# Patient Record
Sex: FEMALE | Race: WHITE | NOT HISPANIC OR LATINO | Employment: FULL TIME | URBAN - METROPOLITAN AREA
[De-identification: names, ages, dates, MRNs, and addresses within clinical notes are randomized per-mention and may not be internally consistent; named-entity substitution may affect disease eponyms.]

---

## 2017-01-04 ENCOUNTER — HOSPITAL ENCOUNTER (OUTPATIENT)
Dept: LAB | Facility: MEDICAL CENTER | Age: 47
End: 2017-01-04
Attending: INTERNAL MEDICINE
Payer: COMMERCIAL

## 2017-01-04 DIAGNOSIS — Z79.899 ENCOUNTER FOR LONG-TERM (CURRENT) USE OF HIGH-RISK MEDICATION: ICD-10-CM

## 2017-01-04 LAB
ANION GAP SERPL CALC-SCNC: 10 MMOL/L (ref 0–11.9)
BUN SERPL-MCNC: 18 MG/DL (ref 8–22)
CALCIUM SERPL-MCNC: 9.7 MG/DL (ref 8.5–10.5)
CHLORIDE SERPL-SCNC: 102 MMOL/L (ref 96–112)
CO2 SERPL-SCNC: 24 MMOL/L (ref 20–33)
CREAT SERPL-MCNC: 0.89 MG/DL (ref 0.5–1.4)
CRP SERPL HS-MCNC: 0.33 MG/DL (ref 0–0.75)
ERYTHROCYTE [SEDIMENTATION RATE] IN BLOOD BY WESTERGREN METHOD: 8 MM/HOUR (ref 0–20)
GLUCOSE SERPL-MCNC: 115 MG/DL (ref 65–99)
POTASSIUM SERPL-SCNC: 4.1 MMOL/L (ref 3.6–5.5)
SODIUM SERPL-SCNC: 136 MMOL/L (ref 135–145)

## 2017-01-04 PROCEDURE — 85652 RBC SED RATE AUTOMATED: CPT

## 2017-01-04 PROCEDURE — 80048 BASIC METABOLIC PNL TOTAL CA: CPT

## 2017-01-04 PROCEDURE — 36415 COLL VENOUS BLD VENIPUNCTURE: CPT

## 2017-01-04 PROCEDURE — 86140 C-REACTIVE PROTEIN: CPT

## 2017-01-06 ENCOUNTER — OFFICE VISIT (OUTPATIENT)
Dept: MEDICAL GROUP | Facility: MEDICAL CENTER | Age: 47
End: 2017-01-06
Payer: COMMERCIAL

## 2017-01-06 VITALS
SYSTOLIC BLOOD PRESSURE: 128 MMHG | DIASTOLIC BLOOD PRESSURE: 84 MMHG | BODY MASS INDEX: 34.72 KG/M2 | TEMPERATURE: 98.2 F | OXYGEN SATURATION: 98 % | HEIGHT: 66 IN | HEART RATE: 80 BPM | WEIGHT: 216 LBS

## 2017-01-06 DIAGNOSIS — Z00.00 PREVENTATIVE HEALTH CARE: Chronic | ICD-10-CM

## 2017-01-06 DIAGNOSIS — F41.9 ANXIETY: ICD-10-CM

## 2017-01-06 DIAGNOSIS — M13.0 POLYARTHRITIS: ICD-10-CM

## 2017-01-06 DIAGNOSIS — Z23 NEED FOR PNEUMOCOCCAL VACCINATION: ICD-10-CM

## 2017-01-06 DIAGNOSIS — I00 RHEUMATIC FEVER: Chronic | ICD-10-CM

## 2017-01-06 DIAGNOSIS — E66.9 NON MORBID OBESITY, UNSPECIFIED OBESITY TYPE: ICD-10-CM

## 2017-01-06 DIAGNOSIS — M54.5 LOW BACK PAIN, UNSPECIFIED BACK PAIN LATERALITY, UNSPECIFIED CHRONICITY, WITH SCIATICA PRESENCE UNSPECIFIED: Chronic | ICD-10-CM

## 2017-01-06 PROCEDURE — 90732 PPSV23 VACC 2 YRS+ SUBQ/IM: CPT | Performed by: INTERNAL MEDICINE

## 2017-01-06 PROCEDURE — 99214 OFFICE O/P EST MOD 30 MIN: CPT | Mod: 25 | Performed by: INTERNAL MEDICINE

## 2017-01-06 PROCEDURE — 90471 IMMUNIZATION ADMIN: CPT | Performed by: INTERNAL MEDICINE

## 2017-01-06 RX ORDER — LORAZEPAM 1 MG/1
1 TABLET ORAL PRN
Qty: 5 TAB | Refills: 0 | Status: SHIPPED | OUTPATIENT
Start: 2017-01-06 | End: 2017-01-06 | Stop reason: SDUPTHER

## 2017-01-06 RX ORDER — LORAZEPAM 1 MG/1
1 TABLET ORAL PRN
Qty: 5 TAB | Refills: 0 | Status: SHIPPED
Start: 2017-01-06 | End: 2017-03-21

## 2017-01-06 NOTE — PROGRESS NOTES
Subjective:      Janett Mnculty is a 46 y.o. female who presents with polyarthritis         HPI    Here for follow up polyarthritis sees  and tapering down prednisone 12.5 mg and joint pains still there but going down every week 2.5 mg, now both knees are painful and the right knee mri did show avascular necrosis, did see  and has mri left knee and ankles and pelvis and that test is pending, patient is claustrophobic with mri and will require sedation  Overall pain scale 5-6 /10 compared to 8-10 scale last month  Joints bother her with prolonged standing and walking   Has had echo recently showing normal heart valves  Swelling improving feet and ankles  No GI upset with prednisone, weight increased on prednisone, no skin rashes, no nodule recurrence, no fever or chills  No nsaids   On penicillin 250 mg twice daily for 5 years by infectious disease recommendation, recent echo no valvular dysfunction, no chest pain, short of breath, palpitations, fevers or chills, no rash or diarrhea related to prednisone therapy  No new skin rashes, no new nodules  Denies anxiety or depression on prednisone      Current Outpatient Prescriptions   Medication Sig Dispense Refill   • ascorbic acid (ASCORBIC ACID) 500 MG Tab Take 500 mg by mouth every day.     • naproxen (NAPROSYN) 500 MG Tab Take 500 mg by mouth 2 times a day, with meals.     • prednisONE (DELTASONE) 2.5 MG Tab Take 15 mg in evening daily 180 Tab 0   • predniSONE (DELTASONE) 20 MG Tab Take 1 Tab by mouth 2 times a day. 60 Tab 0   • TURMERIC PO Take  by mouth.     • therapeutic multivitamin-minerals (THERAGRAN-M) Tab Take 1 Tab by mouth every day.     • vitamin D (CHOLECALCIFEROL) 1000 UNIT Tab Take 1,000 Units by mouth every day.     • fluticasone (FLONASE) 50 MCG/ACT nasal spray Spray 2 Sprays in nose every day. (Patient taking differently: Spray 2 Sprays in nose as needed.) 16 g 3   • vitamin e (VITAMIN E) 400 UNIT CAPS Take 400 Units by mouth  every day.     • loratadine (CLARITIN) 10 MG TABS Take 10 mg by mouth as needed.       No current facility-administered medications for this visit.     Patient Active Problem List    Diagnosis Date Noted   • Avascular necrosis of right femur (HCC) 12/23/2016   • Obesity 09/16/2016   • Rheumatic fever 09/16/2016   • Foot pain, right 09/30/2015   • Perimenopausal 09/25/2015   • Dyslipidemia 09/18/2015   • Allergic rhinitis due to animal hair and dander 09/12/2013   • S/P bunionectomy 06/07/2013   • Varicose vein 10/07/2011   • S/P appendectomy 06/01/2011   • Left hip revision 2002 06/01/2011   • Left ankle surgery 06/01/2011   • Right hip arthroscopy  06/01/2011   • Low back pain 06/01/2011   • Cervical pain (neck) 06/01/2011   • IBS (irritable bowel syndrome) 06/01/2011   • Preventative health care 06/01/2011           Varicose vein excision left lower extremity    Status post bunionectomy  8/9/13  podiatry; right foot martín bunionectomy, right foot first metatarsophalangeal joint bunion/degenerative arthritis     Status post appendectomy    Right hip arthroscopy  2007  ortho right hip arthroscopy  2/11/14  note bilateral greater trochanter injection under ultrasound guidance    Rheumatic fever  8/29/16 streptococcal pharyngitis positive strep test, treated with augmentin, developed erythema nodosum lower extremities and palms given NSAIDs and medrol dosepack  9/22/16 repeat medrol dose pack x 1 still with painful erythema nodosum nodules  9/24/16 erythema nodosum nodules and arthritic-type pains, we will retreat with penicillin V 500 mg 3 times a day ×10 days  10/5/16 high dose aspirin no benefit, changed to prednisone 20 mg daily, she has an appointment with me in 2 days  10/7/16 echo ordered, EKG done  10/10/16 cbc,cmp,tsh normal,ESR 40,CRP 0.3, repeat throat culture negative, blood cultures negative,  10/19/16 increase prednisone to 40 mg x 3 days then back to 20  mg  10/19/16 daughter diagnosis strep throat given antibiotics, we will treat prophylactically provided patient penicillin V 500 mg tid x 10 days  10/21/16 echo normal LV size and function, EF 65%, trace MR structurally normal mitral valve, mild TR and RVSP 30  10/24/16 on prednisone 40 mg qday unable to taper to 20 mg due to flare up of pain will try 20 mg bid then taper to 20 mg am and 10 mg qpm over the next week  10/26/16  infectious disease recommended secondary prophylaxis penicillin  mg bid x 5 years  11/7/16 still with polyarthritis, on prednisone 20 mg twice a day, repeat labs, still on penicillin, will speak with rheumatology about referral  11/8/16 ESR 15,CRP 0.3,wbc 10.9 (on prednisone),hgb 14,hct 43,cmp normal,RF,C3C4,TPO,lyme,MERA,HLA B27 negative, Factin IgG 22 (0-19),parietal cell Ab 25(0-25)  11/14/16  rheumatolog note, features consistent with rheumatic fever, also consider seronegative spondyloarthropathies, sarcoidosis, rheumatoid arthritis, will check sacroiliac films, hand and feet x-rays, follow-up one week  11/17/16 refill prednisone  11/18/16 ACE serum level normal, G6PD ad vitamin 1,25 d level normal  11/21/16 cxr negative  11/21/16 xray SI joints negative for erosions  11/29/16 x-ray joint survey hands, feet, ankles no evidence of inflammatory arthropathy  11/29/16 CT soft tissue neck without; negative  12/7/16  cardiology repeat echo in 3-4 weeks  12/10/16 MRI right knee multiple areas right knee avascular necrosis medial and lateral femoral condyle, medial and lateral tibial plateau, bone marrow edema  12/23/16  rheumatology note, inflammatory markers did normalize with steroids, rheumatoid factor, CCP, MERA,ANKA negative continue to taper steroids given osteonecrosis alternating 17.5 mg and 15 mg, and 15 mg, and 15 mg alternating with 12.5 mg, and so forth  12/27/16 echo LV ejection fraction 60%, no valvular disease    Preventative health  11/05 [facility]  colon negative  9/25/15 tdap  10/7/15 pap per gyn  negative  9/29/16 chol 190,trig 52,hdl 65,ldl 115  10/110/16 vit d 31  10/26/16 mammogram    Perimenopausal  9/25/15 on climara patch and progesterone tab per  gyn    Low back pain  10/10 MRI LS L5-S1 minimal disc bulge  1/11  at the pain management left L3-S1 facet joint injection, right L3-S1 facet joint injection  2/11  pain management right L3-S1 lateral, medial, dorsal ramus nerve block  4/11  pain management left L3-S1 medial, lateral, bursal ramus nerve block  5/11  pain management bilateral SI joint injection under fluoroscopy  7/11  pain note bilat trochanteric bursitis injection  3/12  pain note, right and left L3-L4 L5-S1 medial, dorsal, lateral branch ablation  12/12  pain note, left L3-S1 dorsal and medial branch radiofrequency ablation  1/4/13  pain note; right L3-S1 radiofrequency ablation  8/14/13  pain note  9/29/13  pain note, left L3-S1 nerve frequency ablation  10/4/13  pain note, status post right L3-S1 FJNA  12/19/13 pain note; status post right SIJI injection, continue voltaren gel, TENS, IT stretches  5/16/14  pain note; left L3-S1 medial, partial, lateral branch radiofrequency ablation under fluoroscopy  6/6/14  right L3-S1 FJNA  7/3/14  pain note; continue TENS,celebrex 200 mg qday, voltaren gel 1%  9/10/14  pain note; continue TENS, lidoderm patch,voltaren gel, celebrex 200 mg qday    Left hip revision  History of left hip open decompression and osteotomy  12/03  left hip implant removal  11/10  ortho x-ray stable decompression left femoral head neck junction  11/12  pain management note bilateral trochanteric bursitis injection  5/2/13  pain note, bilateral trochanteric bursal  "injection    Left ankle surgery 2006 ligament reconstruction    IBS    Foot pain  8/5/15 MRI right foot severe artifact secondary to first MTP are clear, limiting analysis of soft tissue, highly likely complete FHL tear  8/28/15  orthopedic note right ankle FHL tendon rupture seen on MRI, do not recommend surgical repair at this time which would involve hemiarthroplasty, implant, first MTP fusion with bone graft, followup podiatry     Dyslipidemia  9/29/14 chol 186,trig 46,hdl 76,ldl 101  9/18/15 chol 225,trig 50,hdl 77,ldl 138    Cervical pain  10/11 MRI cervical spine C4-C5 tiny disc bulge, C5-C6 bilateral and plate spurring with uncinate hypertrophy  10/7/16 MRI cervical spine C5-C6 small broad-based osteophyte complex, borderline foraminal stenosis, no significant progression of disease since 2010    avascular necrosis right femur  12/10/16 MRI right knee multiple areas right knee avascular necrosis medial and lateral femoral condyle, medial and lateral tibial plateau, bone marrow edema  12/23/16  rheumatology note, inflammatory markers did normalize with steroids, rheumatoid factor, CCP, MERA,ANKA negative continue to taper steroids given osteonecrosis alternating 17.5 mg and 15 mg, and 15 mg, and 15 mg alternating with 12.5 mg, and so forth        ROS       Objective:     /84 mmHg  Pulse 80  Temp(Src) 36.8 °C (98.2 °F)  Ht 1.676 m (5' 6\")  Wt 97.977 kg (216 lb)  BMI 34.88 kg/m2  SpO2 98%     Physical Exam   Constitutional: She appears well-developed and well-nourished. No distress.   HENT:   Head: Normocephalic and atraumatic.   Eyes: Conjunctivae are normal. Right eye exhibits no discharge. Left eye exhibits no discharge.   Neck: No JVD present. No thyromegaly present.   Cardiovascular: Normal rate and regular rhythm.    No murmur heard.  Pulmonary/Chest: Effort normal. No respiratory distress. She has no wheezes.   Abdominal: She exhibits no distension.   Neurological: She " is alert.   Skin: She is not diaphoretic.   Psychiatric: She has a normal mood and affect. Her behavior is normal.   Nursing note and vitals reviewed.    No spinal tenderness, no hip tenderness, right knee normal range of motion, no crepitus, left knee normal range of motion, left hip and right hip normal flexion, extension, internal and external rotation  Normal dorsiflexion and plantar flexion  Trace lower extremity edema  Normal affect, insight, judgment          Assessment/Plan:     Assessment  #!  Polyarthralgias initially onset after streptococcal pharyngitis, did have nodules and findings consistent with rheumatic fever, nodules have subsided, she completed a course of penicillin for the streptococcal pharyngitis, currently followed by rheumatology, inflammatory markers including sedimentation rate and C-reactive protein have returned to baseline normal most recent tests done on January 4, she still has pain in her joints but not as severe, mostly affecting knees and ankles.  Remains on prednisone taper 12.5 milligrams per day, taper per rheumatology    #2 avascular necrosis right knee by MRI, also has left knee and ankle pain, has been seen by orthopedics  and MRIs of those other joints have been ordered as well    #3 weight gain related to prednisone    #4 history streptococcal pharyngitis and rheumatic fever, on long-term insulin therapy per infectious disease      Plan  #! Ativan for pre procedure sedation for upcoming MRI ordered per orthopedics    #2 MRI left knee, bilateral ankles and pelvis per surgery     #3 continue prednisone taper per rheumatology    #4 continue prednisone 250 mg twice a day ×5 years per infectious disease    #5 monitor for rheumatic disease affecting mitral valve per cardiology, recent echo revealed normal valvular function    #6 pneumococcal 23 vaccination because she has been on chronic prednisone therapy    #7 patient counseling and education with regards to  antibiotics, prednisone, rheumatic fever, discussed.  Follow-up orthopedics and rheumatology, greater than 50% of the visit spent discussing diet, nutrition, possibly starting aquatic therapy for weight gain and exercise, continue minimize alcohol, no tobacco, prednisone taper, monitor for side effects on prednisone, continuing penicillin twice a day dosing for 5 years    #8 minimize NSAIDs on prednisone

## 2017-01-06 NOTE — MR AVS SNAPSHOT
"        Janett Ann Page   2017 10:20 AM   Office Visit   MRN: [id number]    Department:  [facility]   Dept Phone:  [phone]    Description:  Female : 1970   Provider:  Franklin Gonzalez M.D.           Allergies as of 2017     Allergen Noted Reactions    Morphine 2008   Rash    Other Food 2013   Rash    Onions-cause headaches and facial flushing      You were diagnosed with     Rheumatic fever   [646933]       Need for pneumococcal vaccination   [897087]       Low back pain, unspecified back pain laterality, unspecified chronicity, with sciatica presence unspecified   [1034774]       Polyarthritis   [324330]       Anxiety   [473387]         Vital Signs     Blood Pressure Pulse Temperature Height Weight Body Mass Index    128/84 mmHg 80 36.8 °C (98.2 °F) 1.676 m (5' 6\") 97.977 kg (216 lb) 34.88 kg/m2    Oxygen Saturation Smoking Status                98% Never Smoker           Basic Information     Date Of Birth Sex Race Ethnicity Preferred Language    1970 Female White Non- English      Your appointments     2017  8:30 AM   Follow Up Visit with Starr Jackson M.D.   [facility] (--)    [address] [place] [address], [facility] [address]  [facility] [id number] [phone]           You will be receiving a confirmation call a few days before your appointment from our automated call confirmation system.            2017  2:00 PM   MR EXTREMITY WITHOUT (30) with [facility] MRI 1   IMAGING [facility] ([place])    [address]  [facility] [address]   [phone]            2017  2:30 PM   MR EXTREMITY WITHOUT (30) with PEREZ MRI 1   IMAGING [facility] ([place])    [address]  [facility] [address]   [phone]            2017  3:00 PM   MR EXTREMITY WITHOUT (30) with PEREZ MRI 1   IMAGING [facility] ([place])    [address]  [facility] [address]   [phone]            2017  3:30 PM   MR BODY WITH with [facility] MRI 1   IMAGING [facility] ([place])    [address] [facility]  [place] [address]   [phone]            Mar 08, 2017  8:00 AM   FOLLOW UP with Bryan Armenta M.D.   [facility] (--)    [address]  [place] [id number] [phone]              Problem List              ICD-10-CM Priority Class Noted - Resolved    S/P appendectomy Z90.49   6/1/2011 - Present    Left hip revision 2002 M21.959   6/1/2011 - Present    Left ankle surgery M25.572   6/1/2011 - Present    Right hip arthroscopy  M25.551   6/1/2011 - Present    Low back pain (Chronic) M54.5   6/1/2011 - Present    Cervical pain (neck) M54.2   6/1/2011 - Present    IBS (irritable bowel syndrome) K58.9   6/1/2011 - Present    Preventative health care (Chronic) Z00.00   6/1/2011 - Present    Varicose vein I86.8   10/7/2011 - Present    S/P bunionectomy Z98.890   6/7/2013 - Present    Allergic rhinitis due to animal hair and dander (Chronic) J30.81   9/12/2013 - Present    Dyslipidemia E78.5   9/18/2015 - Present    Perimenopausal (Chronic) N95.1   9/25/2015 - Present    Foot pain, right (Chronic) M79.671   9/30/2015 - Present    Obesity E66.9   9/16/2016 - Present    Rheumatic fever (Chronic) I00   9/16/2016 - Present    Avascular necrosis of right femur (HCC) (Chronic) M87.051   12/23/2016 - Present      Health Maintenance        Date Due Completion Dates    MAMMOGRAM 10/26/2017 10/26/2016, 9/15/2015, 9/8/2014, 8/29/2013, 4/22/2004    PAP SMEAR 10/7/2018 10/7/2015    IMM DTaP/Tdap/Td Vaccine (2 - Td) 9/25/2025 9/25/2015            Current Immunizations     Influenza TIV (IM) 12/9/2013, 1/9/2013    Influenza Vaccine Quad Inj (Preserved) 12/13/2016, 10/20/2015  2:00 AM, 11/19/2014    Pneumococcal polysaccharide vaccine (PPSV-23)  Incomplete    Tdap Vaccine 9/25/2015 11:06 AM    Tuberculin Skin Test 5/5/2014  1:40 PM, 6/3/2013  1:20 PM, 6/4/2012 12:30 PM, 7/1/2011      Below and/or attached are the medications your provider expects you to take.  Review all of your home medications and newly ordered medications with your provider and/or pharmacist. Follow medication instructions as directed by your provider and/or pharmacist. Please keep your medication list with you and share with your provider. Update the information when medications are discontinued, doses are changed, or new medications (including over-the-counter products) are added; and carry medication information at all times in the event of emergency situations     Allergies:  MORPHINE - Rash     OTHER FOOD - Rash               Medications  Valid as of: January 06, 2017 - 11:09 AM    Generic Name Brand Name Tablet Size Instructions for use    Ascorbic Acid (Tab) ascorbic acid 500 MG Take 500 mg by mouth every day.        Cholecalciferol (Tab) cholecalciferol 1000 UNIT Take 1,000 Units by mouth every day.        Fluticasone Propionate (Suspension) FLONASE 50 MCG/ACT Spray 2 Sprays in nose every day.        Loratadine (Tab) CLARITIN 10 MG Take 10 mg by mouth as needed.        LORazepam (Tab) ATIVAN 1 MG Take 1 Tab by mouth as needed for Anxiety (take one hour before procedure, max one per day).        Multiple Vitamins-Minerals (Tab) THERAGRAN-M  Take 1 Tab by mouth every day.        Naproxen (Tab) NAPROSYN 500 MG Take 500 mg by mouth 2 times a day, with meals.        PredniSONE (Tab) DELTASONE 20 MG Take 1 Tab by mouth 2 times a day.        PredniSONE (Tab) DELTASONE 2.5 MG Take 15 mg in evening daily        Turmeric   Take  by mouth.        Vitamin E (Cap) VITAMIN E 400 UNIT Take 400 Units by mouth every day.        .                 Medicines prescribed today were sent to:     [id number] - [place], [place] - [address] [address]    [address] [address] [place] [address]    Phone: [phone] Fax: [phone]    Open 24 Hours?: No      Medication refill instructions:       If your prescription bottle indicates you have medication refills left, it is not necessary to call your provider’s office.  Please contact your pharmacy and they will refill your medication.    If your prescription bottle indicates you do not have any refills left, you may request refills at any time through one of the following ways: The online [employer] system (except Urgent Care), by calling your provider’s office, or by asking your pharmacy to contact your provider’s office with a refill request. Medication refills are processed only during regular business hours and may not be available until the next business day. Your provider may request additional information or to have a follow-up visit with you prior to refilling your medication.   *Please Note: Medication refills are assigned a new Rx number when refilled electronically. Your pharmacy may indicate that no refills were authorized even though a new prescription for the same medication is available at the pharmacy. Please request the medicine by name with the pharmacy before contacting your provider for a refill.        Other Notes About Your Plan         12/1/16 wbc 11.5 (81%N,13%L),ESR 10,CRP 0.02,CCP 2, MERA neg,anti-DS DNA neg,uric 4.9,, hiv negative, ANCA neg,SSA and SSB negative,Sm Ab negative,Rnp negative,C3 and C4 negative,hep B sAb,hep B sAg and hep B cAb negative, hep C Ab negative, quantiferon gold negative,anti-thyroglobulin Ab negative, t.pallidum Ab negative   12/1/16 bun 23,creat 1.2,GFR 45  12/10/16 MRI right knee multiple areas right knee avascular necrosis medial and lateral femoral condyle, medial and lateral tibial plateau, bone marrow edema  12/23/16  rheumatology note, inflammatory markers did normalize with steroids, rheumatoid factor, CCP, MERA,ANKA negative continue to taper steroids given osteonecrosis alternating 17.5 mg and 15 mg, and 15 mg, and 15 mg alternating with 12.5 mg, and so forth                   [employer] Access Code: Activation code not generated  Current [employer] Status: Active

## 2017-01-12 ENCOUNTER — OFFICE VISIT (OUTPATIENT)
Dept: RHEUMATOLOGY | Facility: PHYSICIAN GROUP | Age: 47
End: 2017-01-12
Payer: COMMERCIAL

## 2017-01-12 VITALS
OXYGEN SATURATION: 96 % | RESPIRATION RATE: 12 BRPM | TEMPERATURE: 97.5 F | HEART RATE: 88 BPM | SYSTOLIC BLOOD PRESSURE: 140 MMHG | BODY MASS INDEX: 34.56 KG/M2 | WEIGHT: 214 LBS | DIASTOLIC BLOOD PRESSURE: 90 MMHG

## 2017-01-12 DIAGNOSIS — M79.672 PAIN IN BOTH FEET: ICD-10-CM

## 2017-01-12 DIAGNOSIS — I00 RHEUMATIC FEVER: Chronic | ICD-10-CM

## 2017-01-12 DIAGNOSIS — M54.5 LOW BACK PAIN, UNSPECIFIED BACK PAIN LATERALITY, UNSPECIFIED CHRONICITY, WITH SCIATICA PRESENCE UNSPECIFIED: Chronic | ICD-10-CM

## 2017-01-12 DIAGNOSIS — Z79.899 ENCOUNTER FOR LONG-TERM (CURRENT) USE OF HIGH-RISK MEDICATION: ICD-10-CM

## 2017-01-12 DIAGNOSIS — M79.671 PAIN IN BOTH FEET: ICD-10-CM

## 2017-01-12 PROCEDURE — 99214 OFFICE O/P EST MOD 30 MIN: CPT | Performed by: INTERNAL MEDICINE

## 2017-01-12 RX ORDER — PENICILLIN V POTASSIUM 250 MG/1
250 TABLET ORAL 4 TIMES DAILY
COMMUNITY
End: 2017-09-11

## 2017-01-12 ASSESSMENT — ENCOUNTER SYMPTOMS
FEVER: 0
CHILLS: 0
BACK PAIN: 1

## 2017-01-12 NOTE — MR AVS SNAPSHOT
Janett Urban Page   2017 8:30 AM   Office Visit   MRN: [id number]    Department:  [facility]umatology [facility]   Dept Phone:  [phone]    Description:  Female : 1970   Provider:  Starr Jackson M.D.           Reason for Visit     Follow-Up follow up      Allergies as of 2017     Allergen Noted Reactions    Morphine 2008   Rash    Other Food 2013   Rash    Onions-cause headaches and facial flushing      You were diagnosed with     Pain in both feet   [401401]         Vital Signs     Blood Pressure Pulse Temperature Respirations Weight Oxygen Saturation    140/90 mmHg 88 36.4 °C (97.5 °F) 12 97.07 kg (214 lb) 96%    Smoking Status                   Never Smoker            Basic Information     Date Of Birth Sex Race Ethnicity Preferred Language    1970 Female White Non- English      Your appointments     2017  2:00 PM   MR EXTREMITY WITHOUT (30) with [facility] MRI 1   [facility] ([facility])    [address]  [place] [address]   [phone]            2017  2:30 PM   MR EXTREMITY WITHOUT (30) with PEREZ MRI 1   [facility] ([facility])    [address]  [place] [address]   [phone]            2017  3:00 PM   MR EXTREMITY WITHOUT (30) with PEREZ MRI 1   [facility]ING [facility] ([facility])    [address]  [place] [address]   [phone]            2017  3:30 PM   MR BODY WITH with PEREZ MRI 1   IMAGING [facility] ([facility])    [address]  [place] [address]   [phone]            Feb 10, 2017 11:00 AM   Established Patient with Franklin Gonzalez M.D.   [facility] ([facility])    [address]  [facility] [id number] [phone]           You will be receiving a confirmation call a few days before your appointment from our automated call confirmation system.            2017  8:00 AM   Follow Up Visit with Starr Jackson M.D.   [facility] (--)    [address]  [place]  [place] [id number] [phone]           You will be receiving a confirmation call a few days before your appointment from our automated call confirmation system.            Mar 08, 2017  8:00 AM   FOLLOW UP with Bryan Armenta M.D.   [facility] (--)    [address]  [place] [id number] [phone]              Problem List              ICD-10-CM Priority Class Noted - Resolved    S/P appendectomy Z90.49   6/1/2011 - Present    Left hip revision 2002 M21.959   6/1/2011 - Present    Left ankle surgery M25.572   6/1/2011 - Present    Right hip arthroscopy  M25.551   6/1/2011 - Present    Low back pain (Chronic) M54.5   6/1/2011 - Present    Cervical pain (neck) M54.2   6/1/2011 - Present    IBS (irritable bowel syndrome) K58.9   6/1/2011 - Present    Preventative health care (Chronic) Z00.00   6/1/2011 - Present    Varicose vein I86.8   10/7/2011 - Present    S/P bunionectomy Z98.890   6/7/2013 - Present    Allergic rhinitis due to animal hair and dander (Chronic) J30.81   9/12/2013 - Present    Dyslipidemia E78.5   9/18/2015 - Present    Perimenopausal (Chronic) N95.1   9/25/2015 - Present    Foot pain, right (Chronic) M79.671   9/30/2015 - Present    Obesity E66.9   9/16/2016 - Present    Rheumatic fever (Chronic) I00   9/16/2016 - Present    Avascular necrosis of right femur (HCC) (Chronic) M87.051   12/23/2016 - Present      Health Maintenance        Date Due Completion Dates    MAMMOGRAM 10/26/2017 10/26/2016, 9/15/2015, 9/8/2014, 8/29/2013, 4/22/2004    PAP SMEAR 10/7/2018 10/7/2015    IMM DTaP/Tdap/Td Vaccine (2 - Td) 9/25/2025 9/25/2015            Current Immunizations     Influenza TIV (IM) 12/9/2013, 1/9/2013    Influenza Vaccine Quad Inj (Preserved) 12/13/2016, 10/20/2015  2:00 AM, 11/19/2014    Pneumococcal polysaccharide vaccine (PPSV-23) 1/6/2017 11:10 AM    Tdap Vaccine 9/25/2015 11:06 AM    Tuberculin Skin Test 5/5/2014  1:40 PM, 6/3/2013  1:20 PM,  6/4/2012 12:30 PM, 7/1/2011      Below and/or attached are the medications your provider expects you to take. Review all of your home medications and newly ordered medications with your provider and/or pharmacist. Follow medication instructions as directed by your provider and/or pharmacist. Please keep your medication list with you and share with your provider. Update the information when medications are discontinued, doses are changed, or new medications (including over-the-counter products) are added; and carry medication information at all times in the event of emergency situations     Allergies:  MORPHINE - Rash     OTHER FOOD - Rash               Medications  Valid as of: January 12, 2017 -  9:53 AM    Generic Name Brand Name Tablet Size Instructions for use    Ascorbic Acid (Tab) ascorbic acid 500 MG Take 500 mg by mouth every day.        Cholecalciferol (Tab) cholecalciferol 1000 UNIT Take 1,000 Units by mouth every day.        Fluticasone Propionate (Suspension) FLONASE 50 MCG/ACT Spray 2 Sprays in nose every day.        Loratadine (Tab) CLARITIN 10 MG Take 10 mg by mouth as needed.        LORazepam (Tab) ATIVAN 1 MG Take 1 Tab by mouth as needed for Anxiety (take one hour before procedure, max one per day).        Multiple Vitamins-Minerals (Tab) THERAGRAN-M  Take 1 Tab by mouth every day.        Naproxen (Tab) NAPROSYN 500 MG Take 500 mg by mouth 2 times a day, with meals.        Penicillin V Potassium (Tab) VEETID 250 MG Take 250 mg by mouth 4 times a day.        PredniSONE (Tab) DELTASONE 20 MG Take 1 Tab by mouth 2 times a day.        PredniSONE (Tab) DELTASONE 2.5 MG Take 15 mg in evening daily        Turmeric   Take  by mouth.        Vitamin E (Cap) VITAMIN E 400 UNIT Take 400 Units by mouth every day.        .                 Medicines prescribed today were sent to:     [id number] - [place], [place] - [address] [address]    [address] [address] [address]    Phone: [phone] Fax:  [phone]    Open 24 Hours?: No      Medication refill instructions:       If your prescription bottle indicates you have medication refills left, it is not necessary to call your provider’s office. Please contact your pharmacy and they will refill your medication.    If your prescription bottle indicates you do not have any refills left, you may request refills at any time through one of the following ways: The online [employer] system (except Urgent Care), by calling your provider’s office, or by asking your pharmacy to contact your provider’s office with a refill request. Medication refills are processed only during regular business hours and may not be available until the next business day. Your provider may request additional information or to have a follow-up visit with you prior to refilling your medication.   *Please Note: Medication refills are assigned a new Rx number when refilled electronically. Your pharmacy may indicate that no refills were authorized even though a new prescription for the same medication is available at the pharmacy. Please request the medicine by name with the pharmacy before contacting your provider for a refill.        Your To Do List     Future Labs/Procedures Complete By Expires    MR-FOOT-W/O LEFT  As directed 1/12/2018      Other Notes About Your Plan         12/1/16 wbc 11.5 (81%N,13%L),ESR 10,CRP 0.02,CCP 2, MERA neg,anti-DS DNA neg,uric 4.9,, hiv negative, ANCA neg,SSA and SSB negative,Sm Ab negative,Rnp negative,C3 and C4 negative,hep B sAb,hep B sAg and hep B cAb negative, hep C Ab negative, quantiferon gold negative,anti-thyroglobulin Ab negative, t.pallidum Ab negative   12/1/16 bun 23,creat 1.2,GFR 45  12/10/16 MRI right knee multiple areas right knee avascular necrosis medial and lateral femoral condyle, medial and lateral tibial plateau, bone marrow edema  12/23/16  rheumatology note, inflammatory markers did normalize with steroids, rheumatoid factor,  CCP, MERA,ANKA negative continue to taper steroids given osteonecrosis alternating 17.5 mg and 15 mg, and 15 mg, and 15 mg alternating with 12.5 mg, and so forth                   MyChart Access Code: Activation code not generated  Current [facility] Status: Active

## 2017-01-12 NOTE — Clinical Note
[facility]   [address], [address]  [place] [address]  Phone: [phone]  Fax: [phone]              Encounter Date: 1/12/2017    Dear Dr. Gonzalez,    It was a pleasure seeing your patient, Janett Mcnulty, on 1/12/2017. Diagnoses of Pain in both feet, Low back pain, unspecified back pain laterality, unspecified chronicity, with sciatica presence unspecified, Rheumatic fever, and Encounter for long-term (current) use of high-risk medication were pertinent to this visit.     Please find attached progress note which includes the history I obtained from Ms. Mcnulty, my physical examination findings, my impression and recommendations.      Once again, it was a pleasure participating in your patient's care.  Please feel free to contact me if you have any questions or if I can be of any further assistance to your patients.      Sincerely,    Starr Jackson M.D.  Electronically Signed          PROGRESS NOTE:  Subjective:   Date of Consultation:1/12/2017  8:46 AM  Primary care physician:Franklin Gonzalez M.D.      Reason for Consultation:  Ms. Mcnulty  is a pleasant 46 y.o. year old female who presents for follow-up of arthritis    Rheumatic Fever  Following cardiology  Has been seen by ID    Arthritis  SHe reports overall stable as she has tapered her prednisone. She is noticing that she on 2 consecutive nights as she is tapered some ankle pain that wakes her from in the middle of the night. This is new.  She denies persistent joint swelling but does feel stiffness in the morning.  She was on 20 mg for a week and today she decreased down to 15 mg daily.    Avascular necrosis of the knee  Appreciated on MRI.  She has an appointment with orthopedics next week.    Past Medical History   Diagnosis Date   • Pain 6/5/13     right foot   • Arthritis      osteoarthritis     Past Surgical History   Procedure Laterality Date   • Pr inj dx/ther agnt paravert facet joint, gómez*  1/21/2011     Performed by  EDMAR ELIAS at [facility]   • Pr inj dx/ther agnt paravert facet joint, gómez*  2/25/2011     Performed by EDMAR ELIAS at [facility]   • Pr dest,paravertebral,l/s,single  3/4/2011     Performed by EDMAR ELIAS at [facility]   • Inj,sacroiliac,anes/steroid  5/13/2011     Performed by EDMAR ELIAS at [facility]   • Pr inject nerv blck,stellate ganglion  9/2/2011     Performed by EDMAR ELIAS at [facility]   • Neuro dest facet l/s w/ig sngl  3/2/2012     Performed by EDMAR ELIAS at [facility]   • Neuro dest facet l/s w/ig addl  3/2/2012     Performed by EDMAR ELIAS at [facility]   • Neuro dest facet l/s w/ig addl  3/2/2012     Performed by EDMAR ELIAS at [facility]   • Neuro dest facet l/s w/ig sngl  3/9/2012     Performed by EDMAR ELIAS at [facility]   • Neuro dest facet l/s w/ig addl  3/9/2012     Performed by EDMAR ELIAS at [facility]   • Neuro dest facet l/s w/ig addl  3/9/2012     Performed by EDMAR ELIAS at [facility]   • Neuro dest facet l/s w/ig sngl  12/28/2012     Performed by Edmar Elias D.O. at [facility]   • Neuro dest facet l/s w/ig addl  12/28/2012     Performed by Edmar Elias D.O. at [facility]   • Neuro dest facet l/s w/ig addl  12/28/2012     Performed by Edmar Elias D.O. at [facility]   • Neuro dest facet l/s w/ig sngl  1/4/2013     Performed by Edmar Elias D.O. at [facility]   • Neuro dest facet l/s w/ig addl  1/4/2013     Performed by Edmar Elias D.O. at [facility]   • Neuro dest facet l/s w/ig addl  1/4/2013     Performed by Edmar Elias D.O. at [facility]   • Other  1998     left hip revision   • Other  2009     right hip revision   •  Tonsillectomy  2001   • Other  2007     left ankle tendon repair   • Appendectomy  1991   • Other       uterine ablation   • Bunionectomy  6/7/2013     Performed by Oliver Pratt D.P.M. at [facility]   • Neuro dest facet l/s w/ig sngl  9/20/2013     Performed by Farhan Rees D.O. at [facility]   • Neuro dest facet l/s w/ig addl  9/20/2013     Performed by Farhan Rees D.O. at [facility]   • Neuro dest facet l/s w/ig addl  9/20/2013     Performed by Farhan Rees D.O. at [facility]   • Neuro dest facet l/s w/ig addl  9/20/2013     Performed by Farahn Rees D.O. at [facility]   • Neuro dest facet l/s w/ig sngl  10/4/2013     Performed by Farhan Rees D.O. at [facility]   • Neuro dest facet l/s w/ig addl  10/4/2013     Performed by Farhan Rees D.O. at [facility]   • Neuro dest facet l/s w/ig addl  10/4/2013     Performed by Farhan Rees D.O. at [facility]   • Neuro dest facet l/s w/ig addl  10/4/2013     Performed by Farhan Rees D.O. at [facility]   • Inj,sacroiliac,anes/steroid  11/8/2013     Performed by Farhan Rees D.O. at [facility]   • Inj,sacroiliac,anes/steroid  12/6/2013     Performed by Farhan Rees D.O. at [facility]   • Inj,sacroiliac,anes/steroid  1/10/2014     Performed by Farhan Rees D.O. at [facility]   • Inj,sacroiliac,anes/steroid  1/10/2014     Performed by Farhan Rees D.O. at [facility]   • Neuro dest facet l/s w/ig sngl  5/16/2014     Performed by Farhan Rees D.O. at [facility]   • Neuro dest facet l/s w/ig addl  5/16/2014     Performed by Farhan Rees D.O. at [facility]   • Neuro dest facet l/s w/ig addl  5/16/2014     Performed by Farhan Rees D.O. at [facility]      • Neuro dest facet l/s w/ig addl  5/16/2014     Performed by Farhan Rees D.O. at [facility]   • Neuro dest facet l/s w/ig sngl  6/6/2014     Performed by Farhan Rees D.O. at [facility]   • Neuro dest facet l/s w/ig addl  6/6/2014     Performed by Farhan Rees D.O. at [facility]   • Neuro dest facet l/s w/ig addl  6/6/2014     Performed by Farhan Rees D.O. at [facility]   • Neuro dest facet l/s w/ig addl  6/6/2014     Performed by Farhan Rees D.O. at [facility]   • Toe arthroplasty  7/7/2014     Performed by Oliver Pratt D.P.M. at [facility]   • Inj,sacroiliac,anes/steroid  7/11/2014     Performed by Farhan Rees D.O. at [facility]   • Inj,sacroiliac,anes/steroid  7/11/2014     Performed by Farhan Rees D.O. at [facility]   • Neuro dest facet l/s w/ig sngl  2/6/2015     Performed by Farhan Rees D.O. at [facility]   • Neuro dest facet l/s w/ig addl  2/6/2015     Performed by Farhan Rees D.O. at [facility]   • Neuro dest facet l/s w/ig addl  2/6/2015     Performed by Farhan Rees D.O. at [facility]   • Neuro dest facet l/s w/ig sngl  2/13/2015     Performed by Farhan Rees D.O. at [facility]   • Neuro dest facet l/s w/ig addl  2/13/2015     Performed by Farhan Rees D.O. at [facility]   • Neuro dest facet l/s w/ig addl  2/13/2015     Performed by Farhan Rees D.O. at [facility]   • Inj,sacroiliac,anes/steroid  4/3/2015     Performed by Farhan Rees D.O. at [facility]   • Inj,sacroiliac,anes/steroid  4/3/2015     Performed by Farhan Rees D.O. at [facility]   • Pr dstr nrolytc agnt parverteb fct sngl lmbr/sacral Left 10/9/2015     Procedure: NEURO DEST FACET L/S W/IG SNGL - L3-S1;   Surgeon: Farhan Rees D.O.;  Location: [facility];  Service: Pain Management   • Pr dstr nrolytc agnt parverteb fct addl lmbr/sacral  10/9/2015     Procedure: NEURO DEST FACET L/S W/IG ADDL;  Surgeon: Farhan Rees D.O.;  Location: [facility];  Service: Pain Management   • Pr inject rx other periph nerve  10/9/2015     Procedure: NEUROLYTIC DEST-OTHER NERVE;  Surgeon: Farhan Rees D.O.;  Location: [facility];  Service: Pain Management   • Pr dstr nrolytc agnt parverteb fct addl lmbr/sacral  10/9/2015     Procedure: NEURO DEST FACET L/S W/IG ADDL;  Surgeon: Farhan Rees D.O.;  Location: SURGERY [facility];  Service: Pain Management   • Pr dstr nrolytc agnt parverteb fct sngl lmbr/sacral Right 10/16/2015     Procedure: NEURO DEST FACET L/S W/IG SNGL - L3-S1;  Surgeon: Farhan Rees D.O.;  Location: [facility];  Service: Pain Management   • Pr dstr nrolytc agnt parverteb fct addl lmbr/sacral  10/16/2015     Procedure: NEURO DEST FACET L/S W/IG ADDL;  Surgeon: Farhan Rees D.O.;  Location: SURGERY [facility];  Service: Pain Management   • Pr inject rx other periph nerve  10/16/2015     Procedure: NEUROLYTIC DEST-OTHER NERVE;  Surgeon: Farhan Rees D.O.;  Location: [facility];  Service: Pain Management   • Pr dstr nrolytc agnt parverteb fct addl lmbr/sacral  10/16/2015     Procedure: NEURO DEST FACET L/S W/IG ADDL;  Surgeon: Farhan Rees D.O.;  Location: SUR[facility]ERY [facility];  Service: Pain Management   • Pr dstr nrolytc agnt parverteb fct sngl lmbr/sacral Left 5/6/2016     Procedure: NEURO DEST FACET L/S W/IG SNGL - L3-S1;  Surgeon: Farhan G Rees, D.O.;  Location: [facility];  Service: Pain Management   • Pr dstr nrolytc agnt parverteb fct addl lmbr/sacral Left 5/6/2016     Procedure: NEURO DEST FACET L/S W/IG ADDL;  Surgeon: Farhan Rees D.O.;  Location:  [facility]URGERY [facility];  Service: Pain Management   • Pr inject rx other periph nerve Left 5/6/2016     Procedure: NEUROLYTIC DEST-OTHER NERVE;  Surgeon: Farhan Rees D.O.;  Location: [facility];  Service: Pain Management   • Pr dstr nrolytc agnt parverteb fct addl lmbr/sacral  5/6/2016     Procedure: NEURO DEST FACET L/S W/IG ADDL;  Surgeon: Farhan Rees D.O.;  Location: [facility];  Service: Pain Management   • Pr dstr nrolytc agnt parverteb fct sngl lmbr/sacral Right 6/24/2016     Procedure: NEURO DEST FACET L/S W/IG SNGL - L3-S1;  Surgeon: Farhan Rees D.O.;  Location: [facility];  Service: Pain Management   • Pr dstr nrolytc agnt parverteb fct addl lmbr/sacral Right 6/24/2016     Procedure: NEURO DEST FACET L/S W/IG ADDL;  Surgeon: Farhan Rees D.O.;  Location: [facility];  Service: Pain Management   • Pr inject rx other periph nerve Right 6/24/2016     Procedure: NEUROLYTIC DEST-OTHER NERVE;  Surgeon: Farhan Rees D.O.;  Location: [facility];  Service: Pain Management   • Pr dstr nrolytc agnt parverteb fct addl lmbr/sacral  6/24/2016     Procedure: NEURO DEST FACET L/S W/IG ADDL;  Surgeon: Farhan Rees D.O.;  Location: [facility];  Service: Pain Management     Allergies   Allergen Reactions   • Morphine Rash   • Other Food Rash     Onions-cause headaches and facial flushing     Outpatient Encounter Prescriptions as of 1/12/2017   Medication Sig Dispense Refill   • penicillin v potassium (VEETID) 250 MG Tab Take 250 mg by mouth 4 times a day.     • ascorbic acid (ASCORBIC ACID) 500 MG Tab Take 500 mg by mouth every day.     • naproxen (NAPROSYN) 500 MG Tab Take 500 mg by mouth 2 times a day, with meals.     • prednisONE (DELTASONE) 2.5 MG Tab Take 15 mg in evening daily 180 Tab 0   • predniSONE (DELTASONE) 20 MG Tab Take 1 Tab by mouth 2 times a day. 60 Tab 0   • TURMERIC PO Take  by  mouth.     • therapeutic multivitamin-minerals (THERAGRAN-M) Tab Take 1 Tab by mouth every day.     • vitamin D (CHOLECALCIFEROL) 1000 UNIT Tab Take 1,000 Units by mouth every day.     • vitamin e (VITAMIN E) 400 UNIT CAPS Take 400 Units by mouth every day.     • lorazepam (ATIVAN) 1 MG Tab Take 1 Tab by mouth as needed for Anxiety (take one hour before procedure, max one per day). 5 Tab 0   • fluticasone (FLONASE) 50 MCG/ACT nasal spray Spray 2 Sprays in nose every day. (Patient taking differently: Spray 2 Sprays in nose as needed.) 16 g 3   • loratadine (CLARITIN) 10 MG TABS Take 10 mg by mouth as needed.       No facility-administered encounter medications on file as of 1/12/2017.       Social History     Social History   • Marital Status:      Spouse Name: N/A   • Number of Children: N/A   • Years of Education: N/A     Occupational History   • Not on file.     Social History Main Topics   • Smoking status: Never Smoker    • Smokeless tobacco: Never Used   • Alcohol Use: 5.4 oz/week     2 Glasses of wine, 7 Standard drinks or equivalent per week      Comment: 2 drinks per week   • Drug Use: No   • Sexual Activity: Not on file     Other Topics Concern   • Not on file     Social History Narrative      History   Smoking status   • Never Smoker    Smokeless tobacco   • Never Used     History   Alcohol Use   • 5.4 oz/week   • 2 Glasses of wine, 7 Standard drinks or equivalent per week     Comment: 2 drinks per week     History   Drug Use No      OB History   No data available      No LMP recorded. Patient has had an ablation.    G P A L     Family History   Problem Relation Age of Onset   • Diabetes     • Hypertension     • Cancer         Review of Systems   Constitutional: Negative for fever and chills.   HENT:        Neck and face swelling   Respiratory:        She feels she has an upper respiratory congestion   Musculoskeletal: Positive for back pain and joint pain.        Pain now also in upper back and  shoulders   Skin: Negative for rash.        Skin does feel tight  She denies Raynauds        Objective:   /90 mmHg  Pulse 88  Temp(Src) 36.4 °C (97.5 °F)  Resp 12  Wt 97.07 kg (214 lb)  SpO2 96%  Left 144/96  right 142/92  Physical Exam   Constitutional: She is oriented to person, place, and time. She appears well-developed and well-nourished. No distress.   HENT:   Head: Normocephalic and atraumatic.   Right Ear: External ear normal.   Left Ear: External ear normal.   Mild cushingoid facies   Eyes: Conjunctivae are normal. Right eye exhibits no discharge. Left eye exhibits no discharge.   Neck: No thyromegaly present.   Cardiovascular: Normal rate, regular rhythm and normal heart sounds.    Pulmonary/Chest: Effort normal. No stridor. No respiratory distress. She has no wheezes. She has no rales.   Abdominal: Soft.   Mild tenderness on DEEP palpation.   Musculoskeletal: She exhibits no edema.   There is mild tenderness on palpation of the Achilles on the L>> R and at the inertion of the plantar fascia.  Everything there can be any   No synovitis appreciated in the wrists, MCP, PIP  Trace edema of the lower extremities.  Left ankle appears swollen.   Neurological: She is alert and oriented to person, place, and time.   Skin: Skin is warm and dry. She is not diaphoretic. No erythema.   Limited exam   Psychiatric: She has a normal mood and affect. Her behavior is normal. Judgment and thought content normal.         Labs    Lab Results   Component Value Date/Time    QUANTIFERON TB GOLD Negative 12/01/2016 04:04 PM     Lab Results   Component Value Date/Time    HEPATITIS B CCORE AB, TOTAL Negative 12/01/2016 04:04 PM    HEPATITIS B SURFACE ANTIGEN Negative 12/01/2016 04:04 PM     Lab Results   Component Value Date/Time    HEPATITIS C ANTIBODY Negative 12/01/2016 04:04 PM     Lab Results   Component Value Date/Time    SODIUM 136 01/04/2017 03:54 PM    POTASSIUM 4.1 01/04/2017 03:54 PM    CHLORIDE 102 01/04/2017  03:54 PM    CO2 24 01/04/2017 03:54 PM    GLUCOSE 115* 01/04/2017 03:54 PM    BUN 18 01/04/2017 03:54 PM    CREATININE 0.89 01/04/2017 03:54 PM      Lab Results   Component Value Date/Time    WBC 11.5* 12/01/2016 04:04 PM    RBC 4.35 12/01/2016 04:04 PM    HEMOGLOBIN 14.1 12/01/2016 04:04 PM    HEMATOCRIT 42.7 12/01/2016 04:04 PM    MCV 98.2* 12/01/2016 04:04 PM    MCH 32.4 12/01/2016 04:04 PM    MCHC 33.0* 12/01/2016 04:04 PM    MPV 9.5 12/01/2016 04:04 PM    NEUTROPHILS-POLYS 81.40* 12/01/2016 04:04 PM    LYMPHOCYTES 13.40* 12/01/2016 04:04 PM    MONOCYTES 4.40 12/01/2016 04:04 PM    EOSINOPHILS 0.00 12/01/2016 04:04 PM    BASOPHILS 0.30 12/01/2016 04:04 PM      Lab Results   Component Value Date/Time    CALCIUM 9.7 01/04/2017 03:54 PM    AST(SGOT) 15 12/01/2016 04:04 PM    ALT(SGPT) 31 12/01/2016 04:04 PM    ALKALINE PHOSPHATASE 48 12/01/2016 04:04 PM    TOTAL BILIRUBIN 0.4 12/01/2016 04:04 PM    ALBUMIN 4.3 12/01/2016 04:04 PM    TOTAL PROTEIN 7.4 12/01/2016 04:04 PM     Lab Results   Component Value Date/Time    URIC ACID 4.9 12/01/2016 04:04 PM    RHEUMATOID FACTOR -NEPH- <10 11/08/2016 01:17 PM    CCP ANTIBODIES 2 12/01/2016 04:04 PM    ANTINUCLEAR ANTIBODY None Detected 12/01/2016 04:03 PM     Lab Results   Component Value Date/Time    SED RATE WESTERGREN 8 01/04/2017 03:54 PM    STAT C-REACTIVE PROTEIN 0.33 01/04/2017 03:54 PM     No results found for: MICHAEL SANTIAGO  Lab Results   Component Value Date/Time    C3 COMPLEMENT 152.0 12/01/2016 04:04 PM    COMPLEMENT C4 28.0 12/01/2016 04:04 PM     Lab Results   Component Value Date/Time    ANTI-DNA -DS None Detected 12/01/2016 04:04 PM    RNP ANTIBODIES 0 12/01/2016 04:04 PM    SMITH ANTIBODIES 0 12/01/2016 04:04 PM    ANTI-SCL-70 0 11/08/2016 01:17 PM     Lab Results   Component Value Date/Time    ANTI-DNA -DS None Detected 12/01/2016 04:04 PM    ANCA IGG <1:20 12/01/2016 04:04 PM    C3 COMPLEMENT 152.0 12/01/2016 04:04 PM    RNP ANTIBODIES 0  12/01/2016 04:04 PM    SJOGREN'S ANTI-SS-B 0 12/01/2016 04:04 PM     Lab Results   Component Value Date/Time    COLOR Colorless 12/01/2016 04:03 PM    SPECIFIC GRAVITY 1.008 12/01/2016 04:03 PM    PH 6.5 12/01/2016 04:03 PM    GLUCOSE Negative 12/01/2016 04:03 PM    KETONES Negative 12/01/2016 04:03 PM    PROTEIN Negative 12/01/2016 04:03 PM     No results found for: TOTPROTUR, TOTALVOLUME, [id number]  Lab Results   Component Value Date/Time    SSA 60 (R0)(KATHERINE) AB, IGG 0 12/01/2016 04:04 PM    SSA 52 (R0)(KATHERINE) AB, IGG 5 12/01/2016 04:04 PM     No results found for: HBA1C  No results found for: CPKTOTAL  Lab Results   Component Value Date/Time    G-6-PD 13.7 11/18/2016 03:18 PM     No results found for: [id number]  Lab Results   Component Value Date/Time    ACE 11 11/18/2016 03:18 PM     Lab Results   Component Value Date/Time    25-HYDROXY   VITAMIN D 25 31 10/10/2016 02:25 PM     No results found for: TSH, FREEDIR  Lab Results   Component Value Date/Time    TSH 1.530 11/28/2016 05:54 PM     Lab Results   Component Value Date/Time    MICROSOMAL -TPO- ABS 0.5 11/08/2016 01:17 PM    ANTI-THYROGLOBULIN <0.9 12/01/2016 04:04 PM     Lab Results   Component Value Date/Time    LYME DISEASE ABS, IGG 0.49 11/08/2016 01:17 PM     Lab Results   Component Value Date/Time    F-ACTIN AB, IGG 22* 11/08/2016 01:17 PM     No results found for: IGA, TTRANSIGA, ENDOIGA  No results found for: FLTYPE, CRYSTALSBDF  No results found for: ISTATICAL  No results found for: ISTATCREAT  No results found for: CTELOPEP  No results found for: GBMABG  No results found for: PTHINTACT    Assessment:     1. Pain in both feet  MR-FOOT-W/O LEFT   2. Low back pain, unspecified back pain laterality, unspecified chronicity, with sciatica presence unspecified     3. Rheumatic fever     4. Encounter for long-term (current) use of high-risk medication           Medical Decision Making:  Today's Assessment / Status / Plan:     Ms. Janett Urban Page presents  for arthritis    Arthralgia    RF, CCP, MERA, ANCA , HLA B27 negative.  Her inflammatory markers did normalize with steroids.  We will continue to taper her steroids given the finding of osteonecrosis.  She has been on 10 mg since Monday, January 9. We will continue a taper with alternating doses for a few days before transitioning to a stepdown at an interval of 2.5 mg of prednisone.    She had tapered her prednisone she is reporting some mild arthralgias increased tenderness in her feet. On exam today there is bilateral tenderness over the Achilles tendon in the insertion site the need to concern for possible enthesitis. We will continue to monitor however suspicious that we may need to do consider sulfasalazine for possible spondyloarthropathy. Given the tenderness in that area worse in the left and previous exam there is tenderness on the left I would like to order an MRI to evaluate for any tendinitis tenosynovitis that can be appreciated.    Osteonecrosis  Seeing orthopedics. Pending additional MRIs    Rheumatic fever  Repeat echo was normal  On antibiotics    Encounter high risk medications  On chronic steroids  Advise to continue calcium and vitamin D      1. Pain in both feet  MR-FOOT-W/O LEFT   2. Low back pain, unspecified back pain laterality, unspecified chronicity, with sciatica presence unspecified     3. Rheumatic fever     4. Encounter for long-term (current) use of high-risk medication       Return in about 6 weeks (around 2/23/2017).      1. Pain in both feet  MR-FOOT-W/O LEFT   2. Low back pain, unspecified back pain laterality, unspecified chronicity, with sciatica presence unspecified     3. Rheumatic fever     4. Encounter for long-term (current) use of high-risk medication        She agreed and verbalized her agreement and understanding with the current plan. I answered all questions and concerns she has at this time and advised her to call at any time in there interim with questions or  concerns in regards to her care.    Thank you for allowing me to participate in her care, I will continue to follow closely.

## 2017-01-12 NOTE — PROGRESS NOTES
Subjective:   Date of Consultation:1/12/2017  8:46 AM  Primary care physician:Franklin Gonzalez M.D.      Reason for Consultation:  Ms. Mcnulty  is a pleasant 46 y.o. year old female who presents for follow-up of arthritis    Rheumatic Fever  Following cardiology  Has been seen by ID    Arthritis  SHe reports overall stable as she has tapered her prednisone. She is noticing that she on 2 consecutive nights as she is tapered some ankle pain that wakes her from in the middle of the night. This is new.  She denies persistent joint swelling but does feel stiffness in the morning.  She was on 20 mg for a week and today she decreased down to 15 mg daily.    Avascular necrosis of the knee  Appreciated on MRI.  She has an appointment with orthopedics next week.    Past Medical History   Diagnosis Date   • Pain 6/5/13     right foot   • Arthritis      osteoarthritis     Past Surgical History   Procedure Laterality Date   • Pr inj dx/ther agnt paravert facet joint, gómez*  1/21/2011     Performed by EDMAR ELIAS at [facility]   • Pr inj dx/ther agnt paravert facet joint, gómez*  2/25/2011     Performed by EDMAR ELIAS at [facility]   • Pr dest,paravertebral,l/s,single  3/4/2011     Performed by EDMAR ELIAS at [facility]   • Inj,sacroiliac,anes/steroid  5/13/2011     Performed by EDMAR ELIAS at [facility]   • Pr inject nerv blck,stellate ganglion  9/2/2011     Performed by EDMAR ELIAS at [facility]   • Neuro dest facet l/s w/ig sngl  3/2/2012     Performed by EDMAR ELIAS at [facility]   • Neuro dest facet l/s w/ig addl  3/2/2012     Performed by EDMAR ELIAS at [facility]   • Neuro dest facet l/s w/ig addl  3/2/2012     Performed by EDMAR ELIAS at [facility]   • Neuro dest facet l/s w/ig sngl  3/9/2012     Performed by EDMAR ELIAS at [facility]    • Neuro dest facet l/s w/ig addl  3/9/2012     Performed by EDMAR ELIAS at [facility]   • Neuro dest facet l/s w/ig addl  3/9/2012     Performed by EDMAR ELIAS at [facility]   • Neuro dest facet l/s w/ig sngl  12/28/2012     Performed by Edmar Elias D.O. at [facility]   • Neuro dest facet l/s w/ig addl  12/28/2012     Performed by Edmar Elias D.O. at [facility]   • Neuro dest facet l/s w/ig addl  12/28/2012     Performed by Edmar Elias D.O. at [facility]   • Neuro dest facet l/s w/ig sngl  1/4/2013     Performed by Edmar Elias D.O. at [facility]   • Neuro dest facet l/s w/ig addl  1/4/2013     Performed by Edmar Elias D.O. at [facility]   • Neuro dest facet l/s w/ig addl  1/4/2013     Performed by Edmar Elias D.O. at [facility]   • Other  1998     left hip revision   • Other  2009     right hip revision   • Tonsillectomy  2001   • Other  2007     left ankle tendon repair   • Appendectomy  1991   • Other       uterine ablation   • Bunionectomy  6/7/2013     Performed by Oliver Pratt D.P.M. at [facility]   • Neuro dest facet l/s w/ig sngl  9/20/2013     Performed by Edmar Elias D.O. at [facility]   • Neuro dest facet l/s w/ig addl  9/20/2013     Performed by Edmar Elias D.O. at [facility]   • Neuro dest facet l/s w/ig addl  9/20/2013     Performed by Edmar Elias D.O. at [facility]   • Neuro dest facet l/s w/ig addl  9/20/2013     Performed by Edmar Elias D.O. at [facility]   • Neuro dest facet l/s w/ig sngl  10/4/2013     Performed by Edmar Elias D.O. at [facility]   • Neuro dest facet l/s w/ig addl  10/4/2013     Performed by Edmar Elias D.O. at [facility]   • Neuro dest facet l/s w/ig addl   10/4/2013     Performed by Farhan Rees D.O. at [facility]   • Neuro dest facet l/s w/ig addl  10/4/2013     Performed by Farhan Rees D.O. at [facility]   • Inj,sacroiliac,anes/steroid  11/8/2013     Performed by Farhan Rees D.O. at [facility]   • Inj,sacroiliac,anes/steroid  12/6/2013     Performed by Farhan Rees D.O. at [facility]   • Inj,sacroiliac,anes/steroid  1/10/2014     Performed by Farhan Rees D.O. at [facility]   • Inj,sacroiliac,anes/steroid  1/10/2014     Performed by Farhan Rees D.O. at [facility]   • Neuro dest facet l/s w/ig sngl  5/16/2014     Performed by Farhan Rees D.O. at [facility]   • Neuro dest facet l/s w/ig addl  5/16/2014     Performed by Farhan Rees D.O. at [facility]   • Neuro dest facet l/s w/ig addl  5/16/2014     Performed by Farhan Rees D.O. at [facility]   • Neuro dest facet l/s w/ig addl  5/16/2014     Performed by Farhan Rees D.O. at [facility]   • Neuro dest facet l/s w/ig sngl  6/6/2014     Performed by Farhan Rees D.O. at [facility]   • Neuro dest facet l/s w/ig addl  6/6/2014     Performed by Farhan Rees D.O. at [facility]   • Neuro dest facet l/s w/ig addl  6/6/2014     Performed by Farhan Rees D.O. at [facility]   • Neuro dest facet l/s w/ig addl  6/6/2014     Performed by Farhan Rees D.O. at [facility]   • Toe arthroplasty  7/7/2014     Performed by Oliver Pratt D.P.M. at [facility]   • Inj,sacroiliac,anes/steroid  7/11/2014     Performed by Farhan Rees D.O. at [facility]   • Inj,sacroiliac,anes/steroid  7/11/2014     Performed by Farhan Rees D.O. at [facility]   • Neuro dest facet l/s w/ig sngl  2/6/2015      Performed by Farhan Rees D.O. at [facility]   • Neuro dest facet l/s w/ig addl  2/6/2015     Performed by Farhan Rees D.O. at [facility]   • Neuro dest facet l/s w/ig addl  2/6/2015     Performed by Farhan Rees D.O. at [facility]   • Neuro dest facet l/s w/ig sngl  2/13/2015     Performed by Farhan Rees D.O. at [facility]   • Neuro dest facet l/s w/ig addl  2/13/2015     Performed by Farhan Rees D.O. at [facility]   • Neuro dest facet l/s w/ig addl  2/13/2015     Performed by Farhan Rees D.O. at [facility]   • Inj,sacroiliac,anes/steroid  4/3/2015     Performed by Farhan Rees D.O. at [facility]   • Inj,sacroiliac,anes/steroid  4/3/2015     Performed by Farhan Rees D.O. at [facility]   • Pr dstr nrolytc agnt parverteb fct sngl lmbr/sacral Left 10/9/2015     Procedure: NEURO DEST FACET L/S W/IG SNGL - L3-S1;  Surgeon: Farhan Rees D.O.;  Location: [facility];  Service: Pain Management   • Pr dstr nrolytc agnt parverteb fct addl lmbr/sacral  10/9/2015     Procedure: NEURO DEST FACET L/S W/IG ADDL;  Surgeon: Farhan Rees D.O.;  Location: [facility];  Service: Pain Management   • Pr inject rx other periph nerve  10/9/2015     Procedure: NEUROLYTIC DEST-OTHER NERVE;  Surgeon: Farhan Rees D.O.;  Location: [facility];  Service: Pain Management   • Pr dstr nrolytc agnt parverteb fct addl lmbr/sacral  10/9/2015     Procedure: NEURO DEST FACET L/S W/IG ADDL;  Surgeon: Farhan Rees D.O.;  Location: [facility];  Service: Pain Management   • Pr dstr nrolytc agnt parverteb fct sngl lmbr/sacral Right 10/16/2015     Procedure: NEURO DEST FACET L/S W/IG SNGL - L3-S1;  Surgeon: Farhan Rees D.O.;  Location: [facility];  Service: Pain Management   • Pr dstr  nrolytc agnt parverteb fct addl lmbr/sacral  10/16/2015     Procedure: NEURO DEST FACET L/S W/IG ADDL;  Surgeon: Farhan Rees D.O.;  Location: [facility];  Service: Pain Management   • Pr inject rx other periph nerve  10/16/2015     Procedure: NEUROLYTIC DEST-OTHER NERVE;  Surgeon: Farhan Rees D.O.;  Location: [facility];  Service: Pain Management   • Pr dstr nrolytc agnt parverteb fct addl lmbr/sacral  10/16/2015     Procedure: NEURO DEST FACET L/S W/IG ADDL;  Surgeon: Farhan Rees D.O.;  Location: [facility];  Service: Pain Management   • Pr dstr nrolytc agnt parverteb fct sngl lmbr/sacral Left 5/6/2016     Procedure: NEURO DEST FACET L/S W/IG SNGL - L3-S1;  Surgeon: Farhan Rees D.O.;  Location: [facility];  Service: Pain Management   • Pr dstr nrolytc agnt parverteb fct addl lmbr/sacral Left 5/6/2016     Procedure: NEURO DEST FACET L/S W/IG ADDL;  Surgeon: Farhan Rees D.O.;  Location: [facility];  Service: Pain Management   • Pr inject rx other periph nerve Left 5/6/2016     Procedure: NEUROLYTIC DEST-OTHER NERVE;  Surgeon: Farhan Rees D.O.;  Location: [facility];  Service: Pain Management   • Pr dstr nrolytc agnt parverteb fct addl lmbr/sacral  5/6/2016     Procedure: NEURO DEST FACET L/S W/IG ADDL;  Surgeon: Farhan Rees D.O.;  Location: [facility];  Service: Pain Management   • Pr dstr nrolytc agnt parverteb fct sngl lmbr/sacral Right 6/24/2016     Procedure: NEURO DEST FACET L/S W/IG SNGL - L3-S1;  Surgeon: Farhan Rees D.O.;  Location: [facility];  Service: Pain Management   • Pr dstr nrolytc agnt parverteb fct addl lmbr/sacral Right 6/24/2016     Procedure: NEURO DEST FACET L/S W/IG ADDL;  Surgeon: Farhan Rees D.O.;  Location: [facility];  Service: Pain Management   • Pr inject rx other periph nerve Right 6/24/2016      Procedure: NEUROLYTIC DEST-OTHER NERVE;  Surgeon: Farhan Rees D.O.;  Location: [facility];  Service: Pain Management   • Pr dstr nrolytc agnt parverteb fct addl lmbr/sacral  6/24/2016     Procedure: NEURO DEST FACET L/S W/IG ADDL;  Surgeon: Farhan Rees D.O.;  Location: [facility];  Service: Pain Management     Allergies   Allergen Reactions   • Morphine Rash   • Other Food Rash     Onions-cause headaches and facial flushing     Outpatient Encounter Prescriptions as of 1/12/2017   Medication Sig Dispense Refill   • penicillin v potassium (VEETID) 250 MG Tab Take 250 mg by mouth 4 times a day.     • ascorbic acid (ASCORBIC ACID) 500 MG Tab Take 500 mg by mouth every day.     • naproxen (NAPROSYN) 500 MG Tab Take 500 mg by mouth 2 times a day, with meals.     • prednisONE (DELTASONE) 2.5 MG Tab Take 15 mg in evening daily 180 Tab 0   • predniSONE (DELTASONE) 20 MG Tab Take 1 Tab by mouth 2 times a day. 60 Tab 0   • TURMERIC PO Take  by mouth.     • therapeutic multivitamin-minerals (THERAGRAN-M) Tab Take 1 Tab by mouth every day.     • vitamin D (CHOLECALCIFEROL) 1000 UNIT Tab Take 1,000 Units by mouth every day.     • vitamin e (VITAMIN E) 400 UNIT CAPS Take 400 Units by mouth every day.     • lorazepam (ATIVAN) 1 MG Tab Take 1 Tab by mouth as needed for Anxiety (take one hour before procedure, max one per day). 5 Tab 0   • fluticasone (FLONASE) 50 MCG/ACT nasal spray Spray 2 Sprays in nose every day. (Patient taking differently: Spray 2 Sprays in nose as needed.) 16 g 3   • loratadine (CLARITIN) 10 MG TABS Take 10 mg by mouth as needed.       No facility-administered encounter medications on file as of 1/12/2017.       Social History     Social History   • Marital Status:      Spouse Name: N/A   • Number of Children: N/A   • Years of Education: N/A     Occupational History   • Not on file.     Social History Main Topics   • Smoking status: Never Smoker    •  Smokeless tobacco: Never Used   • Alcohol Use: 5.4 oz/week     2 Glasses of wine, 7 Standard drinks or equivalent per week      Comment: 2 drinks per week   • Drug Use: No   • Sexual Activity: Not on file     Other Topics Concern   • Not on file     Social History Narrative      History   Smoking status   • Never Smoker    Smokeless tobacco   • Never Used     History   Alcohol Use   • 5.4 oz/week   • 2 Glasses of wine, 7 Standard drinks or equivalent per week     Comment: 2 drinks per week     History   Drug Use No      OB History   No data available      No LMP recorded. Patient has had an ablation.    G P A L     Family History   Problem Relation Age of Onset   • Diabetes     • Hypertension     • Cancer         Review of Systems   Constitutional: Negative for fever and chills.   HENT:        Neck and face swelling   Respiratory:        She feels she has an upper respiratory congestion   Musculoskeletal: Positive for back pain and joint pain.        Pain now also in upper back and shoulders   Skin: Negative for rash.        Skin does feel tight  She denies Raynauds        Objective:   /90 mmHg  Pulse 88  Temp(Src) 36.4 °C (97.5 °F)  Resp 12  Wt 97.07 kg (214 lb)  SpO2 96%  Left 144/96  right 142/92  Physical Exam   Constitutional: She is oriented to person, place, and time. She appears well-developed and well-nourished. No distress.   HENT:   Head: Normocephalic and atraumatic.   Right Ear: External ear normal.   Left Ear: External ear normal.   Mild cushingoid facies   Eyes: Conjunctivae are normal. Right eye exhibits no discharge. Left eye exhibits no discharge.   Neck: No thyromegaly present.   Cardiovascular: Normal rate, regular rhythm and normal heart sounds.    Pulmonary/Chest: Effort normal. No stridor. No respiratory distress. She has no wheezes. She has no rales.   Abdominal: Soft.   Mild tenderness on DEEP palpation.   Musculoskeletal: She exhibits no edema.   There is mild tenderness on  palpation of the Achilles on the L>> R and at the inertion of the plantar fascia.  Everything there can be any   No synovitis appreciated in the wrists, MCP, PIP  Trace edema of the lower extremities.  Left ankle appears swollen.   Neurological: She is alert and oriented to person, place, and time.   Skin: Skin is warm and dry. She is not diaphoretic. No erythema.   Limited exam   Psychiatric: She has a normal mood and affect. Her behavior is normal. Judgment and thought content normal.         Labs    Lab Results   Component Value Date/Time    QUANTIFERON TB GOLD Negative 12/01/2016 04:04 PM     Lab Results   Component Value Date/Time    HEPATITIS B CCORE AB, TOTAL Negative 12/01/2016 04:04 PM    HEPATITIS B SURFACE ANTIGEN Negative 12/01/2016 04:04 PM     Lab Results   Component Value Date/Time    HEPATITIS C ANTIBODY Negative 12/01/2016 04:04 PM     Lab Results   Component Value Date/Time    SODIUM 136 01/04/2017 03:54 PM    POTASSIUM 4.1 01/04/2017 03:54 PM    CHLORIDE 102 01/04/2017 03:54 PM    CO2 24 01/04/2017 03:54 PM    GLUCOSE 115* 01/04/2017 03:54 PM    BUN 18 01/04/2017 03:54 PM    CREATININE 0.89 01/04/2017 03:54 PM      Lab Results   Component Value Date/Time    WBC 11.5* 12/01/2016 04:04 PM    RBC 4.35 12/01/2016 04:04 PM    HEMOGLOBIN 14.1 12/01/2016 04:04 PM    HEMATOCRIT 42.7 12/01/2016 04:04 PM    MCV 98.2* 12/01/2016 04:04 PM    MCH 32.4 12/01/2016 04:04 PM    MCHC 33.0* 12/01/2016 04:04 PM    MPV 9.5 12/01/2016 04:04 PM    NEUTROPHILS-POLYS 81.40* 12/01/2016 04:04 PM    LYMPHOCYTES 13.40* 12/01/2016 04:04 PM    MONOCYTES 4.40 12/01/2016 04:04 PM    EOSINOPHILS 0.00 12/01/2016 04:04 PM    BASOPHILS 0.30 12/01/2016 04:04 PM      Lab Results   Component Value Date/Time    CALCIUM 9.7 01/04/2017 03:54 PM    AST(SGOT) 15 12/01/2016 04:04 PM    ALT(SGPT) 31 12/01/2016 04:04 PM    ALKALINE PHOSPHATASE 48 12/01/2016 04:04 PM    TOTAL BILIRUBIN 0.4 12/01/2016 04:04 PM    ALBUMIN 4.3 12/01/2016 04:04 PM     TOTAL PROTEIN 7.4 12/01/2016 04:04 PM     Lab Results   Component Value Date/Time    URIC ACID 4.9 12/01/2016 04:04 PM    RHEUMATOID FACTOR -NEPH- <10 11/08/2016 01:17 PM    CCP ANTIBODIES 2 12/01/2016 04:04 PM    ANTINUCLEAR ANTIBODY None Detected 12/01/2016 04:03 PM     Lab Results   Component Value Date/Time    SED RATE WESTERGREN 8 01/04/2017 03:54 PM    STAT C-REACTIVE PROTEIN 0.33 01/04/2017 03:54 PM     No results found for: DR PAMELAVVTINTERP  Lab Results   Component Value Date/Time    C3 COMPLEMENT 152.0 12/01/2016 04:04 PM    COMPLEMENT C4 28.0 12/01/2016 04:04 PM     Lab Results   Component Value Date/Time    ANTI-DNA -DS None Detected 12/01/2016 04:04 PM    RNP ANTIBODIES 0 12/01/2016 04:04 PM    SMITH ANTIBODIES 0 12/01/2016 04:04 PM    ANTI-SCL-70 0 11/08/2016 01:17 PM     Lab Results   Component Value Date/Time    ANTI-DNA -DS None Detected 12/01/2016 04:04 PM    ANCA IGG <1:20 12/01/2016 04:04 PM    C3 COMPLEMENT 152.0 12/01/2016 04:04 PM    RNP ANTIBODIES 0 12/01/2016 04:04 PM    SJOGREN'S ANTI-SS-B 0 12/01/2016 04:04 PM     Lab Results   Component Value Date/Time    COLOR Colorless 12/01/2016 04:03 PM    SPECIFIC GRAVITY 1.008 12/01/2016 04:03 PM    PH 6.5 12/01/2016 04:03 PM    GLUCOSE Negative 12/01/2016 04:03 PM    KETONES Negative 12/01/2016 04:03 PM    PROTEIN Negative 12/01/2016 04:03 PM     No results found for: TOTPROTUR, TOTALVOLUME, ETARGYRN94  Lab Results   Component Value Date/Time    SSA 60 (R0)(KATHERINE) AB, IGG 0 12/01/2016 04:04 PM    SSA 52 (R0)(KATHERINE) AB, IGG 5 12/01/2016 04:04 PM     No results found for: HBA1C  No results found for: CPKTOTAL  Lab Results   Component Value Date/Time    G-6-PD 13.7 11/18/2016 03:18 PM     No results found for: [id number]  Lab Results   Component Value Date/Time    ACE 11 11/18/2016 03:18 PM     Lab Results   Component Value Date/Time    25-HYDROXY   VITAMIN D 25 31 10/10/2016 02:25 PM     No results found for: TSH, FREEDIR  Lab Results    Component Value Date/Time    TSH 1.530 11/28/2016 05:54 PM     Lab Results   Component Value Date/Time    MICROSOMAL -TPO- ABS 0.5 11/08/2016 01:17 PM    ANTI-THYROGLOBULIN <0.9 12/01/2016 04:04 PM     Lab Results   Component Value Date/Time    LYME DISEASE ABS, IGG 0.49 11/08/2016 01:17 PM     Lab Results   Component Value Date/Time    F-ACTIN AB, IGG 22* 11/08/2016 01:17 PM     No results found for: IGA, TTRANSIGA, ENDOIGA  No results found for: FLTYPE, CRYSTALSBDF  No results found for: ISTATICAL  No results found for: ISTATCREAT  No results found for: CTELOPEP  No results found for: GBMABG  No results found for: PTHINTACT    Assessment:     1. Pain in both feet  MR-FOOT-W/O LEFT   2. Low back pain, unspecified back pain laterality, unspecified chronicity, with sciatica presence unspecified     3. Rheumatic fever     4. Encounter for long-term (current) use of high-risk medication           Medical Decision Making:  Today's Assessment / Status / Plan:     Ms. Janett Mcnulty presents for arthritis    Arthralgia    RF, CCP, MERA, ANCA , HLA B27 negative.  Her inflammatory markers did normalize with steroids.  We will continue to taper her steroids given the finding of osteonecrosis.  She has been on 10 mg since Monday, January 9. We will continue a taper with alternating doses for a few days before transitioning to a stepdown at an interval of 2.5 mg of prednisone.    She had tapered her prednisone she is reporting some mild arthralgias increased tenderness in her feet. On exam today there is bilateral tenderness over the Achilles tendon in the insertion site the need to concern for possible enthesitis. We will continue to monitor however suspicious that we may need to do consider sulfasalazine for possible spondyloarthropathy. Given the tenderness in that area worse in the left and previous exam there is tenderness on the left I would like to order an MRI to evaluate for any tendinitis tenosynovitis that can be  appreciated.    Osteonecrosis  Seeing orthopedics. Pending additional MRIs    Rheumatic fever  Repeat echo was normal  On antibiotics    Encounter high risk medications  On chronic steroids  Advise to continue calcium and vitamin D      1. Pain in both feet  MR-FOOT-W/O LEFT   2. Low back pain, unspecified back pain laterality, unspecified chronicity, with sciatica presence unspecified     3. Rheumatic fever     4. Encounter for long-term (current) use of high-risk medication       Return in about 6 weeks (around 2/23/2017).      1. Pain in both feet  MR-FOOT-W/O LEFT   2. Low back pain, unspecified back pain laterality, unspecified chronicity, with sciatica presence unspecified     3. Rheumatic fever     4. Encounter for long-term (current) use of high-risk medication        She agreed and verbalized her agreement and understanding with the current plan. I answered all questions and concerns she has at this time and advised her to call at any time in there interim with questions or concerns in regards to her care.    Thank you for allowing me to participate in her care, I will continue to follow closely.

## 2017-01-20 ENCOUNTER — HOSPITAL ENCOUNTER (OUTPATIENT)
Dept: RADIOLOGY | Facility: MEDICAL CENTER | Age: 47
End: 2017-01-20
Attending: ORTHOPAEDIC SURGERY
Payer: COMMERCIAL

## 2017-01-20 DIAGNOSIS — M25.571 RIGHT ANKLE PAIN, UNSPECIFIED CHRONICITY: ICD-10-CM

## 2017-01-20 DIAGNOSIS — M25.572 LEFT ANKLE PAIN, UNSPECIFIED CHRONICITY: ICD-10-CM

## 2017-01-20 DIAGNOSIS — M79.672 PAIN IN BOTH FEET: ICD-10-CM

## 2017-01-20 DIAGNOSIS — M25.559 PAIN IN JOINT, PELVIC REGION AND THIGH, UNSPECIFIED LATERALITY: ICD-10-CM

## 2017-01-20 DIAGNOSIS — M79.671 PAIN IN BOTH FEET: ICD-10-CM

## 2017-01-20 DIAGNOSIS — M25.562 LEFT KNEE PAIN, UNSPECIFIED CHRONICITY: ICD-10-CM

## 2017-01-20 PROCEDURE — 73721 MRI JNT OF LWR EXTRE W/O DYE: CPT | Mod: LT

## 2017-01-20 PROCEDURE — 73718 MRI LOWER EXTREMITY W/O DYE: CPT | Mod: LT

## 2017-01-20 PROCEDURE — 72195 MRI PELVIS W/O DYE: CPT

## 2017-01-20 PROCEDURE — 73721 MRI JNT OF LWR EXTRE W/O DYE: CPT | Mod: RT

## 2017-02-06 ENCOUNTER — HOSPITAL ENCOUNTER (OUTPATIENT)
Dept: LAB | Facility: MEDICAL CENTER | Age: 47
End: 2017-02-06
Attending: ORTHOPAEDIC SURGERY
Payer: COMMERCIAL

## 2017-02-06 LAB — B-HCG SERPL-ACNC: <0.6 MIU/ML (ref 0–5)

## 2017-02-06 PROCEDURE — 84702 CHORIONIC GONADOTROPIN TEST: CPT

## 2017-02-06 PROCEDURE — 36415 COLL VENOUS BLD VENIPUNCTURE: CPT

## 2017-02-10 ENCOUNTER — OFFICE VISIT (OUTPATIENT)
Dept: MEDICAL GROUP | Facility: MEDICAL CENTER | Age: 47
End: 2017-02-10
Payer: COMMERCIAL

## 2017-02-10 VITALS
SYSTOLIC BLOOD PRESSURE: 146 MMHG | TEMPERATURE: 97.8 F | WEIGHT: 210 LBS | HEART RATE: 92 BPM | DIASTOLIC BLOOD PRESSURE: 84 MMHG | HEIGHT: 66 IN | OXYGEN SATURATION: 97 % | BODY MASS INDEX: 33.75 KG/M2

## 2017-02-10 DIAGNOSIS — M25.551 RIGHT HIP PAIN: ICD-10-CM

## 2017-02-10 DIAGNOSIS — I00 RHEUMATIC FEVER: Chronic | ICD-10-CM

## 2017-02-10 DIAGNOSIS — M87.00 AVASCULAR NECROSIS (HCC): ICD-10-CM

## 2017-02-10 DIAGNOSIS — M54.5 LOW BACK PAIN, UNSPECIFIED BACK PAIN LATERALITY, UNSPECIFIED CHRONICITY, WITH SCIATICA PRESENCE UNSPECIFIED: Chronic | ICD-10-CM

## 2017-02-10 PROCEDURE — 99214 OFFICE O/P EST MOD 30 MIN: CPT | Performed by: INTERNAL MEDICINE

## 2017-02-10 RX ORDER — TRAMADOL HYDROCHLORIDE 50 MG/1
50 TABLET ORAL EVERY 8 HOURS PRN
Qty: 90 TAB | Refills: 3 | Status: SHIPPED
Start: 2017-02-10 | End: 2017-11-13 | Stop reason: SDUPTHER

## 2017-02-10 NOTE — PROGRESS NOTES
Subjective:      Janett Mcnulty is a 46 y.o. female who presents with joint pain           HPI   Here for follow-up, has been seen by rheumatology and orthopedics, with avascular necrosis multiple joints, initially chronic prednisone therapy in October of last year for polyarthralgias related to streptococcal pharyngitis. Has had more joint pain due to AVN in her knees and hips and calf muscles, and tapering off the prednisone 5 mg alternating with 2.5 mg daily, takes naprosyn twice daily no GI upset with medication. Joined [facility] [employer] therapy daily or five days per week over the past week and did have right hip pain the next day after working out, has also started stretching. Able to work as nurse yet, able to sleep at night without too much discomfort.  Mostly pains are in her joints, knees, hips bilateral, some low back pain, occasional shoulder pain and stiffness, some stiffness in her hands periodically, as well as ankles.  Has had muscle pains as well thigh, calf muscles and leg muscles, ambulates without use of cane or walker, no falls.  Has tried ibuprofen in addition to naproxen.  No abdominal pain, melena or hematochezia, no history of CKD  Still working full-time as a nurse, mostly desk position, has FMLA to take off as needed  No recurrent skin rashes or nodules that initially presented with streptococcal pharyngitis, no hand nodules or anterior pretibial nodules  No hematuria, no flank pain  No fevers, chills, night sweats  No skin rashes, ecchymosis, bruising  Also sees rheumatology and orthopedics who have ordered other mri of wrist and shoulders as those joints have been bothering her as well  No recurrence of subcutaneous nodules hands or legs  Remains on pcn for recent acute rheumatoid arthritis, seen by infectious disease, recommended penicillin V orally ×5 years, no fevers, chills, sore throat, cough  No tobacco, no alcohol  No anxiety or depression      Current Outpatient  Prescriptions   Medication Sig Dispense Refill   • penicillin v potassium (VEETID) 250 MG Tab Take 250 mg by mouth 4 times a day.     • lorazepam (ATIVAN) 1 MG Tab Take 1 Tab by mouth as needed for Anxiety (take one hour before procedure, max one per day). 5 Tab 0   • ascorbic acid (ASCORBIC ACID) 500 MG Tab Take 500 mg by mouth every day.     • naproxen (NAPROSYN) 500 MG Tab Take 500 mg by mouth 2 times a day, with meals.     • prednisONE (DELTASONE) 2.5 MG Tab Take 15 mg in evening daily 180 Tab 0   • TURMERIC PO Take  by mouth.     • therapeutic multivitamin-minerals (THERAGRAN-M) Tab Take 1 Tab by mouth every day.     • vitamin D (CHOLECALCIFEROL) 1000 UNIT Tab Take 1,000 Units by mouth every day.     • fluticasone (FLONASE) 50 MCG/ACT nasal spray Spray 2 Sprays in nose every day. (Patient taking differently: Spray 2 Sprays in nose as needed.) 16 g 3   • vitamin e (VITAMIN E) 400 UNIT CAPS Take 400 Units by mouth every day.     • loratadine (CLARITIN) 10 MG TABS Take 10 mg by mouth as needed.     • predniSONE (DELTASONE) 20 MG Tab Take 1 Tab by mouth 2 times a day. 60 Tab 0     No current facility-administered medications for this visit.     Patient Active Problem List    Diagnosis Date Noted   • Avascular necrosis of right femur (CMS-HCC) 12/23/2016   • Obesity 09/16/2016   • Rheumatic fever 09/16/2016   • Foot pain, right 09/30/2015   • Perimenopausal 09/25/2015   • Dyslipidemia 09/18/2015   • Allergic rhinitis due to animal hair and dander 09/12/2013   • S/P bunionectomy 06/07/2013   • Varicose vein 10/07/2011   • S/P appendectomy 06/01/2011   • Left hip revision 2002 06/01/2011   • Left ankle surgery 06/01/2011   • Right hip arthroscopy  06/01/2011   • Low back pain 06/01/2011   • Cervical pain (neck) 06/01/2011   • IBS (irritable bowel syndrome) 06/01/2011   • Preventative health care 06/01/2011         Varicose vein excision left lower extremity    Status post bunionectomy  8/9/13  podiatry;  right foot martín bunionectomy, right foot first metatarsophalangeal joint bunion/degenerative arthritis     Status post appendectomy    Right hip arthroscopy  2007  ortho right hip arthroscopy  2/11/14  note bilateral greater trochanter injection under ultrasound guidance    Rheumatic fever  8/29/16 streptococcal pharyngitis positive strep test, treated with augmentin, developed erythema nodosum lower extremities and palms given NSAIDs and medrol dosepack  9/22/16 repeat medrol dose pack x 1 still with painful erythema nodosum nodules  9/24/16 erythema nodosum nodules and arthritic-type pains, we will retreat with penicillin V 500 mg 3 times a day ×10 days  10/5/16 high dose aspirin no benefit, changed to prednisone 20 mg daily, she has an appointment with me in 2 days  10/7/16 echo ordered, EKG done  10/10/16 cbc,cmp,tsh normal,ESR 40,CRP 0.3, repeat throat culture negative, blood cultures negative,  10/19/16 increase prednisone to 40 mg x 3 days then back to 20 mg  10/19/16 daughter diagnosis strep throat given antibiotics, we will treat prophylactically provided patient penicillin V 500 mg tid x 10 days  10/21/16 echo normal LV size and function, EF 65%, trace MR structurally normal mitral valve, mild TR and RVSP 30  10/24/16 on prednisone 40 mg qday unable to taper to 20 mg due to flare up of pain will try 20 mg bid then taper to 20 mg am and 10 mg qpm over the next week  10/26/16  infectious disease recommended secondary prophylaxis penicillin  mg bid x 5 years  11/7/16 still with polyarthritis, on prednisone 20 mg twice a day, repeat labs, still on penicillin, will speak with rheumatology about referral  11/8/16 ESR 15,CRP 0.3,wbc 10.9 (on prednisone),hgb 14,hct 43,cmp normal,RF,C3C4,TPO,lyme,MERA,HLA B27 negative, Factin IgG 22 (0-19),parietal cell Ab 25(0-25)  11/14/16  rheumatolog note, features consistent with rheumatic fever, also consider seronegative  spondyloarthropathies, sarcoidosis, rheumatoid arthritis, will check sacroiliac films, hand and feet x-rays, follow-up one week  11/17/16 refill prednisone  11/18/16 ACE serum level normal, G6PD ad vitamin 1,25 d level normal  11/21/16 cxr negative  11/21/16 xray SI joints negative for erosions  11/29/16 x-ray joint survey hands, feet, ankles no evidence of inflammatory arthropathy  11/29/16 CT soft tissue neck without; negative  12/7/16  cardiology repeat echo in 3-4 weeks  12/10/16 MRI right knee multiple areas right knee avascular necrosis medial and lateral femoral condyle, medial and lateral tibial plateau, bone marrow edema  12/23/16  rheumatology note, inflammatory markers did normalize with steroids, rheumatoid factor, CCP, MERA,ANKA negative continue to taper steroids given osteonecrosis alternating 17.5 mg and 15 mg, and 15 mg, and 15 mg alternating with 12.5 mg, and so forth  12/27/16 echo LV ejection fraction 60%, no valvular disease  1/4/17 ESR 8,CRP 0.3  1/12/17  rheumatology note, inflammatory markers did normalize with prednisone therapy, continue taper with osteonecrosis bilateral tenderness achilles insertion consider enthesitis, will get MRI left achilles region to evaluate for tendinitis or tenosynovitis, follow-up 6 weeks  1/20/17 MRI left foot without; no evidence of marrow edema, arthropathy, tendinopathy or ligament injury    Preventative health  11/05 GIC colon negative  9/25/15 tdap  10/7/15 pap per gyn  negative  9/29/16 chol 190,trig 52,hdl 65,ldl 115  10/110/16 vit d 31  10/26/16 mammogram  1/6/17 pneumovax    Perimenopausal  9/25/15 on climara patch and progesterone tab per  gyn    Low back pain  10/10 MRI LS L5-S1 minimal disc bulge  1/11  at the pain management left L3-S1 facet joint injection, right L3-S1 facet joint injection  2/11  pain management right L3-S1 lateral, medial, dorsal ramus nerve block  4/11   pain management left L3-S1 medial, lateral, bursal ramus nerve block  5/11  pain management bilateral SI joint injection under fluoroscopy  7/11  pain note bilat trochanteric bursitis injection  3/12  pain note, right and left L3-L4 L5-S1 medial, dorsal, lateral branch ablation  12/12  pain note, left L3-S1 dorsal and medial branch radiofrequency ablation  1/4/13  pain note; right L3-S1 radiofrequency ablation  8/14/13  pain note  9/29/13  pain note, left L3-S1 nerve frequency ablation  10/4/13  pain note, status post right L3-S1 FJNA  12/19/13 pain note; status post right SIJI injection, continue voltaren gel, TENS, IT stretches  5/16/14  pain note; left L3-S1 medial, partial, lateral branch radiofrequency ablation under fluoroscopy  6/6/14  right L3-S1 FJNA  7/3/14  pain note; continue TENS,celebrex 200 mg qday, voltaren gel 1%  9/10/14  pain note; continue TENS, lidoderm patch,voltaren gel, celebrex 200 mg qday    Left hip revision  History of left hip open decompression and osteotomy  12/03  left hip implant removal  11/10  ortho x-ray stable decompression left femoral head neck junction  11/12  pain management note bilateral trochanteric bursitis injection  5/2/13  pain note, bilateral trochanteric bursal injection    Left ankle surgery 2006 ligament reconstruction    IBS    Foot pain  8/5/15 MRI right foot severe artifact secondary to first MTP are clear, limiting analysis of soft tissue, highly likely complete FHL tear  8/28/15  orthopedic note right ankle FHL tendon rupture seen on MRI, do not recommend surgical repair at this time which would involve hemiarthroplasty, implant, first MTP fusion with bone graft, followup podiatry     Dyslipidemia  9/29/14 chol 186,trig 46,hdl  76,ldl 101  9/18/15 chol 225,trig 50,hdl 77,ldl 138    Cervical pain  10/11 MRI cervical spine C4-C5 tiny disc bulge, C5-C6 bilateral and plate spurring with uncinate hypertrophy  10/7/16 MRI cervical spine C5-C6 small broad-based osteophyte complex, borderline foraminal stenosis, no significant progression of disease since 2010    avascular necrosis   8/29/16 streptococcal pharyngitis positive strep test, treated with augmentin, developed erythema nodosum lower extremities and palms given NSAIDs and medrol dosepack  9/22/16 repeat medrol dose pack x 1 still with painful erythema nodosum nodules  10/5/16 high dose aspirin no benefit, changed to prednisone 20 mg daily,  10/19/16 increase prednisone to 40 mg x 3 days then back to 20 mg  11/7/16 still with polyarthritis, on prednisone 20 mg twice a day, repeat labs, still on penicillin, will speak with rheumatology about referral  11/14/16  rheumatolog note, features consistent with rheumatic fever, also consider seronegative spondyloarthropathies, sarcoidosis, rheumatoid arthritis, will check sacroiliac films, hand and feet x-rays, follow-up one week  12/23/16  rheumatology note, inflammatory markers did normalize with steroids, rheumatoid factor, CCP, MERA,ANKA negative continue to taper steroids given osteonecrosis alternating 17.5 mg and 15 mg, and 15 mg, and 15 mg alternating with 12.5 mg, and so forth  12/10/16 MRI right knee multiple areas right knee avascular necrosis medial and lateral femoral condyle, medial and lateral tibial plateau, bone marrow edema  12/23/16  rheumatology note, inflammatory markers did normalize with steroids, rheumatoid factor, CCP, MERA,ANKA negative continue to taper steroids given osteonecrosis alternating 17.5 mg and 15 mg, and 15 mg, and 15 mg alternating with 12.5 mg, and so forth  1/20/17 MRI left foot no evidence of arthropathy or tendinopathy  1/20/17 MRI left ankle metallic artifact lateral  "malleolus  1/20/17 MRI right ankle small oblong focus finger edema tibial plafond below subchondral plate, would be consistent with small area of avascular necrosis  1/20/17 MRI left knee multiple areas avascular necrosis surrounding the knee to include femur, tibia, fibula  1/20/17 MRI pelvis bilateral femoral head avascular necrosis involving approximately 40% of the articular surface, metallic artifact left femoral head consistent with presence of small metallic pain  2/9/17 tapering prednisone down to 2.5 mg alternating with 5 mg      ROS       Objective:     /84 mmHg  Pulse 92  Temp(Src) 36.6 °C (97.8 °F)  Ht 1.676 m (5' 6\")  Wt 95.255 kg (210 lb)  BMI 33.91 kg/m2  SpO2 97%     Physical Exam   Constitutional: She appears well-developed and well-nourished. No distress.   HENT:   Head: Normocephalic and atraumatic.   Mouth/Throat: Oropharynx is clear and moist. No oropharyngeal exudate.   Eyes: Conjunctivae are normal. Right eye exhibits no discharge. Left eye exhibits no discharge. No scleral icterus.   Neck: Neck supple. No JVD present.   Cardiovascular: Normal rate, regular rhythm and normal heart sounds.    No murmur heard.  Pulmonary/Chest: Effort normal and breath sounds normal. No respiratory distress. She has no wheezes. She has no rales.   Abdominal: She exhibits no distension.   Skin: Skin is warm. No rash noted. She is not diaphoretic. No erythema.   Psychiatric: She has a normal mood and affect. Her behavior is normal.   Nursing note and vitals reviewed.    No subcutaneous nodules problems  No pretibial nodules     Hands and wrists no tenosynovitis, no edema, no subcutaneous nodules, normal range of motion wrists and elbows and shoulders, no crepitus  Knees bilateral mild crepitus, normal flexion and extension  No lower extremity edema  Ankles normal dorsiflexion, plantarflexion  Right hip normal flexion, extension, internal and external rotation  Left hip somewhat limited full extension " and internal and external rotation  No spinal tenderness  Skin no petechia, purpura, no nodules, no erythema nodosum or extremities  Normal affect, insight, judgment       Assessment/Plan:     Assessment  #!  Avascular necrosis multiple joints by MRI including right ankle, bilateral knees, bilateral hips, possibly shoulders and wrists, those MRIs are pending, likely related to steroid therapy for polyarthritis being followed by rheumatology, orthopedic surgery, serologic workup for underlying rheumatologic process was negative currently been tapered off prednisone by rheumatology down to 5 mg alternating with 2.5 mg    #2 polyarthritis related to avascular necrosis including knees, hips, ankles, as indicated above she also has shoulder and wrist involvement with regards to polyarthralgias, MRIs are pending for those particular joints.  Polyarthritis thought to be initially related to rheumatoid arthritis after initial streptococcal pharyngitis in August, full dose ibuprofen and naproxen were not beneficial for rheumatoid arthritis pain, subsequently prednisone therapy was initiated    #3 history of streptococcal pharyngitis and acute rheumatic fever in September, seen by infectious disease, started with penicillin V 500 mg 3 times a day and completed course of therapy recommended duration penicillin V 250 mg twice a day times 5 years of therapy with penicillin, has been seen by cardiology, echocardiogram and evaluation for rheumatic heart disease negative, initially started on prednisone higher dose in October 20 mg daily for polyarthralgias related to rheumatic fever and increasing prednisone dose to 40 mg daily through November, tapering regimen initiated by rheumatology in December.    #4 acute rheumatic fever in September, seen by infectious disease remains on prophylactic penicillin V 250 mg ×5 years, cardiology follow-up and echocardiogram negative for rheumatic valve disease    #5 remains on FMLA taking one  day of a week as needed from work for chronic pains, still working full-time      Plan  #!  Continue taper prednisone per rheumatology recommendation    #2 follow-up orthopedics, MRI wrist and shoulders pending    #3 she may need FMLA paperwork completed again, advised her to drop that off    #4 continue work on exercise aquatic therapy as tolerated    #5 additional tramadol 50 mg one by mouth 3 times a day as needed for pain, may cause drowsiness, sedation, lightheadedness, no alcohol with medication, notify us for side effects    #6 continue penicillin V 250 mg twice a day total 5 years    #7 annual echocardiogram    #8 we will check with [facility] per patient request perhaps they have an avascular necrosis specialist, I am not aware of that but we will make inquiries    #9 follow-up myself 3 months

## 2017-02-10 NOTE — MR AVS SNAPSHOT
"Arelioraailin Urban Page   2/10/2017 11:00 AM   Office Visit   MRN: [id number]    Department:  [facility]   Dept Phone:  [phone]    Description:  Female : 1970   Provider:  Franklin Gonzalez M.D.           Allergies as of 2/10/2017     Allergen Noted Reactions    Morphine 2008   Rash    Other Food 2013   Rash    Onions-cause headaches and facial flushing      You were diagnosed with     Low back pain, unspecified back pain laterality, unspecified chronicity, with sciatica presence unspecified   [[id number]]         Vital Signs     Blood Pressure Pulse Temperature Height Weight Body Mass Index    146/84 mmHg 92 36.6 °C (97.8 °F) 1.676 m (5' 6\") 95.255 kg (210 lb) 33.91 kg/m2    Oxygen Saturation Smoking Status                97% Never Smoker           Basic Information     Date Of Birth Sex Race Ethnicity Preferred Language    1970 Female White Non- English      Your appointments     2017  8:00 AM   Follow Up Visit with Starr Jackson M.D.   [facility] (--)    [address] [place] [address], [address]  [facility] [address]   [phone]           You will be receiving a confirmation call a few days before your appointment from our automated call confirmation system.            Mar 07, 2017  7:30 AM   MR SP WO 30 with [address] [facility] 1   [facility] - MRI - [address]    [address]  [place] [address]   [phone]            Mar 07, 2017  8:00 AM   MR EXTREMITY WITHOUT (30) with [address] [facility] 1   [facility] - MRI - [address])    [address]  [place] [address]   [phone]            Mar 07, 2017  8:30 AM   MR EXTREMITY WITHOUT (30) with [address] [facility] 1   [facility] - MRI - [address]    [address]  [place] [address]   [phone]            Mar 07, 2017  9:00 AM   MR-HAND/WRIST (30) with [address] 1   [facility] - MRI - [address]    [address]  [place] [address]   "   [phone]            Mar 07, 2017 10:00 AM   MR-HAND/WRIST (30) with [address] [facility] 1   [facility] - MRI - [address]    [address]  [place] [address]   [phone]            Mar 08, 2017  8:00 AM   FOLLOW UP with Bryan Armenta M.D.   [facility] (--)    [address]  [place] [address]   [phone]            Apr 21, 2017 11:00 AM   Established Patient with Franklin Gonzalez M.D.   [facility]    [address]  [place] [address]   [phone]           You will be receiving a confirmation call a few days before your appointment from our automated call confirmation system.              Problem List              ICD-10-CM Priority Class Noted - Resolved    S/P appendectomy Z90.49   6/1/2011 - Present    Left hip revision 2002 M21.959   6/1/2011 - Present    Left ankle surgery M25.572   6/1/2011 - Present    Right hip arthroscopy  M25.551   6/1/2011 - Present    Low back pain (Chronic) M54.5   6/1/2011 - Present    Cervical pain (neck) M54.2   6/1/2011 - Present    IBS (irritable bowel syndrome) K58.9   6/1/2011 - Present    Preventative health care (Chronic) Z00.00   6/1/2011 - Present    Varicose vein I86.8   10/7/2011 - Present    S/P bunionectomy Z98.890   6/7/2013 - Present    Allergic rhinitis due to animal hair and dander (Chronic) J30.81   9/12/2013 - Present    Dyslipidemia E78.5   9/18/2015 - Present    Perimenopausal (Chronic) N95.1   9/25/2015 - Present    Foot pain, right (Chronic) M79.671   9/30/2015 - Present    Obesity E66.9   9/16/2016 - Present    Rheumatic fever (Chronic) I00   9/16/2016 - Present    Avascular necrosis of right femur (CMS-HCC) (Chronic) M87.051   12/23/2016 - Present      Health Maintenance        Date Due Completion Dates    MAMMOGRAM 10/26/2017 10/26/2016, 9/15/2015, 9/8/2014, 8/29/2013, 4/22/2004    PAP SMEAR 10/7/2018 10/7/2015    IMM  DTaP/Tdap/Td Vaccine (2 - Td) 9/25/2025 9/25/2015            Current Immunizations     Influenza TIV (IM) 12/9/2013, 1/9/2013    Influenza Vaccine Quad Inj (Preserved) 12/13/2016, 10/20/2015  2:00 AM, 11/19/2014    Pneumococcal polysaccharide vaccine (PPSV-23) 1/6/2017 11:10 AM    Tdap Vaccine 9/25/2015 11:06 AM    Tuberculin Skin Test 5/5/2014  1:40 PM, 6/3/2013  1:20 PM, 6/4/2012 12:30 PM, 7/1/2011      Below and/or attached are the medications your provider expects you to take. Review all of your home medications and newly ordered medications with your provider and/or pharmacist. Follow medication instructions as directed by your provider and/or pharmacist. Please keep your medication list with you and share with your provider. Update the information when medications are discontinued, doses are changed, or new medications (including over-the-counter products) are added; and carry medication information at all times in the event of emergency situations     Allergies:  MORPHINE - Rash     OTHER FOOD - Rash               Medications  Valid as of: February 10, 2017 - 11:49 AM    Generic Name Brand Name Tablet Size Instructions for use    Ascorbic Acid (Tab) ascorbic acid 500 MG Take 500 mg by mouth every day.        Cholecalciferol (Tab) cholecalciferol 1000 UNIT Take 1,000 Units by mouth every day.        Fluticasone Propionate (Suspension) FLONASE 50 MCG/ACT Spray 2 Sprays in nose every day.        Loratadine (Tab) CLARITIN 10 MG Take 10 mg by mouth as needed.        LORazepam (Tab) ATIVAN 1 MG Take 1 Tab by mouth as needed for Anxiety (take one hour before procedure, max one per day).        Multiple Vitamins-Minerals (Tab) THERAGRAN-M  Take 1 Tab by mouth every day.        Naproxen (Tab) NAPROSYN 500 MG Take 500 mg by mouth 2 times a day, with meals.        Penicillin V Potassium (Tab) VEETID 250 MG Take 250 mg by mouth 4 times a day.        PredniSONE (Tab) DELTASONE 2.5 MG Take 15 mg in evening daily         TraMADol HCl (Tab) ULTRAM 50 MG Take 1 Tab by mouth every 8 hours as needed.        Turmeric   Take  by mouth.        Vitamin E (Cap) VITAMIN E 400 UNIT Take 400 Units by mouth every day.        .                 Medicines prescribed today were sent to:     [id number] - [place], [place] - [address] [address]    [address] [address] [place] [address]    Phone: [phone] Fax: [phone]    Open 24 Hours?: No      Medication refill instructions:       If your prescription bottle indicates you have medication refills left, it is not necessary to call your provider’s office. Please contact your pharmacy and they will refill your medication.    If your prescription bottle indicates you do not have any refills left, you may request refills at any time through one of the following ways: The online [employer] system (except Urgent Care), by calling your provider’s office, or by asking your pharmacy to contact your provider’s office with a refill request. Medication refills are processed only during regular business hours and may not be available until the next business day. Your provider may request additional information or to have a follow-up visit with you prior to refilling your medication.   *Please Note: Medication refills are assigned a new Rx number when refilled electronically. Your pharmacy may indicate that no refills were authorized even though a new prescription for the same medication is available at the pharmacy. Please request the medicine by name with the pharmacy before contacting your provider for a refill.        Other Notes About Your Plan     1/12/17  rheumatology note, inflammatory markers did normalize with prednisone therapy, continue taper with osteonecrosis bilateral tenderness achilles insertion consider enthesitis, will get MRI left achilles region to evaluate for tendinitis or tenosynovitis, follow-up 6 weeks            Varicose vein excision left lower extremity    Status post  bunionectomy  8/9/13  podiatry; right foot martín bunionectomy, right foot first metatarsophalangeal joint bunion/degenerative arthritis     Status post appendectomy    Right hip arthroscopy  2007  ortho right hip arthroscopy  2/11/14  note bilateral greater trochanter injection under ultrasound guidance    Rheumatic fever  8/29/16 streptococcal pharyngitis positive strep test, treated with augmentin, developed erythema nodosum lower extremities and palms given NSAIDs and medrol dosepack  9/22/16 repeat medrol dose pack x 1 still with painful erythema nodosum nodules  9/24/16 erythema nodosum nodules and arthritic-type pains, we will retreat with penicillin V 500 mg 3 times a day ×10 days  10/5/16 high dose aspirin no benefit, changed to prednisone 20 mg daily, she has an appointment with me in 2 days  10/7/16 echo ordered, EKG done  10/10/16 cbc,cmp,tsh normal,ESR 40,CRP 0.3, repeat throat culture negative, blood cultures negative,  10/19/16 increase prednisone to 40 mg x 3 days then back to 20 mg  10/19/16 daughter diagnosis strep throat given antibiotics, we will treat prophylactically provided patient penicillin V 500 mg tid x 10 days  10/21/16 echo normal LV size and function, EF 65%, trace MR structurally normal mitral valve, mild TR and RVSP 30  10/24/16 on prednisone 40 mg qday unable to taper to 20 mg due to flare up of pain will try 20 mg bid then taper to 20 mg am and 10 mg qpm over the next week  10/26/16  infectious disease recommended secondary prophylaxis penicillin  mg bid x 5 years  11/7/16 still with polyarthritis, on prednisone 20 mg twice a day, repeat labs, still on penicillin, will speak with rheumatology about referral  11/8/16 ESR 15,CRP 0.3,wbc 10.9 (on prednisone),hgb 14,hct 43,cmp normal,RF,C3C4,TPO,lyme,MERA,HLA B27 negative, Factin IgG 22 (0-19),parietal cell Ab 25(0-25)  11/14/16  rheumatolog note, features consistent with  rheumatic fever, also consider seronegative spondyloarthropathies, sarcoidosis, rheumatoid arthritis, will check sacroiliac films, hand and feet x-rays, follow-up one week  11/17/16 refill prednisone  11/18/16 ACE serum level normal, G6PD ad vitamin 1,25 d level normal  11/21/16 cxr negative  11/21/16 xray SI joints negative for erosions  11/29/16 x-ray joint survey hands, feet, ankles no evidence of inflammatory arthropathy  11/29/16 CT soft tissue neck without; negative  12/7/16  cardiology repeat echo in 3-4 weeks  12/10/16 MRI right knee multiple areas right knee avascular necrosis medial and lateral femoral condyle, medial and lateral tibial plateau, bone marrow edema  12/23/16  rheumatology note, inflammatory markers did normalize with steroids, rheumatoid factor, CCP, MERA,ANKA negative continue to taper steroids given osteonecrosis alternating 17.5 mg and 15 mg, and 15 mg, and 15 mg alternating with 12.5 mg, and so forth  12/27/16 echo LV ejection fraction 60%, no valvular disease  1/4/17 ESR 8,CRP 0.3  1/12/17  rheumatology note, inflammatory markers did normalize with prednisone therapy, continue taper with osteonecrosis bilateral tenderness achilles insertion consider enthesitis, will get MRI left achilles region to evaluate for tendinitis or tenosynovitis, follow-up 6 weeks  1/20/17 MRI left foot without; no evidence of marrow edema, arthropathy, tendinopathy or ligament injury    Preventative health  11/05 GIC colon negative  9/25/15 tdap  10/7/15 pap per gyn  negative  9/29/16 chol 190,trig 52,hdl 65,ldl 115  10/110/16 vit d 31  10/26/16 mammogram  1/6/17 pneumovax    Perimenopausal  9/25/15 on climara patch and progesterone tab per  gyn    Low back pain  10/10 MRI LS L5-S1 minimal disc bulge  1/11  at the pain management left L3-S1 facet joint injection, right L3-S1 facet joint injection  2/11  pain management right L3-S1 lateral,  medial, dorsal ramus nerve block  4/11  pain management left L3-S1 medial, lateral, bursal ramus nerve block  5/11  pain management bilateral SI joint injection under fluoroscopy  7/11  pain note bilat trochanteric bursitis injection  3/12  pain note, right and left L3-L4 L5-S1 medial, dorsal, lateral branch ablation  12/12  pain note, left L3-S1 dorsal and medial branch radiofrequency ablation  1/4/13  pain note; right L3-S1 radiofrequency ablation  8/14/13  pain note  9/29/13  pain note, left L3-S1 nerve frequency ablation  10/4/13  pain note, status post right L3-S1 FJNA  12/19/13 pain note; status post right SIJI injection, continue voltaren gel, TENS, IT stretches  5/16/14  pain note; left L3-S1 medial, partial, lateral branch radiofrequency ablation under fluoroscopy  6/6/14  right L3-S1 FJNA  7/3/14  pain note; continue TENS,celebrex 200 mg qday, voltaren gel 1%  9/10/14  pain note; continue TENS, lidoderm patch,voltaren gel, celebrex 200 mg qday    Left hip revision  History of left hip open decompression and osteotomy  12/03  left hip implant removal  11/10  ortho x-ray stable decompression left femoral head neck junction  11/12  pain management note bilateral trochanteric bursitis injection  5/2/13  pain note, bilateral trochanteric bursal injection    Left ankle surgery 2006 ligament reconstruction    IBS    Foot pain  8/5/15 MRI right foot severe artifact secondary to first MTP are clear, limiting analysis of soft tissue, highly likely complete FHL tear  8/28/15  orthopedic note right ankle FHL tendon rupture seen on MRI, do not recommend surgical repair at this time which would involve hemiarthroplasty, implant, first MTP fusion with bone graft, followup podiatry      Dyslipidemia  9/29/14 chol 186,trig 46,hdl 76,ldl 101  9/18/15 chol 225,trig 50,hdl 77,ldl 138    Cervical pain  10/11 MRI cervical spine C4-C5 tiny disc bulge, C5-C6 bilateral and plate spurring with uncinate hypertrophy  10/7/16 MRI cervical spine C5-C6 small broad-based osteophyte complex, borderline foraminal stenosis, no significant progression of disease since 2010    avascular necrosis right femur  12/10/16 MRI right knee multiple areas right knee avascular necrosis medial and lateral femoral condyle, medial and lateral tibial plateau, bone marrow edema  12/23/16  rheumatology note, inflammatory markers did normalize with steroids, rheumatoid factor, CCP, MERA,ANKA negative continue to taper steroids given osteonecrosis alternating 17.5 mg and 15 mg, and 15 mg, and 15 mg alternating with 12.5 mg, and so forth                     MyChart Access Code: Activation code not generated  Current [employer] Status: Active

## 2017-02-12 PROBLEM — M87.00 AVASCULAR NECROSIS (HCC): Status: ACTIVE | Noted: 2017-02-12

## 2017-02-12 PROBLEM — M87.00 AVASCULAR NECROSIS (HCC): Chronic | Status: ACTIVE | Noted: 2017-02-12

## 2017-02-16 ENCOUNTER — TELEPHONE (OUTPATIENT)
Dept: MEDICAL GROUP | Facility: MEDICAL CENTER | Age: 47
End: 2017-02-16

## 2017-02-16 DIAGNOSIS — M87.00 AVASCULAR NECROSIS (HCC): ICD-10-CM

## 2017-02-16 NOTE — TELEPHONE ENCOUNTER
Please notify patient that I have submitted a request to [facility], to have her see one of the orthopedic specialist for evaluation regarding the avascular necrosis

## 2017-02-23 ENCOUNTER — OFFICE VISIT (OUTPATIENT)
Dept: RHEUMATOLOGY | Facility: PHYSICIAN GROUP | Age: 47
End: 2017-02-23
Payer: COMMERCIAL

## 2017-02-23 VITALS
WEIGHT: 214 LBS | TEMPERATURE: 97.8 F | RESPIRATION RATE: 12 BRPM | SYSTOLIC BLOOD PRESSURE: 120 MMHG | HEART RATE: 90 BPM | DIASTOLIC BLOOD PRESSURE: 86 MMHG | BODY MASS INDEX: 34.56 KG/M2 | OXYGEN SATURATION: 100 %

## 2017-02-23 DIAGNOSIS — M79.10 MYALGIA: ICD-10-CM

## 2017-02-23 DIAGNOSIS — I00 RHEUMATIC FEVER: Chronic | ICD-10-CM

## 2017-02-23 DIAGNOSIS — M87.00 AVASCULAR NECROSIS (HCC): Chronic | ICD-10-CM

## 2017-02-23 PROCEDURE — 99213 OFFICE O/P EST LOW 20 MIN: CPT | Performed by: INTERNAL MEDICINE

## 2017-02-23 ASSESSMENT — ENCOUNTER SYMPTOMS
BACK PAIN: 1
FEVER: 0
BRUISES/BLEEDS EASILY: 1
CHILLS: 0
MYALGIAS: 1

## 2017-02-23 NOTE — Clinical Note
[facility]   [address], [address]  [place] [address]  Phone: [phone]  Fax: [phone]              Encounter Date: 2/23/2017    Dear Dr. Gonzalez,    It was a pleasure seeing your patient, Jantet Mcnulty, on 2/23/2017. Diagnoses of Avascular necrosis (CMS-[facility]), Rheumatic fever, and Myalgia were pertinent to this visit.     Please find attached progress note which includes the history I obtained from Ms. Mcnulty, my physical examination findings, my impression and recommendations.      Once again, it was a pleasure participating in your patient's care.  Please feel free to contact me if you have any questions or if I can be of any further assistance to your patients.      Sincerely,    Starr Jackson M.D.  Electronically Signed          PROGRESS NOTE:  Subjective:   Date of Consultation:2/23/2017  8:10 AM  Primary care physician:Franklin Gonzalez M.D.      Reason for Consultation:  Ms. Mcnulty  is a pleasant 46 y.o. year old female who presents for follow-up of arthritis    Rheumatic Fever  Following cardiology  Has been seen by ID    Arthritis  SHe reports overall stable as she has tapered her prednisone. She reports no worsening of symptoms as she has tapered her prednisone.  She is reporting her current use of prednisone at 2.5 mg every other day and will be tapered off prednisone by the end of this week     She continues to report myalgias and tenderness on palpation. She has some mild increase pain in the right ankle. However her episodes of pain previously noted has been stable.    Avascular necrosis of multiple joints  We first discovered avascular necrosis of the knee. She was referred to orthopedics. However additional imaging has noted avascular necrosis in the hip and the ankle. She is expected to have additional imaging in March of her shoulders and hands      Past Medical History   Diagnosis Date   • Pain 6/5/13     right foot   • Arthritis      osteoarthritis     Past  Surgical History   Procedure Laterality Date   • Pr inj dx/ther agnt paravert facet joint, gómez*  1/21/2011     Performed by EDMAR ELIAS at [facility]   • Pr inj dx/ther agnt paravert facet joint, gómez*  2/25/2011     Performed by EDMAR ELIAS at [facility]   • Pr dest,paravertebral,l/s,single  3/4/2011     Performed by EDMAR ELIAS at [facility]   • Inj,sacroiliac,anes/steroid  5/13/2011     Performed by EDMAR ELIAS at [facility]   • Pr inject nerv blck,stellate ganglion  9/2/2011     Performed by EDMAR ELIAS at [facility]   • Neuro dest facet l/s w/ig sngl  3/2/2012     Performed by EDMAR ELIAS at [facility]   • Neuro dest facet l/s w/ig addl  3/2/2012     Performed by EDMAR ELIAS at [facility]   • Neuro dest facet l/s w/ig addl  3/2/2012     Performed by EDMAR ELIAS at [facility]   • Neuro dest facet l/s w/ig sngl  3/9/2012     Performed by EDMAR ELIAS at [facility]   • Neuro dest facet l/s w/ig addl  3/9/2012     Performed by EDMAR ELIAS at [facility]   • Neuro dest facet l/s w/ig addl  3/9/2012     Performed by EDMAR ELIAS at [facility]   • Neuro dest facet l/s w/ig sngl  12/28/2012     Performed by Edmar Elias D.O. at [facility]   • Neuro dest facet l/s w/ig addl  12/28/2012     Performed by Edmar Elias D.O. at [facility]   • Neuro dest facet l/s w/ig addl  12/28/2012     Performed by Edmar Elias D.O. at [facility]   • Neuro dest facet l/s w/ig sngl  1/4/2013     Performed by Edmar Elias D.O. at [facility]   • Neuro dest facet l/s w/ig addl  1/4/2013     Performed by Edmar Elias D.O. at [facility]   • Neuro dest facet l/s w/ig addl  1/4/2013     Performed by Edmar IGLESIAS  ERNESTO Rees at [facility]S   • Other  1998     left hip revision   • Other  2009     right hip revision   • Tonsillectomy  2001   • Other  2007     left ankle tendon repair   • Appendectomy  1991   • Other       uterine ablation   • Bunionectomy  6/7/2013     Performed by Oliver Pratt D.P.M. at [facility]   • Neuro dest facet l/s w/ig sngl  9/20/2013     Performed by Farhan Rees D.O. at [facility]   • Neuro dest facet l/s w/ig addl  9/20/2013     Performed by Farhan Rees D.O. at [facility]   • Neuro dest facet l/s w/ig addl  9/20/2013     Performed by Farhan Rees D.O. at [facility]   • Neuro dest facet l/s w/ig addl  9/20/2013     Performed by Farhan Rees D.O. at [facility]   • Neuro dest facet l/s w/ig sngl  10/4/2013     Performed by Farhan Rees D.O. at [facility]   • Neuro dest facet l/s w/ig addl  10/4/2013     Performed by Farhan Rees D.O. at [facility]   • Neuro dest facet l/s w/ig addl  10/4/2013     Performed by Farhan Rees D.O. at [facility]   • Neuro dest facet l/s w/ig addl  10/4/2013     Performed by Farhan Rees D.O. at [facility]   • Inj,sacroiliac,anes/steroid  11/8/2013     Performed by Farhan Rees D.O. at [facility]   • Inj,sacroiliac,anes/steroid  12/6/2013     Performed by Farhan Rees D.O. at [facility]   • Inj,sacroiliac,anes/steroid  1/10/2014     Performed by Farhan Rees D.O. at [facility]   • Inj,sacroiliac,anes/steroid  1/10/2014     Performed by Farhan Rees D.O. at [facility]   • Neuro dest facet l/s w/ig sngl  5/16/2014     Performed by Farhan Rees D.O. at [facility]   • Neuro dest facet l/s w/ig addl  5/16/2014     Performed by Farhan Rees D.O. at [facility]   • Neuro dest facet l/s w/ig addl  5/16/2014     Performed by Farhan Rees D.O. at [facility]S   • Neuro dest facet l/s w/ig addl  5/16/2014     Performed by Farhan Rees D.O. at [facility]S   • Neuro dest facet l/s w/ig sngl  6/6/2014     Performed by Farhan Rees D.O. at [facility]S   • Neuro dest facet l/s w/ig addl  6/6/2014     Performed by Farhan Rees D.O. at [facility]S   • Neuro dest facet l/s w/ig addl  6/6/2014     Performed by Farhan Rees D.O. at [facility]S   • Neuro dest facet l/s w/ig addl  6/6/2014     Performed by Farhan Rees D.O. at [facility]S   • Toe arthroplasty  7/7/2014     Performed by Oliver Pratt D.P.M. at [facility]S   • Inj,sacroiliac,anes/steroid  7/11/2014     Performed by Farhan Rees D.O. at [facility]   • Inj,sacroiliac,anes/steroid  7/11/2014     Performed by Farhan Rees D.O. at [facility]   • Neuro dest facet l/s w/ig sngl  2/6/2015     Performed by Farhan Rees D.O. at [facility]S   • Neuro dest facet l/s w/ig addl  2/6/2015     Performed by Farhan Rees D.O. at [facility]S   • Neuro dest facet l/s w/ig addl  2/6/2015     Performed by Farhan Rees D.O. at [facility]S   • Neuro dest facet l/s w/ig sngl  2/13/2015     Performed by Farhan Rees D.O. at [facility]   • Neuro dest facet l/s w/ig addl  2/13/2015     Performed by Farhan Rees D.O. at [facility]   • Neuro dest facet l/s w/ig addl  2/13/2015     Performed by Farhan Rees D.O. at [facility]   • Inj,sacroiliac,anes/steroid  4/3/2015     Performed by Farhan Rees D.O. at [facility]   • Inj,sacroiliac,anes/steroid  4/3/2015     Performed by Farhan Rees D.O. at [facility]      • Pr dstr nrolytc agnt parverteb fct sngl lmbr/sacral Left 10/9/2015     Procedure: NEURO DEST FACET L/S W/IG SNGL - L3-S1;  Surgeon: Farhan Rees D.O.;  Location: SURGERY [facility] ORS;  Service: Pain Management   • Pr dstr nrolytc agnt parverteb fct addl lmbr/sacral  10/9/2015     Procedure: NEURO DEST FACET L/S W/IG ADDL;  Surgeon: Farhan Rees D.O.;  Location: SURGERY [facility] ORS;  Service: Pain Management   • Pr inject rx other periph nerve  10/9/2015     Procedure: NEUROLYTIC DEST-OTHER NERVE;  Surgeon: Farhan Rees D.O.;  Location: SURGERY [facility] ORS;  Service: Pain Management   • Pr dstr nrolytc agnt parverteb fct addl lmbr/sacral  10/9/2015     Procedure: NEURO DEST FACET L/S W/IG ADDL;  Surgeon: Farhan Rees D.O.;  Location: SURGERY [facility] ORS;  Service: Pain Management   • Pr dstr nrolytc agnt parverteb fct sngl lmbr/sacral Right 10/16/2015     Procedure: NEURO DEST FACET L/S W/IG SNGL - L3-S1;  Surgeon: Farhan Rees D.O.;  Location: SURGERY [facility];  Service: Pain Management   • Pr dstr nrolytc agnt parverteb fct addl lmbr/sacral  10/16/2015     Procedure: NEURO DEST FACET L/S W/IG ADDL;  Surgeon: Farhan Rees D.O.;  Location: SURGERY SURGICAL [facility] ORS;  Service: Pain Management   • Pr inject rx other periph nerve  10/16/2015     Procedure: NEUROLYTIC DEST-OTHER NERVE;  Surgeon: Farhan Rees D.O.;  Location: SURGERY [facility] ORS;  Service: Pain Management   • Pr dstr nrolytc agnt parverteb fct addl lmbr/sacral  10/16/2015     Procedure: NEURO DEST FACET L/S W/IG ADDL;  Surgeon: Farhan Rees D.O.;  Location: SURGERY SURGICAL [facility] ORS;  Service: Pain Management   • Pr dstr nrolytc agnt parverteb fct sngl lmbr/sacral Left 5/6/2016     Procedure: NEURO DEST FACET L/S W/IG SNGL - L3-S1;  Surgeon: Farhan Rees D.O.;  [facility]: [facility];  Service: Pain Management   • Pr dstr nrolytc robb carmichael [facility]  addl lmbr/sacral Left 5/6/2016     Procedure: NEURO DEST FACET L/S W/IG ADDL;  Surgeon: Farhan Rees D.O.;  Location: SURGERY SURGICAL [facility] ORS;  Service: Pain Management   • Pr inject rx other periph nerve Left 5/6/2016     Procedure: NEUROLYTIC DEST-OTHER NERVE;  Surgeon: Farhan Rees D.O.;  Location: SURGERY SURGICAL [facility]S;  Service: Pain Management   • Pr dstr nrolytc agnt parverteb fct addl lmbr/sacral  5/6/2016     Procedure: NEURO DEST FACET L/S W/IG ADDL;  Surgeon: Farhan Rees D.O.;  Location: SURGERY SURGICAL [facility] ORS;  Service: Pain Management   • Pr dstr nrolytc agnt parverteb fct sngl lmbr/sacral Right 6/24/2016     Procedure: NEURO DEST FACET L/S W/IG SNGL - L3-S1;  Surgeon: Farhan Rees D.O.;  Location: SURGERY SURGICAL [facility]S;  Service: Pain Management   • Pr dstr nrolytc agnt parverteb fct addl lmbr/sacral Right 6/24/2016     Procedure: NEURO DEST FACET L/S W/IG ADDL;  Surgeon: Farhan Rees D.O.;  Location: SURGERY SURGICAL [facility]S;  Service: Pain Management   • Pr inject rx other periph nerve Right 6/24/2016     Procedure: NEUROLYTIC DEST-OTHER NERVE;  Surgeon: Farhan Rees D.O.;  Location: SURGERY SURGICAL [facility]S;  Service: Pain Management   • Pr dstr nrolytc agnt parverteb fct addl lmbr/sacral  6/24/2016     Procedure: NEURO DEST FACET L/S W/IG ADDL;  Surgeon: Farhan Rees D.O.;  Location: SURGERY SURGICAL [facility]S;  Service: Pain Management     Allergies   Allergen Reactions   • Morphine Rash   • Other Food Rash     Onions-cause headaches and facial flushing     Outpatient Encounter Prescriptions as of 2/23/2017   Medication Sig Dispense Refill   • tramadol (ULTRAM) 50 MG Tab Take 1 Tab by mouth every 8 hours as needed. 90 Tab 3   • penicillin v potassium (VEETID) 250 MG Tab Take 250 mg by mouth 4 times a day.     • ascorbic acid (ASCORBIC ACID) 500 MG Tab Take 500 mg by mouth every day.     • naproxen (NAPROSYN) 500 MG Tab Take 500 mg by mouth 2  times a day, with meals.     • prednisONE (DELTASONE) 2.5 MG Tab Take 15 mg in evening daily 180 Tab 0   • TURMERIC PO Take  by mouth.     • therapeutic multivitamin-minerals (THERAGRAN-M) Tab Take 1 Tab by mouth every day.     • vitamin D (CHOLECALCIFEROL) 1000 UNIT Tab Take 1,000 Units by mouth every day.     • vitamin e (VITAMIN E) 400 UNIT CAPS Take 400 Units by mouth every day.     • lorazepam (ATIVAN) 1 MG Tab Take 1 Tab by mouth as needed for Anxiety (take one hour before procedure, max one per day). 5 Tab 0   • fluticasone (FLONASE) 50 MCG/ACT nasal spray Spray 2 Sprays in nose every day. (Patient taking differently: Spray 2 Sprays in nose as needed.) 16 g 3   • loratadine (CLARITIN) 10 MG TABS Take 10 mg by mouth as needed.       No facility-administered encounter medications on file as of 2/23/2017.       Social History     Social History   • Marital Status:      Spouse Name: N/A   • Number of Children: N/A   • Years of Education: N/A     Occupational History   • Not on file.     Social History Main Topics   • Smoking status: Never Smoker    • Smokeless tobacco: Never Used   • Alcohol Use: 5.4 oz/week     2 Glasses of wine, 7 Standard drinks or equivalent per week      Comment: 2 drinks per week   • Drug Use: No   • Sexual Activity: Not on file     Other Topics Concern   • Not on file     Social History Narrative      History   Smoking status   • Never Smoker    Smokeless tobacco   • Never Used     History   Alcohol Use   • 5.4 oz/week   • 2 Glasses of wine, 7 Standard drinks or equivalent per week     Comment: 2 drinks per week     History   Drug Use No      OB History   No data available      No LMP recorded. Patient has had an ablation.    G P A L     Family History   Problem Relation Age of Onset   • Diabetes     • Hypertension     • Cancer         Review of Systems   Constitutional: Negative for fever and chills.   HENT:        Neck and face still feels full   Musculoskeletal: Positive for  myalgias, back pain and joint pain.        Pain now also in upper back and shoulders  She reports a month ago she had hand soreness while she was cutting.   Skin: Negative for rash.        She denies Raynauds   Endo/Heme/Allergies: Bruises/bleeds easily.        She denies any history of blood clots or miscarriages        Objective:   /86 mmHg  Pulse 90  Temp(Src) 36.6 °C (97.8 °F)  Resp 12  Wt 97.07 kg (214 lb)  SpO2 100%  Left 144/96  right 142/92  Physical Exam   Constitutional: She is oriented to person, place, and time. She appears well-developed and well-nourished. No distress.   HENT:   Head: Normocephalic and atraumatic.   Right Ear: External ear normal.   Left Ear: External ear normal.   Mild cushingoid facies   Eyes: Conjunctivae are normal. Right eye exhibits no discharge. Left eye exhibits no discharge.   Neck: No thyromegaly present.   Pulmonary/Chest: Effort normal. No stridor. No respiratory distress.   Musculoskeletal: She exhibits no edema.   Neurological: She is alert and oriented to person, place, and time.   Skin: Skin is warm and dry. She is not diaphoretic. No erythema.   Limited exam   Psychiatric: She has a normal mood and affect. Her behavior is normal. Judgment and thought content normal.         Labs    Lab Results   Component Value Date/Time    QUANTIFERON TB GOLD Negative 12/01/2016 04:04 PM     Lab Results   Component Value Date/Time    HEPATITIS B CCORE AB, TOTAL Negative 12/01/2016 04:04 PM    HEPATITIS B SURFACE ANTIGEN Negative 12/01/2016 04:04 PM     Lab Results   Component Value Date/Time    HEPATITIS C ANTIBODY Negative 12/01/2016 04:04 PM     Lab Results   Component Value Date/Time    SODIUM 136 01/04/2017 03:54 PM    POTASSIUM 4.1 01/04/2017 03:54 PM    CHLORIDE 102 01/04/2017 03:54 PM    CO2 24 01/04/2017 03:54 PM    GLUCOSE 115* 01/04/2017 03:54 PM    BUN 18 01/04/2017 03:54 PM    CREATININE 0.89 01/04/2017 03:54 PM      Lab Results   Component Value Date/Time     WBC 11.5* 12/01/2016 04:04 PM    RBC 4.35 12/01/2016 04:04 PM    HEMOGLOBIN 14.1 12/01/2016 04:04 PM    HEMATOCRIT 42.7 12/01/2016 04:04 PM    MCV 98.2* 12/01/2016 04:04 PM    MCH 32.4 12/01/2016 04:04 PM    MCHC 33.0* 12/01/2016 04:04 PM    MPV 9.5 12/01/2016 04:04 PM    NEUTROPHILS-POLYS 81.40* 12/01/2016 04:04 PM    LYMPHOCYTES 13.40* 12/01/2016 04:04 PM    MONOCYTES 4.40 12/01/2016 04:04 PM    EOSINOPHILS 0.00 12/01/2016 04:04 PM    BASOPHILS 0.30 12/01/2016 04:04 PM      Lab Results   Component Value Date/Time    CALCIUM 9.7 01/04/2017 03:54 PM    AST(SGOT) 15 12/01/2016 04:04 PM    ALT(SGPT) 31 12/01/2016 04:04 PM    ALKALINE PHOSPHATASE 48 12/01/2016 04:04 PM    TOTAL BILIRUBIN 0.4 12/01/2016 04:04 PM    ALBUMIN 4.3 12/01/2016 04:04 PM    TOTAL PROTEIN 7.4 12/01/2016 04:04 PM     Lab Results   Component Value Date/Time    URIC ACID 4.9 12/01/2016 04:04 PM    RHEUMATOID FACTOR -NEPH- <10 11/08/2016 01:17 PM    CCP ANTIBODIES 2 12/01/2016 04:04 PM    ANTINUCLEAR ANTIBODY None Detected 12/01/2016 04:03 PM     Lab Results   Component Value Date/Time    SED RATE WESTERGREN 8 01/04/2017 03:54 PM    STAT C-REACTIVE PROTEIN 0.33 01/04/2017 03:54 PM     No results found for: MICHAEL SANTIAGO  Lab Results   Component Value Date/Time    C3 COMPLEMENT 152.0 12/01/2016 04:04 PM    COMPLEMENT C4 28.0 12/01/2016 04:04 PM     Lab Results   Component Value Date/Time    ANTI-DNA -DS None Detected 12/01/2016 04:04 PM    RNP ANTIBODIES 0 12/01/2016 04:04 PM    SMITH ANTIBODIES 0 12/01/2016 04:04 PM    ANTI-SCL-70 0 11/08/2016 01:17 PM     Lab Results   Component Value Date/Time    ANTI-DNA -DS None Detected 12/01/2016 04:04 PM    ANCA IGG <1:20 12/01/2016 04:04 PM    C3 COMPLEMENT 152.0 12/01/2016 04:04 PM    RNP ANTIBODIES 0 12/01/2016 04:04 PM    SJOGREN'S ANTI-SS-B 0 12/01/2016 04:04 PM     Lab Results   Component Value Date/Time    COLOR Colorless 12/01/2016 04:03 PM    SPECIFIC GRAVITY 1.008 12/01/2016 04:03 PM     PH 6.5 12/01/2016 04:03 PM    GLUCOSE Negative 12/01/2016 04:03 PM    KETONES Negative 12/01/2016 04:03 PM    PROTEIN Negative 12/01/2016 04:03 PM     No results found for: TOTPROTUR, TOTALVOLUME, [id number]  Lab Results   Component Value Date/Time    SSA 60 (R0)(KATHERINE) AB, IGG 0 12/01/2016 04:04 PM    SSA 52 (R0)(KATHERINE) AB, IGG 5 12/01/2016 04:04 PM     No results found for: HBA1C  No results found for: CPKTOTAL  Lab Results   Component Value Date/Time    G-6-PD 13.7 11/18/2016 03:18 PM     No results found for: [id number]  Lab Results   Component Value Date/Time    ACE 11 11/18/2016 03:18 PM     Lab Results   Component Value Date/Time    25-HYDROXY   VITAMIN D 25 31 10/10/2016 02:25 PM     No results found for: TSH, FREEDIR  Lab Results   Component Value Date/Time    TSH 1.530 11/28/2016 05:54 PM     Lab Results   Component Value Date/Time    MICROSOMAL -TPO- ABS 0.5 11/08/2016 01:17 PM    ANTI-THYROGLOBULIN <0.9 12/01/2016 04:04 PM     Lab Results   Component Value Date/Time    LYME DISEASE ABS, IGG 0.49 11/08/2016 01:17 PM     Lab Results   Component Value Date/Time    F-ACTIN AB, IGG 22* 11/08/2016 01:17 PM     No results found for: IGA, TTRANSIGA, ENDOIGA  No results found for: FLTYPE, CRYSTALSBDF  No results found for: ISTATICAL  No results found for: ISTATCREAT  No results found for: CTELOPEP  No results found for: GBMABG  No results found for: PTHINTACT    Assessment:     1. Avascular necrosis (CMS-HCC)  ANTICARDIOLIPIN AB IGG,IGM,IGA    BETA-2 GLYCOPROTEIN I AB,G,A,M    LUPUS ANTICOAGULANT    WESTERGREN SED RATE    CRP QUANTITIVE (NON-CARDIAC)    CORTISOL    ACTH    CREATINE KINASE    PROTEIN C FUNCTIONAL    PROTEIN S FUNCTIONAL    FACTOR V LEIDEN MUTATION    ANTITHROMBIN PANEL   2. Rheumatic fever  ANTICARDIOLIPIN AB IGG,IGM,IGA    BETA-2 GLYCOPROTEIN I AB,G,A,M    LUPUS ANTICOAGULANT    WESTERGREN SED RATE    CRP QUANTITIVE (NON-CARDIAC)    CORTISOL    ACTH   3. Myalgia  CREATINE KINASE         Medical  Decision Making:  Today's Assessment / Status / Plan:     Ms. Janett Mcnulty presents for arthritis    Arthralgia    RF, CCP, MERA, ANCA , HLA B27 negative.  Her inflammatory markers did normalize with steroids. We will complete her steroid course taper and recheck a sedimentation rate and CRP    Myalgia  She is reporting some myalgias. will check a CK    Osteonecrosis  Seeing orthopedics. Additional MRIs do show other joint involvement with osteonecrosis. We will check antiphospholipid antibodies protein to see protein S deficiency factor Leiden 5 mutation and antithrombin panel.    Rheumatic fever  Repeat echo was normal  On antibiotics    Encounter high risk medications  Will be tapering  Advise to continue calcium and vitamin D      1. Avascular necrosis (CMS-HCC)  ANTICARDIOLIPIN AB IGG,IGM,IGA    BETA-2 GLYCOPROTEIN I AB,G,A,M    LUPUS ANTICOAGULANT    WESTERGREN SED RATE    CRP QUANTITIVE (NON-CARDIAC)    CORTISOL    ACTH    CREATINE KINASE    PROTEIN C FUNCTIONAL    PROTEIN S FUNCTIONAL    FACTOR V LEIDEN MUTATION    ANTITHROMBIN PANEL   2. Rheumatic fever  ANTICARDIOLIPIN AB IGG,IGM,IGA    BETA-2 GLYCOPROTEIN I AB,G,A,M    LUPUS ANTICOAGULANT    WESTERGREN SED RATE    CRP QUANTITIVE (NON-CARDIAC)    CORTISOL    ACTH   3. Myalgia  CREATINE KINASE     No Follow-up on file.      1. Avascular necrosis (CMS-HCC)  ANTICARDIOLIPIN AB IGG,IGM,IGA    BETA-2 GLYCOPROTEIN I AB,G,A,M    LUPUS ANTICOAGULANT    WESTERGREN SED RATE    CRP QUANTITIVE (NON-CARDIAC)    CORTISOL    ACTH    CREATINE KINASE    PROTEIN C FUNCTIONAL    PROTEIN S FUNCTIONAL    FACTOR V LEIDEN MUTATION    ANTITHROMBIN PANEL   2. Rheumatic fever  ANTICARDIOLIPIN AB IGG,IGM,IGA    BETA-2 GLYCOPROTEIN I AB,G,A,M    LUPUS ANTICOAGULANT    WESTERGREN SED RATE    CRP QUANTITIVE (NON-CARDIAC)    CORTISOL    ACTH   3. Myalgia  CREATINE KINASE      She agreed and verbalized her agreement and understanding with the current plan. I answered all questions and  concerns she has at this time and advised her to call at any time in there interim with questions or concerns in regards to her care.    Thank you for allowing me to participate in her care, I will continue to follow closely.     Please note that this dictation was created using voice recognition software. I have made every reasonable attempt to correct obvious errors, but I expect that there are errors of grammar and possibly content that I did not discover before finalizing the note.

## 2017-02-23 NOTE — MR AVS SNAPSHOT
Jantet Urban Page   2017 8:00 AM   Office Visit   MRN: [id number]    Department:  Rheumatology [facility]   Dept Phone:  [phone]    Description:  Female : 1970   Provider:  Starr Jackson M.D.           Reason for Visit     Follow-Up follow up      Allergies as of 2017     Allergen Noted Reactions    Morphine 2008   Rash    Other Food 2013   Rash    Onions-cause headaches and facial flushing      You were diagnosed with     Avascular necrosis (CMS-HCC)   [427234]       Rheumatic fever   [610888]       Myalgia   [117921]         Vital Signs     Blood Pressure Pulse Temperature Respirations Weight Oxygen Saturation    120/86 mmHg 90 36.6 °C (97.8 °F) 12 97.07 kg (214 lb) 100%    Smoking Status                   Never Smoker            Basic Information     Date Of Birth Sex Race Ethnicity Preferred Language    1970 Female White Non- English      Your appointments     Mar 07, 2017  7:30 AM   MR SP WO 30 with [address] MRI 1   [facility] - MRI - [address]    [address]  [place] [address]   [phone]            Mar 07, 2017  8:00 AM   MR EXTREMITY WITHOUT (30) with [address] 1   [facility] - MRI - [address]    [address]  [place] [address]   [phone]            Mar 07, 2017  8:30 AM   MR EXTREMITY WITHOUT (30) with [address] 1   [facility] - MRI - [address]    [address]  [place] [address]   [phone]            Mar 07, 2017  9:00 AM   MR-HAND/WRIST (30) with [address] 1   [facility] - MRI - [address]    [address]  [place] [address]   [phone]            Mar 07, 2017 10:00 AM   MR-HAND/WRIST (30) with [address] 1   [facility] - MRI - [address]    [address]  [place] [address]   [phone]            Mar 08, 2017  8:00 AM   FOLLOW UP with Bryan Armenta M.D.   [facility] (--)    [address]  [place] [address] [phone]            Mar 14, 2017  8:00 AM   Follow Up Visit with Starr Jackson M.D.   [facility] (--)    [address]  [place] [address] [phone]           You will be receiving a confirmation call a few days before your appointment from our automated call confirmation system.            Apr 21, 2017 11:00 AM   Established Patient with Franklin Gonzalez M.D.   [facility]    [address]  [place] [id number] [phone]           You will be receiving a confirmation call a few days before your appointment from our automated call confirmation system.              Problem List              ICD-10-CM Priority Class Noted - Resolved    S/P appendectomy Z90.49   6/1/2011 - Present    Left hip revision 2002 M21.959   6/1/2011 - Present    Left ankle surgery M25.572   6/1/2011 - Present    Right hip arthroscopy  M25.551   6/1/2011 - Present    Low back pain (Chronic) M54.5   6/1/2011 - Present    Cervical pain (neck) M54.2   6/1/2011 - Present    IBS (irritable bowel syndrome) K58.9   6/1/2011 - Present    Preventative health care (Chronic) Z00.00   6/1/2011 - Present    Varicose vein I86.8   10/7/2011 - Present    S/P bunionectomy Z98.890   6/7/2013 - Present    Allergic rhinitis due to animal hair and dander (Chronic) J30.81   9/12/2013 - Present    Dyslipidemia E78.5   9/18/2015 - Present    Perimenopausal (Chronic) N95.1   9/25/2015 - Present    Foot pain, right (Chronic) M79.671   9/30/2015 - Present    Obesity E66.9   9/16/2016 - Present    Rheumatic fever (Chronic) I00   9/16/2016 - Present    Avascular necrosis (CMS-HCC) (Chronic) M87.00   2/12/2017 - Present      Health Maintenance        Date Due Completion Dates    MAMMOGRAM 10/26/2017 10/26/2016, 9/15/2015, 9/8/2014, 8/29/2013, 4/22/2004    PAP SMEAR 10/7/2018 10/7/2015    IMM DTaP/Tdap/Td Vaccine (2 - Td) 9/25/2025 9/25/2015             Current Immunizations     Influenza TIV (IM) 12/9/2013, 1/9/2013    Influenza Vaccine Quad Inj (Preserved) 12/13/2016, 10/20/2015  2:00 AM, 11/19/2014    Pneumococcal polysaccharide vaccine (PPSV-23) 1/6/2017 11:10 AM    Tdap Vaccine 9/25/2015 11:06 AM    Tuberculin Skin Test 5/5/2014  1:40 PM, 6/3/2013  1:20 PM, 6/4/2012 12:30 PM, 7/1/2011      Below and/or attached are the medications your provider expects you to take. Review all of your home medications and newly ordered medications with your provider and/or pharmacist. Follow medication instructions as directed by your provider and/or pharmacist. Please keep your medication list with you and share with your provider. Update the information when medications are discontinued, doses are changed, or new medications (including over-the-counter products) are added; and carry medication information at all times in the event of emergency situations     Allergies:  MORPHINE - Rash     OTHER FOOD - Rash               Medications  Valid as of: February 23, 2017 -  9:28 AM    Generic Name Brand Name Tablet Size Instructions for use    Ascorbic Acid (Tab) ascorbic acid 500 MG Take 500 mg by mouth every day.        Cholecalciferol (Tab) cholecalciferol 1000 UNIT Take 1,000 Units by mouth every day.        Fluticasone Propionate (Suspension) FLONASE 50 MCG/ACT Spray 2 Sprays in nose every day.        Loratadine (Tab) CLARITIN 10 MG Take 10 mg by mouth as needed.        LORazepam (Tab) ATIVAN 1 MG Take 1 Tab by mouth as needed for Anxiety (take one hour before procedure, max one per day).        Multiple Vitamins-Minerals (Tab) THERAGRAN-M  Take 1 Tab by mouth every day.        Naproxen (Tab) NAPROSYN 500 MG Take 500 mg by mouth 2 times a day, with meals.        Penicillin V Potassium (Tab) VEETID 250 MG Take 250 mg by mouth 4 times a day.        PredniSONE (Tab) DELTASONE 2.5 MG Take 15 mg in evening daily        TraMADol HCl (Tab) ULTRAM 50 MG Take 1 Tab by mouth  every 8 hours as needed.        Turmeric   Take  by mouth.        Vitamin E (Cap) VITAMIN E 400 UNIT Take 400 Units by mouth every day.        .                 Medicines prescribed today were sent to:     [id number] - [place], [place] - [address]    [address] [address] [facility] [address] [place] [place] [address]    Phone: [phone] Fax: [phone]    Open 24 Hours?: No      Medication refill instructions:       If your prescription bottle indicates you have medication refills left, it is not necessary to call your provider’s office. Please contact your pharmacy and they will refill your medication.    If your prescription bottle indicates you do not have any refills left, you may request refills at any time through one of the following ways: The online [employer] system (except Urgent Care), by calling your provider’s office, or by asking your pharmacy to contact your provider’s office with a refill request. Medication refills are processed only during regular business hours and may not be available until the next business day. Your provider may request additional information or to have a follow-up visit with you prior to refilling your medication.   *Please Note: Medication refills are assigned a new Rx number when refilled electronically. Your pharmacy may indicate that no refills were authorized even though a new prescription for the same medication is available at the pharmacy. Please request the medicine by name with the pharmacy before contacting your provider for a refill.        Your To Do List     Future Labs/Procedures Complete By Expires    CRP QUANTITIVE (NON-CARDIAC)  3/7/2017 2/23/2018    ACTH  As directed 2/23/2018    ANTICARDIOLIPIN AB IGG,IGM,IGA  As directed 2/23/2018    ANTITHROMBIN PANEL  As directed 2/23/2018    CORTISOL  As directed 2/23/2018    CREATINE KINASE  As directed 2/23/2018    FACTOR V LEIDEN MUTATION  As directed 2/23/2018    LUPUS ANTICOAGULANT  As directed 2/23/2018    PROTEIN C FUNCTIONAL   As directed 2/23/2018    PROTEIN S FUNCTIONAL  As directed 2/23/2018    WESTERGREN SED RATE  As directed 2/23/2018      Other Notes About Your Plan     1/12/17  rheumatology note, inflammatory markers did normalize with prednisone therapy, continue taper with osteonecrosis bilateral tenderness achilles insertion consider enthesitis, will get MRI left achilles region to evaluate for tendinitis or tenosynovitis, follow-up 6 weeks                   MyChart Access Code: Activation code not generated  Current MyChart Status: Active

## 2017-02-24 NOTE — PROGRESS NOTES
Subjective:   Date of Consultation:2/23/2017  8:10 AM  Primary care physician:Franklin Gonzalez M.D.      Reason for Consultation:  Ms. Mcnulty  is a pleasant 46 y.o. year old female who presents for follow-up of arthritis    Rheumatic Fever  Following cardiology  Has been seen by ID    Arthritis  SHe reports overall stable as she has tapered her prednisone. She reports no worsening of symptoms as she has tapered her prednisone.  She is reporting her current use of prednisone at 2.5 mg every other day and will be tapered off prednisone by the end of this week     She continues to report myalgias and tenderness on palpation. She has some mild increase pain in the right ankle. However her episodes of pain previously noted has been stable.    Avascular necrosis of multiple joints  We first discovered avascular necrosis of the knee. She was referred to orthopedics. However additional imaging has noted avascular necrosis in the hip and the ankle. She is expected to have additional imaging in March of her shoulders and hands      Past Medical History   Diagnosis Date   • Pain 6/5/13     right foot   • Arthritis      osteoarthritis     Past Surgical History   Procedure Laterality Date   • Pr inj dx/ther agnt paravert facet joint, gómez*  1/21/2011     Performed by EDMAR ELIAS at [facility]S   • Pr inj dx/ther agnt paravert facet joint, gómez*  2/25/2011     Performed by EDMAR ELIAS at [facility]   • Pr dest,paravertebral,l/s,single  3/4/2011     Performed by EDMAR ELIAS at [facility]   • Inj,sacroiliac,anes/steroid  5/13/2011     Performed by EDMAR ELIAS at [facility]   • Pr inject nerv blck,stellate ganglion  9/2/2011     Performed by EDMAR ELIAS at [facility]   • Neuro dest facet l/s w/ig sngl  3/2/2012     Performed by EDMAR ELIAS at [facility] ORS   • Neuro dest facet l/s w/ig addl  3/2/2012      Performed by EDMAR ELIAS at [facility] ORS   • Neuro dest facet l/s w/ig addl  3/2/2012     Performed by EDMAR ELIAS at [facility] ORS   • Neuro dest facet l/s w/ig sngl  3/9/2012     Performed by EDMAR ELIAS at [facility] ORS   • Neuro dest facet l/s w/ig addl  3/9/2012     Performed by EDMAR ELIAS at [facility] OR[facility]   • Neuro dest facet l/s w/ig addl  3/9/2012     Performed by EDMAR ELIAS at [facility] ORS   • Neuro dest facet l/s w/ig sngl  12/28/2012     Performed by Edmar Elias D.O. at [facility] ORS   • Neuro dest facet l/s w/ig addl  12/28/2012     Performed by Edmar Elias D.O. at [facility] ORS   • Neuro dest facet l/s w/ig addl  12/28/2012     Performed by Edmar Elias D.O. at [facility] ORS   • Neuro dest facet l/s w/ig sngl  1/4/2013     Performed by Edmar Elias D.O. at [facility] ORS   • Neuro dest facet l/s w/ig addl  1/4/2013     Performed by Edmar Elias D.O. at [facility]   • Neuro dest facet l/s w/ig addl  1/4/2013     Performed by Edmar Elias D.O. at [facility]   • Other  1998     left hip revision   • Other  2009     right hip revision   • Tonsillectomy  2001   • Other  2007     left ankle tendon repair   • Appendectomy  1991   • Other       uterine ablation   • Bunionectomy  6/7/2013     Performed by Oliver Pratt D.P.M. at [facility]   • Neuro dest facet l/s w/ig sngl  9/20/2013     Performed by Edmar Elias D.O. at [facility] ORS   • Neuro dest facet l/s w/ig addl  9/20/2013     Performed by Edmar Elias D.O. at [facility] ORS   • Neuro dest facet l/s w/ig addl  9/20/2013     Performed by Edmar Elias D.O. [facility]   • Neuro dest facet l/s w/ig addl  9/20/2013     Performed by Edmar Elias D.O. at SUR[facility]ERY  [facility] ORS   • Neuro dest facet l/s w/ig sngl  10/4/2013     Performed by Farhan Rees D.O. at [facility] ORS   • Neuro dest facet l/s w/ig addl  10/4/2013     Performed by Farhan Rees D.O. at [facility]S   • Neuro dest facet l/s w/ig addl  10/4/2013     Performed by Farhan Rees D.O. at [facility]S   • Neuro dest facet l/s w/ig addl  10/4/2013     Performed by Farhan Rees D.O. at [facility]S   • Inj,sacroiliac,anes/steroid  11/8/2013     Performed by Farhan Rees D.O. at [facility]S   • Inj,sacroiliac,anes/steroid  12/6/2013     Performed by Farhan Rees D.O. at [facility]   • Inj,sacroiliac,anes/steroid  1/10/2014     Performed by Farhan Rees D.O. at [facility]   • Inj,sacroiliac,anes/steroid  1/10/2014     Performed by Farhan Rees D.O. at [facility]   • Neuro dest facet l/s w/ig sngl  5/16/2014     Performed by Farhan Rees D.O. at [facility]S   • Neuro dest facet l/s w/ig addl  5/16/2014     Performed by Farhan Rees D.O. at [facility]S   • Neuro dest facet l/s w/ig addl  5/16/2014     Performed by Farhan Rees D.O. at [facility]S   • Neuro dest facet l/s w/ig addl  5/16/2014     Performed by Farhan Rees D.O. at [facility]S   • Neuro dest facet l/s w/ig sngl  6/6/2014     Performed by Farhan Rees D.O. at [facility]   • Neuro dest facet l/s w/ig addl  6/6/2014     Performed by Farhan Rees D.O. at [facility]S   • Neuro dest facet l/s w/ig addl  6/6/2014     Performed by Farhan Rees D.O. at SUR[facility]ERY [facility]S   • Neuro dest facet l/s w/ig addl  6/6/2014     Performed by Farhan Rees D.O. at SUR[facility]   • Toe arthroplasty  7/7/2014     Performed by Oliver Pratt D.P.M. at [facility]   • Inj,sacroiliac,anes/steroid  7/11/2014     Performed by Farhan Rees D.O. at [facility]   • Inj,sacroiliac,anes/steroid  7/11/2014     Performed by Farhan Rees D.O. at [facility]   • Neuro dest facet l/s w/ig sngl  2/6/2015     Performed by Farhan Rees D.O. at [facility]   • Neuro dest facet l/s w/ig addl  2/6/2015     Performed by Farhan Rees D.O. at [facility]   • Neuro dest facet l/s w/ig addl  2/6/2015     Performed by Farhan Rees D.O. at [facility]   • Neuro dest facet l/s w/ig sngl  2/13/2015     Performed by Farhan Rees D.O. at [facility]   • Neuro dest facet l/s w/ig addl  2/13/2015     Performed by Farhan Rees D.O. at [facility]   • Neuro dest facet l/s w/ig addl  2/13/2015     Performed by Farhan Rees D.O. at [facility]   • Inj,sacroiliac,anes/steroid  4/3/2015     Performed by Farhan Rees D.O. at [facility]   • Inj,sacroiliac,anes/steroid  4/3/2015     Performed by Farhan Rees D.O. at [facility]   • Pr dstr nrolytc agnt parverteb fct sngl lmbr/sacral Left 10/9/2015     Procedure: NEURO DEST FACET L/S W/IG SNGL - L3-S1;  Surgeon: Farhan Rees D.O.;  Location: [facility];  Service: Pain Management   • Pr dstr nrolytc agnt parverteb fct addl lmbr/sacral  10/9/2015     Procedure: NEURO DEST FACET L/S W/IG ADDL;  Surgeon: Farhan Rees D.O.;  Location: [facility];  Service: Pain Management   • Pr inject rx other periph nerve  10/9/2015     Procedure: NEUROLYTIC DEST-OTHER NERVE;  Surgeon: Farhan Rees D.O.;  Location: [facility];  Service: Pain Management   • Pr dstr nrolytc agnt parverteb fct addl lmbr/sacral  10/9/2015     Procedure: NEURO DEST FACET L/S W/IG ADDL;  Surgeon: Farhan Rees D.O.;  Location:  [facility];  Service: Pain Management   • Pr dstr nrolytc agnt parverteb fct sngl lmbr/sacral Right 10/16/2015     Procedure: NEURO DEST FACET L/S W/IG SNGL - L3-S1;  Surgeon: Farhan Rees D.O.;  Location: [facility];  Service: Pain Management   • Pr dstr nrolytc agnt parverteb fct addl lmbr/sacral  10/16/2015     Procedure: NEURO DEST FACET L/S W/IG ADDL;  Surgeon: Farhan Rees D.O.;  Location: [facility];  Service: Pain Management   • Pr inject rx other periph nerve  10/16/2015     Procedure: NEUROLYTIC DEST-OTHER NERVE;  Surgeon: Farhan Rees D.O.;  Location: [facility];  Service: Pain Management   • Pr dstr nrolytc agnt parverteb fct addl lmbr/sacral  10/16/2015     Procedure: NEURO DEST FACET L/S W/IG ADDL;  Surgeon: Farhan Rees D.O.;  Location: [facility];  Service: Pain Management   • Pr dstr nrolytc agnt parverteb fct sngl lmbr/sacral Left 5/6/2016     Procedure: NEURO DEST FACET L/S W/IG SNGL - L3-S1;  Surgeon: Farhan Rees D.O.;  Location: [facility];  Service: Pain Management   • Pr dstr nrolytc agnt parverteb fct addl lmbr/sacral Left 5/6/2016     Procedure: NEURO DEST FACET L/S W/IG ADDL;  Surgeon: Farhan Rees D.O.;  Location: [facility];  Service: Pain Management   • Pr inject rx other periph nerve Left 5/6/2016     Procedure: NEUROLYTIC DEST-OTHER NERVE;  Surgeon: Farhan Rees D.O.;  Location: [facility];  Service: Pain Management   • Pr dstr nrolytc agnt parverteb fct addl lmbr/sacral  5/6/2016     Procedure: NEURO DEST FACET L/S W/IG ADDL;  Surgeon: Farhan Rees D.O.;  Location: [facility];  Service: Pain Management   • Pr dstr nrolytc agnt parverteb fct sngl lmbr/sacral Right 6/24/2016     Procedure: NEURO DEST FACET L/S W/IG SNGL - L3-S1;  Surgeon: Farhan Rees D.O.;  [facility]: [facility];   Service: Pain Management   • Pr dstr nrolytc agnt parverteb fct addl lmbr/sacral Right 6/24/2016     Procedure: NEURO DEST FACET L/S W/IG ADDL;  Surgeon: Farhan Rees D.O.;  Location: [facility]ERY [facility];  Service: Pain Management   • Pr inject rx other periph nerve Right 6/24/2016     Procedure: NEUROLYTIC DEST-OTHER NERVE;  Surgeon: Farhan Rees D.O.;  Location: [facility];  Service: Pain Management   • Pr dstr nrolytc agnt parverteb fct addl lmbr/sacral  6/24/2016     Procedure: NEURO DEST FACET L/S W/IG ADDL;  Surgeon: Farhan Rees D.O.;  Location: [facility];  Service: Pain Management     Allergies   Allergen Reactions   • Morphine Rash   • Other Food Rash     Onions-cause headaches and facial flushing     Outpatient Encounter Prescriptions as of 2/23/2017   Medication Sig Dispense Refill   • tramadol (ULTRAM) 50 MG Tab Take 1 Tab by mouth every 8 hours as needed. 90 Tab 3   • penicillin v potassium (VEETID) 250 MG Tab Take 250 mg by mouth 4 times a day.     • ascorbic acid (ASCORBIC ACID) 500 MG Tab Take 500 mg by mouth every day.     • naproxen (NAPROSYN) 500 MG Tab Take 500 mg by mouth 2 times a day, with meals.     • prednisONE (DELTASONE) 2.5 MG Tab Take 15 mg in evening daily 180 Tab 0   • TURMERIC PO Take  by mouth.     • therapeutic multivitamin-minerals (THERAGRAN-M) Tab Take 1 Tab by mouth every day.     • vitamin D (CHOLECALCIFEROL) 1000 UNIT Tab Take 1,000 Units by mouth every day.     • vitamin e (VITAMIN E) 400 UNIT CAPS Take 400 Units by mouth every day.     • lorazepam (ATIVAN) 1 MG Tab Take 1 Tab by mouth as needed for Anxiety (take one hour before procedure, max one per day). 5 Tab 0   • fluticasone (FLONASE) 50 MCG/ACT nasal spray Spray 2 Sprays in nose every day. (Patient taking differently: Spray 2 Sprays in nose as needed.) 16 g 3   • loratadine (CLARITIN) 10 MG TABS Take 10 mg by mouth as needed.       No facility-administered  encounter medications on file as of 2/23/2017.       Social History     Social History   • Marital Status:      Spouse Name: N/A   • Number of Children: N/A   • Years of Education: N/A     Occupational History   • Not on file.     Social History Main Topics   • Smoking status: Never Smoker    • Smokeless tobacco: Never Used   • Alcohol Use: 5.4 oz/week     2 Glasses of wine, 7 Standard drinks or equivalent per week      Comment: 2 drinks per week   • Drug Use: No   • Sexual Activity: Not on file     Other Topics Concern   • Not on file     Social History Narrative      History   Smoking status   • Never Smoker    Smokeless tobacco   • Never Used     History   Alcohol Use   • 5.4 oz/week   • 2 Glasses of wine, 7 Standard drinks or equivalent per week     Comment: 2 drinks per week     History   Drug Use No      OB History   No data available      No LMP recorded. Patient has had an ablation.    G P A L     Family History   Problem Relation Age of Onset   • Diabetes     • Hypertension     • Cancer         Review of Systems   Constitutional: Negative for fever and chills.   HENT:        Neck and face still feels full   Musculoskeletal: Positive for myalgias, back pain and joint pain.        Pain now also in upper back and shoulders  She reports a month ago she had hand soreness while she was cutting.   Skin: Negative for rash.        She denies Raynauds   Endo/Heme/Allergies: Bruises/bleeds easily.        She denies any history of blood clots or miscarriages        Objective:   /86 mmHg  Pulse 90  Temp(Src) 36.6 °C (97.8 °F)  Resp 12  Wt 97.07 kg (214 lb)  SpO2 100%  Left 144/96  right 142/92  Physical Exam   Constitutional: She is oriented to person, place, and time. She appears well-developed and well-nourished. No distress.   HENT:   Head: Normocephalic and atraumatic.   Right Ear: External ear normal.   Left Ear: External ear normal.   Mild cushingoid facies   Eyes: Conjunctivae are normal. Right  eye exhibits no discharge. Left eye exhibits no discharge.   Neck: No thyromegaly present.   Pulmonary/Chest: Effort normal. No stridor. No respiratory distress.   Musculoskeletal: She exhibits no edema.   Neurological: She is alert and oriented to person, place, and time.   Skin: Skin is warm and dry. She is not diaphoretic. No erythema.   Limited exam   Psychiatric: She has a normal mood and affect. Her behavior is normal. Judgment and thought content normal.         Labs    Lab Results   Component Value Date/Time    QUANTIFERON TB GOLD Negative 12/01/2016 04:04 PM     Lab Results   Component Value Date/Time    HEPATITIS B CCORE AB, TOTAL Negative 12/01/2016 04:04 PM    HEPATITIS B SURFACE ANTIGEN Negative 12/01/2016 04:04 PM     Lab Results   Component Value Date/Time    HEPATITIS C ANTIBODY Negative 12/01/2016 04:04 PM     Lab Results   Component Value Date/Time    SODIUM 136 01/04/2017 03:54 PM    POTASSIUM 4.1 01/04/2017 03:54 PM    CHLORIDE 102 01/04/2017 03:54 PM    CO2 24 01/04/2017 03:54 PM    GLUCOSE 115* 01/04/2017 03:54 PM    BUN 18 01/04/2017 03:54 PM    CREATININE 0.89 01/04/2017 03:54 PM      Lab Results   Component Value Date/Time    WBC 11.5* 12/01/2016 04:04 PM    RBC 4.35 12/01/2016 04:04 PM    HEMOGLOBIN 14.1 12/01/2016 04:04 PM    HEMATOCRIT 42.7 12/01/2016 04:04 PM    MCV 98.2* 12/01/2016 04:04 PM    MCH 32.4 12/01/2016 04:04 PM    MCHC 33.0* 12/01/2016 04:04 PM    MPV 9.5 12/01/2016 04:04 PM    NEUTROPHILS-POLYS 81.40* 12/01/2016 04:04 PM    LYMPHOCYTES 13.40* 12/01/2016 04:04 PM    MONOCYTES 4.40 12/01/2016 04:04 PM    EOSINOPHILS 0.00 12/01/2016 04:04 PM    BASOPHILS 0.30 12/01/2016 04:04 PM      Lab Results   Component Value Date/Time    CALCIUM 9.7 01/04/2017 03:54 PM    AST(SGOT) 15 12/01/2016 04:04 PM    ALT(SGPT) 31 12/01/2016 04:04 PM    ALKALINE PHOSPHATASE 48 12/01/2016 04:04 PM    TOTAL BILIRUBIN 0.4 12/01/2016 04:04 PM    ALBUMIN 4.3 12/01/2016 04:04 PM    TOTAL PROTEIN 7.4  12/01/2016 04:04 PM     Lab Results   Component Value Date/Time    URIC ACID 4.9 12/01/2016 04:04 PM    RHEUMATOID FACTOR -NEPH- <10 11/08/2016 01:17 PM    CCP ANTIBODIES 2 12/01/2016 04:04 PM    ANTINUCLEAR ANTIBODY None Detected 12/01/2016 04:03 PM     Lab Results   Component Value Date/Time    SED RATE WESTERGREN 8 01/04/2017 03:54 PM    STAT C-REACTIVE PROTEIN 0.33 01/04/2017 03:54 PM     No results found for: STACI SANTIAGOVTINTERP  Lab Results   Component Value Date/Time    C3 COMPLEMENT 152.0 12/01/2016 04:04 PM    COMPLEMENT C4 28.0 12/01/2016 04:04 PM     Lab Results   Component Value Date/Time    ANTI-DNA -DS None Detected 12/01/2016 04:04 PM    RNP ANTIBODIES 0 12/01/2016 04:04 PM    SMITH ANTIBODIES 0 12/01/2016 04:04 PM    ANTI-SCL-70 0 11/08/2016 01:17 PM     Lab Results   Component Value Date/Time    ANTI-DNA -DS None Detected 12/01/2016 04:04 PM    ANCA IGG <1:20 12/01/2016 04:04 PM    C3 COMPLEMENT 152.0 12/01/2016 04:04 PM    RNP ANTIBODIES 0 12/01/2016 04:04 PM    SJOGREN'S ANTI-SS-B 0 12/01/2016 04:04 PM     Lab Results   Component Value Date/Time    COLOR Colorless 12/01/2016 04:03 PM    SPECIFIC GRAVITY 1.008 12/01/2016 04:03 PM    PH 6.5 12/01/2016 04:03 PM    GLUCOSE Negative 12/01/2016 04:03 PM    KETONES Negative 12/01/2016 04:03 PM    PROTEIN Negative 12/01/2016 04:03 PM     No results found for: TOTPROTUR, TOTALVOLUME, [id number]  Lab Results   Component Value Date/Time    SSA 60 (R0)(KATHERINE) AB, IGG 0 12/01/2016 04:04 PM    SSA 52 (R0)(KATHERINE) AB, IGG 5 12/01/2016 04:04 PM     No results found for: HBA1C  No results found for: CPKTOTAL  Lab Results   Component Value Date/Time    G-6-PD 13.7 11/18/2016 03:18 PM     No results found for: [id number]  Lab Results   Component Value Date/Time    ACE 11 11/18/2016 03:18 PM     Lab Results   Component Value Date/Time    25-HYDROXY   VITAMIN D 25 31 10/10/2016 02:25 PM     No results found for: TSH, FREEDIR  Lab Results   Component Value Date/Time     TSH 1.530 11/28/2016 05:54 PM     Lab Results   Component Value Date/Time    MICROSOMAL -TPO- ABS 0.5 11/08/2016 01:17 PM    ANTI-THYROGLOBULIN <0.9 12/01/2016 04:04 PM     Lab Results   Component Value Date/Time    LYME DISEASE ABS, IGG 0.49 11/08/2016 01:17 PM     Lab Results   Component Value Date/Time    F-ACTIN AB, IGG 22* 11/08/2016 01:17 PM     No results found for: IGA, TTRANSIGA, ENDOIGA  No results found for: FLTYPE, CRYSTALSBDF  No results found for: ISTATICAL  No results found for: ISTATCREAT  No results found for: CTELOPEP  No results found for: GBMABG  No results found for: PTHINTACT    Assessment:     1. Avascular necrosis (CMS-HCC)  ANTICARDIOLIPIN AB IGG,IGM,IGA    BETA-2 GLYCOPROTEIN I AB,G,A,M    LUPUS ANTICOAGULANT    WESTERGREN SED RATE    CRP QUANTITIVE (NON-CARDIAC)    CORTISOL    ACTH    CREATINE KINASE    PROTEIN C FUNCTIONAL    PROTEIN S FUNCTIONAL    FACTOR V LEIDEN MUTATION    ANTITHROMBIN PANEL   2. Rheumatic fever  ANTICARDIOLIPIN AB IGG,IGM,IGA    BETA-2 GLYCOPROTEIN I AB,G,A,M    LUPUS ANTICOAGULANT    WESTERGREN SED RATE    CRP QUANTITIVE (NON-CARDIAC)    CORTISOL    ACTH   3. Myalgia  CREATINE KINASE         Medical Decision Making:  Today's Assessment / Status / Plan:     Ms. Janett Mcnulty presents for arthritis    Arthralgia    RF, CCP, MERA, ANCA , HLA B27 negative.  Her inflammatory markers did normalize with steroids. We will complete her steroid course taper and recheck a sedimentation rate and CRP    Myalgia  She is reporting some myalgias. will check a CK    Osteonecrosis  Seeing orthopedics. Additional MRIs do show other joint involvement with osteonecrosis. We will check antiphospholipid antibodies protein to see protein S deficiency factor Leiden 5 mutation and antithrombin panel.    Rheumatic fever  Repeat echo was normal  On antibiotics    Encounter high risk medications  Will be tapering  Advise to continue calcium and vitamin D      1. Avascular necrosis (CMS-HCC)   ANTICARDIOLIPIN AB IGG,IGM,IGA    BETA-2 GLYCOPROTEIN I AB,G,A,M    LUPUS ANTICOAGULANT    WESTERGREN SED RATE    CRP QUANTITIVE (NON-CARDIAC)    CORTISOL    ACTH    CREATINE KINASE    PROTEIN C FUNCTIONAL    PROTEIN S FUNCTIONAL    FACTOR V LEIDEN MUTATION    ANTITHROMBIN PANEL   2. Rheumatic fever  ANTICARDIOLIPIN AB IGG,IGM,IGA    BETA-2 GLYCOPROTEIN I AB,G,A,M    LUPUS ANTICOAGULANT    WESTERGREN SED RATE    CRP QUANTITIVE (NON-CARDIAC)    CORTISOL    ACTH   3. Myalgia  CREATINE KINASE     Return in about 3 weeks (around 3/16/2017).      1. Avascular necrosis (CMS-HCC)  ANTICARDIOLIPIN AB IGG,IGM,IGA    BETA-2 GLYCOPROTEIN I AB,G,A,M    LUPUS ANTICOAGULANT    WESTERGREN SED RATE    CRP QUANTITIVE (NON-CARDIAC)    CORTISOL    ACTH    CREATINE KINASE    PROTEIN C FUNCTIONAL    PROTEIN S FUNCTIONAL    FACTOR V LEIDEN MUTATION    ANTITHROMBIN PANEL   2. Rheumatic fever  ANTICARDIOLIPIN AB IGG,IGM,IGA    BETA-2 GLYCOPROTEIN I AB,G,A,M    LUPUS ANTICOAGULANT    WESTERGREN SED RATE    CRP QUANTITIVE (NON-CARDIAC)    CORTISOL    ACTH   3. Myalgia  CREATINE KINASE      She agreed and verbalized her agreement and understanding with the current plan. I answered all questions and concerns she has at this time and advised her to call at any time in there interim with questions or concerns in regards to her care.    Thank you for allowing me to participate in her care, I will continue to follow closely.     Please note that this dictation was created using voice recognition software. I have made every reasonable attempt to correct obvious errors, but I expect that there are errors of grammar and possibly content that I did not discover before finalizing the note.

## 2017-02-28 ENCOUNTER — TELEPHONE (OUTPATIENT)
Dept: MEDICAL GROUP | Facility: MEDICAL CENTER | Age: 47
End: 2017-02-28

## 2017-02-28 NOTE — TELEPHONE ENCOUNTER
1. Caller Name: Camila                      Call Back Number: [phone]    2. Message: Pt left message checking on status of FMLA paperwork. I spoke with pt and notified her Dr. Gonzalez was away on vacation. Pt stated she would check with HR to see if they can extend the due date the paperwork is needed to be turned in by.     3. Patient approves office to leave a detailed voicemail/MyChart message: N\A

## 2017-03-01 ENCOUNTER — TELEPHONE (OUTPATIENT)
Dept: MEDICAL GROUP | Facility: MEDICAL CENTER | Age: 47
End: 2017-03-01

## 2017-03-06 ENCOUNTER — HOSPITAL ENCOUNTER (OUTPATIENT)
Dept: LAB | Facility: MEDICAL CENTER | Age: 47
End: 2017-03-06
Attending: ORTHOPAEDIC SURGERY
Payer: COMMERCIAL

## 2017-03-06 ENCOUNTER — HOSPITAL ENCOUNTER (OUTPATIENT)
Dept: LAB | Facility: MEDICAL CENTER | Age: 47
End: 2017-03-06
Attending: INTERNAL MEDICINE
Payer: COMMERCIAL

## 2017-03-06 DIAGNOSIS — M79.10 MYALGIA: ICD-10-CM

## 2017-03-06 DIAGNOSIS — I00 RHEUMATIC FEVER: Chronic | ICD-10-CM

## 2017-03-06 DIAGNOSIS — M87.00 AVASCULAR NECROSIS (HCC): Chronic | ICD-10-CM

## 2017-03-06 LAB
ANION GAP SERPL CALC-SCNC: 8 MMOL/L (ref 0–11.9)
B-HCG SERPL-ACNC: <0.6 MIU/ML (ref 0–5)
BUN SERPL-MCNC: 20 MG/DL (ref 8–22)
CALCIUM SERPL-MCNC: 9.6 MG/DL (ref 8.5–10.5)
CHLORIDE SERPL-SCNC: 103 MMOL/L (ref 96–112)
CO2 SERPL-SCNC: 25 MMOL/L (ref 20–33)
CREAT SERPL-MCNC: 0.8 MG/DL (ref 0.5–1.4)
GLUCOSE SERPL-MCNC: 79 MG/DL (ref 65–99)
POTASSIUM SERPL-SCNC: 3.9 MMOL/L (ref 3.6–5.5)
SODIUM SERPL-SCNC: 136 MMOL/L (ref 135–145)

## 2017-03-06 PROCEDURE — 36415 COLL VENOUS BLD VENIPUNCTURE: CPT

## 2017-03-06 PROCEDURE — 85610 PROTHROMBIN TIME: CPT

## 2017-03-06 PROCEDURE — 85300 ANTITHROMBIN III ACTIVITY: CPT

## 2017-03-06 PROCEDURE — 85730 THROMBOPLASTIN TIME PARTIAL: CPT

## 2017-03-06 PROCEDURE — 80048 BASIC METABOLIC PNL TOTAL CA: CPT

## 2017-03-06 PROCEDURE — 81241 F5 GENE: CPT

## 2017-03-06 PROCEDURE — 85306 CLOT INHIBIT PROT S FREE: CPT

## 2017-03-06 PROCEDURE — 86147 CARDIOLIPIN ANTIBODY EA IG: CPT | Mod: 91

## 2017-03-06 PROCEDURE — 85303 CLOT INHIBIT PROT C ACTIVITY: CPT

## 2017-03-06 PROCEDURE — 84702 CHORIONIC GONADOTROPIN TEST: CPT

## 2017-03-06 PROCEDURE — 86146 BETA-2 GLYCOPROTEIN ANTIBODY: CPT | Mod: 91

## 2017-03-06 PROCEDURE — 85613 RUSSELL VIPER VENOM DILUTED: CPT

## 2017-03-06 PROCEDURE — 82550 ASSAY OF CK (CPK): CPT

## 2017-03-06 PROCEDURE — 85301 ANTITHROMBIN III ANTIGEN: CPT

## 2017-03-07 ENCOUNTER — HOSPITAL ENCOUNTER (OUTPATIENT)
Dept: RADIOLOGY | Facility: MEDICAL CENTER | Age: 47
End: 2017-03-07
Attending: ORTHOPAEDIC SURGERY
Payer: COMMERCIAL

## 2017-03-07 VITALS
HEART RATE: 90 BPM | HEIGHT: 67 IN | BODY MASS INDEX: 32.96 KG/M2 | TEMPERATURE: 97.4 F | SYSTOLIC BLOOD PRESSURE: 119 MMHG | OXYGEN SATURATION: 98 % | DIASTOLIC BLOOD PRESSURE: 58 MMHG | RESPIRATION RATE: 16 BRPM | WEIGHT: 210 LBS

## 2017-03-07 DIAGNOSIS — M25.531 RIGHT WRIST PAIN: ICD-10-CM

## 2017-03-07 DIAGNOSIS — M25.512 LEFT SHOULDER PAIN, UNSPECIFIED CHRONICITY: ICD-10-CM

## 2017-03-07 DIAGNOSIS — M25.532 LEFT WRIST PAIN: ICD-10-CM

## 2017-03-07 DIAGNOSIS — M54.5 LOW BACK PAIN, UNSPECIFIED BACK PAIN LATERALITY, UNSPECIFIED CHRONICITY, WITH SCIATICA PRESENCE UNSPECIFIED: ICD-10-CM

## 2017-03-07 DIAGNOSIS — M25.511 RIGHT SHOULDER PAIN, UNSPECIFIED CHRONICITY: ICD-10-CM

## 2017-03-07 LAB
APTT PPP: 27.1 SEC (ref 24.7–36)
CK SERPL-CCNC: 92 U/L (ref 0–154)
INR PPP: 0.85 (ref 0.87–1.13)
LA PPP-IMP: ABNORMAL
PROTHROMBIN TIME: 11.9 SEC (ref 12–14.6)
SCREEN DRVVT: 37.4 SEC (ref 28–48)

## 2017-03-07 PROCEDURE — 73221 MRI JOINT UPR EXTREM W/O DYE: CPT | Mod: RT

## 2017-03-07 PROCEDURE — 700101 HCHG RX REV CODE 250

## 2017-03-07 PROCEDURE — 72148 MRI LUMBAR SPINE W/O DYE: CPT

## 2017-03-07 PROCEDURE — 700111 HCHG RX REV CODE 636 W/ 250 OVERRIDE (IP)

## 2017-03-07 PROCEDURE — 73221 MRI JOINT UPR EXTREM W/O DYE: CPT | Mod: LT

## 2017-03-07 ASSESSMENT — PAIN SCALES - GENERAL
PAINLEVEL_OUTOF10: 3
PAINLEVEL_OUTOF10: 0
PAINLEVEL_OUTOF10: 1
PAINLEVEL_OUTOF10: 0

## 2017-03-07 NOTE — DISCHARGE INSTRUCTIONS
MRI ADULT DISCHARGE INSTRUCTIONS    You have been medicated today for your scan. Please follow the instructions below to ensure your safe recovery. If you have any questions or problems, feel free to call us at [phone] or [phone].     1.   Have someone stay with you to assist you as needed.    2.   Do not drive or operate any mechanical devices.    3.   Do not perform any activity that requires concentration. Make no major decisions over the next 24 hours.     4.   Be careful changing positions from laying down to sitting or standing, as you may become dizzy.     5.   Do not drink alcohol for 48 hours.    6.   There are no restrictions for eating your normal meals. Drink fluids.    7.   You may continue your usual medications for pain, tranquilizers, muscle relaxants or sedatives when awake.     8.   Tomorrow, you may continue your normal daily activities.     9.   Pressure dressing on 10 - 15 minutes. If swelling or bleeding occurs when removed, continue placing direct pressure on injection site for another 5 minutes, or until bleeding stops.     I have been informed of and understand the above discharge instructions.

## 2017-03-07 NOTE — IP AVS SNAPSHOT
" <p align=\"LEFT\"><IMG SRC=\"//[email or web address]$/[email or web address]\" alt=\"Image\" WIDTH=\"50%\" HEIGHT=\"200\" BORDER=\"\"></p>      `           Janett Urban Page   MRN: [id number]    Department:  [facility] MRI [address]   Date of Visit:              `  Discharge Instructions       MRI ADULT DISCHARGE INSTRUCTIONS    You have been medicated today for your scan. Please follow the instructions below to ensure your safe recovery. If you have any questions or problems, feel free to call us at [phone] or [phone].     1.   Have someone stay with you to assist you as needed.    2.   Do not drive or operate any mechanical devices.    3.   Do not perform any activity that requires concentration. Make no major decisions over the next 24 hours.     4.   Be careful changing positions from laying down to sitting or standing, as you may become dizzy.     5.   Do not drink alcohol for 48 hours.    6.   There are no restrictions for eating your normal meals. Drink fluids.    7.   You may continue your usual medications for pain, tranquilizers, muscle relaxants or sedatives when awake.     8.   Tomorrow, you may continue your normal daily activities.     9.   Pressure dressing on 10 - 15 minutes. If swelling or bleeding occurs when removed, continue placing direct pressure on injection site for another 5 minutes, or until bleeding stops.     I have been informed of and understand the above discharge instructions.      `       Diet / Nutrition:    Follow any diet instructions given to you by your doctor or the dietician, including how much salt (sodium) you are allowed each day.    If you are overweight, talk to your doctor about a weight reduction plan.    Activity:    Remain physically active following your doctor's instructions about exercise and activity.    Rest often.     Any time you become even a little tired or short of breath, SIT DOWN and rest.    Worsening Symptoms:    Report any of the following signs and symptoms to the " doctor's office immediately:    *Pain of jaw, arm, or neck  *Chest pain not relieved by medication                               *Dizziness or loss of consciousness  *Difficulty breathing even when at rest   *More tired than usual                                       *Bleeding drainage or swelling of surgical site  *Swelling of feet, ankles, legs or stomach                 *Fever (>100ºF)  *Pink or blood tinged sputum  *Weight gain (3lbs/day or 5lbs /week)           *Shock from internal defibrillator (if applicable)  *Palpitations or irregular heartbeats                *Cool and/or numb extremities    Stroke Awareness    Common Risk Factors for Stroke include:    Age  Atrial Fibrillation  Carotid Artery Stenosis  Diabetes Mellitus  Excessive alcohol consumption  High blood pressure  Overweight   Physical inactivity  Smoking    Warning signs and symptoms of a stroke include:    *Sudden numbness or weakness of the face, arm or leg (especially on one side of the body).  *Sudden confusion, trouble speaking or understanding.  *Sudden trouble seeing in one or both eyes.  *Sudden trouble walking, dizziness, loss of balance or coordination.Sudden severe headache with no known cause.    It is very important to get treatment quickly when a stroke occurs. If you experience any of the above warning signs, call 911 immediately.                    `     Quit Smoking / Tobacco Use:    I understand the use of any tobacco products increases my chance of suffering from future heart disease or stroke and could cause other illnesses which may shorten my life. Quitting the use of tobacco products is the single most important thing I can do to improve my health. For further information on smoking / tobacco cessation call a Toll Free Quit Line at 1-384.388.3831 (*[employer] [place]) or 1-832.387.3635 (American [employer]) or you can access the web based program at www.lungusa.org.    [place] Tobacco Users Help Line:  (393)  879-3707       Toll Free: 3-581-590-3025  Quit Tobacco Program [facility] (702)526-4114    Crisis Hotline:    [place] Crisis Hotline:  6-016-MOIVRWO or 1-711.702.5237    [place] Crisis Hotline:    1-138.865.1511 or 033-427-2817    Discharge Survey:   Thank you for choosing [facility]. We hope we did everything we could to make your hospital stay a pleasant one. You may be receiving a phone survey and we would appreciate your time and participation in answering the questions. Your input is very valuable to us in our efforts to improve our service to our patients and their families.        My signature on this form indicates that:    1. I have reviewed and understand the above information.  2. My questions regarding this information have been answered to my satisfaction.  3. I have formulated a plan with my discharge nurse to obtain my prescribed medications for home.                   `           Patient or Caregiver Signature:  ____________________________________________________________    Date:  ____________________________________________________________       `

## 2017-03-08 ENCOUNTER — OFFICE VISIT (OUTPATIENT)
Dept: CARDIOLOGY | Facility: MEDICAL CENTER | Age: 47
End: 2017-03-08
Payer: COMMERCIAL

## 2017-03-08 VITALS
HEART RATE: 88 BPM | HEIGHT: 66 IN | SYSTOLIC BLOOD PRESSURE: 120 MMHG | WEIGHT: 213 LBS | DIASTOLIC BLOOD PRESSURE: 80 MMHG | BODY MASS INDEX: 34.23 KG/M2

## 2017-03-08 DIAGNOSIS — I00 RHEUMATIC FEVER: Chronic | ICD-10-CM

## 2017-03-08 LAB
AT III ACT/NOR PPP CHRO: 96 % (ref 76–128)
AT III AG ACT/NOR PPP IA: 83 % (ref 82–136)
CARDIOLIPIN IGA SER IA-ACNC: 1 APL (ref 0–11)
CARDIOLIPIN IGG SER IA-ACNC: 2 GPL (ref 0–14)
CARDIOLIPIN IGM SER IA-ACNC: 6 MPL (ref 0–12)
PROT C ACT/NOR PPP: 167 % (ref 83–168)
PROT S ACT/NOR PPP: 103 % (ref 57–131)

## 2017-03-08 PROCEDURE — 99213 OFFICE O/P EST LOW 20 MIN: CPT | Performed by: INTERNAL MEDICINE

## 2017-03-08 ASSESSMENT — ENCOUNTER SYMPTOMS
FEVER: 0
BRUISES/BLEEDS EASILY: 0
NEUROLOGICAL NEGATIVE: 1
PALPITATIONS: 0

## 2017-03-08 NOTE — PROGRESS NOTES
Subjective:   Janett Mcnulty is a 46 y.o. female who presents today for follow-up of rheumatic fever. She is still not feeling hyper sent back to normal. Repeat echo was reviewed with the patient and she was actually no valvular abnormalities and normal LV function.  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    Past Medical History   Diagnosis Date   • Pain 6/5/13     right foot   • Arthritis      osteoarthritis     Past Surgical History   Procedure Laterality Date   • Pr inj dx/ther agnt paravert facet joint, gómez*  1/21/2011     Performed by EDMAR REES at [facility]   • Pr inj dx/ther agnt paravert facet joint, gómez*  2/25/2011     Performed by EDMAR REES at [facility]   • Pr dest,paravertebral,l/s,single  3/4/2011     Performed by EDMAR REES at [facility]   • Inj,sacroiliac,anes/steroid  5/13/2011     Performed by EDMAR REES at [facility]   • Pr inject nerv blck,stellate ganglion  9/2/2011     Performed by EDMAR REES at [facility]   • Neuro dest facet l/s w/ig sngl  3/2/2012     Performed by EDMAR REES at [facility]   • Neuro dest facet l/s w/ig addl  3/2/2012     Performed by EDMAR REES at [facility]   • Neuro dest facet l/s w/ig addl  3/2/2012     Performed by EMDAR REES at [facility]   • Neuro dest facet l/s w/ig sngl  3/9/2012     Performed by EDMAR REES at [facility]   • Neuro dest facet l/s w/ig addl  3/9/2012     Performed by EDMAR REES at [facility]   • Neuro dest facet l/s w/ig addl  3/9/2012     Performed by EDMAR REES at [facility]   • Neuro dest facet l/s w/ig sngl  12/28/2012     Performed by Edmar Rees D.O. at [facility]   • Neuro dest facet l/s w/ig addl  12/28/2012      Performed by Farhan Rees D.O. at [facility]   • Neuro dest facet l/s w/ig addl  12/28/2012     Performed by Farhan Rees D.O. at [facility]   • Neuro dest facet l/s w/ig sngl  1/4/2013     Performed by Farhan Rees D.O. at [facility]   • Neuro dest facet l/s w/ig addl  1/4/2013     Performed by Farhan Rees D.O. at [facility]   • Neuro dest facet l/s w/ig addl  1/4/2013     Performed by Farhan Rees D.O. at [facility]   • Other  1998     left hip revision   • Other  2009     right hip revision   • Tonsillectomy  2001   • Other  2007     left ankle tendon repair   • Appendectomy  1991   • Other       uterine ablation   • Bunionectomy  6/7/2013     Performed by Oliver Pratt D.P.M. at [facility]   • Neuro dest facet l/s w/ig sngl  9/20/2013     Performed by Farhan Rees D.O. at [facility]   • Neuro dest facet l/s w/ig addl  9/20/2013     Performed by Farhan Rees D.O. at [facility]   • Neuro dest facet l/s w/ig addl  9/20/2013     Performed by Farhan Rees D.O. at [facility]   • Neuro dest facet l/s w/ig addl  9/20/2013     Performed by Farhan Rees D.O. at [facility]   • Neuro dest facet l/s w/ig sngl  10/4/2013     Performed by Farhan Rees D.O. at [facility]   • Neuro dest facet l/s w/ig addl  10/4/2013     Performed by Farhan Rees D.O. at [facility]   • Neuro dest facet l/s w/ig addl  10/4/2013     Performed by Farhan Rees D.O. at [facility]   • Neuro dest facet l/s w/ig addl  10/4/2013     Performed by Farhan Rees D.O. at [facility]   • Inj,sacroiliac,anes/steroid  11/8/2013     Performed by Farhan Rees D.O. at [facility]   • Inj,sacroiliac,anes/steroid  12/6/2013     Performed by Farhan Rees,  ERNESTO at [facility]   • Inj,sacroiliac,anes/steroid  1/10/2014     Performed by Farhan Rees D.O. at [facility]   • Inj,sacroiliac,anes/steroid  1/10/2014     Performed by Farhan Rees D.O. at [facility]   • Neuro dest facet l/s w/ig sngl  5/16/2014     Performed by Farhan Rees D.O. at [facility]   • Neuro dest facet l/s w/ig addl  5/16/2014     Performed by Farhan Rees D.O. at [facility]   • Neuro dest facet l/s w/ig addl  5/16/2014     Performed by Farhan Rees D.O. at [facility]   • Neuro dest facet l/s w/ig addl  5/16/2014     Performed by Farhan Rees D.O. at [facility]   • Neuro dest facet l/s w/ig sngl  6/6/2014     Performed by Farhan Rees D.O. at [facility]   • Neuro dest facet l/s w/ig addl  6/6/2014     Performed by Farhan Rees D.O. at [facility]   • Neuro dest facet l/s w/ig addl  6/6/2014     Performed by Farhan Rees D.O. at [facility]   • Neuro dest facet l/s w/ig addl  6/6/2014     Performed by Farhan Rees D.O. at [facility]   • Toe arthroplasty  7/7/2014     Performed by Oliver Pratt D.P.M. at [facility]   • Inj,sacroiliac,anes/steroid  7/11/2014     Performed by Farhan Rees D.O. at [facility]   • Inj,sacroiliac,anes/steroid  7/11/2014     Performed by Farhan Rees D.O. at [facility]   • Neuro dest facet l/s w/ig sngl  2/6/2015     Performed by Farhan Rees D.O. at [facility]   • Neuro dest facet l/s w/ig addl  2/6/2015     Performed by Farhan Rees D.O. at [facility]   • Neuro dest facet l/s w/ig addl  2/6/2015     Performed by Farhan Rees D.O. at [facility]   • Neuro dest facet l/s w/ig sngl  2/13/2015     Performed by Farhan Rees D.O. at  [facility]   • Neuro dest facet l/s w/ig addl  2/13/2015     Performed by Farhan Rees D.O. at [facility]   • Neuro dest facet l/s w/ig addl  2/13/2015     Performed by Farhan Rees D.O. at [facility]   • Inj,sacroiliac,anes/steroid  4/3/2015     Performed by Farhan Rees D.O. at [facility]   • Inj,sacroiliac,anes/steroid  4/3/2015     Performed by Farhan Rees D.O. at [facility]   • Pr dstr nrolytc agnt parverteb fct sngl lmbr/sacral Left 10/9/2015     Procedure: NEURO DEST FACET L/S W/IG SNGL - L3-S1;  Surgeon: Farhan Rees D.O.;  Location: [facility];  Service: Pain Management   • Pr dstr nrolytc agnt parverteb fct addl lmbr/sacral  10/9/2015     Procedure: NEURO DEST FACET L/S W/IG ADDL;  Surgeon: Farhan Rees D.O.;  Location: [facility];  Service: Pain Management   • Pr inject rx other periph nerve  10/9/2015     Procedure: NEUROLYTIC DEST-OTHER NERVE;  Surgeon: Farhan Rees D.O.;  Location: [facility];  Service: Pain Management   • Pr dstr nrolytc agnt parverteb fct addl lmbr/sacral  10/9/2015     Procedure: NEURO DEST FACET L/S W/IG ADDL;  Surgeon: Farhan Rees D.O.;  Location: [facility];  Service: Pain Management   • Pr dstr nrolytc agnt parverteb fct sngl lmbr/sacral Right 10/16/2015     Procedure: NEURO DEST FACET L/S W/IG SNGL - L3-S1;  Surgeon: Farhan Rees D.O.;  Location: [facility];  Service: Pain Management   • Pr dstr nrolytc agnt parverteb fct addl lmbr/sacral  10/16/2015     Procedure: NEURO DEST FACET L/S W/IG ADDL;  Surgeon: Farhan Rees D.O.;  Location: [facility];  Service: Pain Management   • Pr inject rx other periph nerve  10/16/2015     Procedure: NEUROLYTIC DEST-OTHER NERVE;  Surgeon: Farhan Rees D.O.;  Location: [facility];  Service: Pain  Management   • Pr dstr nrolytc agnt parverteb fct addl lmbr/sacral  10/16/2015     Procedure: NEURO DEST FACET L/S W/IG ADDL;  Surgeon: Farhan Rees D.O.;  Location: [facility];  Service: Pain Management   • Pr dstr nrolytc agnt parverteb fct sngl lmbr/sacral Left 5/6/2016     Procedure: NEURO DEST FACET L/S W/IG SNGL - L3-S1;  Surgeon: Farhan Rees D.O.;  Location: [facility];  Service: Pain Management   • Pr dstr nrolytc agnt parverteb fct addl lmbr/sacral Left 5/6/2016     Procedure: NEURO DEST FACET L/S W/IG ADDL;  Surgeon: Farhan Rees D.O.;  Location: [facility];  Service: Pain Management   • Pr inject rx other periph nerve Left 5/6/2016     Procedure: NEUROLYTIC DEST-OTHER NERVE;  Surgeon: Farhan Rees D.O.;  Location: [facility];  Service: Pain Management   • Pr dstr nrolytc agnt parverteb fct addl lmbr/sacral  5/6/2016     Procedure: NEURO DEST FACET L/S W/IG ADDL;  Surgeon: Farhan Rees D.O.;  Location: [facility];  Service: Pain Management   • Pr dstr nrolytc agnt parverteb fct sngl lmbr/sacral Right 6/24/2016     Procedure: NEURO DEST FACET L/S W/IG SNGL - L3-S1;  Surgeon: Farhan Rees D.O.;  Location: [facility];  Service: Pain Management   • Pr dstr nrolytc agnt parverteb fct addl lmbr/sacral Right 6/24/2016     Procedure: NEURO DEST FACET L/S W/IG ADDL;  Surgeon: Farhan Rees D.O.;  Location: [facility];  Service: Pain Management   • Pr inject rx other periph nerve Right 6/24/2016     Procedure: NEUROLYTIC DEST-OTHER NERVE;  Surgeon: Farhan Rees D.O.;  Location: [facility];  Service: Pain Management   • Pr dstr nrolytc agnt parverteb fct addl lmbr/sacral  6/24/2016     Procedure: NEURO DEST FACET L/S W/IG ADDL;  Surgeon: Farhan Rees D.O.;  Location: [facility];  Service: Pain Management     Family History  "  Problem Relation Age of Onset   • Diabetes     • Hypertension     • Cancer       History   Smoking status   • Never Smoker    Smokeless tobacco   • Never Used     Allergies   Allergen Reactions   • Morphine Rash   • Other Food Rash     Onions-cause headaches and facial flushing     Outpatient Encounter Prescriptions as of 3/8/2017   Medication Sig Dispense Refill   • tramadol (ULTRAM) 50 MG Tab Take 1 Tab by mouth every 8 hours as needed. 90 Tab 3   • penicillin v potassium (VEETID) 250 MG Tab Take 250 mg by mouth 4 times a day.     • ascorbic acid (ASCORBIC ACID) 500 MG Tab Take 500 mg by mouth every day.     • naproxen (NAPROSYN) 500 MG Tab Take 500 mg by mouth 2 times a day, with meals.     • TURMERIC PO Take  by mouth.     • therapeutic multivitamin-minerals (THERAGRAN-M) Tab Take 1 Tab by mouth every day.     • vitamin D (CHOLECALCIFEROL) 1000 UNIT Tab Take 1,000 Units by mouth every day.     • fluticasone (FLONASE) 50 MCG/ACT nasal spray Spray 2 Sprays in nose every day. (Patient taking differently: Spray 2 Sprays in nose as needed.) 16 g 3   • vitamin e (VITAMIN E) 400 UNIT CAPS Take 400 Units by mouth every day.     • loratadine (CLARITIN) 10 MG TABS Take 10 mg by mouth as needed.     • lorazepam (ATIVAN) 1 MG Tab Take 1 Tab by mouth as needed for Anxiety (take one hour before procedure, max one per day). 5 Tab 0   • prednisONE (DELTASONE) 2.5 MG Tab Take 15 mg in evening daily 180 Tab 0     No facility-administered encounter medications on file as of 3/8/2017.     Review of Systems   Constitutional: Positive for malaise/fatigue. Negative for fever.   Cardiovascular: Negative for chest pain, palpitations and leg swelling.   Neurological: Negative.    Endo/Heme/Allergies: Does not bruise/bleed easily.        Objective:   /80 mmHg  Pulse 88  Ht 1.676 m (5' 6\")  Wt 96.616 kg (213 lb)  BMI 34.40 kg/m2    Physical Exam   Constitutional: She is oriented to person, place, and time. She appears " well-developed and well-nourished. No distress.   HENT:   Head: Atraumatic.   Eyes: Conjunctivae and EOM are normal. Pupils are equal, round, and reactive to light.   Neck: Neck supple. No JVD present.   Cardiovascular: Normal rate and regular rhythm.    No murmur heard.  Pulmonary/Chest: Effort normal and breath sounds normal. No respiratory distress. She has no wheezes. She has no rales.   Abdominal: Soft. There is no tenderness.   Musculoskeletal: She exhibits no edema.   Neurological: She is alert and oriented to person, place, and time.   Skin: Skin is warm and dry. She is not diaphoretic.   Psychiatric: She has a normal mood and affect.       Assessment:     1. Rheumatic fever         Medical Decision Making:  Today's Assessment / Status / Plan:     The patient has no evidence of any valvular involvement on repeat and repeat echo. Again the images were reviewed with the patient and reassurance given. Return on an as-needed basis.

## 2017-03-08 NOTE — MR AVS SNAPSHOT
"        Janett Rajni Page   3/8/2017 8:00 AM   Office Visit   MRN: [id number]    Department:  [facility]   Dept Phone:  [phone]    Description:  Female : 1970   Provider:  Bryan Armenta M.D.           Reason for Visit     Follow-Up echo in Epic      Allergies as of 3/8/2017     Allergen Noted Reactions    Morphine 2008   Rash    Other Food 2013   Rash    Onions-cause headaches and facial flushing      Vital Signs     Blood Pressure Pulse Height Weight Body Mass Index Smoking Status    120/80 mmHg 88 1.676 m (5' 6\") 96.616 kg (213 lb) 34.40 kg/m2 Never Smoker       Basic Information     Date Of Birth Sex Race Ethnicity Preferred Language    1970 Female White Non- English      Your appointments     Mar 14, 2017  8:00 AM   Follow Up Visit with Starr Jackson M.D.   [facility] (--)    [address], [address]  [facility] [address] [phone]           You will be receiving a confirmation call a few days before your appointment from our automated call confirmation system.            2017 11:00 AM   Established Patient with Franklin Gonzalez M.D.   [facility] ([facility])    [address]  [facility] [address] [phone]           You will be receiving a confirmation call a few days before your appointment from our automated call confirmation system.              Problem List              ICD-10-CM Priority Class Noted - Resolved    S/P appendectomy Z90.49   2011 - Present    Left hip revision 2002 M21.959   2011 - Present    Left ankle surgery M25.572   2011 - Present    Right hip arthroscopy  M25.551   2011 - Present    Low back pain (Chronic) M54.5   2011 - Present    Cervical pain (neck) M54.2   2011 - Present    IBS (irritable bowel syndrome) K58.9   2011 - Present    Preventative health care (Chronic) Z00.00   2011 - Present    Varicose vein I86.8   10/7/2011 - Present    S/P " bunionectomy Z98.890   6/7/2013 - Present    Allergic rhinitis due to animal hair and dander (Chronic) J30.81   9/12/2013 - Present    Dyslipidemia E78.5   9/18/2015 - Present    Perimenopausal (Chronic) N95.1   9/25/2015 - Present    Foot pain, right (Chronic) M79.671   9/30/2015 - Present    Obesity E66.9   9/16/2016 - Present    Rheumatic fever (Chronic) I00   9/16/2016 - Present    Avascular necrosis (CMS-HCC) (Chronic) M87.00   2/12/2017 - Present      Health Maintenance        Date Due Completion Dates    MAMMOGRAM 10/26/2017 10/26/2016, 9/15/2015, 9/8/2014, 8/29/2013, 4/22/2004    PAP SMEAR 10/7/2018 10/7/2015    IMM DTaP/Tdap/Td Vaccine (2 - Td) 9/25/2025 9/25/2015            Current Immunizations     Influenza TIV (IM) 12/9/2013, 1/9/2013    Influenza Vaccine Quad Inj (Preserved) 12/13/2016, 10/20/2015  2:00 AM, 11/19/2014    Pneumococcal polysaccharide vaccine (PPSV-23) 1/6/2017 11:10 AM    Tdap Vaccine 9/25/2015 11:06 AM    Tuberculin Skin Test 5/5/2014  1:40 PM, 6/3/2013  1:20 PM, 6/4/2012 12:30 PM, 7/1/2011      Below and/or attached are the medications your provider expects you to take. Review all of your home medications and newly ordered medications with your provider and/or pharmacist. Follow medication instructions as directed by your provider and/or pharmacist. Please keep your medication list with you and share with your provider. Update the information when medications are discontinued, doses are changed, or new medications (including over-the-counter products) are added; and carry medication information at all times in the event of emergency situations     Allergies:  MORPHINE - Rash     OTHER FOOD - Rash               Medications  Valid as of: March 08, 2017 -  8:35 AM    Generic Name Brand Name Tablet Size Instructions for use    Ascorbic Acid (Tab) ascorbic acid 500 MG Take 500 mg by mouth every day.        Cholecalciferol (Tab) cholecalciferol 1000 UNIT Take 1,000 Units by mouth every day.          Fluticasone Propionate (Suspension) FLONASE 50 MCG/ACT Spray 2 Sprays in nose every day.        Loratadine (Tab) CLARITIN 10 MG Take 10 mg by mouth as needed.        LORazepam (Tab) ATIVAN 1 MG Take 1 Tab by mouth as needed for Anxiety (take one hour before procedure, max one per day).        Multiple Vitamins-Minerals (Tab) THERAGRAN-M  Take 1 Tab by mouth every day.        Naproxen (Tab) NAPROSYN 500 MG Take 500 mg by mouth 2 times a day, with meals.        Penicillin V Potassium (Tab) VEETID 250 MG Take 250 mg by mouth 4 times a day.        PredniSONE (Tab) DELTASONE 2.5 MG Take 15 mg in evening daily        TraMADol HCl (Tab) ULTRAM 50 MG Take 1 Tab by mouth every 8 hours as needed.        Turmeric   Take  by mouth.        Vitamin E (Cap) VITAMIN E 400 UNIT Take 400 Units by mouth every day.        .                 Medicines prescribed today were sent to:     [facility] - [place], [place] - [address] [address] [address]    Phone: [phone] Fax: [phone]    Open 24 Hours?: No      Medication refill instructions:       If your prescription bottle indicates you have medication refills left, it is not necessary to call your provider’s office. Please contact your pharmacy and they will refill your medication.    If your prescription bottle indicates you do not have any refills left, you may request refills at any time through one of the following ways: The online [employer] system (except Urgent Care), by calling your provider’s office, or by asking your pharmacy to contact your provider’s office with a refill request. Medication refills are processed only during regular business hours and may not be available until the next business day. Your provider may request additional information or to have a follow-up visit with you prior to refilling your medication.   *Please Note: Medication refills are assigned a new Rx number when refilled electronically. Your pharmacy may  indicate that no refills were authorized even though a new prescription for the same medication is available at the pharmacy. Please request the medicine by name with the pharmacy before contacting your provider for a refill.        Other Notes About Your Plan       2/23/17  rheumatology note complete steroid taper then repeat ESR, CRP and CPK, also check antiphospholipid antibody,antithrombin, factor v leiden, protein s                     MyChart Access Code: Activation code not generated  Current MyChart Status: Active

## 2017-03-09 ENCOUNTER — PATIENT MESSAGE (OUTPATIENT)
Dept: RHEUMATOLOGY | Facility: PHYSICIAN GROUP | Age: 47
End: 2017-03-09

## 2017-03-09 DIAGNOSIS — R60.9 SWELLING: ICD-10-CM

## 2017-03-09 LAB
B2 GLYCOPROT1 IGA SER-ACNC: 2 SAU (ref 0–20)
B2 GLYCOPROT1 IGG SERPL IA-ACNC: 0 SGU (ref 0–20)
B2 GLYCOPROT1 IGM SERPL IA-ACNC: 4 SMU (ref 0–20)

## 2017-03-10 ENCOUNTER — HOSPITAL ENCOUNTER (OUTPATIENT)
Dept: LAB | Facility: MEDICAL CENTER | Age: 47
End: 2017-03-10
Attending: INTERNAL MEDICINE
Payer: COMMERCIAL

## 2017-03-10 ENCOUNTER — TELEPHONE (OUTPATIENT)
Dept: RHEUMATOLOGY | Facility: PHYSICIAN GROUP | Age: 47
End: 2017-03-10

## 2017-03-10 ENCOUNTER — HOSPITAL ENCOUNTER (OUTPATIENT)
Dept: RADIOLOGY | Facility: MEDICAL CENTER | Age: 47
End: 2017-03-10
Attending: ORTHOPAEDIC SURGERY
Payer: COMMERCIAL

## 2017-03-10 DIAGNOSIS — M87.00 AVASCULAR NECROSIS (HCC): Chronic | ICD-10-CM

## 2017-03-10 DIAGNOSIS — I00 RHEUMATIC FEVER: Chronic | ICD-10-CM

## 2017-03-10 DIAGNOSIS — R60.0 BILATERAL EDEMA OF LOWER EXTREMITY: ICD-10-CM

## 2017-03-10 DIAGNOSIS — R60.9 SWELLING: ICD-10-CM

## 2017-03-10 LAB
APPEARANCE UR: CLEAR
BILIRUB UR QL STRIP.AUTO: NEGATIVE
COLOR UR AUTO: NORMAL
CORTIS SERPL-MCNC: 3.3 UG/DL (ref 0–23)
CRP SERPL HS-MCNC: 0.17 MG/DL (ref 0–0.75)
ERYTHROCYTE [SEDIMENTATION RATE] IN BLOOD BY WESTERGREN METHOD: 19 MM/HOUR (ref 0–20)
F5 P.R506Q BLD/T QL: NEGATIVE
GLUCOSE UR STRIP.AUTO-MCNC: NEGATIVE MG/DL
KETONES UR STRIP.AUTO-MCNC: NEGATIVE MG/DL
LEUKOCYTE ESTERASE UR QL STRIP.AUTO: NEGATIVE
MICRO URNS: NORMAL
NITRITE UR QL STRIP.AUTO: NEGATIVE
PH UR: 6 [PH]
PROT UR QL STRIP: NEGATIVE MG/DL
RBC UR QL AUTO: NEGATIVE
SP GR UR STRIP.AUTO: 1.01

## 2017-03-10 PROCEDURE — 85652 RBC SED RATE AUTOMATED: CPT

## 2017-03-10 PROCEDURE — 82784 ASSAY IGA/IGD/IGG/IGM EACH: CPT | Mod: 91

## 2017-03-10 PROCEDURE — 82533 TOTAL CORTISOL: CPT

## 2017-03-10 PROCEDURE — 93922 UPR/L XTREMITY ART 2 LEVELS: CPT | Mod: 26 | Performed by: SURGERY

## 2017-03-10 PROCEDURE — 93922 UPR/L XTREMITY ART 2 LEVELS: CPT

## 2017-03-10 PROCEDURE — 82595 ASSAY OF CRYOGLOBULIN: CPT

## 2017-03-10 PROCEDURE — 36415 COLL VENOUS BLD VENIPUNCTURE: CPT

## 2017-03-10 PROCEDURE — 93970 EXTREMITY STUDY: CPT | Mod: 26 | Performed by: SURGERY

## 2017-03-10 PROCEDURE — 82785 ASSAY OF IGE: CPT

## 2017-03-10 PROCEDURE — 86140 C-REACTIVE PROTEIN: CPT

## 2017-03-10 PROCEDURE — 93970 EXTREMITY STUDY: CPT

## 2017-03-10 PROCEDURE — 82784 ASSAY IGA/IGD/IGG/IGM EACH: CPT

## 2017-03-10 PROCEDURE — 82024 ASSAY OF ACTH: CPT

## 2017-03-10 PROCEDURE — 81003 URINALYSIS AUTO W/O SCOPE: CPT

## 2017-03-10 NOTE — TELEPHONE ENCOUNTER
Also called patient.  Discussed with the patient alternatives to management.  Ordered inflammatory markers and UA.  If UA shows RBC or protein we will pursue further studies.        She has been off prednisone since 2/24.  Starting this week she started to have increasing pain in the lower extremities with each day developing worsening symptoms.  She denies pulmonary or cardiac symptoms.  She only has swelling no rashes.  Given that she has had multiple areas of avascular necrosis with no clear explanation and work-up thus far for hypercoagulable state is negative will still check arterial dopplers and venous dopplers.

## 2017-03-10 NOTE — TELEPHONE ENCOUNTER
Pt called to see if you can change the Ultrasound order to STAT, so they can do it today rather than next week. Pt concerned she may have a blood clot.

## 2017-03-12 LAB
ACTH PLAS-MCNC: 16 PG/ML (ref 6–58)
IGA SERPL-MCNC: 140 MG/DL (ref 68–408)
IGG SERPL-MCNC: 753 MG/DL (ref 768–1632)
IGM SERPL-MCNC: 93 MG/DL (ref 35–263)

## 2017-03-14 ENCOUNTER — PATIENT MESSAGE (OUTPATIENT)
Dept: FAMILY PLANNING/WOMEN'S HEALTH CLINIC | Facility: OTHER | Age: 47
End: 2017-03-14

## 2017-03-14 ENCOUNTER — OFFICE VISIT (OUTPATIENT)
Dept: RHEUMATOLOGY | Facility: PHYSICIAN GROUP | Age: 47
End: 2017-03-14
Payer: COMMERCIAL

## 2017-03-14 VITALS
RESPIRATION RATE: 12 BRPM | OXYGEN SATURATION: 97 % | HEART RATE: 90 BPM | BODY MASS INDEX: 34.23 KG/M2 | SYSTOLIC BLOOD PRESSURE: 124 MMHG | DIASTOLIC BLOOD PRESSURE: 76 MMHG | TEMPERATURE: 97.6 F | WEIGHT: 212 LBS

## 2017-03-14 DIAGNOSIS — M87.00 AVASCULAR NECROSIS (HCC): Chronic | ICD-10-CM

## 2017-03-14 PROCEDURE — 99214 OFFICE O/P EST MOD 30 MIN: CPT | Performed by: INTERNAL MEDICINE

## 2017-03-14 ASSESSMENT — ENCOUNTER SYMPTOMS
FEVER: 0
BACK PAIN: 1
CHILLS: 0

## 2017-03-14 NOTE — PROGRESS NOTES
Subjective:   Date of Consultation:3/14/2017  8:19 AM  Primary care physician:Franklin Gonzalez M.D.      Reason for Consultation:  Ms. Mcnulty  is a pleasant 46 y.o. year old female who presents for follow-up of arthritis and multifocal avascular necrosis    Rheumatic Fever  Following cardiology  Has been seen by ID    Arthritis  She has been stable up until 1 week ago when she first developed lower extremity swelling followed by arthralgia    Lower extremity swelling  Progressing. Started a week ago.  Does not resolve with rest but improves.  DVT studies are negative    Avascular necrosis of multiple joints  We first discovered avascular necrosis of the knee. She was referred to orthopedics. However additional imaging has noted avascular necrosis in the hip and the ankle. Additional imaging shows involvement of the wrists.      Past Medical History   Diagnosis Date   • Pain 6/5/13     right foot   • Arthritis      osteoarthritis     Past Surgical History   Procedure Laterality Date   • Pr inj dx/ther agnt paravert facet joint, gómez*  1/21/2011     Performed by EDMAR ELIAS at [facility]   • Pr inj dx/ther agnt paravert facet joint, gómez*  2/25/2011     Performed by EDMAR ELIAS at [facility]   • Pr dest,paravertebral,l/s,single  3/4/2011     Performed by EDMAR ELIAS at [facility]   • Inj,sacroiliac,anes/steroid  5/13/2011     Performed by EDMAR ELIAS at [facility]   • Pr inject nerv blck,stellate ganglion  9/2/2011     Performed by EDMAR ELIAS at [facility]   • Neuro dest facet l/s w/ig sngl  3/2/2012     Performed by EDMAR ELIAS at [facility]   • Neuro dest facet l/s w/ig addl  3/2/2012     Performed by EDMAR ELIAS at [facility]   • Neuro dest facet l/s w/ig addl  3/2/2012     Performed by EDMAR ELIAS at [facility]   • Neuro dest facet l/s w/ig sngl   3/9/2012     Performed by EDMAR ELIAS at [facility]   • Neuro dest facet l/s w/ig addl  3/9/2012     Performed by EDMAR ELIAS at [facility]   • Neuro dest facet l/s w/ig addl  3/9/2012     Performed by EDMAR ELIAS at [facility]   • Neuro dest facet l/s w/ig sngl  12/28/2012     Performed by Edmar Elias D.O. at [facility]   • Neuro dest facet l/s w/ig addl  12/28/2012     Performed by Edmar Elias D.O. at [facility]   • Neuro dest facet l/s w/ig addl  12/28/2012     Performed by Edmar Elias D.O. at [facility]   • Neuro dest facet l/s w/ig sngl  1/4/2013     Performed by Edmar Elias D.O. at [facility]   • Neuro dest facet l/s w/ig addl  1/4/2013     Performed by Edmar Elias D.O. at [facility]   • Neuro dest facet l/s w/ig addl  1/4/2013     Performed by Edmar Elias D.O. at [facility]   • Other  1998     left hip revision   • Other  2009     right hip revision   • Tonsillectomy  2001   • Other  2007     left ankle tendon repair   • Appendectomy  1991   • Other       uterine ablation   • Bunionectomy  6/7/2013     Performed by Oliver Pratt D.P.M. at [facility]   • Neuro dest facet l/s w/ig sngl  9/20/2013     Performed by Edmar Elias D.O. at [facility]   • Neuro dest facet l/s w/ig addl  9/20/2013     Performed by Edmar Elias D.O. at [facility]   • Neuro dest facet l/s w/ig addl  9/20/2013     Performed by Edmar Elias D.O. at [facility]   • Neuro dest facet l/s w/ig addl  9/20/2013     Performed by Edmar Elias D.O. at [facility]   • Neuro dest facet l/s w/ig sngl  10/4/2013     Performed by Edmar Elias D.O. at [facility]   • Neuro dest facet l/s w/ig addl  10/4/2013     Performed by Edmar IGLESIAS  ERNESTO Rees at [facility]S   • Neuro dest facet l/s w/ig addl  10/4/2013     Performed by Farhan Rees D.O. at [facility]   • Neuro dest facet l/s w/ig addl  10/4/2013     Performed by Farhan Rees D.O. at [facility]   • Inj,sacroiliac,anes/steroid  11/8/2013     Performed by Farhan Rees D.O. at [facility]   • Inj,sacroiliac,anes/steroid  12/6/2013     Performed by Farhan Rees D.O. at [facility]   • Inj,sacroiliac,anes/steroid  1/10/2014     Performed by Farhan Rees D.O. at [facility]   • Inj,sacroiliac,anes/steroid  1/10/2014     Performed by Farhan Rees D.O. at [facility]   • Neuro dest facet l/s w/ig sngl  5/16/2014     Performed by Farhan Rees D.O. at [facility]   • Neuro dest facet l/s w/ig addl  5/16/2014     Performed by Farhan Rees D.O. at [facility]   • Neuro dest facet l/s w/ig addl  5/16/2014     Performed by Farhan Rees D.O. at [facility]   • Neuro dest facet l/s w/ig addl  5/16/2014     Performed by Farhan Rees D.O. at [facility]   • Neuro dest facet l/s w/ig sngl  6/6/2014     Performed by Farhan Rees D.O. at [facility]   • Neuro dest facet l/s w/ig addl  6/6/2014     Performed by Farhan Rees D.O. at [facility]   • Neuro dest facet l/s w/ig addl  6/6/2014     Performed by Farhan Rees D.O. at [facility]   • Neuro dest facet l/s w/ig addl  6/6/2014     Performed by Farhan Rees D.O. at [facility]   • Toe arthroplasty  7/7/2014     Performed by Oliver Pratt D.P.M. at [facility]   • Inj,sacroiliac,anes/steroid  7/11/2014     Performed by Farhan Rees D.O. at [facility]   • Inj,sacroiliac,anes/steroid  7/11/2014     Performed by Farhan Rees,  ERNESTO at [facility]   • Neuro dest facet l/s w/ig sngl  2/6/2015     Performed by Farhan Rees D.O. at [facility]   • Neuro dest facet l/s w/ig addl  2/6/2015     Performed by Farhan Rees D.O. at [facility]   • Neuro dest facet l/s w/ig addl  2/6/2015     Performed by Farhan Rees D.O. at [facility]   • Neuro dest facet l/s w/ig sngl  2/13/2015     Performed by Farhan Rees D.O. at [facility]   • Neuro dest facet l/s w/ig addl  2/13/2015     Performed by Farhan Rees D.O. at [facility]   • Neuro dest facet l/s w/ig addl  2/13/2015     Performed by Farhan Rees D.O. at [facility]   • Inj,sacroiliac,anes/steroid  4/3/2015     Performed by Farhan Rees D.O. at [facility]   • Inj,sacroiliac,anes/steroid  4/3/2015     Performed by Farhan Rees D.O. at [facility]   • Pr dstr nrolytc agnt parverteb fct sngl lmbr/sacral Left 10/9/2015     Procedure: NEURO DEST FACET L/S W/IG SNGL - L3-S1;  Surgeon: Farhan Rees D.O.;  Location: [facility];  Service: Pain Management   • Pr dstr nrolytc agnt parverteb fct addl lmbr/sacral  10/9/2015     Procedure: NEURO DEST FACET L/S W/IG ADDL;  Surgeon: Farhan Rees D.O.;  Location: [facility];  Service: Pain Management   • Pr inject rx other periph nerve  10/9/2015     Procedure: NEUROLYTIC DEST-OTHER NERVE;  Surgeon: Farhan Rees D.O.;  Location: [facility];  Service: Pain Management   • Pr dstr nrolytc agnt parverteb fct addl lmbr/sacral  10/9/2015     Procedure: NEURO DEST FACET L/S W/IG ADDL;  Surgeon: Farhan Rees D.O.;  Location: [facility];  Service: Pain Management   • Pr dstr nrolytc agnt parverteb fct sngl lmbr/sacral Right 10/16/2015     Procedure: NEURO DEST FACET L/S W/IG SNGL - L3-S1;  Surgeon: Farhan Rees,  ERNESTO;  Location: SUR[facility];  Service: Pain Management   • Pr dstr nrolytc agnt parverteb fct addl lmbr/sacral  10/16/2015     Procedure: NEURO DEST FACET L/S W/IG ADDL;  Surgeon: Farhan Rees D.O.;  Location: [facility]ERY [facility];  Service: Pain Management   • Pr inject rx other periph nerve  10/16/2015     Procedure: NEUROLYTIC DEST-OTHER NERVE;  Surgeon: Farhan Rees D.O.;  Location: [facility]ERY [facility];  Service: Pain Management   • Pr dstr nrolytc agnt parverteb fct addl lmbr/sacral  10/16/2015     Procedure: NEURO DEST FACET L/S W/IG ADDL;  Surgeon: Farhan Rees D.O.;  Location: [facility];  Service: Pain Management   • Pr dstr nrolytc agnt parverteb fct sngl lmbr/sacral Left 5/6/2016     Procedure: NEURO DEST FACET L/S W/IG SNGL - L3-S1;  Surgeon: Farhan Rees D.O.;  Location: [facility];  Service: Pain Management   • Pr dstr nrolytc agnt parverteb fct addl lmbr/sacral Left 5/6/2016     Procedure: NEURO DEST FACET L/S W/IG ADDL;  Surgeon: Farhan Rees D.O.;  Location: [facility];  Service: Pain Management   • Pr inject rx other periph nerve Left 5/6/2016     Procedure: NEUROLYTIC DEST-OTHER NERVE;  Surgeon: Farhan Rees D.O.;  Location: [facility];  Service: Pain Management   • Pr dstr nrolytc agnt parverteb fct addl lmbr/sacral  5/6/2016     Procedure: NEURO DEST FACET L/S W/IG ADDL;  Surgeon: Farhan Rees D.O.;  Location: [facility];  Service: Pain Management   • Pr dstr nrolytc agnt parverteb fct sngl lmbr/sacral Right 6/24/2016     Procedure: NEURO DEST FACET L/S W/IG SNGL - L3-S1;  Surgeon: Farhan Rees D.O.;  Location: [facility];  Service: Pain Management   • Pr dstr nrolytc agnt parverteb fct addl lmbr/sacral Right 6/24/2016     Procedure: NEURO DEST FACET L/S W/IG ADDL;  Surgeon: Farhan Rees D.O.;  [facility]: [facility];  Service: Pain Management   • Pr inject rx other periph nerve Right 6/24/2016     Procedure: NEUROLYTIC DEST-OTHER NERVE;  Surgeon: Farhan Rees D.O.;  Location: [facility];  Service: Pain Management   • Pr dstr nrolytc agnt parverteb fct addl lmbr/sacral  6/24/2016     Procedure: NEURO DEST FACET L/S W/IG ADDL;  Surgeon: Farhan Rees D.O.;  Location: [facility];  Service: Pain Management     Allergies   Allergen Reactions   • Morphine Rash   • Other Food Rash     Onions-cause headaches and facial flushing     Outpatient Encounter Prescriptions as of 3/14/2017   Medication Sig Dispense Refill   • penicillin v potassium (VEETID) 250 MG Tab Take 250 mg by mouth 4 times a day.     • ascorbic acid (ASCORBIC ACID) 500 MG Tab Take 500 mg by mouth every day.     • naproxen (NAPROSYN) 500 MG Tab Take 500 mg by mouth 2 times a day, with meals.     • TURMERIC PO Take  by mouth.     • therapeutic multivitamin-minerals (THERAGRAN-M) Tab Take 1 Tab by mouth every day.     • vitamin D (CHOLECALCIFEROL) 1000 UNIT Tab Take 1,000 Units by mouth every day.     • vitamin e (VITAMIN E) 400 UNIT CAPS Take 400 Units by mouth every day.     • tramadol (ULTRAM) 50 MG Tab Take 1 Tab by mouth every 8 hours as needed. 90 Tab 3   • lorazepam (ATIVAN) 1 MG Tab Take 1 Tab by mouth as needed for Anxiety (take one hour before procedure, max one per day). 5 Tab 0   • prednisONE (DELTASONE) 2.5 MG Tab Take 15 mg in evening daily 180 Tab 0   • fluticasone (FLONASE) 50 MCG/ACT nasal spray Spray 2 Sprays in nose every day. (Patient taking differently: Spray 2 Sprays in nose as needed.) 16 g 3   • loratadine (CLARITIN) 10 MG TABS Take 10 mg by mouth as needed.       No facility-administered encounter medications on file as of 3/14/2017.       Social History     Social History   • Marital Status:      Spouse Name: N/A   • Number of Children: N/A   • Years of Education: N/A     Occupational History   •  Not on file.     Social History Main Topics   • Smoking status: Never Smoker    • Smokeless tobacco: Never Used   • Alcohol Use: 5.4 oz/week     2 Glasses of wine, 7 Standard drinks or equivalent per week      Comment: 2 drinks per week   • Drug Use: No   • Sexual Activity: Not on file     Other Topics Concern   • Not on file     Social History Narrative      History   Smoking status   • Never Smoker    Smokeless tobacco   • Never Used     History   Alcohol Use   • 5.4 oz/week   • 2 Glasses of wine, 7 Standard drinks or equivalent per week     Comment: 2 drinks per week     History   Drug Use No      OB History   No data available      No LMP recorded. Patient has had an ablation.    G P A L     Family History   Problem Relation Age of Onset   • Diabetes     • Hypertension     • Cancer         Review of Systems   Constitutional: Negative for fever and chills.   HENT:        Neck and face still feels full   Cardiovascular: Positive for leg swelling.   Musculoskeletal: Positive for back pain and joint pain.        Joint pains described as tingling   Skin: Negative for rash.        She denies Raynauds   Endo/Heme/Allergies:        She denies any history of blood clots or miscarriages        Objective:   /76 mmHg  Pulse 90  Temp(Src) 36.4 °C (97.6 °F)  Resp 12  Wt 96.163 kg (212 lb)  SpO2 97%  Breastfeeding? No  Left 144/96  right 142/92  Physical Exam   Constitutional: She is oriented to person, place, and time. She appears well-developed and well-nourished. No distress.   HENT:   Head: Normocephalic and atraumatic.   Right Ear: External ear normal.   Left Ear: External ear normal.   Eyes: Conjunctivae are normal. Right eye exhibits no discharge. Left eye exhibits no discharge.   Neck: No thyromegaly present.   Pulmonary/Chest: Effort normal. No stridor. No respiratory distress.   Musculoskeletal: She exhibits edema.   Bilateral edema.  No synovitis of the MCP, PIP, Wrists.  Mild puffiness to the hands.    Neurological: She is alert and oriented to person, place, and time.   Skin: Skin is warm and dry. She is not diaphoretic. No erythema.   Limited exam.  No palpable purpura, no erythema nodosum, no petechiae   Psychiatric: She has a normal mood and affect. Her behavior is normal. Judgment and thought content normal.         Labs    Lab Results   Component Value Date/Time    QUANTIFERON TB GOLD Negative 12/01/2016 04:04 PM     Lab Results   Component Value Date/Time    HEPATITIS B CCORE AB, TOTAL Negative 12/01/2016 04:04 PM    HEPATITIS B SURFACE ANTIGEN Negative 12/01/2016 04:04 PM     Lab Results   Component Value Date/Time    HEPATITIS C ANTIBODY Negative 12/01/2016 04:04 PM     Lab Results   Component Value Date/Time    SODIUM 136 03/06/2017 02:12 PM    POTASSIUM 3.9 03/06/2017 02:12 PM    CHLORIDE 103 03/06/2017 02:12 PM    CO2 25 03/06/2017 02:12 PM    GLUCOSE 79 03/06/2017 02:12 PM    BUN 20 03/06/2017 02:12 PM    CREATININE 0.80 03/06/2017 02:12 PM      Lab Results   Component Value Date/Time    WBC 11.5* 12/01/2016 04:04 PM    RBC 4.35 12/01/2016 04:04 PM    HEMOGLOBIN 14.1 12/01/2016 04:04 PM    HEMATOCRIT 42.7 12/01/2016 04:04 PM    MCV 98.2* 12/01/2016 04:04 PM    MCH 32.4 12/01/2016 04:04 PM    MCHC 33.0* 12/01/2016 04:04 PM    MPV 9.5 12/01/2016 04:04 PM    NEUTROPHILS-POLYS 81.40* 12/01/2016 04:04 PM    LYMPHOCYTES 13.40* 12/01/2016 04:04 PM    MONOCYTES 4.40 12/01/2016 04:04 PM    EOSINOPHILS 0.00 12/01/2016 04:04 PM    BASOPHILS 0.30 12/01/2016 04:04 PM      Lab Results   Component Value Date/Time    CALCIUM 9.6 03/06/2017 02:12 PM    AST(SGOT) 15 12/01/2016 04:04 PM    ALT(SGPT) 31 12/01/2016 04:04 PM    ALKALINE PHOSPHATASE 48 12/01/2016 04:04 PM    TOTAL BILIRUBIN 0.4 12/01/2016 04:04 PM    ALBUMIN 4.3 12/01/2016 04:04 PM    TOTAL PROTEIN 7.4 12/01/2016 04:04 PM     Lab Results   Component Value Date/Time    URIC ACID 4.9 12/01/2016 04:04 PM    RHEUMATOID FACTOR -NEPH- <10 11/08/2016 01:17 PM     CCP ANTIBODIES 2 12/01/2016 04:04 PM    ANTINUCLEAR ANTIBODY None Detected 12/01/2016 04:03 PM     Lab Results   Component Value Date/Time    SED RATE WESTERGREN 19 03/10/2017 12:08 PM    STAT C-REACTIVE PROTEIN 0.17 03/10/2017 12:08 PM     Lab Results   Component Value Date/Time    DILUTE DANNY VIPER VENOM MARY KAY 37.4 03/06/2017 02:12 PM    LUPUS ANTICOAGULANT Not Present 03/06/2017 02:12 PM     Lab Results   Component Value Date/Time    C3 COMPLEMENT 152.0 12/01/2016 04:04 PM    COMPLEMENT C4 28.0 12/01/2016 04:04 PM    ANTI-CARIOLIPIN AB IGG 2 03/06/2017 02:12 PM    ANTI-CARDIOLIPIN AB IGM 6 03/06/2017 02:12 PM    ANTI-CARDIOLIPIN AB IGA 1 03/06/2017 02:12 PM     Lab Results   Component Value Date/Time    ANTI-DNA -DS None Detected 12/01/2016 04:04 PM    RNP ANTIBODIES 0 12/01/2016 04:04 PM    SMITH ANTIBODIES 0 12/01/2016 04:04 PM    ANTI-SCL-70 0 11/08/2016 01:17 PM     Lab Results   Component Value Date/Time    ANTI-DNA -DS None Detected 12/01/2016 04:04 PM    ANCA IGG <1:20 12/01/2016 04:04 PM    C3 COMPLEMENT 152.0 12/01/2016 04:04 PM    RNP ANTIBODIES 0 12/01/2016 04:04 PM    SJOGREN'S ANTI-SS-B 0 12/01/2016 04:04 PM     Lab Results   Component Value Date/Time    COLOR Lt. Yellow 03/10/2017 12:08 PM    SPECIFIC GRAVITY 1.014 03/10/2017 12:08 PM    PH 6.0 03/10/2017 12:08 PM    GLUCOSE Negative 03/10/2017 12:08 PM    KETONES Negative 03/10/2017 12:08 PM    PROTEIN Negative 03/10/2017 12:08 PM     No results found for: TOTPROTUR, TOTALVOLUME, [id number]  Lab Results   Component Value Date/Time    SSA 60 (R0)(KATHERINE) AB, IGG 0 12/01/2016 04:04 PM    SSA 52 (R0)(KATHERINE) AB, IGG 5 12/01/2016 04:04 PM     No results found for: HBA1C  Lab Results   Component Value Date/Time    CPK TOTAL 92 03/06/2017 02:12 PM     Lab Results   Component Value Date/Time    G-6-PD 13.7 11/18/2016 03:18 PM     No results found for: [id number]  Lab Results   Component Value Date/Time    ACE 11 11/18/2016 03:18 PM     Lab Results   Component  Value Date/Time    25-HYDROXY   VITAMIN D 25 31 10/10/2016 02:25 PM     No results found for: TSH, FREEDIR  Lab Results   Component Value Date/Time    TSH 1.530 11/28/2016 05:54 PM     Lab Results   Component Value Date/Time    MICROSOMAL -TPO- ABS 0.5 11/08/2016 01:17 PM    ANTI-THYROGLOBULIN <0.9 12/01/2016 04:04 PM     Lab Results   Component Value Date/Time    LYME DISEASE ABS, IGG 0.49 11/08/2016 01:17 PM     Lab Results   Component Value Date/Time    F-ACTIN AB, IGG 22* 11/08/2016 01:17 PM     Lab Results   Component Value Date/Time    IMMUNOGLOBULIN A 140 03/10/2017 12:08 PM     No results found for: FLTYPE, CRYSTALSBDF  No results found for: ISTATICAL  No results found for: ISTATCREAT  No results found for: CTELOPEP  No results found for: GBMABG  No results found for: PTHINTACT         Assessment:     1. Avascular necrosis (CMS-HCC)  REFERRAL TO IMMUNOLOGY    REFERRAL TO HEMATOLOGY ONCOLOGY Referral to?: Other    CANCELED: REFERRAL TO HEMATOLOGY ONCOLOGY Referral to?: Other         Medical Decision Making:  Today's Assessment / Status / Plan:     Ms. Janett Mcnulty first presented to me in November 2016 following a diagnosis of streptococcal pharyngitis and then erythema nodosum noted by PCP and diagnosis of rheumatic fever. At the time she presented with polyarthralgias.  She was started on prednisone 20 mg and increased to 40 mg. Subsequently we obtain an MRI that showed avascular necrosis of the knee. Further evaluation did identify additional joints also with osteonecrosis. We have since gradually tapered her prednisone. Unclear if the short course of prednisone could be the etiology of her multifocal osteonecrosis. Hypercoagulable workup thus far is negative. Anti-TPO antibody and antithyroglobulin antibody was negative. Her urinalysis did not show any protein or urine.    Rheumatologic workup includes a chest x-ray that was negative for hilar adenopathy, MERA that was negative RF and anti-CCP antibody  was negative. HLA-B27 antibody that was negative, ANCA serologies that was negative. Hepatitis panel and syphilis and QuantiFERON were also negative    Arthralgia    RF, CCP, MERA, ANCA , HLA B27 negative.  Her inflammatory markers did normalize with steroids. Following completion of steroids about 2 weeks later she had onset of lower extremity swelling.  Upon the onset of her joint pains we did repeat sedimentation rate and CRP on March 10 and this was normal.  On exam I do not appreciate any synovitis.    Myalgia  CK is normal    Osteonecrosis - multifocal  Seeing orthopedics. She has multiple involvement of her wrists right ankle and knees (bilateral), hips. Etiology is unclear however it seems to be involving more joints.  Antiphospholipid antibody panels negative, protein C and protein S is normal, anti-thrombin III is normal, Factor Leiden V is normal.  Cryoglobulins are pending  Will refer to hematology for evaluation of other etiologies.    Rheumatic fever  Followed by cardiology and ID  On antibiotics    Slightly decrease IgG levels  We'll refer to immunology/allergy.    Encounter high risk medications  Off prednisone  At this point even though she is experiencing lower extremity swelling and arthralgia will hold off on prednisone.  Advise to continue calcium and vitamin D    Lower extremity swelling  Venous doppler is negative  UA did not show protein  PAOLA were negative.      1. Avascular necrosis (CMS-HCC)  REFERRAL TO IMMUNOLOGY    REFERRAL TO HEMATOLOGY ONCOLOGY Referral to?: Other    CANCELED: REFERRAL TO HEMATOLOGY ONCOLOGY Referral to?: Other     Return in about 4 weeks (around 4/11/2017).      1. Avascular necrosis (CMS-HCC)  REFERRAL TO IMMUNOLOGY    REFERRAL TO HEMATOLOGY ONCOLOGY Referral to?: Other    CANCELED: REFERRAL TO HEMATOLOGY ONCOLOGY Referral to?: Other      I have spent greater than 50% of this 30 minute visit in counseling/coordination of care with the patient regarding follow-up and  contact with hematology office.  I did leave a message for the staff regarding this referral.      She agreed and verbalized her agreement and understanding with the current plan. I answered all questions and concerns she has at this time and advised her to call at any time in there interim with questions or concerns in regards to her care.    Thank you for allowing me to participate in her care, I will continue to follow closely.     Please note that this dictation was created using voice recognition software. I have made every reasonable attempt to correct obvious errors, but I expect that there are errors of grammar and possibly content that I did not discover before finalizing the note.

## 2017-03-14 NOTE — MR AVS SNAPSHOT
Janett Rajni Page   3/14/2017 8:00 AM   Office Visit   MRN: [id number]    Department:  Rheumatology [facility]   Dept Phone:  [phone]    Description:  Female : 1970   Provider:  Starr Jackson M.D.           Reason for Visit     Follow-Up follow up      Allergies as of 3/14/2017     Allergen Noted Reactions    Morphine 2008   Rash    Other Food 2013   Rash    Onions-cause headaches and facial flushing      You were diagnosed with     Avascular necrosis (CMS-[facility])   [[id number]]         Vital Signs     Blood Pressure Pulse Temperature Respirations Weight Oxygen Saturation    124/76 mmHg 90 36.4 °C (97.6 °F) 12 96.163 kg (212 lb) 97%    Breastfeeding? Smoking Status                No Never Smoker           Basic Information     Date Of Birth Sex Race Ethnicity Preferred Language    1970 Female White Non- English      Your appointments     2017  8:00 AM   Follow Up Visit with Starr Jackson M.D.   [facility] (--)    [address], [address]  [place] [address] [phone]           You will be receiving a confirmation call a few days before your appointment from our automated call confirmation system.            2017 11:00 AM   Established Patient with Franklin Gonzalez M.D.   [facility] ([facility])    [address]  [place] [address] [phone]           You will be receiving a confirmation call a few days before your appointment from our automated call confirmation system.              Problem List              ICD-10-CM Priority Class Noted - Resolved    S/P appendectomy Z90.49   2011 - Present    Left hip revision  M21.959   2011 - Present    Left ankle surgery M25.572   2011 - Present    Right hip arthroscopy  M25.551   2011 - Present    Low back pain (Chronic) M54.5   2011 - Present    Cervical pain (neck) M54.2   2011 - Present    IBS (irritable bowel syndrome) K58.9   2011 -  Present    Preventative health care (Chronic) Z00.00   6/1/2011 - Present    Varicose vein I86.8   10/7/2011 - Present    S/P bunionectomy Z98.890   6/7/2013 - Present    Allergic rhinitis due to animal hair and dander (Chronic) J30.81   9/12/2013 - Present    Dyslipidemia E78.5   9/18/2015 - Present    Perimenopausal (Chronic) N95.1   9/25/2015 - Present    Foot pain, right (Chronic) M79.671   9/30/2015 - Present    Obesity E66.9   9/16/2016 - Present    Rheumatic fever (Chronic) I00   9/16/2016 - Present    Avascular necrosis (CMS-HCC) (Chronic) M87.00   2/12/2017 - Present      Health Maintenance        Date Due Completion Dates    MAMMOGRAM 10/26/2017 10/26/2016, 9/15/2015, 9/8/2014, 8/29/2013, 4/22/2004    PAP SMEAR 10/7/2018 10/7/2015    IMM DTaP/Tdap/Td Vaccine (2 - Td) 9/25/2025 9/25/2015            Current Immunizations     Influenza TIV (IM) 12/9/2013, 1/9/2013    Influenza Vaccine Quad Inj (Preserved) 12/13/2016, 10/20/2015  2:00 AM, 11/19/2014    Pneumococcal polysaccharide vaccine (PPSV-23) 1/6/2017 11:10 AM    Tdap Vaccine 9/25/2015 11:06 AM    Tuberculin Skin Test 5/5/2014  1:40 PM, 6/3/2013  1:20 PM, 6/4/2012 12:30 PM, 7/1/2011      Below and/or attached are the medications your provider expects you to take. Review all of your home medications and newly ordered medications with your provider and/or pharmacist. Follow medication instructions as directed by your provider and/or pharmacist. Please keep your medication list with you and share with your provider. Update the information when medications are discontinued, doses are changed, or new medications (including over-the-counter products) are added; and carry medication information at all times in the event of emergency situations     Allergies:  MORPHINE - Rash     OTHER FOOD - Rash               Medications  Valid as of: March 14, 2017 -  9:29 AM    Generic Name Brand Name Tablet Size Instructions for use    Ascorbic Acid (Tab) ascorbic acid 500  MG Take 500 mg by mouth every day.        Cholecalciferol (Tab) cholecalciferol 1000 UNIT Take 1,000 Units by mouth every day.        Fluticasone Propionate (Suspension) FLONASE 50 MCG/ACT Spray 2 Sprays in nose every day.        Loratadine (Tab) CLARITIN 10 MG Take 10 mg by mouth as needed.        LORazepam (Tab) ATIVAN 1 MG Take 1 Tab by mouth as needed for Anxiety (take one hour before procedure, max one per day).        Multiple Vitamins-Minerals (Tab) THERAGRAN-M  Take 1 Tab by mouth every day.        Naproxen (Tab) NAPROSYN 500 MG Take 500 mg by mouth 2 times a day, with meals.        Penicillin V Potassium (Tab) VEETID 250 MG Take 250 mg by mouth 4 times a day.        PredniSONE (Tab) DELTASONE 2.5 MG Take 15 mg in evening daily        TraMADol HCl (Tab) ULTRAM 50 MG Take 1 Tab by mouth every 8 hours as needed.        Turmeric   Take  by mouth.        Vitamin E (Cap) VITAMIN E 400 UNIT Take 400 Units by mouth every day.        .                 Medicines prescribed today were sent to:     [id number] - [place], [place] - [address]    [address] [place] [address]    Phone: [phone] Fax: [phone]    Open 24 Hours?: No      Medication refill instructions:       If your prescription bottle indicates you have medication refills left, it is not necessary to call your provider’s office. Please contact your pharmacy and they will refill your medication.    If your prescription bottle indicates you do not have any refills left, you may request refills at any time through one of the following ways: The online [employer] system (except Urgent Care), by calling your provider’s office, or by asking your pharmacy to contact your provider’s office with a refill request. Medication refills are processed only during regular business hours and may not be available until the next business day. Your provider may request additional information or to have a follow-up visit with you prior to refilling your  medication.   *Please Note: Medication refills are assigned a new Rx number when refilled electronically. Your pharmacy may indicate that no refills were authorized even though a new prescription for the same medication is available at the pharmacy. Please request the medicine by name with the pharmacy before contacting your provider for a refill.        Referral     A referral request has been sent to our patient care coordination department. Please allow 3-5 business days for us to process this request and contact you either by phone or mail. If you do not hear from us by the 5th business day, please call us at [phone].        Other Notes About Your Plan       2/23/17  rheumatology note complete steroid taper then repeat ESR, CRP and CPK, also check antiphospholipid antibody,antithrombin, factor v leiden, protein s                     MyChart Access Code: Activation code not generated  Current MyChart Status: Active

## 2017-03-14 NOTE — Clinical Note
[facility]   [address], [address]  [place] [address]  Phone: [phone]  Fax: [phone]              Encounter Date: 3/14/2017    Dear Dr. Kurtz ref. provider found,    It was a pleasure seeing your patient, Janett Mcnulty, on 3/14/2017. The encounter diagnosis was Avascular necrosis (CMS-[facility]).     Please find attached progress note which includes the history I obtained from Ms. Mcnulty, my physical examination findings, my impression and recommendations.      Once again, it was a pleasure participating in your patient's care.  Please feel free to contact me if you have any questions or if I can be of any further assistance to your patients.      Sincerely,    Starr Jackson M.D.  Electronically Signed          PROGRESS NOTE:  Subjective:   Date of Consultation:3/14/2017  8:19 AM  Primary care physician:Franklin Gonzalez M.D.      Reason for Consultation:  Ms. Mcnulty  is a pleasant 46 y.o. year old female who presents for follow-up of arthritis and multifocal avascular necrosis    Rheumatic Fever  Following cardiology  Has been seen by ID    Arthritis  She has been stable up until 1 week ago when she first developed lower extremity swelling followed by arthralgia    Lower extremity swelling  Progressing. Started a week ago.  Does not resolve with rest but improves.  DVT studies are negative    Avascular necrosis of multiple joints  We first discovered avascular necrosis of the knee. She was referred to orthopedics. However additional imaging has noted avascular necrosis in the hip and the ankle. Additional imaging shows involvement of the wrists.      Past Medical History   Diagnosis Date   • Pain 6/5/13     right foot   • Arthritis      osteoarthritis     Past Surgical History   Procedure Laterality Date   • Pr inj dx/ther agnt paravert facet joint, gómez*  1/21/2011     Performed by EDMAR ELIAS [facility]   • Pr inj dx/ther agnt paravert facet joint, gómez*  2/25/2011      Performed by EDMAR ELIAS at [facility] ORS   • Pr dest,paravertebral,l/s,single  3/4/2011     Performed by EDMAR ELIAS at [facility] OR[facility]   • Inj,sacroiliac,anes/steroid  5/13/2011     Performed by EDMAR ELIAS at [facility]   • Pr inject nerv blck,stellate ganglion  9/2/2011     Performed by EDMAR ELIAS at [facility] OR[facility]   • Neuro dest facet l/s w/ig sngl  3/2/2012     Performed by EDMAR ELIAS at [facility]   • Neuro dest facet l/s w/ig addl  3/2/2012     Performed by EDMAR ELIAS at [facility]   • Neuro dest facet l/s w/ig addl  3/2/2012     Performed by EDMAR ELIAS at [facility]   • Neuro dest facet l/s w/ig sngl  3/9/2012     Performed by EDMAR ELIAS at [facility]   • Neuro dest facet l/s w/ig addl  3/9/2012     Performed by EDMAR ELIAS at [facility]   • Neuro dest facet l/s w/ig addl  3/9/2012     Performed by EDMAR ELIAS at [facility]   • Neuro dest facet l/s w/ig sngl  12/28/2012     Performed by Edmar Elias D.O. at [facility] ORS   • Neuro dest facet l/s w/ig addl  12/28/2012     Performed by Edmar Elias D.O. at [facility] ORS   • Neuro dest facet l/s w/ig addl  12/28/2012     Performed by Edmar Elias D.O. at [facility] ORS   • Neuro dest facet l/s w/ig sngl  1/4/2013     Performed by Edmar Elias D.O. at [facility] ORS   • Neuro dest facet l/s w/ig addl  1/4/2013     Performed by Edmar Elias D.O. at [facility]   • Neuro dest facet l/s w/ig addl  1/4/2013     Performed by Edmar Elias D.O. at [facility]   • Other  1998     left hip revision   • Other  2009     right hip revision   • Tonsillectomy  2001   • Other  2007     left ankle tendon repair   • Appendectomy  1991   • Other        uterine ablation   • Bunionectomy  6/7/2013     Performed by Oliver Pratt D.P.M. at [facility]   • Neuro dest facet l/s w/ig sngl  9/20/2013     Performed by Farhan Rees D.O. at [facility] ORS   • Neuro dest facet l/s w/ig addl  9/20/2013     Performed by Farhan Rees D.O. at [facility] ORS   • Neuro dest facet l/s w/ig addl  9/20/2013     Performed by Farhan Rees D.O. at [facility] ORS   • Neuro dest facet l/s w/ig addl  9/20/2013     Performed by Farhan Rees D.O. at [facility] ORS   • Neuro dest facet l/s w/ig sngl  10/4/2013     Performed by Farhan Rees D.O. at [facility] ORS   • Neuro dest facet l/s w/ig addl  10/4/2013     Performed by Farhan Rees D.O. at [facility] ORS   • Neuro dest facet l/s w/ig addl  10/4/2013     Performed by Farhan Rees D.O. at [facility] ORS   • Neuro dest facet l/s w/ig addl  10/4/2013     Performed by Farhan Rees D.O. at [facility] ORS   • Inj,sacroiliac,anes/steroid  11/8/2013     Performed by Farhan Rees D.O. at [facility] ORS   • Inj,sacroiliac,anes/steroid  12/6/2013     Performed by Farhan Rees D.O. at [facility] OR[facility]   • Inj,sacroiliac,anes/steroid  1/10/2014     Performed by Farhan Rees D.O. at [facility]   • Inj,sacroiliac,anes/steroid  1/10/2014     Performed by Farhan Rees D.O. at [facility] ORS   • Neuro dest facet l/s w/ig sngl  5/16/2014     Performed by Farhan Rees D.O. at [facility] ORS   • Neuro dest facet l/s w/ig addl  5/16/2014     Performed by Farhan Rees D.O. at SUR[facility] ORS   • Neuro dest facet l/s w/ig addl  5/16/2014     Performed by Farhan Rees D.O. at [facility]S   • Neuro dest facet l/s w/ig addl  5/16/2014     Performed by Farhan Rees D.O. at SURGERY  [facility] ORS   • Neuro dest facet l/s w/ig sngl  6/6/2014     Performed by Farhan Rees D.O. at [facility] ORS   • Neuro dest facet l/s w/ig addl  6/6/2014     Performed by Farhan Rees D.O. at [facility] ORS   • Neuro dest facet l/s w/ig addl  6/6/2014     Performed by Farhan Rees D.O. at [facility] ORS   • Neuro dest facet l/s w/ig addl  6/6/2014     Performed by Farhan Rees D.O. at [facility] ORS   • Toe arthroplasty  7/7/2014     Performed by Oliver Pratt D.P.M. at [facility]   • Inj,sacroiliac,anes/steroid  7/11/2014     Performed by Farhan Rees D.O. at [facility] ORS   • Inj,sacroiliac,anes/steroid  7/11/2014     Performed by Farhan Rees D.O. at [facility] ORS   • Neuro dest facet l/s w/ig sngl  2/6/2015     Performed by Farhan Rees D.O. at [facility]S   • Neuro dest facet l/s w/ig addl  2/6/2015     Performed by Farhan Rees D.O. at [facility] ORS   • Neuro dest facet l/s w/ig addl  2/6/2015     Performed by Farhan Rees D.O. at [facility] ORS   • Neuro dest facet l/s w/ig sngl  2/13/2015     Performed by Farhan Rees D.O. at [facility] OR[facility]   • Neuro dest facet l/s w/ig addl  2/13/2015     Performed by Farhan Rees D.O. at [facility]   • Neuro dest facet l/s w/ig addl  2/13/2015     Performed by Farhan Rees D.O. at [facility]   • Inj,sacroiliac,anes/steroid  4/3/2015     Performed by Farhan Rees D.O. at [facility]   • Inj,sacroiliac,anes/steroid  4/3/2015     Performed by Farhan Rees D.O. at [facility]   • Pr dstr nrolytc agnt parverteb fct sngl lmbr/sacral Left 10/9/2015     Procedure: NEURO DEST FACET L/S W/IG SNGL - L3-S1;  Surgeon: Farhan Rees D.O.;  Location: [facility];  Service: Pain Management     • Pr dstr nrolytc agnt parverteb fct addl lmbr/sacral  10/9/2015     Procedure: NEURO DEST FACET L/S W/IG ADDL;  Surgeon: Farhan Rees D.O.;  Location: [facility];  Service: Pain Management   • Pr inject rx other periph nerve  10/9/2015     Procedure: NEUROLYTIC DEST-OTHER NERVE;  Surgeon: Farhan Rees D.O.;  Location: [facility];  Service: Pain Management   • Pr dstr nrolytc agnt parverteb fct addl lmbr/sacral  10/9/2015     Procedure: NEURO DEST FACET L/S W/IG ADDL;  Surgeon: Farhan Rees D.O.;  Location: [facility];  Service: Pain Management   • Pr dstr nrolytc agnt parverteb fct sngl lmbr/sacral Right 10/16/2015     Procedure: NEURO DEST FACET L/S W/IG SNGL - L3-S1;  Surgeon: Farhan Rees D.O.;  Location: [facility];  Service: Pain Management   • Pr dstr nrolytc agnt parverteb fct addl lmbr/sacral  10/16/2015     Procedure: NEURO DEST FACET L/S W/IG ADDL;  Surgeon: Farhan Rees D.O.;  Location: [facility];  Service: Pain Management   • Pr inject rx other periph nerve  10/16/2015     Procedure: NEUROLYTIC DEST-OTHER NERVE;  Surgeon: Farhan Rees D.O.;  Location: [facility];  Service: Pain Management   • Pr dstr nrolytc agnt parverteb fct addl lmbr/sacral  10/16/2015     Procedure: NEURO DEST FACET L/S W/IG ADDL;  Surgeon: Farhan Rees D.O.;  Location: [facility];  Service: Pain Management   • Pr dstr nrolytc agnt parverteb fct sngl lmbr/sacral Left 5/6/2016     Procedure: NEURO DEST FACET L/S W/IG SNGL - L3-S1;  Surgeon: Farhan Rees D.O.;  Location: [facility];  Service: Pain Management   • Pr dstr nrolytc agnt parverteb fct addl lmbr/sacral Left 5/6/2016     Procedure: NEURO DEST FACET L/S W/IG ADDL;  Surgeon: Farhan Rees D.O.;  [facility]: [facility];  Service: Pain Management   • Pr inject rx other periph nerve Left  5/6/2016     Procedure: NEUROLYTIC DEST-OTHER NERVE;  Surgeon: Farhan Rees D.O.;  [facility]: [facility];  Service: Pain Management   • Pr dstr nrolytc agnt parverteb fct addl lmbr/sacral  5/6/2016     Procedure: NEURO DEST FACET L/S W/IG ADDL;  Surgeon: Farhan Rees D.O.;  [facility]: [facility];  Service: Pain Management   • Pr dstr nrolytc agnt parverteb fct sngl lmbr/sacral Right 6/24/2016     Procedure: NEURO DEST FACET L/S W/IG SNGL - L3-S1;  Surgeon: Farhan Rees D.O.;  [facility]: [facility];  Service: Pain Management   • Pr dstr nrolytc agnt parverteb fct addl lmbr/sacral Right 6/24/2016     Procedure: NEURO DEST FACET L/S W/IG ADDL;  Surgeon: Farhan Rees D.O.;  [facility]: [facility];  Service: Pain Management   • Pr inject rx other periph nerve Right 6/24/2016     Procedure: NEUROLYTIC DEST-OTHER NERVE;  Surgeon: Farhan Rees D.O.;  [facility]: [facility];  Service: Pain Management   • Pr dstr nrolytc agnt parverteb fct addl lmbr/sacral  6/24/2016     Procedure: NEURO DEST FACET L/S W/IG ADDL;  Surgeon: Farhan Rees D.O.;  [facility];  Service: Pain Management     Allergies   Allergen Reactions   • Morphine Rash   • Other Food Rash     Onions-cause headaches and facial flushing     Outpatient Encounter Prescriptions as of 3/14/2017   Medication Sig Dispense Refill   • penicillin v potassium (VEETID) 250 MG Tab Take 250 mg by mouth 4 times a day.     • ascorbic acid (ASCORBIC ACID) 500 MG Tab Take 500 mg by mouth every day.     • naproxen (NAPROSYN) 500 MG Tab Take 500 mg by mouth 2 times a day, with meals.     • TURMERIC PO Take  by mouth.     • therapeutic multivitamin-minerals (THERAGRAN-M) Tab Take 1 Tab by mouth every day.     • vitamin D (CHOLECALCIFEROL) 1000 UNIT Tab Take 1,000 Units by mouth every day.     • vitamin e (VITAMIN E) 400 UNIT CAPS Take 400 Units by mouth  every day.     • tramadol (ULTRAM) 50 MG Tab Take 1 Tab by mouth every 8 hours as needed. 90 Tab 3   • lorazepam (ATIVAN) 1 MG Tab Take 1 Tab by mouth as needed for Anxiety (take one hour before procedure, max one per day). 5 Tab 0   • prednisONE (DELTASONE) 2.5 MG Tab Take 15 mg in evening daily 180 Tab 0   • fluticasone (FLONASE) 50 MCG/ACT nasal spray Spray 2 Sprays in nose every day. (Patient taking differently: Spray 2 Sprays in nose as needed.) 16 g 3   • loratadine (CLARITIN) 10 MG TABS Take 10 mg by mouth as needed.       No facility-administered encounter medications on file as of 3/14/2017.       Social History     Social History   • Marital Status:      Spouse Name: N/A   • Number of Children: N/A   • Years of Education: N/A     Occupational History   • Not on file.     Social History Main Topics   • Smoking status: Never Smoker    • Smokeless tobacco: Never Used   • Alcohol Use: 5.4 oz/week     2 Glasses of wine, 7 Standard drinks or equivalent per week      Comment: 2 drinks per week   • Drug Use: No   • Sexual Activity: Not on file     Other Topics Concern   • Not on file     Social History Narrative      History   Smoking status   • Never Smoker    Smokeless tobacco   • Never Used     History   Alcohol Use   • 5.4 oz/week   • 2 Glasses of wine, 7 Standard drinks or equivalent per week     Comment: 2 drinks per week     History   Drug Use No      OB History   No data available      No LMP recorded. Patient has had an ablation.    G P A L     Family History   Problem Relation Age of Onset   • Diabetes     • Hypertension     • Cancer         Review of Systems   Constitutional: Negative for fever and chills.   HENT:        Neck and face still feels full   Cardiovascular: Positive for leg swelling.   Musculoskeletal: Positive for back pain and joint pain.        Joint pains described as tingling   Skin: Negative for rash.        She denies Raynauds   Endo/Heme/Allergies:        She denies any  history of blood clots or miscarriages        Objective:   /76 mmHg  Pulse 90  Temp(Src) 36.4 °C (97.6 °F)  Resp 12  Wt 96.163 kg (212 lb)  SpO2 97%  Breastfeeding? No  Left 144/96  right 142/92  Physical Exam   Constitutional: She is oriented to person, place, and time. She appears well-developed and well-nourished. No distress.   HENT:   Head: Normocephalic and atraumatic.   Right Ear: External ear normal.   Left Ear: External ear normal.   Eyes: Conjunctivae are normal. Right eye exhibits no discharge. Left eye exhibits no discharge.   Neck: No thyromegaly present.   Pulmonary/Chest: Effort normal. No stridor. No respiratory distress.   Musculoskeletal: She exhibits edema.   Bilateral edema.  No synovitis of the MCP, PIP, Wrists.  Mild puffiness to the hands.   Neurological: She is alert and oriented to person, place, and time.   Skin: Skin is warm and dry. She is not diaphoretic. No erythema.   Limited exam.  No palpable purpura, no erythema nodosum, no petechiae   Psychiatric: She has a normal mood and affect. Her behavior is normal. Judgment and thought content normal.         Labs    Lab Results   Component Value Date/Time    QUANTIFERON TB GOLD Negative 12/01/2016 04:04 PM     Lab Results   Component Value Date/Time    HEPATITIS B CCORE AB, TOTAL Negative 12/01/2016 04:04 PM    HEPATITIS B SURFACE ANTIGEN Negative 12/01/2016 04:04 PM     Lab Results   Component Value Date/Time    HEPATITIS C ANTIBODY Negative 12/01/2016 04:04 PM     Lab Results   Component Value Date/Time    SODIUM 136 03/06/2017 02:12 PM    POTASSIUM 3.9 03/06/2017 02:12 PM    CHLORIDE 103 03/06/2017 02:12 PM    CO2 25 03/06/2017 02:12 PM    GLUCOSE 79 03/06/2017 02:12 PM    BUN 20 03/06/2017 02:12 PM    CREATININE 0.80 03/06/2017 02:12 PM      Lab Results   Component Value Date/Time    WBC 11.5* 12/01/2016 04:04 PM    RBC 4.35 12/01/2016 04:04 PM    HEMOGLOBIN 14.1 12/01/2016 04:04 PM    HEMATOCRIT 42.7 12/01/2016 04:04 PM     MCV 98.2* 12/01/2016 04:04 PM    MCH 32.4 12/01/2016 04:04 PM    MCHC 33.0* 12/01/2016 04:04 PM    MPV 9.5 12/01/2016 04:04 PM    NEUTROPHILS-POLYS 81.40* 12/01/2016 04:04 PM    LYMPHOCYTES 13.40* 12/01/2016 04:04 PM    MONOCYTES 4.40 12/01/2016 04:04 PM    EOSINOPHILS 0.00 12/01/2016 04:04 PM    BASOPHILS 0.30 12/01/2016 04:04 PM      Lab Results   Component Value Date/Time    CALCIUM 9.6 03/06/2017 02:12 PM    AST(SGOT) 15 12/01/2016 04:04 PM    ALT(SGPT) 31 12/01/2016 04:04 PM    ALKALINE PHOSPHATASE 48 12/01/2016 04:04 PM    TOTAL BILIRUBIN 0.4 12/01/2016 04:04 PM    ALBUMIN 4.3 12/01/2016 04:04 PM    TOTAL PROTEIN 7.4 12/01/2016 04:04 PM     Lab Results   Component Value Date/Time    URIC ACID 4.9 12/01/2016 04:04 PM    RHEUMATOID FACTOR -NEPH- <10 11/08/2016 01:17 PM    CCP ANTIBODIES 2 12/01/2016 04:04 PM    ANTINUCLEAR ANTIBODY None Detected 12/01/2016 04:03 PM     Lab Results   Component Value Date/Time    SED RATE WESTERGREN 19 03/10/2017 12:08 PM    STAT C-REACTIVE PROTEIN 0.17 03/10/2017 12:08 PM     Lab Results   Component Value Date/Time    DILUTE DANNY VIPER VENOM MARY KAY 37.4 03/06/2017 02:12 PM    LUPUS ANTICOAGULANT Not Present 03/06/2017 02:12 PM     Lab Results   Component Value Date/Time    C3 COMPLEMENT 152.0 12/01/2016 04:04 PM    COMPLEMENT C4 28.0 12/01/2016 04:04 PM    ANTI-CARIOLIPIN AB IGG 2 03/06/2017 02:12 PM    ANTI-CARDIOLIPIN AB IGM 6 03/06/2017 02:12 PM    ANTI-CARDIOLIPIN AB IGA 1 03/06/2017 02:12 PM     Lab Results   Component Value Date/Time    ANTI-DNA -DS None Detected 12/01/2016 04:04 PM    RNP ANTIBODIES 0 12/01/2016 04:04 PM    SMITH ANTIBODIES 0 12/01/2016 04:04 PM    ANTI-SCL-70 0 11/08/2016 01:17 PM     Lab Results   Component Value Date/Time    ANTI-DNA -DS None Detected 12/01/2016 04:04 PM    ANCA IGG <1:20 12/01/2016 04:04 PM    C3 COMPLEMENT 152.0 12/01/2016 04:04 PM    RNP ANTIBODIES 0 12/01/2016 04:04 PM    SJOGREN'S ANTI-SS-B 0 12/01/2016 04:04 PM     Lab Results      Component Value Date/Time    COLOR Lt. Yellow 03/10/2017 12:08 PM    SPECIFIC GRAVITY 1.014 03/10/2017 12:08 PM    PH 6.0 03/10/2017 12:08 PM    GLUCOSE Negative 03/10/2017 12:08 PM    KETONES Negative 03/10/2017 12:08 PM    PROTEIN Negative 03/10/2017 12:08 PM     No results found for: TOTPROTUR, TOTALVOLUME, [id number]  Lab Results   Component Value Date/Time    SSA 60 (R0)(KATHERINE) AB, IGG 0 12/01/2016 04:04 PM    SSA 52 (R0)(KATHERINE) AB, IGG 5 12/01/2016 04:04 PM     No results found for: HBA1C  Lab Results   Component Value Date/Time    CPK TOTAL 92 03/06/2017 02:12 PM     Lab Results   Component Value Date/Time    G-6-PD 13.7 11/18/2016 03:18 PM     No results found for: [id number]  Lab Results   Component Value Date/Time    ACE 11 11/18/2016 03:18 PM     Lab Results   Component Value Date/Time    25-HYDROXY   VITAMIN D 25 31 10/10/2016 02:25 PM     No results found for: TSH, FREEDIR  Lab Results   Component Value Date/Time    TSH 1.530 11/28/2016 05:54 PM     Lab Results   Component Value Date/Time    MICROSOMAL -TPO- ABS 0.5 11/08/2016 01:17 PM    ANTI-THYROGLOBULIN <0.9 12/01/2016 04:04 PM     Lab Results   Component Value Date/Time    LYME DISEASE ABS, IGG 0.49 11/08/2016 01:17 PM     Lab Results   Component Value Date/Time    F-ACTIN AB, IGG 22* 11/08/2016 01:17 PM     Lab Results   Component Value Date/Time    IMMUNOGLOBULIN A 140 03/10/2017 12:08 PM     No results found for: FLTYPE, CRYSTALSBDF  No results found for: ISTATICAL  No results found for: ISTATCREAT  No results found for: CTELOPEP  No results found for: GBMABG  No results found for: PTHINTACT         Assessment:     1. Avascular necrosis (CMS-HCC)  REFERRAL TO IMMUNOLOGY    REFERRAL TO HEMATOLOGY ONCOLOGY Referral to?: Other    CANCELED: REFERRAL TO HEMATOLOGY ONCOLOGY Referral to?: Other         Medical Decision Making:  Today's Assessment / Status / Plan:     Ms. Janett Mcnulty first presented to me in November 2016 following a diagnosis of  streptococcal pharyngitis and then erythema nodosum noted by PCP and diagnosis of rheumatic fever. At the time she presented with polyarthralgias.  She was started on prednisone 20 mg and increased to 40 mg. Subsequently we obtain an MRI that showed avascular necrosis of the knee. Further evaluation did identify additional joints also with osteonecrosis. We have since gradually tapered her prednisone. Unclear if the short course of prednisone could be the etiology of her multifocal osteonecrosis. Hypercoagulable workup thus far is negative. Anti-TPO antibody and antithyroglobulin antibody was negative. Her urinalysis did not show any protein or urine.    Rheumatologic workup includes a chest x-ray that was negative for hilar adenopathy, MERA that was negative RF and anti-CCP antibody was negative. HLA-B27 antibody that was negative, ANCA serologies that was negative. Hepatitis panel and syphilis and QuantiFERON were also negative    Arthralgia    RF, CCP, MERA, ANCA , HLA B27 negative.  Her inflammatory markers did normalize with steroids. Following completion of steroids about 2 weeks later she had onset of lower extremity swelling.  Upon the onset of her joint pains we did repeat sedimentation rate and CRP on March 10 and this was normal.  On exam I do not appreciate any synovitis.    Myalgia  CK is normal    Osteonecrosis - multifocal  Seeing orthopedics. She has multiple involvement of her wrists right ankle and knees (bilateral), hips. Etiology is unclear however it seems to be involving more joints.  Antiphospholipid antibody panels negative, protein C and protein S is normal, anti-thrombin III is normal, Factor Leiden V is normal.  Cryoglobulins are pending  Will refer to hematology for evaluation of other etiologies.    Rheumatic fever  Followed by cardiology and ID  On antibiotics    Slightly decrease IgG levels  We'll refer to immunology/allergy.    Encounter high risk medications  Off prednisone  At this  point even though she is experiencing lower extremity swelling and arthralgia will hold off on prednisone.  Advise to continue calcium and vitamin D    Lower extremity swelling  Venous doppler is negative  UA did not show protein  PAOLA were negative.      1. Avascular necrosis (CMS-HCC)  REFERRAL TO IMMUNOLOGY    REFERRAL TO HEMATOLOGY ONCOLOGY Referral to?: Other    CANCELED: REFERRAL TO HEMATOLOGY ONCOLOGY Referral to?: Other     Return in about 4 weeks (around 4/11/2017).      1. Avascular necrosis (CMS-HCC)  REFERRAL TO IMMUNOLOGY    REFERRAL TO HEMATOLOGY ONCOLOGY Referral to?: Other    CANCELED: REFERRAL TO HEMATOLOGY ONCOLOGY Referral to?: Other      I have spent greater than 50% of this 30 minute visit in counseling/coordination of care with the patient regarding follow-up and contact with hematology office.  I did leave a message for the staff regarding this referral.      She agreed and verbalized her agreement and understanding with the current plan. I answered all questions and concerns she has at this time and advised her to call at any time in there interim with questions or concerns in regards to her care.    Thank you for allowing me to participate in her care, I will continue to follow closely.     Please note that this dictation was created using voice recognition software. I have made every reasonable attempt to correct obvious errors, but I expect that there are errors of grammar and possibly content that I did not discover before finalizing the note.

## 2017-03-15 LAB — CRYOGLOB SER QL 3D COLD INC: NORMAL

## 2017-03-16 NOTE — PROGRESS NOTES
Discussed case with Hematology.  They are happy to see the patient however they did review the records.  They are unsure if there is more to add but they are happy to take a closer look    I discussed with the patient our discussion.  Patient still would like to see hematology.    We will move forward with the referral.

## 2017-03-17 LAB — IGE SERPL-ACNC: 6 KU/L

## 2017-03-20 ENCOUNTER — TELEPHONE (OUTPATIENT)
Dept: MEDICAL GROUP | Facility: MEDICAL CENTER | Age: 47
End: 2017-03-20

## 2017-03-21 ENCOUNTER — TELEPHONE (OUTPATIENT)
Dept: MEDICAL GROUP | Facility: MEDICAL CENTER | Age: 47
End: 2017-03-21

## 2017-03-21 ENCOUNTER — APPOINTMENT (OUTPATIENT)
Dept: ADMISSIONS | Facility: MEDICAL CENTER | Age: 47
End: 2017-03-21
Attending: PHYSICAL MEDICINE & REHABILITATION
Payer: COMMERCIAL

## 2017-03-21 NOTE — OR NURSING
Pre admit appointment completed via telephone. Note pt has been on PCN since 9/2016 while her MD still trying to R/O rheumatic fever. Pt states the protocol for treatment involves PCN for total of 5 years. States no heart murmur.    Dr Tyson notified of procedure and possible rheumatic fever via email.

## 2017-03-21 NOTE — TELEPHONE ENCOUNTER
Please call [facility], I placed a referral for the patient on March 17 to see infectious disease, and the patient specifically asked for Dr. Edison Anderson from [facility], phone number [phone].  Instead the referral was placed for [facility] infectious disease.    Could you please contact [facility] and have them change that referral to Dr. Anderson at [facility]?

## 2017-03-21 NOTE — TELEPHONE ENCOUNTER
----- Message from Malena Sainz sent at 3/21/2017  9:39 AM PDT -----  Regarding: RE: Infectious Disease Referral  Done. They do require a doctor to doctor phone call before seeing the patient. Could you have Dr. Gonzalez give them a call at his convenience, please?    Thank you,  Malena     ----- Message -----     From: Zulema Arndt, Med Ass't     Sent: 3/21/2017   8:54 AM       To: Malena Sainz, Faye Cevallos, Med Ass't  Subject: Infectious Disease Referral                      Good morning,    Dr. Gonzalez sent a referral on 3/17 requesting it to go to Dr. Anderson who is at [facility], phone number is [phone]. Can we please correct the referral so it goes to the correct office?     Thank you,    Zulema, Medical Assistant

## 2017-03-21 NOTE — TELEPHONE ENCOUNTER
Dr. Anderson's office requires a doctor to doctor call regarding referral.     Please contact: [phone]

## 2017-03-21 NOTE — OR NURSING
"Per Dr Tyson (after reviewing available medical information): \"If the RF is an active issue then I would guess Dr Rees would want to wait.  In any case, Dr Rees should speak to Dr Gonzalez and get a clear picture of this prior to probably doing the procedure.\"     Notified Parvin at Dr Rees's office and faxed note to office. She will inform Dr Rees tomorrow when he is back in office.   "

## 2017-03-23 NOTE — TELEPHONE ENCOUNTER
Called infectious disease office and left message for them to call us back about referral for second opinion rheumatic fever

## 2017-03-23 NOTE — TELEPHONE ENCOUNTER
I did call and speak to Dr. Ferguson from infectious disease, he is a partner of Dr. Anderson, he does agree that the patient can be seen in consultation by Dr. Anderson from infectious disease.    Please have medical records fax all old records for the past year to that office    Please call [facility] and notify them that I did receive authorization from Dr. Anderson's office to have the patient be seen as a new patient consultation by [facility]

## 2017-03-24 ENCOUNTER — HOSPITAL ENCOUNTER (OUTPATIENT)
Facility: MEDICAL CENTER | Age: 47
End: 2017-03-24
Attending: PHYSICAL MEDICINE & REHABILITATION | Admitting: PHYSICAL MEDICINE & REHABILITATION
Payer: COMMERCIAL

## 2017-03-24 VITALS
TEMPERATURE: 97.9 F | DIASTOLIC BLOOD PRESSURE: 63 MMHG | OXYGEN SATURATION: 97 % | WEIGHT: 210 LBS | RESPIRATION RATE: 16 BRPM | SYSTOLIC BLOOD PRESSURE: 118 MMHG | HEIGHT: 70 IN | HEART RATE: 76 BPM | BODY MASS INDEX: 30.06 KG/M2

## 2017-03-24 PROBLEM — M47.816 LUMBAR SPONDYLOSIS: Status: ACTIVE | Noted: 2017-03-24

## 2017-03-24 PROCEDURE — 64636 DESTROY L/S FACET JNT ADDL: CPT | Performed by: PHYSICAL MEDICINE & REHABILITATION

## 2017-03-24 PROCEDURE — 700111 HCHG RX REV CODE 636 W/ 250 OVERRIDE (IP)

## 2017-03-24 PROCEDURE — 99152 MOD SED SAME PHYS/QHP 5/>YRS: CPT | Performed by: PHYSICAL MEDICINE & REHABILITATION

## 2017-03-24 PROCEDURE — 99153 MOD SED SAME PHYS/QHP EA: CPT | Performed by: PHYSICAL MEDICINE & REHABILITATION

## 2017-03-24 PROCEDURE — 64640 INJECTION TREATMENT OF NERVE: CPT | Performed by: PHYSICAL MEDICINE & REHABILITATION

## 2017-03-24 PROCEDURE — 700101 HCHG RX REV CODE 250

## 2017-03-24 PROCEDURE — 502240 HCHG MISC OR SUPPLY RC 0272: Performed by: PHYSICAL MEDICINE & REHABILITATION

## 2017-03-24 PROCEDURE — 64635 DESTROY LUMB/SAC FACET JNT: CPT | Performed by: PHYSICAL MEDICINE & REHABILITATION

## 2017-03-24 PROCEDURE — 160048 HCHG OR STATISTICAL LEVEL 1-5: Performed by: PHYSICAL MEDICINE & REHABILITATION

## 2017-03-24 PROCEDURE — 160002 HCHG RECOVERY MINUTES (STAT): Performed by: PHYSICAL MEDICINE & REHABILITATION

## 2017-03-24 PROCEDURE — 700111 HCHG RX REV CODE 636 W/ 250 OVERRIDE (IP): Performed by: PHYSICAL MEDICINE & REHABILITATION

## 2017-03-24 RX ORDER — SODIUM CHLORIDE, SODIUM LACTATE, POTASSIUM CHLORIDE, CALCIUM CHLORIDE 600; 310; 30; 20 MG/100ML; MG/100ML; MG/100ML; MG/100ML
500 INJECTION, SOLUTION INTRAVENOUS CONTINUOUS
Status: DISCONTINUED | OUTPATIENT
Start: 2017-03-24 | End: 2017-03-24 | Stop reason: HOSPADM

## 2017-03-24 RX ORDER — BUPIVACAINE HYDROCHLORIDE 5 MG/ML
INJECTION, SOLUTION EPIDURAL; INTRACAUDAL
Status: DISCONTINUED | OUTPATIENT
Start: 2017-03-24 | End: 2017-03-24 | Stop reason: HOSPADM

## 2017-03-24 RX ORDER — MIDAZOLAM HYDROCHLORIDE 1 MG/ML
INJECTION INTRAMUSCULAR; INTRAVENOUS
Status: DISCONTINUED | OUTPATIENT
Start: 2017-03-24 | End: 2017-03-24 | Stop reason: HOSPADM

## 2017-03-24 RX ORDER — DEXTROSE MONOHYDRATE 25 G/50ML
INJECTION, SOLUTION INTRAVENOUS
Status: DISCONTINUED | OUTPATIENT
Start: 2017-03-24 | End: 2017-03-24 | Stop reason: HOSPADM

## 2017-03-24 RX ORDER — LIDOCAINE HYDROCHLORIDE 10 MG/ML
INJECTION, SOLUTION EPIDURAL; INFILTRATION; INTRACAUDAL; PERINEURAL
Status: DISCONTINUED | OUTPATIENT
Start: 2017-03-24 | End: 2017-03-24 | Stop reason: HOSPADM

## 2017-03-24 RX ADMIN — SODIUM CHLORIDE, POTASSIUM CHLORIDE, SODIUM LACTATE AND CALCIUM CHLORIDE 500 ML: 600; 310; 30; 20 INJECTION, SOLUTION INTRAVENOUS at 08:00

## 2017-03-24 ASSESSMENT — PAIN SCALES - GENERAL
PAINLEVEL_OUTOF10: 1
PAINLEVEL_OUTOF10: 2
PAINLEVEL_OUTOF10: 4

## 2017-03-24 NOTE — DISCHARGE INSTRUCTIONS
HOME CARE INSTRUCTIONS      1. Resume usual diet and medications unless otherwise instructed by your physician.  2. You may experience tenderness in your low back/neck after the procedure for the next 24 hours.  The pain you have may worsen in the next day or so.  It can take up to one week to get relief from the injection.  For post-procedure pain, over-the-counter non-aspirin analgesic is recommended.  3. Apply ice packs to the injection site intermittently for the next 24 hours (apply for 15 minutes, remove for 30 minutes, reapply ice, etc.)  Do not apply heat to this area for the next 24 hours.  4. Resume non-strenuous activity as prior to injection unless otherwise instructed by your physician.  5. If given local anesthesia with your procedure, you may experience warmth, tingling and/or numbness in your extremities following the injection.  6. It is important that you take extra time and precautions to prevent possible stumbling or falling, especially if you are taking medications that may cause drowsiness.  7. Do not take a tub bath for a few days after your procedure.  Showers are okay.  8. You should not drive for 24 hours after the procedure.  A ride home after the procedure is required.  If you received sedation, you must have a responsible adult to stay with you for 24 hours.  9. No alcohol for 24 hours if you receive sedation.  10. Do not make any important decisions or sign any legal documents for 24 hours after receiving sedation.  11. If you experience any loss of bowel or bladder control, contact your physician.  12. Your physician will discuss follow-up arrangements with you.  If you  have any questions or problems please call your physician.      Dr. Mccord       [phone]  Dr. Mccormack      [phone]  Dr. Winston   [phone]  Dr. Pérez [phone]     Dr. White  [phone]  Dr. Robles      [phone]  Dr. Boyd   [phone]  Dr. Mcnamara  [phone]  Dr. Cardona           [phone]  Dr. Rees [phone]    Asha  [phone] Dr. Johnson   [phone]  Dr. Bolden        [phone]  Dr. Holder      [phone]  Dr. Zollinger [phone]  Other:___________    I hereby acknowledge that I understand and accept these instructions.

## 2017-03-24 NOTE — IP AVS SNAPSHOT
3/24/2017          Janett Urban Page  [address]  [place] [address]    Dear Janett:    [facility] wants to ensure your discharge home is safe and you or your loved ones have had all your questions answered regarding your care after you leave the hospital.    You may receive a telephone call within two days of your discharge.  This call is to make certain you understand your discharge instructions as well as ensure we provided you with the best care possible during your stay with us.     The call will only last approximately 3-5 minutes and will be done by a nurse.    Once again, we want to ensure your discharge home is safe and that you have a clear understanding of any next steps in your care.  If you have any questions or concerns, please do not hesitate to contact us, we are here for you.  Thank you for choosing [facility] for your healthcare needs.    Sincerely,    Erlin Soriano    [facility] [place]

## 2017-03-24 NOTE — IP AVS SNAPSHOT
" Home Care Instructions                                                                                                                Name:Janett Mcnulty  Medical Record Number:[id number]  CSN: [id number]    YOB: 1970   Age: 46 y.o.  Sex: female  HT:1.778 m (5' 10\") WT: 95.255 kg (210 lb)          Admit Date: 3/24/2017     Discharge Date:   Today's Date: 3/24/2017  Attending Doctor:  Farhan Rees D.O.                  Allergies:  Morphine and Other food                Discharge Instructions          HOME CARE INSTRUCTIONS      1. Resume usual diet and medications unless otherwise instructed by your physician.  2. You may experience tenderness in your low back/neck after the procedure for the next 24 hours.  The pain you have may worsen in the next day or so.  It can take up to one week to get relief from the injection.  For post-procedure pain, over-the-counter non-aspirin analgesic is recommended.  3. Apply ice packs to the injection site intermittently for the next 24 hours (apply for 15 minutes, remove for 30 minutes, reapply ice, etc.)  Do not apply heat to this area for the next 24 hours.  4. Resume non-strenuous activity as prior to injection unless otherwise instructed by your physician.  5. If given local anesthesia with your procedure, you may experience warmth, tingling and/or numbness in your extremities following the injection.  6. It is important that you take extra time and precautions to prevent possible stumbling or falling, especially if you are taking medications that may cause drowsiness.  7. Do not take a tub bath for a few days after your procedure.  Showers are okay.  8. You should not drive for 24 hours after the procedure.  A ride home after the procedure is required.  If you received sedation, you must have a responsible adult to stay with you for 24 hours.  9. No alcohol for 24 hours if you receive sedation.  10. Do not make any important decisions or sign any legal " documents for 24 hours after receiving sedation.  11. If you experience any loss of bowel or bladder control, contact your physician.  12. Your physician will discuss follow-up arrangements with you.  If you  have any questions or problems please call your physician.      Dr. Mccord       [phone]  Dr. Mccormack      [phone]  Dr. Winston   [phone]  Dr. Pérez [phone]     Dr. White  [phone]  Dr. Robles      [phone]  Dr. Boyd   [phone]  Dr. Mcnamara  [phone]  Dr. Cardona           [phone]  Dr. Rees [phone]  Dr. Barry  [phone] Dr. Johnson   [phone]  Dr. Bolden        [phone]  Dr. Holder      [phone]  Dr. Zollinger [phone]  Other:___________    I hereby acknowledge that I understand and accept these instructions.                            Medication List      ASK your doctor about these medications        Instructions    ascorbic acid 500 MG Tabs   Commonly known as:  ascorbic acid    Take 500 mg by mouth as needed.   Dose:  500 mg       fluticasone 50 MCG/ACT nasal spray   Commonly known as:  FLONASE    Spray 2 Sprays in nose every day.   Dose:  2 Spray       GLUCOSAMINE COMPLEX PO    Take  by mouth every day.       loratadine 10 MG Tabs   Commonly known as:  CLARITIN    Take 10 mg by mouth as needed.   Dose:  10 mg       naproxen 500 MG Tabs   Commonly known as:  NAPROSYN    Take 500 mg by mouth 2 times a day, with meals.   Dose:  500 mg       penicillin v potassium 250 MG Tabs   Commonly known as:  VEETID    Take 250 mg by mouth 4 times a day.   Dose:  250 mg       therapeutic multivitamin-minerals Tabs    Take 1 Tab by mouth every day.   Dose:  1 Tab       tramadol 50 MG Tabs   Commonly known as:  ULTRAM    Take 1 Tab by mouth every 8 hours as needed.   Dose:  50 mg       TURMERIC PO    Take  by mouth.       vitamin D 1000 UNIT Tabs   Commonly known as:  cholecalciferol    Take 1,000 Units by mouth every day.   Dose:  1000 Units       vitamin e 400 UNIT Caps   Commonly known as:  VITAMIN E    Take 400  Units by mouth every day.   Dose:  400 Units               Medication Information     Above and/or attached are the medications your physician expects you to take upon discharge. Review all of your home medications and newly ordered medications with your doctor and/or pharmacist. Follow medication instructions as directed by your doctor and/or pharmacist. Please keep your medication list with you and share with your physician. Update the information when medications are discontinued, doses are changed, or new medications (including over-the-counter products) are added; and carry medication information at all times in the event of emergency situations.        Resources     Quit Smoking / Tobacco Use:    I understand the use of any tobacco products increases my chance of suffering from future heart disease or stroke and could cause other illnesses which may shorten my life. Quitting the use of tobacco products is the single most important thing I can do to improve my health. For further information on smoking / tobacco cessation call a Toll Free Quit Line at 1-983.658.5580 (*[employer] [place]) or 1-529.247.4466 (American [employer] Association) or you can access the web based program at www.[employer].org.    [place] Tobacco Users Help Line:  (622) 681-1208       Toll Free: 1-171.510.5045  Quit Tobacco Program [facility] (624)780-4304    Crisis Hotline:    [place] Crisis Hotline:  0-327-BKUTIKY or 1-903.166.8135    [place] Crisis Hotline:    1-277.439.3895 or 788-616-4958    Discharge Survey:   Thank you for choosing [facility]. We hope we did everything we could to make your hospital stay a pleasant one. You may be receiving a survey and we would appreciate your time and participation in answering the questions. Your input is very valuable to us in our efforts to improve our service to our patients and their families.            Signatures     My signature on this form indicates that:    1. I  acknowledge receipt and understanding of these Home Care Instruction.  2. My questions regarding this information have been answered to my satisfaction.  3. I have formulated a plan with my discharge nurse to obtain my prescribed medications for home.    __________________________________      __________________________________                   Patient Signature                                 Guardian/Responsible Adult Signature      __________________________________                 __________       ________                       Nurse Signature                                               Date                 Time

## 2017-03-25 NOTE — TELEPHONE ENCOUNTER
ANTHONY for [facility] to call me.   Medical records request faxed, contact info included in that fax for Dr Anderson's office.

## 2017-04-01 ENCOUNTER — HOSPITAL ENCOUNTER (OUTPATIENT)
Dept: LAB | Facility: MEDICAL CENTER | Age: 47
End: 2017-04-01
Attending: ALLERGY & IMMUNOLOGY
Payer: COMMERCIAL

## 2017-04-01 LAB
APPEARANCE UR: CLEAR
BASOPHILS # BLD AUTO: 0.06 K/UL (ref 0–0.12)
BASOPHILS NFR BLD AUTO: 1.1 % (ref 0–1.8)
BILIRUB UR QL STRIP.AUTO: NEGATIVE
COLOR UR AUTO: NORMAL
CRP SERPL HS-MCNC: 2.4 MG/L (ref 0–7.5)
EOSINOPHIL # BLD: 0.34 K/UL (ref 0–0.51)
EOSINOPHIL NFR BLD AUTO: 6.1 % (ref 0–6.9)
ERYTHROCYTE [DISTWIDTH] IN BLOOD BY AUTOMATED COUNT: 42.5 FL (ref 35.9–50)
ERYTHROCYTE [SEDIMENTATION RATE] IN BLOOD BY WESTERGREN METHOD: 29 MM/HOUR (ref 0–20)
GLUCOSE UR STRIP.AUTO-MCNC: NEGATIVE MG/DL
HCT VFR BLD AUTO: 38.9 % (ref 37–47)
HGB BLD-MCNC: 12.9 G/DL (ref 12–16)
IMM GRANULOCYTES # BLD AUTO: 0.01 K/UL (ref 0–0.11)
IMM GRANULOCYTES NFR BLD AUTO: 0.2 % (ref 0–0.9)
KETONES UR STRIP.AUTO-MCNC: NEGATIVE MG/DL
LDH SERPL L TO P-CCNC: 198 U/L (ref 107–266)
LEUKOCYTE ESTERASE UR QL STRIP.AUTO: NEGATIVE
LYMPHOCYTES # BLD: 1.64 K/UL (ref 1–4.8)
LYMPHOCYTES NFR BLD AUTO: 29.4 % (ref 22–41)
MCH RBC QN AUTO: 31.9 PG (ref 27–33)
MCHC RBC AUTO-ENTMCNC: 33.2 G/DL (ref 33.6–35)
MCV RBC AUTO: 96 FL (ref 81.4–97.8)
MICRO URNS: NORMAL
MONOCYTES # BLD: 0.52 K/UL (ref 0–0.85)
MONOCYTES NFR BLD AUTO: 9.3 % (ref 0–13.4)
NEUTROPHILS # BLD: 3 K/UL (ref 2–7.15)
NEUTROPHILS NFR BLD AUTO: 53.9 % (ref 44–72)
NITRITE UR QL STRIP.AUTO: NEGATIVE
NRBC # BLD AUTO: 0 K/UL
NRBC BLD-RTO: 0 /100 WBC
PH UR: 6 [PH]
PLATELET # BLD AUTO: 299 K/UL (ref 164–446)
PMV BLD AUTO: 10.4 FL (ref 9–12.9)
PROT UR QL STRIP: NEGATIVE MG/DL
RBC # BLD AUTO: 4.05 M/UL (ref 4.2–5.4)
RBC UR QL AUTO: NEGATIVE
SP GR UR STRIP.AUTO: 1.01
WBC # BLD AUTO: 5.6 K/UL (ref 4.8–10.8)

## 2017-04-01 PROCEDURE — 86215 DEOXYRIBONUCLEASE ANTIBODY: CPT

## 2017-04-01 PROCEDURE — 81003 URINALYSIS AUTO W/O SCOPE: CPT

## 2017-04-01 PROCEDURE — 82784 ASSAY IGA/IGD/IGG/IGM EACH: CPT | Mod: 91

## 2017-04-01 PROCEDURE — 85025 COMPLETE CBC W/AUTO DIFF WBC: CPT

## 2017-04-01 PROCEDURE — 82787 IGG 1 2 3 OR 4 EACH: CPT | Mod: 91

## 2017-04-01 PROCEDURE — 82232 ASSAY OF BETA-2 PROTEIN: CPT

## 2017-04-01 PROCEDURE — 86060 ANTISTREPTOLYSIN O TITER: CPT

## 2017-04-01 PROCEDURE — 84160 ASSAY OF PROTEIN ANY SOURCE: CPT

## 2017-04-01 PROCEDURE — 84156 ASSAY OF PROTEIN URINE: CPT

## 2017-04-01 PROCEDURE — 88184 FLOWCYTOMETRY/ TC 1 MARKER: CPT

## 2017-04-01 PROCEDURE — 85652 RBC SED RATE AUTOMATED: CPT

## 2017-04-01 PROCEDURE — 36415 COLL VENOUS BLD VENIPUNCTURE: CPT

## 2017-04-01 PROCEDURE — 83883 ASSAY NEPHELOMETRY NOT SPEC: CPT | Mod: 91

## 2017-04-01 PROCEDURE — 84165 PROTEIN E-PHORESIS SERUM: CPT

## 2017-04-01 PROCEDURE — 86317 IMMUNOASSAY INFECTIOUS AGENT: CPT | Mod: 91

## 2017-04-01 PROCEDURE — 88185 FLOWCYTOMETRY/TC ADD-ON: CPT | Mod: 91

## 2017-04-01 PROCEDURE — 82784 ASSAY IGA/IGD/IGG/IGM EACH: CPT

## 2017-04-01 PROCEDURE — 86141 C-REACTIVE PROTEIN HS: CPT

## 2017-04-01 PROCEDURE — 83615 LACTATE (LD) (LDH) ENZYME: CPT

## 2017-04-03 LAB
ALBUMIN 24H UR QL ELPH: DETECTED
ALBUMIN SERPL-MCNC: 4.16 G/DL (ref 3.75–5.01)
ALPHA1 GLOB 24H UR QL ELPH: ABNORMAL
ALPHA1 GLOB SERPL ELPH-MCNC: 0.32 G/DL (ref 0.19–0.46)
ALPHA2 GLOB 24H UR QL ELPH: ABNORMAL
ALPHA2 GLOB SERPL ELPH-MCNC: 0.7 G/DL (ref 0.48–1.05)
ANNOTATION COMMENT IMP: NORMAL
ASO AB SERPL-ACNC: 85 IU/ML (ref 0–330)
B-GLOBULIN SERPL ELPH-MCNC: 0.71 G/DL (ref 0.48–1.1)
B-GLOBULIN UR QL ELPH: ABNORMAL
B2 MICROGLOB SERPL-MCNC: 1.9 MG/L (ref 1.1–2.4)
CD19 CELLS NFR SPEC: 9 % (ref 6–23)
CD3 CELLS # BLD: 1379 CELLS/UL (ref 570–2400)
CD3 CELLS NFR SPEC: 76 % (ref 62–87)
CD3+CD4+ CELLS # BLD: 965 CELLS/UL (ref 430–1800)
CD3+CD4+ CELLS NFR BLD: 53 % (ref 32–64)
CD3+CD4+ CELLS/CD3+CD8+ CLL BLD: 2.41 RATIO (ref 0.8–3.9)
CD3+CD8+ CELLS # BLD: 400 CELLS/UL (ref 210–1200)
CD3+CD8+ CELLS NFR SPEC: 22 % (ref 15–46)
CD3-CD16+CD56+ CELLS # SPEC: 200 CELLS/UL (ref 78–470)
CD3-CD16+CD56+ CELLS NFR SPEC: 11 % (ref 4–26)
CELLS.CD3-CD19+ [#/VOLUME] IN BLOOD: 171 CELLS/UL (ref 91–610)
COLLECT DURATION TIME SPEC: ABNORMAL H
DEPRECATED S PNEUM 1 IGG SER-MCNC: 7.71 UG/ML
DEPRECATED S PNEUM12 IGG SER-MCNC: 6.84 UG/ML
DEPRECATED S PNEUM14 IGG SER-MCNC: 24.3 UG/ML
DEPRECATED S PNEUM19 IGG SER-MCNC: 5.57 UG/ML
DEPRECATED S PNEUM23 IGG SER-MCNC: 4.74 UG/ML
DEPRECATED S PNEUM3 IGG SER-MCNC: 1.91 UG/ML
DEPRECATED S PNEUM4 IGG SER-MCNC: 1.46 UG/ML
DEPRECATED S PNEUM5 IGG SER-MCNC: 6.97 UG/ML
DEPRECATED S PNEUM8 IGG SER-MCNC: 2.45 UG/ML
DEPRECATED S PNEUM9 IGG SER-MCNC: 5.89 UG/ML
EER PROT ELECT SER Q1092: NORMAL
GAMMA GLOB SERPL ELPH-MCNC: 0.72 G/DL (ref 0.62–1.51)
GAMMA GLOB UR QL ELPH: DETECTED
IGA SERPL-MCNC: 131 MG/DL (ref 68–408)
IGG1 SER-MCNC: 309 MG/DL (ref 240–1118)
IGG2 SER-MCNC: 232 MG/DL (ref 124–549)
IGG3 SER-MCNC: 28 MG/DL (ref 21–134)
IGG4 SER-MCNC: 29 MG/DL (ref 1–123)
IGM SERPL-MCNC: 74 MG/DL (ref 35–263)
INTERPRETATION SERPL IFE-IMP: NORMAL
INTERPRETATION UR IFE-IMP: ABNORMAL
KAPPA LC FREE UR-MCNC: 0.18 MG/DL (ref 0.14–2.42)
KAPPA LC FREE/LAMBDA FREE UR: 18 RATIO (ref 2.04–10.37)
LAMBDA LC FREE UR-MCNC: 0.01 MG/DL (ref 0.02–0.67)
PROT 24H UR-MRATE: ABNORMAL MG/D (ref 10–140)
PROT SERPL-MCNC: 6.6 G/DL (ref 6–8.3)
S PNEUM DA 18C IGG SER-MCNC: 44.43 UG/ML
S PNEUM DA 6B IGG SER-MCNC: >37.83 UG/ML
S PNEUM DA 7F IGG SER-MCNC: 23.13 UG/ML
S PNEUM DA 9V IGG SER-MCNC: 6.4 UG/ML
S PNEUM SEROTYPE IGG SER-IMP: NORMAL
TOTAL VOLUME 1105: ABNORMAL ML

## 2017-04-03 NOTE — OP REPORT
Surgeon: Farhan Rees DO    Assistants:  None    Preoperative diagnosis:  Lumbosacral spondylosis    Post operative diagnosis:  Lumbosacral spondylosis    Type of Sedation:  Local anesthesia and conscious sedation    Sedation time:  19 minutes    Site of Service: [facility]    Procedure:  1. Left L3 Medial Branch Nerve Radiofrequency Ablation Under Fluoroscopy  2. Left L4 Medial Branch Nerve Radiofrequency Ablation Under Fluoroscopy  3. Left L5 Dorsal Ramus Nerve Radiofrequency Ablation Under Fluoroscopy  4. Left S1 Lateral Branch Nerve Radiofrequency Ablation Under Fluoroscopy    After discussing risks, benefits, and alternatives to the procedure, the patient expressed understanding and wished to proceed.  The patient was brought into the fluoroscopy suite and placed in the prone position.  Procedural pause was conducted to verify: correct patient identity, procedure to be performed and as applicable, correct side and site, correct patient position, and availability of implants, special equipment or special instruments.      The skin was sterilely prepped and draped in the usual fashion using betadine times three in the region of the injections.      Left L3 Medial Branch Nerve Radiofrequency Ablation:  Using fluoroscopy, the junction of the L4 transverse process and superior articulating process was identified and marked.  The skin and subcutaneous tissues were anesthetized with 1% lidocaine using a 25 gauge 1.5 inch needle.  Using fluoroscopic guidance, a 100 mm SMK RF cannulus with a 10 mm active tip was then inserted down to the superior junction of the left L4 transverse process and the superior articulating process.  Sensory testing was performed at 50Hz up to 1 volt.  Motor testing was performed at 2Hz up to 3.5 volts.  With final needle positioning, there was no evidence of radicular stimulation.  Prior to lesioning, 1 cc of 1% lidocaine was injected.  Lesion was performed at  80 degrees Celcius for 120 seconds.  After lesioning a solution of 0.5 cc of 0.5% Bupivicaine and 0.5 cc of D50 was injected performing a chemical neurolysis.  Following the procedure the needle was withdrawn slightly and flushed with 1% Lidocaine as it was withdrawn.      Left L3 RF probe spot film:  A single A-P spot film was taken of the probe    placement which documented that the probe position was at the superior junction   of the base of the left L4 transverse process.    Left L4 Medial Branch Nerve Radiofrequency Ablation: Using fluoroscopy, the junction of the L5 transverse process and superior articulating process was identified and marked.  The skin and subcutaneous tissues were anesthetized with 1% lidocaine using a 25 gauge 1.5 inch needle.  Using fluoroscopic guidance, a 100 mm SMK RF cannulus with a 10 mm active tip was then inserted down to the superior junction of the left L5 transverse process and the superior articulating process.  Sensory testing was performed at 50Hz up to 1 volt.  Motor testing was performed at 2Hz up to 3.5 volts.  With final needle positioning, there was no evidence of radicular stimulation.  Prior to lesioning, 1 cc of 1% lidocaine was injected.  Lesion was performed at 80 degrees Celcius for 120 seconds.  After lesioning a solution of 0.5 cc of 0.5% Bupivicaine and 0.5 cc of D50 was injected performing a chemical neurolysis.   Following the procedure the needle was withdrawn slightly and flushed with 1% Lidocaine as it was withdrawn.      Left L4 RF probe spot film:  A single A-P spot film was taken of the probe    placement which documented that the probe position was at the superior junction   of the base of the left L5 transverse process.    Left L5 Dorsal Ramus Nerve Radiofrequency Ablation:  Using fluoroscopy, the junction of the left S1 superior articulating process and the sacral ala was identified and marked.  The skin and subcutaneous tissues were anesthetized with  1% lidocaine using a 25 gauge 1.5 inch needle.  Using fluoroscopic guidance, a 100 mm SMK RF cannulus with a 10 mm active tip was then inserted down to the superior junction of the left S1 superior articulating process and the sacral ala to ablate the left L5 dorsal ramus.  Sensory testing was performed at 50Hz up to 1 volt.  Motor testing was performed at 2Hz up to 3.5 volts.  With final needle positioning, there was no evidence of radicular stimulation.  Prior to lesioning, 1 cc of 1% lidocaine was injected.  Lesion was performed at 80 degrees Celcius for 120 seconds.  After lesioning a solution of 0.5 cc of 0.5% Bupivicaine and 0.5 cc of D50 was injected performing a chemical neurolysis.   A spot film was taken for documentation purposes.  Following the procedure the needle was withdrawn slightly and flushed with 1% Lidocaine as it was withdrawn.      Left L5 RF probe spot film:  A single A-P spot film was taken of the probe    placement which documented that the probe position was at the superior junction   of the left S1 superior articular process and the left sacral ala.    Left S1 Lateral Branch Nerve Radiofrequency Ablation: Using fluoroscopy, the left S1 foramen was identified and marked.  The skin and subcutaneous tissues were anesthetized with 1% lidocaine using a 25 gauge 1.5 inch needle.  Using fluoroscopic guidance, a 100 mm SMK RF cannulus with a 10 mm active tip was then inserted down to the superior and lateral corner of the left S1 foramen to ablate the left S1 ascending facet joint nerve.   Sensory testing was performed at 50Hz up to 1 volt.  Motor testing was performed at 2Hz up to 3.5 volts.  With final needle positioning, there was no evidence of radicular stimulation.  Prior to lesioning, 1 cc of 1% lidocaine was injected.  Lesion was performed at 80 degrees Celcius for 120 seconds.  After lesioning a solution of 0.5 cc of 0.5% Bupivicaine and 0.5 cc of D50 was injected performing a chemical  neurolysis.  A spot film was taken for documentation purposes.  Following the procedure the needle was withdrawn slightly and flushed with 1% Lidocaine as it was withdrawn.      Left S1 RF probe spot film:  A single A-P spot film was taken of the probe    placement which documented that the probe position was at the superior outer   quadrant of the left S1 foramen.      The patient tolerated the procedure well and there were no apparent complications.  After an appropriate amount of observation, the patient was dismissed from the clinic in good condition under their own power.    Complications:  None    Disposition:   1.  The patient was discharged to the recovery area in good condition.  2.  Discharge instructions were provided and explained.  3.  Patient was instructed to call with any questions or problems.  4.  Apply ice to the procedure site and keep clean and dry for 24 hours.  5.  Medications were reviewed for efficacy and appropriateness.  6.  Continue current medications.  7.  Return in 1 to 2 weeks for follow up.

## 2017-04-04 ENCOUNTER — HOSPITAL ENCOUNTER (OUTPATIENT)
Facility: MEDICAL CENTER | Age: 47
End: 2017-04-07
Attending: PHYSICAL MEDICINE & REHABILITATION | Admitting: PHYSICAL MEDICINE & REHABILITATION
Payer: COMMERCIAL

## 2017-04-04 ENCOUNTER — APPOINTMENT (OUTPATIENT)
Dept: ADMISSIONS | Facility: MEDICAL CENTER | Age: 47
End: 2017-04-04
Attending: PHYSICAL MEDICINE & REHABILITATION
Payer: COMMERCIAL

## 2017-04-04 LAB
C DIPHTHERIAE IGG SER-ACNC: 0.6 IU/ML
HAEM INFLU B IGG SER-MCNC: 1.4 UG/ML

## 2017-04-04 NOTE — IP AVS SNAPSHOT
" Home Care Instructions                                                                                                                Name:Janett Mcnulty  Medical Record Number:[id number]  CSN: [id number]    YOB: 1970   Age: 46 y.o.  Sex: female  HT:1.702 m (5' 7\") WT: 95.255 kg (210 lb)          Admit Date: 4/4/2017     Discharge Date:   Today's Date: 4/7/2017  Attending Doctor:  Farhan Rees D.O.                  Allergies:  Morphine and Other food                Discharge Instructions          HOME CARE INSTRUCTIONS      1. Resume usual diet and medications unless otherwise instructed by your physician.  2. You may experience tenderness in your low back/neck after the procedure for the next 24 hours.  The pain you have may worsen in the next day or so.  It can take up to one week to get relief from the injection.  For post-procedure pain, over-the-counter non-aspirin analgesic is recommended.  3. Apply ice packs to the injection site intermittently for the next 24 hours (apply for 15 minutes, remove for 30 minutes, reapply ice, etc.)  Do not apply heat to this area for the next 24 hours.  4. Resume non-strenuous activity as prior to injection unless otherwise instructed by your physician.  5. If given local anesthesia with your procedure, you may experience warmth, tingling and/or numbness in your extremities following the injection.  6. It is important that you take extra time and precautions to prevent possible stumbling or falling, especially if you are taking medications that may cause drowsiness.  7. Do not take a tub bath for a few days after your procedure.  Showers are okay.  8. You should not drive for 24 hours after the procedure.  A ride home after the procedure is required.  If you received sedation, you must have a responsible adult to stay with you for 24 hours.  9. No alcohol for 24 hours if you receive sedation.  10. Do not make any important decisions or sign any legal documents " for 24 hours after receiving sedation.  11. If you experience any loss of bowel or bladder control, contact your physician.  12. Your physician will discuss follow-up arrangements with you.  If you  have any questions or problems please call your physician.      Dr. Rees [phone]      I hereby acknowledge that I understand and accept these instructions.                            Medication List      ASK your doctor about these medications        Instructions    ascorbic acid 500 MG Tabs   Commonly known as:  ascorbic acid    Take 500 mg by mouth as needed.   Dose:  500 mg       fluticasone 50 MCG/ACT nasal spray   Commonly known as:  FLONASE    Spray 2 Sprays in nose every day.   Dose:  2 Spray       GLUCOSAMINE COMPLEX PO    Take  by mouth every day.       loratadine 10 MG Tabs   Commonly known as:  CLARITIN    Take 10 mg by mouth as needed.   Dose:  10 mg       naproxen 500 MG Tabs   Commonly known as:  NAPROSYN    Take 500 mg by mouth 2 times a day, with meals.   Dose:  500 mg       penicillin v potassium 250 MG Tabs   Commonly known as:  VEETID    Take 250 mg by mouth 4 times a day.   Dose:  250 mg       therapeutic multivitamin-minerals Tabs    Take 1 Tab by mouth every day.   Dose:  1 Tab       tramadol 50 MG Tabs   Commonly known as:  ULTRAM    Take 1 Tab by mouth every 8 hours as needed.   Dose:  50 mg       TURMERIC PO    Take  by mouth.       vitamin D 1000 UNIT Tabs   Commonly known as:  cholecalciferol    Take 1,000 Units by mouth every day.   Dose:  1000 Units       vitamin e 400 UNIT Caps   Commonly known as:  VITAMIN E    Take 400 Units by mouth every day.   Dose:  400 Units               Medication Information     Above and/or attached are the medications your physician expects you to take upon discharge. Review all of your home medications and newly ordered medications with your doctor and/or pharmacist. Follow medication instructions as directed by your doctor and/or pharmacist. Please keep  your medication list with you and share with your physician. Update the information when medications are discontinued, doses are changed, or new medications (including over-the-counter products) are added; and carry medication information at all times in the event of emergency situations.        Resources     Quit Smoking / Tobacco Use:    I understand the use of any tobacco products increases my chance of suffering from future heart disease or stroke and could cause other illnesses which may shorten my life. Quitting the use of tobacco products is the single most important thing I can do to improve my health. For further information on smoking / tobacco cessation call a Toll Free Quit Line at 1-116.371.6868 (*[employer] [place]) or 1-339.439.3004 (American [employer] Association) or you can access the web based program at www.lungusa.org.    [place] Tobacco Users Help Line:  (189) 780-1056       Toll Free: 1-494.910.3721  Quit Tobacco Program [facility] (257)306-6913    Crisis Hotline:    [place] Crisis Hotline:  5-471-QXLKBJT or 1-832.729.7359    [place] Crisis Hotline:    1-972.872.1450 or 260-655-0788    Discharge Survey:   Thank you for choosing [facility]. We hope we did everything we could to make your hospital stay a pleasant one. You may be receiving a survey and we would appreciate your time and participation in answering the questions. Your input is very valuable to us in our efforts to improve our service to our patients and their families.            Signatures     My signature on this form indicates that:    1. I acknowledge receipt and understanding of these Home Care Instruction.  2. My questions regarding this information have been answered to my satisfaction.  3. I have formulated a plan with my discharge nurse to obtain my prescribed medications for home.    __________________________________      __________________________________                   Patient Signature                                  Guardian/Responsible Adult Signature      __________________________________                 __________       ________                       Nurse Signature                                               Date                 Time

## 2017-04-05 LAB — STREP DNASE B TITR SER: <86 U/ML (ref 0–260)

## 2017-04-06 ENCOUNTER — HOSPITAL ENCOUNTER (OUTPATIENT)
Facility: MEDICAL CENTER | Age: 47
End: 2017-04-06
Attending: PHYSICAL MEDICINE & REHABILITATION | Admitting: PHYSICAL MEDICINE & REHABILITATION
Payer: COMMERCIAL

## 2017-04-07 ENCOUNTER — TELEPHONE (OUTPATIENT)
Dept: MEDICAL GROUP | Facility: MEDICAL CENTER | Age: 47
End: 2017-04-07

## 2017-04-07 VITALS
OXYGEN SATURATION: 95 % | HEART RATE: 79 BPM | DIASTOLIC BLOOD PRESSURE: 73 MMHG | SYSTOLIC BLOOD PRESSURE: 115 MMHG | RESPIRATION RATE: 14 BRPM | HEIGHT: 67 IN | TEMPERATURE: 97.2 F | WEIGHT: 210 LBS | BODY MASS INDEX: 32.96 KG/M2

## 2017-04-07 DIAGNOSIS — I00 RHEUMATIC FEVER: ICD-10-CM

## 2017-04-07 DIAGNOSIS — M87.00 AVASCULAR NECROSIS (HCC): ICD-10-CM

## 2017-04-07 DIAGNOSIS — M25.50 POLYARTHRALGIA: ICD-10-CM

## 2017-04-07 PROCEDURE — 64635 DESTROY LUMB/SAC FACET JNT: CPT | Performed by: PHYSICAL MEDICINE & REHABILITATION

## 2017-04-07 PROCEDURE — A6402 STERILE GAUZE <= 16 SQ IN: HCPCS | Performed by: PHYSICAL MEDICINE & REHABILITATION

## 2017-04-07 PROCEDURE — 99153 MOD SED SAME PHYS/QHP EA: CPT | Performed by: PHYSICAL MEDICINE & REHABILITATION

## 2017-04-07 PROCEDURE — 160048 HCHG OR STATISTICAL LEVEL 1-5: Performed by: PHYSICAL MEDICINE & REHABILITATION

## 2017-04-07 PROCEDURE — 64636 DESTROY L/S FACET JNT ADDL: CPT | Performed by: PHYSICAL MEDICINE & REHABILITATION

## 2017-04-07 PROCEDURE — 64640 INJECTION TREATMENT OF NERVE: CPT | Performed by: PHYSICAL MEDICINE & REHABILITATION

## 2017-04-07 PROCEDURE — 160002 HCHG RECOVERY MINUTES (STAT): Performed by: PHYSICAL MEDICINE & REHABILITATION

## 2017-04-07 PROCEDURE — 700111 HCHG RX REV CODE 636 W/ 250 OVERRIDE (IP): Performed by: PHYSICAL MEDICINE & REHABILITATION

## 2017-04-07 PROCEDURE — 99152 MOD SED SAME PHYS/QHP 5/>YRS: CPT | Performed by: PHYSICAL MEDICINE & REHABILITATION

## 2017-04-07 RX ORDER — SODIUM CHLORIDE, SODIUM LACTATE, POTASSIUM CHLORIDE, CALCIUM CHLORIDE 600; 310; 30; 20 MG/100ML; MG/100ML; MG/100ML; MG/100ML
500 INJECTION, SOLUTION INTRAVENOUS CONTINUOUS
Status: DISCONTINUED | OUTPATIENT
Start: 2017-04-07 | End: 2017-04-07 | Stop reason: HOSPADM

## 2017-04-07 RX ORDER — MIDAZOLAM HYDROCHLORIDE 1 MG/ML
INJECTION INTRAMUSCULAR; INTRAVENOUS
Status: DISCONTINUED | OUTPATIENT
Start: 2017-04-07 | End: 2017-04-07 | Stop reason: HOSPADM

## 2017-04-07 RX ORDER — LIDOCAINE HYDROCHLORIDE 10 MG/ML
INJECTION, SOLUTION EPIDURAL; INFILTRATION; INTRACAUDAL; PERINEURAL
Status: DISCONTINUED | OUTPATIENT
Start: 2017-04-07 | End: 2017-04-07 | Stop reason: HOSPADM

## 2017-04-07 RX ORDER — BUPIVACAINE HYDROCHLORIDE 5 MG/ML
INJECTION, SOLUTION EPIDURAL; INTRACAUDAL
Status: DISCONTINUED | OUTPATIENT
Start: 2017-04-07 | End: 2017-04-07 | Stop reason: HOSPADM

## 2017-04-07 ASSESSMENT — PAIN SCALES - GENERAL
PAINLEVEL_OUTOF10: 4
PAINLEVEL_OUTOF10: 1
PAINLEVEL_OUTOF10: 1

## 2017-04-07 NOTE — DISCHARGE INSTRUCTIONS
HOME CARE INSTRUCTIONS      1. Resume usual diet and medications unless otherwise instructed by your physician.  2. You may experience tenderness in your low back/neck after the procedure for the next 24 hours.  The pain you have may worsen in the next day or so.  It can take up to one week to get relief from the injection.  For post-procedure pain, over-the-counter non-aspirin analgesic is recommended.  3. Apply ice packs to the injection site intermittently for the next 24 hours (apply for 15 minutes, remove for 30 minutes, reapply ice, etc.)  Do not apply heat to this area for the next 24 hours.  4. Resume non-strenuous activity as prior to injection unless otherwise instructed by your physician.  5. If given local anesthesia with your procedure, you may experience warmth, tingling and/or numbness in your extremities following the injection.  6. It is important that you take extra time and precautions to prevent possible stumbling or falling, especially if you are taking medications that may cause drowsiness.  7. Do not take a tub bath for a few days after your procedure.  Showers are okay.  8. You should not drive for 24 hours after the procedure.  A ride home after the procedure is required.  If you received sedation, you must have a responsible adult to stay with you for 24 hours.  9. No alcohol for 24 hours if you receive sedation.  10. Do not make any important decisions or sign any legal documents for 24 hours after receiving sedation.  11. If you experience any loss of bowel or bladder control, contact your physician.  12. Your physician will discuss follow-up arrangements with you.  If you  have any questions or problems please call your physician.      Dr. Rees [phone]      I hereby acknowledge that I understand and accept these instructions.

## 2017-04-07 NOTE — OR NURSING
Pt arrived from pain procedure room with RN present. Pt denies numbness/tingling in LE bilaterally. No reports of pain or nausea. Unlabored breathing. VSS. Dressing clean, dry, intact. No drainage from injection sites.

## 2017-04-07 NOTE — OP REPORT
Surgeon: Farhan Rees DO    Assistants:  None    Preoperative diagnosis:  Lumbosacral spondylosis    Post operative diagnosis:  Lumboscacral spondylosis    Type of Sedation:  Local anesthesia and conscious sedation    Sedation time:    Site of Service: [facility]     Procedure:  1. Right L3 Medial Branch Nerve Radiofrequency Ablation Under Fluoroscopy  2. Right L4 Medial Branch Nerve Radiofrequency Ablation Under Fluoroscopy  3. Right L5 Dorsal Ramus Nerve Radiofrequency Ablation Under Fluoroscopy  4. Right S1 Lateral Branch Nerve Radiofrequency Ablation Under Fluoroscopy    After discussing risks, benefits, and alternatives to the procedure, the patient expressed understanding and wished to proceed.  The patient was brought into the fluoroscopy suite and placed in the prone position.  Procedural pause was conducted to verify: correct patient identity, procedure to be performed and as applicable, correct side and site, correct patient position, and availability of implants, special equipment or special instruments.      The skin was sterilely prepped and draped in the usual fashion using betadine times three in the region of the injections.      Right L3 Medial Branch Nerve Radiofrequency Ablation:  Using fluoroscopy, the junction of the L4 transverse process and superior articulating process was identified and marked.  The skin and subcutaneous tissues were anesthetized with 1% lidocaine using a 25 gauge 1.5 inch needle.  Using fluoroscopic guidance, a 100 mm SMK RF cannulus with a 10 mm active tip was then inserted down to the superior junction of the right L4 transverse process and the superior articulating process.  Sensory testing was performed at 50Hz up to 1 volt.  Motor testing was performed at 2Hz up to 3.5 volts.  With final needle positioning, there was no evidence of radicular stimulation.  Prior to lesioning, 1 cc of 1% lidocaine was injected.  Lesion was performed at 80 degrees  Celcius for 120 seconds.  After lesioning a solution of 0.5 cc of 0.5% Bupivicaine and 0.5 cc of D50 was injected performing a chemical neurolysis.  Then a solution of 0.5 cc of 0.5% Bupivicaine and 0.5 cc of Celestone (6mg/ml) was injected.  A spot film was taken for documentation purposes.  Following the procedure the needle was withdrawn slightly and flushed with 1% Lidocaine as it was withdrawn.       Right L3 RF probe spot film:  A single A-P spot film was taken of the probe    placement which documented that the probe position was at the superior junction   of the base of the right L4 transverse process.    Right L4 Medial Branch Nerve Radiofrequency Ablation: Using fluoroscopy, the junction of the L5 transverse process and superior articulating process was identified and marked.  The skin and subcutaneous tissues were anesthetized with 1% lidocaine using a 25 gauge 1.5 inch needle.  Using fluoroscopic guidance, a 100 mm SMK RF cannulus with a 10 mm active tip was then inserted down to the superior junction of the right L5 transverse process and the superior articulating process.  Sensory testing was performed at 50Hz up to 1 volt.  Motor testing was performed at 2Hz up to 3.5 volts.  With final needle positioning, there was no evidence of radicular stimulation.  Prior to lesioning, 1 cc of 1% lidocaine was injected.  Lesion was performed at 80 degrees Celcius for 120 seconds.  After lesioning a solution of 0.5 cc of 0.5% Bupivicaine and 0.5 cc of D50 was injected performing a chemical neurolysis.  Then a solution of 0.5 cc of 0.5% Bupivicaine and 0.5 cc of Celestone (6mg/ml) was injected.  A spot film was taken for documentation purposes.  Following the procedure the needle was withdrawn slightly and flushed with 1% Lidocaine as it was withdrawn.    Right L4 RF probe spot film:  A single A-P spot film was taken of the probe    placement which documented that the probe position was at the superior  junction   of the base of the right L5 transverse process.    Right L5 Dorsal Ramus Nerve Radiofrequency Ablation:  Using fluoroscopy, the junction of the right S1 superior articulating process and the sacral ala was identified and marked.  The skin and subcutaneous tissues were anesthetized with 1% lidocaine using a 25 gauge 1.5 inch needle.  Using fluoroscopic guidance, a 100 mm SMK RF cannulus with a 10 mm active tip was then inserted down to the superior junction of the right S1 superior articulating process and the sacral ala to ablate the right L5 dorsal ramus.  Sensory testing was performed at 50Hz up to 1 volt.  Motor testing was performed at 2Hz up to 3.5 volts.  With final needle positioning, there was no evidence of radicular stimulation.  Prior to lesioning, 1 cc of 1% lidocaine was injected.  Lesion was performed at 80 degrees Celcius for 120 seconds.  After lesioning a solution of 0.5 cc of 0.5% Bupivicaine and 0.5 cc of D50 was injected performing a chemical neurolysis.  Then a solution of 0.5 cc of 0.5% Bupivicaine and 0.5 cc of Celestone (6mg/ml) was injected.  A spot film was taken for documentation purposes.  Following the procedure the needle was withdrawn slightly and flushed with 1% Lidocaine as it was withdrawn.      Right L5 RF probe spot film:  A single A-P spot film was taken of the probe    placement which documented that the probe position was at the superior junction   of the right S1 superior articular process and the right sacral ala.    Right S1 Lateral Branch Nerve Radiofrequency Ablation: Using fluoroscopy, the right S1 foramen was identified and marked.  The skin and subcutaneous tissues were anesthetized with 1% lidocaine using a 25 gauge 1.5 inch needle.  Using fluoroscopic guidance, a 100 mm SMK RF cannulus with a 10 mm active tip was then inserted down to the superior and lateral corner of the right S1 foramen to ablate the right S1 ascending facet joint nerve.   Sensory  testing was performed at 50Hz up to 1 volt.  Motor testing was performed at 2Hz up to 3.5 volts.  With final needle positioning, there was no evidence of radicular stimulation.  Prior to lesioning, 1 cc of 1% lidocaine was injected.  Lesion was performed at 80 degrees Celcius for 120 seconds.  After lesioning a solution of 0.5 cc of 0.5% Bupivicaine and 0.5 cc of D50 was injected performing a chemical neurolysis.  Then a solution of 0.5 cc of 0.5% Bupivicaine and 0.5 cc of Celestone (6mg/ml) was injected.  A spot film was taken for documentation purposes.  Following the procedure the needle was withdrawn slightly and flushed with 1% Lidocaine as it was withdrawn.      Right S1 RF probe spot film:  A single A-P spot film was taken of the probe    placement which documented that the probe position was at the superior outer   quadrant of the right S1 foramen.    The patient tolerated the procedure well and there were no apparent complications.  After an appropriate amount of observation, the patient was dismissed from the clinic in good condition under their own power.    Complications:  None    Disposition:   1.  The patient was discharged to the recovery area in good condition.  2.  Discharge instructions were provided and explained.  3.  Patient was instructed to call with any questions or problems.  4.  Apply ice to the procedure site and keep clean and dry for 24 hours.  5.  Medications were reviewed for efficacy and appropriateness.  6.  Continue current medications.  7.  Return in 1 to 2 weeks for follow up.

## 2017-04-11 ENCOUNTER — APPOINTMENT (OUTPATIENT)
Dept: RHEUMATOLOGY | Facility: PHYSICIAN GROUP | Age: 47
End: 2017-04-11
Payer: COMMERCIAL

## 2017-04-19 PROBLEM — M47.816 LUMBAR SPONDYLOSIS: Status: RESOLVED | Noted: 2017-03-24 | Resolved: 2017-04-19

## 2017-04-21 ENCOUNTER — OFFICE VISIT (OUTPATIENT)
Dept: MEDICAL GROUP | Facility: MEDICAL CENTER | Age: 47
End: 2017-04-21
Payer: COMMERCIAL

## 2017-04-21 VITALS
HEART RATE: 91 BPM | HEIGHT: 66 IN | BODY MASS INDEX: 33.59 KG/M2 | OXYGEN SATURATION: 95 % | WEIGHT: 209 LBS | SYSTOLIC BLOOD PRESSURE: 112 MMHG | TEMPERATURE: 97.1 F | DIASTOLIC BLOOD PRESSURE: 70 MMHG

## 2017-04-21 DIAGNOSIS — Z86.79 HISTORY OF RHEUMATIC FEVER: Chronic | ICD-10-CM

## 2017-04-21 DIAGNOSIS — M87.00 AVASCULAR NECROSIS (HCC): Chronic | ICD-10-CM

## 2017-04-21 DIAGNOSIS — M54.5 LOW BACK PAIN, UNSPECIFIED BACK PAIN LATERALITY, UNSPECIFIED CHRONICITY, WITH SCIATICA PRESENCE UNSPECIFIED: Chronic | ICD-10-CM

## 2017-04-21 PROCEDURE — 99214 OFFICE O/P EST MOD 30 MIN: CPT | Performed by: INTERNAL MEDICINE

## 2017-04-21 NOTE — MR AVS SNAPSHOT
"        Janett Rajni Page   2017 11:00 AM   Office Visit   MRN: [id number]    Department:  [facility]   Dept Phone:  [phone]    Description:  Female : 1970   Provider:  Franklin Gonzalez M.D.           Allergies as of 2017     Allergen Noted Reactions    Morphine 2008   Rash    Other Food 2013   Rash    Onions-cause headaches and facial flushing      Vital Signs     Blood Pressure Pulse Temperature Height Weight Body Mass Index    112/70 mmHg 91 36.2 °C (97.1 °F) 1.676 m (5' 6\") 94.802 kg (209 lb) 33.75 kg/m2    Oxygen Saturation Smoking Status                95% Never Smoker           Basic Information     Date Of Birth Sex Race Ethnicity Preferred Language    1970 Female White Non- English      Your appointments     2017  5:30 PM   Follow Up Visit with Starr Jackson M.D.   [facility] (--)    [address], [address]  [place] [address] [phone]           You will be receiving a confirmation call a few days before your appointment from our automated call confirmation system.            2017 11:00 AM   Established Patient with Franklin Gonzalez M.D.   [facility] ([facility])    [address]  [place] [address] [phone]           You will be receiving a confirmation call a few days before your appointment from our automated call confirmation system.              Problem List              ICD-10-CM Priority Class Noted - Resolved    S/P appendectomy Z90.49   2011 - Present    Left hip revision  M21.959   2011 - Present    Left ankle surgery M25.572   2011 - Present    Right hip arthroscopy  M25.551   2011 - Present    Low back pain (Chronic) M54.5   2011 - Present    Cervical pain (neck) M54.2   2011 - Present    IBS (irritable bowel syndrome) K58.9   2011 - Present    Preventative health care (Chronic) Z00.00   2011 - Present    Varicose vein I86.8 "   10/7/2011 - Present    S/P bunionectomy Z98.890   6/7/2013 - Present    Allergic rhinitis due to animal hair and dander (Chronic) J30.81   9/12/2013 - Present    Dyslipidemia E78.5   9/18/2015 - Present    Perimenopausal (Chronic) N95.1   9/25/2015 - Present    Foot pain, right (Chronic) M79.671   9/30/2015 - Present    Obesity E66.9   9/16/2016 - Present    History of rheumatic fever (Chronic) Z86.79   9/16/2016 - Present    Avascular necrosis (CMS-HCC) (Chronic) M87.00   2/12/2017 - Present      Health Maintenance        Date Due Completion Dates    MAMMOGRAM 10/26/2017 10/26/2016, 9/15/2015, 9/8/2014, 8/29/2013, 4/22/2004    PAP SMEAR 10/7/2018 10/7/2015    IMM DTaP/Tdap/Td Vaccine (2 - Td) 9/25/2025 9/25/2015            Current Immunizations     Influenza TIV (IM) 12/9/2013, 1/9/2013    Influenza Vaccine Quad Inj (Preserved) 12/13/2016, 10/20/2015  2:00 AM, 11/19/2014    Pneumococcal polysaccharide vaccine (PPSV-23) 1/6/2017 11:10 AM    Tdap Vaccine 9/25/2015 11:06 AM    Tuberculin Skin Test 5/5/2014  1:40 PM, 6/3/2013  1:20 PM, 6/4/2012 12:30 PM, 7/1/2011      Below and/or attached are the medications your provider expects you to take. Review all of your home medications and newly ordered medications with your provider and/or pharmacist. Follow medication instructions as directed by your provider and/or pharmacist. Please keep your medication list with you and share with your provider. Update the information when medications are discontinued, doses are changed, or new medications (including over-the-counter products) are added; and carry medication information at all times in the event of emergency situations     Allergies:  MORPHINE - Rash     OTHER FOOD - Rash               Medications  Valid as of: April 21, 2017 - 11:52 AM    Generic Name Brand Name Tablet Size Instructions for use    Ascorbic Acid (Tab) ascorbic acid 500 MG Take 500 mg by mouth as needed.        [place]-Glucosamine-Vit D   Take  by  mouth every day.        Cholecalciferol (Tab) cholecalciferol 1000 UNIT Take 1,000 Units by mouth every day.        Fluticasone Propionate (Suspension) FLONASE 50 MCG/ACT Spray 2 Sprays in nose every day.        Loratadine (Tab) CLARITIN 10 MG Take 10 mg by mouth as needed.        Multiple Vitamins-Minerals (Tab) THERAGRAN-M  Take 1 Tab by mouth every day.        Naproxen (Tab) NAPROSYN 500 MG Take 500 mg by mouth 2 times a day, with meals.        Penicillin V Potassium (Tab) VEETID 250 MG Take 250 mg by mouth 4 times a day.        TraMADol HCl (Tab) ULTRAM 50 MG Take 1 Tab by mouth every 8 hours as needed.        Turmeric   Take  by mouth.        Vitamin E (Cap) VITAMIN E 400 UNIT Take 400 Units by mouth every day.        .                 Medicines prescribed today were sent to:     [id number] - [place], [place] - [address] [address]    [id number] [place] [address]    Phone: [phone] Fax: [phone]    Open 24 Hours?: No      Medication refill instructions:       If your prescription bottle indicates you have medication refills left, it is not necessary to call your provider’s office. Please contact your pharmacy and they will refill your medication.    If your prescription bottle indicates you do not have any refills left, you may request refills at any time through one of the following ways: The online [employer] system (except Urgent Care), by calling your provider’s office, or by asking your pharmacy to contact your provider’s office with a refill request. Medication refills are processed only during regular business hours and may not be available until the next business day. Your provider may request additional information or to have a follow-up visit with you prior to refilling your medication.   *Please Note: Medication refills are assigned a new Rx number when refilled electronically. Your pharmacy may indicate that no refills were authorized even though a new prescription for the same  medication is available at the pharmacy. Please request the medicine by name with the pharmacy before contacting your provider for a refill.        Other Notes About Your Plan       3/6/17 anticardiolipin antibodies, lupus anticoagulant, protein CNS, factor V 5 Leyden, anti-thrombin panel, beta 2 glycoprotein negative  3/10/17 ESR 19,CRP 0.17, cortisol 3.3, ACTH 16, IgG 753 ([phone]), IgA 140, IgM 93  3/14/16  rheumatology note currently off prednisone, osteonecrosis involving multiple joints, knees, hips, right ankle, etiology unclear, antiphospholipid antibody, protein C and S normal, antithrombin III factor V leyden normal, refer to hematology for evaluation other etiologies, slightly decreased IgG refer to allergy immunology  4/17/17  allergy note slight IgG reduction at 753 with normal IgA, normal IgM, no history to suggest primary immunodeficiency, 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

## 2017-04-21 NOTE — PROGRESS NOTES
Subjective:      Janett Mcnulty is a 46 y.o. female who presents with joint pain          HPI  Did see [facility] specialist  and is being referred to a [place] bone endocrinologist we do not have the records from [facility] that, patient also has been seen by orthopedics locally  yesterday, we do not have those records.  Avascular necrosis bilateral hips, knees, left ankle, wrists contributing to significant joint pain.  Previously the pain has been worse.  Recently she has had more hip pain bilateral worse with prolonged sitting and then will need to get up to walk because of the pain, able to walk 15 minutes to 30 minutes at a time before her hips will bother her or her knees will bother her, also will have discomfort at night.  Tramadol is beneficial at night one tablet without daytime drowsiness or sedation.  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   Left hip is worse than right side no significant swelling. Knees will also bother her at times as well, not as intense pain as the hips, worse with sitting for a long time, no falls. Insomnia will make joint pain worse next day, wrists will bother her at ×2.  Does notice some left ankle swelling at the end of the day.  Has a previous ultrasound negative for deep venous thrombosis.  Still does aqua classes friday through sunday 30 to 60 minutes , strength in a healthy diet.  Limit sweets and candies.  No tobacco  Needs FMLA form completed for work, typically works 4 days a week, but needs 1-2 days off from work due to chronic pain, also needs to leave work on occasion one to 2 hours prior end of the day for various appointments  Denies depression, good social support with           Current Outpatient Prescriptions   Medication Sig Dispense Refill   • Boswellia-Glucosamine-Vit D (GLUCOSAMINE COMPLEX PO) Take  by mouth every day.     • tramadol (ULTRAM) 50 MG Tab Take 1 Tab by mouth every 8 hours as  needed. 90 Tab 3   • penicillin v potassium (VEETID) 250 MG Tab Take 250 mg by mouth 4 times a day.     • ascorbic acid (ASCORBIC ACID) 500 MG Tab Take 500 mg by mouth as needed.     • naproxen (NAPROSYN) 500 MG Tab Take 500 mg by mouth 2 times a day, with meals.     • TURMERIC PO Take  by mouth.     • therapeutic multivitamin-minerals (THERAGRAN-M) Tab Take 1 Tab by mouth every day.     • vitamin D (CHOLECALCIFEROL) 1000 UNIT Tab Take 1,000 Units by mouth every day.     • fluticasone (FLONASE) 50 MCG/ACT nasal spray Spray 2 Sprays in nose every day. (Patient taking differently: Spray 2 Sprays in nose as needed.) 16 g 3   • vitamin e (VITAMIN E) 400 UNIT CAPS Take 400 Units by mouth every day.     • loratadine (CLARITIN) 10 MG TABS Take 10 mg by mouth as needed.       No current facility-administered medications for this visit.     Patient Active Problem List    Diagnosis Date Noted   • Avascular necrosis (CMS-HCC) 02/12/2017   • Obesity 09/16/2016   • History of rheumatic fever 09/16/2016   • Foot pain, right 09/30/2015   • Perimenopausal 09/25/2015   • Dyslipidemia 09/18/2015   • Allergic rhinitis due to animal hair and dander 09/12/2013   • S/P bunionectomy 06/07/2013   • Varicose vein 10/07/2011   • S/P appendectomy 06/01/2011   • Left hip revision 2002 06/01/2011   • Left ankle surgery 06/01/2011   • Right hip arthroscopy  06/01/2011   • Low back pain 06/01/2011   • Cervical pain (neck) 06/01/2011   • IBS (irritable bowel syndrome) 06/01/2011   • Preventative health care 06/01/2011         Varicose vein excision left lower extremity    Status post bunionectomy  8/9/13  podiatry; right foot martín bunionectomy, right foot first metatarsophalangeal joint bunion/degenerative arthritis     Status post appendectomy    Right hip arthroscopy  2007  ortho right hip arthroscopy  2/11/14  note bilateral greater trochanter injection under ultrasound guidance  1/20/17 MRI pelvis bilateral  femoral head avascular necrosis involving approximately 40% of the articular surface, metallic artifact left femoral head consistent with presence of small metallic pain    Preventative health  11/05 [facility] colon negative  9/25/15 tdap  10/7/15 pap per gyn  negative  9/29/16 chol 190,trig 52,hdl 65,ldl 115  10/110/16 vit d 31  10/26/16 mammogram  1/6/17 pneumovax    Perimenopausal  9/25/15 on climara patch and progesterone tab per  gyn    Low back pain  10/10 MRI LS L5-S1 minimal disc bulge  1/11  at the pain management left L3-S1 facet joint injection, right L3-S1 facet joint injection  2/11  pain management right L3-S1 lateral, medial, dorsal ramus nerve block  4/11  pain management left L3-S1 medial, lateral, bursal ramus nerve block  5/11  pain management bilateral SI joint injection under fluoroscopy  7/11  pain note bilat trochanteric bursitis injection  3/12  pain note, right and left L3-L4 L5-S1 medial, dorsal, lateral branch ablation  12/12  pain note, left L3-S1 dorsal and medial branch radiofrequency ablation  1/4/13  pain note; right L3-S1 radiofrequency ablation  8/14/13  pain note  9/29/13  pain note, left L3-S1 nerve frequency ablation  10/4/13  pain note, status post right L3-S1 FJNA  12/19/13 pain note; status post right SIJI injection, continue voltaren gel, TENS, IT stretches  5/16/14  pain note; left L3-S1 medial, partial, lateral branch radiofrequency ablation under fluoroscopy  6/6/14  right L3-S1 FJNA  7/3/14  pain note; continue TENS,celebrex 200 mg qday, voltaren gel 1%  9/10/14  pain note; continue TENS, lidoderm patch,voltaren gel, celebrex 200 mg qday    Left hip revision  History of left hip open decompression and osteotomy  12/03  left hip implant removal  11/10   ortho x-ray stable decompression left femoral head neck junction  11/12  pain management note bilateral trochanteric bursitis injection  5/2/13  pain note, bilateral trochanteric bursal injection    Left ankle surgery 2006 ligament reconstruction    IBS    History rheumatic fever  8/29/16 streptococcal pharyngitis positive strep test, treated with augmentin, developed erythema nodosum lower extremities and palms given NSAIDs and medrol dosepack  9/22/16 repeat medrol dose pack x 1 still with painful erythema nodosum nodules  9/24/16 erythema nodosum nodules and arthritic-type pains, we will retreat with penicillin V 500 mg 3 times a day ×10 days  10/5/16 high dose aspirin no benefit, changed to prednisone 20 mg daily, she has an appointment with me in 2 days  10/7/16 echo ordered, EKG done  10/10/16 cbc,cmp,tsh normal,ESR 40,CRP 0.3, repeat throat culture negative, blood cultures negative,  10/19/16 increase prednisone to 40 mg x 3 days then back to 20 mg  10/19/16 daughter diagnosis strep throat given antibiotics, we will treat prophylactically provided patient penicillin V 500 mg tid x 10 days  10/21/16 echo normal LV size and function, EF 65%, trace MR structurally normal mitral valve, mild TR and RVSP 30  10/24/16 on prednisone 40 mg qday unable to taper to 20 mg due to flare up of pain will try 20 mg bid then taper to 20 mg am and 10 mg qpm over the next week  10/26/16  infectious disease recommended secondary prophylaxis penicillin  mg bid x 5 years  11/7/16 still with polyarthritis, on prednisone 20 mg twice a day, repeat labs, still on penicillin, will speak with rheumatology about referral  11/8/16 ESR 15,CRP 0.3,wbc 10.9 (on prednisone),hgb 14,hct 43,cmp normal,RF,C3C4,TPO,lyme,MERA,HLA B27 negative, Factin IgG 22 (0-19),parietal cell Ab 25(0-25)  11/14/16  rheumatolog note, features consistent with rheumatic fever, also consider seronegative  spondyloarthropathies, sarcoidosis, rheumatoid arthritis, will check sacroiliac films, hand and feet x-rays, follow-up one week  11/17/16 refill prednisone  11/18/16 ACE serum level normal, G6PD ad vitamin 1,25 d level normal  11/21/16 cxr negative  11/21/16 xray SI joints negative for erosions  11/29/16 x-ray joint survey hands, feet, ankles no evidence of inflammatory arthropathy  11/29/16 CT soft tissue neck without; negative  12/7/16  cardiology repeat echo in 3-4 weeks  12/10/16 MRI right knee multiple areas right knee avascular necrosis medial and lateral femoral condyle, medial and lateral tibial plateau, bone marrow edema  12/23/16  rheumatology note, inflammatory markers did normalize with steroids, rheumatoid factor, CCP, MERA,ANKA negative continue to taper steroids given osteonecrosis alternating 17.5 mg and 15 mg, and 15 mg, and 15 mg alternating with 12.5 mg, and so forth  12/27/16 echo LV ejection fraction 60%, no valvular disease  1/4/17 ESR 8,CRP 0.3  1/12/17  rheumatology note, inflammatory markers did normalize with prednisone therapy, continue taper with osteonecrosis bilateral tenderness achilles insertion consider enthesitis, will get MRI left achilles region to evaluate for tendinitis or tenosynovitis, follow-up 6 weeks  1/20/17 MRI left foot without; no evidence of marrow edema, arthropathy, tendinopathy or ligament injury  3/6/17 anticardiolipin antibodies, lupus anticoagulant, protein CNS, factor V 5 Leyden, anti-thrombin panel, beta 2 glycoprotein negative  3/10/17 ESR 19,CRP 0.17, cortisol 3.3, ACTH 16, IgG 753 ([id number]), IgA 140, IgM 93  3/14/16  rheumatology note currently off prednisone, osteonecrosis involving multiple joints, knees, hips, right ankle, etiology unclear, antiphospholipid antibody, protein C and S normal, antithrombin III factor V leyden normal, refer to hematology for evaluation other etiologies, slightly decreased IgG refer to allergy  immunology  3/14/17 remains on penicillin  3/30/17  allergy immunology evaluation slight IgG reduction 753 with normal IgG, IgM, no history to suggest primary immunodeficiency, recent diagnosis of group A streptococcal pharyngitis complicated by erythema nodosum, 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 therapy, we will perform humerol workup to include repeat quantitative immunoglobulins with subclass, specific antibodies for haemophilus influenza, pneumococcal, tetanus/diphtheria, limited flow cytometry with repeat CBC, SPEP/UPEP, ESR, CRP, UA, follow-up ASO, DNase B titer  4/17/17  allergy note slight IgG reduction at 753 with normal IgA, normal IgM, no history to suggest primary immunodeficiency, suspect that 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 carryover effect from chronic prednisone therapy, cbc unremarkable, inflammatory markers ESR slightly elevated 28, CRP 2.4, urinalysis negative for protein or blood, no further humeral immunodeficiency or lab assessment at this point with normal immunologic titers, intact specific and subclass antibodies, the IgG decrease does not represent any underlying primary immunodeficiency or active antibody dysfunction at this time    Foot pain  8/5/15 MRI right foot severe artifact secondary to first MTP are clear, limiting analysis of soft tissue, highly likely complete FHL tear  8/28/15  orthopedic note right ankle FHL tendon rupture seen on MRI, do not recommend surgical repair at this time which would involve hemiarthroplasty, implant, first MTP fusion with bone graft, followup podiatry     Dyslipidemia  9/29/14 chol 186,trig 46,hdl 76,ldl 101  9/18/15 chol 225,trig 50,hdl 77,ldl 138    Cervical pain  10/11 MRI cervical spine C4-C5 tiny disc bulge, C5-C6 bilateral and plate spurring with uncinate hypertrophy  10/7/16 MRI cervical spine C5-C6 small broad-based osteophyte complex, borderline foraminal stenosis, no significant progression  of disease since 2010    avascular necrosis    8/29/16 streptococcal pharyngitis positive strep test, treated with augmentin, developed erythema nodosum lower extremities and palms given NSAIDs and medrol dosepack  9/22/16 repeat medrol dose pack x 1 still with painful erythema nodosum nodules  10/5/16 high dose aspirin no benefit, changed to prednisone 20 mg daily,  10/19/16 increase prednisone to 40 mg x 3 days then back to 20 mg  11/7/16 still with polyarthritis, on prednisone 20 mg twice a day, repeat labs, still on penicillin, will speak with rheumatology about referral  11/14/16  rheumatolog note, features consistent with rheumatic fever, also consider seronegative spondyloarthropathies, sarcoidosis, rheumatoid arthritis, will check sacroiliac films, hand and feet x-rays, follow-up one week  12/23/16  rheumatology note, inflammatory markers did normalize with steroids, rheumatoid factor, CCP, MERA,ANKA negative continue to taper steroids given osteonecrosis alternating 17.5 mg and 15 mg, and 15 mg, and 15 mg alternating with 12.5 mg, and so forth  12/10/16 MRI right knee multiple areas right knee avascular necrosis medial and lateral femoral condyle, medial and lateral tibial plateau, bone marrow edema  12/23/16  rheumatology note, inflammatory markers did normalize with steroids, rheumatoid factor, CCP, MERA,ANKA negative continue to taper steroids given osteonecrosis alternating 17.5 mg and 15 mg, and 15 mg, and 15 mg alternating with 12.5 mg, and so forth  1/20/17 MRI left foot no evidence of arthropathy or tendinopathy  1/20/17 MRI left ankle metallic artifact lateral malleolus  1/20/17 MRI right ankle small oblong focus finger edema tibial plafond below subchondral plate, would be consistent with small area of avascular necrosis  1/20/17 MRI left knee multiple areas avascular necrosis surrounding the knee to include femur, tibia, fibula  1/20/17 MRI pelvis bilateral femoral head  avascular necrosis involving approximately 40% of the articular surface, metallic artifact left femoral head consistent with presence of small metallic pain  2/9/17 tapering prednisone down to 2.5 mg alternating with 5 mg  2/23/17  rheumatology note complete steroid taper then repeat ESR, CRP and CPK, also check antiphospholipid antibody,antithrombin, factor v leiden, protein s  3/6/17 anticardiolipin antibodies, lupus anticoagulant, protein CNS, factor V 5 Leyden, anti-thrombin panel, beta 2 glycoprotein negative  3/10/17 ESR 19,CRP 0.17, cortisol 3.3, ACTH 16, IgG 753 ([id number]), IgA 140, IgM 93  3/14/16  rheumatology note currently off prednisone, osteonecrosis involving multiple joints, knees, hips, right ankle, etiology unclear, antiphospholipid antibody, protein C and S normal, antithrombin III factor V leyden normal, refer to hematology for evaluation other etiologies, slightly decreased IgG refer to allergy immunology  3/30/17  allergy immunology evaluation slight IgG reduction 753 with normal IgG, IgM, no history to suggest primary immunodeficiency, recent diagnosis of group A streptococcal pharyngitis complicated by erythema nodosum, 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 therapy, we will perform humerol workup to include repeat quantitative immunoglobulins with subclass, specific antibodies for haemophilus influenza, pneumococcal, tetanus/diphtheria, limited flow cytometry with repeat CBC, SPEP/UPEP, ESR, CRP, UA, follow-up ASO, DNase B titer      ROS       Objective:         Physical Exam   Constitutional: She appears well-developed and well-nourished. No distress.   HENT:   Head: Normocephalic and atraumatic.   Mouth/Throat: Oropharynx is clear and moist.   Eyes: Conjunctivae are normal. Right eye exhibits no discharge. Left eye exhibits no discharge.   Neck: No JVD present. No thyromegaly present.   Cardiovascular: Normal rate, regular rhythm and normal heart sounds.     Pulmonary/Chest: Effort normal and breath sounds normal. No respiratory distress.   Abdominal: She exhibits no distension.   Neurological: She is alert.   Skin: Skin is warm. She is not diaphoretic.   Psychiatric: She has a normal mood and affect. Her behavior is normal.   Nursing note and vitals reviewed.    No edema or effusion of bilateral knees  Trace left lower extremity edema, normal dorsalis pedis pulses bilateral, no open sores, lesions, ulcerations feet lower extremities  No petechia or purpura  Normal affect, insight, judgment          Assessment/Plan:     Assessment  #1 history of rheumatic fever August 2016 completed treatment of penicillin for documented streptococcal pharyngitis, developed subsequent erythema nodosum, polyarthritis, received Medrol Dosepak ×2 in September.  Polyarthritis unresponsive to high dose ibuprofen, started on chronic prednisone in October, saw infectious disease  recommended continuing prophylaxis penicillin  mg twice a day ×5 years which she remains on bid and is compliant, transthoracic echocardiogram no evidence of valvular disease, saw rheumatology  in November, and continues to see orthopedics .  Taking tramadol in addition to naproxen, tramadol just at night without daytime drowsiness or side effects    #2 avascular necrosis right ankle by MRI in January, right knee by MRI in December, left knee by MRI in January, bilateral hips MRI in January has seen orthopedics, rheumatology, and [facility] as well, is off prednisone.  Currently has bilateral hip pain left greater than right, bilateral knee pain left greater than right, left ankle pain, bilateral wrist pain, she still continues to work although she is limited by her chronic arthritic pain    #3 history of right hip arthroscopy    #4 history of low back pain, saw by Dr. Rees, has had multiple injections with corticosteroids, nerve blocks, status post radiofrequency  ablation, back pain has been stable compared to her other joint pains    #5 chronic prophylactic penicillin status post episode of rheumatic fever in August 2016, tolerating pcn without side effects          Plan  #1 FMLA form completed     #2 old records [facility] and  [facility]    #3 may increase tramadol to twice a day dosing monitor for daytime drowsiness, continue Naprosyn 500 mg twice a day    #4 follow-up [facility], [facility] referral has been made to bone endocrinologist at [facility]    #5 follow-up [facility]    #6 Continue penicillin 5 years total    #7 total time spent with patient regarding completing FMLA form, revealing old records, counseling, education, greater than 50% of office visit    #8 follow-up 2 months, hopefully by that time she will have seen the bone endocrinologist at [facility] with regards to further treatment options for avascular necrosis

## 2017-04-21 NOTE — Clinical Note
[facility]  Franklin Gonzalez M.D.  [address]  [place] [address]  Fax: [phone]   Authorization for Release/Disclosure of   Protected Health Information   Name: SUMAYA WATSON : 1970 SSN: [id number]   Address: [address] [place] [address]  [place] [address] Phone:    [phone] (home) [phone] (work)   I authorize the entity listed below to release/disclose the PHI below to:   [facility]/Franklin Gonzalez M.D. and Franklin Gonzalez M.D.   Provider or Entity Name:                                                     Dr Ferrer   Address   City, [facility], [facility]   Phone:      Fax:     Reason for request: continuity of care   Information to be released:   Most recent [facility] records   [  ] LAST COLONOSCOPY,  including any PATH REPORT and follow-up  [  ] LAST FIT/COLOGUARD RESULT [  ] LAST DEXA  [  ] LAST MAMMOGRAM  [  ] LAST PAP  [  ] LAST LABS [  ] RETINA EXAM REPORT  [  ] IMMUNIZATION RECORDS  [ xx ] Release all info      Most recent [facility] records.      [  ] Check here and initial the line next to each item to release ALL health information INCLUDING  _____ Care and treatment for drug and / or alcohol abuse  _____ HIV testing, infection status, or AIDS  _____ Genetic Testing    DATES OF SERVICE OR TIME PERIOD TO BE DISCLOSED: _____________  I understand and acknowledge that:  * This Authorization may be revoked at any time by you in writing, except if your health information has already been used or disclosed.  * Your health information that will be used or disclosed as a result of you signing this authorization could be re-disclosed by the recipient. If this occurs, your re-disclosed health information may no longer be protected by State or Federal laws.  * You may refuse to sign this Authorization. Your refusal will not affect your ability to obtain treatment.  * This Authorization becomes effective upon signing and will  on (date) __________.      If no date is indicated, this  Authorization will  one (1) year from the signature date.    Name: Janett Mcnulty    Signature:   Date:     2017       PLEASE FAX REQUESTED RECORDS BACK TO: [phone]

## 2017-04-25 ENCOUNTER — OFFICE VISIT (OUTPATIENT)
Dept: RHEUMATOLOGY | Facility: PHYSICIAN GROUP | Age: 47
End: 2017-04-25
Payer: COMMERCIAL

## 2017-04-25 VITALS
OXYGEN SATURATION: 98 % | BODY MASS INDEX: 34.07 KG/M2 | WEIGHT: 211 LBS | RESPIRATION RATE: 12 BRPM | HEART RATE: 82 BPM | TEMPERATURE: 97.8 F | SYSTOLIC BLOOD PRESSURE: 110 MMHG | DIASTOLIC BLOOD PRESSURE: 70 MMHG

## 2017-04-25 DIAGNOSIS — R70.0 ESR RAISED: ICD-10-CM

## 2017-04-25 DIAGNOSIS — M87.00 AVASCULAR NECROSIS (HCC): Chronic | ICD-10-CM

## 2017-04-25 PROCEDURE — 99213 OFFICE O/P EST LOW 20 MIN: CPT | Performed by: INTERNAL MEDICINE

## 2017-04-25 ASSESSMENT — ENCOUNTER SYMPTOMS
CHILLS: 0
BACK PAIN: 1
FEVER: 0

## 2017-04-25 NOTE — Clinical Note
[facility]   [address], [address]  [place] [address]  Phone: [phone]  Fax: [phone]              Encounter Date: 4/25/2017    Dear Dr. Gonzalez  It was a pleasure seeing your patient, Janett Mcnulty, on 4/25/2017. Diagnoses of ESR raised and Avascular necrosis (CMS-HCC) were pertinent to this visit.     Please find attached progress note which includes the history I obtained from Ms. Mcnulty, my physical examination findings, my impression and recommendations.      Once again, it was a pleasure participating in your patient's care.  Please feel free to contact me if you have any questions or if I can be of any further assistance to your patients.      Sincerely,    Starr Jackson M.D.  Electronically Signed          PROGRESS NOTE:  Subjective:   Date of Consultation:4/25/2017  5:35 PM  Primary care physician:Franklin Gonzalez M.D.      Reason for Consultation:  Ms. Mcnulty  is a pleasant 46 y.o. year old female who presents for follow-up of arthritis and multifocal avascular necrosis    Rheumatic Fever  Following cardiology  Has been seen by ID    Arthritis  She is experiencing increasing arthralgia in her hands and feet    Lower extremity swelling  Progressing. Started a week ago.  Does not resolve with rest but improves.  DVT studies are negative    Avascular necrosis of multiple joints  We first discovered avascular necrosis of the knee. She was referred to orthopedics. However additional imaging has noted avascular necrosis in the hip and the ankle. Additional imaging shows involvement of the wrists.  She did see a specialist at [place] and has been referred to bone endocrinology      Past Medical History   Diagnosis Date   • Anesthesia      vomitted with tonsils   • Arthritis      osteoarthritis   • Avascular necrosis (CMS-HCC) 11/2016     in many joints   • Rheumatic fever 3/21/17     Working on ruling out possible diagnoses-on penicillin(started 9/2016)   • Pain 6/5/13    right foot   • Pain 3/21/17     to joints related to avascular necrosis.     Past Surgical History   Procedure Laterality Date   • Pr inj dx/ther agnt paravert facet joint, gómez*  1/21/2011     Performed by EDMAR ELIAS at [facility]S   • Pr inj dx/ther agnt paravert facet joint, gómez*  2/25/2011     Performed by EDMAR ELIAS at [facility]   • Pr dest,paravertebral,l/s,single  3/4/2011     Performed by EDMAR ELIAS at [facility]   • Inj,sacroiliac,anes/steroid  5/13/2011     Performed by EDMAR ELIAS at [facility]   • Pr inject nerv blck,stellate ganglion  9/2/2011     Performed by EDMAR ELIAS at [facility]   • Neuro dest facet l/s w/ig sngl  3/2/2012     Performed by EDMAR ELIAS at [facility]   • Neuro dest facet l/s w/ig addl  3/2/2012     Performed by EDMAR ELIAS at [facility]   • Neuro dest facet l/s w/ig addl  3/2/2012     Performed by EDMAR ELIAS at [facility]   • Neuro dest facet l/s w/ig sngl  3/9/2012     Performed by EDMAR ELIAS at [facility]   • Neuro dest facet l/s w/ig addl  3/9/2012     Performed by EDMAR ELIAS at [facility]   • Neuro dest facet l/s w/ig addl  3/9/2012     Performed by EDMAR ELIAS at [facility]   • Neuro dest facet l/s w/ig sngl  12/28/2012     Performed by Edmar Elias D.O. at [facility]   • Neuro dest facet l/s w/ig addl  12/28/2012     Performed by Edmar Elias D.O. at [facility]   • Neuro dest facet l/s w/ig addl  12/28/2012     Performed by Edmar Elias D.O. at [facility]   • Neuro dest facet l/s w/ig sngl  1/4/2013     Performed by Edmar [facility] D.O. [facility]   • Neuro dest facet l/s w/ig addl  1/4/2013     Performed by Edmar Elias D.O. at [facility]   • Neuro dest facet l/s w/ig addl  1/4/2013     Performed by Farhan Rees D.O. at [facility] ORS   • Hip arthroscopy Right 2009      revision   • Tonsillectomy  2001   • Other Left 2007     left ankle tendon repair   • Appendectomy  1991   • Other  2013     uterine ablation   • Bunionectomy  6/7/2013     Performed by Oliver Pratt D.P.M. at SURGERY [facility]S   • Neuro dest facet l/s w/ig sngl  9/20/2013     Performed by Farhan Rees D.O. at [facility] ORS   • Neuro dest facet l/s w/ig addl  9/20/2013     Performed by Farhan Rees D.O. at [facility]S   • Neuro dest facet l/s w/ig addl  9/20/2013     Performed by Farhan Rees D.O. at [facility] ORS   • Neuro dest facet l/s w/ig addl  9/20/2013     Performed by Farhan Rees D.O. at [facility] ORS   • Neuro dest facet l/s w/ig sngl  10/4/2013     Performed by Farhan Rees D.O. at [facility]S   • Neuro dest facet l/s w/ig addl  10/4/2013     Performed by Farhan Rees D.O. at [facility] ORS   • Neuro dest facet l/s w/ig addl  10/4/2013     Performed by Farhan Rees D.O. at [facility] ORS   • Neuro dest facet l/s w/ig addl  10/4/2013     Performed by Farhan Rees D.O. at [facility] ORS   • Inj,sacroiliac,anes/steroid  11/8/2013     Performed by Farhan Rees D.O. at [facility]   • Inj,sacroiliac,anes/steroid  12/6/2013     Performed by Farhan Rees D.O. at [facility]   • Inj,sacroiliac,anes/steroid  1/10/2014     Performed by Farhan Rees D.O. at [facility]S   • Inj,sacroiliac,anes/steroid  1/10/2014     Performed by Farhan Rees D.O. at [facility]   • Neuro dest facet l/s w/ig sngl  5/16/2014     Performed by Farhan Rees D.O. at [facility]S   • Neuro dest facet l/s w/ig addl  5/16/2014      Performed by Farhan Rees D.O. at [facility] ORS   • Neuro dest facet l/s w/ig addl  5/16/2014     Performed by Farhan Rees D.O. at [facility] ORS   • Neuro dest facet l/s w/ig addl  5/16/2014     Performed by Farhan Rees D.O. at [facility] ORS   • Neuro dest facet l/s w/ig sngl  6/6/2014     Performed by Farhan Rees D.O. at [facility] ORS   • Neuro dest facet l/s w/ig addl  6/6/2014     Performed by Farhan Rees D.O. at [facility] ORS   • Neuro dest facet l/s w/ig addl  6/6/2014     Performed by Farhan Rees D.O. at [facility] ORS   • Neuro dest facet l/s w/ig addl  6/6/2014     Performed by Farhan Rees D.O. at [facility] ORS   • Toe arthroplasty  7/7/2014     Performed by Oliver Pratt D.P.M. at [facility] ORS   • Inj,sacroiliac,anes/steroid  7/11/2014     Performed by Farhan Rees D.O. at [facility]S   • Inj,sacroiliac,anes/steroid  7/11/2014     Performed by Farhan Rees D.O. at [facility]S   • Neuro dest facet l/s w/ig sngl  2/6/2015     Performed by Farhan Rees D.O. at [facility]S   • Neuro dest facet l/s w/ig addl  2/6/2015     Performed by Farhan Rees D.O. at [facility]S   • Neuro dest facet l/s w/ig addl  2/6/2015     Performed by Farhan Rees D.O. at [facility]S   • Neuro dest facet l/s w/ig sngl  2/13/2015     Performed by Farhan Rees D.O. at [facility]S   • Neuro dest facet l/s w/ig addl  2/13/2015     Performed by Farhan Rees D.O. at [facility]   • Neuro dest facet l/s w/ig addl  2/13/2015     Performed by Farhan Rees D.O. at [facility]   • Inj,sacroiliac,anes/steroid  4/3/2015     Performed by Farhan Rees D.O. at [facility]   • Inj,sacroiliac,anes/steroid  4/3/2015     Performed  by Farhan Rees D.O. at [facility]S   • Pr dstr nrolytc agnt parverteb fct sngl lmbr/sacral Left 10/9/2015     Procedure: NEURO DEST FACET L/S W/IG SNGL - L3-S1;  Surgeon: Farhan Rese D.O.;  Location: [facility] ORS;  Service: Pain Management   • Pr dstr nrolytc agnt parverteb fct addl lmbr/sacral  10/9/2015     Procedure: NEURO DEST FACET L/S W/IG ADDL;  Surgeon: Farhan Rees D.O.;  Location: SURGERY [facility] ORS;  Service: Pain Management   • Pr inject rx other periph nerve  10/9/2015     Procedure: NEUROLYTIC DEST-OTHER NERVE;  Surgeon: Farhan Rees D.O.;  Location: [facility];  Service: Pain Management   • Pr dstr nrolytc agnt parverteb fct addl lmbr/sacral  10/9/2015     Procedure: NEURO DEST FACET L/S W/IG ADDL;  Surgeon: Farhan Rees D.O.;  Location: SURGERY [facility] ORS;  Service: Pain Management   • Pr dstr nrolytc agnt parverteb fct sngl lmbr/sacral Right 10/16/2015     Procedure: NEURO DEST FACET L/S W/IG SNGL - L3-S1;  Surgeon: Farhan Rees D.O.;  Location: [facility];  Service: Pain Management   • Pr dstr nrolytc agnt parverteb fct addl lmbr/sacral  10/16/2015     Procedure: NEURO DEST FACET L/S W/IG ADDL;  Surgeon: Farhan Rees D.O.;  Location: SURGERY [facility];  Service: Pain Management   • Pr inject rx other periph nerve  10/16/2015     Procedure: NEUROLYTIC DEST-OTHER NERVE;  Surgeon: Farhan Rees D.O.;  Location: SURGERY [facility];  Service: Pain Management   • Pr dstr nrolytc agnt parverteb fct addl lmbr/sacral  10/16/2015     Procedure: NEURO DEST FACET L/S W/IG ADDL;  Surgeon: Farhan Rees D.O.;  Location: SURGERY [facility];  Service: Pain Management   • Pr dstr nrolytc agnt parverteb fct sngl lmbr/sacral Left 5/6/2016     Procedure: NEURO DEST FACET L/S W/IG SNGL - L3-S1;  Surgeon: Farhan Rees D.O.;  [facility];   Service: Pain Management   • Pr dstr nrolytc agnt parverteb fct addl lmbr/sacral Left 5/6/2016     Procedure: NEURO DEST FACET L/S W/IG ADDL;  Surgeon: Farhan Rees D.O.;  Location: SURGERY [facility];  Service: Pain Management   • Pr inject rx other periph nerve Left 5/6/2016     Procedure: NEUROLYTIC DEST-OTHER NERVE;  Surgeon: Farhan Rees D.O.;  Location: SURGERY [facility];  Service: Pain Management   • Pr dstr nrolytc agnt parverteb fct addl lmbr/sacral  5/6/2016     Procedure: NEURO DEST FACET L/S W/IG ADDL;  Surgeon: Farhan Rees D.O.;  Location: SURGERY [facility];  Service: Pain Management   • Pr dstr nrolytc agnt parverteb fct sngl lmbr/sacral Right 6/24/2016     Procedure: NEURO DEST FACET L/S W/IG SNGL - L3-S1;  Surgeon: Farhan Rees D.O.;  Location: SURGERY [facility];  Service: Pain Management   • Pr dstr nrolytc agnt parverteb fct addl lmbr/sacral Right 6/24/2016     Procedure: NEURO DEST FACET L/S W/IG ADDL;  Surgeon: Farhan Rees D.O.;  Location: SURGERY [facility];  Service: Pain Management   • Pr inject rx other periph nerve Right 6/24/2016     Procedure: NEUROLYTIC DEST-OTHER NERVE;  Surgeon: Farhan Rees D.O.;  Location: SURGERY [facility];  Service: Pain Management   • Pr dstr nrolytc agnt parverteb fct addl lmbr/sacral  6/24/2016     Procedure: NEURO DEST FACET L/S W/IG ADDL;  Surgeon: Farhan Rees D.O.;  Location: SURGERY [facility];  Service: Pain Management   • Other orthopedic surgery Left 2001     hip revision(not total)   • Pr dstr nrolytc agnt parverteb fct sngl lmbr/sacral Left 3/24/2017     Procedure: NEURO DEST FACET L/S W/IG SNGL - L3-S1;  Surgeon: Farhan Rees D.O.;  [facility];  Service: Pain Management   • Pr dstr nrolytc agnt parverteb fct addl lmbr/sacral Left 3/24/2017     Procedure: NEURO DEST FACET L/S W/IG ADDL;  Surgeon: Farhan Rees D.O.;   [place] [facility];  Service: Pain Management   • Pr inject rx other periph nerve Left 3/24/2017     Procedure: NEUROLYTIC DEST-OTHER NERVE;  Surgeon: Farhan Rees D.O.;  [place] [facility];  Service: Pain Management   • Pr dstr nrolytc agnt parverteb fct addl lmbr/sacral  3/24/2017     Procedure: NEURO DEST FACET L/S W/IG ADDL;  Surgeon: Farhan Rees D.O.;  Location: [facility];  Service: Pain Management   • Pr dstr nrolytc agnt parverteb fct sngl lmbr/sacral Right 4/7/2017     Procedure: NEURO DEST FACET L/S W/IG SNGL - L3-S1;  Surgeon: Farhan Rees D.O.;  Location: [facility];  Service: Pain Management   • Pr dstr nrolytc agnt parverteb fct addl lmbr/sacral Right 4/7/2017     Procedure: NEURO DEST FACET L/S W/IG ADDL;  Surgeon: Farhan Rees D.O.;  Location: [facility];  Service: Pain Management   • Pr inject rx other periph nerve Right 4/7/2017     Procedure: NEUROLYTIC DEST-OTHER NERVE;  Surgeon: Farhan Rees D.O.;  Location: [facility];  Service: Pain Management   • Pr dstr nrolytc agnt parverteb fct addl lmbr/sacral  4/7/2017     Procedure: NEURO DEST FACET L/S W/IG ADDL;  Surgeon: Farhan Rees D.O.;  Location: [facility];  Service: Pain Management     Allergies   Allergen Reactions   • Morphine Rash   • Other Food Rash     Onions-cause headaches and facial flushing     Outpatient Encounter Prescriptions as of 4/25/2017   Medication Sig Dispense Refill   • [place]ia-Glucosamine-Vit D (GLUCOSAMINE COMPLEX PO) Take  by mouth every day.     • tramadol (ULTRAM) 50 MG Tab Take 1 Tab by mouth every 8 hours as needed. 90 Tab 3   • penicillin v potassium (VEETID) 250 MG Tab Take 250 mg by mouth 4 times a day.     • ascorbic acid (ASCORBIC ACID) 500 MG Tab Take 500 mg by mouth as needed.     • naproxen (NAPROSYN) 500 MG Tab Take 500 mg by mouth 2 times a day, with meals.     •  TURMERIC PO Take  by mouth.     • therapeutic multivitamin-minerals (THERAGRAN-M) Tab Take 1 Tab by mouth every day.     • vitamin D (CHOLECALCIFEROL) 1000 UNIT Tab Take 1,000 Units by mouth every day.     • vitamin e (VITAMIN E) 400 UNIT CAPS Take 400 Units by mouth every day.     • fluticasone (FLONASE) 50 MCG/ACT nasal spray Spray 2 Sprays in nose every day. (Patient taking differently: Spray 2 Sprays in nose as needed.) 16 g 3   • loratadine (CLARITIN) 10 MG TABS Take 10 mg by mouth as needed.       No facility-administered encounter medications on file as of 4/25/2017.       Social History     Social History   • Marital Status:      Spouse Name: N/A   • Number of Children: N/A   • Years of Education: N/A     Occupational History   • Not on file.     Social History Main Topics   • Smoking status: Never Smoker    • Smokeless tobacco: Never Used   • Alcohol Use: 5.4 oz/week     2 Glasses of wine, 7 Standard drinks or equivalent per week      Comment: 2 drinks per week   • Drug Use: No   • Sexual Activity: Not on file     Other Topics Concern   • Not on file     Social History Narrative      History   Smoking status   • Never Smoker    Smokeless tobacco   • Never Used     History   Alcohol Use   • 5.4 oz/week   • 2 Glasses of wine, 7 Standard drinks or equivalent per week     Comment: 2 drinks per week     History   Drug Use No      OB History   No data available      No LMP recorded. Patient has had an ablation.    G P A L     Family History   Problem Relation Age of Onset   • Diabetes     • Hypertension     • Cancer         Review of Systems   Constitutional: Negative for fever and chills.   HENT:        Neck and face still feels full   Cardiovascular: Positive for leg swelling.   Musculoskeletal: Positive for back pain and joint pain.        Joint pains described as tingling   Skin: Negative for rash.        She denies Raynauds   Endo/Heme/Allergies:        She denies any history of blood clots or  miscarriages        Objective:   /70 mmHg  Pulse 82  Temp(Src) 36.6 °C (97.8 °F)  Resp 12  Wt 95.709 kg (211 lb)  SpO2 98%  Breastfeeding? No  Left 144/96  right 142/92  Physical Exam   Constitutional: She is oriented to person, place, and time. She appears well-developed and well-nourished. No distress.   HENT:   Head: Normocephalic and atraumatic.   Right Ear: External ear normal.   Left Ear: External ear normal.   Eyes: Conjunctivae are normal. Right eye exhibits no discharge. Left eye exhibits no discharge.   Neck: No thyromegaly present.   Pulmonary/Chest: Effort normal. No stridor. No respiratory distress.   Musculoskeletal: She exhibits edema.   Bilateral edema of lower extremities   No synovitis of the MCP, PIP, Wrists.  Mild puffiness to the hands.   Neurological: She is alert and oriented to person, place, and time.   Skin: Skin is warm and dry. She is not diaphoretic. No erythema.   Limited exam.  No palpable purpura, no erythema nodosum, no petechiae   Psychiatric: She has a normal mood and affect. Her behavior is normal. Judgment and thought content normal.         Labs    Lab Results   Component Value Date/Time    QUANTIFERON TB GOLD Negative 12/01/2016 04:04 PM     Lab Results   Component Value Date/Time    HEPATITIS B CCORE AB, TOTAL Negative 12/01/2016 04:04 PM    HEPATITIS B SURFACE ANTIGEN Negative 12/01/2016 04:04 PM     Lab Results   Component Value Date/Time    HEPATITIS C ANTIBODY Negative 12/01/2016 04:04 PM     Lab Results   Component Value Date/Time    SODIUM 136 03/06/2017 02:12 PM    POTASSIUM 3.9 03/06/2017 02:12 PM    CHLORIDE 103 03/06/2017 02:12 PM    CO2 25 03/06/2017 02:12 PM    GLUCOSE 79 03/06/2017 02:12 PM    BUN 20 03/06/2017 02:12 PM    CREATININE 0.80 03/06/2017 02:12 PM      Lab Results   Component Value Date/Time    WBC 5.6 04/01/2017 12:03 PM    RBC 4.05* 04/01/2017 12:03 PM    HEMOGLOBIN 12.9 04/01/2017 12:03 PM    HEMATOCRIT 38.9 04/01/2017 12:03 PM    MCV  96.0 04/01/2017 12:03 PM    MCH 31.9 04/01/2017 12:03 PM    MCHC 33.2* 04/01/2017 12:03 PM    MPV 10.4 04/01/2017 12:03 PM    NEUTROPHILS-POLYS 53.90 04/01/2017 12:03 PM    LYMPHOCYTES 29.40 04/01/2017 12:03 PM    MONOCYTES 9.30 04/01/2017 12:03 PM    EOSINOPHILS 6.10 04/01/2017 12:03 PM    BASOPHILS 1.10 04/01/2017 12:03 PM      Lab Results   Component Value Date/Time    CALCIUM 9.6 03/06/2017 02:12 PM    AST(SGOT) 15 12/01/2016 04:04 PM    ALT(SGPT) 31 12/01/2016 04:04 PM    ALKALINE PHOSPHATASE 48 12/01/2016 04:04 PM    TOTAL BILIRUBIN 0.4 12/01/2016 04:04 PM    ALBUMIN 4.16 04/01/2017 12:03 PM    TOTAL PROTEIN 6.60 04/01/2017 12:03 PM     Lab Results   Component Value Date/Time    URIC ACID 4.9 12/01/2016 04:04 PM    RHEUMATOID FACTOR -NEPH- <10 11/08/2016 01:17 PM    CCP ANTIBODIES 2 12/01/2016 04:04 PM    ANTINUCLEAR ANTIBODY None Detected 12/01/2016 04:03 PM     Lab Results   Component Value Date/Time    SED RATE WESTERGREN 29* 04/01/2017 12:03 PM    STAT C-REACTIVE PROTEIN 0.17 03/10/2017 12:08 PM     Lab Results   Component Value Date/Time    DILUTE DANNY VIPER VENOM MARY KAY 37.4 03/06/2017 02:12 PM    LUPUS ANTICOAGULANT Not Present 03/06/2017 02:12 PM     Lab Results   Component Value Date/Time    C3 COMPLEMENT 152.0 12/01/2016 04:04 PM    COMPLEMENT C4 28.0 12/01/2016 04:04 PM    ANTI-CARIOLIPIN AB IGG 2 03/06/2017 02:12 PM    ANTI-CARDIOLIPIN AB IGM 6 03/06/2017 02:12 PM    ANTI-CARDIOLIPIN AB IGA 1 03/06/2017 02:12 PM    CRYOGLOBULINS NEG 72Hour 03/10/2017 12:08 PM     Lab Results   Component Value Date/Time    ANTI-DNA -DS None Detected 12/01/2016 04:04 PM    RNP ANTIBODIES 0 12/01/2016 04:04 PM    SMITH ANTIBODIES 0 12/01/2016 04:04 PM    ANTI-SCL-70 0 11/08/2016 01:17 PM     Lab Results   Component Value Date/Time    ANTI-DNA -DS None Detected 12/01/2016 04:04 PM    ANCA IGG <1:20 12/01/2016 04:04 PM    C3 COMPLEMENT 152.0 12/01/2016 04:04 PM    RNP ANTIBODIES 0 12/01/2016 04:04 PM    SJOGREN'S  ANTI-SS-B 0 12/01/2016 04:04 PM     Lab Results   Component Value Date/Time    COLOR Lt. Yellow 04/01/2017 12:03 PM    SPECIFIC GRAVITY 1.006 04/01/2017 12:03 PM    PH 6.0 04/01/2017 12:03 PM    GLUCOSE Negative 04/01/2017 12:03 PM    KETONES Negative 04/01/2017 12:03 PM    PROTEIN Negative 04/01/2017 12:03 PM     Lab Results   Component Value Date/Time    TOTAL PROTEIN, URINE See Note 04/01/2017 12:03 PM    TOTAL VOLUME Random 04/01/2017 12:03 PM     Lab Results   Component Value Date/Time    SSA 60 (R0)(KATHERINE) AB, IGG 0 12/01/2016 04:04 PM    SSA 52 (R0)(KATHERINE) AB, IGG 5 12/01/2016 04:04 PM     No results found for: HBA1C  Lab Results   Component Value Date/Time    CPK TOTAL 92 03/06/2017 02:12 PM     Lab Results   Component Value Date/Time    G-6-PD 13.7 11/18/2016 03:18 PM     No results found for: [id number]  Lab Results   Component Value Date/Time    ACE 11 11/18/2016 03:18 PM     Lab Results   Component Value Date/Time    25-HYDROXY   VITAMIN D 25 31 10/10/2016 02:25 PM     No results found for: TSH, FREEDIR  Lab Results   Component Value Date/Time    TSH 1.530 11/28/2016 05:54 PM     Lab Results   Component Value Date/Time    MICROSOMAL -TPO- ABS 0.5 11/08/2016 01:17 PM    ANTI-THYROGLOBULIN <0.9 12/01/2016 04:04 PM     Lab Results   Component Value Date/Time    LYME DISEASE ABS, IGG 0.49 11/08/2016 01:17 PM     Lab Results   Component Value Date/Time    F-ACTIN AB, IGG 22* 11/08/2016 01:17 PM     Lab Results   Component Value Date/Time    IMMUNOGLOBULIN A 131 04/01/2017 12:03 PM     No results found for: FLTYPE, CRYSTALSBDF  No results found for: ISTATICAL  No results found for: ISTATCREAT  No results found for: CTELOPEP  No results found for: GBMABG  No results found for: PTHINTACT         Assessment:     1. ESR raised  WESTERGREN SED RATE    CRP QUANTITIVE (NON-CARDIAC)   2. Avascular necrosis (CMS-HCC)           Medical Decision Making:  Today's Assessment / Status / Plan:     Ms. Janett Urban Page first  presented to me in November 2016 following a diagnosis of streptococcal pharyngitis and then erythema nodosum noted by PCP and diagnosis of rheumatic fever. At the time she presented with polyarthralgias.  She was started on prednisone 20 mg and increased to 40 mg. Subsequently we obtain an MRI that showed avascular necrosis of the knee. Further evaluation did identify additional joints also with osteonecrosis. We have since gradually tapered her prednisone. Unclear if the short course of prednisone could be the etiology of her multifocal osteonecrosis. Hypercoagulable workup thus far is negative. Anti-TPO antibody and antithyroglobulin antibody was negative. Her urinalysis did not show any protein or urine.    Rheumatologic workup includes a chest x-ray that was negative for hilar adenopathy, MERA that was negative RF and anti-CCP antibody was negative. HLA-B27 antibody that was negative, ANCA serologies that was negative. Hepatitis panel and syphilis and QuantiFERON were also negative    Arthralgia    RF, CCP, MERA, ANCA , HLA B27 negative.  Her inflammatory markers did normalize with steroids. Following completion of steroids about 2 weeks later she had onset of lower extremity swelling.  Upon the onset of her joint pains we did repeat sedimentation rate and CRP on March 10 and this was normal.  On exam I do not appreciate any synovitis.    Raised ESR  We will repeat along with CRP  If elevated we will also consider evaluation for vasculitis and order imaging.    Myalgia  CK is normal    Osteonecrosis - multifocal  She did see orthopedics at [place] and refer to bone endocrinology. Her appointment is on May 10. The etiology still is unclear. The patient herself is noticing increasing pain in her hips. Suggestive of worsening  disease  She has multiple involvement of her wrists right ankle and knees (bilateral), hips. Etiology is unclear however it seems to be involving more joints.  Antiphospholipid antibody  panels negative, protein C and protein S is normal, anti-thrombin III is normal, Factor Leiden V is normal.  Cryoglobulins are negative    Rheumatic fever  Followed by cardiology and ID  On antibiotics    Slightly decrease IgG levels  Her work-up is negative.    Encounter high risk medications  Off prednisone  At this point even though she is experiencing lower extremity swelling and arthralgia will hold off on prednisone.  Advise to continue calcium and vitamin D    Lower extremity swelling  Venous doppler is negative  UA did not show protein  PAOLA were negative.  Her symptoms seems to be progressing.  We will continue to monitor      1. ESR raised  WESTERGREN SED RATE    CRP QUANTITIVE (NON-CARDIAC)   2. Avascular necrosis (CMS-HCC)       Return in about 4 weeks (around 5/24/2017), or @10:30.      1. ESR raised  WESTERGREN SED RATE    CRP QUANTITIVE (NON-CARDIAC)   2. Avascular necrosis (CMS-HCC)        I have spent greater than 50% of this 30 minute visit in counseling/coordination of care with the patient regarding follow-up and discussion of her increasing symptoms and any additional work-up    She agreed and verbalized her agreement and understanding with the current plan. I answered all questions and concerns she has at this time and advised her to call at any time in there interim with questions or concerns in regards to her care.    Thank you for allowing me to participate in her care, I will continue to follow closely.     Please note that this dictation was created using voice recognition software. I have made every reasonable attempt to correct obvious errors, but I expect that there are errors of grammar and possibly content that I did not discover before finalizing the note.

## 2017-04-25 NOTE — MR AVS SNAPSHOT
Janett Rajni Page   2017 5:30 PM   Office Visit   MRN: [id number]    Department:  Rheumatology Med [facility]   Dept Phone:  [phone]    Description:  Female : 1970   Provider:  Starr Jackson M.D.           Reason for Visit     Follow-Up follow up      Allergies as of 2017     Allergen Noted Reactions    Morphine 2008   Rash    Other Food 2013   Rash    Onions-cause headaches and facial flushing      You were diagnosed with     ESR raised   [998043]       Avascular necrosis (CMS-HCC)   [992440]         Vital Signs     Blood Pressure Pulse Temperature Respirations Weight Oxygen Saturation    110/70 mmHg 82 36.6 °C (97.8 °F) 12 95.709 kg (211 lb) 98%    Breastfeeding? Smoking Status                No Never Smoker           Basic Information     Date Of Birth Sex Race Ethnicity Preferred Language    1970 Female White Non- English      Your appointments     2017 10:30 AM   PT New Evaluation 60 Minutes with Laura Marx P.MERRY Hernández Physical Therapy [facility] ([address])    [address] [place] [id number]   [phone]           Please bring Photo ID, Insurance Cards, list of all Medication and copies of any legal documents (such as Living Will, Power of ) If speaking a language besides English please bring an adult . Please arrive 30 minutes prior for check in and registration.            May 02, 2017 11:45 AM   PT Follow Up 30 Minutes with BEAR Szymanski Physical Therapy [facility] ([address])    [address] [place] [address]   [phone]            May 05, 2017  1:15 PM   PT Follow Up 30 Minutes with Laura Marx P.TSrikanth Hernández Physical Therapy [facility] ([address] [place] [address])    [address] [place] [address]   [phone]            May 09, 2017 11:45 AM   PT Follow Up 30 Minutes with MARY Szymanski.TSrikanth Hernández Physical Therapy [facility] ([address] [place] [address])    [facility]  [address] [address]   [phone]            May 12, 2017 10:45 AM   [facility] ([address])    [address] [place] [address]   [phone]            May 16, 2017 11:45 AM   PT Follow Up 30 Minutes with BEAR Szymanski Physical Therapy [facility] ([address])    [address] [place] [address]   [phone]            May 19, 2017 10:45 AM   PT Follow Up 30 Minutes with BEAR Szymanski Physical Therapy [facility] ([address])    [address] [place] [address]   [phone]            May 22, 2017 11:45 AM   PT Follow Up 30 Minutes [facility] ([address])    [address] [place] [address]   [phone]            May 24, 2017 11:15 AM   PT Follow Up 30 Minutes [facility] ([address])    [address] [place] [address]   [phone]            May 25, 2017 10:30 AM   Urgent/Same Day with Starr Jackson M.D.   [facility] (--)    [address] [facility] [address]  [facility] [id number] [phone]           You will be receiving a confirmation call a few days before your appointment from our automated call confirmation system.            Jun 23, 2017 11:00 AM   Established Patient with Franklin Gonzalez M.D.   [facility] ([facility])    [address]  [facility] [id number] [phone]           You will be receiving a confirmation call a few days before your appointment from our automated call confirmation system.              Problem List              ICD-10-CM Priority Class Noted - Resolved    S/P appendectomy Z90.49   6/1/2011 - Present    Left hip revision 2002 M21.959   6/1/2011 - Present    Left ankle surgery M25.572   6/1/2011 -  Present    Right hip arthroscopy  M25.551   6/1/2011 - Present    Low back pain (Chronic) M54.5   6/1/2011 - Present    Cervical pain (neck) M54.2   6/1/2011 - Present    IBS (irritable bowel syndrome) K58.9   6/1/2011 - Present    Preventative health care (Chronic) Z00.00   6/1/2011 - Present    Varicose vein I86.8   10/7/2011 - Present    S/P bunionectomy Z98.890   6/7/2013 - Present    Allergic rhinitis due to animal hair and dander (Chronic) J30.81   9/12/2013 - Present    Dyslipidemia E78.5   9/18/2015 - Present    Perimenopausal (Chronic) N95.1   9/25/2015 - Present    Foot pain, right (Chronic) M79.671   9/30/2015 - Present    Obesity E66.9   9/16/2016 - Present    History of rheumatic fever (Chronic) Z86.79   9/16/2016 - Present    Avascular necrosis (CMS-HCC) (Chronic) M87.00   2/12/2017 - Present      Health Maintenance        Date Due Completion Dates    MAMMOGRAM 10/26/2017 10/26/2016, 9/15/2015, 9/8/2014, 8/29/2013, 4/22/2004    PAP SMEAR 10/7/2018 10/7/2015    IMM DTaP/Tdap/Td Vaccine (2 - Td) 9/25/2025 9/25/2015            Current Immunizations     Influenza TIV (IM) 12/9/2013, 1/9/2013    Influenza Vaccine Quad Inj (Preserved) 12/13/2016, 10/20/2015  2:00 AM, 11/19/2014    Pneumococcal polysaccharide vaccine (PPSV-23) 1/6/2017 11:10 AM    Tdap Vaccine 9/25/2015 11:06 AM    Tuberculin Skin Test 5/5/2014  1:40 PM, 6/3/2013  1:20 PM, 6/4/2012 12:30 PM, 7/1/2011      Below and/or attached are the medications your provider expects you to take. Review all of your home medications and newly ordered medications with your provider and/or pharmacist. Follow medication instructions as directed by your provider and/or pharmacist. Please keep your medication list with you and share with your provider. Update the information when medications are discontinued, doses are changed, or new medications (including over-the-counter products) are added; and carry medication information at all times in the event of  emergency situations     Allergies:  MORPHINE - Rash     OTHER FOOD - Rash               Medications  Valid as of: April 25, 2017 -  6:51 PM    Generic Name Brand Name Tablet Size Instructions for use    Ascorbic Acid (Tab) ascorbic acid 500 MG Take 500 mg by mouth as needed.        Boswellia-Glucosamine-Vit D   Take  by mouth every day.        Cholecalciferol (Tab) cholecalciferol 1000 UNIT Take 1,000 Units by mouth every day.        Fluticasone Propionate (Suspension) FLONASE 50 MCG/ACT Spray 2 Sprays in nose every day.        Loratadine (Tab) CLARITIN 10 MG Take 10 mg by mouth as needed.        Multiple Vitamins-Minerals (Tab) THERAGRAN-M  Take 1 Tab by mouth every day.        Naproxen (Tab) NAPROSYN 500 MG Take 500 mg by mouth 2 times a day, with meals.        Penicillin V Potassium (Tab) VEETID 250 MG Take 250 mg by mouth 4 times a day.        TraMADol HCl (Tab) ULTRAM 50 MG Take 1 Tab by mouth every 8 hours as needed.        Turmeric   Take  by mouth.        Vitamin E (Cap) VITAMIN E 400 UNIT Take 400 Units by mouth every day.        .                 Medicines prescribed today were sent to:     [id number] - [place], [place] - [address] [address] [address]    Phone: [phone] Fax: [phone]    Open 24 Hours?: No      Medication refill instructions:       If your prescription bottle indicates you have medication refills left, it is not necessary to call your provider’s office. Please contact your pharmacy and they will refill your medication.    If your prescription bottle indicates you do not have any refills left, you may request refills at any time through one of the following ways: The online [employer] system (except Urgent Care), by calling your provider’s office, or by asking your pharmacy to contact your provider’s office with a refill request. Medication refills are processed only during regular business hours and may not be available until the next business day.  Your provider may request additional information or to have a follow-up visit with you prior to refilling your medication.   *Please Note: Medication refills are assigned a new Rx number when refilled electronically. Your pharmacy may indicate that no refills were authorized even though a new prescription for the same medication is available at the pharmacy. Please request the medicine by name with the pharmacy before contacting your provider for a refill.        Your To Do List     Future Labs/Procedures Complete By Expires    CRP QUANTITIVE (NON-CARDIAC)  5/7/2017 4/25/2018    COREYREN SED RATE  As directed 4/25/2018      Other Notes About Your Plan     4/21/17 upcoming appointment with bone endocrinologist at [facility] Access Code: Activation code not generated  Current [employer] Status: Active

## 2017-04-28 ENCOUNTER — HOSPITAL ENCOUNTER (OUTPATIENT)
Dept: PHYSICAL THERAPY | Facility: REHABILITATION | Age: 47
End: 2017-04-28
Attending: PHYSICAL MEDICINE & REHABILITATION
Payer: COMMERCIAL

## 2017-04-28 PROCEDURE — 97110 THERAPEUTIC EXERCISES: CPT

## 2017-04-28 PROCEDURE — 97162 PT EVAL MOD COMPLEX 30 MIN: CPT

## 2017-05-02 ENCOUNTER — HOSPITAL ENCOUNTER (OUTPATIENT)
Dept: PHYSICAL THERAPY | Facility: REHABILITATION | Age: 47
End: 2017-05-02
Attending: PHYSICAL MEDICINE & REHABILITATION
Payer: COMMERCIAL

## 2017-05-02 PROCEDURE — 97110 THERAPEUTIC EXERCISES: CPT

## 2017-05-04 ENCOUNTER — HOSPITAL ENCOUNTER (OUTPATIENT)
Dept: LAB | Facility: MEDICAL CENTER | Age: 47
End: 2017-05-04
Attending: INTERNAL MEDICINE
Payer: COMMERCIAL

## 2017-05-04 DIAGNOSIS — R70.0 ESR RAISED: ICD-10-CM

## 2017-05-04 LAB
CRP SERPL HS-MCNC: 0.32 MG/DL (ref 0–0.75)
ERYTHROCYTE [SEDIMENTATION RATE] IN BLOOD BY WESTERGREN METHOD: 19 MM/HOUR (ref 0–20)

## 2017-05-04 PROCEDURE — 86140 C-REACTIVE PROTEIN: CPT

## 2017-05-04 PROCEDURE — 85652 RBC SED RATE AUTOMATED: CPT

## 2017-05-04 PROCEDURE — 36415 COLL VENOUS BLD VENIPUNCTURE: CPT

## 2017-05-05 ENCOUNTER — HOSPITAL ENCOUNTER (OUTPATIENT)
Dept: PHYSICAL THERAPY | Facility: REHABILITATION | Age: 47
End: 2017-05-05
Attending: PHYSICAL MEDICINE & REHABILITATION
Payer: COMMERCIAL

## 2017-05-05 PROCEDURE — 97110 THERAPEUTIC EXERCISES: CPT

## 2017-05-09 ENCOUNTER — HOSPITAL ENCOUNTER (OUTPATIENT)
Dept: PHYSICAL THERAPY | Facility: REHABILITATION | Age: 47
End: 2017-05-09
Attending: PHYSICAL MEDICINE & REHABILITATION
Payer: COMMERCIAL

## 2017-05-09 PROCEDURE — 97110 THERAPEUTIC EXERCISES: CPT

## 2017-05-12 ENCOUNTER — HOSPITAL ENCOUNTER (OUTPATIENT)
Dept: PHYSICAL THERAPY | Facility: REHABILITATION | Age: 47
End: 2017-05-12
Attending: PHYSICAL MEDICINE & REHABILITATION
Payer: COMMERCIAL

## 2017-05-12 PROCEDURE — 97110 THERAPEUTIC EXERCISES: CPT

## 2017-05-16 ENCOUNTER — HOSPITAL ENCOUNTER (OUTPATIENT)
Dept: PHYSICAL THERAPY | Facility: REHABILITATION | Age: 47
End: 2017-05-16
Attending: PHYSICAL MEDICINE & REHABILITATION
Payer: COMMERCIAL

## 2017-05-16 PROCEDURE — 97110 THERAPEUTIC EXERCISES: CPT

## 2017-05-19 ENCOUNTER — APPOINTMENT (OUTPATIENT)
Dept: PHYSICAL THERAPY | Facility: REHABILITATION | Age: 47
End: 2017-05-19
Attending: PHYSICAL MEDICINE & REHABILITATION
Payer: COMMERCIAL

## 2017-05-22 ENCOUNTER — APPOINTMENT (OUTPATIENT)
Dept: PHYSICAL THERAPY | Facility: REHABILITATION | Age: 47
End: 2017-05-22
Attending: PHYSICAL MEDICINE & REHABILITATION
Payer: COMMERCIAL

## 2017-05-24 ENCOUNTER — APPOINTMENT (OUTPATIENT)
Dept: PHYSICAL THERAPY | Facility: REHABILITATION | Age: 47
End: 2017-05-24
Attending: PHYSICAL MEDICINE & REHABILITATION
Payer: COMMERCIAL

## 2017-05-25 ENCOUNTER — OFFICE VISIT (OUTPATIENT)
Dept: RHEUMATOLOGY | Facility: PHYSICIAN GROUP | Age: 47
End: 2017-05-25
Payer: COMMERCIAL

## 2017-05-25 VITALS
BODY MASS INDEX: 33.1 KG/M2 | DIASTOLIC BLOOD PRESSURE: 68 MMHG | RESPIRATION RATE: 12 BRPM | HEART RATE: 80 BPM | SYSTOLIC BLOOD PRESSURE: 112 MMHG | TEMPERATURE: 99 F | WEIGHT: 205 LBS | OXYGEN SATURATION: 98 %

## 2017-05-25 DIAGNOSIS — M79.672 PAIN IN BOTH FEET: ICD-10-CM

## 2017-05-25 DIAGNOSIS — M87.00 AVASCULAR NECROSIS (HCC): Chronic | ICD-10-CM

## 2017-05-25 DIAGNOSIS — M79.671 PAIN IN BOTH FEET: ICD-10-CM

## 2017-05-25 DIAGNOSIS — M79.89 SWELLING OF LOWER EXTREMITY: ICD-10-CM

## 2017-05-25 DIAGNOSIS — M25.50 ARTHRALGIA, UNSPECIFIED JOINT: ICD-10-CM

## 2017-05-25 PROCEDURE — 99214 OFFICE O/P EST MOD 30 MIN: CPT | Performed by: INTERNAL MEDICINE

## 2017-05-25 RX ORDER — ALENDRONATE SODIUM 70 MG/1
70 TABLET ORAL
COMMUNITY
End: 2017-12-14

## 2017-05-25 ASSESSMENT — ENCOUNTER SYMPTOMS
BACK PAIN: 1
CHILLS: 0
SHORTNESS OF BREATH: 0
MYALGIAS: 1
FEVER: 0

## 2017-05-25 NOTE — MR AVS SNAPSHOT
Janett Ann Page   2017 10:30 AM   Office Visit   MRN: [id number]    Department:  Rheumatology Med [facility]   Dept Phone:  [phone]    Description:  Female : 1970   Provider:  Starr Jackson M.D.           Reason for Visit     Follow-Up follow up      Allergies as of 2017     Allergen Noted Reactions    Morphine 2008   Rash    Other Food 2013   Rash    Onions-cause headaches and facial flushing      Vital Signs     Blood Pressure Pulse Temperature Respirations Weight Oxygen Saturation    112/68 mmHg 80 37.2 °C (99 °F) 12 92.987 kg (205 lb) 98%    Breastfeeding? Smoking Status                No Never Smoker           Basic Information     Date Of Birth Sex Race Ethnicity Preferred Language    1970 Female White Non- English      Your appointments     May 31, 2017 11:45 AM   PT Follow Up 30 Minutes with Laura Marx P.T.   [facility] Physical Therapy [facility] ([address] [facility])    [address] [place] [address].  [address]  [facility] [id number] [phone]            2017 11:00 AM   Established Patient with Franklin Gonzalez M.D.   [facility] ([facility])    [address]  [facility] [id number] [phone]           You will be receiving a confirmation call a few days before your appointment from our automated call confirmation system.            2017  8:00 AM   Follow Up Visit with Starr Jackson M.D.   [facility] (--)    [address]  [facility] [id number] [phone]           You will be receiving a confirmation call a few days before your appointment from our automated call confirmation system.              Problem List              ICD-10-CM Priority Class Noted - Resolved    S/P appendectomy Z90.49   2011 - Present    Left hip revision  M21.959   2011 - Present    Left ankle surgery M25.572   2011 - Present    Right hip arthroscopy  M25.551   2011 - Present    Low back pain (Chronic)  M54.5   6/1/2011 - Present    Cervical pain (neck) M54.2   6/1/2011 - Present    IBS (irritable bowel syndrome) K58.9   6/1/2011 - Present    Preventative health care (Chronic) Z00.00   6/1/2011 - Present    Varicose vein I86.8   10/7/2011 - Present    S/P bunionectomy Z98.890   6/7/2013 - Present    Allergic rhinitis due to animal hair and dander (Chronic) J30.81   9/12/2013 - Present    Dyslipidemia E78.5   9/18/2015 - Present    Perimenopausal (Chronic) N95.1   9/25/2015 - Present    Foot pain, right (Chronic) M79.671   9/30/2015 - Present    Obesity E66.9   9/16/2016 - Present    History of rheumatic fever (Chronic) Z86.79   9/16/2016 - Present    Avascular necrosis (CMS-HCC) (Chronic) M87.00   2/12/2017 - Present      Health Maintenance        Date Due Completion Dates    MAMMOGRAM 10/26/2017 10/26/2016, 9/15/2015, 9/8/2014, 8/29/2013, 4/22/2004    PAP SMEAR 10/7/2018 10/7/2015    IMM DTaP/Tdap/Td Vaccine (2 - Td) 9/25/2025 9/25/2015            Current Immunizations     Influenza TIV (IM) 12/9/2013, 1/9/2013    Influenza Vaccine Quad Inj (Preserved) 12/13/2016, 10/20/2015  2:00 AM, 11/19/2014    Pneumococcal polysaccharide vaccine (PPSV-23) 1/6/2017 11:10 AM    Tdap Vaccine 9/25/2015 11:06 AM    Tuberculin Skin Test 5/5/2014  1:40 PM, 6/3/2013  1:20 PM, 6/4/2012 12:30 PM, 7/1/2011      Below and/or attached are the medications your provider expects you to take. Review all of your home medications and newly ordered medications with your provider and/or pharmacist. Follow medication instructions as directed by your provider and/or pharmacist. Please keep your medication list with you and share with your provider. Update the information when medications are discontinued, doses are changed, or new medications (including over-the-counter products) are added; and carry medication information at all times in the event of emergency situations     Allergies:  MORPHINE - Rash     OTHER FOOD - Rash                  Medications  Valid as of: May 25, 2017 - 11:38 AM    Generic Name Brand Name Tablet Size Instructions for use    Alendronate Sodium (Tab) FOSAMAX 70 MG Take 70 mg by mouth every 7 days.        Ascorbic Acid (Tab) ascorbic acid 500 MG Take 500 mg by mouth as needed.        Boswellia-Glucosamine-Vit D   Take  by mouth every day.        Calcium Citrate-Vitamin D   Take  by mouth.        Cholecalciferol (Tab) cholecalciferol 1000 UNIT Take 1,000 Units by mouth every day.        Fluticasone Propionate (Suspension) FLONASE 50 MCG/ACT Spray 2 Sprays in nose every day.        Loratadine (Tab) CLARITIN 10 MG Take 10 mg by mouth as needed.        Multiple Vitamins-Minerals (Tab) THERAGRAN-M  Take 1 Tab by mouth every day.        Naproxen (Tab) NAPROSYN 500 MG Take 500 mg by mouth 2 times a day, with meals.        Penicillin V Potassium (Tab) VEETID 250 MG Take 250 mg by mouth 4 times a day.        TraMADol HCl (Tab) ULTRAM 50 MG Take 1 Tab by mouth every 8 hours as needed.        Turmeric   Take  by mouth.        Vitamin E (Cap) VITAMIN E 400 UNIT Take 400 Units by mouth every day.        .                 Medicines prescribed today were sent to:     [facility] - [place], [place] - [address] [address] [address]    Phone: [phone] Fax: [phone]    Open 24 Hours?: No      Medication refill instructions:       If your prescription bottle indicates you have medication refills left, it is not necessary to call your provider’s office. Please contact your pharmacy and they will refill your medication.    If your prescription bottle indicates you do not have any refills left, you may request refills at any time through one of the following ways: The online [employer] system (except Urgent Care), by calling your provider’s office, or by asking your pharmacy to contact your provider’s office with a refill request. Medication refills are processed only during regular business hours and may not be  available until the next business day. Your provider may request additional information or to have a follow-up visit with you prior to refilling your medication.   *Please Note: Medication refills are assigned a new Rx number when refilled electronically. Your pharmacy may indicate that no refills were authorized even though a new prescription for the same medication is available at the pharmacy. Please request the medicine by name with the pharmacy before contacting your provider for a refill.        Other Notes About Your Plan     5/10/17 [email or web address] lacy endocrinology note recommend fosamax weekly with anecdotal evidence that bisphosphonate therapy may provide symptomatic benefit and osteonecrosis, if develops side effects could consider intravenous reclast, referral [facility], follow-up 4 months                     MyChart Access Code: Activation code not generated  Current [employer] Status: Active

## 2017-05-25 NOTE — Clinical Note
[facility]   [address], [address]  [place] [address]  Phone: [phone]  Fax: [phone]              Encounter Date: 5/25/2017    Dear Dr. Gonzalez,    It was a pleasure seeing your patient, Janett Mcnulty, on 5/25/2017. Diagnoses of Avascular necrosis (CMS-HCC), Pain in both feet, Arthralgia, unspecified joint, and Swelling of lower extremity were pertinent to this visit.     Please find attached progress note which includes the history I obtained from Ms. Mcnulty, my physical examination findings, my impression and recommendations.      Once again, it was a pleasure participating in your patient's care.  Please feel free to contact me if you have any questions or if I can be of any further assistance to your patients.      Sincerely,    Starr Jackson M.D.  Electronically Signed          PROGRESS NOTE:  Subjective:   Date of Consultation:5/25/2017 10:37 AM  Primary care physician:Franklin Gonzalez M.D.      Reason for Consultation:  Ms. Mcnulty  is a pleasant 46 y.o. year old female who presents for follow-up of arthritis and multifocal avascular necrosis    Rheumatic Fever  Following cardiology  Has been seen by ID    Arthritis  She is experiencing increasing arthralgia in her feet    Lower extremity swelling  Previous DVT studies were negative.  Persistent lower extremity swelling.  With elevation swelling improves.    Avascular necrosis of multiple joints  She did see bone endocrinology and was started on alendronate.  She will follow-up in 4 weeks   She is tolerating her medications.    Increase tripping  Noted a few weeks ago she noted tripping a lot but has not happened of recently  No weakness no numbness no tingling    Past Medical History   Diagnosis Date   • Anesthesia      vomitted with tonsils   • Arthritis      osteoarthritis   • Avascular necrosis (CMS-HCC) 11/2016     in many joints   • Rheumatic fever 3/21/17     Working on ruling out possible diagnoses-on  penicillin(started 9/2016)   • Pain 6/5/13     right foot   • Pain 3/21/17     to joints related to avascular necrosis.     Past Surgical History   Procedure Laterality Date   • Pr inj dx/ther agnt paravert facet joint, gómez*  1/21/2011     Performed by EDMAR ELIAS at [facility]   • Pr inj dx/ther agnt paravert facet joint, gómez*  2/25/2011     Performed by EDMAR ELIAS at [facility]   • Pr dest,paravertebral,l/s,single  3/4/2011     Performed by EDMAR ELIAS at [facility]   • Inj,sacroiliac,anes/steroid  5/13/2011     Performed by EDMAR ELIAS at [facility]   • Pr inject nerv blck,stellate ganglion  9/2/2011     Performed by EDMAR ELIAS at [facility]   • Neuro dest facet l/s w/ig sngl  3/2/2012     Performed by EDMAR ELIAS at [facility]   • Neuro dest facet l/s w/ig addl  3/2/2012     Performed by EDMAR ELIAS at [facility]   • Neuro dest facet l/s w/ig addl  3/2/2012     Performed by EDMAR ELIAS at [facility]   • Neuro dest facet l/s w/ig sngl  3/9/2012     Performed by EDMAR ELIAS at [facility]   • Neuro dest facet l/s w/ig addl  3/9/2012     Performed by EDMAR ELIAS at [facility]   • Neuro dest facet l/s w/ig addl  3/9/2012     Performed by EDMAR ELIAS at [facility]   • Neuro dest facet l/s w/ig sngl  12/28/2012     Performed by Edmar Elias D.O. at [facility]   • Neuro dest facet l/s w/ig addl  12/28/2012     Performed by Edmar Elias D.O. at [facility]   • Neuro dest facet l/s w/ig addl  12/28/2012     Performed by Edmar Elias D.O. at [facility]   • Neuro dest facet l/s w/ig sngl  1/4/2013     Performed by Edmar [facility]as D.O. at [facility]   • Neuro dest facet l/s w/ig addl  1/4/2013     Performed  by Farhan Rees D.O. at [facility]   • Neuro dest facet l/s w/ig addl  1/4/2013     Performed by Farhan Rees D.O. at [facility]   • Hip arthroscopy Right 2009      revision   • Tonsillectomy  2001   • Other Left 2007     left ankle tendon repair   • Appendectomy  1991   • Other  2013     uterine ablation   • Bunionectomy  6/7/2013     Performed by Oliver Pratt D.P.M. at [facility]   • Neuro dest facet l/s w/ig sngl  9/20/2013     Performed by Farhan Rees D.O. at [facility]   • Neuro dest facet l/s w/ig addl  9/20/2013     Performed by Farhan Rees D.O. at [facility]   • Neuro dest facet l/s w/ig addl  9/20/2013     Performed by Farhan Rees D.O. at [facility]   • Neuro dest facet l/s w/ig addl  9/20/2013     Performed by Farhan Rees D.O. at [facility]   • Neuro dest facet l/s w/ig sngl  10/4/2013     Performed by Farhan Rees D.O. at [facility]   • Neuro dest facet l/s w/ig addl  10/4/2013     Performed by Farhan Rees D.O. at [facility]   • Neuro dest facet l/s w/ig addl  10/4/2013     Performed by Farhan Rees D.O. at [facility]   • Neuro dest facet l/s w/ig addl  10/4/2013     Performed by Farhan Rees D.O. at [facility]   • Inj,sacroiliac,anes/steroid  11/8/2013     Performed by Farhan Rees D.O. at [facility]   • Inj,sacroiliac,anes/steroid  12/6/2013     Performed by Farhan Rees D.O. at [facility]   • Inj,sacroiliac,anes/steroid  1/10/2014     Performed by Farhan Rees D.O. at [facility]   • Inj,sacroiliac,anes/steroid  1/10/2014     Performed by Farhan Rees D.O. at [facility]   • Neuro dest facet l/s w/ig sngl  5/16/2014     Performed by Farhan Rees D.O. at [facility]   •  Neuro dest facet l/s w/ig addl  5/16/2014     Performed by Farhan Rees D.O. at [facility]   • Neuro dest facet l/s w/ig addl  5/16/2014     Performed by Farhan Rees D.O. at [facility]   • Neuro dest facet l/s w/ig addl  5/16/2014     Performed by Farhan Rees D.O. at [facility]   • Neuro dest facet l/s w/ig sngl  6/6/2014     Performed by Farhan Rees D.O. at [facility]   • Neuro dest facet l/s w/ig addl  6/6/2014     Performed by Farhan Rees D.O. at [facility]   • Neuro dest facet l/s w/ig addl  6/6/2014     Performed by Farhan Rees D.O. at [facility]   • Neuro dest facet l/s w/ig addl  6/6/2014     Performed by Farhan Rees D.O. at [facility]   • Toe arthroplasty  7/7/2014     Performed by Oliver Pratt D.P.M. at [facility]   • Inj,sacroiliac,anes/steroid  7/11/2014     Performed by Farhan Rees D.O. at [facility]   • Inj,sacroiliac,anes/steroid  7/11/2014     Performed by Farhan Rees D.O. at [facility]   • Neuro dest facet l/s w/ig sngl  2/6/2015     Performed by Farhan Rees D.O. at [facility]   • Neuro dest facet l/s w/ig addl  2/6/2015     Performed by Farhan Rees D.O. at [facility]   • Neuro dest facet l/s w/ig addl  2/6/2015     Performed by Farhan Rees D.O. at [facility]   • Neuro dest facet l/s w/ig sngl  2/13/2015     Performed by Farhan Rees D.O. at [facility]   • Neuro dest facet l/s w/ig addl  2/13/2015     Performed by Farhan Rees D.O. at [facility]   • Neuro dest facet l/s w/ig addl  2/13/2015     Performed by Farhan Rees D.O. at [facility]   • Inj,sacroiliac,anes/steroid  4/3/2015     Performed by Farhan Rees D.O. at [facility]   •  Inj,sacroiliac,anes/steroid  4/3/2015     Performed by Farhan Rees D.O. at [facility]   • Pr dstr nrolytc agnt parverteb fct sngl lmbr/sacral Left 10/9/2015     Procedure: NEURO DEST FACET L/S W/IG SNGL - L3-S1;  Surgeon: Farhan Rees D.O.;  Location: [facility];  Service: Pain Management   • Pr dstr nrolytc agnt parverteb fct addl lmbr/sacral  10/9/2015     Procedure: NEURO DEST FACET L/S W/IG ADDL;  Surgeon: Farhan Rees D.O.;  Location: [facility];  Service: Pain Management   • Pr inject rx other periph nerve  10/9/2015     Procedure: NEUROLYTIC DEST-OTHER NERVE;  Surgeon: Farhan Rees D.O.;  Location: [facility];  Service: Pain Management   • Pr dstr nrolytc agnt parverteb fct addl lmbr/sacral  10/9/2015     Procedure: NEURO DEST FACET L/S W/IG ADDL;  Surgeon: Farhan Rees D.O.;  Location: [facility];  Service: Pain Management   • Pr dstr nrolytc agnt parverteb fct sngl lmbr/sacral Right 10/16/2015     Procedure: NEURO DEST FACET L/S W/IG SNGL - L3-S1;  Surgeon: Farhan Rees D.O.;  Location: [facility];  Service: Pain Management   • Pr dstr nrolytc agnt parverteb fct addl lmbr/sacral  10/16/2015     Procedure: NEURO DEST FACET L/S W/IG ADDL;  Surgeon: Farhan Rees D.O.;  Location: [facility];  Service: Pain Management   • Pr inject rx other periph nerve  10/16/2015     Procedure: NEUROLYTIC DEST-OTHER NERVE;  Surgeon: Farhan Rees D.O.;  Location: [facility];  Service: Pain Management   • Pr dstr nrolytc agnt parverteb fct addl lmbr/sacral  10/16/2015     Procedure: NEURO DEST FACET L/S W/IG ADDL;  Surgeon: Farhan Rees D.O.;  Location: [facility];  Service: Pain Management   • Pr dstr nrolytc agnt parverteb fct sngl lmbr/sacral Left 5/6/2016     Procedure: NEURO DEST FACET L/S W/IG SNGL - L3-S1;  Surgeon: Farhan Rees,  ERNESTO;  Location: [facility];  Service: Pain Management   • Pr dstr nrolytc agnt parverteb fct addl lmbr/sacral Left 5/6/2016     Procedure: NEURO DEST FACET L/S W/IG ADDL;  Surgeon: Farhan Rees D.O.;  Location: [facility];  Service: Pain Management   • Pr inject rx other periph nerve Left 5/6/2016     Procedure: NEUROLYTIC DEST-OTHER NERVE;  Surgeon: Farhan Rees D.O.;  Location: [facility];  Service: Pain Management   • Pr dstr nrolytc agnt parverteb fct addl lmbr/sacral  5/6/2016     Procedure: NEURO DEST FACET L/S W/IG ADDL;  Surgeon: Farhan Rees D.O.;  Location: [facility];  Service: Pain Management   • Pr dstr nrolytc agnt parverteb fct sngl lmbr/sacral Right 6/24/2016     Procedure: NEURO DEST FACET L/S W/IG SNGL - L3-S1;  Surgeon: Farhan Rees D.O.;  Location: [facility];  Service: Pain Management   • Pr dstr nrolytc agnt parverteb fct addl lmbr/sacral Right 6/24/2016     Procedure: NEURO DEST FACET L/S W/IG ADDL;  Surgeon: Farhan Rees D.O.;  Location: [facility];  Service: Pain Management   • Pr inject rx other periph nerve Right 6/24/2016     Procedure: NEUROLYTIC DEST-OTHER NERVE;  Surgeon: Farhan Rees D.O.;  Location: [facility];  Service: Pain Management   • Pr dstr nrolytc agnt parverteb fct addl lmbr/sacral  6/24/2016     Procedure: NEURO DEST FACET L/S W/IG ADDL;  Surgeon: Farhan Rees D.O.;  Location: [facility];  Service: Pain Management   • Other orthopedic surgery Left 2001     hip revision(not total)   • Pr dstr nrolytc agnt parverteb fct sngl lmbr/sacral Left 3/24/2017     Procedure: NEURO DEST FACET L/S W/IG SNGL - L3-S1;  Surgeon: Farhan Rees D.O.;  [facility]: [facility];  Service: Pain Management   • Pr dstr nrolytc agnt parverteb fct addl lmbr/sacral Left 3/24/2017     Procedure: NEURO DEST FACET L/S W/IG  ADDL;  Surgeon: Farhan Rees D.O.;  Location: SUR[facility]ERY [facility];  Service: Pain Management   • Pr inject rx other periph nerve Left 3/24/2017     Procedure: NEUROLYTIC DEST-OTHER NERVE;  Surgeon: Farhan Rees D.O.;  Location: [facility];  Service: Pain Management   • Pr dstr nrolytc agnt parverteb fct addl lmbr/sacral  3/24/2017     Procedure: NEURO DEST FACET L/S W/IG ADDL;  Surgeon: Farhan Rees D.O.;  Location: SURGERY [facility];  Service: Pain Management   • Pr dstr nrolytc agnt parverteb fct sngl lmbr/sacral Right 4/7/2017     Procedure: NEURO DEST FACET L/S W/IG SNGL - L3-S1;  Surgeon: Farhan Rees D.O.;  Location: [facility];  Service: Pain Management   • Pr dstr nrolytc agnt parverteb fct addl lmbr/sacral Right 4/7/2017     Procedure: NEURO DEST FACET L/S W/IG ADDL;  Surgeon: Farhan Rees D.O.;  Location: [facility];  Service: Pain Management   • Pr inject rx other periph nerve Right 4/7/2017     Procedure: NEUROLYTIC DEST-OTHER NERVE;  Surgeon: Farhan Rees D.O.;  Location: [facility];  Service: Pain Management   • Pr dstr nrolytc agnt parverteb fct addl lmbr/sacral  4/7/2017     Procedure: NEURO DEST FACET L/S W/IG ADDL;  Surgeon: Farhan Rees D.O.;  Location: SURGERY [facility];  Service: Pain Management     Allergies   Allergen Reactions   • Morphine Rash   • Other Food Rash     Onions-cause headaches and facial flushing     Outpatient Encounter Prescriptions as of 5/25/2017   Medication Sig Dispense Refill   • Calcium Citrate-Vitamin D (CALCIUM + D PO) Take  by mouth.     • Boswellia-Glucosamine-Vit D (GLUCOSAMINE COMPLEX PO) Take  by mouth every day.     • tramadol (ULTRAM) 50 MG Tab Take 1 Tab by mouth every 8 hours as needed. 90 Tab 3   • penicillin v potassium (VEETID) 250 MG Tab Take 250 mg by mouth 4 times a day.     • naproxen (NAPROSYN) 500 MG Tab Take 500 mg by mouth 2  times a day, with meals.     • TURMERIC PO Take  by mouth.     • therapeutic multivitamin-minerals (THERAGRAN-M) Tab Take 1 Tab by mouth every day.     • vitamin D (CHOLECALCIFEROL) 1000 UNIT Tab Take 1,000 Units by mouth every day.     • fluticasone (FLONASE) 50 MCG/ACT nasal spray Spray 2 Sprays in nose every day. (Patient taking differently: Spray 2 Sprays in nose as needed.) 16 g 3   • vitamin e (VITAMIN E) 400 UNIT CAPS Take 400 Units by mouth every day.     • alendronate (FOSAMAX) 70 MG Tab Take 70 mg by mouth every 7 days.     • ascorbic acid (ASCORBIC ACID) 500 MG Tab Take 500 mg by mouth as needed.     • loratadine (CLARITIN) 10 MG TABS Take 10 mg by mouth as needed.       No facility-administered encounter medications on file as of 5/25/2017.       Social History     Social History   • Marital Status:      Spouse Name: N/A   • Number of Children: N/A   • Years of Education: N/A     Occupational History   • Not on file.     Social History Main Topics   • Smoking status: Never Smoker    • Smokeless tobacco: Never Used   • Alcohol Use: 5.4 oz/week     2 Glasses of wine, 7 Standard drinks or equivalent per week      Comment: 2 drinks per week   • Drug Use: No   • Sexual Activity: Not on file     Other Topics Concern   • Not on file     Social History Narrative      History   Smoking status   • Never Smoker    Smokeless tobacco   • Never Used     History   Alcohol Use   • 5.4 oz/week   • 2 Glasses of wine, 7 Standard drinks or equivalent per week     Comment: 2 drinks per week     History   Drug Use No      OB History   No data available      No LMP recorded. Patient has had an ablation.    G P A L     Family History   Problem Relation Age of Onset   • Diabetes     • Hypertension     • Cancer         Review of Systems   Constitutional: Negative for fever and chills.   Respiratory: Negative for shortness of breath.    Cardiovascular: Positive for leg swelling. Negative for chest pain.   Musculoskeletal:  Positive for myalgias, back pain and joint pain.        Mild myalgias since starting alendronate   Skin: Negative for rash.        Objective:   /68 mmHg  Pulse 80  Temp(Src) 37.2 °C (99 °F)  Resp 12  Wt 92.987 kg (205 lb)  SpO2 98%  Breastfeeding? No    Physical Exam   Constitutional: She is oriented to person, place, and time. She appears well-developed and well-nourished. No distress.   HENT:   Head: Normocephalic and atraumatic.   Right Ear: External ear normal.   Left Ear: External ear normal.   Eyes: Conjunctivae are normal. Right eye exhibits no discharge. Left eye exhibits no discharge.   Neck: No thyromegaly present.   Pulmonary/Chest: Effort normal. No stridor. No respiratory distress.   Musculoskeletal: She exhibits edema.   Bilateral edema of lower extremities   No synovitis of the MCP, PIP, Wrists.     Neurological: She is alert and oriented to person, place, and time.   Skin: Skin is warm and dry. No rash noted. She is not diaphoretic. No erythema.   Limited exam.  No palpable purpura, no erythema nodosum, no petechiae  Mild duskiness appreciated in toes that improves with elevation.  Capillary refill is ~ 2 sec   Psychiatric: She has a normal mood and affect. Her behavior is normal. Judgment and thought content normal.         Labs    Lab Results   Component Value Date/Time    QUANTIFERON TB GOLD Negative 12/01/2016 04:04 PM     Lab Results   Component Value Date/Time    HEPATITIS B CCORE AB, TOTAL Negative 12/01/2016 04:04 PM    HEPATITIS B SURFACE ANTIGEN Negative 12/01/2016 04:04 PM     Lab Results   Component Value Date/Time    HEPATITIS C ANTIBODY Negative 12/01/2016 04:04 PM     Lab Results   Component Value Date/Time    SODIUM 136 03/06/2017 02:12 PM    POTASSIUM 3.9 03/06/2017 02:12 PM    CHLORIDE 103 03/06/2017 02:12 PM    CO2 25 03/06/2017 02:12 PM    GLUCOSE 79 03/06/2017 02:12 PM    BUN 20 03/06/2017 02:12 PM    CREATININE 0.80 03/06/2017 02:12 PM      Lab Results   Component  Value Date/Time    WBC 5.6 04/01/2017 12:03 PM    RBC 4.05* 04/01/2017 12:03 PM    HEMOGLOBIN 12.9 04/01/2017 12:03 PM    HEMATOCRIT 38.9 04/01/2017 12:03 PM    MCV 96.0 04/01/2017 12:03 PM    MCH 31.9 04/01/2017 12:03 PM    MCHC 33.2* 04/01/2017 12:03 PM    MPV 10.4 04/01/2017 12:03 PM    NEUTROPHILS-POLYS 53.90 04/01/2017 12:03 PM    LYMPHOCYTES 29.40 04/01/2017 12:03 PM    MONOCYTES 9.30 04/01/2017 12:03 PM    EOSINOPHILS 6.10 04/01/2017 12:03 PM    BASOPHILS 1.10 04/01/2017 12:03 PM      Lab Results   Component Value Date/Time    CALCIUM 9.6 03/06/2017 02:12 PM    AST(SGOT) 15 12/01/2016 04:04 PM    ALT(SGPT) 31 12/01/2016 04:04 PM    ALKALINE PHOSPHATASE 48 12/01/2016 04:04 PM    TOTAL BILIRUBIN 0.4 12/01/2016 04:04 PM    ALBUMIN 4.16 04/01/2017 12:03 PM    TOTAL PROTEIN 6.60 04/01/2017 12:03 PM     Lab Results   Component Value Date/Time    URIC ACID 4.9 12/01/2016 04:04 PM    RHEUMATOID FACTOR -NEPH- <10 11/08/2016 01:17 PM    CCP ANTIBODIES 2 12/01/2016 04:04 PM    ANTINUCLEAR ANTIBODY None Detected 12/01/2016 04:03 PM     Lab Results   Component Value Date/Time    SED RATE WESTERGREN 19 05/04/2017 02:19 PM    STAT C-REACTIVE PROTEIN 0.32 05/04/2017 02:19 PM     Lab Results   Component Value Date/Time    DILUTE DANNY VIPER VENOM MARY KAY 37.4 03/06/2017 02:12 PM    LUPUS ANTICOAGULANT Not Present 03/06/2017 02:12 PM     Lab Results   Component Value Date/Time    C3 COMPLEMENT 152.0 12/01/2016 04:04 PM    COMPLEMENT C4 28.0 12/01/2016 04:04 PM    ANTI-CARIOLIPIN AB IGG 2 03/06/2017 02:12 PM    ANTI-CARDIOLIPIN AB IGM 6 03/06/2017 02:12 PM    ANTI-CARDIOLIPIN AB IGA 1 03/06/2017 02:12 PM    CRYOGLOBULINS NEG 72Hour 03/10/2017 12:08 PM     Lab Results   Component Value Date/Time    ANTI-DNA -DS None Detected 12/01/2016 04:04 PM    RNP ANTIBODIES 0 12/01/2016 04:04 PM    SMITH ANTIBODIES 0 12/01/2016 04:04 PM    ANTI-SCL-70 0 11/08/2016 01:17 PM     Lab Results   Component Value Date/Time    ANTI-DNA -DS None  Detected 12/01/2016 04:04 PM    ANCA IGG <1:20 12/01/2016 04:04 PM    C3 COMPLEMENT 152.0 12/01/2016 04:04 PM    RNP ANTIBODIES 0 12/01/2016 04:04 PM    SJOGREN'S ANTI-SS-B 0 12/01/2016 04:04 PM     Lab Results   Component Value Date/Time    COLOR Lt. Yellow 04/01/2017 12:03 PM    SPECIFIC GRAVITY 1.006 04/01/2017 12:03 PM    PH 6.0 04/01/2017 12:03 PM    GLUCOSE Negative 04/01/2017 12:03 PM    KETONES Negative 04/01/2017 12:03 PM    PROTEIN Negative 04/01/2017 12:03 PM     Lab Results   Component Value Date/Time    TOTAL PROTEIN, URINE See Note 04/01/2017 12:03 PM    TOTAL VOLUME Random 04/01/2017 12:03 PM     Lab Results   Component Value Date/Time    SSA 60 (R0)(KATHERINE) AB, IGG 0 12/01/2016 04:04 PM    SSA 52 (R0)(KATHERINE) AB, IGG 5 12/01/2016 04:04 PM     No results found for: HBA1C  Lab Results   Component Value Date/Time    CPK TOTAL 92 03/06/2017 02:12 PM     Lab Results   Component Value Date/Time    G-6-PD 13.7 11/18/2016 03:18 PM     No results found for: [id number]  Lab Results   Component Value Date/Time    ACE 11 11/18/2016 03:18 PM     Lab Results   Component Value Date/Time    25-HYDROXY   VITAMIN D 25 31 10/10/2016 02:25 PM     No results found for: TSH, FREEDIR  Lab Results   Component Value Date/Time    TSH 1.530 11/28/2016 05:54 PM     Lab Results   Component Value Date/Time    MICROSOMAL -TPO- ABS 0.5 11/08/2016 01:17 PM    ANTI-THYROGLOBULIN <0.9 12/01/2016 04:04 PM     Lab Results   Component Value Date/Time    LYME DISEASE ABS, IGG 0.49 11/08/2016 01:17 PM     Lab Results   Component Value Date/Time    F-ACTIN AB, IGG 22* 11/08/2016 01:17 PM     Lab Results   Component Value Date/Time    IMMUNOGLOBULIN A 131 04/01/2017 12:03 PM     No results found for: FLTYPE, CRYSTALSBDF  No results found for: ISTATICAL  No results found for: ISTATCREAT  No results found for: CTELOPEP  No results found for: GBMABG  No results found for: PTHINTACT         Assessment:     1. Avascular necrosis (CMS-HCC)     2. Pain  in both feet     3. Arthralgia, unspecified joint     4. Swelling of lower extremity           Medical Decision Making:  Today's Assessment / Status / Plan:     Ms. Janett Mcnulty first presented to me in November 2016 following a diagnosis of streptococcal pharyngitis and then erythema nodosum noted by PCP and diagnosis of rheumatic fever. At the time she presented with polyarthralgias.  She was started on prednisone 20 mg and increased to 40 mg. Subsequently we obtain an MRI that showed avascular necrosis of the knee. Further evaluation did identify additional joints also with osteonecrosis. We have since gradually tapered off prednisone. Unclear if the short course of prednisone could be the etiology of her multifocal osteonecrosis. Hypercoagulable workup thus far is negative. Anti-TPO antibody and antithyroglobulin antibody was negative. Her urinalysis did not show any protein or urine.    Rheumatologic workup includes a chest x-ray that was negative for hilar adenopathy, MERA that was negative RF and anti-CCP antibody was negative. HLA-B27 antibody that was negative, ANCA serologies that was negative. Hepatitis panel and syphilis and QuantiFERON were also negative    Arthralgia    RF, CCP, MERA, ANCA , HLA B27 negative.  Her inflammatory markers did normalize with steroids. Following completion of steroids about 2 weeks later she had onset of lower extremity swelling.  Off steroids repeat ESR and CRP were within normal limits    Myalgia  CK is normal    Osteonecrosis - multifocal  Antiphospholipid antibody panels negative, protein C and protein S is normal, anti-thrombin III is normal, Factor Leiden V is normal. Cryoglobulins are negative  She did see bone endocrinology and was started on alendronate    Rheumatic fever  Followed by cardiology and ID  On antibiotics    Encounter high risk medications  Off prednisone  Advise to continue calcium and vitamin D    Lower extremity swelling  Venous  insufficiency??  PAOLA were negative.  Her symptoms seems to be persistent but not worsening.  We will continue to monitor but if this worsens consider additional imaging      1. Avascular necrosis (CMS-HCC)     2. Pain in both feet     3. Arthralgia, unspecified joint     4. Swelling of lower extremity       Return in about 8 weeks (around 7/20/2017).      1. Avascular necrosis (CMS-HCC)     2. Pain in both feet     3. Arthralgia, unspecified joint     4. Swelling of lower extremity        I have spent greater than 50% of this 30 minute visit in counseling/coordination of care with the patient regarding follow-up and discussion of next steps    She agreed and verbalized her agreement and understanding with the current plan. I answered all questions and concerns she has at this time and advised her to call at any time in there interim with questions or concerns in regards to her care.    Thank you for allowing me to participate in her care, I will continue to follow closely.     Please note that this dictation was created using voice recognition software. I have made every reasonable attempt to correct obvious errors, but I expect that there are errors of grammar and possibly content that I did not discover before finalizing the note.

## 2017-05-25 NOTE — PROGRESS NOTES
Subjective:   Date of Consultation:5/25/2017 10:37 AM  Primary care physician:Franklin Gonzalez M.D.      Reason for Consultation:  Ms. Mcnulty  is a pleasant 46 y.o. year old female who presents for follow-up of arthritis and multifocal avascular necrosis    Rheumatic Fever  Following cardiology  Has been seen by ID    Arthritis  She is experiencing increasing arthralgia in her feet    Lower extremity swelling  Previous DVT studies were negative.  Persistent lower extremity swelling.  With elevation swelling improves.    Avascular necrosis of multiple joints  She did see bone endocrinology and was started on alendronate.  She will follow-up in 4 weeks   She is tolerating her medications.    Increase tripping  Noted a few weeks ago she noted tripping a lot but has not happened of recently  No weakness no numbness no tingling    Past Medical History   Diagnosis Date   • Anesthesia      vomitted with tonsils   • Arthritis      osteoarthritis   • Avascular necrosis (CMS-HCC) 11/2016     in many joints   • Rheumatic fever 3/21/17     Working on ruling out possible diagnoses-on penicillin(started 9/2016)   • Pain 6/5/13     right foot   • Pain 3/21/17     to joints related to avascular necrosis.     Past Surgical History   Procedure Laterality Date   • Pr inj dx/ther agnt paravert facet joint, gómez*  1/21/2011     Performed by EDMAR ELIAS at [facility]S   • Pr inj dx/ther agnt paravert facet joint, gómez*  2/25/2011     Performed by EDMAR ELIAS at [facility]S   • Pr dest,paravertebral,l/s,single  3/4/2011     Performed by EDMAR ELIAS at [facility]S   • Inj,sacroiliac,anes/steroid  5/13/2011     Performed by EDMAR ELIAS at [facility] ORS   • Pr inject nerv blck,stellate ganglion  9/2/2011     Performed by EDMAR ELIAS at [facility] ORS   • Neuro dest facet l/s w/ig sngl  3/2/2012     Performed by EDMAR ELIAS at [facility] ORS   • Neuro dest facet l/s w/ig addl  3/2/2012     Performed by EDMAR ELIAS at [facility] ORS   • Neuro dest facet l/s w/ig addl  3/2/2012     Performed by EDMAR ELIAS at [facility] ORS   • Neuro dest facet l/s w/ig sngl  3/9/2012     Performed by EDMAR ELIAS at [facility] ORS   • Neuro dest facet l/s w/ig addl  3/9/2012     Performed by EDMAR ELIAS at [facility] ORS   • Neuro dest facet l/s w/ig addl  3/9/2012     Performed by EDMAR ELIAS at [facility] ORS   • Neuro dest facet l/s w/ig sngl  12/28/2012     Performed by Edmar Elias D.O. at [facility] ORS   • Neuro dest facet l/s w/ig addl  12/28/2012     Performed by Edmar Elias D.O. at [facility] ORS   • Neuro dest facet l/s w/ig addl  12/28/2012     Performed by Edmar Elias D.O. at [facility] ORS   • Neuro dest facet l/s w/ig sngl  1/4/2013     Performed by Edmar Elias D.O. at [facility] ORS   • Neuro dest facet l/s w/ig addl  1/4/2013     Performed by Edmar Elias D.O. at [facility] ORS   • Neuro dest facet l/s w/ig addl  1/4/2013     Performed by Edmar Elias D.O. at [facility] ORS   • Hip arthroscopy Right 2009      revision   • Tonsillectomy  2001   • Other Left 2007     left ankle tendon repair   • Appendectomy  1991   • Other  2013     uterine ablation   • Bunionectomy  6/7/2013     Performed by VESTA GarciaP.KASSANDRA at [facility] ORS   • Neuro dest facet l/s w/ig sngl  9/20/2013     Performed by Edmar Elias D.O. at [facility] ORS   • Neuro dest facet l/s w/ig addl  9/20/2013     Performed by Edmar Elias D.O. at [facility] ORS   • Neuro dest facet l/s w/ig addl  9/20/2013     Performed by Edmar Elias D.O. at [facility]   • Neuro dest facet l/s w/ig addl  9/20/2013     Performed by Edmar  KELSIE Rees D.O. at [facility] ORS   • Neuro dest facet l/s w/ig sngl  10/4/2013     Performed by Farhan Rees D.O. at [facility] ORS   • Neuro dest facet l/s w/ig addl  10/4/2013     Performed by Farhan Rees D.O. at [facility] ORS   • Neuro dest facet l/s w/ig addl  10/4/2013     Performed by Farhan Rees D.O. at [facility] ORS   • Neuro dest facet l/s w/ig addl  10/4/2013     Performed by Farhan Rees D.O. at [facility] ORS   • Inj,sacroiliac,anes/steroid  11/8/2013     Performed by Farhan Rees D.O. at [facility] ORS   • Inj,sacroiliac,anes/steroid  12/6/2013     Performed by Farhan Rees D.O. at [facility] ORS   • Inj,sacroiliac,anes/steroid  1/10/2014     Performed by Farhan Rees D.O. at [facility] ORS   • Inj,sacroiliac,anes/steroid  1/10/2014     Performed by Farhan Rees D.O. at [facility] ORS   • Neuro dest facet l/s w/ig sngl  5/16/2014     Performed by Farhan Rees D.O. at [facility] ORS   • Neuro dest facet l/s w/ig addl  5/16/2014     Performed by Farhan Rees D.O. at [facility] ORS   • Neuro dest facet l/s w/ig addl  5/16/2014     Performed by Farhan Rees D.O. at [facility] ORS   • Neuro dest facet l/s w/ig addl  5/16/2014     Performed by Farhan Rees D.O. at [facility] ORS   • Neuro dest facet l/s w/ig sngl  6/6/2014     Performed by Farhan Rees D.O. at [facility] ORS   • Neuro dest facet l/s w/ig addl  6/6/2014     Performed by Farhan Rees D.O. at [facility] ORS   • Neuro dest facet l/s w/ig addl  6/6/2014     Performed by Farhan Rees D.O. at [facility] ORS   • Neuro dest facet l/s w/ig addl  6/6/2014     Performed by Farhan Rees D.O. at [facility] ORS   • Toe arthroplasty  7/7/2014     Performed by Oliver KERR  ANTONY Pratt at [facility] ORS   • Inj,sacroiliac,anes/steroid  7/11/2014     Performed by Farhan Rees D.O. at [facility] ORS   • Inj,sacroiliac,anes/steroid  7/11/2014     Performed by Farhan Rees D.O. at [facility] ORS   • Neuro dest facet l/s w/ig sngl  2/6/2015     Performed by Farhan Rees D.O. at [facility] ORS   • Neuro dest facet l/s w/ig addl  2/6/2015     Performed by Farhan Rees D.O. at [facility] ORS   • Neuro dest facet l/s w/ig addl  2/6/2015     Performed by Farhan Rees D.O. at [facility] ORS   • Neuro dest facet l/s w/ig sngl  2/13/2015     Performed by Farhan Rees D.O. at [facility] ORS   • Neuro dest facet l/s w/ig addl  2/13/2015     Performed by Farhan Rees D.O. at [facility] ORS   • Neuro dest facet l/s w/ig addl  2/13/2015     Performed by Farhan Rees D.O. at [facility] ORS   • Inj,sacroiliac,anes/steroid  4/3/2015     Performed by Farhan Rees D.O. at [facility] ORS   • Inj,sacroiliac,anes/steroid  4/3/2015     Performed by Farhan Rees D.O. at [facility] ORS   • Pr dstr nrolytc agnt parverteb fct sngl lmbr/sacral Left 10/9/2015     Procedure: NEURO DEST FACET L/S W/IG SNGL - L3-S1;  Surgeon: Farhan Rees D.O.;  Location: [facility];  Service: Pain Management   • Pr dstr nrolytc agnt parverteb fct addl lmbr/sacral  10/9/2015     Procedure: NEURO DEST FACET L/S W/IG ADDL;  Surgeon: Farhan Rees D.O.;  Location: [facility] ORS;  Service: Pain Management   • Pr inject rx other periph nerve  10/9/2015     Procedure: NEUROLYTIC DEST-OTHER NERVE;  Surgeon: Farhan Rees D.O.;  Location: [facility];  Service: Pain Management   • Pr dstr nrolytc agnt parverteb fct addl lmbr/sacral  10/9/2015     Procedure: NEURO DEST FACET L/S W/IG ADDL;  Surgeon: Farhan IGLESIAS  ERNESTO Rees;  Location: SURGERY [facility] ORS;  Service: Pain Management   • Pr dstr nrolytc agnt parverteb fct sngl lmbr/sacral Right 10/16/2015     Procedure: NEURO DEST FACET L/S W/IG SNGL - L3-S1;  Surgeon: Farhan Rees D.O.;  Location: [facility];  Service: Pain Management   • Pr dstr nrolytc agnt parverteb fct addl lmbr/sacral  10/16/2015     Procedure: NEURO DEST FACET L/S W/IG ADDL;  Surgeon: Farhan Rees D.O.;  Location: [facility];  Service: Pain Management   • Pr inject rx other periph nerve  10/16/2015     Procedure: NEUROLYTIC DEST-OTHER NERVE;  Surgeon: Farhan Rees D.O.;  Location: [facility]ERY [facility];  Service: Pain Management   • Pr dstr nrolytc agnt parverteb fct addl lmbr/sacral  10/16/2015     Procedure: NEURO DEST FACET L/S W/IG ADDL;  Surgeon: Farhan Rees D.O.;  Location: SURGERY [facility];  Service: Pain Management   • Pr dstr nrolytc agnt parverteb fct sngl lmbr/sacral Left 5/6/2016     Procedure: NEURO DEST FACET L/S W/IG SNGL - L3-S1;  Surgeon: Farhan Rees D.O.;  Location: [facility]ERY [facility]S;  Service: Pain Management   • Pr dstr nrolytc agnt parverteb fct addl lmbr/sacral Left 5/6/2016     Procedure: NEURO DEST FACET L/S W/IG ADDL;  Surgeon: Farhan Rees D.O.;  Location: SUR[facility]ERY [facility];  Service: Pain Management   • Pr inject rx other periph nerve Left 5/6/2016     Procedure: NEUROLYTIC DEST-OTHER NERVE;  Surgeon: Farhan Rees D.O.;  Location: SUR[facility]ERY [facility];  Service: Pain Management   • Pr dstr nrolytc agnt parverteb fct addl lmbr/sacral  5/6/2016     Procedure: NEURO DEST FACET L/S W/IG ADDL;  Surgeon: Farhan Rees D.O.;  Location: [facility];  Service: Pain Management   • Pr dstr nrolytc agnt parverteb fct sngl lmbr/sacral Right 6/24/2016     Procedure: NEURO DEST FACET L/S W/IG SNGL - L3-S1;  Surgeon: Farhan Rees D.O.;  Location:  SURGERY [facility] ORS;  Service: Pain Management   • Pr dstr nrolytc agnt parverteb fct addl lmbr/sacral Right 6/24/2016     Procedure: NEURO DEST FACET L/S W/IG ADDL;  Surgeon: Farhan Rees D.O.;  Location: [facility];  Service: Pain Management   • Pr inject rx other periph nerve Right 6/24/2016     Procedure: NEUROLYTIC DEST-OTHER NERVE;  Surgeon: Farhan Rees D.O.;  Location: [facility];  Service: Pain Management   • Pr dstr nrolytc agnt parverteb fct addl lmbr/sacral  6/24/2016     Procedure: NEURO DEST FACET L/S W/IG ADDL;  Surgeon: Farhan Rees D.O.;  Location: [facility];  Service: Pain Management   • Other orthopedic surgery Left 2001     hip revision(not total)   • Pr dstr nrolytc agnt parverteb fct sngl lmbr/sacral Left 3/24/2017     Procedure: NEURO DEST FACET L/S W/IG SNGL - L3-S1;  Surgeon: Farhan Rees D.O.;  Location: [facility];  Service: Pain Management   • Pr dstr nrolytc agnt parverteb fct addl lmbr/sacral Left 3/24/2017     Procedure: NEURO DEST FACET L/S W/IG ADDL;  Surgeon: Farhan Rees D.O.;  Location: [facility];  Service: Pain Management   • Pr inject rx other periph nerve Left 3/24/2017     Procedure: NEUROLYTIC DEST-OTHER NERVE;  Surgeon: Farhan Rees D.O.;  Location: [facility];  Service: Pain Management   • Pr dstr nrolytc agnt parverteb fct addl lmbr/sacral  3/24/2017     Procedure: NEURO DEST FACET L/S W/IG ADDL;  Surgeon: Farhan Rees D.O.;  Location: [facility];  Service: Pain Management   • Pr dstr nrolytc agnt parverteb fct sngl lmbr/sacral Right 4/7/2017     Procedure: NEURO DEST FACET L/S W/IG SNGL - L3-S1;  Surgeon: Farhan G Rees, D.O.;  Location: [facility];  Service: Pain Management   • Pr dstr nrolytc agnt parverteb fct addl lmbr/sacral Right 4/7/2017     Procedure: NEURO DEST FACET L/S W/IG ADDL;  Surgeon:  Farhan Rees D.O.;  Location: [facility];  Service: Pain Management   • Pr inject rx other periph nerve Right 4/7/2017     Procedure: NEUROLYTIC DEST-OTHER NERVE;  Surgeon: Farhan Rees D.O.;  Location: [facility];  Service: Pain Management   • Pr dstr nrolytc agnt parverteb fct addl lmbr/sacral  4/7/2017     Procedure: NEURO DEST FACET L/S W/IG ADDL;  Surgeon: Farhan Rees D.O.;  Location: [facility];  Service: Pain Management     Allergies   Allergen Reactions   • Morphine Rash   • Other Food Rash     Onions-cause headaches and facial flushing     Outpatient Encounter Prescriptions as of 5/25/2017   Medication Sig Dispense Refill   • Calcium Citrate-Vitamin D (CALCIUM + D PO) Take  by mouth.     • Boswellia-Glucosamine-Vit D (GLUCOSAMINE COMPLEX PO) Take  by mouth every day.     • tramadol (ULTRAM) 50 MG Tab Take 1 Tab by mouth every 8 hours as needed. 90 Tab 3   • penicillin v potassium (VEETID) 250 MG Tab Take 250 mg by mouth 4 times a day.     • naproxen (NAPROSYN) 500 MG Tab Take 500 mg by mouth 2 times a day, with meals.     • TURMERIC PO Take  by mouth.     • therapeutic multivitamin-minerals (THERAGRAN-M) Tab Take 1 Tab by mouth every day.     • vitamin D (CHOLECALCIFEROL) 1000 UNIT Tab Take 1,000 Units by mouth every day.     • fluticasone (FLONASE) 50 MCG/ACT nasal spray Spray 2 Sprays in nose every day. (Patient taking differently: Spray 2 Sprays in nose as needed.) 16 g 3   • vitamin e (VITAMIN E) 400 UNIT CAPS Take 400 Units by mouth every day.     • alendronate (FOSAMAX) 70 MG Tab Take 70 mg by mouth every 7 days.     • ascorbic acid (ASCORBIC ACID) 500 MG Tab Take 500 mg by mouth as needed.     • loratadine (CLARITIN) 10 MG TABS Take 10 mg by mouth as needed.       No facility-administered encounter medications on file as of 5/25/2017.       Social History     Social History   • Marital Status:      Spouse Name: N/A   • Number of  Children: N/A   • Years of Education: N/A     Occupational History   • Not on file.     Social History Main Topics   • Smoking status: Never Smoker    • Smokeless tobacco: Never Used   • Alcohol Use: 5.4 oz/week     2 Glasses of wine, 7 Standard drinks or equivalent per week      Comment: 2 drinks per week   • Drug Use: No   • Sexual Activity: Not on file     Other Topics Concern   • Not on file     Social History Narrative      History   Smoking status   • Never Smoker    Smokeless tobacco   • Never Used     History   Alcohol Use   • 5.4 oz/week   • 2 Glasses of wine, 7 Standard drinks or equivalent per week     Comment: 2 drinks per week     History   Drug Use No      OB History   No data available      No LMP recorded. Patient has had an ablation.    G P A L     Family History   Problem Relation Age of Onset   • Diabetes     • Hypertension     • Cancer         Review of Systems   Constitutional: Negative for fever and chills.   Respiratory: Negative for shortness of breath.    Cardiovascular: Positive for leg swelling. Negative for chest pain.   Musculoskeletal: Positive for myalgias, back pain and joint pain.        Mild myalgias since starting alendronate   Skin: Negative for rash.        Objective:   /68 mmHg  Pulse 80  Temp(Src) 37.2 °C (99 °F)  Resp 12  Wt 92.987 kg (205 lb)  SpO2 98%  Breastfeeding? No    Physical Exam   Constitutional: She is oriented to person, place, and time. She appears well-developed and well-nourished. No distress.   HENT:   Head: Normocephalic and atraumatic.   Right Ear: External ear normal.   Left Ear: External ear normal.   Eyes: Conjunctivae are normal. Right eye exhibits no discharge. Left eye exhibits no discharge.   Neck: No thyromegaly present.   Pulmonary/Chest: Effort normal. No stridor. No respiratory distress.   Musculoskeletal: She exhibits edema.   Bilateral edema of lower extremities   No synovitis of the MCP, PIP, Wrists.     Neurological: She is alert  and oriented to person, place, and time.   Skin: Skin is warm and dry. No rash noted. She is not diaphoretic. No erythema.   Limited exam.  No palpable purpura, no erythema nodosum, no petechiae  Mild duskiness appreciated in toes that improves with elevation.  Capillary refill is ~ 2 sec   Psychiatric: She has a normal mood and affect. Her behavior is normal. Judgment and thought content normal.         Labs    Lab Results   Component Value Date/Time    QUANTIFERON TB GOLD Negative 12/01/2016 04:04 PM     Lab Results   Component Value Date/Time    HEPATITIS B CCORE AB, TOTAL Negative 12/01/2016 04:04 PM    HEPATITIS B SURFACE ANTIGEN Negative 12/01/2016 04:04 PM     Lab Results   Component Value Date/Time    HEPATITIS C ANTIBODY Negative 12/01/2016 04:04 PM     Lab Results   Component Value Date/Time    SODIUM 136 03/06/2017 02:12 PM    POTASSIUM 3.9 03/06/2017 02:12 PM    CHLORIDE 103 03/06/2017 02:12 PM    CO2 25 03/06/2017 02:12 PM    GLUCOSE 79 03/06/2017 02:12 PM    BUN 20 03/06/2017 02:12 PM    CREATININE 0.80 03/06/2017 02:12 PM      Lab Results   Component Value Date/Time    WBC 5.6 04/01/2017 12:03 PM    RBC 4.05* 04/01/2017 12:03 PM    HEMOGLOBIN 12.9 04/01/2017 12:03 PM    HEMATOCRIT 38.9 04/01/2017 12:03 PM    MCV 96.0 04/01/2017 12:03 PM    MCH 31.9 04/01/2017 12:03 PM    MCHC 33.2* 04/01/2017 12:03 PM    MPV 10.4 04/01/2017 12:03 PM    NEUTROPHILS-POLYS 53.90 04/01/2017 12:03 PM    LYMPHOCYTES 29.40 04/01/2017 12:03 PM    MONOCYTES 9.30 04/01/2017 12:03 PM    EOSINOPHILS 6.10 04/01/2017 12:03 PM    BASOPHILS 1.10 04/01/2017 12:03 PM      Lab Results   Component Value Date/Time    CALCIUM 9.6 03/06/2017 02:12 PM    AST(SGOT) 15 12/01/2016 04:04 PM    ALT(SGPT) 31 12/01/2016 04:04 PM    ALKALINE PHOSPHATASE 48 12/01/2016 04:04 PM    TOTAL BILIRUBIN 0.4 12/01/2016 04:04 PM    ALBUMIN 4.16 04/01/2017 12:03 PM    TOTAL PROTEIN 6.60 04/01/2017 12:03 PM     Lab Results   Component Value Date/Time    URIC  ACID 4.9 12/01/2016 04:04 PM    RHEUMATOID FACTOR -NEPH- <10 11/08/2016 01:17 PM    CCP ANTIBODIES 2 12/01/2016 04:04 PM    ANTINUCLEAR ANTIBODY None Detected 12/01/2016 04:03 PM     Lab Results   Component Value Date/Time    SED RATE WESTERGREN 19 05/04/2017 02:19 PM    STAT C-REACTIVE PROTEIN 0.32 05/04/2017 02:19 PM     Lab Results   Component Value Date/Time    DILUTE DANNY VIPER VENOM MARY KAY 37.4 03/06/2017 02:12 PM    LUPUS ANTICOAGULANT Not Present 03/06/2017 02:12 PM     Lab Results   Component Value Date/Time    C3 COMPLEMENT 152.0 12/01/2016 04:04 PM    COMPLEMENT C4 28.0 12/01/2016 04:04 PM    ANTI-CARIOLIPIN AB IGG 2 03/06/2017 02:12 PM    ANTI-CARDIOLIPIN AB IGM 6 03/06/2017 02:12 PM    ANTI-CARDIOLIPIN AB IGA 1 03/06/2017 02:12 PM    CRYOGLOBULINS NEG 72Hour 03/10/2017 12:08 PM     Lab Results   Component Value Date/Time    ANTI-DNA -DS None Detected 12/01/2016 04:04 PM    RNP ANTIBODIES 0 12/01/2016 04:04 PM    SMITH ANTIBODIES 0 12/01/2016 04:04 PM    ANTI-SCL-70 0 11/08/2016 01:17 PM     Lab Results   Component Value Date/Time    ANTI-DNA -DS None Detected 12/01/2016 04:04 PM    ANCA IGG <1:20 12/01/2016 04:04 PM    C3 COMPLEMENT 152.0 12/01/2016 04:04 PM    RNP ANTIBODIES 0 12/01/2016 04:04 PM    SJOGREN'S ANTI-SS-B 0 12/01/2016 04:04 PM     Lab Results   Component Value Date/Time    COLOR Lt. Yellow 04/01/2017 12:03 PM    SPECIFIC GRAVITY 1.006 04/01/2017 12:03 PM    PH 6.0 04/01/2017 12:03 PM    GLUCOSE Negative 04/01/2017 12:03 PM    KETONES Negative 04/01/2017 12:03 PM    PROTEIN Negative 04/01/2017 12:03 PM     Lab Results   Component Value Date/Time    TOTAL PROTEIN, URINE See Note 04/01/2017 12:03 PM    TOTAL VOLUME Random 04/01/2017 12:03 PM     Lab Results   Component Value Date/Time    SSA 60 (R0)(KATHERINE) AB, IGG 0 12/01/2016 04:04 PM    SSA 52 (R0)(KATHERINE) AB, IGG 5 12/01/2016 04:04 PM     No results found for: HBA1C  Lab Results   Component Value Date/Time    CPK TOTAL 92 03/06/2017 02:12 PM      Lab Results   Component Value Date/Time    G-6-PD 13.7 11/18/2016 03:18 PM     No results found for: [id number]  Lab Results   Component Value Date/Time    ACE 11 11/18/2016 03:18 PM     Lab Results   Component Value Date/Time    25-HYDROXY   VITAMIN D 25 31 10/10/2016 02:25 PM     No results found for: TSH, FREEDIR  Lab Results   Component Value Date/Time    TSH 1.530 11/28/2016 05:54 PM     Lab Results   Component Value Date/Time    MICROSOMAL -TPO- ABS 0.5 11/08/2016 01:17 PM    ANTI-THYROGLOBULIN <0.9 12/01/2016 04:04 PM     Lab Results   Component Value Date/Time    LYME DISEASE ABS, IGG 0.49 11/08/2016 01:17 PM     Lab Results   Component Value Date/Time    F-ACTIN AB, IGG 22* 11/08/2016 01:17 PM     Lab Results   Component Value Date/Time    IMMUNOGLOBULIN A 131 04/01/2017 12:03 PM     No results found for: FLTYPE, CRYSTALSBDF  No results found for: ISTATICAL  No results found for: ISTATCREAT  No results found for: CTELOPEP  No results found for: GBMABG  No results found for: PTHINTACT         Assessment:     1. Avascular necrosis (CMS-HCC)     2. Pain in both feet     3. Arthralgia, unspecified joint     4. Swelling of lower extremity           Medical Decision Making:  Today's Assessment / Status / Plan:     Ms. Janett Mcnulty first presented to me in November 2016 following a diagnosis of streptococcal pharyngitis and then erythema nodosum noted by PCP and diagnosis of rheumatic fever. At the time she presented with polyarthralgias.  She was started on prednisone 20 mg and increased to 40 mg. Subsequently we obtain an MRI that showed avascular necrosis of the knee. Further evaluation did identify additional joints also with osteonecrosis. We have since gradually tapered off prednisone. Unclear if the short course of prednisone could be the etiology of her multifocal osteonecrosis. Hypercoagulable workup thus far is negative. Anti-TPO antibody and antithyroglobulin antibody was negative. Her  urinalysis did not show any protein or urine.    Rheumatologic workup includes a chest x-ray that was negative for hilar adenopathy, MERA that was negative RF and anti-CCP antibody was negative. HLA-B27 antibody that was negative, ANCA serologies that was negative. Hepatitis panel and syphilis and QuantiFERON were also negative    Arthralgia    RF, CCP, MERA, ANCA , HLA B27 negative.  Her inflammatory markers did normalize with steroids. Following completion of steroids about 2 weeks later she had onset of lower extremity swelling.  Off steroids repeat ESR and CRP were within normal limits    Myalgia  CK is normal    Osteonecrosis - multifocal  Antiphospholipid antibody panels negative, protein C and protein S is normal, anti-thrombin III is normal, Factor Leiden V is normal. Cryoglobulins are negative  She did see bone endocrinology and was started on alendronate    Rheumatic fever  Followed by cardiology and ID  On antibiotics    Encounter high risk medications  Off prednisone  Advise to continue calcium and vitamin D    Lower extremity swelling  Venous insufficiency??  PAOLA were negative.  Her symptoms seems to be persistent but not worsening.  We will continue to monitor but if this worsens consider additional imaging      1. Avascular necrosis (CMS-HCC)     2. Pain in both feet     3. Arthralgia, unspecified joint     4. Swelling of lower extremity       Return in about 8 weeks (around 7/20/2017).      1. Avascular necrosis (CMS-HCC)     2. Pain in both feet     3. Arthralgia, unspecified joint     4. Swelling of lower extremity        I have spent greater than 50% of this 30 minute visit in counseling/coordination of care with the patient regarding follow-up and discussion of next steps    She agreed and verbalized her agreement and understanding with the current plan. I answered all questions and concerns she has at this time and advised her to call at any time in there interim with questions or concerns in  regards to her care.    Thank you for allowing me to participate in her care, I will continue to follow closely.     Please note that this dictation was created using voice recognition software. I have made every reasonable attempt to correct obvious errors, but I expect that there are errors of grammar and possibly content that I did not discover before finalizing the note.

## 2017-05-31 ENCOUNTER — HOSPITAL ENCOUNTER (OUTPATIENT)
Dept: PHYSICAL THERAPY | Facility: REHABILITATION | Age: 47
End: 2017-05-31
Attending: PHYSICAL MEDICINE & REHABILITATION
Payer: COMMERCIAL

## 2017-05-31 PROCEDURE — 97110 THERAPEUTIC EXERCISES: CPT

## 2017-05-31 PROCEDURE — 97140 MANUAL THERAPY 1/> REGIONS: CPT

## 2017-06-23 ENCOUNTER — OFFICE VISIT (OUTPATIENT)
Dept: MEDICAL GROUP | Facility: MEDICAL CENTER | Age: 47
End: 2017-06-23
Payer: COMMERCIAL

## 2017-06-23 VITALS
BODY MASS INDEX: 32.47 KG/M2 | DIASTOLIC BLOOD PRESSURE: 70 MMHG | SYSTOLIC BLOOD PRESSURE: 118 MMHG | HEIGHT: 66 IN | WEIGHT: 202 LBS | TEMPERATURE: 97.9 F | OXYGEN SATURATION: 97 % | HEART RATE: 94 BPM

## 2017-06-23 DIAGNOSIS — M87.00 AVASCULAR NECROSIS (HCC): Chronic | ICD-10-CM

## 2017-06-23 DIAGNOSIS — G89.29 CHRONIC PAIN OF BOTH KNEES: ICD-10-CM

## 2017-06-23 DIAGNOSIS — G58.9 MONONEUROPATHY: ICD-10-CM

## 2017-06-23 DIAGNOSIS — M25.552 PAIN OF BOTH HIP JOINTS: ICD-10-CM

## 2017-06-23 DIAGNOSIS — M25.551 PAIN OF BOTH HIP JOINTS: ICD-10-CM

## 2017-06-23 DIAGNOSIS — M25.562 CHRONIC PAIN OF BOTH KNEES: ICD-10-CM

## 2017-06-23 DIAGNOSIS — M25.561 CHRONIC PAIN OF BOTH KNEES: ICD-10-CM

## 2017-06-23 PROCEDURE — 99214 OFFICE O/P EST MOD 30 MIN: CPT | Performed by: INTERNAL MEDICINE

## 2017-06-23 RX ORDER — GABAPENTIN 300 MG/1
CAPSULE ORAL
Qty: 60 CAP | Refills: 11 | Status: SHIPPED | OUTPATIENT
Start: 2017-06-23 | End: 2018-06-27 | Stop reason: SDUPTHER

## 2017-06-23 ASSESSMENT — PATIENT HEALTH QUESTIONNAIRE - PHQ9: CLINICAL INTERPRETATION OF PHQ2 SCORE: 0

## 2017-06-23 NOTE — MR AVS SNAPSHOT
Janett Ann Page   2017 11:00 AM   Office Visit   MRN: [id number]    Department:  [facility]   Dept Phone:  [phone]    Description:  Female : 1970   Provider:  Franklin Gonzalez M.D.           Allergies as of 2017     Allergen Noted Reactions    Morphine 2008   Rash    Other Food 2013   Rash    Onions-cause headaches and facial flushing      You were diagnosed with     Avascular necrosis (CMS-HCC)   [671163]         Vital Signs     Blood Pressure Pulse Temperature Weight Oxygen Saturation Smoking Status    118/70 mmHg 94 36.6 °C (97.9 °F) 91.627 kg (202 lb) 97% Never Smoker       Basic Information     Date Of Birth Sex Race Ethnicity Preferred Language    1970 Female White Non- English      Your appointments     2017  8:00 AM   Follow Up Visit with Starr Jackson M.D.   [facility] (--)    [address] [place] [address], [address]  [facility] [id number] [phone]           You will be receiving a confirmation call a few days before your appointment from our automated call confirmation system.              Problem List              ICD-10-CM Priority Class Noted - Resolved    S/P appendectomy Z90.49   2011 - Present    Left hip revision  M21.959   2011 - Present    Left ankle surgery M25.572   2011 - Present    Right hip arthroscopy  M25.551   2011 - Present    Low back pain (Chronic) M54.5   2011 - Present    Cervical pain (neck) M54.2   2011 - Present    IBS (irritable bowel syndrome) K58.9   2011 - Present    Preventative health care (Chronic) Z00.00   2011 - Present    Varicose vein I86.8   10/7/2011 - Present    S/P bunionectomy Z98.890   2013 - Present    Allergic rhinitis due to animal hair and dander (Chronic) J30.81   2013 - Present    Dyslipidemia E78.5   2015 - Present    Perimenopausal (Chronic) N95.1   2015 - Present    Foot pain, right (Chronic) M79.671   2015 - Present    Obesity E66.9   9/16/2016 - Present    History of rheumatic fever (Chronic) Z86.79   9/16/2016 - Present    Avascular necrosis (CMS-HCC) (Chronic) M87.00   2/12/2017 - Present      Health Maintenance        Date Due Completion Dates    MAMMOGRAM 10/26/2017 10/26/2016, 9/15/2015, 9/8/2014, 8/29/2013, 4/22/2004    PAP SMEAR 10/7/2018 10/7/2015    IMM DTaP/Tdap/Td Vaccine (2 - Td) 9/25/2025 9/25/2015            Current Immunizations     Influenza TIV (IM) 12/9/2013, 1/9/2013    Influenza Vaccine Quad Inj (Preserved) 12/13/2016, 10/20/2015  2:00 AM, 11/19/2014    Pneumococcal polysaccharide vaccine (PPSV-23) 1/6/2017 11:10 AM    Tdap Vaccine 9/25/2015 11:06 AM    Tuberculin Skin Test 5/5/2014  1:40 PM, 6/3/2013  1:20 PM, 6/4/2012 12:30 PM, 7/1/2011      Below and/or attached are the medications your provider expects you to take. Review all of your home medications and newly ordered medications with your provider and/or pharmacist. Follow medication instructions as directed by your provider and/or pharmacist. Please keep your medication list with you and share with your provider. Update the information when medications are discontinued, doses are changed, or new medications (including over-the-counter products) are added; and carry medication information at all times in the event of emergency situations     Allergies:  MORPHINE - Rash     OTHER FOOD - Rash               Medications  Valid as of: June 23, 2017 - 11:40 AM    Generic Name Brand Name Tablet Size Instructions for use    Alendronate Sodium (Tab) FOSAMAX 70 MG Take 70 mg by mouth every 7 days.        Ascorbic Acid (Tab) ascorbic acid 500 MG Take 500 mg by mouth as needed.        Boswellia-Glucosamine-Vit D   Take  by mouth every day.        Calcium Citrate-Vitamin D   Take  by mouth.        Cholecalciferol (Tab) cholecalciferol 1000 UNIT Take 1,000 Units by mouth every day.        Fluticasone Propionate (Suspension) FLONASE 50 MCG/ACT Spray 2 Sprays in nose  every day.        Loratadine (Tab) CLARITIN 10 MG Take 10 mg by mouth as needed.        Multiple Vitamins-Minerals (Tab) THERAGRAN-M  Take 1 Tab by mouth every day.        Naproxen (Tab) NAPROSYN 500 MG Take 500 mg by mouth 2 times a day, with meals.        Penicillin V Potassium (Tab) VEETID 250 MG Take 250 mg by mouth 4 times a day.        TraMADol HCl (Tab) ULTRAM 50 MG Take 1 Tab by mouth every 8 hours as needed.        Turmeric   Take  by mouth.        Vitamin E (Cap) VITAMIN E 400 UNIT Take 400 Units by mouth every day.        .                 Medicines prescribed today were sent to:     [id number] - [place], [place] - [address] [address] [facility]    [address] [facility] [place] [address]    Phone: [phone] Fax: [phone]    Open 24 Hours?: No      Medication refill instructions:       If your prescription bottle indicates you have medication refills left, it is not necessary to call your provider’s office. Please contact your pharmacy and they will refill your medication.    If your prescription bottle indicates you do not have any refills left, you may request refills at any time through one of the following ways: The online [employer] system (except Urgent Care), by calling your provider’s office, or by asking your pharmacy to contact your provider’s office with a refill request. Medication refills are processed only during regular business hours and may not be available until the next business day. Your provider may request additional information or to have a follow-up visit with you prior to refilling your medication.   *Please Note: Medication refills are assigned a new Rx number when refilled electronically. Your pharmacy may indicate that no refills were authorized even though a new prescription for the same medication is available at the pharmacy. Please request the medicine by name with the pharmacy before contacting your provider for a refill.        Other Notes About Your Plan     5/10/17   [facility] endocrinology note recommend fosamax weekly with anecdotal evidence that bisphosphonate therapy may provide symptomatic benefit and osteonecrosis, if develops side effects could consider intravenous reclast, referral [facility], follow-up 4 months                         MyChart Access Code: Activation code not generated  Current [facility] Status: Active

## 2017-06-23 NOTE — PROGRESS NOTES
Subjective:      Janett Mcnulty is a 47 y.o. female who presents with follow up knee pain        HPI  Arrived 11:11  History of rheumatic fever in August of last year, treated with penicillin, developed polyarthralgias subsequently, started on prednisone in September after no improvement with ibuprofen 800 mg 3 times a day, was on prednisone for the next 6 weeks up to 40 mg daily unable to taper, started on penicillin 250 mg twice a day by infectious disease ×5 years, so cardiology, echocardiogram no vegetation, seen by rheumatology in November findings consistent with rheumatic fever, patient's symptoms progressed, and December MRI showed right knee avascular necrosis, January MRI revealed avascular necrosis, in addition to right ankle is moderately avascular necrosis, MRI pelvis avascular necrosis 40% articular surface, seen by allergy immunology locally, and seen recently by [place] [facility].  Also has seen orthopedics locally and at [place].  Patient started on alendronate nearly 2 months ago  Did see [place] [facility] this week and is on fosamax and does have some more muscle pain since starting the medication has had more calf pain and thigh and hip pain, more like muscle pains and stiffness since starting the medication, that part has not gotten worse, bottom of feet more burning sensation especially at night and when walks on the feet. No worsening edema.  We have no records yet from [place] [facility]  the joint pains have been stable not worse  Goes to pool 3-5 times per week  No tobacco, no etoh  No fever or chills  No gerd, no abdominal pain, no nausea or emesis, no dysphagia or odynophagia          Current Outpatient Prescriptions   Medication Sig Dispense Refill   • alendronate (FOSAMAX) 70 MG Tab Take 70 mg by mouth every 7 days.     • Calcium Citrate-Vitamin D (CALCIUM + D PO) Take  by mouth.     • Boswellia-Glucosamine-Vit D (GLUCOSAMINE COMPLEX PO) Take  by mouth  every day.     • tramadol (ULTRAM) 50 MG Tab Take 1 Tab by mouth every 8 hours as needed. 90 Tab 3   • penicillin v potassium (VEETID) 250 MG Tab Take 250 mg by mouth 4 times a day.     • ascorbic acid (ASCORBIC ACID) 500 MG Tab Take 500 mg by mouth as needed.     • naproxen (NAPROSYN) 500 MG Tab Take 500 mg by mouth 2 times a day, with meals.     • TURMERIC PO Take  by mouth.     • therapeutic multivitamin-minerals (THERAGRAN-M) Tab Take 1 Tab by mouth every day.     • vitamin D (CHOLECALCIFEROL) 1000 UNIT Tab Take 1,000 Units by mouth every day.     • fluticasone (FLONASE) 50 MCG/ACT nasal spray Spray 2 Sprays in nose every day. (Patient taking differently: Spray 2 Sprays in nose as needed.) 16 g 3   • vitamin e (VITAMIN E) 400 UNIT CAPS Take 400 Units by mouth every day.     • loratadine (CLARITIN) 10 MG TABS Take 10 mg by mouth as needed.       No current facility-administered medications for this visit.     Patient Active Problem List    Diagnosis Date Noted   • Avascular necrosis (CMS-HCC) 02/12/2017   • Obesity 09/16/2016   • History of rheumatic fever 09/16/2016   • Foot pain, right 09/30/2015   • Perimenopausal 09/25/2015   • Dyslipidemia 09/18/2015   • Allergic rhinitis due to animal hair and dander 09/12/2013   • S/P bunionectomy 06/07/2013   • Varicose vein 10/07/2011   • S/P appendectomy 06/01/2011   • Left hip revision 2002 06/01/2011   • Left ankle surgery 06/01/2011   • Right hip arthroscopy  06/01/2011   • Low back pain 06/01/2011   • Cervical pain (neck) 06/01/2011   • IBS (irritable bowel syndrome) 06/01/2011   • Preventative health care 06/01/2011       Varicose vein excision left lower extremity    Status post bunionectomy  8/9/13  podiatry; right foot martín bunionectomy, right foot first metatarsophalangeal joint bunion/degenerative arthritis     Status post appendectomy    Right hip arthroscopy  2007  ortho right hip arthroscopy  2/11/14  note bilateral  greater trochanter injection under ultrasound guidance  1/20/17 MRI pelvis bilateral femoral head avascular necrosis involving approximately 40% of the articular surface, metallic artifact left femoral head consistent with presence of small metallic pain    Preventative health  11/05 [facility] colon negative  9/25/15 tdap  10/7/15 pap per gyn  negative  9/29/16 chol 190,trig 52,hdl 65,ldl 115  10/110/16 vit d 31  10/26/16 mammogram  1/6/17 pneumovax    Perimenopausal  9/25/15 on climara patch and progesterone tab per  gyn    Low back pain  10/10 MRI LS L5-S1 minimal disc bulge  1/11  at the pain management left L3-S1 facet joint injection, right L3-S1 facet joint injection  2/11  pain management right L3-S1 lateral, medial, dorsal ramus nerve block  4/11  pain management left L3-S1 medial, lateral, bursal ramus nerve block  5/11  pain management bilateral SI joint injection under fluoroscopy  7/11  pain note bilat trochanteric bursitis injection  3/12  pain note, right and left L3-L4 L5-S1 medial, dorsal, lateral branch ablation  12/12  pain note, left L3-S1 dorsal and medial branch radiofrequency ablation  1/4/13  pain note; right L3-S1 radiofrequency ablation  8/14/13  pain note  9/29/13  pain note, left L3-S1 nerve frequency ablation  10/4/13  pain note, status post right L3-S1 FJNA  12/19/13 pain note; status post right SIJI injection, continue voltaren gel, TENS, IT stretches  5/16/14  pain note; left L3-S1 medial, partial, lateral branch radiofrequency ablation under fluoroscopy  6/6/14  right L3-S1 FJNA  7/3/14  pain note; continue TENS,celebrex 200 mg qday, voltaren gel 1%  9/10/14  pain note; continue TENS, lidoderm patch,voltaren gel, celebrex 200 mg qday    Left hip revision  History of left hip open  decompression and osteotomy  12/03  left hip implant removal  11/10  ortho x-ray stable decompression left femoral head neck junction  11/12  pain management note bilateral trochanteric bursitis injection  5/2/13  pain note, bilateral trochanteric bursal injection    Left ankle surgery 2006 ligament reconstruction    IBS    History rheumatic fever  8/29/16 streptococcal pharyngitis positive strep test, treated with augmentin, developed erythema nodosum lower extremities and palms given NSAIDs and medrol dosepack  9/22/16 repeat medrol dose pack x 1 still with painful erythema nodosum nodules  9/24/16 erythema nodosum nodules and arthritic-type pains, we will retreat with penicillin V 500 mg 3 times a day ×10 days  10/5/16 high dose aspirin no benefit, changed to prednisone 20 mg daily, she has an appointment with me in 2 days  10/7/16 echo ordered, EKG done  10/10/16 cbc,cmp,tsh normal,ESR 40,CRP 0.3, repeat throat culture negative, blood cultures negative,  10/19/16 increase prednisone to 40 mg x 3 days then back to 20 mg  10/19/16 daughter diagnosis strep throat given antibiotics, we will treat prophylactically provided patient penicillin V 500 mg tid x 10 days  10/21/16 echo normal LV size and function, EF 65%, trace MR structurally normal mitral valve, mild TR and RVSP 30  10/24/16 on prednisone 40 mg qday unable to taper to 20 mg due to flare up of pain will try 20 mg bid then taper to 20 mg am and 10 mg qpm over the next week  10/26/16  infectious disease recommended secondary prophylaxis penicillin  mg bid x 5 years  11/7/16 still with polyarthritis, on prednisone 20 mg twice a day, repeat labs, still on penicillin, will speak with rheumatology about referral  11/8/16 ESR 15,CRP 0.3,wbc 10.9 (on prednisone),hgb 14,hct 43,cmp normal,RF,C3C4,TPO,lyme,MERA,HLA B27 negative, Factin IgG 22 (0-19),parietal cell Ab 25(0-25)  11/14/16   rheumatolog note, features consistent with rheumatic fever, also consider seronegative spondyloarthropathies, sarcoidosis, rheumatoid arthritis, will check sacroiliac films, hand and feet x-rays, follow-up one week  11/17/16 refill prednisone  11/18/16 ACE serum level normal, G6PD ad vitamin 1,25 d level normal  11/21/16 cxr negative  11/21/16 xray SI joints negative for erosions  11/29/16 x-ray joint survey hands, feet, ankles no evidence of inflammatory arthropathy  11/29/16 CT soft tissue neck without; negative  12/7/16  cardiology repeat echo in 3-4 weeks  12/10/16 MRI right knee multiple areas right knee avascular necrosis medial and lateral femoral condyle, medial and lateral tibial plateau, bone marrow edema  12/23/16  rheumatology note, inflammatory markers did normalize with steroids, rheumatoid factor, CCP, MERA,ANKA negative continue to taper steroids given osteonecrosis alternating 17.5 mg and 15 mg, and 15 mg, and 15 mg alternating with 12.5 mg, and so forth  12/27/16 echo LV ejection fraction 60%, no valvular disease  1/4/17 ESR 8,CRP 0.3  1/12/17  rheumatology note, inflammatory markers did normalize with prednisone therapy, continue taper with osteonecrosis bilateral tenderness achilles insertion consider enthesitis, will get MRI left achilles region to evaluate for tendinitis or tenosynovitis, follow-up 6 weeks  1/20/17 MRI left foot without; no evidence of marrow edema, arthropathy, tendinopathy or ligament injury  3/6/17 anticardiolipin antibodies, lupus anticoagulant, protein CNS, factor V 5 Leyden, anti-thrombin panel, beta 2 glycoprotein negative  3/10/17 ESR 19,CRP 0.17, cortisol 3.3, ACTH 16, IgG 753 ([id number]), IgA 140, IgM 93  3/14/16  rheumatology note currently off prednisone, osteonecrosis involving multiple joints, knees, hips, right ankle, etiology unclear, antiphospholipid antibody, protein C and S normal, antithrombin III factor V leyden normal, refer to  hematology for evaluation other etiologies, slightly decreased IgG refer to allergy immunology  3/14/17 remains on penicillin  3/30/17  allergy immunology evaluation slight IgG reduction 753 with normal IgG, IgM, no history to suggest primary immunodeficiency, recent diagnosis of group A streptococcal pharyngitis complicated by erythema nodosum, 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 therapy, we will perform humerol workup to include repeat quantitative immunoglobulins with subclass, specific antibodies for haemophilus influenza, pneumococcal, tetanus/diphtheria, limited flow cytometry with repeat CBC, SPEP/UPEP, ESR, CRP, UA, follow-up ASO, DNase B titer  4/17/17  allergy note slight IgG reduction at 753 with normal IgA, normal IgM, no history to suggest primary immunodeficiency, suspect that 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 carryover effect from chronic prednisone therapy, cbc unremarkable, inflammatory markers ESR slightly elevated 28, CRP 2.4, urinalysis negative for protein or blood, no further humeral immunodeficiency or lab assessment at this point with normal immunologic titers, intact specific and subclass antibodies, the IgG decrease does not represent any underlying primary immunodeficiency or active antibody dysfunction at this time  4/25/17  rheumatology note await [place] [facility] evaluation  5/10/17 dr.wu lacy endocrinology note recommend fosamax weekly with anecdotal evidence that bisphosphonate therapy may provide symptomatic benefit and osteonecrosis, if develops side effects could consider intravenous reclast, referral [place] [facility], follow-up 4 months  5/28/17  rheumatology note avascular necrosis, arthralgias lower extremities, continue off prednisone, on alendronate per [place] [facility]    Foot pain  8/5/15 MRI right foot severe artifact secondary to first MTP are clear, limiting analysis of soft tissue, highly likely  complete FHL tear  8/28/15  orthopedic note right ankle FHL tendon rupture seen on MRI, do not recommend surgical repair at this time which would involve hemiarthroplasty, implant, first MTP fusion with bone graft, followup podiatry     Dyslipidemia  9/29/14 chol 186,trig 46,hdl 76,ldl 101  9/18/15 chol 225,trig 50,hdl 77,ldl 138    Cervical pain  10/11 MRI cervical spine C4-C5 tiny disc bulge, C5-C6 bilateral and plate spurring with uncinate hypertrophy  10/7/16 MRI cervical spine C5-C6 small broad-based osteophyte complex, borderline foraminal stenosis, no significant progression of disease since 2010    avascular necrosis    8/29/16 streptococcal pharyngitis positive strep test, treated with augmentin, developed erythema nodosum lower extremities and palms given NSAIDs and medrol dosepack  9/22/16 repeat medrol dose pack x 1 still with painful erythema nodosum nodules  10/5/16 high dose aspirin no benefit, changed to prednisone 20 mg daily,  10/19/16 increase prednisone to 40 mg x 3 days then back to 20 mg  11/7/16 still with polyarthritis, on prednisone 20 mg twice a day, repeat labs, still on penicillin, will speak with rheumatology about referral  11/14/16  rheumatolog note, features consistent with rheumatic fever, also consider seronegative spondyloarthropathies, sarcoidosis, rheumatoid arthritis, will check sacroiliac films, hand and feet x-rays, follow-up one week  12/23/16  rheumatology note, inflammatory markers did normalize with steroids, rheumatoid factor, CCP, MERA,ANKA negative continue to taper steroids given osteonecrosis alternating 17.5 mg and 15 mg, and 15 mg, and 15 mg alternating with 12.5 mg, and so forth  12/10/16 MRI right knee multiple areas right knee avascular necrosis medial and lateral femoral condyle, medial and lateral tibial plateau, bone marrow edema  12/23/16  rheumatology note, inflammatory markers did normalize with steroids, rheumatoid  factor, CCP, MERA,ANKA negative continue to taper steroids given osteonecrosis alternating 17.5 mg and 15 mg, and 15 mg, and 15 mg alternating with 12.5 mg, and so forth  1/20/17 MRI left foot no evidence of arthropathy or tendinopathy  1/20/17 MRI left ankle metallic artifact lateral malleolus  1/20/17 MRI right ankle small oblong focus finger edema tibial plafond below subchondral plate, would be consistent with small area of avascular necrosis  1/20/17 MRI left knee multiple areas avascular necrosis surrounding the knee to include femur, tibia, fibula  1/20/17 MRI pelvis bilateral femoral head avascular necrosis involving approximately 40% of the articular surface, metallic artifact left femoral head consistent with presence of small metallic pain  2/9/17 tapering prednisone down to 2.5 mg alternating with 5 mg  2/23/17  rheumatology note complete steroid taper then repeat ESR, CRP and CPK, also check antiphospholipid antibody,antithrombin, factor v leiden, protein s  3/6/17 anticardiolipin antibodies, lupus anticoagulant, protein CNS, factor V 5 Leyden, anti-thrombin panel, beta 2 glycoprotein negative  3/10/17 ESR 19,CRP 0.17, cortisol 3.3, ACTH 16, IgG 753 ([id number]), IgA 140, IgM 93  3/14/16  rheumatology note currently off prednisone, osteonecrosis involving multiple joints, knees, hips, right ankle, etiology unclear, antiphospholipid antibody, protein C and S normal, antithrombin III factor V leyden normal, refer to hematology for evaluation other etiologies, slightly decreased IgG refer to allergy immunology  3/30/17  allergy immunology evaluation slight IgG reduction 753 with normal IgG, IgM, no history to suggest primary immunodeficiency, recent diagnosis of group A streptococcal pharyngitis complicated by erythema nodosum, 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 therapy, we will perform humerol workup to include repeat quantitative immunoglobulins with subclass, specific  antibodies for haemophilus influenza, pneumococcal, tetanus/diphtheria, limited flow cytometry with repeat CBC, SPEP/UPEP, ESR, CRP, UA, follow-up ASO, DNase B titer  5/10/17 dr.wu lacy endocrinology note recommend fosamax weekly with anecdotal evidence that bisphosphonate therapy may provide symptomatic benefit and osteonecrosis, if develops side effects could consider intravenous reclast, referral [facility], follow-up 4 months  5/28/17  rheumatology note avascular necrosis, arthralgias lower extremities, continue off prednisone, on alendronate per [facility]    Allergic rhinitis  Tried otc claritin  9/12/13 flonase trial  9/25/15 flonase and zyrtec or claritin          ROS       Objective:          Physical Exam   Constitutional: She appears well-developed and well-nourished. No distress.   HENT:   Head: Normocephalic and atraumatic.   Neck: No JVD present. No thyromegaly present.   Cardiovascular: Normal rate and regular rhythm.    No murmur heard.  Pulmonary/Chest: Effort normal and breath sounds normal. No respiratory distress. She has no wheezes.   Abdominal: She exhibits no distension.   Neurological: She is alert.   Skin: Skin is warm. She is not diaphoretic.   Psychiatric: She has a normal mood and affect. Her behavior is normal.   Nursing note and vitals reviewed.    Limited right and left hip flexion-extension, right and left knee crepitus, difficulty with full range of motion, difficulty with full extension, tender to palpation knees bilateral, mild effusion bilateral, tender IT band area right and left  Lower extremity edema +1 bilateral  Feet no open sores, lesions, ulcerations          Assessment/Plan:      Assessment   #1 history of streptococcal pharyngitis with probable rheumatic fever by criteria also has subcutaneous nodules and erythema nodosum september of last year, completed initial penicillin therapy for streptococcal pharyngitis, subsequently seen by  infectious disease and cardiology, currently on prophylaxis penicillin 250 mg twice a day ×5 years, developed polyarthralgias was on prednisone starting in September of last year 20 to 40 mg but had difficulty tapering off medication, seen by rheumatology, allergy immunology locally, and subsequently rheumatology at [place]    #2 avascular necrosis bilateral hips, knees, question related to steroid therapy although was not on a significant amount of prednisone for a prolonged period of time, seen by orthopedics locally and at [place], last MRI January, particularly affecting knees with 40% involvement, recommended to consider hip surgery, and was seen by our local orthopedic specialist  and by the [place] orthopedist specialist, seen by bone endocrinologist and for started on alendronate 2 months ago    #3 chronic penicillin prophylaxis 250 mg twice a day ×5 years per infectious disease for rheumatic fever    #4 Fosamax therapy ×2 months for avascular necrosis, no side effects as far as gastrointestinal symptoms but some muscle pain since starting medication    #5 neuropathic pain on feet bilaterally, no history of diabetes          Plan  #1 repeat MRI pelvis particular attention to hips, also repeat MRI knees evaluate status of avascular necrosis as she may be a candidate for surgery, MRI would be useful to monitor progression of disease and to check for response to alendronate, if the MRI especially of the hips shows worsening of avascular necrosis, patient would consider surgery as an option, we may be able to also detect improvement of the avascular necrosis with alendronate     #2 continue penicillin, follow-up infectious disease second opinion for duration of therapy, considering intramuscular penicillin monthly    #3 continue Fosamax, precautions with regards to staying upright, taking only with water, wait 30 minutes, before ingesting other medications, meals, vitamins, supplements    #4 start  gabapentin initially 300 mg every evening, then titrating to twice a day after 3 days may cause lightheadedness, dizziness, nausea    #5 follow with myself 3 months

## 2017-07-01 ENCOUNTER — HOSPITAL ENCOUNTER (OUTPATIENT)
Dept: LAB | Facility: MEDICAL CENTER | Age: 47
End: 2017-07-01
Attending: INTERNAL MEDICINE
Payer: COMMERCIAL

## 2017-07-01 LAB
ALBUMIN SERPL BCP-MCNC: 4 G/DL (ref 3.2–4.9)
ALBUMIN/GLOB SERPL: 1.5 G/DL
ALP SERPL-CCNC: 44 U/L (ref 30–99)
ALT SERPL-CCNC: 25 U/L (ref 2–50)
ANION GAP SERPL CALC-SCNC: 6 MMOL/L (ref 0–11.9)
AST SERPL-CCNC: 23 U/L (ref 12–45)
BASOPHILS # BLD AUTO: 1.1 % (ref 0–1.8)
BASOPHILS # BLD: 0.07 K/UL (ref 0–0.12)
BILIRUB SERPL-MCNC: 0.3 MG/DL (ref 0.1–1.5)
BUN SERPL-MCNC: 15 MG/DL (ref 8–22)
CALCIUM SERPL-MCNC: 8.9 MG/DL (ref 8.5–10.5)
CHLORIDE SERPL-SCNC: 106 MMOL/L (ref 96–112)
CO2 SERPL-SCNC: 25 MMOL/L (ref 20–33)
CREAT SERPL-MCNC: 0.69 MG/DL (ref 0.5–1.4)
CRP SERPL HS-MCNC: 0.21 MG/DL (ref 0–0.75)
EOSINOPHIL # BLD AUTO: 0.32 K/UL (ref 0–0.51)
EOSINOPHIL NFR BLD: 5.1 % (ref 0–6.9)
ERYTHROCYTE [DISTWIDTH] IN BLOOD BY AUTOMATED COUNT: 45 FL (ref 35.9–50)
ERYTHROCYTE [SEDIMENTATION RATE] IN BLOOD BY WESTERGREN METHOD: 18 MM/HOUR (ref 0–20)
GFR SERPL CREATININE-BSD FRML MDRD: >60 ML/MIN/1.73 M 2
GLOBULIN SER CALC-MCNC: 2.7 G/DL (ref 1.9–3.5)
GLUCOSE SERPL-MCNC: 87 MG/DL (ref 65–99)
HCT VFR BLD AUTO: 39.4 % (ref 37–47)
HGB BLD-MCNC: 12.8 G/DL (ref 12–16)
IMM GRANULOCYTES # BLD AUTO: 0.01 K/UL (ref 0–0.11)
IMM GRANULOCYTES NFR BLD AUTO: 0.2 % (ref 0–0.9)
LYMPHOCYTES # BLD AUTO: 1.97 K/UL (ref 1–4.8)
LYMPHOCYTES NFR BLD: 31.5 % (ref 22–41)
MCH RBC QN AUTO: 30.3 PG (ref 27–33)
MCHC RBC AUTO-ENTMCNC: 32.5 G/DL (ref 33.6–35)
MCV RBC AUTO: 93.1 FL (ref 81.4–97.8)
MONOCYTES # BLD AUTO: 0.57 K/UL (ref 0–0.85)
MONOCYTES NFR BLD AUTO: 9.1 % (ref 0–13.4)
NEUTROPHILS # BLD AUTO: 3.32 K/UL (ref 2–7.15)
NEUTROPHILS NFR BLD: 53 % (ref 44–72)
NRBC # BLD AUTO: 0 K/UL
NRBC BLD AUTO-RTO: 0 /100 WBC
PLATELET # BLD AUTO: 333 K/UL (ref 164–446)
PMV BLD AUTO: 10.3 FL (ref 9–12.9)
POTASSIUM SERPL-SCNC: 4.3 MMOL/L (ref 3.6–5.5)
PROT SERPL-MCNC: 6.7 G/DL (ref 6–8.2)
RBC # BLD AUTO: 4.23 M/UL (ref 4.2–5.4)
SODIUM SERPL-SCNC: 137 MMOL/L (ref 135–145)
WBC # BLD AUTO: 6.3 K/UL (ref 4.8–10.8)

## 2017-07-01 PROCEDURE — 85652 RBC SED RATE AUTOMATED: CPT

## 2017-07-01 PROCEDURE — 36415 COLL VENOUS BLD VENIPUNCTURE: CPT

## 2017-07-01 PROCEDURE — 80053 COMPREHEN METABOLIC PANEL: CPT

## 2017-07-01 PROCEDURE — 86140 C-REACTIVE PROTEIN: CPT

## 2017-07-01 PROCEDURE — 85025 COMPLETE CBC W/AUTO DIFF WBC: CPT

## 2017-07-07 ENCOUNTER — HOSPITAL ENCOUNTER (OUTPATIENT)
Dept: RADIOLOGY | Facility: MEDICAL CENTER | Age: 47
End: 2017-07-07
Attending: INTERNAL MEDICINE
Payer: COMMERCIAL

## 2017-07-07 ENCOUNTER — TELEPHONE (OUTPATIENT)
Dept: MEDICAL GROUP | Facility: MEDICAL CENTER | Age: 47
End: 2017-07-07

## 2017-07-07 DIAGNOSIS — M87.00 AVASCULAR NECROSIS (HCC): Chronic | ICD-10-CM

## 2017-07-07 DIAGNOSIS — M25.552 PAIN OF BOTH HIP JOINTS: ICD-10-CM

## 2017-07-07 DIAGNOSIS — M25.551 PAIN OF BOTH HIP JOINTS: ICD-10-CM

## 2017-07-07 PROCEDURE — 73721 MRI JNT OF LWR EXTRE W/O DYE: CPT | Mod: RT

## 2017-07-07 PROCEDURE — 72195 MRI PELVIS W/O DYE: CPT

## 2017-07-07 PROCEDURE — 73721 MRI JNT OF LWR EXTRE W/O DYE: CPT | Mod: LT

## 2017-07-08 NOTE — TELEPHONE ENCOUNTER
Notified with results of MRI, improvement in areas of avascular necrosis bilateral knees, stable hips, continue bisphosphonate therapy, follow-up [place] [facility] bone specialist in September

## 2017-07-08 NOTE — TELEPHONE ENCOUNTER
Notified with results of MRI, improvement in areas of avascular necrosis bilateral knees, stable hips, continue bisphosphonate therapy, follow-up [place] [facility] bone specialist in September

## 2017-07-17 ENCOUNTER — PATIENT MESSAGE (OUTPATIENT)
Dept: RHEUMATOLOGY | Facility: PHYSICIAN GROUP | Age: 47
End: 2017-07-17

## 2017-07-17 DIAGNOSIS — M87.00 AVASCULAR NECROSIS (HCC): ICD-10-CM

## 2017-07-19 ENCOUNTER — APPOINTMENT (OUTPATIENT)
Dept: RHEUMATOLOGY | Facility: PHYSICIAN GROUP | Age: 47
End: 2017-07-19
Payer: COMMERCIAL

## 2017-07-25 ENCOUNTER — TELEPHONE (OUTPATIENT)
Dept: MEDICAL GROUP | Facility: MEDICAL CENTER | Age: 47
End: 2017-07-25

## 2017-07-25 DIAGNOSIS — M25.541 ARTHRALGIA OF RIGHT HAND: ICD-10-CM

## 2017-07-25 DIAGNOSIS — M87.00 AVASCULAR NECROSIS OF BONE (HCC): ICD-10-CM

## 2017-07-25 DIAGNOSIS — M25.559 ARTHRALGIA OF HIP, UNSPECIFIED LATERALITY: ICD-10-CM

## 2017-07-25 DIAGNOSIS — M25.569 KNEE PAIN, UNSPECIFIED CHRONICITY, UNSPECIFIED LATERALITY: ICD-10-CM

## 2017-07-26 NOTE — TELEPHONE ENCOUNTER
Please call akash tello's office (orthopedics) at [phone] extension [phone]    Letter know that we received the request for the orthopedic referral, I will place the referral in the computer system, our referral specialist will work on the authorization

## 2017-08-14 ENCOUNTER — HOSPITAL ENCOUNTER (OUTPATIENT)
Dept: LAB | Facility: MEDICAL CENTER | Age: 47
End: 2017-08-14
Attending: INTERNAL MEDICINE
Payer: COMMERCIAL

## 2017-08-14 DIAGNOSIS — M87.00 AVASCULAR NECROSIS (HCC): ICD-10-CM

## 2017-08-14 PROCEDURE — 86060 ANTISTREPTOLYSIN O TITER: CPT

## 2017-08-14 PROCEDURE — 85306 CLOT INHIBIT PROT S FREE: CPT

## 2017-08-14 PROCEDURE — 85303 CLOT INHIBIT PROT C ACTIVITY: CPT

## 2017-08-14 PROCEDURE — 86146 BETA-2 GLYCOPROTEIN ANTIBODY: CPT | Mod: 91

## 2017-08-14 PROCEDURE — 36415 COLL VENOUS BLD VENIPUNCTURE: CPT

## 2017-08-14 PROCEDURE — 85300 ANTITHROMBIN III ACTIVITY: CPT

## 2017-08-14 PROCEDURE — 83090 ASSAY OF HOMOCYSTEINE: CPT

## 2017-08-14 PROCEDURE — 86215 DEOXYRIBONUCLEASE ANTIBODY: CPT

## 2017-08-14 PROCEDURE — 86147 CARDIOLIPIN ANTIBODY EA IG: CPT | Mod: 91

## 2017-08-14 PROCEDURE — 85610 PROTHROMBIN TIME: CPT

## 2017-08-14 PROCEDURE — 85613 RUSSELL VIPER VENOM DILUTED: CPT

## 2017-08-14 PROCEDURE — 85730 THROMBOPLASTIN TIME PARTIAL: CPT

## 2017-08-14 PROCEDURE — 81241 F5 GENE: CPT

## 2017-08-15 LAB
APTT PPP: 24.7 SEC (ref 24.7–36)
HCYS SERPL-SCNC: 10.97 UMOL/L
INR PPP: 0.85 (ref 0.87–1.13)
LA PPP-IMP: ABNORMAL
PROTHROMBIN TIME: 11.9 SEC (ref 12–14.6)
SCREEN DRVVT: 33.8 SEC (ref 28–48)

## 2017-08-16 LAB
ASO AB SERPL-ACNC: 92 IU/ML (ref 0–330)
AT III ACT/NOR PPP CHRO: 88 % (ref 76–128)
B2 GLYCOPROT1 IGA SER-ACNC: 2 SAU (ref 0–20)
B2 GLYCOPROT1 IGG SERPL IA-ACNC: 0 SGU (ref 0–20)
B2 GLYCOPROT1 IGM SERPL IA-ACNC: 5 SMU (ref 0–20)
CARDIOLIPIN IGA SER IA-ACNC: 2 APL (ref 0–11)
CARDIOLIPIN IGG SER IA-ACNC: 0 GPL (ref 0–14)
CARDIOLIPIN IGM SER IA-ACNC: 2 MPL (ref 0–12)
PROT C ACT/NOR PPP: 138 % (ref 83–168)
PROT S ACT/NOR PPP: 98 % (ref 57–131)
STREP DNASE B TITR SER: 104 U/ML (ref 0–260)

## 2017-08-18 ENCOUNTER — HOSPITAL ENCOUNTER (OUTPATIENT)
Dept: RADIOLOGY | Facility: MEDICAL CENTER | Age: 47
End: 2017-08-18
Attending: ORTHOPAEDIC SURGERY
Payer: COMMERCIAL

## 2017-08-18 DIAGNOSIS — M89.50: ICD-10-CM

## 2017-08-18 PROCEDURE — 73718 MRI LOWER EXTREMITY W/O DYE: CPT | Mod: RT

## 2017-08-18 PROCEDURE — 73718 MRI LOWER EXTREMITY W/O DYE: CPT | Mod: LT

## 2017-08-30 ASSESSMENT — ENCOUNTER SYMPTOMS
CHILLS: 0
SHORTNESS OF BREATH: 0
BACK PAIN: 1
MYALGIAS: 1
FEVER: 0

## 2017-08-31 ENCOUNTER — OFFICE VISIT (OUTPATIENT)
Dept: RHEUMATOLOGY | Facility: PHYSICIAN GROUP | Age: 47
End: 2017-08-31
Payer: COMMERCIAL

## 2017-08-31 VITALS
DIASTOLIC BLOOD PRESSURE: 64 MMHG | TEMPERATURE: 98.5 F | HEART RATE: 98 BPM | RESPIRATION RATE: 16 BRPM | SYSTOLIC BLOOD PRESSURE: 118 MMHG | BODY MASS INDEX: 32.93 KG/M2 | WEIGHT: 204 LBS | OXYGEN SATURATION: 94 %

## 2017-08-31 DIAGNOSIS — M87.9 OSTEONECROSIS (HCC): ICD-10-CM

## 2017-08-31 PROCEDURE — 99213 OFFICE O/P EST LOW 20 MIN: CPT | Performed by: INTERNAL MEDICINE

## 2017-08-31 NOTE — LETTER
[facility]   [address], [address]  [place] [address]  Phone: [phone]  Fax: [phone]              Encounter Date: 8/31/2017    Dear Dr. Gonzalez,    It was a pleasure seeing your patient, Janett Mcnulty, on 8/31/2017. The encounter diagnosis was Osteonecrosis (CMS-[facility]).     Please find attached progress note which includes the history I obtained from Ms. Mcnulty, my physical examination findings, my impression and recommendations.      Once again, it was a pleasure participating in your patient's care.  Please feel free to contact me if you have any questions or if I can be of any further assistance to your patients.      Sincerely,    Starr Jackson M.D.  Electronically Signed          PROGRESS NOTE:  Subjective:   Date of Consultation:8/31/2017  8:13 AM  Primary care physician:Franklin Gonzalez M.D.      Reason for Consultation:  Ms. Mcnulty  is a pleasant 46 y.o. year old female who presents for follow-up of arthritis and multifocal avascular necrosis    Rheumatic Fever  Following cardiology and repeat echo is normal  Has been seen by ID and antibiotics have been stopped  We did repeat the anti DNASE B enzyme and this was negative      Arthritis  She is experiencing increasing arthralgia in her feet.  MRI of the feet showed OA and some bone marrow edema.  SHe denies prolonged morning stiffness.  She continues to have hip pain bilateral.    Lower extremity swelling  Previous DVT studies were negative.  Persistent lower extremity swelling.  With elevation swelling improves.    Avascular necrosis of multiple joints  At last visit she did see endocrinology and was started on alendronate.  She did see bone endocrinology and was started on alendronate.   She was to follow-up for 4 weeks.    In addition she did see [facility] and recommendations were for a hypercoagulable work-up.  Her work-up thus far has been negative.  We have repeated her serologies.  She also had MRI of  the pelvis repeated and the AVN in the hips are stable with no subchondral collapse.  Also   MRI of the knee showed improvement in the multiple infarcts of the femur, tibia and fibula.  She did see ortho and given the persistent hip pain will proceed with core decompression.    Results for SUMAYA WATSON (MRN [id number]) as of 8/30/2017 20:52   Ref. Range 8/14/2017 16:08   PT Latest Ref Range: 12.0 - 14.6 sec 11.9 (L)   INR Latest Ref Range: 0.87 - 1.13  0.85 (L)   APTT Latest Ref Range: 24.7 - 36.0 sec 24.7   Dilute Vladimir Viper Venom Kevin Latest Ref Range: 28.0 - 48.0 sec 33.8   Lupus Anticoagulant Unknown Not Present   Protein C Functional Latest Ref Range: 83 - 168 % 138   Protein S-Functional Latest Ref Range: 57 - 131 % 98   Antithrombin III, Functional Latest Ref Range: 76 - 128 % 88   Homocysteine Latest Ref Range: <11.00 umol/L 10.97   Anti-Cariolipin Ab Igg Latest Ref Range: 0 - 14 GPL 0   Anti-Cardiolipin Ab Iga Latest Ref Range: 0 - 11 APL 2   Anti-Cardiolipin Ab Igm Latest Ref Range: 0 - 12 MPL 2   Beta-2 Glycoprotein I Ab Igg Latest Ref Range: 0 - 20 SGU 0   Beta-2 Glycoprotein I Iga Latest Ref Range: 0 - 20 LAURA 2   Beta-2 Glycoprotein I Igm Latest Ref Range: 0 - 20 SMU 5   Antistreptolysin O Titer Latest Ref Range: 0 - 330 IU/mL 92   Anti Dnase-B Latest Ref Range: 0 - 260 U/mL 104     She is tolerating her medications.    Past Medical History:   Diagnosis Date   • Rheumatic fever 3/21/17    Working on ruling out possible diagnoses-on penicillin(started 9/2016)   • Pain 3/21/17    to joints related to avascular necrosis.   • Avascular necrosis (CMS-HCC) 11/2016    in many joints   • Pain 6/5/13    right foot   • Anesthesia     vomitted with tonsils   • Arthritis     osteoarthritis     Past Surgical History:   Procedure Laterality Date   • NC DSTR NROLYTC AGNT PARVERTEB FCT SNGL LMBR/SACRAL Right 4/7/2017    Procedure: NEURO DEST FACET L/S W/IG SNGL - L3-S1;  Surgeon: Farhan Rees D.O.;  Location:  SURGERY [facility];  Service: Pain Management   • IA DSTR NROLYTC AGNT PARVERTEB FCT ADDL LMBR/SACRAL Right 4/7/2017    Procedure: NEURO DEST FACET L/S W/IG ADDL;  Surgeon: Farhan Rees D.O.;  Location: SURGERY [facility];  Service: Pain Management   • PB INJECT RX OTHER PERIPH NERVE Right 4/7/2017    Procedure: NEUROLYTIC DEST-OTHER NERVE;  Surgeon: Farhan Rees D.O.;  Location: SURGERY [facility];  Service: Pain Management   • IA DSTR NROLYTC AGNT PARVERTEB FCT ADDL LMBR/SACRAL  4/7/2017    Procedure: NEURO DEST FACET L/S W/IG ADDL;  Surgeon: Farhan Rees D.O.;  Location: [facility];  Service: Pain Management   • IA DSTR NROLYTC AGNT PARVERTEB FCT SNGL LMBR/SACRAL Left 3/24/2017    Procedure: NEURO DEST FACET L/S W/IG SNGL - L3-S1;  Surgeon: Farhan Rees D.O.;  Location: [facility];  Service: Pain Management   • IA DSTR NROLYTC AGNT PARVERTEB FCT ADDL LMBR/SACRAL Left 3/24/2017    Procedure: NEURO DEST FACET L/S W/IG ADDL;  Surgeon: Farhan Rees D.O.;  Location: [facility];  Service: Pain Management   • PB INJECT RX OTHER PERIPH NERVE Left 3/24/2017    Procedure: NEUROLYTIC DEST-OTHER NERVE;  Surgeon: Farhan Rees D.O.;  Location: SURGERY [facility];  Service: Pain Management   • IA DSTR NROLYTC AGNT PARVERTEB FCT ADDL LMBR/SACRAL  3/24/2017    Procedure: NEURO DEST FACET L/S W/IG ADDL;  Surgeon: Farhan Rees D.O.;  Location: SURGERY [facility];  Service: Pain Management   • IA DSTR NROLYTC AGNT PARVERTEB FCT SNGL LMBR/SACRAL Right 6/24/2016    Procedure: NEURO DEST FACET L/S W/IG SNGL - L3-S1;  Surgeon: Farhan Rees D.O.;  Location: SURGERY [facility];  Service: Pain Management   • IA DSTR NROLYTC AGNT PARVERTEB FCT ADDL LMBR/SACRAL Right 6/24/2016    Procedure: NEURO DEST FACET L/S W/IG ADDL;  Surgeon: Farhan Rees D.O.;  Location: [facility];  Service: Pain  Management   • PB INJECT RX OTHER PERIPH NERVE Right 6/24/2016    Procedure: NEUROLYTIC DEST-OTHER NERVE;  Surgeon: Farhan Rees D.O.;  Location: SURGERY [facility];  Service: Pain Management   • MD DSTR NROLYTC AGNT PARVERTEB FCT ADDL LMBR/SACRAL  6/24/2016    Procedure: NEURO DEST FACET L/S W/IG ADDL;  Surgeon: Farhan Rees D.O.;  Location: SURGERY [facility];  Service: Pain Management   • MD DSTR NROLYTC AGNT PARVERTEB FCT SNGL LMBR/SACRAL Left 5/6/2016    Procedure: NEURO DEST FACET L/S W/IG SNGL - L3-S1;  Surgeon: Farhan Rees D.O.;  Location: SURGERY [facility];  Service: Pain Management   • MD DSTR NROLYTC AGNT PARVERTEB FCT ADDL LMBR/SACRAL Left 5/6/2016    Procedure: NEURO DEST FACET L/S W/IG ADDL;  Surgeon: Farhan Rees D.O.;  Location: SURGERY [facility];  Service: Pain Management   • PB INJECT RX OTHER PERIPH NERVE Left 5/6/2016    Procedure: NEUROLYTIC DEST-OTHER NERVE;  Surgeon: Farhan Rees D.O.;  Location: SURGERY [facility];  Service: Pain Management   • MD DSTR NROLYTC AGNT PARVERTEB FCT ADDL LMBR/SACRAL  5/6/2016    Procedure: NEURO DEST FACET L/S W/IG ADDL;  Surgeon: Farhan Rees D.O.;  Location: SURGERY [facility];  Service: Pain Management   • MD DSTR NROLYTC AGNT PARVERTEB FCT SNGL LMBR/SACRAL Right 10/16/2015    Procedure: NEURO DEST FACET L/S W/IG SNGL - L3-S1;  Surgeon: Farhan Rees D.O.;  Location: SURGERY [facility];  Service: Pain Management   • MD DSTR NROLYTC AGNT PARVERTEB FCT ADDL LMBR/SACRAL  10/16/2015    Procedure: NEURO DEST FACET L/S W/IG ADDL;  Surgeon: Farhan Rees D.O.;  Location: SURGERY [facility];  Service: Pain Management   • PB INJECT RX OTHER PERIPH NERVE  10/16/2015    Procedure: NEUROLYTIC DEST-OTHER NERVE;  Surgeon: Farhan Rees D.O.;  [facility]: [facility];  Service: Pain Management   • MD DSTR NROLYTC AGNT PARVERTEB FCT ADDL LMBR/SACRAL   10/16/2015    Procedure: NEURO DEST FACET L/S W/IG ADDL;  Surgeon: Farhan Rees D.O.;  Location: [facility];  Service: Pain Management   • AK DSTR NROLYTC AGNT PARVERTEB FCT SNGL LMBR/SACRAL Left 10/9/2015    Procedure: NEURO DEST FACET L/S W/IG SNGL - L3-S1;  Surgeon: Farhan Rees D.O.;  Location: SURGERY [facility];  Service: Pain Management   • AK DSTR NROLYTC AGNT PARVERTEB FCT ADDL LMBR/SACRAL  10/9/2015    Procedure: NEURO DEST FACET L/S W/IG ADDL;  Surgeon: Farhan Rees D.O.;  Location: SURGERY [facility];  Service: Pain Management   • PB INJECT RX OTHER PERIPH NERVE  10/9/2015    Procedure: NEUROLYTIC DEST-OTHER NERVE;  Surgeon: Farhan Rees D.O.;  Location: [facility];  Service: Pain Management   • AK DSTR NROLYTC AGNT PARVERTEB FCT ADDL LMBR/SACRAL  10/9/2015    Procedure: NEURO DEST FACET L/S W/IG ADDL;  Surgeon: Farhan Rees D.O.;  Location: [facility];  Service: Pain Management   • INJ,SACROILIAC,ANES/STEROID  4/3/2015    Performed by Farhan Rees D.O. at [facility]   • INJ,SACROILIAC,ANES/STEROID  4/3/2015    Performed by Farhan Rees D.O. at [facility]   • NEURO DEST FACET L/S W/IG SNGL  2/13/2015    Performed by Farhan Rees D.O. at [facility]   • NEURO DEST FACET L/S W/IG ADDL  2/13/2015    Performed by Farhan Rees D.O. at [facility]   • NEURO DEST FACET L/S W/IG ADDL  2/13/2015    Performed by Farhan Rees D.O. at [facility]   • NEURO DEST FACET L/S W/IG SNGL  2/6/2015    Performed by Farhan Rees D.O. at [facility]   • NEURO DEST FACET L/S W/IG ADDL  2/6/2015    Performed by Farhan Rees D.O. at [facility]   • NEURO DEST FACET L/S W/IG ADDL  2/6/2015    Performed by Farhan Rees D.O. at [facility]   • INJ,SACROILIAC,ANES/STEROID  7/11/2014     Performed by Farhan Rees D.O. at [facility]   • INJ,SACROILIAC,ANES/STEROID  7/11/2014    Performed by Farhan Rees D.O. at [facility]   • TOE ARTHROPLASTY  7/7/2014    Performed by Oliver Pratt D.P.M. at [facility]   • NEURO DEST FACET L/S W/IG SNGL  6/6/2014    Performed by Farhan Rees D.O. at [facility]   • NEURO DEST FACET L/S W/IG ADDL  6/6/2014    Performed by Farhan Rees D.O. at [facility]   • NEURO DEST FACET L/S W/IG ADDL  6/6/2014    Performed by Farhan Rees D.O. at [facility]   • NEURO DEST FACET L/S W/IG ADDL  6/6/2014    Performed by Farhan Rees D.O. at [facility]   • NEURO DEST FACET L/S W/IG SNGL  5/16/2014    Performed by Farhan Rees D.O. at [facility]   • NEURO DEST FACET L/S W/IG ADDL  5/16/2014    Performed by Farhan Rees D.O. at [facility]   • NEURO DEST FACET L/S W/IG ADDL  5/16/2014    Performed by Farhan Rees D.O. at [facility]   • NEURO DEST FACET L/S W/IG ADDL  5/16/2014    Performed by Farhan Rees D.O. at [facility]   • INJ,SACROILIAC,ANES/STEROID  1/10/2014    Performed by Farhan Rees D.O. at [facility]   • INJ,SACROILIAC,ANES/STEROID  1/10/2014    Performed by Farhan Rees D.O. at [facility]   • INJ,SACROILIAC,ANES/STEROID  12/6/2013    Performed by Farhan Rees D.O. at [facility]   • INJ,SACROILIAC,ANES/STEROID  11/8/2013    Performed by Farhan Rees D.O. at [facility]   • NEURO DEST FACET L/S W/IG SNGL  10/4/2013    Performed by Farhan Rees D.O. at SUR[facility]ERY [facility]   • NEURO DEST FACET L/S W/IG ADDL  10/4/2013    Performed by Farhan Rees D.O. at SUR[facility]ERY [facility]   • NEURO DEST FACET L/S W/IG ADDL  10/4/2013    Performed by Farhan IGLESIAS  ERNESTO Elias at [facility]   • NEURO DEST FACET L/S W/IG ADDL  10/4/2013    Performed by Edmar Elias D.O. at [facility]   • NEURO DEST FACET L/S W/IG SNGL  9/20/2013    Performed by Edmar Elias D.O. at [facility]   • NEURO DEST FACET L/S W/IG ADDL  9/20/2013    Performed by Edmar Elias D.O. at [facility]   • NEURO DEST FACET L/S W/IG ADDL  9/20/2013    Performed by Edmar Elias D.O. at [facility]   • NEURO DEST FACET L/S W/IG ADDL  9/20/2013    Performed by Edmar Elias D.O. at [facility]   • BUNIONECTOMY  6/7/2013    Performed by Oliver Pratt D.P.M. at [facility]   • NEURO DEST FACET L/S W/IG SNGL  1/4/2013    Performed by Edmar Elias D.O. at [facility]   • NEURO DEST FACET L/S W/IG ADDL  1/4/2013    Performed by Edmar Elias D.O. at [facility]   • NEURO DEST FACET L/S W/IG ADDL  1/4/2013    Performed by Edmar Elias D.O. at [facility]   • OTHER  2013    uterine ablation   • NEURO DEST FACET L/S W/IG SNGL  12/28/2012    Performed by Edmar Elias D.O. at [facility]   • NEURO DEST FACET L/S W/IG ADDL  12/28/2012    Performed by Edmar Elias D.O. at [facility]   • NEURO DEST FACET L/S W/IG ADDL  12/28/2012    Performed by Edmar Elias D.O. at [facility]   • NEURO DEST FACET L/S W/IG SNGL  3/9/2012    Performed by EDMAR ELIAS at [facility]   • NEURO DEST FACET L/S W/IG ADDL  3/9/2012    Performed by EDMAR ELIAS at [facility]   • NEURO DEST FACET L/S W/IG ADDL  3/9/2012    Performed by EDMAR ELIAS at [facility]   • NEURO DEST FACET L/S W/IG SNGL  3/2/2012    Performed by EDMAR ELIAS at [facility]   • NEURO DEST FACET L/S W/IG ADDL  3/2/2012    Performed by  EDMAR ELIAS at [facility]   • NEURO DEST FACET L/S W/IG ADDL  3/2/2012    Performed by EDMAR ELIAS at [facility]   • PB INJECT NERV BLCK,STELLATE GANGLION  9/2/2011    Performed by EDMAR ELIAS at [facility]   • INJ,SACROILIAC,ANES/STEROID  5/13/2011    Performed by EDMAR ELIAS at [facility]   • PB DEST,PARAVERTEBRAL,L/S,SINGLE  3/4/2011    Performed by EDMAR ELIAS at [facility]   • PB INJ DX/THER AGNT PARAVERT FACET JOINT, ROSALVA*  2/25/2011    Performed by EDMAR ELIAS at [facility]   • PB INJ DX/THER AGNT PARAVERT FACET JOINT, ROSALVA*  1/21/2011    Performed by EDMAR ELIAS at [facility]   • HIP ARTHROSCOPY Right 2009     revision   • OTHER Left 2007    left ankle tendon repair   • TONSILLECTOMY  2001   • OTHER ORTHOPEDIC SURGERY Left 2001    hip revision(not total)   • APPENDECTOMY  1991     Allergies   Allergen Reactions   • Morphine Rash   • Other Food Rash     Onions-cause headaches and facial flushing     Outpatient Encounter Prescriptions as of 8/31/2017   Medication Sig Dispense Refill   • gabapentin (NEURONTIN) 300 MG Cap One po qpm x 3 days then increase to bid 60 Cap 11   • alendronate (FOSAMAX) 70 MG Tab Take 70 mg by mouth every 7 days.     • Calcium Citrate-Vitamin D (CALCIUM + D PO) Take  by mouth.     • [place]wellia-Glucosamine-Vit D (GLUCOSAMINE COMPLEX PO) Take  by mouth every day.     • tramadol (ULTRAM) 50 MG Tab Take 1 Tab by mouth every 8 hours as needed. 90 Tab 3   • naproxen (NAPROSYN) 500 MG Tab Take 500 mg by mouth 2 times a day, with meals.     • TURMERIC PO Take  by mouth.     • therapeutic multivitamin-minerals (THERAGRAN-M) Tab Take 1 Tab by mouth every day.     • vitamin D (CHOLECALCIFEROL) 1000 UNIT Tab Take 1,000 Units by mouth every day.     • fluticasone (FLONASE) 50 MCG/ACT nasal spray Spray 2 Sprays in nose every day. (Patient taking  differently: Spray 2 Sprays in nose as needed.) 16 g 3   • vitamin e (VITAMIN E) 400 UNIT CAPS Take 400 Units by mouth every day.     • penicillin v potassium (VEETID) 250 MG Tab Take 250 mg by mouth 4 times a day.     • ascorbic acid (ASCORBIC ACID) 500 MG Tab Take 500 mg by mouth as needed.     • loratadine (CLARITIN) 10 MG TABS Take 10 mg by mouth as needed.       No facility-administered encounter medications on file as of 8/31/2017.        Social History     Social History   • Marital status:      Spouse name: N/A   • Number of children: N/A   • Years of education: N/A     Occupational History   • Not on file.     Social History Main Topics   • Smoking status: Never Smoker   • Smokeless tobacco: Never Used   • Alcohol use 5.4 oz/week     2 Glasses of wine, 7 Standard drinks or equivalent per week      Comment: 2 drinks per week   • Drug use: No   • Sexual activity: Not on file     Other Topics Concern   • Not on file     Social History Narrative   • No narrative on file      History   Smoking Status   • Never Smoker   Smokeless Tobacco   • Never Used     History   Alcohol Use   • 5.4 oz/week   • 2 Glasses of wine, 7 Standard drinks or equivalent per week     Comment: 2 drinks per week     History   Drug Use No      OB History   No data available      No LMP recorded. Patient has had an ablation.    G P A L     Family History   Problem Relation Age of Onset   • Diabetes     • Hypertension     • Cancer         Review of Systems   Constitutional: Negative for chills and fever.   Respiratory: Negative for shortness of breath.    Cardiovascular: Positive for leg swelling. Negative for chest pain.   Musculoskeletal: Positive for back pain, joint pain and myalgias.        Mild myalgias since starting alendronate   Skin: Negative for rash.        She denies rash but does note a mottling on the forearms and thighs that is faint        Objective:   /64   Pulse 98   Temp 36.9 °C (98.5 °F)   Resp 16   Wt  92.5 kg (204 lb)   SpO2 94%   Breastfeeding? No   BMI 32.93 kg/m²      Physical Exam   Constitutional: She is oriented to person, place, and time. She appears well-developed and well-nourished. No distress.   HENT:   Head: Normocephalic and atraumatic.   Right Ear: External ear normal.   Left Ear: External ear normal.   Eyes: Conjunctivae are normal. Right eye exhibits no discharge. Left eye exhibits no discharge.   Pulmonary/Chest: Effort normal. No stridor. No respiratory distress.   Musculoskeletal:        Neurological: She is alert and oriented to person, place, and time.   Skin: Skin is warm and dry. No rash noted. She is not diaphoretic. No erythema.   Limited exam. On the forearms mild faint mottling of the skin   Psychiatric: She has a normal mood and affect. Her behavior is normal. Judgment and thought content normal.         Labs    Lab Results   Component Value Date/Time    QNTTBGOLD Negative 12/01/2016 04:04 PM     Lab Results   Component Value Date/Time    HEPBCORTOT Negative 12/01/2016 04:04 PM    HEPBSAG Negative 12/01/2016 04:04 PM     Lab Results   Component Value Date/Time    HEPCAB Negative 12/01/2016 04:04 PM     Lab Results   Component Value Date/Time    SODIUM 137 07/01/2017 11:56 AM    POTASSIUM 4.3 07/01/2017 11:56 AM    CHLORIDE 106 07/01/2017 11:56 AM    CO2 25 07/01/2017 11:56 AM    GLUCOSE 87 07/01/2017 11:56 AM    BUN 15 07/01/2017 11:56 AM    CREATININE 0.69 07/01/2017 11:56 AM      Lab Results   Component Value Date/Time    WBC 6.3 07/01/2017 11:56 AM    RBC 4.23 07/01/2017 11:56 AM    HEMOGLOBIN 12.8 07/01/2017 11:56 AM    HEMATOCRIT 39.4 07/01/2017 11:56 AM    MCV 93.1 07/01/2017 11:56 AM    MCH 30.3 07/01/2017 11:56 AM    MCHC 32.5 (L) 07/01/2017 11:56 AM    MPV 10.3 07/01/2017 11:56 AM    NEUTSPOLYS 53.00 07/01/2017 11:56 AM    LYMPHOCYTES 31.50 07/01/2017 11:56 AM    MONOCYTES 9.10 07/01/2017 11:56 AM    EOSINOPHILS 5.10 07/01/2017 11:56 AM    BASOPHILS 1.10 07/01/2017 11:56 AM       Lab Results   Component Value Date/Time    CALCIUM 8.9 07/01/2017 11:56 AM    ASTSGOT 23 07/01/2017 11:56 AM    ALTSGPT 25 07/01/2017 11:56 AM    ALKPHOSPHAT 44 07/01/2017 11:56 AM    TBILIRUBIN 0.3 07/01/2017 11:56 AM    ALBUMIN 4.0 07/01/2017 11:56 AM    ALBUMIN 4.16 04/01/2017 12:03 PM    TOTPROTEIN 6.7 07/01/2017 11:56 AM    TOTPROTEIN 6.60 04/01/2017 12:03 PM     Lab Results   Component Value Date/Time    URICACID 4.9 12/01/2016 04:04 PM    RHEUMFACTN <10 11/08/2016 01:17 PM    CCPANTIBODY 2 12/01/2016 04:04 PM    ANTINUCAB None Detected 12/01/2016 04:03 PM     Lab Results   Component Value Date/Time    SEDRATEWES 18 07/01/2017 11:56 AM    CREACTPROT 0.21 07/01/2017 11:56 AM     Lab Results   Component Value Date/Time    RUSSELVIPER 33.8 08/14/2017 04:08 PM    DRVVTINTERP Not Present 08/14/2017 04:08 PM     Lab Results   Component Value Date/Time    Z7XMQDQXSKS 152.0 12/01/2016 04:04 PM    Z8LWKFKXTHA 28.0 12/01/2016 04:04 PM    IGGCARDIOLI 0 08/14/2017 04:08 PM    IGMCARDIOLI 2 08/14/2017 04:08 PM    IGACARDIOLI 2 08/14/2017 04:08 PM    CRYOGLOBULIN NEG 72Hour 03/10/2017 12:08 PM     Lab Results   Component Value Date/Time    ANTIDNADS None Detected 12/01/2016 04:04 PM    RNPAB 0 12/01/2016 04:04 PM    SMITHAB 0 12/01/2016 04:04 PM    ADXYYSD14 0 11/08/2016 01:17 PM     Lab Results   Component Value Date/Time    ANTIDNADS None Detected 12/01/2016 04:04 PM    ANCAIGG <1:20 12/01/2016 04:04 PM    U2JGDEDXKVL 152.0 12/01/2016 04:04 PM    RNPAB 0 12/01/2016 04:04 PM    ANTISSBSJ 0 12/01/2016 04:04 PM     Lab Results   Component Value Date/Time    COLORURINE Lt. Yellow 04/01/2017 12:03 PM    SPECGRAVITY 1.006 04/01/2017 12:03 PM    PHURINE 6.0 04/01/2017 12:03 PM    GLUCOSEUR Negative 04/01/2017 12:03 PM    KETONES Negative 04/01/2017 12:03 PM    PROTEINURIN Negative 04/01/2017 12:03 PM     Lab Results   Component Value Date/Time    TOTPROTUR See Note 04/01/2017 12:03 PM    TOTALVOLUME Random 04/01/2017 12:03  PM     Lab Results   Component Value Date/Time    SSA60 0 12/01/2016 04:04 PM    SSA52 5 12/01/2016 04:04 PM     No results found for: HBA1C  Lab Results   Component Value Date/Time    CPKTOTAL 92 03/06/2017 02:12 PM     Lab Results   Component Value Date/Time    G6PD 13.7 11/18/2016 03:18 PM     No results found for: MRZA09LQEN  Lab Results   Component Value Date/Time    ACESERUM 11 11/18/2016 03:18 PM     Lab Results   Component Value Date/Time    25HYDROXY 31 10/10/2016 02:25 PM     No results found for: TSH, FREEDIR  Lab Results   Component Value Date/Time    TSHULTRASEN 1.530 11/28/2016 05:54 PM     Lab Results   Component Value Date/Time    MICROSOMALA 0.5 11/08/2016 01:17 PM    ANTITHYROGL <0.9 12/01/2016 04:04 PM     Lab Results   Component Value Date/Time    IGGLYMEABS 0.49 11/08/2016 01:17 PM     Lab Results   Component Value Date/Time    FACTIN 22 (H) 11/08/2016 01:17 PM     Lab Results   Component Value Date/Time     04/01/2017 12:03 PM     No results found for: FLTYPE, CRYSTALSBDF  No results found for: ISTATICAL  No results found for: ISTATCREAT  No results found for: CTELOPEP  No results found for: GBMABG  No results found for: PTHINTACT         Assessment:     1. Osteonecrosis (CMS-HCC)  FACTOR V LEIDEN MUTATION    MISCELLANEOUS LAB TEST ([facility]/Other)         Medical Decision Making:  Today's Assessment / Status / Plan:     Ms. Janett Mcnulty first presented to me in November 2016 following a diagnosis of streptococcal pharyngitis and then erythema nodosum noted by PCP and diagnosis of rheumatic fever. At the time she presented with polyarthralgias.  She was started on prednisone 20 mg and increased to 40 mg. Subsequently we obtain an MRI that showed avascular necrosis of the knee. Further evaluation did identify additional joints also with osteonecrosis. We have since gradually tapered off prednisone. Unclear if the short course of prednisone could be the etiology of her multifocal  osteonecrosis. Hypercoagulable workup thus far is negative. Anti-TPO antibody and antithyroglobulin antibody was negative. Her urinalysis did not show any protein or urine.    Rheumatologic workup includes a chest x-ray that was negative for hilar adenopathy, MERA that was negative RF and anti-CCP antibody was negative. HLA-B27 antibody that was negative, ANCA serologies that was negative. Hepatitis panel and syphilis and QuantiFERON were also negative    Arthralgia    RF, CCP, MERA, ANCA , HLA B27 negative.  Her inflammatory markers did normalize with steroids. Following completion of steroids about 2 weeks later she had onset of lower extremity swelling That has not resolved.  She has new onset feet pain that is worse in the right. Her MRI did show some bone marrow edema for which we will continue to monitor as this is somewhat nonspecific.    Myalgia  CK is normal    Osteonecrosis - multifocal  Antiphospholipid antibody panels negative, protein C and protein S is normal, anti-thrombin III is normal, Cryoglobulins are negative  She is still on alendronate.    We will order for the anti phosphatidyl antibody IgG/IgA/IgM and repeat her factor Leiden V  We will follow-up and did discuss with [facility] regarding next steps given her findings were primarily negative.  For her hip she is planned for surgery.    Rheumatic fever  She did see infectious disease and she is off antibiotics    Encounter high risk medications  Off prednisone  Advise to continue calcium and vitamin D    Lower extremity swelling  Venous insufficiency??  PAOLA were negative.  Her symptoms seems to be persistent but not worsening.  We will continue to monitor but if this worsens consider additional imaging      1. Osteonecrosis (CMS-HCC)  FACTOR V LEIDEN MUTATION    MISCELLANEOUS LAB TEST ([facility]/Other)     We will reschedule our next appointment after discussion with [facility].      She agreed and verbalized her agreement and  understanding with the current plan. I answered all questions and concerns she has at this time and advised her to call at any time in there interim with questions or concerns in regards to her care.    Thank you for allowing me to participate in her care, I will continue to follow closely.     Please note that this dictation was created using voice recognition software. I have made every reasonable attempt to correct obvious errors, but I expect that there are errors of grammar and possibly content that I did not discover before finalizing the note.

## 2017-08-31 NOTE — PROGRESS NOTES
Subjective:   Date of Consultation:8/31/2017  8:13 AM  Primary care physician:Franklin Gonzalez M.D.      Reason for Consultation:  Ms. Mcnulty  is a pleasant 46 y.o. year old female who presents for follow-up of arthritis and multifocal avascular necrosis    Rheumatic Fever  Following cardiology and repeat echo is normal  Has been seen by ID and antibiotics have been stopped  We did repeat the anti DNASE B enzyme and this was negative      Arthritis  She is experiencing increasing arthralgia in her feet.  MRI of the feet showed OA and some bone marrow edema.  SHe denies prolonged morning stiffness.  She continues to have hip pain bilateral.    Lower extremity swelling  Previous DVT studies were negative.  Persistent lower extremity swelling.  With elevation swelling improves.    Avascular necrosis of multiple joints  At last visit she did see endocrinology and was started on alendronate.  She did see bone endocrinology and was started on alendronate.   She was to follow-up for 4 weeks.    In addition she did see [place] [facility] and recommendations were for a hypercoagulable work-up.  Her work-up thus far has been negative.  We have repeated her serologies.  She also had MRI of the pelvis repeated and the AVN in the hips are stable with no subchondral collapse.  Also   MRI of the knee showed improvement in the multiple infarcts of the femur, tibia and fibula.  She did see ortho and given the persistent hip pain will proceed with core decompression.    Results for SUMAYA MCNULTY (MRN [id number]) as of 8/30/2017 20:52   Ref. Range 8/14/2017 16:08   PT Latest Ref Range: 12.0 - 14.6 sec 11.9 (L)   INR Latest Ref Range: 0.87 - 1.13  0.85 (L)   APTT Latest Ref Range: 24.7 - 36.0 sec 24.7   Dilute Vladimir Viper Venom Kevin Latest Ref Range: 28.0 - 48.0 sec 33.8   Lupus Anticoagulant Unknown Not Present   Protein C Functional Latest Ref Range: 83 - 168 % 138   Protein S-Functional Latest Ref Range: 57 - 131 % 98    Antithrombin III, Functional Latest Ref Range: 76 - 128 % 88   Homocysteine Latest Ref Range: <11.00 umol/L 10.97   Anti-Cariolipin Ab Igg Latest Ref Range: 0 - 14 GPL 0   Anti-Cardiolipin Ab Iga Latest Ref Range: 0 - 11 APL 2   Anti-Cardiolipin Ab Igm Latest Ref Range: 0 - 12 MPL 2   Beta-2 Glycoprotein I Ab Igg Latest Ref Range: 0 - 20 SGU 0   Beta-2 Glycoprotein I Iga Latest Ref Range: 0 - 20 LAURA 2   Beta-2 Glycoprotein I Igm Latest Ref Range: 0 - 20 SMU 5   Antistreptolysin O Titer Latest Ref Range: 0 - 330 IU/mL 92   Anti Dnase-B Latest Ref Range: 0 - 260 U/mL 104     She is tolerating her medications.    Past Medical History:   Diagnosis Date   • Rheumatic fever 3/21/17    Working on ruling out possible diagnoses-on penicillin(started 9/2016)   • Pain 3/21/17    to joints related to avascular necrosis.   • Avascular necrosis (CMS-HCC) 11/2016    in many joints   • Pain 6/5/13    right foot   • Anesthesia     vomitted with tonsils   • Arthritis     osteoarthritis     Past Surgical History:   Procedure Laterality Date   • GA DSTR NROLYTC AGNT PARVERTEB FCT SNGL LMBR/SACRAL Right 4/7/2017    Procedure: NEURO DEST FACET L/S W/IG SNGL - L3-S1;  Surgeon: Farhan Rees D.O.;  Location: [facility] ORS;  Service: Pain Management   • GA DSTR NROLYTC AGNT PARVERTEB FCT ADDL LMBR/SACRAL Right 4/7/2017    Procedure: NEURO DEST FACET L/S W/IG ADDL;  Surgeon: Farhan Rees D.O.;  Location: [facility] ORS;  Service: Pain Management   • PB INJECT RX OTHER PERIPH NERVE Right 4/7/2017    Procedure: NEUROLYTIC DEST-OTHER NERVE;  Surgeon: Farhan Rees D.O.;  Location: [facility] ORS;  Service: Pain Management   • GA DSTR NROLYTC AGNT PARVERTEB FCT ADDL LMBR/SACRAL  4/7/2017    Procedure: NEURO DEST FACET L/S W/IG ADDL;  Surgeon: Farhan Rees D.O.;  Location: [facility] ORS;  Service: Pain Management   • GA DSTR NROLYTC AGNT PARVERTEB FCT SNGL LMBR/SACRAL Left  3/24/2017    Procedure: NEURO DEST FACET L/S W/IG SNGL - L3-S1;  Surgeon: Farhan Rees D.O.;  Location: [facility] ORS;  Service: Pain Management   • WY DSTR NROLYTC AGNT PARVERTEB FCT ADDL LMBR/SACRAL Left 3/24/2017    Procedure: NEURO DEST FACET L/S W/IG ADDL;  Surgeon: Farhan Rees D.O.;  Location: [facility] ORS;  Service: Pain Management   • PB INJECT RX OTHER PERIPH NERVE Left 3/24/2017    Procedure: NEUROLYTIC DEST-OTHER NERVE;  Surgeon: Farhan Rees D.O.;  Location: [facility]S;  Service: Pain Management   • WY DSTR NROLYTC AGNT PARVERTEB FCT ADDL LMBR/SACRAL  3/24/2017    Procedure: NEURO DEST FACET L/S W/IG ADDL;  Surgeon: Farhan Rees D.O.;  Location: [facility] ORS;  Service: Pain Management   • WY DSTR NROLYTC AGNT PARVERTEB FCT SNGL LMBR/SACRAL Right 6/24/2016    Procedure: NEURO DEST FACET L/S W/IG SNGL - L3-S1;  Surgeon: Farhan Rees D.O.;  Location: SURGERY [facility] ORS;  Service: Pain Management   • WY DSTR NROLYTC AGNT PARVERTEB FCT ADDL LMBR/SACRAL Right 6/24/2016    Procedure: NEURO DEST FACET L/S W/IG ADDL;  Surgeon: Farhan Rees D.O.;  Location: [facility] ORS;  Service: Pain Management   • PB INJECT RX OTHER PERIPH NERVE Right 6/24/2016    Procedure: NEUROLYTIC DEST-OTHER NERVE;  Surgeon: Farhan Rees D.O.;  Location: [facility] ORS;  Service: Pain Management   • WY DSTR NROLYTC AGNT PARVERTEB FCT ADDL LMBR/SACRAL  6/24/2016    Procedure: NEURO DEST FACET L/S W/IG ADDL;  Surgeon: Farhan Rees D.O.;  Location: [facility] ORS;  Service: Pain Management   • WY DSTR NROLYTC AGNT PARVERTEB FCT SNGL LMBR/SACRAL Left 5/6/2016    Procedure: NEURO DEST FACET L/S W/IG SNGL - L3-S1;  Surgeon: Farhan Rees D.O.;  Location: [facility];  Service: Pain Management   • WY DSTR NROLYTC AGNT PARVERTEB FCT ADDL LMBR/SACRAL Left 5/6/2016    Procedure: NEURO DEST  FACET L/S W/IG ADDL;  Surgeon: Farhan Rees D.O.;  Location: SUR[facility]ERY SURG[facility] ORS;  Service: Pain Management   • PB INJECT RX OTHER PERIPH NERVE Left 5/6/2016    Procedure: NEUROLYTIC DEST-OTHER NERVE;  Surgeon: Farhan Rees D.O.;  Location: [facility] ORS;  Service: Pain Management   • SC DSTR NROLYTC AGNT PARVERTEB FCT ADDL LMBR/SACRAL  5/6/2016    Procedure: NEURO DEST FACET L/S W/IG ADDL;  Surgeon: Farhan Rees D.O.;  Location: [facility] ORS;  Service: Pain Management   • SC DSTR NROLYTC AGNT PARVERTEB FCT SNGL LMBR/SACRAL Right 10/16/2015    Procedure: NEURO DEST FACET L/S W/IG SNGL - L3-S1;  Surgeon: Farhan Rees D.O.;  Location: [facility] ORS;  Service: Pain Management   • SC DSTR NROLYTC AGNT PARVERTEB FCT ADDL LMBR/SACRAL  10/16/2015    Procedure: NEURO DEST FACET L/S W/IG ADDL;  Surgeon: Farhan Rees D.O.;  Location: [facility] ORS;  Service: Pain Management   • PB INJECT RX OTHER PERIPH NERVE  10/16/2015    Procedure: NEUROLYTIC DEST-OTHER NERVE;  Surgeon: Farhan Rees D.O.;  Location: [facility] ORS;  Service: Pain Management   • SC DSTR NROLYTC AGNT PARVERTEB FCT ADDL LMBR/SACRAL  10/16/2015    Procedure: NEURO DEST FACET L/S W/IG ADDL;  Surgeon: Farhan Rees D.O.;  Location: [facility] ORS;  Service: Pain Management   • SC DSTR NROLYTC AGNT PARVERTEB FCT SNGL LMBR/SACRAL Left 10/9/2015    Procedure: NEURO DEST FACET L/S W/IG SNGL - L3-S1;  Surgeon: Farhan Rees D.O.;  Location: [facility]ERY [facility] ORS;  Service: Pain Management   • SC DSTR NROLYTC AGNT PARVERTEB FCT ADDL LMBR/SACRAL  10/9/2015    Procedure: NEURO DEST FACET L/S W/IG ADDL;  Surgeon: Farhan Rees D.O.;  Location: [facility];  Service: Pain Management   • PB INJECT RX OTHER PERIPH NERVE  10/9/2015    Procedure: NEUROLYTIC DEST-OTHER NERVE;  Surgeon: Farhan Rees D.O.;  Location: SURGERY  [facility] ORS;  Service: Pain Management   • IN DSTR NROLYTC AGNT PARVERTEB FCT ADDL LMBR/SACRAL  10/9/2015    Procedure: NEURO DEST FACET L/S W/IG ADDL;  Surgeon: Farhan Rees D.O.;  Location: [facility];  Service: Pain Management   • INJ,SACROILIAC,ANES/STEROID  4/3/2015    Performed by Farhan Rees D.O. at [facility]   • INJ,SACROILIAC,ANES/STEROID  4/3/2015    Performed by Farhan Rees D.O. at [facility]   • NEURO DEST FACET L/S W/IG SNGL  2/13/2015    Performed by Farhan Rees D.O. at [facility]   • NEURO DEST FACET L/S W/IG ADDL  2/13/2015    Performed by Farhan Rees D.O. at [facility]   • NEURO DEST FACET L/S W/IG ADDL  2/13/2015    Performed by Farhan Rees D.O. at [facility]   • NEURO DEST FACET L/S W/IG SNGL  2/6/2015    Performed by Farhan Rees D.O. at [facility]   • NEURO DEST FACET L/S W/IG ADDL  2/6/2015    Performed by Farhan Rees D.O. at [facility]   • NEURO DEST FACET L/S W/IG ADDL  2/6/2015    Performed by Farhan Rees D.O. at [facility] ORS   • INJ,SACROILIAC,ANES/STEROID  7/11/2014    Performed by Farhna Rees D.O. at [facility]   • INJ,SACROILIAC,ANES/STEROID  7/11/2014    Performed by Farhan Rees D.O. at [facility] ORS   • TOE ARTHROPLASTY  7/7/2014    Performed by Oliver Pratt D.P.M. at [facility]   • NEURO DEST FACET L/S W/IG SNGL  6/6/2014    Performed by Farhan Rees D.O. at [facility]   • NEURO DEST FACET L/S W/IG ADDL  6/6/2014    Performed by Farhan Rees D.O. at [facility] ORS   • NEURO DEST FACET L/S W/IG ADDL  6/6/2014    Performed by Farhan Rees D.O. at [facility]   • NEURO DEST FACET L/S W/IG ADDL  6/6/2014    Performed by Farhan Rees D.O. at [facility] ORS    • NEURO DEST FACET L/S W/IG SNGL  5/16/2014    Performed by Farhan Rees D.O. at [facility] ORS   • NEURO DEST FACET L/S W/IG ADDL  5/16/2014    Performed by Farhan Rees D.O. at [facility] ORS   • NEURO DEST FACET L/S W/IG ADDL  5/16/2014    Performed by Farhan Rees D.O. at [facility]   • NEURO DEST FACET L/S W/IG ADDL  5/16/2014    Performed by Farhan Rees D.O. at [facility] ORS   • INJ,SACROILIAC,ANES/STEROID  1/10/2014    Performed by Farhan Rees D.O. at [facility] ORS   • INJ,SACROILIAC,ANES/STEROID  1/10/2014    Performed by Farhan Rees D.O. at [facility] OR[facility]   • INJ,SACROILIAC,ANES/STEROID  12/6/2013    Performed by Farhan Rees D.O. at [facility]   • INJ,SACROILIAC,ANES/STEROID  11/8/2013    Performed by Farhan Rees D.O. at [facility]   • NEURO DEST FACET L/S W/IG SNGL  10/4/2013    Performed by Farhan Rees D.O. at [facility]   • NEURO DEST FACET L/S W/IG ADDL  10/4/2013    Performed by Farhan Rees D.O. at [facility]   • NEURO DEST FACET L/S W/IG ADDL  10/4/2013    Performed by Farhan Rees D.O. at [facility]   • NEURO DEST FACET L/S W/IG ADDL  10/4/2013    Performed by Farhan Rees D.O. at [facility]   • NEURO DEST FACET L/S W/IG SNGL  9/20/2013    Performed by Farhan Rees D.O. at [facility]   • NEURO DEST FACET L/S W/IG ADDL  9/20/2013    Performed by Farhan Rees D.O. at [facility]S   • NEURO DEST FACET L/S W/IG ADDL  9/20/2013    Performed by Farhan Rees D.O. at SUR[facility]S   • NEURO DEST FACET L/S W/IG ADDL  9/20/2013    Performed by Fahran Rees D.O. at SUR[facility]ERY [facility]S   • BUNIONECTOMY  6/7/2013    Performed by Oliver Pratt D.P.M. at [facility] ORS   • NEURO DEST FACET  L/S W/IG SNGL  1/4/2013    Performed by Edmar Elias D.O. at [place] [facility]   • NEURO DEST FACET L/S W/IG ADDL  1/4/2013    Performed by Edmar Elias D.O. at [place] [facility]   • NEURO DEST FACET L/S W/IG ADDL  1/4/2013    Performed by Edmar Elias D.O. at [place] [facility]   • OTHER  2013    uterine ablation   • NEURO DEST FACET L/S W/IG SNGL  12/28/2012    Performed by Edmar Elias D.O. at [place] [facility]   • NEURO DEST FACET L/S W/IG ADDL  12/28/2012    Performed by Edmar Elias D.O. at [place] [facility]   • NEURO DEST FACET L/S W/IG ADDL  12/28/2012    Performed by Edmar Elias D.O. at [place] [facility]   • NEURO DEST FACET L/S W/IG SNGL  3/9/2012    Performed by EDMAR ELIAS at [place] [facility]   • NEURO DEST FACET L/S W/IG ADDL  3/9/2012    Performed by EDMAR ELIAS at [place] [facility]   • NEURO DEST FACET L/S W/IG ADDL  3/9/2012    Performed by EDMAR ELIAS at [place] [facility]   • NEURO DEST FACET L/S W/IG SNGL  3/2/2012    Performed by EDMAR ELIAS at [place] [facility]   • NEURO DEST FACET L/S W/IG ADDL  3/2/2012    Performed by EDMAR ELIAS at [place] [facility]   • NEURO DEST FACET L/S W/IG ADDL  3/2/2012    Performed by EDMAR ELIAS at [place] [facility]   • PB INJECT NERV BLCK,STELLATE GANGLION  9/2/2011    Performed by EDMAR ELIAS at [place] [facility]   • INJ,SACROILIAC,ANES/STEROID  5/13/2011    Performed by EDMAR ELIAS at [place] [facility]   • PB DEST,PARAVERTEBRAL,L/S,SINGLE  3/4/2011    Performed by EDMAR ELIAS at [place] [facility]   • PB INJ DX/THER AGNT PARAVERT FACET JOINT, ROSALVA*  2/25/2011    Performed by EDMAR ELIAS at [facility]   • PB INJ DX/THER AGNT PARAVERT FACET JOINT, ROSALVA*  1/21/2011    Performed by EDMAR ELIAS at [facility]   •  HIP ARTHROSCOPY Right 2009     revision   • OTHER Left 2007    left ankle tendon repair   • TONSILLECTOMY  2001   • OTHER ORTHOPEDIC SURGERY Left 2001    hip revision(not total)   • APPENDECTOMY  1991     Allergies   Allergen Reactions   • Morphine Rash   • Other Food Rash     Onions-cause headaches and facial flushing     Outpatient Encounter Prescriptions as of 8/31/2017   Medication Sig Dispense Refill   • gabapentin (NEURONTIN) 300 MG Cap One po qpm x 3 days then increase to bid 60 Cap 11   • alendronate (FOSAMAX) 70 MG Tab Take 70 mg by mouth every 7 days.     • Calcium Citrate-Vitamin D (CALCIUM + D PO) Take  by mouth.     • Boswellia-Glucosamine-Vit D (GLUCOSAMINE COMPLEX PO) Take  by mouth every day.     • tramadol (ULTRAM) 50 MG Tab Take 1 Tab by mouth every 8 hours as needed. 90 Tab 3   • naproxen (NAPROSYN) 500 MG Tab Take 500 mg by mouth 2 times a day, with meals.     • TURMERIC PO Take  by mouth.     • therapeutic multivitamin-minerals (THERAGRAN-M) Tab Take 1 Tab by mouth every day.     • vitamin D (CHOLECALCIFEROL) 1000 UNIT Tab Take 1,000 Units by mouth every day.     • fluticasone (FLONASE) 50 MCG/ACT nasal spray Spray 2 Sprays in nose every day. (Patient taking differently: Spray 2 Sprays in nose as needed.) 16 g 3   • vitamin e (VITAMIN E) 400 UNIT CAPS Take 400 Units by mouth every day.     • penicillin v potassium (VEETID) 250 MG Tab Take 250 mg by mouth 4 times a day.     • ascorbic acid (ASCORBIC ACID) 500 MG Tab Take 500 mg by mouth as needed.     • loratadine (CLARITIN) 10 MG TABS Take 10 mg by mouth as needed.       No facility-administered encounter medications on file as of 8/31/2017.        Social History     Social History   • Marital status:      Spouse name: N/A   • Number of children: N/A   • Years of education: N/A     Occupational History   • Not on file.     Social History Main Topics   • Smoking status: Never Smoker   • Smokeless tobacco: Never Used   • Alcohol use 5.4  oz/week     2 Glasses of wine, 7 Standard drinks or equivalent per week      Comment: 2 drinks per week   • Drug use: No   • Sexual activity: Not on file     Other Topics Concern   • Not on file     Social History Narrative   • No narrative on file      History   Smoking Status   • Never Smoker   Smokeless Tobacco   • Never Used     History   Alcohol Use   • 5.4 oz/week   • 2 Glasses of wine, 7 Standard drinks or equivalent per week     Comment: 2 drinks per week     History   Drug Use No      OB History   No data available      No LMP recorded. Patient has had an ablation.    G P A L     Family History   Problem Relation Age of Onset   • Diabetes     • Hypertension     • Cancer         Review of Systems   Constitutional: Negative for chills and fever.   Respiratory: Negative for shortness of breath.    Cardiovascular: Positive for leg swelling. Negative for chest pain.   Musculoskeletal: Positive for back pain, joint pain and myalgias.        Mild myalgias since starting alendronate   Skin: Negative for rash.        She denies rash but does note a mottling on the forearms and thighs that is faint        Objective:   /64   Pulse 98   Temp 36.9 °C (98.5 °F)   Resp 16   Wt 92.5 kg (204 lb)   SpO2 94%   Breastfeeding? No   BMI 32.93 kg/m²     Physical Exam   Constitutional: She is oriented to person, place, and time. She appears well-developed and well-nourished. No distress.   HENT:   Head: Normocephalic and atraumatic.   Right Ear: External ear normal.   Left Ear: External ear normal.   Eyes: Conjunctivae are normal. Right eye exhibits no discharge. Left eye exhibits no discharge.   Pulmonary/Chest: Effort normal. No stridor. No respiratory distress.   Musculoskeletal:        Neurological: She is alert and oriented to person, place, and time.   Skin: Skin is warm and dry. No rash noted. She is not diaphoretic. No erythema.   Limited exam. On the forearms mild faint mottling of the skin   Psychiatric: She  has a normal mood and affect. Her behavior is normal. Judgment and thought content normal.         Labs    Lab Results   Component Value Date/Time    QNTTBGOLD Negative 12/01/2016 04:04 PM     Lab Results   Component Value Date/Time    HEPBCORTOT Negative 12/01/2016 04:04 PM    HEPBSAG Negative 12/01/2016 04:04 PM     Lab Results   Component Value Date/Time    HEPCAB Negative 12/01/2016 04:04 PM     Lab Results   Component Value Date/Time    SODIUM 137 07/01/2017 11:56 AM    POTASSIUM 4.3 07/01/2017 11:56 AM    CHLORIDE 106 07/01/2017 11:56 AM    CO2 25 07/01/2017 11:56 AM    GLUCOSE 87 07/01/2017 11:56 AM    BUN 15 07/01/2017 11:56 AM    CREATININE 0.69 07/01/2017 11:56 AM      Lab Results   Component Value Date/Time    WBC 6.3 07/01/2017 11:56 AM    RBC 4.23 07/01/2017 11:56 AM    HEMOGLOBIN 12.8 07/01/2017 11:56 AM    HEMATOCRIT 39.4 07/01/2017 11:56 AM    MCV 93.1 07/01/2017 11:56 AM    MCH 30.3 07/01/2017 11:56 AM    MCHC 32.5 (L) 07/01/2017 11:56 AM    MPV 10.3 07/01/2017 11:56 AM    NEUTSPOLYS 53.00 07/01/2017 11:56 AM    LYMPHOCYTES 31.50 07/01/2017 11:56 AM    MONOCYTES 9.10 07/01/2017 11:56 AM    EOSINOPHILS 5.10 07/01/2017 11:56 AM    BASOPHILS 1.10 07/01/2017 11:56 AM      Lab Results   Component Value Date/Time    CALCIUM 8.9 07/01/2017 11:56 AM    ASTSGOT 23 07/01/2017 11:56 AM    ALTSGPT 25 07/01/2017 11:56 AM    ALKPHOSPHAT 44 07/01/2017 11:56 AM    TBILIRUBIN 0.3 07/01/2017 11:56 AM    ALBUMIN 4.0 07/01/2017 11:56 AM    ALBUMIN 4.16 04/01/2017 12:03 PM    TOTPROTEIN 6.7 07/01/2017 11:56 AM    TOTPROTEIN 6.60 04/01/2017 12:03 PM     Lab Results   Component Value Date/Time    URICACID 4.9 12/01/2016 04:04 PM    RHEUMFACTN <10 11/08/2016 01:17 PM    CCPANTIBODY 2 12/01/2016 04:04 PM    ANTINUCAB None Detected 12/01/2016 04:03 PM     Lab Results   Component Value Date/Time    SEDRATEWES 18 07/01/2017 11:56 AM    CREACTPROT 0.21 07/01/2017 11:56 AM     Lab Results   Component Value Date/Time     SANTIAGOVIPER 33.8 08/14/2017 04:08 PM    DRVVTINTERP Not Present 08/14/2017 04:08 PM     Lab Results   Component Value Date/Time    I6ACSFSQETX 152.0 12/01/2016 04:04 PM    V3MYHBLKZKG 28.0 12/01/2016 04:04 PM    IGGCARDIOLI 0 08/14/2017 04:08 PM    IGMCARDIOLI 2 08/14/2017 04:08 PM    IGACARDIOLI 2 08/14/2017 04:08 PM    CRYOGLOBULIN NEG 72Hour 03/10/2017 12:08 PM     Lab Results   Component Value Date/Time    ANTIDNADS None Detected 12/01/2016 04:04 PM    RNPAB 0 12/01/2016 04:04 PM    SMITHAB 0 12/01/2016 04:04 PM    NHHGOYK11 0 11/08/2016 01:17 PM     Lab Results   Component Value Date/Time    ANTIDNADS None Detected 12/01/2016 04:04 PM    ANCAIGG <1:20 12/01/2016 04:04 PM    P1XGXDIHPFF 152.0 12/01/2016 04:04 PM    RNPAB 0 12/01/2016 04:04 PM    ANTISSBSJ 0 12/01/2016 04:04 PM     Lab Results   Component Value Date/Time    COLORURINE Lt. Yellow 04/01/2017 12:03 PM    SPECGRAVITY 1.006 04/01/2017 12:03 PM    PHURINE 6.0 04/01/2017 12:03 PM    GLUCOSEUR Negative 04/01/2017 12:03 PM    KETONES Negative 04/01/2017 12:03 PM    PROTEINURIN Negative 04/01/2017 12:03 PM     Lab Results   Component Value Date/Time    TOTPROTUR See Note 04/01/2017 12:03 PM    TOTALVOLUME Random 04/01/2017 12:03 PM     Lab Results   Component Value Date/Time    SSA60 0 12/01/2016 04:04 PM    SSA52 5 12/01/2016 04:04 PM     No results found for: HBA1C  Lab Results   Component Value Date/Time    CPKTOTAL 92 03/06/2017 02:12 PM     Lab Results   Component Value Date/Time    G6PD 13.7 11/18/2016 03:18 PM     No results found for: AEET37WELE  Lab Results   Component Value Date/Time    ACESERUM 11 11/18/2016 03:18 PM     Lab Results   Component Value Date/Time    25HYDROXY 31 10/10/2016 02:25 PM     No results found for: TSH, FREEDIR  Lab Results   Component Value Date/Time    TSHULTRASEN 1.530 11/28/2016 05:54 PM     Lab Results   Component Value Date/Time    MICROSOMALA 0.5 11/08/2016 01:17 PM    ANTITHYROGL <0.9 12/01/2016 04:04 PM     Lab  Results   Component Value Date/Time    IGGLYMEABS 0.49 11/08/2016 01:17 PM     Lab Results   Component Value Date/Time    FACTIN 22 (H) 11/08/2016 01:17 PM     Lab Results   Component Value Date/Time     04/01/2017 12:03 PM     No results found for: FLTYPE, CRYSTALSBDF  No results found for: ISTATICAL  No results found for: ISTATCREAT  No results found for: CTELOPEP  No results found for: GBMABG  No results found for: PTHINTACT         Assessment:     1. Osteonecrosis (CMS-HCC)  FACTOR V LEIDEN MUTATION    MISCELLANEOUS LAB TEST ([facility]/Other)         Medical Decision Making:  Today's Assessment / Status / Plan:     Ms. Janett Mcnulty first presented to me in November 2016 following a diagnosis of streptococcal pharyngitis and then erythema nodosum noted by PCP and diagnosis of rheumatic fever. At the time she presented with polyarthralgias.  She was started on prednisone 20 mg and increased to 40 mg. Subsequently we obtain an MRI that showed avascular necrosis of the knee. Further evaluation did identify additional joints also with osteonecrosis. We have since gradually tapered off prednisone. Unclear if the short course of prednisone could be the etiology of her multifocal osteonecrosis. Hypercoagulable workup thus far is negative. Anti-TPO antibody and antithyroglobulin antibody was negative. Her urinalysis did not show any protein or urine.    Rheumatologic workup includes a chest x-ray that was negative for hilar adenopathy, MERA that was negative RF and anti-CCP antibody was negative. HLA-B27 antibody that was negative, ANCA serologies that was negative. Hepatitis panel and syphilis and QuantiFERON were also negative    Arthralgia    RF, CCP, MERA, ANCA , HLA B27 negative.  Her inflammatory markers did normalize with steroids. Following completion of steroids about 2 weeks later she had onset of lower extremity swelling That has not resolved.  She has new onset feet pain that is worse in the right. Her  MRI did show some bone marrow edema for which we will continue to monitor as this is somewhat nonspecific.    Myalgia  CK is normal    Osteonecrosis - multifocal  Antiphospholipid antibody panels negative, protein C and protein S is normal, anti-thrombin III is normal, Cryoglobulins are negative  She is still on alendronate.    We will order for the anti phosphatidyl antibody IgG/IgA/IgM and repeat her factor Leiden V  We will follow-up and did discuss with [facility] regarding next steps given her findings were primarily negative.  For her hip she is planned for surgery.    Rheumatic fever  She did see infectious disease and she is off antibiotics    Encounter high risk medications  Off prednisone  Advise to continue calcium and vitamin D    Lower extremity swelling  Venous insufficiency??  PAOLA were negative.  Her symptoms seems to be persistent but not worsening.  We will continue to monitor but if this worsens consider additional imaging      1. Osteonecrosis (CMS-HCC)  FACTOR V LEIDEN MUTATION    MISCELLANEOUS LAB TEST ([facility]/Other)     We will reschedule our next appointment after discussion with [facility].      She agreed and verbalized her agreement and understanding with the current plan. I answered all questions and concerns she has at this time and advised her to call at any time in there interim with questions or concerns in regards to her care.    Thank you for allowing me to participate in her care, I will continue to follow closely.     Please note that this dictation was created using voice recognition software. I have made every reasonable attempt to correct obvious errors, but I expect that there are errors of grammar and possibly content that I did not discover before finalizing the note.

## 2017-09-01 ENCOUNTER — HOSPITAL ENCOUNTER (OUTPATIENT)
Dept: LAB | Facility: MEDICAL CENTER | Age: 47
End: 2017-09-01
Attending: INTERNAL MEDICINE
Payer: COMMERCIAL

## 2017-09-01 DIAGNOSIS — M87.9 OSTEONECROSIS (HCC): ICD-10-CM

## 2017-09-01 PROCEDURE — 36415 COLL VENOUS BLD VENIPUNCTURE: CPT

## 2017-09-01 PROCEDURE — 81241 F5 GENE: CPT

## 2017-09-01 PROCEDURE — 86148 ANTI-PHOSPHOLIPID ANTIBODY: CPT | Mod: 91

## 2017-09-03 LAB
PS IGA SER IA-ACNC: 1 U/ML (ref 0–19)
PS IGG SER IA-ACNC: 2 U/ML (ref 0–10)
PS IGM SER IA-ACNC: 15 U/ML (ref 0–24)

## 2017-09-10 LAB — F5 P.R506Q BLD/T QL: NEGATIVE

## 2017-09-11 ENCOUNTER — TELEPHONE (OUTPATIENT)
Dept: RHEUMATOLOGY | Facility: PHYSICIAN GROUP | Age: 47
End: 2017-09-11

## 2017-09-11 ENCOUNTER — APPOINTMENT (OUTPATIENT)
Dept: ADMISSIONS | Facility: MEDICAL CENTER | Age: 47
End: 2017-09-11
Attending: ORTHOPAEDIC SURGERY
Payer: COMMERCIAL

## 2017-09-11 NOTE — TELEPHONE ENCOUNTER
Called Dr. Lam at [facility].  Recommended labs were negative.    She posed a question - role for anticoagulation if there was a secondary role??  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    Will discuss with heme if there are additional labs they may suggest doing.  If additional labs are negative would ask if empirically any role for anticoagulation.  Will fax labs to [phone]

## 2017-09-12 ENCOUNTER — APPOINTMENT (OUTPATIENT)
Dept: ADMISSIONS | Facility: MEDICAL CENTER | Age: 47
End: 2017-09-12
Payer: COMMERCIAL

## 2017-09-21 ENCOUNTER — TELEPHONE (OUTPATIENT)
Dept: RHEUMATOLOGY | Facility: PHYSICIAN GROUP | Age: 47
End: 2017-09-21

## 2017-09-21 NOTE — TELEPHONE ENCOUNTER
Called patient.  REviewed results.  Noted that I spoke Dr. Lam and noted results were normal.  We will fax results to Dr. Lam. [phone]

## 2017-09-22 ENCOUNTER — HOSPITAL ENCOUNTER (OUTPATIENT)
Dept: LAB | Facility: MEDICAL CENTER | Age: 47
End: 2017-09-22
Payer: COMMERCIAL

## 2017-09-22 LAB
BDY FAT % MEASURED: 34.1 %
BP DIAS: 65 MMHG
BP SYS: 113 MMHG
CHOLEST SERPL-MCNC: 189 MG/DL (ref 100–199)
DIABETES HTDIA: NO
EVENT NAME HTEVT: NORMAL
FASTING HTFAS: YES
GLUCOSE SERPL-MCNC: 94 MG/DL (ref 65–99)
HDLC SERPL-MCNC: 56 MG/DL
HYPERTENSION HTHYP: NO
LDLC SERPL CALC-MCNC: 122 MG/DL
SCREENING LOC CITY HTCIT: NORMAL
SCREENING LOC STATE HTSTA: NORMAL
SCREENING LOCATION HTLOC: NORMAL
SMOKING HTSMO: NO
SUBSCRIBER ID HTSID: NORMAL
TRIGL SERPL-MCNC: 53 MG/DL (ref 0–149)

## 2017-09-22 PROCEDURE — 36415 COLL VENOUS BLD VENIPUNCTURE: CPT

## 2017-09-22 PROCEDURE — 80061 LIPID PANEL: CPT

## 2017-09-22 PROCEDURE — 82947 ASSAY GLUCOSE BLOOD QUANT: CPT

## 2017-09-22 PROCEDURE — S5190 WELLNESS ASSESSMENT BY NONPH: HCPCS

## 2017-09-29 ENCOUNTER — APPOINTMENT (OUTPATIENT)
Dept: RADIOLOGY | Facility: MEDICAL CENTER | Age: 47
End: 2017-09-29
Attending: ORTHOPAEDIC SURGERY
Payer: COMMERCIAL

## 2017-09-29 ENCOUNTER — HOSPITAL ENCOUNTER (OUTPATIENT)
Facility: MEDICAL CENTER | Age: 47
End: 2017-09-29
Attending: ORTHOPAEDIC SURGERY | Admitting: ORTHOPAEDIC SURGERY
Payer: COMMERCIAL

## 2017-09-29 VITALS
BODY MASS INDEX: 30.91 KG/M2 | OXYGEN SATURATION: 99 % | HEIGHT: 68 IN | WEIGHT: 203.93 LBS | TEMPERATURE: 96.8 F | DIASTOLIC BLOOD PRESSURE: 76 MMHG | RESPIRATION RATE: 16 BRPM | SYSTOLIC BLOOD PRESSURE: 141 MMHG | HEART RATE: 95 BPM

## 2017-09-29 PROBLEM — G56.01 CARPAL TUNNEL SYNDROME ON RIGHT: Status: ACTIVE | Noted: 2017-09-29

## 2017-09-29 LAB
B-HCG FREE SERPL-ACNC: <5 MIU/ML
IHCGL IHCGL: NEGATIVE MIU/ML

## 2017-09-29 PROCEDURE — 501445 HCHG STAPLER, SKIN DISP: Performed by: ORTHOPAEDIC SURGERY

## 2017-09-29 PROCEDURE — 160036 HCHG PACU - EA ADDL 30 MINS PHASE I: Performed by: ORTHOPAEDIC SURGERY

## 2017-09-29 PROCEDURE — 73502 X-RAY EXAM HIP UNI 2-3 VIEWS: CPT | Mod: RT

## 2017-09-29 PROCEDURE — 160025 RECOVERY II MINUTES (STATS): Performed by: ORTHOPAEDIC SURGERY

## 2017-09-29 PROCEDURE — 160035 HCHG PACU - 1ST 60 MINS PHASE I: Performed by: ORTHOPAEDIC SURGERY

## 2017-09-29 PROCEDURE — 501838 HCHG SUTURE GENERAL: Performed by: ORTHOPAEDIC SURGERY

## 2017-09-29 PROCEDURE — 160048 HCHG OR STATISTICAL LEVEL 1-5: Performed by: ORTHOPAEDIC SURGERY

## 2017-09-29 PROCEDURE — A9270 NON-COVERED ITEM OR SERVICE: HCPCS

## 2017-09-29 PROCEDURE — 160046 HCHG PACU - 1ST 60 MINS PHASE II: Performed by: ORTHOPAEDIC SURGERY

## 2017-09-29 PROCEDURE — 700101 HCHG RX REV CODE 250

## 2017-09-29 PROCEDURE — 502576 HCHG PACK, HAND: Performed by: ORTHOPAEDIC SURGERY

## 2017-09-29 PROCEDURE — 160002 HCHG RECOVERY MINUTES (STAT): Performed by: ORTHOPAEDIC SURGERY

## 2017-09-29 PROCEDURE — 700111 HCHG RX REV CODE 636 W/ 250 OVERRIDE (IP)

## 2017-09-29 PROCEDURE — 84702 CHORIONIC GONADOTROPIN TEST: CPT

## 2017-09-29 PROCEDURE — 160047 HCHG PACU  - EA ADDL 30 MINS PHASE II: Performed by: ORTHOPAEDIC SURGERY

## 2017-09-29 PROCEDURE — 160029 HCHG SURGERY MINUTES - 1ST 30 MINS LEVEL 4: Performed by: ORTHOPAEDIC SURGERY

## 2017-09-29 PROCEDURE — 160009 HCHG ANES TIME/MIN: Performed by: ORTHOPAEDIC SURGERY

## 2017-09-29 PROCEDURE — 160041 HCHG SURGERY MINUTES - EA ADDL 1 MIN LEVEL 4: Performed by: ORTHOPAEDIC SURGERY

## 2017-09-29 PROCEDURE — 700102 HCHG RX REV CODE 250 W/ 637 OVERRIDE(OP)

## 2017-09-29 PROCEDURE — 700105 HCHG RX REV CODE 258: Performed by: ORTHOPAEDIC SURGERY

## 2017-09-29 PROCEDURE — A6402 STERILE GAUZE <= 16 SQ IN: HCPCS | Performed by: ORTHOPAEDIC SURGERY

## 2017-09-29 PROCEDURE — 501480 HCHG STOCKINETTE: Performed by: ORTHOPAEDIC SURGERY

## 2017-09-29 RX ORDER — OXYCODONE HCL 5 MG/5 ML
SOLUTION, ORAL ORAL
Status: COMPLETED
Start: 2017-09-29 | End: 2017-09-29

## 2017-09-29 RX ORDER — SODIUM CHLORIDE, SODIUM LACTATE, POTASSIUM CHLORIDE, CALCIUM CHLORIDE 600; 310; 30; 20 MG/100ML; MG/100ML; MG/100ML; MG/100ML
INJECTION, SOLUTION INTRAVENOUS
Status: DISCONTINUED | OUTPATIENT
Start: 2017-09-29 | End: 2017-09-29 | Stop reason: HOSPADM

## 2017-09-29 RX ORDER — ROPIVACAINE HYDROCHLORIDE 5 MG/ML
INJECTION, SOLUTION EPIDURAL; INFILTRATION; PERINEURAL
Status: DISCONTINUED | OUTPATIENT
Start: 2017-09-29 | End: 2017-09-29 | Stop reason: HOSPADM

## 2017-09-29 RX ORDER — HALOPERIDOL 5 MG/ML
1 INJECTION INTRAMUSCULAR EVERY 6 HOURS PRN
Status: DISCONTINUED | OUTPATIENT
Start: 2017-09-29 | End: 2017-09-29 | Stop reason: HOSPADM

## 2017-09-29 RX ORDER — DIPHENHYDRAMINE HYDROCHLORIDE 50 MG/ML
25 INJECTION INTRAMUSCULAR; INTRAVENOUS EVERY 6 HOURS PRN
Status: DISCONTINUED | OUTPATIENT
Start: 2017-09-29 | End: 2017-09-29 | Stop reason: HOSPADM

## 2017-09-29 RX ORDER — LIDOCAINE HYDROCHLORIDE 10 MG/ML
INJECTION, SOLUTION INFILTRATION; PERINEURAL
Status: COMPLETED
Start: 2017-09-29 | End: 2017-09-29

## 2017-09-29 RX ORDER — MEPERIDINE HYDROCHLORIDE 25 MG/ML
INJECTION INTRAMUSCULAR; INTRAVENOUS; SUBCUTANEOUS
Status: COMPLETED
Start: 2017-09-29 | End: 2017-09-29

## 2017-09-29 RX ORDER — ONDANSETRON 2 MG/ML
4 INJECTION INTRAMUSCULAR; INTRAVENOUS EVERY 4 HOURS PRN
Status: DISCONTINUED | OUTPATIENT
Start: 2017-09-29 | End: 2017-09-29 | Stop reason: HOSPADM

## 2017-09-29 RX ORDER — SCOLOPAMINE TRANSDERMAL SYSTEM 1 MG/1
1 PATCH, EXTENDED RELEASE TRANSDERMAL
Status: DISCONTINUED | OUTPATIENT
Start: 2017-09-29 | End: 2017-09-29 | Stop reason: HOSPADM

## 2017-09-29 RX ORDER — DEXAMETHASONE SODIUM PHOSPHATE 4 MG/ML
4 INJECTION, SOLUTION INTRA-ARTICULAR; INTRALESIONAL; INTRAMUSCULAR; INTRAVENOUS; SOFT TISSUE
Status: DISCONTINUED | OUTPATIENT
Start: 2017-09-29 | End: 2017-09-29 | Stop reason: HOSPADM

## 2017-09-29 RX ADMIN — FENTANYL CITRATE 50 MCG: 50 INJECTION, SOLUTION INTRAMUSCULAR; INTRAVENOUS at 08:45

## 2017-09-29 RX ADMIN — LIDOCAINE HYDROCHLORIDE 0.2 ML: 10 INJECTION, SOLUTION INFILTRATION; PERINEURAL at 06:30

## 2017-09-29 RX ADMIN — SODIUM CHLORIDE, POTASSIUM CHLORIDE, SODIUM LACTATE AND CALCIUM CHLORIDE: 600; 310; 30; 20 INJECTION, SOLUTION INTRAVENOUS at 06:45

## 2017-09-29 RX ADMIN — MEPERIDINE HYDROCHLORIDE 12.5 MG: 25 INJECTION INTRAMUSCULAR; INTRAVENOUS; SUBCUTANEOUS at 08:30

## 2017-09-29 RX ADMIN — OXYCODONE HYDROCHLORIDE 10 MG: 5 SOLUTION ORAL at 08:30

## 2017-09-29 RX ADMIN — MEPERIDINE HYDROCHLORIDE 12.5 MG: 25 INJECTION INTRAMUSCULAR; INTRAVENOUS; SUBCUTANEOUS at 08:48

## 2017-09-29 ASSESSMENT — PAIN SCALES - GENERAL
PAINLEVEL_OUTOF10: 3
PAINLEVEL_OUTOF10: ASSUMED PAIN PRESENT

## 2017-09-29 NOTE — OR NURSING
Patient to preop, allergies and NPO status verified, home medications reconciled, belongings secured, verbalizes understanding of pain scale, surgical site verified, IV access established, SCDs/ DINORAH hose in place.

## 2017-09-29 NOTE — OP REPORT
DATE OF SERVICE:  2017    PREOPERATIVE DIAGNOSES:  1.  Avascular necrosis, right hip.  2.  Carpal tunnel syndrome, right hand.    POSTOPERATIVE DIAGNOSES:    SURGEON:  Luan Ferrer MD.    ANESTHESIOLOGIST:  Gina Tyson MD.    PROCEDURES PERFORMED:  1.  Right hip fluoroscopically guided core decompression.  2.  Right hand carpal tunnel release.    WEIGHTBEARIN% weightbearing to right hip.    PROCEDURE IN DETAIL:  The patient was taken to the operating room where she   underwent general anesthetic in supine position.  All bony prominences were   padded.  Right lower extremity was isolated, prepped using Hibiclens, alcohol,   ChloraPrep and draped using sterile technique.  The surgery was undertaken,   starting with the hip after an appropriate timeout for both the hip and the   right arm.  The right hip was then addressed with fluoroscopic guidance.    Localization was undertaken and then a small stab incision laterally   undertaken.  This was then followed with percutaneous drilling of the lateral   femoral cortex in the appropriate position with a 3.5 drill and then   subsequent multipass drilling of the femoral head on AP and lateral imaging to   obtain a decompression of the dysvascular area of the femoral head.  This was   done and confirmed on multiple plain imaging and the wound then addressed   with irrigation.  Anesthetization with ropivacaine was completed.  The   instruments were removed, the irrigation repeated.  The closure undertaken   with nylon.    At this point, the right hand was addressed.  Right upper extremity isolated,   prepped using Hibiclens, alcohol, ChloraPrep and draped using sterile   technique.  The right carpal tunnel release was then undertaken with a base of   the palm incision parallel to the skin creases.  Skin and subcutaneous tissue   were incised.  Blunt dissection was undertaken.  The palmar fascia was split.    The transverse carpal ligament identified  at its distal extent and then   transected by placing a freer in the tunnel and transecting down on to the   freer.  The proximal extent of the tunnel was bisected after volar and dorsal   dissection of the distal forearm fascia and splitting this with a blunt tip   Metzenbaum scissor.  The tunnel was assessed and no evidence of loose bodies   or mass effect was noted.  The tourniquet let down, hemostasis obtained and   closure undertaken with a running nylon and anesthetization with ropivacaine.    Patient placed into a volar splint, awoken from her anesthetic and taken to   the recovery room, tolerated the procedure very well with no signs of   complications.       ____________________________________     MD GHULAM VERNON / DAY    DD:  09/29/2017 09:19:59  DT:  09/29/2017 09:51:45    D#:  [id number]  Job#:  [id number]

## 2017-09-29 NOTE — OR NURSING
0812 Pt to PACU from OR via gurney, respirations spontaneous and non-labored via 5L NC. Rt hand and hip surgical sites clean, dry and intact, pt not arousable, VSS.   0825 Pt shivering and reports pain in rt hip plan to medicate, VSS.   0830 IV demerol and oral analgesia administered.    0832 Report to LESLY De Luna.

## 2017-09-29 NOTE — OR NURSING
0832: vd report from LESLY Rollins and assumed care. Pt C/O pain, pain medication given per MAR.  0845: Still shivering, medication given per MAR, and Bear Hugger turned on. Pain still not tolerable, pain medication also given per MAR.  0900: More comfortable, pain has decreased, no nausea.  0915: Sleepy, but arouses to voice, pain is still tolerable, no nausea.  0930: Meets criteria to transfer to Stage 2, report called and pt readied for transfer.

## 2017-09-29 NOTE — DISCHARGE INSTRUCTIONS
ACTIVITY: Rest and take it easy for the first 24 hours.  A responsible adult is recommended to remain with you during that time.  It is normal to feel sleepy.  We encourage you to not do anything that requires balance, judgment or coordination.    MILD FLU-LIKE SYMPTOMS ARE NORMAL. YOU MAY EXPERIENCE GENERALIZED MUSCLE ACHES, THROAT IRRITATION, HEADACHE AND/OR SOME NAUSEA.    FOR 24 HOURS DO NOT:  Drive, operate machinery or run household appliances.  Drink beer or alcoholic beverages.   Make important decisions or sign legal documents.    SPECIAL INSTRUCTIONS:   Keep dressings clean, dry, and intact.  May shower on Post-op day #2 (Sunday) with wound covered.  Toe Touch weight bearing, use a heel to toe gait.  Wear white elastic stockings daily until OKed by MD to stop.  Ambulate with assistance post-op day Zero (Friday), and then 3 times daily each subsequent day, unless instructed not to by MD.       DIET: To avoid nausea, slowly advance diet as tolerated, avoiding spicy or greasy foods for the first day.  Add more substantial food to your diet according to your physician's instructions. INCREASE FLUIDS AND FIBER TO AVOID CONSTIPATION.      FOLLOW-UP APPOINTMENT:  A follow-up appointment should be arranged with your doctor in; call to schedule.    You should CALL YOUR PHYSICIAN if you develop:  Fever greater than 101 degrees F.  Pain not relieved by medication, or persistent nausea or vomiting.  Excessive bleeding (blood soaking through dressing) or unexpected drainage from the wound.  Extreme redness or swelling around the incision site, drainage of pus or foul smelling drainage.  Inability to urinate or empty your bladder within 8 hours.  Problems with breathing or chest pain.    You should call 911 if you develop problems with breathing or chest pain.  If you are unable to contact your doctor or surgical center, you should go to the nearest emergency room or urgent care center.  Physician's telephone #:  [phone]    If any questions arise, call your doctor.  If your doctor is not available, please feel free to call the Surgical Center at [phone].  The Center is open Monday through Friday from 7AM to 7PM.  You can also call the HEALTH HOTLINE open 24 hours/day, 7 days/week and speak to a nurse at [phone], or toll free at [phone].    A registered nurse may call you a few days after your surgery to see how you are doing after your procedure.    MEDICATIONS: Resume taking daily medication.  Take prescribed pain medication with food.  If no medication is prescribed, you may take non-aspirin pain medication if needed.  PAIN MEDICATION CAN BE VERY CONSTIPATING.  Take a stool softener or laxative such as senokot, pericolace, or milk of magnesia if needed.    Prescription given prior to surgery.  Last pain medication given at 08:30 AM 10mg Oxycodone.    If your physician has prescribed pain medication that includes Acetaminophen (Tylenol), do not take additional Acetaminophen (Tylenol) while taking the prescribed medication.    Depression / Suicide Risk    As you are discharged from this [facility] facility, it is important to learn how to keep safe from harming yourself.    Recognize the warning signs:  · Abrupt changes in personality, positive or negative- including increase in energy   · Giving away possessions  · Change in eating patterns- significant weight changes-  positive or negative  · Change in sleeping patterns- unable to sleep or sleeping all the time   · Unwillingness or inability to communicate  · Depression  · Unusual sadness, discouragement and loneliness  · Talk of wanting to die  · Neglect of personal appearance   · Rebelliousness- reckless behavior  · Withdrawal from people/activities they love  · Confusion- inability to concentrate     If you or a loved one observes any of these behaviors or has concerns about self-harm, here's what you can do:  · Talk about it- your feelings and  reasons for harming yourself  · Remove any means that you might use to hurt yourself (examples: pills, rope, extension cords, firearm)  · Get professional help from the community (Mental Health, Substance Abuse, psychological counseling)  · Do not be alone:Call your Safe Contact- someone whom you trust who will be there for you.  · Call your local CRISIS HOTLINE 597-5022 or 384-855-8139  · Call your local Children's Mobile Crisis Response Team [place] (892) 226-2532 or www.[facility]  · Call the toll free National Suicide Prevention Hotlines   · National Suicide Prevention Lifeline 736-084-XKTJ (4746)  · National Hope Line Network 800-SUICIDE (201-3286)

## 2017-10-03 ENCOUNTER — TELEPHONE (OUTPATIENT)
Dept: RHEUMATOLOGY | Facility: PHYSICIAN GROUP | Age: 47
End: 2017-10-03

## 2017-10-03 DIAGNOSIS — M87.00 AVASCULAR NECROSIS (HCC): ICD-10-CM

## 2017-10-12 ENCOUNTER — TELEPHONE (OUTPATIENT)
Dept: RHEUMATOLOGY | Facility: PHYSICIAN GROUP | Age: 47
End: 2017-10-12

## 2017-10-12 ENCOUNTER — HOSPITAL ENCOUNTER (OUTPATIENT)
Dept: LAB | Facility: MEDICAL CENTER | Age: 47
End: 2017-10-12
Attending: INTERNAL MEDICINE
Payer: COMMERCIAL

## 2017-10-12 DIAGNOSIS — M87.00 AVASCULAR NECROSIS (HCC): ICD-10-CM

## 2017-10-12 LAB — HCYS SERPL-SCNC: 11.68 UMOL/L

## 2017-10-12 PROCEDURE — 83090 ASSAY OF HOMOCYSTEINE: CPT

## 2017-10-12 PROCEDURE — 81291 MTHFR GENE: CPT

## 2017-10-12 PROCEDURE — 36415 COLL VENOUS BLD VENIPUNCTURE: CPT

## 2017-10-12 NOTE — TELEPHONE ENCOUNTER
[facility] called and stated that they need you to order the Homocystine as a blood draw. They do not do it as a urine. It was drawn for but they need you to order it by today. Thank you!

## 2017-10-18 LAB
MTHFR C.1298A>C GENO BLD/T: NEGATIVE
MTHFR C.677C>T GENO BLD/T: NEGATIVE
MTHFR GENE MUT ANL BLD/T: NORMAL

## 2017-11-08 ENCOUNTER — IMMUNIZATION (OUTPATIENT)
Dept: OCCUPATIONAL MEDICINE | Facility: CLINIC | Age: 47
End: 2017-11-08

## 2017-11-08 DIAGNOSIS — Z23 NEED FOR VACCINATION: ICD-10-CM

## 2017-11-08 PROCEDURE — 90686 IIV4 VACC NO PRSV 0.5 ML IM: CPT | Performed by: PREVENTIVE MEDICINE

## 2017-11-13 DIAGNOSIS — M54.5 LOW BACK PAIN, UNSPECIFIED BACK PAIN LATERALITY, UNSPECIFIED CHRONICITY, WITH SCIATICA PRESENCE UNSPECIFIED: Chronic | ICD-10-CM

## 2017-11-13 RX ORDER — TRAMADOL HYDROCHLORIDE 50 MG/1
50 TABLET ORAL EVERY 8 HOURS PRN
Qty: 90 TAB | Refills: 4 | Status: SHIPPED
Start: 2017-11-13 | End: 2018-05-08 | Stop reason: SDUPTHER

## 2017-12-06 ENCOUNTER — HOSPITAL ENCOUNTER (OUTPATIENT)
Dept: RADIOLOGY | Facility: MEDICAL CENTER | Age: 47
End: 2017-12-06
Attending: OBSTETRICS & GYNECOLOGY
Payer: COMMERCIAL

## 2017-12-06 DIAGNOSIS — Z12.31 ENCOUNTER FOR SCREENING MAMMOGRAM FOR MALIGNANT NEOPLASM OF BREAST: ICD-10-CM

## 2017-12-06 PROCEDURE — G0202 SCR MAMMO BI INCL CAD: HCPCS

## 2017-12-14 ENCOUNTER — APPOINTMENT (OUTPATIENT)
Dept: ADMISSIONS | Facility: MEDICAL CENTER | Age: 47
End: 2017-12-14
Attending: ORTHOPAEDIC SURGERY
Payer: COMMERCIAL

## 2017-12-20 ENCOUNTER — HOSPITAL ENCOUNTER (OUTPATIENT)
Dept: RADIOLOGY | Facility: REHABILITATION | Age: 47
End: 2017-12-20
Attending: PHYSICAL MEDICINE & REHABILITATION
Payer: COMMERCIAL

## 2017-12-20 ENCOUNTER — HOSPITAL ENCOUNTER (OUTPATIENT)
Dept: PAIN MANAGEMENT | Facility: REHABILITATION | Age: 47
End: 2017-12-20
Attending: PHYSICAL MEDICINE & REHABILITATION
Payer: COMMERCIAL

## 2017-12-20 VITALS
SYSTOLIC BLOOD PRESSURE: 111 MMHG | HEIGHT: 67 IN | RESPIRATION RATE: 16 BRPM | TEMPERATURE: 97.5 F | HEART RATE: 78 BPM | BODY MASS INDEX: 31.52 KG/M2 | WEIGHT: 200.84 LBS | DIASTOLIC BLOOD PRESSURE: 60 MMHG | OXYGEN SATURATION: 98 %

## 2017-12-20 PROCEDURE — 99152 MOD SED SAME PHYS/QHP 5/>YRS: CPT

## 2017-12-20 PROCEDURE — 64636 DESTROY L/S FACET JNT ADDL: CPT

## 2017-12-20 PROCEDURE — 64635 DESTROY LUMB/SAC FACET JNT: CPT

## 2017-12-20 PROCEDURE — 64640 INJECTION TREATMENT OF NERVE: CPT

## 2017-12-20 PROCEDURE — 700101 HCHG RX REV CODE 250

## 2017-12-20 PROCEDURE — 700111 HCHG RX REV CODE 636 W/ 250 OVERRIDE (IP)

## 2017-12-20 RX ORDER — BETAMETHASONE SODIUM PHOSPHATE AND BETAMETHASONE ACETATE 3; 3 MG/ML; MG/ML
INJECTION, SUSPENSION INTRA-ARTICULAR; INTRALESIONAL; INTRAMUSCULAR; SOFT TISSUE
Status: COMPLETED
Start: 2017-12-20 | End: 2017-12-20

## 2017-12-20 RX ORDER — BUPIVACAINE HYDROCHLORIDE 5 MG/ML
INJECTION, SOLUTION EPIDURAL; INTRACAUDAL
Status: COMPLETED
Start: 2017-12-20 | End: 2017-12-20

## 2017-12-20 RX ORDER — MIDAZOLAM HYDROCHLORIDE 1 MG/ML
INJECTION INTRAMUSCULAR; INTRAVENOUS
Status: COMPLETED
Start: 2017-12-20 | End: 2017-12-20

## 2017-12-20 RX ORDER — LIDOCAINE HYDROCHLORIDE 10 MG/ML
INJECTION, SOLUTION EPIDURAL; INFILTRATION; INTRACAUDAL; PERINEURAL
Status: COMPLETED
Start: 2017-12-20 | End: 2017-12-20

## 2017-12-20 RX ORDER — DEXTROSE MONOHYDRATE 25 G/50ML
INJECTION, SOLUTION INTRAVENOUS
Status: COMPLETED
Start: 2017-12-20 | End: 2017-12-20

## 2017-12-20 RX ADMIN — MIDAZOLAM HYDROCHLORIDE 1 MG: 1 INJECTION, SOLUTION INTRAMUSCULAR; INTRAVENOUS at 11:19

## 2017-12-20 RX ADMIN — MIDAZOLAM HYDROCHLORIDE 1 MG: 1 INJECTION, SOLUTION INTRAMUSCULAR; INTRAVENOUS at 11:23

## 2017-12-20 RX ADMIN — FENTANYL CITRATE 50 MCG: 50 INJECTION, SOLUTION INTRAMUSCULAR; INTRAVENOUS at 11:20

## 2017-12-20 RX ADMIN — LIDOCAINE HYDROCHLORIDE 5 ML: 10 INJECTION, SOLUTION EPIDURAL; INFILTRATION; INTRACAUDAL; PERINEURAL at 11:18

## 2017-12-20 RX ADMIN — FENTANYL CITRATE 50 MCG: 50 INJECTION, SOLUTION INTRAMUSCULAR; INTRAVENOUS at 11:17

## 2017-12-20 RX ADMIN — BUPIVACAINE HYDROCHLORIDE 2 ML: 5 INJECTION, SOLUTION EPIDURAL; INTRACAUDAL; PERINEURAL at 11:25

## 2017-12-20 RX ADMIN — MIDAZOLAM HYDROCHLORIDE 1 MG: 1 INJECTION, SOLUTION INTRAMUSCULAR; INTRAVENOUS at 11:17

## 2017-12-20 RX ADMIN — DEXTROSE MONOHYDRATE 1 ML: 25 INJECTION, SOLUTION INTRAVENOUS at 11:25

## 2017-12-20 ASSESSMENT — PAIN SCALES - GENERAL
PAINLEVEL_OUTOF10: 6
PAINLEVEL_OUTOF10: 0

## 2017-12-20 NOTE — PROGRESS NOTES
Handoff report done.    Pt is A/O, VS checked and stable. Denies tingling and numbness. Denies dizziness or lightheadedness. Dressing is CDI. Tolerated PO well, cold pack applied.        Brandon   to drive patient home.

## 2017-12-20 NOTE — PROGRESS NOTES
Hand off report received from Westley GRACE at 1110.Pt positioned prone by CNA,RN, and Xray tech. Arms hanging off the head of the table, hands resting on stool under table . Pillow placed under feet and lower legs by CNA. Grounding pad instruction given and placed on rt calf by CNA. Time out given by MD. Stated pt's allergy to steroid to clarify not using celestone. Hand off report given to Westley GRACE at 2824

## 2017-12-20 NOTE — PROGRESS NOTES
Patient is alert and oriented.  VS checked. Denies taking blood thinners  or anti-inflammatory med. Discharge instructions given. NPO status maintained. IV established   Brandon- will drive patient home.     Handoff report given to LESLY Felipe.

## 2017-12-20 NOTE — OP REPORT
Surgeon: Farhan Rees DO    Assistants:  None    Preoperative diagnosis:  Lumbosacral spondylosis    Post operative diagnosis:  Lumbosacral spondylosis    Type of Sedation:  Local anesthesia and conscious sedation    Sedation time: 17 minutes    Site of Service:  [facility]     Procedure:  1. Left L3 Medial Branch Nerve Radiofrequency Ablation Under Fluoroscopy  2. Left L4 Medial Branch Nerve Radiofrequency Ablation Under Fluoroscopy  3. Left L5 Dorsal Ramus Nerve Radiofrequency Ablation Under Fluoroscopy  4. Left S1 Lateral Branch Nerve Radiofrequency Ablation Under Fluoroscopy    After discussing risks, benefits, and alternatives to the procedure, the patient expressed understanding and wished to proceed.  The patient was brought into the fluoroscopy suite and placed in the prone position.  Procedural pause was conducted to verify: correct patient identity, procedure to be performed and as applicable, correct side and site, correct patient position, and availability of implants, special equipment or special instruments.      The skin was sterilely prepped and draped in the usual fashion using betadine times three in the region of the injections.      Left L3 Medial Branch Nerve Radiofrequency Ablation:  Using fluoroscopy, the junction of the L4 transverse process and superior articulating process was identified and marked.  The skin and subcutaneous tissues were anesthetized with 1% lidocaine using a 25 gauge 1.5 inch needle.  Using fluoroscopic guidance, a 100 mm SMK RF cannulus with a 10 mm active tip was then inserted down to the superior junction of the left L4 transverse process and the superior articulating process.  Sensory testing was performed at 50Hz up to 1 volt.  Motor testing was performed at 2Hz up to 3.5 volts.  With final needle positioning, there was no evidence of radicular stimulation.  Prior to lesioning, 1 cc of 1% lidocaine was injected.  Lesion was performed at 80 degrees Celcius  for 120 seconds.  After lesioning a solution of 0.5 cc of 0.5% Bupivicaine and 0.5 cc of D50 was injected performing a chemical neurolysis.  Following the procedure the needle was withdrawn slightly and flushed with 1% Lidocaine as it was withdrawn.      Left L3 RF probe spot film:  A single A-P spot film was taken of the probe placement which documented that the probe position was at the superior junction   of the base of the left L4 transverse process.    Left L4 Medial Branch Nerve Radiofrequency Ablation: Using fluoroscopy, the junction of the L5 transverse process and superior articulating process was identified and marked.  The skin and subcutaneous tissues were anesthetized with 1% lidocaine using a 25 gauge 1.5 inch needle.  Using fluoroscopic guidance, a 100 mm SMK RF cannulus with a 10 mm active tip was then inserted down to the superior junction of the left L5 transverse process and the superior articulating process.  Sensory testing was performed at 50Hz up to 1 volt.  Motor testing was performed at 2Hz up to 3.5 volts.  With final needle positioning, there was no evidence of radicular stimulation.  Prior to lesioning, 1 cc of 1% lidocaine was injected.  Lesion was performed at 80 degrees Celcius for 120 seconds.  After lesioning a solution of 0.5 cc of 0.5% Bupivicaine and 0.5 cc of D50 was injected performing a chemical neurolysis.   A spot film was taken for documentation purposes.  Following the procedure the needle was withdrawn slightly and flushed with 1% Lidocaine as it was withdrawn.      Left L4 RF probe spot film:  A single A-P spot film was taken of the probe    placement which documented that the probe position was at the superior junction   of the base of the left L5 transverse process.    Left L5 Dorsal Ramus Nerve Radiofrequency Ablation:  Using fluoroscopy, the junction of the left S1 superior articulating process and the sacral ala was identified and marked.  The skin and subcutaneous  tissues were anesthetized with 1% lidocaine using a 25 gauge 1.5 inch needle.  Using fluoroscopic guidance, a 100 mm SMK RF cannulus with a 10 mm active tip was then inserted down to the superior junction of the left S1 superior articulating process and the sacral ala to ablate the left L5 dorsal ramus.  Sensory testing was performed at 50Hz up to 1 volt.  Motor testing was performed at 2Hz up to 3.5 volts.  With final needle positioning, there was no evidence of radicular stimulation.  Prior to lesioning, 1 cc of 1% lidocaine was injected.  Lesion was performed at 80 degrees Celcius for 120 seconds.  After lesioning a solution of 0.5 cc of 0.5% Bupivicaine and 0.5 cc of D50 was injected performing a chemical neurolysis.  A spot film was taken for documentation purposes.  Following the procedure the needle was withdrawn slightly and flushed with 1% Lidocaine as it was withdrawn.      Left L5 RF probe spot film:  A single A-P spot film was taken of the probe    placement which documented that the probe position was at the superior junction   of the left S1 superior articular process and the left sacral ala.    Left S1 Lateral Branch Nerve Radiofrequency Ablation: Using fluoroscopy, the left S1 foramen was identified and marked.  The skin and subcutaneous tissues were anesthetized with 1% lidocaine using a 25 gauge 1.5 inch needle.  Using fluoroscopic guidance, a 100 mm SMK RF cannulus with a 10 mm active tip was then inserted down to the superior and lateral corner of the left S1 foramen to ablate the left S1 ascending facet joint nerve.   Sensory testing was performed at 50Hz up to 1 volt.  Motor testing was performed at 2Hz up to 3.5 volts.  With final needle positioning, there was no evidence of radicular stimulation.  Prior to lesioning, 1 cc of 1% lidocaine was injected.  Lesion was performed at 80 degrees Celcius for 120 seconds.  After lesioning a solution of 0.5 cc of 0.5% Bupivicaine and 0.5 cc of D50 was  injected performing a chemical neurolysis. A spot film was taken for documentation purposes.  Following the procedure the needle was withdrawn slightly and flushed with 1% Lidocaine as it was withdrawn.      Left S1 RF probe spot film:  A single A-P spot film was taken of the probe    placement which documented that the probe position was at the superior outer   quadrant of the left S1 foramen.      The patient tolerated the procedure well and there were no apparent complications.  After an appropriate amount of observation, the patient was dismissed from the clinic in good condition under their own power.    Complications:  None    Disposition:   1.  The patient was discharged to the recovery area in good condition.  2.  Discharge instructions were provided and explained.  3.  Patient was instructed to call with any questions or problems.  4.  Apply ice to the procedure site and keep clean and dry for 24 hours.  5.  Medications were reviewed for efficacy and appropriateness.  6.  Continue current medications.  7.  Return in 1 to 2 weeks for follow up.

## 2017-12-22 ENCOUNTER — HOSPITAL ENCOUNTER (OUTPATIENT)
Dept: RADIOLOGY | Facility: REHABILITATION | Age: 47
End: 2017-12-22
Attending: PHYSICAL MEDICINE & REHABILITATION
Payer: COMMERCIAL

## 2017-12-22 ENCOUNTER — HOSPITAL ENCOUNTER (OUTPATIENT)
Dept: PAIN MANAGEMENT | Facility: REHABILITATION | Age: 47
End: 2017-12-22
Attending: PHYSICAL MEDICINE & REHABILITATION
Payer: COMMERCIAL

## 2017-12-22 VITALS
DIASTOLIC BLOOD PRESSURE: 71 MMHG | HEIGHT: 67 IN | RESPIRATION RATE: 16 BRPM | WEIGHT: 202.38 LBS | HEART RATE: 78 BPM | OXYGEN SATURATION: 98 % | SYSTOLIC BLOOD PRESSURE: 114 MMHG | TEMPERATURE: 97.6 F | BODY MASS INDEX: 31.76 KG/M2

## 2017-12-22 PROCEDURE — 64635 DESTROY LUMB/SAC FACET JNT: CPT

## 2017-12-22 PROCEDURE — 700101 HCHG RX REV CODE 250

## 2017-12-22 PROCEDURE — 99152 MOD SED SAME PHYS/QHP 5/>YRS: CPT

## 2017-12-22 PROCEDURE — 700111 HCHG RX REV CODE 636 W/ 250 OVERRIDE (IP)

## 2017-12-22 PROCEDURE — 64636 DESTROY L/S FACET JNT ADDL: CPT

## 2017-12-22 PROCEDURE — 64640 INJECTION TREATMENT OF NERVE: CPT

## 2017-12-22 RX ORDER — LIDOCAINE HYDROCHLORIDE 10 MG/ML
INJECTION, SOLUTION EPIDURAL; INFILTRATION; INTRACAUDAL; PERINEURAL
Status: COMPLETED
Start: 2017-12-22 | End: 2017-12-22

## 2017-12-22 RX ORDER — MIDAZOLAM HYDROCHLORIDE 1 MG/ML
INJECTION INTRAMUSCULAR; INTRAVENOUS
Status: COMPLETED
Start: 2017-12-22 | End: 2017-12-22

## 2017-12-22 RX ORDER — DEXTROSE MONOHYDRATE 25 G/50ML
INJECTION, SOLUTION INTRAVENOUS
Status: COMPLETED
Start: 2017-12-22 | End: 2017-12-22

## 2017-12-22 RX ORDER — BUPIVACAINE HYDROCHLORIDE 5 MG/ML
INJECTION, SOLUTION EPIDURAL; INTRACAUDAL
Status: COMPLETED
Start: 2017-12-22 | End: 2017-12-22

## 2017-12-22 RX ADMIN — MIDAZOLAM HYDROCHLORIDE 1 MG: 1 INJECTION, SOLUTION INTRAMUSCULAR; INTRAVENOUS at 08:50

## 2017-12-22 RX ADMIN — FENTANYL CITRATE 50 MCG: 50 INJECTION, SOLUTION INTRAMUSCULAR; INTRAVENOUS at 08:50

## 2017-12-22 RX ADMIN — MIDAZOLAM HYDROCHLORIDE 1 MG: 1 INJECTION, SOLUTION INTRAMUSCULAR; INTRAVENOUS at 08:54

## 2017-12-22 RX ADMIN — FENTANYL CITRATE 50 MCG: 50 INJECTION, SOLUTION INTRAMUSCULAR; INTRAVENOUS at 08:54

## 2017-12-22 RX ADMIN — BUPIVACAINE HYDROCHLORIDE 5 ML: 5 INJECTION, SOLUTION EPIDURAL; INTRACAUDAL; PERINEURAL at 08:15

## 2017-12-22 RX ADMIN — MIDAZOLAM HYDROCHLORIDE 1 MG: 1 INJECTION, SOLUTION INTRAMUSCULAR; INTRAVENOUS at 08:57

## 2017-12-22 RX ADMIN — DEXTROSE MONOHYDRATE 1 ML: 25 INJECTION, SOLUTION INTRAVENOUS at 08:15

## 2017-12-22 RX ADMIN — LIDOCAINE HYDROCHLORIDE 5 ML: 10 INJECTION, SOLUTION EPIDURAL; INFILTRATION; INTRACAUDAL; PERINEURAL at 08:15

## 2017-12-22 ASSESSMENT — PAIN SCALES - GENERAL
PAINLEVEL_OUTOF10: 1
PAINLEVEL_OUTOF10: 4
PAINLEVEL_OUTOF10: 1

## 2017-12-22 NOTE — OP REPORT
Surgeon: Farhan Rees DO    Assistants:  None    Preoperative diagnosis:  Lumbosacral spondylosis    Post operative diagnosis:  Lumboscacral spondylosis    Type of Sedation:  Local anesthesia and conscious sedation    Sedation time: 18 minutes    Site of Service:  [facility]     Procedure:  1. Right L3 Medial Branch Nerve Radiofrequency Ablation Under Fluoroscopy  2. Right L4 Medial Branch Nerve Radiofrequency Ablation Under Fluoroscopy  3. Right L5 Dorsal Ramus Nerve Radiofrequency Ablation Under Fluoroscopy  4. Right S1 Lateral Branch Nerve Radiofrequency Ablation Under Fluoroscopy    After discussing risks, benefits, and alternatives to the procedure, the patient expressed understanding and wished to proceed.  The patient was brought into the fluoroscopy suite and placed in the prone position.  Procedural pause was conducted to verify: correct patient identity, procedure to be performed and as applicable, correct side and site, correct patient position, and availability of implants, special equipment or special instruments.      The skin was sterilely prepped and draped in the usual fashion using betadine times three in the region of the injections.      Right L3 Medial Branch Nerve Radiofrequency Ablation:  Using fluoroscopy, the junction of the L4 transverse process and superior articulating process was identified and marked.  The skin and subcutaneous tissues were anesthetized with 1% lidocaine using a 25 gauge 1.5 inch needle.  Using fluoroscopic guidance, a 100 mm SMK RF cannulus with a 10 mm active tip was then inserted down to the superior junction of the right L4 transverse process and the superior articulating process.  Sensory testing was performed at 50Hz up to 1 volt.  Motor testing was performed at 2Hz up to 3.5 volts.  With final needle positioning, there was no evidence of radicular stimulation.  Prior to lesioning, 1 cc of 1% lidocaine was injected.  Lesion was performed at 80 degrees  Celcius for 120 seconds.  After lesioning a solution of 0.5 cc of 0.5% Bupivicaine and 0.5 cc of D50 was injected performing a chemical neurolysis.  TA spot film was taken for documentation purposes.  Following the procedure the needle was withdrawn slightly and flushed with 1% Lidocaine as it was withdrawn.       Right L3 RF probe spot film:  A single A-P spot film was taken of the probe placement which documented that the probe position was at the superior junction   of the base of the right L4 transverse process.    Right L4 Medial Branch Nerve Radiofrequency Ablation: Using fluoroscopy, the junction of the L5 transverse process and superior articulating process was identified and marked.  The skin and subcutaneous tissues were anesthetized with 1% lidocaine using a 25 gauge 1.5 inch needle.  Using fluoroscopic guidance, a 100 mm SMK RF cannulus with a 10 mm active tip was then inserted down to the superior junction of the right L5 transverse process and the superior articulating process.  Sensory testing was performed at 50Hz up to 1 volt.  Motor testing was performed at 2Hz up to 3.5 volts.  With final needle positioning, there was no evidence of radicular stimulation.  Prior to lesioning, 1 cc of 1% lidocaine was injected.  Lesion was performed at 80 degrees Celcius for 120 seconds.  After lesioning a solution of 0.5 cc of 0.5% Bupivicaine and 0.5 cc of D50 was injected performing a chemical neurolysis. A spot film was taken for documentation purposes.  Following the procedure the needle was withdrawn slightly and flushed with 1% Lidocaine as it was withdrawn.      Right L4 RF probe spot film:  A single A-P spot film was taken of the probe    placement which documented that the probe position was at the superior junction   of the base of the right L5 transverse process.    Right L5 Dorsal Ramus Nerve Radiofrequency Ablation:  Using fluoroscopy, the junction of the right S1 superior articulating process and the  sacral ala was identified and marked.  The skin and subcutaneous tissues were anesthetized with 1% lidocaine using a 25 gauge 1.5 inch needle.  Using fluoroscopic guidance, a 100 mm SMK RF cannulus with a 10 mm active tip was then inserted down to the superior junction of the right S1 superior articulating process and the sacral ala to ablate the right L5 dorsal ramus.  Sensory testing was performed at 50Hz up to 1 volt.  Motor testing was performed at 2Hz up to 3.5 volts.  With final needle positioning, there was no evidence of radicular stimulation.  Prior to lesioning, 1 cc of 1% lidocaine was injected.  Lesion was performed at 80 degrees Celcius for 120 seconds.  After lesioning a solution of 0.5 cc of 0.5% Bupivicaine and 0.5 cc of D50 was injected performing a chemical neurolysis.   Following the procedure the needle was withdrawn slightly and flushed with 1% Lidocaine as it was withdrawn.      Right L5 RF probe spot film:  A single A-P spot film was taken of the probe    placement which documented that the probe position was at the superior junction   of the right S1 superior articular process and the right sacral ala.    Right S1 Lateral Branch Nerve Radiofrequency Ablation: Using fluoroscopy, the right S1 foramen was identified and marked.  The skin and subcutaneous tissues were anesthetized with 1% lidocaine using a 25 gauge 1.5 inch needle.  Using fluoroscopic guidance, a 100 mm SMK RF cannulus with a 10 mm active tip was then inserted down to the superior and lateral corner of the right S1 foramen to ablate the right S1 ascending facet joint nerve.   Sensory testing was performed at 50Hz up to 1 volt.  Motor testing was performed at 2Hz up to 3.5 volts.  With final needle positioning, there was no evidence of radicular stimulation.  Prior to lesioning, 1 cc of 1% lidocaine was injected.  Lesion was performed at 80 degrees Celcius for 120 seconds.  After lesioning a solution of 0.5 cc of 0.5% Bupivicaine  and 0.5 cc of D50 was injected performing a chemical neurolysis.   A spot film was taken for documentation purposes.  Following the procedure the needle was withdrawn slightly and flushed with 1% Lidocaine as it was withdrawn.      Right S1 RF probe spot film:  A single A-P spot film was taken of the probe    placement which documented that the probe position was at the superior outer   quadrant of the right S1 foramen.    The patient tolerated the procedure well and there were no apparent complications.  After an appropriate amount of observation, the patient was dismissed from the clinic in good condition under their own power.    Complications:  None    Disposition:   1.  The patient was discharged to the recovery area in good condition.  2.  Discharge instructions were provided and explained.  3.  Patient was instructed to call with any questions or problems.  4.  Apply ice to the procedure site and keep clean and dry for 24 hours.  5.  Medications were reviewed for efficacy and appropriateness.  6.  Continue current medications.  7.  Return in 1 to 2 weeks for follow up.

## 2017-12-22 NOTE — PROGRESS NOTES
Pt given verbal and written d/c instructions and verbalizes understanding. denies nsaid use in last 3 days, denies blood thinners use in last 5 days, denies current infection or abx use. Med rec complete. Pt has post procedural ride home with  Brandon.

## 2017-12-29 ENCOUNTER — APPOINTMENT (OUTPATIENT)
Dept: RADIOLOGY | Facility: MEDICAL CENTER | Age: 47
End: 2017-12-29
Attending: ORTHOPAEDIC SURGERY
Payer: COMMERCIAL

## 2017-12-29 ENCOUNTER — HOSPITAL ENCOUNTER (OUTPATIENT)
Facility: MEDICAL CENTER | Age: 47
End: 2017-12-29
Attending: ORTHOPAEDIC SURGERY | Admitting: ORTHOPAEDIC SURGERY
Payer: COMMERCIAL

## 2017-12-29 VITALS
HEIGHT: 67 IN | WEIGHT: 203.71 LBS | TEMPERATURE: 97.2 F | DIASTOLIC BLOOD PRESSURE: 76 MMHG | RESPIRATION RATE: 12 BRPM | OXYGEN SATURATION: 100 % | SYSTOLIC BLOOD PRESSURE: 122 MMHG | BODY MASS INDEX: 31.97 KG/M2

## 2017-12-29 LAB — HCG SERPL QL: NEGATIVE

## 2017-12-29 PROCEDURE — 160048 HCHG OR STATISTICAL LEVEL 1-5: Performed by: ORTHOPAEDIC SURGERY

## 2017-12-29 PROCEDURE — 73502 X-RAY EXAM HIP UNI 2-3 VIEWS: CPT | Mod: LT

## 2017-12-29 PROCEDURE — 160002 HCHG RECOVERY MINUTES (STAT): Performed by: ORTHOPAEDIC SURGERY

## 2017-12-29 PROCEDURE — 36415 COLL VENOUS BLD VENIPUNCTURE: CPT

## 2017-12-29 PROCEDURE — A6402 STERILE GAUZE <= 16 SQ IN: HCPCS | Performed by: ORTHOPAEDIC SURGERY

## 2017-12-29 PROCEDURE — 84703 CHORIONIC GONADOTROPIN ASSAY: CPT

## 2017-12-29 PROCEDURE — 160041 HCHG SURGERY MINUTES - EA ADDL 1 MIN LEVEL 4: Performed by: ORTHOPAEDIC SURGERY

## 2017-12-29 PROCEDURE — 700101 HCHG RX REV CODE 250

## 2017-12-29 PROCEDURE — 160029 HCHG SURGERY MINUTES - 1ST 30 MINS LEVEL 4: Performed by: ORTHOPAEDIC SURGERY

## 2017-12-29 PROCEDURE — 160025 RECOVERY II MINUTES (STATS): Performed by: ORTHOPAEDIC SURGERY

## 2017-12-29 PROCEDURE — 160036 HCHG PACU - EA ADDL 30 MINS PHASE I: Performed by: ORTHOPAEDIC SURGERY

## 2017-12-29 PROCEDURE — 700105 HCHG RX REV CODE 258: Performed by: ORTHOPAEDIC SURGERY

## 2017-12-29 PROCEDURE — 160046 HCHG PACU - 1ST 60 MINS PHASE II: Performed by: ORTHOPAEDIC SURGERY

## 2017-12-29 PROCEDURE — 160035 HCHG PACU - 1ST 60 MINS PHASE I: Performed by: ORTHOPAEDIC SURGERY

## 2017-12-29 PROCEDURE — 700111 HCHG RX REV CODE 636 W/ 250 OVERRIDE (IP)

## 2017-12-29 PROCEDURE — A9270 NON-COVERED ITEM OR SERVICE: HCPCS

## 2017-12-29 PROCEDURE — 700102 HCHG RX REV CODE 250 W/ 637 OVERRIDE(OP)

## 2017-12-29 PROCEDURE — A6222 GAUZE <=16 IN NO W/SAL W/O B: HCPCS | Performed by: ORTHOPAEDIC SURGERY

## 2017-12-29 PROCEDURE — 160009 HCHG ANES TIME/MIN: Performed by: ORTHOPAEDIC SURGERY

## 2017-12-29 RX ORDER — ONDANSETRON 2 MG/ML
INJECTION INTRAMUSCULAR; INTRAVENOUS
Status: COMPLETED
Start: 2017-12-29 | End: 2017-12-29

## 2017-12-29 RX ORDER — OXYCODONE HCL 5 MG/5 ML
SOLUTION, ORAL ORAL
Status: COMPLETED
Start: 2017-12-29 | End: 2017-12-29

## 2017-12-29 RX ORDER — LIDOCAINE HYDROCHLORIDE 10 MG/ML
INJECTION, SOLUTION INFILTRATION; PERINEURAL
Status: DISCONTINUED
Start: 2017-12-29 | End: 2017-12-29 | Stop reason: HOSPADM

## 2017-12-29 RX ORDER — DEXAMETHASONE SODIUM PHOSPHATE 4 MG/ML
4 INJECTION, SOLUTION INTRA-ARTICULAR; INTRALESIONAL; INTRAMUSCULAR; INTRAVENOUS; SOFT TISSUE
Status: DISCONTINUED | OUTPATIENT
Start: 2017-12-29 | End: 2017-12-29 | Stop reason: HOSPADM

## 2017-12-29 RX ORDER — MEPERIDINE HYDROCHLORIDE 25 MG/ML
INJECTION INTRAMUSCULAR; INTRAVENOUS; SUBCUTANEOUS
Status: COMPLETED
Start: 2017-12-29 | End: 2017-12-29

## 2017-12-29 RX ORDER — DIPHENHYDRAMINE HYDROCHLORIDE 50 MG/ML
25 INJECTION INTRAMUSCULAR; INTRAVENOUS EVERY 6 HOURS PRN
Status: DISCONTINUED | OUTPATIENT
Start: 2017-12-29 | End: 2017-12-29 | Stop reason: HOSPADM

## 2017-12-29 RX ORDER — SODIUM CHLORIDE, SODIUM LACTATE, POTASSIUM CHLORIDE, CALCIUM CHLORIDE 600; 310; 30; 20 MG/100ML; MG/100ML; MG/100ML; MG/100ML
INJECTION, SOLUTION INTRAVENOUS
Status: DISCONTINUED | OUTPATIENT
Start: 2017-12-29 | End: 2017-12-29 | Stop reason: HOSPADM

## 2017-12-29 RX ORDER — ROPIVACAINE HYDROCHLORIDE 5 MG/ML
INJECTION, SOLUTION EPIDURAL; INFILTRATION; PERINEURAL
Status: DISCONTINUED | OUTPATIENT
Start: 2017-12-29 | End: 2017-12-29 | Stop reason: HOSPADM

## 2017-12-29 RX ORDER — ONDANSETRON 2 MG/ML
4 INJECTION INTRAMUSCULAR; INTRAVENOUS EVERY 4 HOURS PRN
Status: DISCONTINUED | OUTPATIENT
Start: 2017-12-29 | End: 2017-12-29 | Stop reason: HOSPADM

## 2017-12-29 RX ORDER — HALOPERIDOL 5 MG/ML
1 INJECTION INTRAMUSCULAR EVERY 6 HOURS PRN
Status: DISCONTINUED | OUTPATIENT
Start: 2017-12-29 | End: 2017-12-29 | Stop reason: HOSPADM

## 2017-12-29 RX ORDER — SCOLOPAMINE TRANSDERMAL SYSTEM 1 MG/1
1 PATCH, EXTENDED RELEASE TRANSDERMAL
Status: DISCONTINUED | OUTPATIENT
Start: 2017-12-29 | End: 2017-12-29 | Stop reason: HOSPADM

## 2017-12-29 RX ADMIN — FENTANYL CITRATE 50 MCG: 50 INJECTION, SOLUTION INTRAMUSCULAR; INTRAVENOUS at 10:19

## 2017-12-29 RX ADMIN — MEPERIDINE HYDROCHLORIDE 25 MG: 25 INJECTION INTRAMUSCULAR; INTRAVENOUS; SUBCUTANEOUS at 10:11

## 2017-12-29 RX ADMIN — SODIUM CHLORIDE, POTASSIUM CHLORIDE, SODIUM LACTATE AND CALCIUM CHLORIDE: 600; 310; 30; 20 INJECTION, SOLUTION INTRAVENOUS at 08:10

## 2017-12-29 RX ADMIN — OXYCODONE HYDROCHLORIDE 10 MG: 5 SOLUTION ORAL at 10:28

## 2017-12-29 RX ADMIN — SODIUM CHLORIDE, POTASSIUM CHLORIDE, SODIUM LACTATE AND CALCIUM CHLORIDE: 600; 310; 30; 20 INJECTION, SOLUTION INTRAVENOUS at 10:33

## 2017-12-29 RX ADMIN — ONDANSETRON 4 MG: 2 INJECTION INTRAMUSCULAR; INTRAVENOUS at 11:06

## 2017-12-29 ASSESSMENT — PAIN SCALES - GENERAL
PAINLEVEL_OUTOF10: 2
PAINLEVEL_OUTOF10: 2
PAINLEVEL_OUTOF10: 5
PAINLEVEL_OUTOF10: 2
PAINLEVEL_OUTOF10: 7
PAINLEVEL_OUTOF10: 2
PAINLEVEL_OUTOF10: 4
PAINLEVEL_OUTOF10: ASSUMED PAIN PRESENT

## 2017-12-29 NOTE — DISCHARGE INSTRUCTIONS
ACTIVITY: Rest and take it easy for the first 24 hours.  A responsible adult is recommended to remain with you during that time.  It is normal to feel sleepy.  We encourage you to not do anything that requires balance, judgment or coordination.    MILD FLU-LIKE SYMPTOMS ARE NORMAL. YOU MAY EXPERIENCE GENERALIZED MUSCLE ACHES, THROAT IRRITATION, HEADACHE AND/OR SOME NAUSEA.    FOR 24 HOURS DO NOT:  Drive, operate machinery or run household appliances.  Drink beer or alcoholic beverages.   Make important decisions or sign legal documents.    SPECIAL INSTRUCTIONS:   Ambulate with assistance post-op day Zero, and then 3 times daily each subsequent day, unless instructed not to by MD.  Ice and elevate operative site.  50% weight bearing on operative leg.  Wear white elastic stockings daily until OKed for discontinuation by MD.  May shower with wound COVERED on post-op day #2 (Sunday).    DIET: To avoid nausea, slowly advance diet as tolerated, avoiding spicy or greasy foods for the first day.  Add more substantial food to your diet according to your physician's instructions. INCREASE FLUIDS AND FIBER TO AVOID CONSTIPATION.      FOLLOW-UP APPOINTMENT:  A follow-up appointment should be arranged with your doctor in; call to schedule.    You should CALL YOUR PHYSICIAN if you develop:  Fever greater than 101 degrees F.  Pain not relieved by medication, or persistent nausea or vomiting.  Excessive bleeding (blood soaking through dressing) or unexpected drainage from the wound.  Extreme redness or swelling around the incision site, drainage of pus or foul smelling drainage.  Inability to urinate or empty your bladder within 8 hours.  Problems with breathing or chest pain.    You should call 911 if you develop problems with breathing or chest pain.  If you are unable to contact your doctor or surgical center, you should go to the nearest emergency room or urgent care center.  Physician's telephone #: [phone]    If any  questions arise, call your doctor.  If your doctor is not available, please feel free to call the Surgical Center at [phone].  The Center is open Monday through Friday from 7AM to 7PM.  You can also call the HEALTH HOTLINE open 24 hours/day, 7 days/week and speak to a nurse at [phone], or toll free at [phone].    A registered nurse may call you a few days after your surgery to see how you are doing after your procedure.    MEDICATIONS: Resume taking daily medication.  Take prescribed pain medication with food.  If no medication is prescribed, you may take non-aspirin pain medication if needed.  PAIN MEDICATION CAN BE VERY CONSTIPATING.  Take a stool softener or laxative such as senokot, pericolace, or milk of magnesia if needed.    Prescription given prior to surgery.  Last pain medication given at 10:28 AM..    If your physician has prescribed pain medication that includes Acetaminophen (Tylenol), do not take additional Acetaminophen (Tylenol) while taking the prescribed medication.    Depression / Suicide Risk    As you are discharged from this [facility] facility, it is important to learn how to keep safe from harming yourself.    Recognize the warning signs:  · Abrupt changes in personality, positive or negative- including increase in energy   · Giving away possessions  · Change in eating patterns- significant weight changes-  positive or negative  · Change in sleeping patterns- unable to sleep or sleeping all the time   · Unwillingness or inability to communicate  · Depression  · Unusual sadness, discouragement and loneliness  · Talk of wanting to die  · Neglect of personal appearance   · Rebelliousness- reckless behavior  · Withdrawal from people/activities they love  · Confusion- inability to concentrate     If you or a loved one observes any of these behaviors or has concerns about self-harm, here's what you can do:  · Talk about it- your feelings and reasons for harming  yourself  · Remove any means that you might use to hurt yourself (examples: pills, rope, extension cords, firearm)  · Get professional help from the community (Mental Health, Substance Abuse, psychological counseling)  · Do not be alone:Call your Safe Contact- someone whom you trust who will be there for you.  · Call your local CRISIS HOTLINE 749-4011 or 632-575-1147  · Call your local Children's Mobile Crisis Response Team [place] (399) 331-4930 or www.[employer]  · Call the toll free National Suicide Prevention Hotlines   · National Suicide Prevention Lifeline 596-931-DELC (3591)  · National Hope Line Network 800-SUICIDE (489-6439)

## 2017-12-29 NOTE — OR NURSING
1113  Pt to stage two via terry. Pt denies pain and nausea at this time. Pt getting dressed with help of CNA.   Pt up to chair with help of CNA. VSS.   1142 Explained discharge instructions to pt and pts  . Both express understanding   1152 Pt meets criteria to be discharged after uneventful stay in stage two

## 2017-12-29 NOTE — OR NURSING
Pt to pre-op. Name, birthday, allergies, NPO status, last void, medication rec, surgical site and procedure verified with patient.  Pt belongings collected and secured in locker.  Pt verbalizes understanding of pain scale, expected plan of care and course of stay.  IV established. Sequentials placed with jessica hose.Family at bedside.

## 2017-12-29 NOTE — OR NURSING
1005: To PACU from OR via gurney, respirations spontaneous and non-labored. Icepack applied over c/d/i L hip surgical dressings.  1010: Pt shivering, anti-shiver medication given per MAR. No nausea, pain is tolerable.  1020: Pain increased, pain medication given per MAR, no nausea.  1030: Pain is tolerable, no nausea. No change in surgical site assessment.  1045: Pain is still tolerable, no nausea. Sleepy, but arouses to voice, tolerating room air.  1100: Meets criteria to transfer to Stage 2, report called to LESLY Mccord and pt readied for transfer.  1106: Pt having a little nausea, with moving around getting ready for discharge, anti-nausea medication given per MAR.

## 2017-12-31 NOTE — OP REPORT
DATE OF SERVICE:  12/29/2017    PREOPERATIVE DIAGNOSES:  1.  Left hip pain.  2.  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    POSTOPERATIVE DIAGNOSES:  1.  Left hip pain.  2.  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    PROCEDURE:  Left proximal femoral head core decompression.    SURGEON:  Luan Ferrer MD    ASSISTANT:  Meliton Catherine CST/ENRIQUE    ANESTHESIA:  General.    ANESTHESIOLOGIST:  Marcus Hill MD    FINDINGS:  1.  Stable core decompression.  2.  A 50% weightbearing status with crutches.  3.  Stable decompression in multiple regions of the femoral head under   fluoroscopic guidance.    PROCEDURE IN DETAIL:  Patient was taken to the operating room and underwent   general anesthetic in supine position.  All bony prominences padded.  The [place]   table was utilized.  Fluoroscopic localization was undertaken.  This was   followed by direct visualization on AP and lateral imaging studies.  The hip   was then addressed with a sterile Hibiclens, alcohol, ChloraPrep and a sterile   draping technique.  Fluoroscopic localization was again utilized.  The   appropriate time-out was undertaken.  An incision was made on the lateral   aspect of the thigh.  Skin and subcutaneous tissue were incised.  Hemostasis   obtained.  Blunt dissection undertaken to the lateral aspect of the proximal   femur.  Following this, a drill hole was placed in the lateral aspect of the   femur at a 3.5 diameter.  Through this, the 2.7 diameter long drill bit was   able to be utilized.  Fluoroscopic localization was undertaken to obtain   multiple passes of the drill through the sclerotic and necrotic areas of the   femoral head on AP and lateral imaging in multiple planes across the femoral   head to obtain a stable decompression.  Multiple passes were undertaken and   multiple images saved.  The hip was then addressed with thorough irrigation.    Sutures were utilized for skin closure with anesthetization with  ropivacaine.    Patient placed into a sterile bandage, awoken from her anesthetic and taken   to the recovery room.  She tolerated the procedure very well with no signs of   complications.       ____________________________________     MD GHULAM VERNON / NTS    DD:  12/30/2017 16:26:39  DT:  12/30/2017 16:51:04    D#:  [id number]  Job#:  [id number]

## 2018-05-05 PROBLEM — G56.01 CARPAL TUNNEL SYNDROME ON RIGHT: Status: RESOLVED | Noted: 2017-09-29 | Resolved: 2018-05-05

## 2018-05-08 ENCOUNTER — OFFICE VISIT (OUTPATIENT)
Dept: MEDICAL GROUP | Facility: MEDICAL CENTER | Age: 48
End: 2018-05-08
Payer: COMMERCIAL

## 2018-05-08 ENCOUNTER — TELEPHONE (OUTPATIENT)
Dept: MEDICAL GROUP | Facility: MEDICAL CENTER | Age: 48
End: 2018-05-08

## 2018-05-08 ENCOUNTER — HOSPITAL ENCOUNTER (OUTPATIENT)
Dept: RADIOLOGY | Facility: MEDICAL CENTER | Age: 48
End: 2018-05-08
Attending: INTERNAL MEDICINE
Payer: COMMERCIAL

## 2018-05-08 VITALS
TEMPERATURE: 99 F | OXYGEN SATURATION: 97 % | DIASTOLIC BLOOD PRESSURE: 74 MMHG | HEIGHT: 67 IN | HEART RATE: 84 BPM | SYSTOLIC BLOOD PRESSURE: 120 MMHG | BODY MASS INDEX: 30.92 KG/M2 | WEIGHT: 197 LBS

## 2018-05-08 DIAGNOSIS — G89.29 OTHER CHRONIC PAIN: ICD-10-CM

## 2018-05-08 DIAGNOSIS — M87.00 AVASCULAR NECROSIS (HCC): Chronic | ICD-10-CM

## 2018-05-08 DIAGNOSIS — Z00.00 PREVENTATIVE HEALTH CARE: Primary | Chronic | ICD-10-CM

## 2018-05-08 DIAGNOSIS — E66.9 CLASS 1 OBESITY WITHOUT SERIOUS COMORBIDITY WITH BODY MASS INDEX (BMI) OF 31.0 TO 31.9 IN ADULT, UNSPECIFIED OBESITY TYPE: ICD-10-CM

## 2018-05-08 DIAGNOSIS — M54.2 NECK PAIN: ICD-10-CM

## 2018-05-08 DIAGNOSIS — M54.5 LOW BACK PAIN, UNSPECIFIED BACK PAIN LATERALITY, UNSPECIFIED CHRONICITY, WITH SCIATICA PRESENCE UNSPECIFIED: Chronic | ICD-10-CM

## 2018-05-08 DIAGNOSIS — Z01.810 PREOPERATIVE CARDIOVASCULAR EXAMINATION: ICD-10-CM

## 2018-05-08 DIAGNOSIS — M25.551 RIGHT HIP PAIN: ICD-10-CM

## 2018-05-08 DIAGNOSIS — I65.22 CALCIFICATION OF LEFT CAROTID ARTERY: ICD-10-CM

## 2018-05-08 PROCEDURE — 99396 PREV VISIT EST AGE 40-64: CPT | Performed by: INTERNAL MEDICINE

## 2018-05-08 PROCEDURE — 72040 X-RAY EXAM NECK SPINE 2-3 VW: CPT

## 2018-05-08 RX ORDER — TRAMADOL HYDROCHLORIDE 50 MG/1
50 TABLET ORAL EVERY 8 HOURS PRN
Qty: 90 TAB | Refills: 0 | Status: SHIPPED | OUTPATIENT
Start: 2018-06-07 | End: 2018-05-08 | Stop reason: SDUPTHER

## 2018-05-08 RX ORDER — TRAMADOL HYDROCHLORIDE 50 MG/1
50 TABLET ORAL EVERY 8 HOURS PRN
Qty: 90 TAB | Refills: 0 | Status: SHIPPED | OUTPATIENT
Start: 2018-07-07 | End: 2018-08-06

## 2018-05-08 RX ORDER — TRAMADOL HYDROCHLORIDE 50 MG/1
50 TABLET ORAL EVERY 8 HOURS PRN
Qty: 90 TAB | Refills: 0 | Status: SHIPPED | OUTPATIENT
Start: 2018-05-08 | End: 2018-05-08 | Stop reason: SDUPTHER

## 2018-05-08 NOTE — PROGRESS NOTES
Subjective:      Janett Mcnulty is a 48 y.o. female who presents with annual          HPI   Annual  Sees gyn    Sees pain management  has had ablation back  Sees dr.wu white endocrine  Sees massage therapy   Sees chiropractor   Eye exam annually   Medications, allergies, medical history, surgical history, social history, family history reviewed and updated  Off alendronate   Able to do stationary bike 30 minutes at a time and does once per week and pool therapy one hour three days per week   Takes ultram one per day in the evening for chronic knee pain and low back pain with no side effects   Has had neck pain   SH works nurse, eats clean, coffee and tea, no soda, water daily, rare etoh, no tobacco  FH no changes  Has used tens unit    9/29/17   orthopedic surgical note right hip fluoroscopically guided core decompression and right hand carpal tunnel release  12/20/17  pain note left L3-S1 radiofrequency ablation under fluoroscopy  12/22/17  pain note right L3-S1 radiofrequency ablation under fluoroscopy  12/29/17  orthopedic surgical note  left proximal femoral head core decompression      Current Outpatient Prescriptions   Medication Sig Dispense Refill   • Multiple Vitamins-Minerals (MULTIVITAMIN ADULT PO) Take  by mouth every day.     • tramadol (ULTRAM) 50 MG Tab Take 1 Tab by mouth every 8 hours as needed for Moderate Pain. 90 Tab 4   • gabapentin (NEURONTIN) 300 MG Cap One po qpm x 3 days then increase to bid (Patient taking differently: 600 mg every evening.) 60 Cap 11   • Calcium Citrate-Vitamin D (CALCIUM + D PO) Take  by mouth every day.     • [place]-Glucosamine-Vit D (GLUCOSAMINE COMPLEX PO) Take  by mouth every day.     • ascorbic acid (ASCORBIC ACID) 500 MG Tab Take 500 mg by mouth every day.     • naproxen (NAPROSYN) 500 MG Tab Take 500 mg by mouth every day.     • vitamin D (CHOLECALCIFEROL) 1000 UNIT Tab Take 1,000 Units by  mouth every day.     • fluticasone (FLONASE) 50 MCG/ACT nasal spray Spray 2 Sprays in nose every day. (Patient taking differently: Spray 2 Sprays in nose as needed.) 16 g 3     No current facility-administered medications for this visit.        Varicose vein excision left lower extremity  9/11 varicose veins left excision   3/11/17 ultrasound venous bilateral lower extremities negative     Status post bunionectomy  8/9/13  podiatry; right foot martín bunionectomy, right foot first metatarsophalangeal joint bunion/degenerative arthritis      Status post appendectomy     Right hip arthroscopy  2007  ortho right hip arthroscopy  2/11/14  note bilateral greater trochanter injection under ultrasound guidance  1/20/17 MRI pelvis bilateral femoral head avascular necrosis involving approximately 40% of the articular surface, metallic artifact left femoral head consistent with presence of small metallic pain     Preventative health  11/05 [facility] colon negative  9/25/15 tdap  10/7/15 pap per gyn  negative  9/29/16 chol 190,trig 52,hdl 65,ldl 115  10/110/16 vit d 31  1/6/17 pneumovax  12/6/17 mammogram per  gynecology heterogeneously dense breast tissue     Perimenopausal  9/25/15 on climara patch and progesterone tab per  gyn     Low back pain  10/10 MRI LS L5-S1 minimal disc bulge  1/11  at the pain management left L3-S1 facet joint injection, right L3-S1 facet joint injection  2/11  pain management right L3-S1 lateral, medial, dorsal ramus nerve block  4/11  pain management left L3-S1 medial, lateral, bursal ramus nerve block  5/11  pain management bilateral SI joint injection under fluoroscopy  7/11  pain note bilat trochanteric bursitis injection  3/12  pain note, right and left L3-L4 L5-S1 medial, dorsal, lateral branch ablation  12/12  pain note, left L3-S1 dorsal  and medial branch radiofrequency ablation  1/4/13  pain note; right L3-S1 radiofrequency ablation  8/14/13  pain note  9/29/13  pain note, left L3-S1 nerve frequency ablation  10/4/13  pain note, status post right L3-S1 FJNA  12/19/13 pain note; status post right SIJI injection, continue voltaren gel, TENS, IT stretches  5/16/14  pain note; left L3-S1 medial, partial, lateral branch radiofrequency ablation under fluoroscopy  6/6/14  right L3-S1 FJNA  7/3/14  pain note; continue TENS,celebrex 200 mg qday, voltaren gel 1%  9/10/14  pain note; continue TENS, lidoderm patch,voltaren gel, celebrex 200 mg qday     Left hip revision  History of left hip open decompression and osteotomy  12/03  left hip implant removal  11/10  ortho x-ray stable decompression left femoral head neck junction  11/12  pain management note bilateral trochanteric bursitis injection  5/2/13  pain note, bilateral trochanteric bursal injection     Left ankle surgery 2006 ligament reconstruction     IBS     History rheumatic fever  8/29/16 streptococcal pharyngitis positive strep test, treated with augmentin, developed erythema nodosum lower extremities and palms given NSAIDs and medrol dosepack  9/22/16 repeat medrol dose pack x 1 still with painful erythema nodosum nodules  9/24/16 erythema nodosum nodules and arthritic-type pains, we will retreat with penicillin V 500 mg 3 times a day ×10 days  10/5/16 high dose aspirin no benefit, changed to prednisone 20 mg daily, she has an appointment with me in 2 days  10/7/16 echo ordered, EKG done  10/10/16 cbc,cmp,tsh normal,ESR 40,CRP 0.3, repeat throat culture negative, blood cultures negative,  10/19/16 increase prednisone to 40 mg x 3 days then back to 20 mg  10/19/16 daughter diagnosis strep throat given antibiotics, we will treat  prophylactically provided patient penicillin V 500 mg tid x 10 days  10/21/16 echo normal LV size and function, EF 65%, trace MR structurally normal mitral valve, mild TR and RVSP 30  10/24/16 on prednisone 40 mg qday unable to taper to 20 mg due to flare up of pain will try 20 mg bid then taper to 20 mg am and 10 mg qpm over the next week  10/26/16  infectious disease recommended secondary prophylaxis penicillin  mg bid x 5 years  11/7/16 still with polyarthritis, on prednisone 20 mg twice a day, repeat labs, still on penicillin, will speak with rheumatology about referral  11/8/16 ESR 15,CRP 0.3,wbc 10.9 (on prednisone),hgb 14,hct 43,cmp normal,RF,C3C4,TPO,lyme,MERA,HLA B27 negative, Factin IgG 22 (0-19),parietal cell Ab 25(0-25)  11/14/16  rheumatolog note, features consistent with rheumatic fever, also consider seronegative spondyloarthropathies, sarcoidosis, rheumatoid arthritis, will check sacroiliac films, hand and feet x-rays, follow-up one week  11/17/16 refill prednisone  11/18/16 ACE serum level normal, G6PD ad vitamin 1,25 d level normal  11/21/16 cxr negative  11/21/16 xray SI joints negative for erosions  11/29/16 x-ray joint survey hands, feet, ankles no evidence of inflammatory arthropathy  11/29/16 CT soft tissue neck without; negative  12/7/16  cardiology repeat echo in 3-4 weeks  12/10/16 MRI right knee multiple areas right knee avascular necrosis medial and lateral femoral condyle, medial and lateral tibial plateau, bone marrow edema  12/23/16  rheumatology note, inflammatory markers did normalize with steroids, rheumatoid factor, CCP, MERA,ANKA negative continue to taper steroids given osteonecrosis alternating 17.5 mg and 15 mg, and 15 mg, and 15 mg alternating with 12.5 mg, and so forth  12/27/16 echo LV ejection fraction 60%, no valvular disease  1/4/17 ESR 8,CRP 0.3  1/12/17  rheumatology note, inflammatory markers did normalize with prednisone  therapy, continue taper with osteonecrosis bilateral tenderness achilles insertion consider enthesitis, will get MRI left achilles region to evaluate for tendinitis or tenosynovitis, follow-up 6 weeks  1/20/17 MRI left foot without; no evidence of marrow edema, arthropathy, tendinopathy or ligament injury  3/6/17 anticardiolipin antibodies, lupus anticoagulant, protein CNS, factor V 5 Leyden, anti-thrombin panel, beta 2 glycoprotein negative  3/10/17 ESR 19,CRP 0.17, cortisol 3.3, ACTH 16, IgG 753 ([id number]), IgA 140, IgM 93  3/14/16  rheumatology note currently off prednisone, osteonecrosis involving multiple joints, knees, hips, right ankle, etiology unclear, antiphospholipid antibody, protein C and S normal, antithrombin III factor V leyden normal, refer to hematology for evaluation other etiologies, slightly decreased IgG refer to allergy immunology  3/14/17 remains on penicillin  3/30/17  allergy immunology evaluation slight IgG reduction 753 with normal IgG, IgM, no history to suggest primary immunodeficiency, recent diagnosis of group A streptococcal pharyngitis complicated by erythema nodosum, 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 therapy, we will perform humerol workup to include repeat quantitative immunoglobulins with subclass, specific antibodies for haemophilus influenza, pneumococcal, tetanus/diphtheria, limited flow cytometry with repeat CBC, SPEP/UPEP, ESR, CRP, UA, follow-up ASO, DNase B titer  4/17/17  allergy note slight IgG reduction at 753 with normal IgA, normal IgM, no history to suggest primary immunodeficiency, suspect that 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 carryover effect from chronic prednisone therapy, cbc unremarkable, inflammatory markers ESR slightly elevated 28, CRP 2.4, urinalysis negative for protein or blood, no further humeral immunodeficiency or lab assessment at this point with normal immunologic titers, intact specific and subclass antibodies,  the IgG decrease does not represent any underlying primary immunodeficiency or active antibody dysfunction at this time  4/25/17  rheumatology note await [facility] evaluation  5/10/17 dr.wu [facility] endocrinology note recommend fosamax weekly with anecdotal evidence that bisphosphonate therapy may provide symptomatic benefit and osteonecrosis, if develops side effects could consider intravenous reclast, referral [facility], follow-up 4 months  5/28/17  rheumatology note avascular necrosis, arthralgias lower extremities, continue off prednisone, on alendronate per [facility]  7/3/17  infectious disease consultation history of streptococcal pharyngitis with probable rheumatic fever, recommend discontinue penicillin prophylaxis follow-up as needed  6/27/17  [facility] evaluation, recommended evaluation of hypercoagulable state, to consider antiphospholipid antibodies (lupus anticoagulant, anticardiolipin antibodies, beta-2 glycoprotein, phosphatidyl serine antibodies, factor V Leiden, antithrombin III, prothrombin mutation, protein C&S quantitative and qualitative, homocysteine, recommend after review of records do not strongly feel that she has true rheumatic fever, clear to discontinue penicillin, recheck ASO and anti-DNase B titers after 4 weeks  8/16/17 homocystine, protein C and protein S, lupus anticoagulant, beta 2 glycoprotein, antithrombin III, anticardiolipin antibody, anti-DNAse antibody, antistreptolysin all negative  8/31/17  rheumatology note, continues on alendronate per [facility] recommendation [facility], did follow up with orthopedics, will proceed with core decompression  2/23/18  [facility] note, multifocal osteonecrosis knees, hips, other joints, underlying etiology unclear, trial few months of alendronate without dramatic improvement, alendronate discontinued because of  undergoing core procedures with her hips, recommend continuing off alendronate monitoring for improvement, reassess in 6 months     Foot pain  8/5/15 MRI right foot severe artifact secondary to first MTP are clear, limiting analysis of soft tissue, highly likely complete FHL tear  8/28/15  orthopedic note right ankle FHL tendon rupture seen on MRI, do not recommend surgical repair at this time which would involve hemiarthroplasty, implant, first MTP fusion with bone graft, followup podiatry   8/18/17 MRI left foot normal, MRI right foot osteoathritis right first metatarsal head and sesamoids moderate in degree, ulnar edema with an sesamoids may be due to susceptibility artifact related to first metatarsal phalangeal arthroplasty     Dyslipidemia  9/29/14 chol 186,trig 46,hdl 76,ldl 101  9/18/15 chol 225,trig 50,hdl 77,ldl 138  9/20/17 chol 189,trig 53,hdl 56,ldl 122     Cervical pain  10/11 MRI cervical spine C4-C5 tiny disc bulge, C5-C6 bilateral and plate spurring with uncinate hypertrophy  10/7/16 MRI cervical spine C5-C6 small broad-based osteophyte complex, borderline foraminal stenosis, no significant progression of disease since 2010     avascular necrosis    8/29/16 streptococcal pharyngitis positive strep test, treated with augmentin, developed erythema nodosum lower extremities and palms given NSAIDs and medrol dosepack  9/22/16 repeat medrol dose pack x 1 still with painful erythema nodosum nodules  10/5/16 high dose aspirin no benefit, changed to prednisone 20 mg daily,  10/19/16 increase prednisone to 40 mg x 3 days then back to 20 mg  11/7/16 still with polyarthritis, on prednisone 20 mg twice a day, repeat labs, still on penicillin, will speak with rheumatology about referral  11/14/16  rheumatolog note, features consistent with rheumatic fever, also consider seronegative spondyloarthropathies, sarcoidosis, rheumatoid arthritis, will check sacroiliac films, hand and feet  x-rays, follow-up one week  12/23/16  rheumatology note, inflammatory markers did normalize with steroids, rheumatoid factor, CCP, MERA,ANKA negative continue to taper steroids given osteonecrosis alternating 17.5 mg and 15 mg, and 15 mg, and 15 mg alternating with 12.5 mg, and so forth  12/10/16 MRI right knee multiple areas right knee avascular necrosis medial and lateral femoral condyle, medial and lateral tibial plateau, bone marrow edema  12/23/16  rheumatology note, inflammatory markers did normalize with steroids, rheumatoid factor, CCP, MERA,ANKA negative continue to taper steroids given osteonecrosis alternating 17.5 mg and 15 mg, and 15 mg, and 15 mg alternating with 12.5 mg, and so forth  1/20/17 MRI left foot no evidence of arthropathy or tendinopathy  1/20/17 MRI left ankle metallic artifact lateral malleolus  1/20/17 MRI right ankle small oblong focus finger edema tibial plafond below subchondral plate, would be consistent with small area of avascular necrosis  1/20/17 MRI left knee multiple areas avascular necrosis surrounding the knee to include femur, tibia, fibula  1/20/17 MRI pelvis bilateral femoral head avascular necrosis involving approximately 40% of the articular surface, metallic artifact left femoral head consistent with presence of small metallic pain  2/9/17 tapering prednisone down to 2.5 mg alternating with 5 mg  2/23/17  rheumatology note complete steroid taper then repeat ESR, CRP and CPK, also check antiphospholipid antibody,antithrombin, factor v leiden, protein s  3/6/17 anticardiolipin antibodies, lupus anticoagulant, protein CNS, factor V 5 Leyden, anti-thrombin panel, beta 2 glycoprotein negative  3/10/17 ESR 19,CRP 0.17, cortisol 3.3, ACTH 16, IgG 753 ([id number]), IgA 140, IgM 93  3/14/16  rheumatology note currently off prednisone, osteonecrosis involving multiple joints, knees, hips, right ankle, etiology unclear, antiphospholipid antibody, protein C  and S normal, antithrombin III factor V leyden normal, refer to hematology for evaluation other etiologies, slightly decreased IgG refer to allergy immunology  3/30/17  allergy immunology evaluation slight IgG reduction 753 with normal IgG, IgM, no history to suggest primary immunodeficiency, recent diagnosis of group A streptococcal pharyngitis complicated by erythema nodosum, 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 therapy, we will perform humerol workup to include repeat quantitative immunoglobulins with subclass, specific antibodies for haemophilus influenza, pneumococcal, tetanus/diphtheria, limited flow cytometry with repeat CBC, SPEP/UPEP, ESR, CRP, UA, follow-up ASO, DNase B titer  5/10/17 dr.wu lacy endocrinology note recommend fosamax weekly with anecdotal evidence that bisphosphonate therapy may provide symptomatic benefit and osteonecrosis, if develops side effects could consider intravenous reclast, referral [facility], follow-up 4 months  5/28/17  rheumatology note avascular necrosis, arthralgias lower extremities, continue off prednisone, on alendronate per [facility]  7/7/17 MRI left knee without; again seen multiple bone infarcts femur, tibia, fibula, patella appeared less prominent on current examination  7/7/17 MRI right knee without; interval improvement in size of multiple bony infarcts involving femur, tibia, fibula, some infarcts have resolved  7/7/17 MRI pelvis without; stable bilateral femoral head avascular necrosis, these have not progressed and there is no evidence of subchondral collapse or arthropathy  6/27/17  [facility] evaluation, recommended evaluation of hypercoagulable state, to consider antiphospholipid antibodies (lupus anticoagulant, anticardiolipin antibodies, beta-2 glycoprotein, phosphatidyl serine antibodies, factor V Leiden, antithrombin III, prothrombin mutation, protein C&S quantitative and  qualitative, homocysteine, recommend after review of records do not strongly feel that she has true rheumatic fever, clear to discontinue penicillin, recheck ASO and anti-DNase B titers after 4 weeks  8/16/17 homocystine, protein C and protein S, lupus anticoagulant, beta 2 glycoprotein, antithrombin III, anticardiolipin antibody, anti-DNAse antibody, antistreptolysin all negative  8/18/17 MRI left foot normal, MRI right foot osteoathritis right first metatarsal head and sesamoids moderate in degree, ulnar edema with an sesamoids may be due to susceptibility artifact related to first metatarsal phalangeal arthroplasty  8/31/17  rheumatology note, continues on alendronate per [facility] end[facility] recommendation [facility], did follow up with orthopedics, will proceed with core decompression  9/27/17  [facility] with focal osteonecrosis involving knees, hips, other joints, etiology unclear, dramatic improvement with a few months of alendronate, about to undergo surgical intervention hip, will discontinue alendronate pending surgery, reassess 4-5 months  2/23/18  [facility] note, multifocal osteonecrosis knees, hips, other joints, underlying etiology unclear, trial few months of alendronate without dramatic improvement, alendronate discontinued because of undergoing core procedures with her hips, recommend continuing off alendronate monitoring for improvement, reassess in 6 months     Allergic rhinitis  Tried otc claritin  9/12/13 flonase trial  9/25/15 flonase and zyrtec or claritin            Patient Active Problem List   Diagnosis   • S/P appendectomy   • Left hip revision 2002   • Left ankle surgery   • Right hip arthroscopy    • Low back pain   • Cervical pain (neck)   • IBS (irritable bowel syndrome)   • Preventative health care   • Varicose vein of leg   • S/P bunionectomy   • Allergic rhinitis due to animal hair and dander   • Dyslipidemia   • Perimenopausal   •  Foot pain, right   • Obesity   • History of rheumatic fever   • Avascular necrosis (HCC)       Opiate Risk Score 1 done in office today  Date of Last therapy agreement/ update 5/8/18  Date of last UDS 5/8/18   Discrepancies: 0    ROS       Objective:          Physical Exam   Constitutional: She appears well-developed and well-nourished.   HENT:   Head: Normocephalic and atraumatic.   Right Ear: External ear normal.   Left Ear: External ear normal.   Nose: Nose normal.   Mouth/Throat: Oropharynx is clear and moist.   Eyes: Conjunctivae are normal. Right eye exhibits no discharge. Left eye exhibits no discharge. No scleral icterus.   Neck: Neck supple. No JVD present. No thyromegaly present.   Cardiovascular: Normal rate, regular rhythm and normal heart sounds.    Pulmonary/Chest: Effort normal and breath sounds normal. No respiratory distress. She has no wheezes.   Abdominal: Soft. She exhibits no distension and no mass. There is no tenderness.   Musculoskeletal: She exhibits no edema.   Neurological: She is alert.   Skin: Skin is warm. No rash noted. No erythema.   Psychiatric: She has a normal mood and affect. Her behavior is normal.   Nursing note and vitals reviewed.         limited cervical range of motion, no spinal tenderness to palpation, normal  strength bilateral     Assessment/Plan:     Assessment  #1 annual    #2  avascular necrosis stable followed by orthopedics, rheumatology, [facility] uses tramadol for pain as needed 1-2 per day without side effects  2/23/18  [facility] note, multifocal osteonecrosis knees, hips, other joints, underlying etiology unclear, trial few months of alendronate without dramatic improvement, alendronate discontinued because of undergoing core procedures with her hips, recommend continuing off alendronate monitoring for improvement, reassess in 6 months recent 9/29/17   orthopedic surgical note right hip fluoroscopically guided  core decompression and  subsequent 12/29/17  orthopedic surgical note  left proximal femoral head core decompression     #3 History rheumatic fever stable no recurrence 7/3/17  infectious disease consultation history of streptococcal pharyngitis with probable rheumatic fever, recommend discontinue penicillin prophylaxis follow-up as needed 2/23/18  [facility] note, multifocal osteonecrosis knees, hips, other joints, underlying etiology unclear, trial few months of alendronate without dramatic improvement, alendronate discontinued because of undergoing core procedures with her hips, recommend continuing off alendronate monitoring for improvement, reassess in 6 months     #4 Foot pain chronic has seen podiatry previously stable no falls 8/18/17 MRI left foot normal, MRI right foot osteoathritis right first metatarsal head and sesamoids moderate in degree, ulnar edema with an sesamoids may be due to susceptibility artifact related to first metatarsal phalangeal arthroplasty     #5 Dyslipidemia 9/20/17 chol 189,trig 53,hdl 56,ldl 122, diet-controlled     #6 Right hip arthroscopy 2007  ortho right hip arthroscopy  1/20/17 MRI pelvis bilateral femoral head avascular necrosis involving approximately 40% of the articular surface, metallic artifact left femoral head consistent with presence of small metallic pain and subsequent follow-up procedure 9/29/17   orthopedic surgical note right hip fluoroscopically guided core decompression and  subsequent     #7 Perimenopausal followed by gynecology      #8 Low back pain followed by pain management 9/10/14  pain note; continue TENS, lidoderm patch,voltaren gel, celebrex 200 mg qday status post recent radiofrequency lumbar spine injections right and left, on tramadol as needed for pain 1-2 per day without side effects     #9 Left hip revision History of left hip open decompression and osteotomy recent  12/29/17  orthopedic surgical note  left proximal femoral head core decompression    #10 Preventative health  11/05 [facility] colon negative  9/25/15 tdap  10/7/15 pap per gyn  negative  9/29/16 chol 190,trig 52,hdl 65,ldl 115  10/110/16 vit d 31  1/6/17 pneumovax  12/6/17 mammogram per  gynecology heterogeneously dense breast tissue    #11 neck pain has had mri two years ago and seen by pain management     #12 chronic tramadol uses no drowsiness, sedation, memory loss, confusion, depression, no alcohol with medication, stable and controlled, has tried physical therapy, TENS, acupuncture, chiropractor, massage therapy, heat, NSAIDs, gabapentin, Celebrex, Voltaren, Lidoderm patch    #13 impaired glucose metabolism    #14 history of low vitamin D      Plan   #! Has used tens, massage therapy, chiropractor for neck and back    #2 follow up pain management Dr. Rees      #3 follow up     #4 follow up gyn    #5 ultram 50 mg tid prn pain  monitor for long-term adverse effects as discussed and advised an opiate contract    #6  reviewed    #7 narcotic contract signed and reviwed Opioid side effects include: Drowsiness, sedation, confusion, memory loss, depression, impaired balance and coordination, increased risk of falls, respiratory suppression, nausea, vomiting, constipation, itching and rash, difficulty urinating.  Long-term use may contribute to tolerance, dependency, habituation requiring more medication to achieve the same benefits, and long-term use may decrease efficacy of the medication.  If medications are discontinued abruptly, symptoms of withdrawals may include nausea, abdominal pain, irregular heart rhythms, diaphoresis, all narcotics need to be tapered under the supervision of a provider.  Overdose and death may occur if patient does not take the opioid prescriptions as prescribed, other medications may potentiate side effects of respiratory suppression, overdose,  death, including benzodiazepines, depression or anxiety medications, alcohol, illicit drugs.  Patient will notify myself or prescribing physician of narcotics, of any changes in medication regimen, any additional medications, any change in dosing or frequency of opiate therapy, and patient will not receive opiate prescriptions from anyone other than myself having signed a narcotic contract previously with myself.  Patient has been warned not to operate heavy machinery, and to not handle firearms, operate motor vehicle under the influence of opiate medications, these may affect his ability or her ability to function and result in life-threatening accidents, and as such may have legal repercussions.  Discussed management plan with narcotics risk, symptomatic care, reviewed activity level, exercise program, activity modification.  Reviewed imaging studies performed, further diagnostic and imaging considerations, further evaluation by specialists including pain management, neurosurgeon orthopedics if indicated  Discussed management options and reviewed symptomatic care, can consider acupuncture, massage therapy, TENS unit, home exercise program, physical therapy, reviewed pain medication dosing, risks and alternatives, medication adjustments, consider taper, reviewed , imaging options further discussed and therapeutic options  Given the above-mentioned considerations, at this time benefits of chronic opiate and pain medication therapy outweighs the risks    #8 opiod risk score 1    #9 EKG done today sinus, done because of necessity for conscious sedation, MRI cervical spine    #10 MRI cervical spine will require conscious sedation     #11 labs ordered    #12 x-rays cervical spine    #13 follow-up 3 months

## 2018-05-11 ENCOUNTER — HOSPITAL ENCOUNTER (OUTPATIENT)
Dept: RADIOLOGY | Facility: MEDICAL CENTER | Age: 48
End: 2018-05-11
Attending: INTERNAL MEDICINE
Payer: COMMERCIAL

## 2018-05-11 DIAGNOSIS — I65.22 CALCIFICATION OF LEFT CAROTID ARTERY: ICD-10-CM

## 2018-05-11 PROCEDURE — 93880 EXTRACRANIAL BILAT STUDY: CPT

## 2018-05-30 DIAGNOSIS — Z01.818 PREOP TESTING: ICD-10-CM

## 2018-06-02 ENCOUNTER — HOSPITAL ENCOUNTER (OUTPATIENT)
Dept: LAB | Facility: MEDICAL CENTER | Age: 48
End: 2018-06-02
Attending: INTERNAL MEDICINE
Payer: COMMERCIAL

## 2018-06-02 DIAGNOSIS — Z01.818 PREOP TESTING: ICD-10-CM

## 2018-06-02 DIAGNOSIS — Z00.00 PREVENTATIVE HEALTH CARE: Chronic | ICD-10-CM

## 2018-06-02 LAB
25(OH)D3 SERPL-MCNC: 37 NG/ML (ref 30–100)
BASOPHILS # BLD AUTO: 1.2 % (ref 0–1.8)
BASOPHILS # BLD: 0.08 K/UL (ref 0–0.12)
EOSINOPHIL # BLD AUTO: 0.61 K/UL (ref 0–0.51)
EOSINOPHIL NFR BLD: 8.8 % (ref 0–6.9)
ERYTHROCYTE [DISTWIDTH] IN BLOOD BY AUTOMATED COUNT: 51.6 FL (ref 35.9–50)
EST. AVERAGE GLUCOSE BLD GHB EST-MCNC: 108 MG/DL
HBA1C MFR BLD: 5.4 % (ref 0–5.6)
HCG SERPL QL: NEGATIVE
HCT VFR BLD AUTO: 39.9 % (ref 37–47)
HGB BLD-MCNC: 13.4 G/DL (ref 12–16)
IMM GRANULOCYTES # BLD AUTO: 0.01 K/UL (ref 0–0.11)
IMM GRANULOCYTES NFR BLD AUTO: 0.1 % (ref 0–0.9)
LYMPHOCYTES # BLD AUTO: 2.4 K/UL (ref 1–4.8)
LYMPHOCYTES NFR BLD: 34.7 % (ref 22–41)
MCH RBC QN AUTO: 32.5 PG (ref 27–33)
MCHC RBC AUTO-ENTMCNC: 33.6 G/DL (ref 33.6–35)
MCV RBC AUTO: 96.8 FL (ref 81.4–97.8)
MONOCYTES # BLD AUTO: 0.56 K/UL (ref 0–0.85)
MONOCYTES NFR BLD AUTO: 8.1 % (ref 0–13.4)
NEUTROPHILS # BLD AUTO: 3.26 K/UL (ref 2–7.15)
NEUTROPHILS NFR BLD: 47.1 % (ref 44–72)
NRBC # BLD AUTO: 0 K/UL
NRBC BLD-RTO: 0 /100 WBC
PLATELET # BLD AUTO: 309 K/UL (ref 164–446)
PMV BLD AUTO: 10 FL (ref 9–12.9)
RBC # BLD AUTO: 4.12 M/UL (ref 4.2–5.4)
TSH SERPL DL<=0.005 MIU/L-ACNC: 1.95 UIU/ML (ref 0.38–5.33)
WBC # BLD AUTO: 6.9 K/UL (ref 4.8–10.8)

## 2018-06-02 PROCEDURE — 85025 COMPLETE CBC W/AUTO DIFF WBC: CPT

## 2018-06-02 PROCEDURE — 84703 CHORIONIC GONADOTROPIN ASSAY: CPT

## 2018-06-02 PROCEDURE — 36415 COLL VENOUS BLD VENIPUNCTURE: CPT

## 2018-06-02 PROCEDURE — 84443 ASSAY THYROID STIM HORMONE: CPT

## 2018-06-02 PROCEDURE — 82306 VITAMIN D 25 HYDROXY: CPT

## 2018-06-02 PROCEDURE — 83036 HEMOGLOBIN GLYCOSYLATED A1C: CPT

## 2018-06-04 ENCOUNTER — HOSPITAL ENCOUNTER (OUTPATIENT)
Dept: RADIOLOGY | Facility: MEDICAL CENTER | Age: 48
End: 2018-06-04
Attending: INTERNAL MEDICINE
Payer: COMMERCIAL

## 2018-06-04 ENCOUNTER — TELEPHONE (OUTPATIENT)
Dept: MEDICAL GROUP | Facility: MEDICAL CENTER | Age: 48
End: 2018-06-04

## 2018-06-04 VITALS
RESPIRATION RATE: 16 BRPM | OXYGEN SATURATION: 98 % | DIASTOLIC BLOOD PRESSURE: 65 MMHG | HEART RATE: 76 BPM | HEIGHT: 67 IN | BODY MASS INDEX: 30.13 KG/M2 | WEIGHT: 192 LBS | SYSTOLIC BLOOD PRESSURE: 117 MMHG | TEMPERATURE: 97.6 F

## 2018-06-04 DIAGNOSIS — M54.2 NECK PAIN: ICD-10-CM

## 2018-06-04 PROCEDURE — 72141 MRI NECK SPINE W/O DYE: CPT

## 2018-06-04 PROCEDURE — 99152 MOD SED SAME PHYS/QHP 5/>YRS: CPT

## 2018-06-04 PROCEDURE — 700111 HCHG RX REV CODE 636 W/ 250 OVERRIDE (IP)

## 2018-06-04 RX ORDER — MIDAZOLAM HYDROCHLORIDE 1 MG/ML
INJECTION INTRAMUSCULAR; INTRAVENOUS
Status: DISPENSED
Start: 2018-06-04 | End: 2018-06-04

## 2018-06-04 ASSESSMENT — PAIN SCALES - GENERAL
PAINLEVEL_OUTOF10: 3

## 2018-06-04 NOTE — DISCHARGE INSTRUCTIONS
MRI ADULT DISCHARGE INSTRUCTIONS    You have been medicated today for your scan. Please follow the instructions below to ensure your safe recovery. If you have any questions or problems, feel free to call us at [phone] or [phone].     1.   Have someone stay with you to assist you as needed.    2.   Do not drive or operate any mechanical devices.    3.   Do not perform any activity that requires concentration. Make no major decisions over the next 24 hours.     4.   Be careful changing positions from laying down to sitting or standing, as you may become dizzy.     5.   Do not drink alcohol for 48 hours.    6.   There are no restrictions for eating your normal meals. Drink fluids.    7.   You may continue your usual medications for pain, tranquilizers, muscle relaxants or sedatives when awake.     8.   Tomorrow, you may continue your normal daily activities.     9.   Pressure dressing on 10 - 15 minutes. If swelling or bleeding occurs when removed, continue placing direct pressure on injection site for another 5 minutes, or until bleeding stops.   Midazolam (VERSED)  What is this medicine?  You were given MIDAZOLAM (RENAY ndiaye) for your procedure today. This medication is a benzodiazepine. It is used to cause relaxation or sleep before surgery and to block the memory of the procedure.  This medicine may be used for other purposes; ask your health care provider or pharmacist if you have questions.  What side effects may I notice from receiving this medicine?  Side effects that you should report to your doctor or health care professional as soon as possible:  • allergic reactions like skin rash, itching or hives, swelling of the face, lips, or tongue  • breathing problems  • confusion  • dizziness or lightheadedness  • fast, irregular heartbeat  • halluninations during recovery  • numbness or tingling in the hands or feet  • pain, redness, or swelling at site where injected  • seizures  Side effects that usually  do not require medical attention (report to your doctor or health care professional if they continue or are bothersome):  • coughing  • headache  • hiccups  • involuntary eye and muscle movements  • loss of memory of events just before, during, and after use  • nausea, vomiting  • speech problems  • tiredness  • trouble sleeping or nightmares  This list may not describe all possible side effects. Call your doctor for medical advice about side effects. You may report side effects to FDA at 4-671-WRJ-6459.    Fentanyl  What is this medicine?  You were given FENTANYL (FEN ta nil) for your procedure today, it is a pain reliever. It is used to treat breakthrough pain that your long acting pain medicine does not control. Do not use this medicine for a pain that will go away in a few days like pain from surgery, doctor or dentist visits.   This medicine may be used for other purposes; ask your health care provider or pharmacist if you have questions.  What side effects may I notice from receiving this medicine?  Side effects that you should report to your doctor or health care professional as soon as possible:  • allergic reactions like skin rash, itching or hives, swelling of the face, lips, or tongue  • breathing problems  • changes in vision  • confusion  • dry mouth  • feeling faint, lightheaded  • hallucination  • irregular heartbeat  • mouth pain, sores  • problems with balance, talking, walking  • trouble passing urine or change in the amount of urine  • unusual bleeding or bruising  • unusually weak or tired  Side effects that usually do not require medical attention (report to your doctor or health care professional if they continue or are bothersome):  • dizzy  • headache  • loss of appetite  • nausea, vomiting  • sweating  • tingling in mouth  This list may not describe all possible side effects. Call your doctor for medical advice about side effects. You may report side effects to FDA at 6-428-FDA-4366.    I  have been informed of and understand the above discharge instructions.

## 2018-06-21 NOTE — PROGRESS NOTES
Surgeon: Farhan Rees DO    Assistants:  None    Preoperative diagnosis:  Cervical spondylosis    Post operative diagnosis:  Cervical spondylosis    Type of Sedation:  Local anesthesia and conscious sedation    Sedation time: 16 minutes    Site of Service: [facility]     Procedure:  1. Right Third Occipital Nerve Radiofrequency Ablation Under Fluoroscopy  2. Right C3 Medial Branch Nerve Radiofrequency Ablation Under Fluoroscopy  3. Right C4 Medial Branch Nerve Radiofrequency Ablation Under Fluoroscopy    After discussing risks, benefits, and alternatives to the procedure, the patient expressed understanding and wished to proceed.  The patient was brought into the fluoroscopy suite and placed in the prone position.  Procedural pause was conducted to verify: correct patient identity, procedure to be performed and as applicable, correct side and site, correct patient position, and availability of implants, special equipment or special instruments.      The skin was sterilely prepped and draped in the usual fashion using betadine times three in the region of the injections.      Right Third Occipital Nerve Radiofrequency Ablation:  Using fluoroscopy, the right posterolateral margin of the C3 vertebra was identified and marked.  The skin and subcutaneous tissues were anesthetized with 1% lidocaine using a 25 gauge 1.5 inch needle.  Using fluoroscopic guidance, a 100 mm SMK RF cannulus with a 10 mm active tip was then inserted down to the right posterolateral margin of the C3 vertebra.  Sensory testing was performed at 50Hz up to 1 volt.  Motor testing was performed at 2Hz up to 3.5 volts.  With final needle positioning, there was no evidence of radicular stimulation.  Prior to lesioning, 1 cc of 1% lidocaine was injected.  Lesion was performed at 80 degrees Celcius for 90 seconds once.  After lesioning a solution of 0.5 cc of 0.5% Bupivicaine and 0.5 cc of Celestone (6mg/ml) was injected.  A spot film was taken  for documentation purposes.  Following the procedure the needle was withdrawn slightly and flushed with 1% Lidocaine as it was withdrawn.      Right TON RF probe spot film:  A single A-P spot film was taken of the probe   placement which documented that the probe position was at the right posterolateral margin of the C3 vertebra.      Right C3 Medial Branch Nerve Radiofrequency Ablation: Using fluoroscopy, the waist of the right side of the C3 lateral mass was identified and marked.  The skin and subcutaneous tissues were anesthetized with 1% lidocaine using a 25 gauge 1.5 inch needle.  Using fluoroscopic guidance, a 100 mm SMK RF cannulus with a 10 mm active tip was then inserted down to the waist of the right side of the C3 lateral mass.  Sensory testing was performed at 50Hz up to 1 volt.  Motor testing was performed at 2Hz up to 3.5 volts.  With final needle positioning, there was no evidence of radicular stimulation.  Prior to lesioning, 1 cc of 1% lidocaine was injected.  Lesion was performed at 80 degrees Celcius for 90 seconds once.  After lesioning a solution of 0.5 cc of 0.5% Bupivicaine and 0.5 cc of Celestone (6mg/ml) was injected.  A spot film was taken for documentation purposes.  Following the procedure the needle was withdrawn slightly and flushed with 1% Lidocaine as it was withdrawn.      Right C3 RF probe spot film:  A single A-P spot film was taken of the probe   placement which documented that the probe position was at the waist of the right side of the C3 lateral mass.    Right C4 Medial Branch Nerve Radiofrequency Ablation:  Using fluoroscopy, the waist of the right side of the C4 lateral mass was identified and marked.  The skin and subcutaneous tissues were anesthetized with 1% lidocaine using a 25 gauge 1.5 inch needle.  Using fluoroscopic guidance, a 100 mm SMK RF cannulus with a 10 mm active tip was then inserted down to the waist of the right side of the C4 lateral mass.  Sensory testing  was performed at 50Hz up to 1 volt.  Motor testing was performed at 2Hz up to 3.5 volts.  With final needle positioning, there was no evidence of radicular stimulation.  Prior to lesioning, 1 cc of 1% lidocaine was injected.  Lesion was performed at 80 degrees Celcius for 90 seconds once.  After lesioning a solution of 0.5 cc of 0.5% Bupivicaine and 0.5 cc of Celestone (6mg/ml) was injected.  A spot film was taken for documentation purposes.  Following the procedure the needle was withdrawn slightly and flushed with 1% Lidocaine as it was withdrawn.      Right C4 RF probe spot film:  A single A-P spot film was taken of the probe   placement which documented that the probe position was at the waist of the right side of the C4 lateral mass.      The patient tolerated the procedure well and there were no apparent complications.  After an appropriate amount of observation, the patient was dismissed from the clinic in good condition under their own power.      Complications:  None    Disposition:   1.  The patient was discharged to the recovery area in good condition.  2.  Discharge instructions were provided and explained.  3.  Patient was instructed to call with any questions or problems.  4.  Apply ice to the procedure site and keep clean and dry for 24 hours.  5.  Medications were reviewed for efficacy and appropriateness.  6.  Continue current medications.  7.  Return in 1 to 2 weeks for follow up.

## 2018-06-22 ENCOUNTER — HOSPITAL ENCOUNTER (OUTPATIENT)
Dept: RADIOLOGY | Facility: REHABILITATION | Age: 48
End: 2018-06-22
Attending: PHYSICAL MEDICINE & REHABILITATION
Payer: COMMERCIAL

## 2018-06-22 ENCOUNTER — HOSPITAL ENCOUNTER (OUTPATIENT)
Dept: PAIN MANAGEMENT | Facility: REHABILITATION | Age: 48
End: 2018-06-22
Attending: PHYSICAL MEDICINE & REHABILITATION
Payer: COMMERCIAL

## 2018-06-22 VITALS
DIASTOLIC BLOOD PRESSURE: 69 MMHG | BODY MASS INDEX: 30.24 KG/M2 | SYSTOLIC BLOOD PRESSURE: 106 MMHG | RESPIRATION RATE: 14 BRPM | HEIGHT: 67 IN | OXYGEN SATURATION: 98 % | HEART RATE: 66 BPM | WEIGHT: 192.68 LBS

## 2018-06-22 PROCEDURE — 700111 HCHG RX REV CODE 636 W/ 250 OVERRIDE (IP)

## 2018-06-22 PROCEDURE — 99152 MOD SED SAME PHYS/QHP 5/>YRS: CPT

## 2018-06-22 PROCEDURE — 64633 DESTROY CERV/THOR FACET JNT: CPT

## 2018-06-22 PROCEDURE — 700101 HCHG RX REV CODE 250

## 2018-06-22 PROCEDURE — 64634 DESTROY C/TH FACET JNT ADDL: CPT

## 2018-06-22 RX ORDER — BUPIVACAINE HYDROCHLORIDE 5 MG/ML
INJECTION, SOLUTION EPIDURAL; INTRACAUDAL
Status: COMPLETED
Start: 2018-06-22 | End: 2018-06-22

## 2018-06-22 RX ORDER — MIDAZOLAM HYDROCHLORIDE 1 MG/ML
INJECTION INTRAMUSCULAR; INTRAVENOUS
Status: COMPLETED
Start: 2018-06-22 | End: 2018-06-22

## 2018-06-22 RX ORDER — BETAMETHASONE SODIUM PHOSPHATE AND BETAMETHASONE ACETATE 3; 3 MG/ML; MG/ML
INJECTION, SUSPENSION INTRA-ARTICULAR; INTRALESIONAL; INTRAMUSCULAR; SOFT TISSUE
Status: COMPLETED
Start: 2018-06-22 | End: 2018-06-22

## 2018-06-22 RX ORDER — LIDOCAINE HYDROCHLORIDE 10 MG/ML
INJECTION, SOLUTION EPIDURAL; INFILTRATION; INTRACAUDAL; PERINEURAL
Status: COMPLETED
Start: 2018-06-22 | End: 2018-06-22

## 2018-06-22 RX ADMIN — FENTANYL CITRATE 50 MCG: 50 INJECTION, SOLUTION INTRAMUSCULAR; INTRAVENOUS at 10:19

## 2018-06-22 RX ADMIN — MIDAZOLAM HYDROCHLORIDE 1 MG: 1 INJECTION, SOLUTION INTRAMUSCULAR; INTRAVENOUS at 10:23

## 2018-06-22 RX ADMIN — MIDAZOLAM HYDROCHLORIDE 2 MG: 1 INJECTION, SOLUTION INTRAMUSCULAR; INTRAVENOUS at 10:19

## 2018-06-22 RX ADMIN — LIDOCAINE HYDROCHLORIDE 5 ML: 10 INJECTION, SOLUTION EPIDURAL; INFILTRATION; INTRACAUDAL; PERINEURAL at 10:15

## 2018-06-22 RX ADMIN — BUPIVACAINE HYDROCHLORIDE 5 ML: 5 INJECTION, SOLUTION EPIDURAL; INTRACAUDAL; PERINEURAL at 10:15

## 2018-06-22 ASSESSMENT — PAIN SCALES - GENERAL
PAINLEVEL_OUTOF10: 1
PAINLEVEL_OUTOF10: 4

## 2018-06-22 NOTE — PROGRESS NOTES
Pt given verbal and written d/c instructions and verbalizes understanding. Took ibuprofen yesterday , denies blood thinners use in last 5 days, denies current infection or abx use. Med rec complete. Pt has post procedural ride home with  Brandon

## 2018-07-12 NOTE — PROGRESS NOTES
Surgeon: Farhan Rees DO    Assistants:  None    Preoperative diagnosis:  Lumbosacral spondylosis    Post operative diagnosis:  Lumbosacral spondylosis    Type of Sedation:  Local anesthesia and conscious sedation    Sedation time: 18 minutes    Site of Service:  [facility]     Procedure:  1. Left L3 Medial Branch Nerve Radiofrequency Ablation Under Fluoroscopy  2. Left L4 Medial Branch Nerve Radiofrequency Ablation Under Fluoroscopy  3. Left L5 Dorsal Ramus Nerve Radiofrequency Ablation Under Fluoroscopy  4. Left S1 Lateral Branch Nerve Radiofrequency Ablation Under Fluoroscopy    After discussing risks, benefits, and alternatives to the procedure, the patient expressed understanding and wished to proceed.  The patient was brought into the fluoroscopy suite and placed in the prone position.  Procedural pause was conducted to verify: correct patient identity, procedure to be performed and as applicable, correct side and site, correct patient position, and availability of implants, special equipment or special instruments.      The skin was sterilely prepped and draped in the usual fashion using betadine times three in the region of the injections.      Left L3 Medial Branch Nerve Radiofrequency Ablation:  Using fluoroscopy, the junction of the L4 transverse process and superior articulating process was identified and marked.  The skin and subcutaneous tissues were anesthetized with 1% lidocaine using a 25 gauge 1.5 inch needle.  Using fluoroscopic guidance, a 100 mm SMK RF cannulus with a 10 mm active tip was then inserted down to the superior junction of the left L4 transverse process and the superior articulating process.  Sensory testing was performed at 50Hz up to 1 volt.  Motor testing was performed at 2Hz up to 3.5 volts.  With final needle positioning, there was no evidence of radicular stimulation.  Prior to lesioning, 1 cc of 1% lidocaine was injected.  Lesion was performed at 80 degrees Celcius  for 120 seconds.  After lesioning a solution of 0.5 cc of 0.5% Bupivicaine and 0.5 cc of D50 was injected performing a chemical neurolysis.  Following the procedure the needle was withdrawn slightly and flushed with 1% Lidocaine as it was withdrawn.      Left L3 RF probe spot film:  A single A-P spot film was taken of the probe placement which documented that the probe position was at the superior junction   of the base of the left L4 transverse process.    Left L4 Medial Branch Nerve Radiofrequency Ablation: Using fluoroscopy, the junction of the L5 transverse process and superior articulating process was identified and marked.  The skin and subcutaneous tissues were anesthetized with 1% lidocaine using a 25 gauge 1.5 inch needle.  Using fluoroscopic guidance, a 100 mm SMK RF cannulus with a 10 mm active tip was then inserted down to the superior junction of the left L5 transverse process and the superior articulating process.  Sensory testing was performed at 50Hz up to 1 volt.  Motor testing was performed at 2Hz up to 3.5 volts.  With final needle positioning, there was no evidence of radicular stimulation.  Prior to lesioning, 1 cc of 1% lidocaine was injected.  Lesion was performed at 80 degrees Celcius for 120 seconds.  After lesioning a solution of 0.5 cc of 0.5% Bupivicaine and 0.5 cc of D50 was injected performing a chemical neurolysis.   A spot film was taken for documentation purposes.  Following the procedure the needle was withdrawn slightly and flushed with 1% Lidocaine as it was withdrawn.      Left L4 RF probe spot film:  A single A-P spot film was taken of the probe    placement which documented that the probe position was at the superior junction   of the base of the left L5 transverse process.    Left L5 Dorsal Ramus Nerve Radiofrequency Ablation:  Using fluoroscopy, the junction of the left S1 superior articulating process and the sacral ala was identified and marked.  The skin and subcutaneous  tissues were anesthetized with 1% lidocaine using a 25 gauge 1.5 inch needle.  Using fluoroscopic guidance, a 100 mm SMK RF cannulus with a 10 mm active tip was then inserted down to the superior junction of the left S1 superior articulating process and the sacral ala to ablate the left L5 dorsal ramus.  Sensory testing was performed at 50Hz up to 1 volt.  Motor testing was performed at 2Hz up to 3.5 volts.  With final needle positioning, there was no evidence of radicular stimulation.  Prior to lesioning, 1 cc of 1% lidocaine was injected.  Lesion was performed at 80 degrees Celcius for 120 seconds.  After lesioning a solution of 0.5 cc of 0.5% Bupivicaine and 0.5 cc of D50 was injected performing a chemical neurolysis.  A spot film was taken for documentation purposes.  Following the procedure the needle was withdrawn slightly and flushed with 1% Lidocaine as it was withdrawn.      Left L5 RF probe spot film:  A single A-P spot film was taken of the probe    placement which documented that the probe position was at the superior junction   of the left S1 superior articular process and the left sacral ala.    Left S1 Lateral Branch Nerve Radiofrequency Ablation: Using fluoroscopy, the left S1 foramen was identified and marked.  The skin and subcutaneous tissues were anesthetized with 1% lidocaine using a 25 gauge 1.5 inch needle.  Using fluoroscopic guidance, a 100 mm SMK RF cannulus with a 10 mm active tip was then inserted down to the superior and lateral corner of the left S1 foramen to ablate the left S1 ascending facet joint nerve.   Sensory testing was performed at 50Hz up to 1 volt.  Motor testing was performed at 2Hz up to 3.5 volts.  With final needle positioning, there was no evidence of radicular stimulation.  Prior to lesioning, 1 cc of 1% lidocaine was injected.  Lesion was performed at 80 degrees Celcius for 120 seconds.  After lesioning a solution of 0.5 cc of 0.5% Bupivicaine and 0.5 cc of D50 was  injected performing a chemical neurolysis. A spot film was taken for documentation purposes.  Following the procedure the needle was withdrawn slightly and flushed with 1% Lidocaine as it was withdrawn.      Left S1 RF probe spot film:  A single A-P spot film was taken of the probe    placement which documented that the probe position was at the superior outer   quadrant of the left S1 foramen.      The patient tolerated the procedure well and there were no apparent complications.  After an appropriate amount of observation, the patient was dismissed from the clinic in good condition under their own power.    Complications:  None    Disposition:   1.  The patient was discharged to the recovery area in good condition.  2.  Discharge instructions were provided and explained.  3.  Patient was instructed to call with any questions or problems.  4.  Apply ice to the procedure site and keep clean and dry for 24 hours.  5.  Medications were reviewed for efficacy and appropriateness.  6.  Continue current medications.  7.  Return in 1 to 2 weeks for follow up.

## 2018-07-12 NOTE — PROGRESS NOTES
PAT note: PAT call made 07/12/2018 at 1322. Spoke with Janett. Health history, allergies, medications and pre-procedure instructions reviewed with patient.Pt verbalized understanding of instructions.

## 2018-07-13 ENCOUNTER — HOSPITAL ENCOUNTER (OUTPATIENT)
Dept: PAIN MANAGEMENT | Facility: REHABILITATION | Age: 48
End: 2018-07-13
Attending: PHYSICAL MEDICINE & REHABILITATION
Payer: COMMERCIAL

## 2018-07-13 ENCOUNTER — HOSPITAL ENCOUNTER (OUTPATIENT)
Dept: RADIOLOGY | Facility: REHABILITATION | Age: 48
End: 2018-07-13
Attending: PHYSICAL MEDICINE & REHABILITATION
Payer: COMMERCIAL

## 2018-07-13 VITALS
HEIGHT: 67 IN | DIASTOLIC BLOOD PRESSURE: 71 MMHG | SYSTOLIC BLOOD PRESSURE: 145 MMHG | WEIGHT: 201.94 LBS | HEART RATE: 78 BPM | RESPIRATION RATE: 16 BRPM | OXYGEN SATURATION: 99 % | BODY MASS INDEX: 31.7 KG/M2 | TEMPERATURE: 98 F

## 2018-07-13 PROCEDURE — 64635 DESTROY LUMB/SAC FACET JNT: CPT

## 2018-07-13 PROCEDURE — 700111 HCHG RX REV CODE 636 W/ 250 OVERRIDE (IP)

## 2018-07-13 PROCEDURE — 64640 INJECTION TREATMENT OF NERVE: CPT

## 2018-07-13 PROCEDURE — 99152 MOD SED SAME PHYS/QHP 5/>YRS: CPT

## 2018-07-13 PROCEDURE — 64636 DESTROY L/S FACET JNT ADDL: CPT

## 2018-07-13 PROCEDURE — 700101 HCHG RX REV CODE 250

## 2018-07-13 RX ORDER — DEXTROSE MONOHYDRATE 25 G/50ML
INJECTION, SOLUTION INTRAVENOUS
Status: COMPLETED
Start: 2018-07-13 | End: 2018-07-13

## 2018-07-13 RX ORDER — LIDOCAINE HYDROCHLORIDE 10 MG/ML
INJECTION, SOLUTION EPIDURAL; INFILTRATION; INTRACAUDAL; PERINEURAL
Status: COMPLETED
Start: 2018-07-13 | End: 2018-07-13

## 2018-07-13 RX ORDER — BETAMETHASONE SODIUM PHOSPHATE AND BETAMETHASONE ACETATE 3; 3 MG/ML; MG/ML
INJECTION, SUSPENSION INTRA-ARTICULAR; INTRALESIONAL; INTRAMUSCULAR; SOFT TISSUE
Status: COMPLETED
Start: 2018-07-13 | End: 2018-07-13

## 2018-07-13 RX ORDER — MIDAZOLAM HYDROCHLORIDE 1 MG/ML
INJECTION INTRAMUSCULAR; INTRAVENOUS
Status: COMPLETED
Start: 2018-07-13 | End: 2018-07-13

## 2018-07-13 RX ORDER — BUPIVACAINE HYDROCHLORIDE 5 MG/ML
INJECTION, SOLUTION EPIDURAL; INTRACAUDAL
Status: COMPLETED
Start: 2018-07-13 | End: 2018-07-13

## 2018-07-13 RX ADMIN — MIDAZOLAM HYDROCHLORIDE 1 MG: 1 INJECTION, SOLUTION INTRAMUSCULAR; INTRAVENOUS at 08:38

## 2018-07-13 RX ADMIN — BUPIVACAINE HYDROCHLORIDE 6 ML: 5 INJECTION, SOLUTION EPIDURAL; INTRACAUDAL; PERINEURAL at 08:00

## 2018-07-13 RX ADMIN — MIDAZOLAM HYDROCHLORIDE 2 MG: 1 INJECTION, SOLUTION INTRAMUSCULAR; INTRAVENOUS at 08:34

## 2018-07-13 RX ADMIN — LIDOCAINE HYDROCHLORIDE 5 ML: 10 INJECTION, SOLUTION EPIDURAL; INFILTRATION; INTRACAUDAL; PERINEURAL at 08:00

## 2018-07-13 RX ADMIN — DEXTROSE MONOHYDRATE 1 ML: 25 INJECTION, SOLUTION INTRAVENOUS at 08:00

## 2018-07-13 RX ADMIN — FENTANYL CITRATE 50 MCG: 50 INJECTION, SOLUTION INTRAMUSCULAR; INTRAVENOUS at 08:34

## 2018-07-13 ASSESSMENT — PAIN SCALES - GENERAL
PAINLEVEL_OUTOF10: 1
PAINLEVEL_OUTOF10: 3
PAINLEVEL_OUTOF10: 1
PAINLEVEL_OUTOF10: 1

## 2018-07-13 NOTE — PROGRESS NOTES
Reviewed Plan of Care with patient. Confirmed that she has stopped all blood thinning medications for last 2 days.  Confirmed that she has a . Reviewed home care instructions with patient prior to procedure. All questions and concerns addressed.   Dr Rees aware.

## 2018-08-10 ENCOUNTER — HOSPITAL ENCOUNTER (OUTPATIENT)
Dept: PAIN MANAGEMENT | Facility: REHABILITATION | Age: 48
End: 2018-08-10
Attending: PHYSICAL MEDICINE & REHABILITATION
Payer: COMMERCIAL

## 2018-08-10 ENCOUNTER — HOSPITAL ENCOUNTER (OUTPATIENT)
Dept: RADIOLOGY | Facility: REHABILITATION | Age: 48
End: 2018-08-10
Attending: PHYSICAL MEDICINE & REHABILITATION
Payer: COMMERCIAL

## 2018-08-10 VITALS
HEART RATE: 82 BPM | OXYGEN SATURATION: 97 % | TEMPERATURE: 97.3 F | RESPIRATION RATE: 14 BRPM | HEIGHT: 67 IN | DIASTOLIC BLOOD PRESSURE: 69 MMHG | SYSTOLIC BLOOD PRESSURE: 106 MMHG | WEIGHT: 196.87 LBS | BODY MASS INDEX: 30.9 KG/M2

## 2018-08-10 PROCEDURE — 64634 DESTROY C/TH FACET JNT ADDL: CPT

## 2018-08-10 PROCEDURE — 99152 MOD SED SAME PHYS/QHP 5/>YRS: CPT

## 2018-08-10 PROCEDURE — 700111 HCHG RX REV CODE 636 W/ 250 OVERRIDE (IP)

## 2018-08-10 PROCEDURE — 700101 HCHG RX REV CODE 250

## 2018-08-10 PROCEDURE — 64633 DESTROY CERV/THOR FACET JNT: CPT

## 2018-08-10 RX ORDER — BUPIVACAINE HYDROCHLORIDE 5 MG/ML
INJECTION, SOLUTION EPIDURAL; INTRACAUDAL
Status: COMPLETED
Start: 2018-08-10 | End: 2018-08-10

## 2018-08-10 RX ORDER — BETAMETHASONE SODIUM PHOSPHATE AND BETAMETHASONE ACETATE 3; 3 MG/ML; MG/ML
INJECTION, SUSPENSION INTRA-ARTICULAR; INTRALESIONAL; INTRAMUSCULAR; SOFT TISSUE
Status: COMPLETED
Start: 2018-08-10 | End: 2018-08-10

## 2018-08-10 RX ORDER — LIDOCAINE HYDROCHLORIDE 10 MG/ML
INJECTION, SOLUTION EPIDURAL; INFILTRATION; INTRACAUDAL; PERINEURAL
Status: COMPLETED
Start: 2018-08-10 | End: 2018-08-10

## 2018-08-10 RX ORDER — MIDAZOLAM HYDROCHLORIDE 1 MG/ML
INJECTION INTRAMUSCULAR; INTRAVENOUS
Status: COMPLETED
Start: 2018-08-10 | End: 2018-08-10

## 2018-08-10 RX ORDER — TRAMADOL HYDROCHLORIDE 50 MG/1
50 TABLET ORAL EVERY 8 HOURS PRN
COMMUNITY
End: 2018-12-11 | Stop reason: SDUPTHER

## 2018-08-10 RX ADMIN — LIDOCAINE HYDROCHLORIDE 5 ML: 10 INJECTION, SOLUTION EPIDURAL; INFILTRATION; INTRACAUDAL; PERINEURAL at 08:22

## 2018-08-10 RX ADMIN — MIDAZOLAM HYDROCHLORIDE 1 MG: 1 INJECTION, SOLUTION INTRAMUSCULAR; INTRAVENOUS at 08:22

## 2018-08-10 RX ADMIN — FENTANYL CITRATE 50 MCG: 50 INJECTION, SOLUTION INTRAMUSCULAR; INTRAVENOUS at 08:17

## 2018-08-10 RX ADMIN — FENTANYL CITRATE 50 MCG: 50 INJECTION, SOLUTION INTRAMUSCULAR; INTRAVENOUS at 08:24

## 2018-08-10 RX ADMIN — BUPIVACAINE HYDROCHLORIDE 3 ML: 5 INJECTION, SOLUTION EPIDURAL; INTRACAUDAL; PERINEURAL at 08:26

## 2018-08-10 RX ADMIN — MIDAZOLAM HYDROCHLORIDE 2 MG: 1 INJECTION, SOLUTION INTRAMUSCULAR; INTRAVENOUS at 08:17

## 2018-08-10 ASSESSMENT — PAIN SCALES - GENERAL
PAINLEVEL_OUTOF10: 4
PAINLEVEL_OUTOF10: 0

## 2018-08-10 NOTE — PROGRESS NOTES
Pt position prone by CST and RN.  Arms tucked to pt's sides.  Pillow placed under feet and lower legs by CST. Grounding pad placed on rt. Calf by CST.

## 2018-08-10 NOTE — PROGRESS NOTES
Surgeon: Farhan Rees DO    Assistants:  None    Preoperative diagnosis:  Cervical spondylosis    Post operative diagnosis:  Cervical spondylosis    Type of Sedation:  Local anesthesia and conscious sedation    Sedation time:16 minutes    Site of Service: [facility]     Procedure:  1. Left Third Occipital Nerve Radiofrequency Ablation Under Fluoroscopy  2. Left C3 Medial Branch Nerve Radiofrequency Ablation Under Fluoroscopy  3. Left C4 Medial Branch Nerve Radiofrequency Ablation Under Fluoroscopy    After discussing risks, benefits, and alternatives to the procedure, the patient expressed understanding and wished to proceed.  The patient was brought into the fluoroscopy suite and placed in the prone position.  Procedural pause was conducted to verify: correct patient identity, procedure to be performed and as applicable, correct side and site, correct patient position, and availability of implants, special equipment or special instruments.      The skin was sterilely prepped and draped in the usual fashion using betadine times three in the region of the injections.      Left Third Occipital Nerve Radiofrequency Ablation:  Using fluoroscopy, the left posterolateral margin of the C3 vertebra was identified and marked.  The skin and subcutaneous tissues were anesthetized with 1% lidocaine using a 25 gauge 1.5 inch needle.  Using fluoroscopic guidance, a 100 mm SMK RF cannulus with a 10 mm active tip was then inserted down to the left posterolateral margin of the C3 vertebra.  Sensory testing was performed at 50Hz up to 1 volt.  Motor testing was performed at 2Hz up to 3.5 volts.  With final needle positioning, there was no evidence of radicular stimulation.  Prior to lesioning, 1 cc of 1% lidocaine was injected.  Lesion was performed at 80 degrees Celcius for 90 seconds once.  After lesioning a solution of 0.5 cc of 0.5% Bupivicaine was injected.  A spot film was taken for documentation purposes.  Following  the procedure the needle was withdrawn slightly and flushed with 1% Lidocaine as it was withdrawn.      Left TON RF probe spot film:  A single A-P spot film was taken of the probe   placement which documented that the probe position was at the left posterolateral margin of the C3 vertebra.      Left C3 Medial Branch Nerve Radiofrequency Ablation: Using fluoroscopy, the waist of the left side of the C3 lateral mass was identified and marked.  The skin and subcutaneous tissues were anesthetized with 1% lidocaine using a 25 gauge 1.5 inch needle.  Using fluoroscopic guidance, a 100 mm SMK RF cannulus with a 10 mm active tip was then inserted down to the waist of the left side of the C3 lateral mass.  Sensory testing was performed at 50Hz up to 1 volt.  Motor testing was performed at 2Hz up to 3.5 volts.  With final needle positioning, there was no evidence of radicular stimulation.  Prior to lesioning, 1 cc of 1% lidocaine was injected.  Lesion was performed at 80 degrees Celcius for 90 seconds once.  After lesioning a solution of 0.5 cc of 0.5% Bupivicaine was injected.  A spot film was taken for documentation purposes.  Following the procedure the needle was withdrawn slightly and flushed with 1% Lidocaine as it was withdrawn.      Left C3 RF probe spot film:  A single A-P spot film was taken of the probe   placement which documented that the probe position was at the waist of the left side of the C3 lateral mass.    Left C4 Medial Branch Nerve Radiofrequency Ablation:  Using fluoroscopy, the waist of the left side of the C4 lateral mass was identified and marked.  The skin and subcutaneous tissues were anesthetized with 1% lidocaine using a 25 gauge 1.5 inch needle.  Using fluoroscopic guidance, a 100 mm SMK RF cannulus with a 10 mm active tip was then inserted down to the waist of the left side of the C4 lateral mass.  Sensory testing was performed at 50Hz up to 1 volt.  Motor testing was performed at 2Hz up to 3.5  volts.  With final needle positioning, there was no evidence of radicular stimulation.  Prior to lesioning, 1 cc of 1% lidocaine was injected.  Lesion was performed at 80 degrees Celcius for 90 seconds once.  After lesioning a solution of 0.5 cc of 0.5% Bupivicaine was injected.  A spot film was taken for documentation purposes.  Following the procedure the needle was withdrawn slightly and flushed with 1% Lidocaine as it was withdrawn.      Left C4 RF probe spot film:  A single A-P spot film was taken of the probe   placement which documented that the probe position was at the waist of the left side of the C4 lateral mass.      The patient tolerated the procedure well and there were no apparent complications.  After an appropriate amount of observation, the patient was dismissed from the clinic in good condition under their own power.    Complications:  None    Disposition:   1.  The patient was discharged to the recovery area in good condition.  2.  Discharge instructions were provided and explained.  3.  Patient was instructed to call with any questions or problems.  4.  Apply ice to the procedure site and keep clean and dry for 24 hours.  5.  Medications were reviewed for efficacy and appropriateness.  6.  Continue current medications.  7.  Return in 1 to 2 weeks for follow up.

## 2018-09-23 ENCOUNTER — HOSPITAL ENCOUNTER (EMERGENCY)
Facility: MEDICAL CENTER | Age: 48
End: 2018-09-23
Attending: EMERGENCY MEDICINE
Payer: COMMERCIAL

## 2018-09-23 VITALS
OXYGEN SATURATION: 98 % | HEART RATE: 89 BPM | WEIGHT: 205.91 LBS | SYSTOLIC BLOOD PRESSURE: 142 MMHG | RESPIRATION RATE: 18 BRPM | DIASTOLIC BLOOD PRESSURE: 87 MMHG | TEMPERATURE: 97.7 F | BODY MASS INDEX: 32.32 KG/M2 | HEIGHT: 67 IN

## 2018-09-23 DIAGNOSIS — S81.811A LACERATION OF RIGHT LEG EXCLUDING THIGH, INITIAL ENCOUNTER: ICD-10-CM

## 2018-09-23 PROCEDURE — 90471 IMMUNIZATION ADMIN: CPT

## 2018-09-23 PROCEDURE — 99283 EMERGENCY DEPT VISIT LOW MDM: CPT

## 2018-09-23 PROCEDURE — 304999 HCHG REPAIR-SIMPLE/INTERMED LEVEL 1

## 2018-09-23 PROCEDURE — 303747 HCHG EXTRA SUTURE

## 2018-09-23 PROCEDURE — 304217 HCHG IRRIGATION SYSTEM

## 2018-09-23 PROCEDURE — 90715 TDAP VACCINE 7 YRS/> IM: CPT | Performed by: EMERGENCY MEDICINE

## 2018-09-23 PROCEDURE — 700111 HCHG RX REV CODE 636 W/ 250 OVERRIDE (IP): Performed by: EMERGENCY MEDICINE

## 2018-09-23 RX ADMIN — CLOSTRIDIUM TETANI TOXOID ANTIGEN (FORMALDEHYDE INACTIVATED), CORYNEBACTERIUM DIPHTHERIAE TOXOID ANTIGEN (FORMALDEHYDE INACTIVATED), BORDETELLA PERTUSSIS TOXOID ANTIGEN (GLUTARALDEHYDE INACTIVATED), BORDETELLA PERTUSSIS FILAMENTOUS HEMAGGLUTININ ANTIGEN (FORMALDEHYDE INACTIVATED), BORDETELLA PERTUSSIS PERTACTIN ANTIGEN, AND BORDETELLA PERTUSSIS FIMBRIAE 2/3 ANTIGEN 0.5 ML: 5; 2; 2.5; 5; 3; 5 INJECTION, SUSPENSION INTRAMUSCULAR at 14:54

## 2018-09-23 ASSESSMENT — PAIN SCALES - GENERAL: PAINLEVEL_OUTOF10: 4

## 2018-09-23 NOTE — ED PROVIDER NOTES
ED Provider Note    CHIEF COMPLAINT  Chief Complaint   Patient presents with   • Leg Laceration       HPI  Janett Mcnulty is a 48 y.o. female who presents for evaluation of a laceration to her right leg sustained just prior to arrival and she kicked the end of the bed frame by accident.  No weakness or numbness or fever, isolated pain, unknown last tetanus shot.  No other injuries    REVIEW OF SYSTEMS  Negative for fever, weakness, numbness.    PAST MEDICAL HISTORY   has a past medical history of Anesthesia; Arthritis; Avascular necrosis (HCC) (11/2016); Pain (6/5/13); and Pain (3/21/17).    SOCIAL HISTORY  Social History     Social History Main Topics   • Smoking status: Never Smoker   • Smokeless tobacco: Never Used   • Alcohol use 5.4 oz/week     2 Glasses of wine, 7 Standard drinks or equivalent per week      Comment: 1-2 drinks per week   • Drug use: No   • Sexual activity: Not on file       SURGICAL HISTORY   has a past surgical history that includes inj dx/ther agnt paravert facet joint, gómez* (1/21/2011); inj dx/ther agnt paravert facet joint, gómez* (2/25/2011); dest,paravertebral,l/s,single (3/4/2011); inj,sacroiliac,anes/steroid (5/13/2011); inject nerv blck,stellate ganglion (9/2/2011); neuro dest facet l/s w/ig sngl (3/2/2012); neuro dest facet l/s w/ig addl (3/2/2012); neuro dest facet l/s w/ig addl (3/2/2012); neuro dest facet l/s w/ig sngl (3/9/2012); neuro dest facet l/s w/ig addl (3/9/2012); neuro dest facet l/s w/ig addl (3/9/2012); neuro dest facet l/s w/ig sngl (12/28/2012); neuro dest facet l/s w/ig addl (12/28/2012); neuro dest facet l/s w/ig addl (12/28/2012); neuro dest facet l/s w/ig sngl (1/4/2013); neuro dest facet l/s w/ig addl (1/4/2013); neuro dest facet l/s w/ig addl (1/4/2013); hip arthroscopy (Right, 2009); tonsillectomy (2001); other (Left, 2007); appendectomy (1991); other (2013); bunionectomy (6/7/2013); neuro dest facet l/s w/ig sngl (9/20/2013); neuro dest facet l/s w/ig addl  (9/20/2013); neuro dest facet l/s w/ig addl (9/20/2013); neuro dest facet l/s w/ig addl (9/20/2013); neuro dest facet l/s w/ig sngl (10/4/2013); neuro dest facet l/s w/ig addl (10/4/2013); neuro dest facet l/s w/ig addl (10/4/2013); neuro dest facet l/s w/ig addl (10/4/2013); inj,sacroiliac,anes/steroid (11/8/2013); inj,sacroiliac,anes/steroid (12/6/2013); inj,sacroiliac,anes/steroid (1/10/2014); inj,sacroiliac,anes/steroid (1/10/2014); neuro dest facet l/s w/ig sngl (5/16/2014); neuro dest facet l/s w/ig addl (5/16/2014); neuro dest facet l/s w/ig addl (5/16/2014); neuro dest facet l/s w/ig addl (5/16/2014); neuro dest facet l/s w/ig sngl (6/6/2014); neuro dest facet l/s w/ig addl (6/6/2014); neuro dest facet l/s w/ig addl (6/6/2014); neuro dest facet l/s w/ig addl (6/6/2014); toe arthroplasty (7/7/2014); inj,sacroiliac,anes/steroid (7/11/2014); inj,sacroiliac,anes/steroid (7/11/2014); neuro dest facet l/s w/ig sngl (2/6/2015); neuro dest facet l/s w/ig addl (2/6/2015); neuro dest facet l/s w/ig addl (2/6/2015); neuro dest facet l/s w/ig sngl (2/13/2015); neuro dest facet l/s w/ig addl (2/13/2015); neuro dest facet l/s w/ig addl (2/13/2015); inj,sacroiliac,anes/steroid (4/3/2015); inj,sacroiliac,anes/steroid (4/3/2015); dstr nrolytc agnt parverteb fct sngl lmbr/sacral (Left, 10/9/2015); dstr nrolytc agnt parverteb fct addl lmbr/sacral (10/9/2015); inject rx other periph nerve (10/9/2015); dstr nrolytc agnt parverteb fct addl lmbr/sacral (10/9/2015); dstr nrolytc agnt parverteb fct sngl lmbr/sacral (Right, 10/16/2015); dstr nrolytc agnt parverteb fct addl lmbr/sacral (10/16/2015); inject rx other periph nerve (10/16/2015); dstr nrolytc agnt parverteb fct addl lmbr/sacral (10/16/2015); dstr nrolytc agnt parverteb fct sngl lmbr/sacral (Left, 5/6/2016); dstr nrolytc agnt parverteb fct addl lmbr/sacral (Left, 5/6/2016); inject rx other periph nerve (Left, 5/6/2016); dstr nrolytc agnt parverteb fct addl lmbr/sacral  "(5/6/2016); dstr nrolytc agnt parverteb fct sngl lmbr/sacral (Right, 6/24/2016); dstr nrolytc agnt parverteb fct addl lmbr/sacral (Right, 6/24/2016); inject rx other periph nerve (Right, 6/24/2016); dstr nrolytc agnt parverteb fct addl lmbr/sacral (6/24/2016); other orthopedic surgery (Left, 2001); dstr nrolytc agnt parverteb fct sngl lmbr/sacral (Left, 3/24/2017); dstr nrolytc agnt parverteb fct addl lmbr/sacral (Left, 3/24/2017); inject rx other periph nerve (Left, 3/24/2017); dstr nrolytc agnt parverteb fct addl lmbr/sacral (3/24/2017); dstr nrolytc agnt parverteb fct sngl lmbr/sacral (Right, 4/7/2017); dstr nrolytc agnt parverteb fct addl lmbr/sacral (Right, 4/7/2017); inject rx other periph nerve (Right, 4/7/2017); dstr nrolytc agnt parverteb fct addl lmbr/sacral (4/7/2017); carpal tunnel release (Right, 9/29/2017); femoral neck osteoplasty (Right, 9/29/2017); and femoral head core decompression (Left, 12/29/2017).    CURRENT MEDICATIONS  I personally reviewed the medication list in the charting documentation.     ALLERGIES  Allergies   Allergen Reactions   • Morphine Rash   • Other Drug       Steroids unable to take due to joint necrosis   • Other Food Rash     Onions-cause headaches and facial flushing       PHYSICAL EXAM  VITAL SIGNS: /87   Pulse 89   Temp 36.5 °C (97.7 °F)   Resp 18   Ht 1.702 m (5' 7\")   Wt 93.4 kg (205 lb 14.6 oz)   SpO2 98%   BMI 32.25 kg/m²   Constitutional: Alert in no apparent distress.  HENT: No signs of trauma.   Eyes: Conjunctiva normal, Non-icteric.   Chest: Normal nonlabored respirations  Skin: No erythema, No rash.   Musculoskeletal: 1.5 cm horizontal laceration in the lower leg with no active bleeding, no foreign bodies appreciated, neurovascular intact distally  Neurologic: Alert, No focal deficits noted.   Psychiatric: Affect normal, Judgment normal.    DIAGNOSTIC STUDIES / PROCEDURES    Procedures     Laceration Repair Procedure Note    Indication: " Laceration    Procedure: The patient was placed in the appropriate position and anesthesia around the laceration was obtained by infiltration using 1% Lidocaine with epinephrine. The area was then irrigated with normal saline. The laceration was closed with 5-0 Ethilon using interrupted sutures. There were no additional lacerations requiring repair.     Total repaired wound length: 1.5 cm.     Other Items: Suture count: 5    The patient tolerated the procedure well.    Complications: None      COURSE & MEDICAL DECISION MAKING  Pertinent Labs & Imaging studies reviewed. (See chart for details)    Encounter Summary: This is a 48 y.o. female with an uncomplicated laceration of the right leg, repaired with sutures, neurovascular intact, tetanus has been updated, stable and appropriate for discharge with strict return instructions discussed.      DISPOSITION: Discharge Home      FINAL IMPRESSION  1. Laceration of right leg excluding thigh, initial encounter        This dictation was created using voice recognition software. The accuracy of the dictation is limited to the abilities of the software. I expect there may be some errors of grammar and possibly content. The nursing notes were reviewed and certain aspects of this information were incorporated into this note.    Electronically signed by: Omer Pisano, 9/23/2018 2:51 PM

## 2018-09-23 NOTE — ED TRIAGE NOTES
Pt is accompanied by her .  She C/O an accidental laceration sustained on her right lower leg caused by a metal bed frame.  She requires a Tdap booster.

## 2018-09-23 NOTE — DISCHARGE INSTRUCTIONS
Your stitches should be removed in 10 days. Return immediately to the emergency department at the first sign of infection including increasing redness, increasing pain, pus in the wound, fever or if you have any other concerns.

## 2018-09-23 NOTE — ED NOTES
Bandage placed on pt. Pt D/C to home. D/C instructions given. Pt v/u. Pt leaves ED with no acute changes, complaints or concerns.

## 2018-09-28 ENCOUNTER — HOSPITAL ENCOUNTER (OUTPATIENT)
Dept: LAB | Facility: MEDICAL CENTER | Age: 48
End: 2018-09-28
Attending: INTERNAL MEDICINE
Payer: COMMERCIAL

## 2018-09-28 ENCOUNTER — TELEPHONE (OUTPATIENT)
Dept: MEDICAL GROUP | Facility: MEDICAL CENTER | Age: 48
End: 2018-09-28

## 2018-09-28 ENCOUNTER — HOSPITAL ENCOUNTER (OUTPATIENT)
Dept: LAB | Facility: MEDICAL CENTER | Age: 48
End: 2018-09-28
Payer: COMMERCIAL

## 2018-09-28 DIAGNOSIS — Z00.00 PREVENTATIVE HEALTH CARE: Chronic | ICD-10-CM

## 2018-09-28 LAB
ALBUMIN SERPL BCP-MCNC: 4.4 G/DL (ref 3.2–4.9)
ALBUMIN/GLOB SERPL: 1.5 G/DL
ALP SERPL-CCNC: 50 U/L (ref 30–99)
ALT SERPL-CCNC: 25 U/L (ref 2–50)
ANION GAP SERPL CALC-SCNC: 10 MMOL/L (ref 0–11.9)
AST SERPL-CCNC: 22 U/L (ref 12–45)
BDY FAT % MEASURED: 34.9 %
BILIRUB SERPL-MCNC: 0.4 MG/DL (ref 0.1–1.5)
BP DIAS: 74 MMHG
BP SYS: 119 MMHG
BUN SERPL-MCNC: 15 MG/DL (ref 8–22)
CALCIUM SERPL-MCNC: 9.2 MG/DL (ref 8.5–10.5)
CHLORIDE SERPL-SCNC: 103 MMOL/L (ref 96–112)
CHOLEST SERPL-MCNC: 187 MG/DL (ref 100–199)
CO2 SERPL-SCNC: 25 MMOL/L (ref 20–33)
CREAT SERPL-MCNC: 0.84 MG/DL (ref 0.5–1.4)
DIABETES HTDIA: NO
EVENT NAME HTEVT: NORMAL
FASTING HTFAS: YES
FASTING STATUS PATIENT QL REPORTED: NORMAL
GLOBULIN SER CALC-MCNC: 3 G/DL (ref 1.9–3.5)
GLUCOSE SERPL-MCNC: 79 MG/DL (ref 65–99)
GLUCOSE SERPL-MCNC: 79 MG/DL (ref 65–99)
HDLC SERPL-MCNC: 76 MG/DL
HYPERTENSION HTHYP: NO
LDLC SERPL CALC-MCNC: 99 MG/DL
POTASSIUM SERPL-SCNC: 4.4 MMOL/L (ref 3.6–5.5)
PROT SERPL-MCNC: 7.4 G/DL (ref 6–8.2)
SCREENING LOC CITY HTCIT: NORMAL
SCREENING LOC STATE HTSTA: NORMAL
SCREENING LOCATION HTLOC: NORMAL
SMOKING HTSMO: NO
SODIUM SERPL-SCNC: 138 MMOL/L (ref 135–145)
SUBSCRIBER ID HTSID: NORMAL
TRIGL SERPL-MCNC: 59 MG/DL (ref 0–149)

## 2018-09-28 PROCEDURE — 80061 LIPID PANEL: CPT

## 2018-09-28 PROCEDURE — S5190 WELLNESS ASSESSMENT BY NONPH: HCPCS

## 2018-09-28 PROCEDURE — 36415 COLL VENOUS BLD VENIPUNCTURE: CPT

## 2018-09-28 PROCEDURE — 80053 COMPREHEN METABOLIC PANEL: CPT

## 2018-09-28 PROCEDURE — 82947 ASSAY GLUCOSE BLOOD QUANT: CPT

## 2018-09-29 NOTE — TELEPHONE ENCOUNTER
9/28/18 chol 187,trig 59,hdl 76,ldl 99  Please notify patient that her healthy tracks blood test shows excellent cholesterol at 187 total, good cholesterol is 76 (goal is above 40), bad cholesterol is 99 (goal is less than 100), her blood sugar fasting, liver function, and kidney function test are normal

## 2018-10-01 ENCOUNTER — TELEPHONE (OUTPATIENT)
Dept: MEDICAL GROUP | Facility: MEDICAL CENTER | Age: 48
End: 2018-10-01

## 2018-10-01 NOTE — TELEPHONE ENCOUNTER
----- Message from Franklin Gonzalez M.D. sent at 9/28/2018  5:28 PM PDT -----  9/28/18 chol 187,trig 59,hdl 76,ldl 99  Please notify patient that her healthy tracks blood test shows excellent cholesterol at 187 total, good cholesterol is 76 (goal is above 40), bad cholesterol is 99 (goal is less than 100), her blood sugar fasting, liver function, and kidney function test are normal

## 2018-11-02 ENCOUNTER — OFFICE VISIT (OUTPATIENT)
Dept: URGENT CARE | Facility: PHYSICIAN GROUP | Age: 48
End: 2018-11-02
Payer: COMMERCIAL

## 2018-11-02 VITALS
WEIGHT: 200 LBS | SYSTOLIC BLOOD PRESSURE: 126 MMHG | BODY MASS INDEX: 31.32 KG/M2 | TEMPERATURE: 98 F | OXYGEN SATURATION: 96 % | RESPIRATION RATE: 20 BRPM | HEART RATE: 85 BPM | DIASTOLIC BLOOD PRESSURE: 80 MMHG

## 2018-11-02 DIAGNOSIS — R10.11 RUQ PAIN: Primary | ICD-10-CM

## 2018-11-02 DIAGNOSIS — R11.0 NAUSEA: ICD-10-CM

## 2018-11-02 PROCEDURE — 99214 OFFICE O/P EST MOD 30 MIN: CPT | Performed by: PHYSICIAN ASSISTANT

## 2018-11-02 RX ORDER — ONDANSETRON 4 MG/1
4 TABLET, ORALLY DISINTEGRATING ORAL ONCE
Status: COMPLETED | OUTPATIENT
Start: 2018-11-02 | End: 2018-11-02

## 2018-11-02 RX ORDER — ALENDRONATE SODIUM 70 MG/1
70 TABLET ORAL
COMMUNITY
End: 2019-07-19

## 2018-11-02 RX ORDER — ONDANSETRON 4 MG/1
4 TABLET, ORALLY DISINTEGRATING ORAL EVERY 6 HOURS PRN
Qty: 10 TAB | Refills: 0 | Status: SHIPPED | OUTPATIENT
Start: 2018-11-02 | End: 2018-11-20 | Stop reason: SDUPTHER

## 2018-11-02 RX ADMIN — ONDANSETRON 4 MG: 4 TABLET, ORALLY DISINTEGRATING ORAL at 13:47

## 2018-11-02 NOTE — PROGRESS NOTES
Subjective:      Janett Mcnulty is a 48 y.o. female who presents with Rib Injury (x4 days, right side )    PMH:  has a past medical history of Anesthesia; Arthritis; Avascular necrosis (HCC) (11/2016); Pain (6/5/13); and Pain (3/21/17). She also has no past medical history of Breast cancer (HCC).  MEDS:   Current Outpatient Prescriptions:   •  alendronate (FOSAMAX) 70 MG Tab, Take 70 mg by mouth every 7 days., Disp: , Rfl:   •  fluticasone (FLONASE) 50 MCG/ACT nasal spray, Spray 2 Sprays in nose every day., Disp: 16 g, Rfl: 11  •  tramadol (ULTRAM) 50 MG Tab, Take 50 mg by mouth every 8 hours as needed for Moderate Pain or Severe Pain., Disp: , Rfl:   •  gabapentin (NEURONTIN) 300 MG Cap, Take 1 Cap by mouth 2 Times a Day. (Patient taking differently: Take 600 mg by mouth every day.), Disp: 180 Cap, Rfl: 1  •  Multiple Vitamins-Minerals (MULTIVITAMIN ADULT PO), Take  by mouth every day., Disp: , Rfl:   •  Calcium Citrate-Vitamin D (CALCIUM + D PO), Take  by mouth every day., Disp: , Rfl:   •  Boswellia-Glucosamine-Vit D (GLUCOSAMINE COMPLEX PO), Take  by mouth every day., Disp: , Rfl:   •  ascorbic acid (ASCORBIC ACID) 500 MG Tab, Take 500 mg by mouth every day., Disp: , Rfl:   •  naproxen (NAPROSYN) 500 MG Tab, Take 500 mg by mouth every day., Disp: , Rfl:   •  vitamin D (CHOLECALCIFEROL) 1000 UNIT Tab, Take 1,000 Units by mouth every day., Disp: , Rfl:     Current Facility-Administered Medications:   •  ondansetron (ZOFRAN ODT) dispertab 4 mg, 4 mg, Oral, Once, Mera ENID Mendiola, P.A.-C.  ALLERGIES:   Allergies   Allergen Reactions   • Morphine Rash   • Other Drug       Steroids unable to take due to joint necrosis   • Other Food Rash     Onions-cause headaches and facial flushing     SURGHX:   Past Surgical History:   Procedure Laterality Date   • FEMORAL HEAD CORE DECOMPRESSION Left 12/29/2017    Procedure: FEMORAL HEAD CORE DECOMPRESSION/HIP;  Surgeon: Luan Ferrer M.D.;  Location: SURGERY [facility];   Service: Orthopedics   • CARPAL TUNNEL RELEASE Right 9/29/2017    Procedure: CARPAL TUNNEL RELEASE;  Surgeon: Luan Ferrer M.D.;  Location: [facility];  Service: Orthopedics   • FEMORAL NECK OSTEOPLASTY Right 9/29/2017    Procedure: FEMORAL NECK OSTEOPLASTY FOR HIP CORE DECOMPRESSION ;  Surgeon: Luan Ferrer M.D.;  Location: [facility];  Service: Orthopedics   • HI DSTR NROLYTC AGNT PARVERTEB FCT SNGL LMBR/SACRAL Right 4/7/2017    Procedure: NEURO DEST FACET L/S W/IG SNGL - L3-S1;  Surgeon: Farhan Rees D.O.;  Location: [facility];  Service: Pain Management   • HI DSTR NROLYTC AGNT PARVERTEB FCT ADDL LMBR/SACRAL Right 4/7/2017    Procedure: NEURO DEST FACET L/S W/IG ADDL;  Surgeon: Farhan Rees D.O.;  Location: [facility];  Service: Pain Management   • PB INJECT RX OTHER PERIPH NERVE Right 4/7/2017    Procedure: NEUROLYTIC DEST-OTHER NERVE;  Surgeon: Farhan Rees D.O.;  Location: SURGERY [facility];  Service: Pain Management   • HI DSTR NROLYTC AGNT PARVERTEB FCT ADDL LMBR/SACRAL  4/7/2017    Procedure: NEURO DEST FACET L/S W/IG ADDL;  Surgeon: Farhan Rees D.O.;  Location: SURGERY [facility];  Service: Pain Management   • HI DSTR NROLYTC AGNT PARVERTEB FCT SNGL LMBR/SACRAL Left 3/24/2017    Procedure: NEURO DEST FACET L/S W/IG SNGL - L3-S1;  Surgeon: Farhan Rees D.O.;  Location: SURGERY [facility];  Service: Pain Management   • HI DSTR NROLYTC AGNT PARVERTEB FCT ADDL LMBR/SACRAL Left 3/24/2017    Procedure: NEURO DEST FACET L/S W/IG ADDL;  Surgeon: Farhan Rees D.O.;  Location: SURGERY [facility];  Service: Pain Management   • PB INJECT RX OTHER PERIPH NERVE Left 3/24/2017    Procedure: NEUROLYTIC DEST-OTHER NERVE;  Surgeon: Farhan Rees D.O.;  Location: [facility];  Service: Pain Management   • HI DSTR NROLYTC AGNT PARVERTEB FCT ADDL LMBR/SACRAL  3/24/2017     Procedure: NEURO DEST FACET L/S W/IG ADDL;  Surgeon: Farhan Rees D.O.;  [facility];  Service: Pain Management   • OK DSTR NROLYTC AGNT PARVERTEB FCT SNGL LMBR/SACRAL Right 6/24/2016    Procedure: NEURO DEST FACET L/S W/IG SNGL - L3-S1;  Surgeon: Farhan Rees D.O.;  [facility];  Service: Pain Management   • OK DSTR NROLYTC AGNT PARVERTEB FCT ADDL LMBR/SACRAL Right 6/24/2016    Procedure: NEURO DEST FACET L/S W/IG ADDL;  Surgeon: Farhan Rees D.O.;  [facility];  Service: Pain Management   • PB INJECT RX OTHER PERIPH NERVE Right 6/24/2016    Procedure: NEUROLYTIC DEST-OTHER NERVE;  Surgeon: Farhan Rees D.O.;  [facility];  Service: Pain Management   • OK DSTR NROLYTC AGNT PARVERTEB FCT ADDL LMBR/SACRAL  6/24/2016    Procedure: NEURO DEST FACET L/S W/IG ADDL;  Surgeon: Farhan Rees D.O.;  Location: SURGERY [facility];  Service: Pain Management   • OK DSTR NROLYTC AGNT PARVERTEB FCT SNGL LMBR/SACRAL Left 5/6/2016    Procedure: NEURO DEST FACET L/S W/IG SNGL - L3-S1;  Surgeon: Farhan Rees D.O.;  Location: SURGERY [facility];  Service: Pain Management   • OK DSTR NROLYTC AGNT PARVERTEB FCT ADDL LMBR/SACRAL Left 5/6/2016    Procedure: NEURO DEST FACET L/S W/IG ADDL;  Surgeon: Farhan Rees D.O.;  [facility];  Service: Pain Management   • PB INJECT RX OTHER PERIPH NERVE Left 5/6/2016    Procedure: NEUROLYTIC DEST-OTHER NERVE;  Surgeon: Farhan Rees D.O.;  [facility];  Service: Pain Management   • OK DSTR NROLYTC AGNT PARVERTEB FCT ADDL LMBR/SACRAL  5/6/2016    Procedure: NEURO DEST FACET L/S W/IG ADDL;  Surgeon: Farhan Rees D.O.;  [facility];  Service: Pain Management   • OK DSTR NROLYTC AGNT PARVERTEB FCT SNGL LMBR/SACRAL Right 10/16/2015    Procedure: NEURO DEST FACET L/S W/IG SNGL  - L3-S1;  Surgeon: Farhan Rees D.O.;  Location: SURGERY [facility];  Service: Pain Management   • DC DSTR NROLYTC AGNT PARVERTEB FCT ADDL LMBR/SACRAL  10/16/2015    Procedure: NEURO DEST FACET L/S W/IG ADDL;  Surgeon: Farhan Rees D.O.;  Location: SURGERY [facility];  Service: Pain Management   • PB INJECT RX OTHER PERIPH NERVE  10/16/2015    Procedure: NEUROLYTIC DEST-OTHER NERVE;  Surgeon: Farhan Rees D.O.;  Location: SURGERY [facility];  Service: Pain Management   • DC DSTR NROLYTC AGNT PARVERTEB FCT ADDL LMBR/SACRAL  10/16/2015    Procedure: NEURO DEST FACET L/S W/IG ADDL;  Surgeon: Farhan Rees D.O.;  Location: SURGERY [facility];  Service: Pain Management   • DC DSTR NROLYTC AGNT PARVERTEB FCT SNGL LMBR/SACRAL Left 10/9/2015    Procedure: NEURO DEST FACET L/S W/IG SNGL - L3-S1;  Surgeon: Farhan Rees D.O.;  Location: SURGERY [facility];  Service: Pain Management   • DC DSTR NROLYTC AGNT PARVERTEB FCT ADDL LMBR/SACRAL  10/9/2015    Procedure: NEURO DEST FACET L/S W/IG ADDL;  Surgeon: Farhan Rees D.O.;  Location: SURGERY [facility];  Service: Pain Management   • PB INJECT RX OTHER PERIPH NERVE  10/9/2015    Procedure: NEUROLYTIC DEST-OTHER NERVE;  Surgeon: Farhan Rees D.O.;  Location: SURGERY [facility];  Service: Pain Management   • DC DSTR NROLYTC AGNT PARVERTEB FCT ADDL LMBR/SACRAL  10/9/2015    Procedure: NEURO DEST FACET L/S W/IG ADDL;  Surgeon: Farhan Rees D.O.;  Location: SURGERY [facility];  Service: Pain Management   • INJ,SACROILIAC,ANES/STEROID  4/3/2015    Performed by Farhan Rees D.O. at [facility]   • INJ,SACROILIAC,ANES/STEROID  4/3/2015    Performed by Farhan Rees D.O. at SUR[facility]ERY [facility]   • NEURO DEST FACET L/S W/IG SNGL  2/13/2015    Performed by Farhan Rees D.O. at SUR[facility]ERY [facility]   • NEURO DEST FACET L/S W/IG ADDL  2/13/2015     Performed by Farhan Rees D.O. at [facility] ORS   • NEURO DEST FACET L/S W/IG ADDL  2/13/2015    Performed by Farhan Rees D.O. at [facility] ORS   • NEURO DEST FACET L/S W/IG SNGL  2/6/2015    Performed by Farhan Rees D.O. at [facility]S   • NEURO DEST FACET L/S W/IG ADDL  2/6/2015    Performed by Farhan Rees D.O. at [facility] ORS   • NEURO DEST FACET L/S W/IG ADDL  2/6/2015    Performed by Farhan Rees D.O. at [facility] ORS   • INJ,SACROILIAC,ANES/STEROID  7/11/2014    Performed by Farhan Rees D.O. at [facility]S   • INJ,SACROILIAC,ANES/STEROID  7/11/2014    Performed by Farhan Rees D.O. at [facility] ORS   • TOE ARTHROPLASTY  7/7/2014    Performed by Oliver Pratt D.P.M. at [facility]   • NEURO DEST FACET L/S W/IG SNGL  6/6/2014    Performed by Farhan Rees D.O. at [facility] ORS   • NEURO DEST FACET L/S W/IG ADDL  6/6/2014    Performed by Farhan Rees D.O. at [facility]S   • NEURO DEST FACET L/S W/IG ADDL  6/6/2014    Performed by Farhan Rees D.O. at [facility]S   • NEURO DEST FACET L/S W/IG ADDL  6/6/2014    Performed by Farhan Rees D.O. at [facility] ORS   • NEURO DEST FACET L/S W/IG SNGL  5/16/2014    Performed by Farhan Rees D.O. at [facility]S   • NEURO DEST FACET L/S W/IG ADDL  5/16/2014    Performed by Farhan Rees D.O. at [facility]   • NEURO DEST FACET L/S W/IG ADDL  5/16/2014    Performed by Farhan Rees D.O. [facility]   • NEURO DEST FACET L/S W/IG ADDL  5/16/2014    Performed by Farhan Rees D.O. at [facility]   • INJ,SACROILIAC,ANES/STEROID  1/10/2014    Performed by Farhan Rees D.O. at [facility]   • INJ,SACROILIAC,ANES/STEROID  1/10/2014    Performed by Farhan IGLESIAS  ERENSTO Rees at [facility] ORS   • INJ,SACROILIAC,ANES/STEROID  12/6/2013    Performed by Farhan Rees D.O. at [facility] ORS   • INJ,SACROILIAC,ANES/STEROID  11/8/2013    Performed by Farhan Rees D.O. at [facility] ORS   • NEURO DEST FACET L/S W/IG SNGL  10/4/2013    Performed by Farhan Rees D.O. at [facility] ORS   • NEURO DEST FACET L/S W/IG ADDL  10/4/2013    Performed by Farhan Rees D.O. at [facility]S   • NEURO DEST FACET L/S W/IG ADDL  10/4/2013    Performed by Farhan Rees D.O. at [facility]S   • NEURO DEST FACET L/S W/IG ADDL  10/4/2013    Performed by Farhan Rees D.O. at [facility]   • NEURO DEST FACET L/S W/IG SNGL  9/20/2013    Performed by Farhan Rees D.O. at [facility] ORS   • NEURO DEST FACET L/S W/IG ADDL  9/20/2013    Performed by Farhan Rees D.O. at [facility]S   • NEURO DEST FACET L/S W/IG ADDL  9/20/2013    Performed by Farhan Rees D.O. at [facility]S   • NEURO DEST FACET L/S W/IG ADDL  9/20/2013    Performed by Farhan Rees D.O. at [facility] ORS   • BUNIONECTOMY  6/7/2013    Performed by Oliver Pratt D.P.M. at [facility]   • NEURO DEST FACET L/S W/IG SNGL  1/4/2013    Performed by Farhan Rees D.O. at [facility]S   • NEURO DEST FACET L/S W/IG ADDL  1/4/2013    Performed by Farhan Rees D.O. at [facility] ORS   • NEURO DEST FACET L/S W/IG ADDL  1/4/2013    Performed by Farhan Rees D.O. at [facility] ORS   • OTHER  2013    uterine ablation   • NEURO DEST FACET L/S W/IG SNGL  12/28/2012    Performed by Farhan Rees D.O. at [facility]   • NEURO DEST FACET L/S W/IG ADDL  12/28/2012    Performed by Farhan Rees D.O. at [facility]   • NEURO DEST FACET L/S W/IG ADDL  12/28/2012     Performed by Edmar Elias D.O. at [facility] ORS   • NEURO DEST FACET L/S W/IG SNGL  3/9/2012    Performed by EDMAR ELIAS at [facility] ORS   • NEURO DEST FACET L/S W/IG ADDL  3/9/2012    Performed by EDMAR ELIAS at [facility] ORS   • NEURO DEST FACET L/S W/IG ADDL  3/9/2012    Performed by EDMAR ELIAS at [facility] ORS   • NEURO DEST FACET L/S W/IG SNGL  3/2/2012    Performed by EDMAR ELIAS at [facility] ORS   • NEURO DEST FACET L/S W/IG ADDL  3/2/2012    Performed by EDMAR ELIAS at [facility] ORS   • NEURO DEST FACET L/S W/IG ADDL  3/2/2012    Performed by EDMAR ELIAS at [facility] ORS   • PB INJECT NERV BLCK,STELLATE GANGLION  9/2/2011    Performed by EDMAR ELIAS at [facility]S   • INJ,SACROILIAC,ANES/STEROID  5/13/2011    Performed by EDMAR ELIAS at [facility]   • PB DEST,PARAVERTEBRAL,L/S,SINGLE  3/4/2011    Performed by EDMAR ELIAS at [facility] ORS   • PB INJ DX/THER AGNT PARAVERT FACET JOINT, ROSALVA*  2/25/2011    Performed by EDMAR ELIAS at [facility]S   • PB INJ DX/THER AGNT PARAVERT FACET JOINT, ROSALVA*  1/21/2011    Performed by EDMAR ELIAS at [facility]   • HIP ARTHROSCOPY Right 2009     revision   • OTHER Left 2007    left ankle tendon repair   • TONSILLECTOMY  2001   • OTHER ORTHOPEDIC SURGERY Left 2001    hip revision(not total)   • APPENDECTOMY  1991     SOCHX:  reports that she has never smoked. She has never used smokeless tobacco. She reports that she drinks about 5.4 oz of alcohol per week . She reports that she does not use drugs.  FH: family history includes Cancer in her unknown relative; Diabetes in her unknown relative; Hypertension in her unknown relative.   Reviewed with patient/family. Not pertinent to this complaint.          Patient presents with:  Rib pain, no injury:  x4 days, right side , + nausea and change in appetite. No fever, vomiting or diarrhea.  Sometimes associated with food and sometimes not.           Rib Injury   This is a new problem. Episode onset: 4 days. The problem occurs constantly. The problem has been gradually worsening. Associated symptoms include abdominal pain (Epigastric/RUQ) and anorexia. Pertinent negatives include no change in bowel habit, fever, nausea, rash or vomiting. Exacerbated by: palpation , being touched by clothes. She has tried nothing for the symptoms. The treatment provided no relief.       Review of Systems   Constitutional: Negative for fever.   Respiratory:        Rib pain   Gastrointestinal: Positive for abdominal pain (Epigastric/RUQ) and anorexia. Negative for blood in stool, change in bowel habit, constipation, diarrhea, melena, nausea and vomiting.   Skin: Negative for rash.   All other systems reviewed and are negative.         Objective:     /80   Pulse 85   Temp 36.7 °C (98 °F)   Resp 20   Wt 90.7 kg (200 lb)   SpO2 96%   BMI 31.32 kg/m²      Physical Exam   Constitutional: She is oriented to person, place, and time. She appears well-developed and well-nourished. No distress.   HENT:   Head: Normocephalic.   Mouth/Throat: Oropharynx is clear and moist.   Eyes: Pupils are equal, round, and reactive to light. Conjunctivae and EOM are normal.   Neck: Normal range of motion. Neck supple.   Cardiovascular: Normal rate, regular rhythm and normal heart sounds.    Pulmonary/Chest: Effort normal and breath sounds normal.   Abdominal: Soft. Bowel sounds are normal. She exhibits no mass. There is tenderness in the right upper quadrant and epigastric area. There is no rebound and no guarding.       Musculoskeletal: Normal range of motion.   Neurological: She is alert and oriented to person, place, and time. Gait normal.   Skin: Skin is warm and dry. Capillary refill takes less than 2 seconds.   Nursing note and vitals  reviewed.              Assessment/Plan:      1. RUQ pain  US-RUQ    ondansetron (ZOFRAN ODT) dispertab 4 mg    ondansetron (ZOFRAN ODT) 4 MG TABLET DISPERSIBLE   2. Nausea  ondansetron (ZOFRAN ODT) 4 MG TABLET DISPERSIBLE     US appointment is not available today, pt states she would rather wait for appt tomorrow than go to ER today, US ordered.      Pt will remain NPO after midnight tonight.      PT should follow up with PCP in 1-2 days for re-evaluation if symptoms have not improved.  Discussed red flags and reasons to return to UC or ED.  Pt and/or family verbalized understanding of diagnosis and follow up instructions and was offered informational handout on diagnosis.  PT discharged.

## 2018-11-02 NOTE — PATIENT INSTRUCTIONS
Cholelithiasis  Cholelithiasis is a form of gallbladder disease in which gallstones form in the gallbladder. The gallbladder is an organ that stores bile. Bile is made in the liver, and it helps to digest fats. Gallstones begin as small crystals and slowly grow into stones. They may cause no symptoms until the gallbladder tightens (contracts) and a gallstone is blocking the duct (gallbladder attack), which can cause pain. Cholelithiasis is also referred to as gallstones.  There are two main types of gallstones:  · Cholesterol stones. These are made of hardened cholesterol and are usually yellow-green in color. They are the most common type of gallstone. Cholesterol is a white, waxy, fat-like substance that is made in the liver.  · Pigment stones. These are dark in color and are made of a red-yellow substance that forms when hemoglobin from red blood cells breaks down (bilirubin).  What are the causes?  This condition may be caused by an imbalance in the substances that bile is made of. This can happen if the bile:  · Has too much bilirubin.  · Has too much cholesterol.  · Does not have enough bile salts. These salts help the body absorb and digest fats.  In some cases, this condition can also be caused by the gallbladder not emptying completely or often enough.  What increases the risk?  The following factors may make you more likely to develop this condition:  · Being female.  · Having multiple pregnancies. Health care providers sometimes advise removing diseased gallbladders before future pregnancies.  · Eating a diet that is heavy in fried foods, fat, and refined carbohydrates, like white bread and white rice.  · Being obese.  · Being older than age 40.  · Prolonged use of medicines that contain female hormones (estrogen).  · Having diabetes mellitus.  · Rapidly losing weight.  · Having a family history of gallstones.  · Being of  or [place] descent.  · Having an intestinal disease such as Crohn  disease.  · Having metabolic syndrome.  · Having cirrhosis.  · Having severe types of anemia such as sickle cell anemia.  What are the signs or symptoms?  In most cases, there are no symptoms. These are known as silent gallstones. If a gallstone blocks the bile ducts, it can cause a gallbladder attack. The main symptom of a gallbladder attack is sudden pain in the upper right abdomen. The pain usually comes at night or after eating a large meal. The pain can last for one or several hours and can spread to the right shoulder or chest.  If the bile duct is blocked for more than a few hours, it can cause infection or inflammation of the gallbladder, liver, or pancreas, which may cause:  · Nausea.  · Vomiting.  · Abdominal pain that lasts for 5 hours or more.  · Fever or chills.  · Yellowing of the skin or the whites of the eyes (jaundice).  · Dark urine.  · Light-colored stools.  How is this diagnosed?  This condition may be diagnosed based on:  · A physical exam.  · Your medical history.  · An ultrasound of your gallbladder.  · CT scan.  · MRI.  · Blood tests to check for signs of infection or inflammation.  · A scan of your gallbladder and bile ducts (biliary system) using nonharmful radioactive material and special cameras that can see the radioactive material (cholescintigram). This test checks to see how your gallbladder contracts and whether bile ducts are blocked.  · Inserting a small tube with a camera on the end (endoscope) through your mouth to inspect bile ducts and check for blockages (endoscopic retrograde cholangiopancreatogram).  How is this treated?  Treatment for gallstones depends on the severity of the condition. Silent gallstones do not need treatment. If the gallstones cause a gallbladder attack or other symptoms, treatment may be required. Options for treatment include:  · Surgery to remove the gallbladder (cholecystectomy). This is the most common treatment.  · Medicines to dissolve gallstones.  These are most effective at treating small gallstones. You may need to take medicines for up to 6-12 months.  · Shock wave treatment (extracorporeal biliary lithotripsy). In this treatment, an ultrasound machine sends shock waves to the gallbladder to break gallstones into smaller pieces. These pieces can then be passed into the intestines or be dissolved by medicine. This is rarely used.  · Removing gallstones through endoscopic retrograde cholangiopancreatogram. A small basket can be attached to the endoscope and used to capture and remove gallstones.  Follow these instructions at home:  · Take over-the-counter and prescription medicines only as told by your health care provider.  · Maintain a healthy weight and follow a healthy diet. This includes:  ¨ Reducing fatty foods, such as fried food.  ¨ Reducing refined carbohydrates, like white bread and white rice.  ¨ Increasing fiber. Aim for foods like almonds, fruit, and beans.  · Keep all follow-up visits as told by your health care provider. This is important.  Contact a health care provider if:  · You think you have had a gallbladder attack.  · You have been diagnosed with silent gallstones and you develop abdominal pain or indigestion.  Get help right away if:  · You have pain from a gallbladder attack that lasts for more than 2 hours.  · You have abdominal pain that lasts for more than 5 hours.  · You have a fever or chills.  · You have persistent nausea and vomiting.  · You develop jaundice.  · You have dark urine or light-colored stools.  Summary  · Cholelithiasis (also called gallstones) is a form of gallbladder disease in which gallstones form in the gallbladder.  · This condition is caused by an imbalance in the substances that make up bile. This can happen if the bile has too much cholesterol, too much bilirubin, or not enough bile salts.  · You are more likely to develop this condition if you are female, pregnant, using medicines with estrogen, obese,  older than age 40, or have a family history of gallstones. You may also develop gallstones if you have diabetes, an intestinal disease, cirrhosis, or metabolic syndrome.  · Treatment for gallstones depends on the severity of the condition. Silent gallstones do not need treatment.  · If gallstones cause a gallbladder attack or other symptoms, treatment may be needed. The most common treatment is surgery to remove the gallbladder.  This information is not intended to replace advice given to you by your health care provider. Make sure you discuss any questions you have with your health care provider.  Document Released: 12/14/2006 Document Revised: 09/03/2017 Document Reviewed: 09/03/2017  [employer] Patient Education © 2017 [employer]

## 2018-11-03 ENCOUNTER — HOSPITAL ENCOUNTER (OUTPATIENT)
Dept: RADIOLOGY | Facility: MEDICAL CENTER | Age: 48
End: 2018-11-03
Attending: PHYSICIAN ASSISTANT
Payer: COMMERCIAL

## 2018-11-03 ENCOUNTER — TELEPHONE (OUTPATIENT)
Dept: URGENT CARE | Facility: PHYSICIAN GROUP | Age: 48
End: 2018-11-03

## 2018-11-03 DIAGNOSIS — R10.11 RUQ PAIN: ICD-10-CM

## 2018-11-03 PROCEDURE — 76705 ECHO EXAM OF ABDOMEN: CPT

## 2018-11-03 ASSESSMENT — ENCOUNTER SYMPTOMS
CONSTIPATION: 0
ANOREXIA: 1
FEVER: 0
VOMITING: 0
CHANGE IN BOWEL HABIT: 0
ABDOMINAL PAIN: 1
NAUSEA: 0
DIARRHEA: 0
BLOOD IN STOOL: 0

## 2018-11-05 ENCOUNTER — TELEPHONE (OUTPATIENT)
Dept: MEDICAL GROUP | Facility: MEDICAL CENTER | Age: 48
End: 2018-11-05

## 2018-11-05 ENCOUNTER — PATIENT MESSAGE (OUTPATIENT)
Dept: URGENT CARE | Facility: PHYSICIAN GROUP | Age: 48
End: 2018-11-05

## 2018-11-05 DIAGNOSIS — R10.13 DYSPEPSIA: ICD-10-CM

## 2018-11-05 NOTE — TELEPHONE ENCOUNTER
1. Caller Name: Janett Urban Page                        Call Back Number: [phone] (home) [phone] (work)      2. Message: Pt has has a pain in RUQ x 1 wk. Right under the sternum. Went to  on Fri and they ordered an US, done on Sat. They called her on Sat to say that it wasn't her gall bladder as suspected. Wants to know what to do next. Is still having pain and is at a 7/10 on pain scale.     3. Patient approves office to leave a detailed voicemail/MyChart message: no

## 2018-11-05 NOTE — TELEPHONE ENCOUNTER
Please notify patient urgent care note and ultrasound result reviewed, next step would be a nuclear medicine HIDA scan to evaluate gallbladder function since she does not have kidney stones.  I will place the order in the computer system.

## 2018-11-09 ENCOUNTER — HOSPITAL ENCOUNTER (OUTPATIENT)
Dept: RADIOLOGY | Facility: MEDICAL CENTER | Age: 48
End: 2018-11-09
Attending: INTERNAL MEDICINE
Payer: COMMERCIAL

## 2018-11-09 DIAGNOSIS — R10.13 DYSPEPSIA: ICD-10-CM

## 2018-11-09 PROCEDURE — A9537 TC99M MEBROFENIN: HCPCS

## 2018-11-12 ENCOUNTER — TELEPHONE (OUTPATIENT)
Dept: MEDICAL GROUP | Facility: MEDICAL CENTER | Age: 48
End: 2018-11-12

## 2018-11-13 NOTE — TELEPHONE ENCOUNTER
Notified with results, right upper quadrant pain she states above gallbladder region might be more musculoskeletal, since this was initiated in the urgent care, as far as the ultrasound and the HIDA scan was ordered without an appointment, I advised her to schedule an appointment for further evaluation, she will call scheduling.

## 2018-11-20 ENCOUNTER — OFFICE VISIT (OUTPATIENT)
Dept: MEDICAL GROUP | Facility: MEDICAL CENTER | Age: 48
End: 2018-11-20
Payer: COMMERCIAL

## 2018-11-20 VITALS
BODY MASS INDEX: 32.02 KG/M2 | HEART RATE: 62 BPM | WEIGHT: 204 LBS | TEMPERATURE: 97.2 F | DIASTOLIC BLOOD PRESSURE: 76 MMHG | OXYGEN SATURATION: 94 % | SYSTOLIC BLOOD PRESSURE: 118 MMHG | HEIGHT: 67 IN

## 2018-11-20 DIAGNOSIS — Z23 NEED FOR VACCINATION: ICD-10-CM

## 2018-11-20 DIAGNOSIS — R10.13 EPIGASTRIC PAIN: ICD-10-CM

## 2018-11-20 PROCEDURE — 90686 IIV4 VACC NO PRSV 0.5 ML IM: CPT | Performed by: FAMILY MEDICINE

## 2018-11-20 PROCEDURE — 90471 IMMUNIZATION ADMIN: CPT | Performed by: FAMILY MEDICINE

## 2018-11-20 PROCEDURE — 99214 OFFICE O/P EST MOD 30 MIN: CPT | Mod: 25 | Performed by: FAMILY MEDICINE

## 2018-11-20 RX ORDER — ONDANSETRON 4 MG/1
4 TABLET, ORALLY DISINTEGRATING ORAL EVERY 6 HOURS PRN
Qty: 30 TAB | Refills: 0 | Status: SHIPPED | OUTPATIENT
Start: 2018-11-20 | End: 2019-09-09

## 2018-11-20 RX ORDER — OMEPRAZOLE 40 MG/1
40 CAPSULE, DELAYED RELEASE ORAL DAILY
Qty: 30 CAP | Refills: 1 | Status: SHIPPED | OUTPATIENT
Start: 2018-11-20 | End: 2019-02-12 | Stop reason: SDUPTHER

## 2018-11-20 ASSESSMENT — PATIENT HEALTH QUESTIONNAIRE - PHQ9: CLINICAL INTERPRETATION OF PHQ2 SCORE: 0

## 2018-11-20 NOTE — PROGRESS NOTES
Subjective:   Janett Mcnulty is a 48 y.o. female here today for epigastric pain    Patient has been having epigastric pain for the last 3 weeks.  She states it radiates to her right upper quadrant.  Movement makes it more painful.  There is associated nausea.  If she overeats she gets increased pain, which she states is from pressure.  Pain radiates to her mid back.  She had a right upper quadrant ultrasound and HIDA scan which were both negative.  Patient has been on naproxen chronically for avascular necrosis, which she has discontinued for the last 2 weeks.  She has also tried omeprazole 20 mg daily and still not getting much improvement in her symptoms.  She does use Zofran to help with nausea and is requesting a refill today.  Symptoms started shortly after restarting alendronate, which she has not taken since.    Current medicines (including changes today)  Current Outpatient Prescriptions   Medication Sig Dispense Refill   • omeprazole (PRILOSEC) 40 MG delayed-release capsule Take 1 Cap by mouth every day. 30 Cap 1   • ondansetron (ZOFRAN ODT) 4 MG TABLET DISPERSIBLE Take 1 Tab by mouth every 6 hours as needed for Nausea. 30 Tab 0   • alendronate (FOSAMAX) 70 MG Tab Take 70 mg by mouth every 7 days.     • fluticasone (FLONASE) 50 MCG/ACT nasal spray Spray 2 Sprays in nose every day. 16 g 11   • tramadol (ULTRAM) 50 MG Tab Take 50 mg by mouth every 8 hours as needed for Moderate Pain or Severe Pain.     • gabapentin (NEURONTIN) 300 MG Cap Take 1 Cap by mouth 2 Times a Day. (Patient taking differently: Take 600 mg by mouth every day.) 180 Cap 1   • Multiple Vitamins-Minerals (MULTIVITAMIN ADULT PO) Take  by mouth every day.     • Calcium Citrate-Vitamin D (CALCIUM + D PO) Take  by mouth every day.     • [place]wellia-Glucosamine-Vit D (GLUCOSAMINE COMPLEX PO) Take  by mouth every day.     • ascorbic acid (ASCORBIC ACID) 500 MG Tab Take 500 mg by mouth every day.     • naproxen (NAPROSYN) 500 MG Tab Take 500 mg by  "mouth every day.     • vitamin D (CHOLECALCIFEROL) 1000 UNIT Tab Take 1,000 Units by mouth every day.       No current facility-administered medications for this visit.      She  has a past medical history of Anesthesia; Arthritis; Avascular necrosis (HCC) (11/2016); Pain (6/5/13); and Pain (3/21/17). She also has no past medical history of Breast cancer (HCC).    ROS   No dark stools, no bloody stools, no fever       Objective:     Blood pressure 118/76, pulse 62, temperature 36.2 °C (97.2 °F), height 1.702 m (5' 7\"), weight 92.5 kg (204 lb), SpO2 94 %, not currently breastfeeding. Body mass index is 31.95 kg/m².   Physical Exam:  Constitutional: Alert, no distress.  Skin: Warm, dry, good turgor, no rashes in visible areas.  Eye: Equal, round and reactive, conjunctiva clear, lids normal.  Abdomen: Soft, tenderness to deep palpation of the epigastric and right upper quadrant region.  Psych: Alert and oriented x3, normal affect and mood.        Assessment and Plan:   The following treatment plan was discussed    1. Epigastric pain  Differential diagnosis includes either NSAID induced gastritis/ulcer versus musculoskeletal.  Will do a trial of omeprazole 40 mg daily and she will rest from any activity that causes increased pain.  If she gets no improvement in the next 2-4 weeks with omeprazole and rest, we will need to consider a GI referral for possible endoscopy.  Because she has been on antacid medication we cannot do an H. pylori breath test or stool test.  We will up for an H. pylori blood test to see if we can detect any history of H. pylori, she is never been treated for H. pylori.  If positive for H. pylori blood test will consider adding antibiotics for possible H pylori infection.  - omeprazole (PRILOSEC) 40 MG delayed-release capsule; Take 1 Cap by mouth every day.  Dispense: 30 Cap; Refill: 1  - H. PYLORI AB, IGG  - ondansetron (ZOFRAN ODT) 4 MG TABLET DISPERSIBLE; Take 1 Tab by mouth every 6 hours as " needed for Nausea.  Dispense: 30 Tab; Refill: 0    2. Need for vaccination  - Influenza Vaccine Quad Injection >3Y (PF)      Followup: Return if symptoms worsen or fail to improve.

## 2018-12-07 ENCOUNTER — HOSPITAL ENCOUNTER (OUTPATIENT)
Dept: LAB | Facility: MEDICAL CENTER | Age: 48
End: 2018-12-07
Attending: PHYSICIAN ASSISTANT
Payer: COMMERCIAL

## 2018-12-07 LAB
BASOPHILS # BLD AUTO: 0.8 % (ref 0–1.8)
BASOPHILS # BLD: 0.06 K/UL (ref 0–0.12)
CRP SERPL HS-MCNC: 0.46 MG/DL (ref 0–0.75)
EOSINOPHIL # BLD AUTO: 0.18 K/UL (ref 0–0.51)
EOSINOPHIL NFR BLD: 2.3 % (ref 0–6.9)
ERYTHROCYTE [DISTWIDTH] IN BLOOD BY AUTOMATED COUNT: 48.2 FL (ref 35.9–50)
ERYTHROCYTE [SEDIMENTATION RATE] IN BLOOD BY WESTERGREN METHOD: 23 MM/HOUR (ref 0–20)
HCT VFR BLD AUTO: 42.2 % (ref 37–47)
HGB BLD-MCNC: 13.5 G/DL (ref 12–16)
IMM GRANULOCYTES # BLD AUTO: 0.02 K/UL (ref 0–0.11)
IMM GRANULOCYTES NFR BLD AUTO: 0.3 % (ref 0–0.9)
LYMPHOCYTES # BLD AUTO: 2.86 K/UL (ref 1–4.8)
LYMPHOCYTES NFR BLD: 36.4 % (ref 22–41)
MCH RBC QN AUTO: 31.3 PG (ref 27–33)
MCHC RBC AUTO-ENTMCNC: 32 G/DL (ref 33.6–35)
MCV RBC AUTO: 97.9 FL (ref 81.4–97.8)
MONOCYTES # BLD AUTO: 0.45 K/UL (ref 0–0.85)
MONOCYTES NFR BLD AUTO: 5.7 % (ref 0–13.4)
NEUTROPHILS # BLD AUTO: 4.29 K/UL (ref 2–7.15)
NEUTROPHILS NFR BLD: 54.5 % (ref 44–72)
NRBC # BLD AUTO: 0 K/UL
NRBC BLD-RTO: 0 /100 WBC
PLATELET # BLD AUTO: 331 K/UL (ref 164–446)
PMV BLD AUTO: 10.2 FL (ref 9–12.9)
RBC # BLD AUTO: 4.31 M/UL (ref 4.2–5.4)
WBC # BLD AUTO: 7.9 K/UL (ref 4.8–10.8)

## 2018-12-07 PROCEDURE — 36415 COLL VENOUS BLD VENIPUNCTURE: CPT

## 2018-12-07 PROCEDURE — 86140 C-REACTIVE PROTEIN: CPT

## 2018-12-07 PROCEDURE — 85025 COMPLETE CBC W/AUTO DIFF WBC: CPT

## 2018-12-07 PROCEDURE — 85652 RBC SED RATE AUTOMATED: CPT

## 2018-12-10 ENCOUNTER — HOSPITAL ENCOUNTER (OUTPATIENT)
Dept: RADIOLOGY | Facility: MEDICAL CENTER | Age: 48
End: 2018-12-10
Attending: PHYSICIAN ASSISTANT
Payer: COMMERCIAL

## 2018-12-10 DIAGNOSIS — M87.050: ICD-10-CM

## 2018-12-10 DIAGNOSIS — M54.5 LOW BACK PAIN, UNSPECIFIED BACK PAIN LATERALITY, UNSPECIFIED CHRONICITY, WITH SCIATICA PRESENCE UNSPECIFIED: Chronic | ICD-10-CM

## 2018-12-10 DIAGNOSIS — M25.852 OTHER SPECIFIED JOINT DISORDERS, LEFT HIP: ICD-10-CM

## 2018-12-10 PROCEDURE — 73721 MRI JNT OF LWR EXTRE W/O DYE: CPT | Mod: LT

## 2018-12-10 PROCEDURE — 73721 MRI JNT OF LWR EXTRE W/O DYE: CPT | Mod: RT

## 2018-12-11 ENCOUNTER — TELEPHONE (OUTPATIENT)
Dept: MEDICAL GROUP | Facility: MEDICAL CENTER | Age: 48
End: 2018-12-11

## 2018-12-11 ENCOUNTER — TELEPHONE (OUTPATIENT)
Dept: URGENT CARE | Facility: MEDICAL CENTER | Age: 48
End: 2018-12-11

## 2018-12-11 DIAGNOSIS — G89.29 OTHER CHRONIC PAIN: ICD-10-CM

## 2018-12-11 RX ORDER — TRAMADOL HYDROCHLORIDE 50 MG/1
50 TABLET ORAL EVERY 8 HOURS PRN
Qty: 90 TAB | Refills: 0 | OUTPATIENT
Start: 2018-12-11 | End: 2019-01-03 | Stop reason: SDUPTHER

## 2018-12-11 NOTE — TELEPHONE ENCOUNTER
1. Caller Name: Janett Urban Page                        Call Back Number: [phone] (home) [phone] (work)      2. Message: Pt states that her 3rd Rx for Tramadol  before she could pick it up and now the pharmacy will not release to her. Please provide a replacement Rx for her.     3. Patient approves office to leave a detailed voicemail/MyChart message: no

## 2018-12-11 NOTE — TELEPHONE ENCOUNTER
Spoke to Pharmacist who states last prescription received was a 30 day supply on 8/22/2018. Prescription is needed, Please place a refill and it can be called in with ICD 10 code and day supply.

## 2018-12-11 NOTE — TELEPHONE ENCOUNTER
The prescription is in the system, please call in the tramadol refill to her pharmacy.  Please call [facility] pharmacy at  [phone] and use ICD 10 code M25.559

## 2018-12-11 NOTE — TELEPHONE ENCOUNTER
I sent the patient a my chart message last night apparently she states that since she has 1 refill at the pharmacy on record that has  she spoke to the pharmacist and we can call the tramadol refill in for her.      Please call her pharmacy and speak to the pharmacist to see if we can do that, if not we will have to print a hard copy for her.  She has an appointment on January 3.

## 2018-12-11 NOTE — TELEPHONE ENCOUNTER
Regarding: RE: Prescription Question  ----- Message from Franklin Gonzalez M.D. sent at 12/10/2018 10:59 PM PST -----       ----- Message sent from Franklin Gonzalez M.D. to Janett Mcnulty at 12/10/2018 10:58 PM -----   I apologize for the confusion.  Usually for the tramadol or pain medication refills we need to see patients every 3 months.  However we can extend that to every 6 months if the 3 prescriptions last the full 6 months.  With the legislation that was passed by the state government, all tramadol prescriptions need to be prescribed at an office visit.  Next time we will need to schedule you a follow-up appointment before you leave the office.    Since I do not have any openings, please keep your January 3 appointment.  Usually the pharmacy does not allow a call in refill but since you have one on record let us try that first before you come here to  a prescription that will last until January 3.  I will have my assistant call first thing in the morning to allow the refill of tramadol if your pharmacist will let us do that, if not you would have to come by and  a hard copy.    Franklin           ----- Message -----     From: DONNA JIMENEZ Page     Sent: 12/10/2018  2:33 PM PST       To: Franklin Gonzalez M.D.  Subject: Prescription Question    Manuel Reid,   This is regarding my Tramadol prescription. I called to have the last of my 3 prescriptions filled. They informed me that you won't give me a refill till I was seen again. However as we discussed at my last visit I didn't need to come in till I went through my 3 scripts. I set up an appointment for  that would be about the same time frame needed. You also have no openings. I had no idea that there was a time frame I needed to use them up by and that they . I just wasn't going through them that fast because I was feeling pretty good. Now I am in a pickle because I have 2pills with increased pain and no refill. The pharmacist said  if you guys wanted to call it in he'd be glad to let you know I never filled the last script and that it just  and I do qualify for a call in refill.  Please call me with any questions. I appreciate your help and guidance n the issue.   Thanks Camila   [phone]

## 2018-12-24 ENCOUNTER — HOSPITAL ENCOUNTER (OUTPATIENT)
Dept: RADIOLOGY | Facility: MEDICAL CENTER | Age: 48
End: 2018-12-24
Attending: INTERNAL MEDICINE
Payer: COMMERCIAL

## 2018-12-24 DIAGNOSIS — Z12.31 VISIT FOR SCREENING MAMMOGRAM: ICD-10-CM

## 2018-12-24 PROCEDURE — 77067 SCR MAMMO BI INCL CAD: CPT

## 2019-01-02 ENCOUNTER — TELEPHONE (OUTPATIENT)
Dept: MEDICAL GROUP | Facility: MEDICAL CENTER | Age: 49
End: 2019-01-02

## 2019-01-02 NOTE — TELEPHONE ENCOUNTER
ESTABLISHED PATIENT PRE-VISIT PLANNING     Patient was NOT contacted to complete PVP.     Note: Patient will not be contacted if there is no indication to call.     1.  Reviewed notes from the last few office visits within the medical group: Yes    2.  If any orders were placed at last visit or intended to be done for this visit (i.e. 6 mos follow-up), do we have Results/Consult Notes?        •  Labs - Labs ordered, completed on 9/28/2018 and results are in chart.   Note: If patient appointment is for lab review and patient did not complete labs, check with provider if OK to reschedule patient until labs completed.       •  Imaging - Imaging ordered, completed and results are in chart.       •  Referrals - No referrals were ordered at last office visit.    3. Is this appointment scheduled as a Hospital Follow-Up? No    4.  Immunizations were updated in [facility] using [employer]?: Yes       •  Web [employer] Recommendations: Patient is up to date on all vaccines    5.  Patient is due for the following Health Maintenance Topics:   Health Maintenance Due   Topic Date Due   • PAP SMEAR  10/07/2018     EDUARDO faxed to [facility].    6.  MDX printed for Provider? NO    7.  Patient was NOT informed to arrive 15 min prior to their scheduled appointment and bring in their medication bottles.

## 2019-01-02 NOTE — TELEPHONE ENCOUNTER
Left message for patient to call back regarding pre-visit planning. Please transfer call to [phone].

## 2019-01-02 NOTE — LETTER
[facility]  Franklin Gonzalez M.D.  [address]  [place] [address]  Fax: [phone]   Authorization for Release/Disclosure of   Protected Health Information   Name: SUMAYA WATSON : 1970 SSN: [id number]   Address: [address] [place] [address]  [place] [address] Phone:    [phone] (home) [phone] (work)   I authorize the entity listed below to release/disclose the PHI below to:   [facility]/Franklin Gonzalez M.D. and Franklin Gonzalez M.D.   Provider or Entity Name:  [facility]   Address   City, [facility], Zip  [address] [place] [address] Phone:  [phone]    Fax:  [phone]   Reason for request: continuity of care   Information to be released:    [  ] LAST COLONOSCOPY,  including any PATH REPORT and follow-up  [  ] LAST FIT/COLOGUARD RESULT [  ] LAST DEXA  [  ] LAST MAMMOGRAM  [XXX] LAST PAP  [  ] LAST LABS [  ] RETINA EXAM REPORT  [  ] IMMUNIZATION RECORDS  [  ] Release all info      [  ] Check here and initial the line next to each item to release ALL health information INCLUDING  _____ Care and treatment for drug and / or alcohol abuse  _____ HIV testing, infection status, or AIDS  _____ Genetic Testing    DATES OF SERVICE OR TIME PERIOD TO BE DISCLOSED: _____________  I understand and acknowledge that:  * This Authorization may be revoked at any time by you in writing, except if your health information has already been used or disclosed.  * Your health information that will be used or disclosed as a result of you signing this authorization could be re-disclosed by the recipient. If this occurs, your re-disclosed health information may no longer be protected by State or Federal laws.  * You may refuse to sign this Authorization. Your refusal will not affect your ability to obtain treatment.  * This Authorization becomes effective upon signing and will  on (date) __________.      If no date is indicated, this Authorization will  one (1) year from the signature  date.    Name: Janett Rajni Page    Signature:continuity of care   Date:     1/2/2019       PLEASE FAX REQUESTED RECORDS BACK TO: [phone]

## 2019-01-03 ENCOUNTER — OFFICE VISIT (OUTPATIENT)
Dept: MEDICAL GROUP | Facility: MEDICAL CENTER | Age: 49
End: 2019-01-03
Payer: COMMERCIAL

## 2019-01-03 VITALS
TEMPERATURE: 97.6 F | OXYGEN SATURATION: 98 % | SYSTOLIC BLOOD PRESSURE: 142 MMHG | HEIGHT: 67 IN | DIASTOLIC BLOOD PRESSURE: 72 MMHG | BODY MASS INDEX: 32.96 KG/M2 | WEIGHT: 210 LBS | HEART RATE: 99 BPM

## 2019-01-03 DIAGNOSIS — Z00.00 PREVENTATIVE HEALTH CARE: Chronic | ICD-10-CM

## 2019-01-03 DIAGNOSIS — M54.5 LOW BACK PAIN, UNSPECIFIED BACK PAIN LATERALITY, UNSPECIFIED CHRONICITY, WITH SCIATICA PRESENCE UNSPECIFIED: Chronic | ICD-10-CM

## 2019-01-03 DIAGNOSIS — E66.9 CLASS 1 OBESITY WITHOUT SERIOUS COMORBIDITY WITH BODY MASS INDEX (BMI) OF 31.0 TO 31.9 IN ADULT, UNSPECIFIED OBESITY TYPE: ICD-10-CM

## 2019-01-03 DIAGNOSIS — M25.551 PAIN OF BOTH HIP JOINTS: ICD-10-CM

## 2019-01-03 DIAGNOSIS — M25.552 PAIN OF BOTH HIP JOINTS: ICD-10-CM

## 2019-01-03 DIAGNOSIS — G89.29 OTHER CHRONIC PAIN: ICD-10-CM

## 2019-01-03 PROCEDURE — 99396 PREV VISIT EST AGE 40-64: CPT | Performed by: INTERNAL MEDICINE

## 2019-01-03 RX ORDER — TRAMADOL HYDROCHLORIDE 50 MG/1
50 TABLET ORAL EVERY 8 HOURS PRN
Qty: 90 TAB | Refills: 0 | Status: SHIPPED | OUTPATIENT
Start: 2019-02-03 | End: 2019-01-03 | Stop reason: SDUPTHER

## 2019-01-03 RX ORDER — TRAMADOL HYDROCHLORIDE 50 MG/1
50 TABLET ORAL EVERY 8 HOURS PRN
Qty: 90 TAB | Refills: 0 | Status: SHIPPED | OUTPATIENT
Start: 2019-01-03 | End: 2019-01-03 | Stop reason: SDUPTHER

## 2019-01-03 RX ORDER — TRAMADOL HYDROCHLORIDE 50 MG/1
50 TABLET ORAL EVERY 8 HOURS PRN
Qty: 90 TAB | Refills: 0 | Status: SHIPPED | OUTPATIENT
Start: 2019-03-03 | End: 2019-06-17 | Stop reason: SDUPTHER

## 2019-01-03 ASSESSMENT — PATIENT HEALTH QUESTIONNAIRE - PHQ9: CLINICAL INTERPRETATION OF PHQ2 SCORE: 0

## 2019-01-03 NOTE — PROGRESS NOTES
Subjective:      Janett Mcnulty is a 48 y.o. female who presents annual          HPI     Annual exam  Sees  orthopedics  Sees gyn    Sees  [facility]  meds and allergies reviewed   Going to PT at [facility]   Med and surgical history reviewed and updated  On ultram one per day as needed for hip pain avascular necrosis, also taking naprosyn bid as needed, also on neurontin 300 mg.  Hip pain can be moderate to severe at times worse with activity.  Has tried physical therapy, seen pain management for back injections, has tried and remains on anti-inflammatories Naprosyn twice daily. Ultram no side effects of drowsiness taking for hip pain and knee pains.  Tramadol can be effective decreasing pain intensity. Always has hip pain worse recently 7-8/10 had been 3/10.  Pain can be improved with rest.   still does pool therapy three times per week 30 to 45 minutes at a time, still works at [employer] and goes to PT  Has been off fosamax   Recent dyspepsia resolved, had negative studies including ultrasound, and nuclear medicine HIDA scan, no current nausea, vomiting, abdominal pain, melena or hematochezia          Current Outpatient Prescriptions   Medication Sig Dispense Refill   • tramadol (ULTRAM) 50 MG Tab Take 1 Tab by mouth every 8 hours as needed for Moderate Pain or Severe Pain for up to 30 days. 90 Tab 0   • omeprazole (PRILOSEC) 40 MG delayed-release capsule Take 1 Cap by mouth every day. 30 Cap 1   • ondansetron (ZOFRAN ODT) 4 MG TABLET DISPERSIBLE Take 1 Tab by mouth every 6 hours as needed for Nausea. 30 Tab 0   • alendronate (FOSAMAX) 70 MG Tab Take 70 mg by mouth every 7 days.     • fluticasone (FLONASE) 50 MCG/ACT nasal spray Spray 2 Sprays in nose every day. 16 g 11   • gabapentin (NEURONTIN) 300 MG Cap Take 1 Cap by mouth 2 Times a Day. (Patient taking differently: Take 600 mg by mouth every day.) 180 Cap 1   • Multiple Vitamins-Minerals (MULTIVITAMIN ADULT PO) Take  by  mouth every day.     • Calcium Citrate-Vitamin D (CALCIUM + D PO) Take  by mouth every day.     • Boswellia-Glucosamine-Vit D (GLUCOSAMINE COMPLEX PO) Take  by mouth every day.     • ascorbic acid (ASCORBIC ACID) 500 MG Tab Take 500 mg by mouth every day.     • naproxen (NAPROSYN) 500 MG Tab Take 500 mg by mouth every day.     • vitamin D (CHOLECALCIFEROL) 1000 UNIT Tab Take 1,000 Units by mouth every day.       No current facility-administered medications for this visit.      Varicose vein excision left lower extremity  9/11 varicose veins left excision   3/11/17 ultrasound venous bilateral lower extremities negative     Status post bunionectomy  8/9/13  podiatry; right foot martín bunionectomy, right foot first metatarsophalangeal joint bunion/degenerative arthritis      Status post appendectomy     s/p right hip arthroscopy  2007  ortho right hip arthroscopy  2/11/14  note bilateral greater trochanter injection under ultrasound guidance  1/20/17 MRI pelvis bilateral femoral head avascular necrosis involving approximately 40% of the articular surface, metallic artifact left femoral head consistent with presence of small metallic pain  9/29/17   orthopedic surgical note right hip fluoroscopically guided core decompression and right hand carpal tunnel release  12/10/18 MRI right hip without chronic avascular necrosis right femoral head 50% articular surface without evidence of subchondral collapse, early degenerative changes, surgical changes consistent with prior right proximal femoral osteoplasty    S/p left hip revision  History of left hip open decompression and osteotomy  12/03  left hip implant removal  11/10  ortho x-ray stable decompression left femoral head neck junction  11/12  pain management note bilateral trochanteric bursitis injection  5/2/13  pain note, bilateral trochanteric bursal  injection  12/10/18 MRI left hip avascular necrosis left femoral head 50% articular surface, with some progression from comparison, some early subchondral collapse anteriorly and superiorly with marrow edema extending into the left femoral head and neck, surgical change consistent with prior decompression procedure and femoral osteoplasty, metallic artifact consistent with surgical pain femoral head, early degenerative changes     S/p left ankle surgery 2006 ligament reconstruction     Preventative health  11/05 GIC colon negative  9/25/15 tdap  10/7/15 pap per gyn  negative  10/110/16 vit d 31  1/6/17 pneumovax  12/24/18 mammogram      Perimenopausal  9/25/15 on climara patch and progesterone tab per  gyn     Low back pain  10/10 MRI LS L5-S1 minimal disc bulge  1/11  at the pain management left L3-S1 facet joint injection, right L3-S1 facet joint injection  2/11  pain management right L3-S1 lateral, medial, dorsal ramus nerve block  4/11  pain management left L3-S1 medial, lateral, bursal ramus nerve block  5/11  pain management bilateral SI joint injection under fluoroscopy  7/11  pain note bilat trochanteric bursitis injection  3/12  pain note, right and left L3-L4 L5-S1 medial, dorsal, lateral branch ablation  12/12  pain note, left L3-S1 dorsal and medial branch radiofrequency ablation  1/4/13  pain note; right L3-S1 radiofrequency ablation  8/14/13  pain note  9/29/13  pain note, left L3-S1 nerve frequency ablation  10/4/13  pain note, status post right L3-S1 FJNA  12/19/13 pain note; status post right SIJI injection, continue voltaren gel, TENS, IT stretches  5/16/14  pain note; left L3-S1 medial, partial, lateral branch radiofrequency ablation under fluoroscopy  6/6/14  right L3-S1 FJNA  7/3/14  pain note;  continue TENS,celebrex 200 mg qday, voltaren gel 1%  9/10/14  pain note; continue TENS, lidoderm patch,voltaren gel, celebrex 200 mg qday  12/20/17  pain note left L3-S1 radiofrequency ablation under fluoroscopy  12/22/17 dr.patterson ching note right L3-S1 radiofrequency ablation under fluoroscopy     History of rheumatic fever  8/29/16 streptococcal pharyngitis positive strep test, treated with augmentin, developed erythema nodosum lower extremities and palms given NSAIDs and medrol dosepack  9/22/16 repeat medrol dose pack x 1 still with painful erythema nodosum nodules  9/24/16 erythema nodosum nodules and arthritic-type pains, we will retreat with penicillin V 500 mg 3 times a day ×10 days  10/5/16 high dose aspirin no benefit, changed to prednisone 20 mg daily, she has an appointment with me in 2 days  10/7/16 echo ordered, EKG done  10/10/16 cbc,cmp,tsh normal,ESR 40,CRP 0.3, repeat throat culture negative, blood cultures negative,  10/19/16 increase prednisone to 40 mg x 3 days then back to 20 mg  10/19/16 daughter diagnosis strep throat given antibiotics, we will treat prophylactically provided patient penicillin V 500 mg tid x 10 days  10/21/16 echo normal LV size and function, EF 65%, trace MR structurally normal mitral valve, mild TR and RVSP 30  10/24/16 on prednisone 40 mg qday unable to taper to 20 mg due to flare up of pain will try 20 mg bid then taper to 20 mg am and 10 mg qpm over the next week  10/26/16  infectious disease recommended secondary prophylaxis penicillin  mg bid x 5 years  11/7/16 still with polyarthritis, on prednisone 20 mg twice a day, repeat labs, still on penicillin, will speak with rheumatology about referral  11/8/16 ESR 15,CRP 0.3,wbc 10.9 (on prednisone),hgb 14,hct 43,cmp normal,RF,C3C4,TPO,lyme,MERA,HLA B27 negative, Factin IgG 22 (0-19),parietal cell Ab 25(0-25)  11/14/16  rheumatolog note, features consistent with  rheumatic fever, also consider seronegative spondyloarthropathies, sarcoidosis, rheumatoid arthritis, will check sacroiliac films, hand and feet x-rays, follow-up one week  11/17/16 refill prednisone  11/18/16 ACE serum level normal, G6PD ad vitamin 1,25 d level normal  11/21/16 cxr negative  11/21/16 xray SI joints negative for erosions  11/29/16 x-ray joint survey hands, feet, ankles no evidence of inflammatory arthropathy  11/29/16 CT soft tissue neck without; negative  12/7/16  cardiology repeat echo in 3-4 weeks  12/10/16 MRI right knee multiple areas right knee avascular necrosis medial and lateral femoral condyle, medial and lateral tibial plateau, bone marrow edema  12/23/16  rheumatology note, inflammatory markers did normalize with steroids, rheumatoid factor, CCP, MERA,ANKA negative continue to taper steroids given osteonecrosis alternating 17.5 mg and 15 mg, and 15 mg, and 15 mg alternating with 12.5 mg, and so forth  12/27/16 echo LV ejection fraction 60%, no valvular disease  1/4/17 ESR 8,CRP 0.3  1/12/17  rheumatology note, inflammatory markers did normalize with prednisone therapy, continue taper with osteonecrosis bilateral tenderness achilles insertion consider enthesitis, will get MRI left achilles region to evaluate for tendinitis or tenosynovitis, follow-up 6 weeks  1/20/17 MRI left foot without; no evidence of marrow edema, arthropathy, tendinopathy or ligament injury  3/6/17 anticardiolipin antibodies, lupus anticoagulant, protein CNS, factor V 5 Leyden, anti-thrombin panel, beta 2 glycoprotein negative  3/10/17 ESR 19,CRP 0.17, cortisol 3.3, ACTH 16, IgG 753 ([id number]), IgA 140, IgM 93  3/14/16  rheumatology note currently off prednisone, osteonecrosis involving multiple joints, knees, hips, right ankle, etiology unclear, antiphospholipid antibody, protein C and S normal, antithrombin III factor V leyden normal, refer to hematology for evaluation other etiologies,  slightly decreased IgG refer to allergy immunology  3/14/17 remains on penicillin  3/30/17  allergy immunology evaluation slight IgG reduction 753 with normal IgG, IgM, no history to suggest primary immunodeficiency, recent diagnosis of group A streptococcal pharyngitis complicated by erythema nodosum, 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 therapy, we will perform humerol workup to include repeat quantitative immunoglobulins with subclass, specific antibodies for haemophilus influenza, pneumococcal, tetanus/diphtheria, limited flow cytometry with repeat CBC, SPEP/UPEP, ESR, CRP, UA, follow-up ASO, DNase B titer  4/17/17  allergy note slight IgG reduction at 753 with normal IgA, normal IgM, no history to suggest primary immunodeficiency, suspect that 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 carryover effect from chronic prednisone therapy, cbc unremarkable, inflammatory markers ESR slightly elevated 28, CRP 2.4, urinalysis negative for protein or blood, no further humeral immunodeficiency or lab assessment at this point with normal immunologic titers, intact specific and subclass antibodies, the IgG decrease does not represent any underlying primary immunodeficiency or active antibody dysfunction at this time  4/25/17  rheumatology note await [facility] evaluation  5/10/17 dr.wu lacy endocrinology note recommend fosamax weekly with anecdotal evidence that bisphosphonate therapy may provide symptomatic benefit and osteonecrosis, if develops side effects could consider intravenous reclast, referral [facility], follow-up 4 months  5/28/17  rheumatology note avascular necrosis, arthralgias lower extremities, continue off prednisone, on alendronate per [facility]  7/3/17  infectious disease consultation history of streptococcal pharyngitis with probable rheumatic fever, recommend discontinue penicillin prophylaxis follow-up as  needed  6/27/17  [facility] evaluation, recommended evaluation of hypercoagulable state, to consider antiphospholipid antibodies (lupus anticoagulant, anticardiolipin antibodies, beta-2 glycoprotein, phosphatidyl serine antibodies, factor V Leiden, antithrombin III, prothrombin mutation, protein C&S quantitative and qualitative, homocysteine, recommend after review of records do not strongly feel that she has true rheumatic fever, clear to discontinue penicillin, recheck ASO and anti-DNase B titers after 4 weeks  8/16/17 homocystine, protein C and protein S, lupus anticoagulant, beta 2 glycoprotein, antithrombin III, anticardiolipin antibody, anti-DNAse antibody, antistreptolysin all negative  8/31/17  rheumatology note, continues on alendronate per bone endocrinology recommendation [place], did follow up with orthopedics, will proceed with core decompression  2/23/18  [facility] note, multifocal osteonecrosis knees, hips, other joints, underlying etiology unclear, trial few months of alendronate without dramatic improvement, alendronate discontinued because of undergoing core procedures with her hips, recommend continuing off alendronate monitoring for improvement, reassess in 6 months  10/10/18  [facility] consultation osteonecrosis knees, hips, other joints, unclear etiology trial of alendronate without improvement, discontinued prior to hip procedures, hip pain is improved, discussed another trial of alendronate for limited timeframe 70 mg weekly reassess 6 months    foot pain  8/5/15 MRI right foot severe artifact secondary to first MTP are clear, limiting analysis of soft tissue, highly likely complete FHL tear  8/28/15  orthopedic note right ankle FHL tendon rupture seen on MRI, do not recommend surgical repair at this time which would involve hemiarthroplasty, implant, first MTP fusion with bone graft, followup podiatry    8/18/17 MRI left foot normal, MRI right foot osteoathritis right first metatarsal head and sesamoids moderate in degree, ulnar edema with an sesamoids may be due to susceptibility artifact related to first metatarsal phalangeal arthroplasty    Dyslipidemia  9/29/14 chol 186,trig 46,hdl 76,ldl 101  9/18/15 chol 225,trig 50,hdl 77,ldl 138  9/20/17 chol 189,trig 53,hdl 56,ldl 122  5/11/18 ultrasound carotid minimal left carotid bulb plaque, calcified, no evidence of occlusion less than 50% stenosis  9/28/18 chol 187,trig 59,hdl 76,ldl 99  5/8/18 EKG done in clinic, ultrasound carotid ordered follow-up calcifications   5/11/18 ultrasound carotid less than 50% internal stenosis bilateral    Chronic pain  5/8/18   5/8/18 pain contract  5/8/18 opiate risk score 1  5/8/18 on ultram 1-2 per day has tried physical therapy, TENS, acupuncture, chiropractor, massage therapy, heat, NSAIDs, gabapentin, Celebrex, Voltaren, Lidoderm patch    cervical pain  10/11 MRI cervical spine C4-C5 tiny disc bulge, C5-C6 bilateral and plate spurring with uncinate hypertrophy  10/7/16 MRI cervical spine C5-C6 small broad-based osteophyte complex, borderline foraminal stenosis, no significant progression of disease since 2010 6/4/18 MRI cervical spine C3-C4 mild diffuse bulge with mild bilateral foraminal stenosis, C5-C6 mild bilateral foraminal stenosis    avascular necrosis  8/29/16 streptococcal pharyngitis positive strep test, treated with augmentin, developed erythema nodosum lower extremities and palms given NSAIDs and medrol dosepack  9/22/16 repeat medrol dose pack x 1 still with painful erythema nodosum nodules  10/5/16 high dose aspirin no benefit, changed to prednisone 20 mg daily,  10/19/16 increase prednisone to 40 mg x 3 days then back to 20 mg  11/7/16 still with polyarthritis, on prednisone 20 mg twice a day, repeat labs, still on penicillin, will speak with rheumatology about referral  11/14/16  rheumatolog  note, features consistent with rheumatic fever, also consider seronegative spondyloarthropathies, sarcoidosis, rheumatoid arthritis, will check sacroiliac films, hand and feet x-rays, follow-up one week  12/23/16  rheumatology note, inflammatory markers did normalize with steroids, rheumatoid factor, CCP, MERA,ANKA negative continue to taper steroids given osteonecrosis alternating 17.5 mg and 15 mg, and 15 mg, and 15 mg alternating with 12.5 mg, and so forth  12/10/16 MRI right knee multiple areas right knee avascular necrosis medial and lateral femoral condyle, medial and lateral tibial plateau, bone marrow edema  12/23/16  rheumatology note, inflammatory markers did normalize with steroids, rheumatoid factor, CCP, MERA,ANKA negative continue to taper steroids given osteonecrosis alternating 17.5 mg and 15 mg, and 15 mg, and 15 mg alternating with 12.5 mg, and so forth  1/20/17 MRI left foot no evidence of arthropathy or tendinopathy  1/20/17 MRI left ankle metallic artifact lateral malleolus  1/20/17 MRI right ankle small oblong focus finger edema tibial plafond below subchondral plate, would be consistent with small area of avascular necrosis  1/20/17 MRI left knee multiple areas avascular necrosis surrounding the knee to include femur, tibia, fibula  1/20/17 MRI pelvis bilateral femoral head avascular necrosis involving approximately 40% of the articular surface, metallic artifact left femoral head consistent with presence of small metallic pin  2/9/17 tapering prednisone down to 2.5 mg alternating with 5 mg  2/23/17  rheumatology note complete steroid taper then repeat ESR, CRP and CPK, also check antiphospholipid antibody,antithrombin, factor v leiden, protein s  3/6/17 anticardiolipin antibodies, lupus anticoagulant, protein CNS, factor V 5 Leyden, anti-thrombin panel, beta 2 glycoprotein negative  3/10/17 ESR 19,CRP 0.17, cortisol 3.3, ACTH 16, IgG 753 ([id number]), IgA 140, IgM 93  3/14/16   rheumatology note currently off prednisone, osteonecrosis involving multiple joints, knees, hips, right ankle, etiology unclear, antiphospholipid antibody, protein C and S normal, antithrombin III factor V leyden normal, refer to hematology for evaluation other etiologies, slightly decreased IgG refer to allergy immunology  3/30/17  allergy immunology evaluation slight IgG reduction 753 with normal IgG, IgM, no history to suggest primary immunodeficiency, recent diagnosis of group A streptococcal pharyngitis complicated by erythema nodosum, 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 therapy, we will perform humerol workup to include repeat quantitative immunoglobulins with subclass, specific antibodies for haemophilus influenza, pneumococcal, tetanus/diphtheria, limited flow cytometry with repeat CBC, SPEP/UPEP, ESR, CRP, UA, follow-up ASO, DNase B titer  5/10/17 dr.wu lacy endocrinology note recommend fosamax weekly with anecdotal evidence that bisphosphonate therapy may provide symptomatic benefit and osteonecrosis, if develops side effects could consider intravenous reclast, referral [facility], follow-up 4 months  5/28/17  rheumatology note avascular necrosis, arthralgias lower extremities, continue off prednisone, on alendronate per [facility]  7/7/17 MRI left knee without; again seen multiple bone infarcts femur, tibia, fibula, patella appeared less prominent on current examination  7/7/17 MRI right knee without; interval improvement in size of multiple bony infarcts involving femur, tibia, fibula, some infarcts have resolved  7/7/17 MRI pelvis without; stable bilateral femoral head avascular necrosis, these have not progressed and there is no evidence of subchondral collapse or arthropathy  6/27/17  [facility] evaluation, recommended evaluation of hypercoagulable state, to consider antiphospholipid antibodies (lupus  anticoagulant, anticardiolipin antibodies, beta-2 glycoprotein, phosphatidyl serine antibodies, factor V Leiden, antithrombin III, prothrombin mutation, protein C&S quantitative and qualitative, homocysteine, recommend after review of records do not strongly feel that she has true rheumatic fever, clear to discontinue penicillin, recheck ASO and anti-DNase B titers after 4 weeks  8/16/17 homocystine, protein C and protein S, lupus anticoagulant, beta 2 glycoprotein, antithrombin III, anticardiolipin antibody, anti-DNAse antibody, antistreptolysin all negative  8/18/17 MRI left foot normal, MRI right foot osteoathritis right first metatarsal head and sesamoids moderate in degree, ulnar edema with an sesamoids may be due to susceptibility artifact related to first metatarsal phalangeal arthroplasty  8/31/17  rheumatology note, continues on alendronate per bone endocrinology recommendation [place], did follow up with orthopedics, will proceed with core decompression  9/27/17  [facility] with focal osteonecrosis involving knees, hips, other joints, etiology unclear, dramatic improvement with a few months of alendronate, about to undergo surgical intervention hip, will discontinue alendronate pending surgery, reassess 4-5 months  9/29/17   orthopedic surgical note right hip fluoroscopically guided core decompression and right hand carpal tunnel release  12/29/17  orthopedic surgical note  left proximal femoral head core decompression  2/23/18  [facility] note, multifocal osteonecrosis knees, hips, other joints, underlying etiology unclear, trial few months of alendronate without dramatic improvement, alendronate discontinued because of undergoing core procedures with her hips, recommend continuing off alendronate monitoring for improvement, reassess in 6 months  10/10/18  [facility] consultation osteonecrosis knees, hips, other  joints, unclear etiology trial of alendronate without improvement, discontinued prior to hip procedures, hip pain is improved, discussed another trial of alendronate for limited timeframe 70 mg weekly reassess 6 months  12/7/18 crp 0.46, ESR 23, hgb 13,hct 42, wbc 7.9  12/10/18 MRI left hip avascular necrosis left femoral head 50% articular surface, with some progression from comparison, some early subchondral collapse anteriorly and superiorly with marrow edema extending into the left femoral head and neck, surgical change consistent with prior decompression procedure and femoral osteoplasty, metallic artifact consistent with surgical pain femoral head, early degenerative changes  12/10/18 MRI right hip without chronic avascular necrosis right femoral head 50% articular surface without evidence of subchondral collapse, early degenerative changes, surgical changes consistent with prior right proximal femoral osteoplasty    allergic rhinitis  Tried otc claritin  9/12/13 flonase trial  9/25/15 flonase and zyrtec or claritin          Patient Active Problem List   Diagnosis   • S/P appendectomy   • S/p left hip revision 2002   • S/p left ankle surgery   • S/p right hip arthroscopy    • Low back pain   • Cervical pain (neck)   • IBS (irritable bowel syndrome)   • Preventative health care   • Varicose vein of leg   • S/P bunionectomy   • Allergic rhinitis due to animal hair and dander   • Chronic pain   • Dyslipidemia   • Perimenopausal   • Foot pain, right   • Obesity   • History of rheumatic fever   • Avascular necrosis (HCC)       Depression Screening    Little interest or pleasure in doing things?  0 - not at all  Feeling down, depressed , or hopeless? 0 - not at all  Patient Health Questionnaire Score: 0        Health Maintenance Summary                PAP SMEAR Overdue 10/7/2018      Done 10/7/2015 PAP IG (IMAGE GUIDED)    MAMMOGRAM Next Due 12/24/2019      Done 12/24/2018 MA-SCREENING MAMMO BILAT W/TOMOSYNTHESIS  W/CAD     Patient has more history with this topic...    IMM DTaP/Tdap/Td Vaccine Next Due 9/23/2028      Done 9/23/2018 Imm Admin: Tdap Vaccine     Patient has more history with this topic...          Patient Care Team:  Franklin Gonzalez M.D. as PCP - General (Internal Medicine)  Starr Jackson M.D. as Consulting Physician (Rheumatology)  Franklin Gonzalez M.D.      ROS       Objective:        Physical Exam   Constitutional: She appears well-developed and well-nourished. No distress.   HENT:   Head: Normocephalic and atraumatic.   Eyes: Conjunctivae are normal. Right eye exhibits no discharge. Left eye exhibits no discharge.   Neck: Neck supple. No JVD present. No thyromegaly present.   Cardiovascular: Normal rate and regular rhythm.    Pulmonary/Chest: Effort normal and breath sounds normal. No respiratory distress. She has no wheezes.   Abdominal: She exhibits no distension.   Musculoskeletal: She exhibits no edema.   Neurological: She is alert.   Skin: Skin is warm. She is not diaphoretic.   Psychiatric: She has a normal mood and affect. Her behavior is normal.   Nursing note and vitals reviewed.              Assessment/Plan:     Assessment  #1 annual examination    #2 s/p right hip arthroscopy 9/29/17   orthopedic surgical note right hip fluoroscopically guided core decompression and right hand carpal tunnel release  12/10/18 MRI right hip without chronic avascular necrosis right femoral head 50% articular surface without evidence of subchondral collapse, early degenerative changes, surgical changes consistent with prior right proximal femoral osteoplasty    #3 s/p left hip revision History of left hip open decompression and osteotomy 12/03  left hip implant removal     #4 avascular necrosis followed by orthopedics, endocrinology, right hip pain greater than left, also affects knees, feet, ankles rheumatology, most recent labs 12/7/18 crp 0.46, ESR 23, hgb 13,hct 42, wbc 7.9, most recent  imaging 12/10/18 MRI right hip without chronic avascular necrosis right femoral head 50% articular surface without evidence of subchondral collapse, early degenerative changes, surgical changes consistent with prior right proximal femoral osteoplasty     #5 preventative health  11/05 [facility] colon negative  9/25/15 tdap  10/7/15 pap per gyn  negative  10/110/16 vit d 31  1/6/17 pneumovax  12/24/18 mammogram      #6 perimenopausal 9/25/15 on climara patch and progesterone tab per  gyn     #7 history low back pain has seen physical therapy, has had injections by pain management 12/22/17  pain note right L3-S1 radiofrequency ablation under fluoroscopy     #9 chronic pain on tramadol 5/8/18  5/8/18 pain contract signed, 5/8/18 opiate risk score 1, remains on ultram 1-2 per day for chronic hip pain, knee pain, ankle pain related to avascular necrosis has tried physical therapy, TENS, acupuncture, chiropractor, massage therapy, heat, NSAIDs, gabapentin, Celebrex, Voltaren, Lidoderm patch    #10 BMI 32.8    #11 dyspepsia resolved      Plan  #!     #2 refill tramadol 50 mg every 8 hours as needed quantity 90 per 30 days, 3 separate prescriptions    #3 risks outweigh benefits of tramadol therapy given chronic pain related to avascular necrosis, reviewed with patient indications, benefits, side effects, reviewed imaging studies, specialist consultation notes, has tried and failed physical therapy, gabapentin, anti-inflammatories, acupuncture, TENS, Celebrex, Voltaren, Lidoderm patch.  Long-term tramadol may cause drowsiness, sedation, memory loss, confusion, falls, continue no alcohol with medication    #4 nutrition, diet, exercise discussed    #5 follow-up 6 months    #6 follow-up with specialist    #7 annual mammogram repeat next year    #8 consider Reclast instead of Fosamax she will check with [facility], for now she will resume Fosamax for avascular necrosis, taking with water 30 minutes  prior to medications, meals, supplements monitor for GI upset or recurrent dyspepsia symptoms, should that occur she will call us, continue omeprazole    #9 continue gabapentin    #10 has had influenza vaccine, old records from gynecology regarding Pap smear, has had pneumococcal 23, TDAP, next colonoscopy age 51

## 2019-01-05 ENCOUNTER — HOSPITAL ENCOUNTER (OUTPATIENT)
Dept: LAB | Facility: MEDICAL CENTER | Age: 49
End: 2019-01-05
Attending: OBSTETRICS & GYNECOLOGY
Payer: COMMERCIAL

## 2019-01-05 LAB
ESTRADIOL SERPL-MCNC: 228 PG/ML
FSH SERPL-ACNC: 10.4 MIU/ML

## 2019-01-05 PROCEDURE — 83001 ASSAY OF GONADOTROPIN (FSH): CPT

## 2019-01-05 PROCEDURE — 36415 COLL VENOUS BLD VENIPUNCTURE: CPT

## 2019-01-05 PROCEDURE — 82670 ASSAY OF TOTAL ESTRADIOL: CPT

## 2019-02-12 DIAGNOSIS — R10.13 EPIGASTRIC PAIN: ICD-10-CM

## 2019-02-12 RX ORDER — OMEPRAZOLE 40 MG/1
CAPSULE, DELAYED RELEASE ORAL
Qty: 90 CAP | Refills: 3 | Status: SHIPPED | OUTPATIENT
Start: 2019-02-12 | End: 2019-09-09

## 2019-04-24 PROBLEM — R10.13 DYSPEPSIA: Status: ACTIVE | Noted: 2019-04-24

## 2019-05-15 ENCOUNTER — TELEPHONE (OUTPATIENT)
Dept: MEDICAL GROUP | Facility: MEDICAL CENTER | Age: 49
End: 2019-05-15

## 2019-05-16 NOTE — TELEPHONE ENCOUNTER
Please call patient [phone]  (1) FMLA forms have been completed to scan on the counter  (2) notify patient that forms have been completed  (3) mail a copy of the [place] paperwork to the patient per her request  (4) fax [place] paperwork to [facility] [employer]

## 2019-05-24 ENCOUNTER — APPOINTMENT (OUTPATIENT)
Dept: RADIOLOGY | Facility: MEDICAL CENTER | Age: 49
End: 2019-05-24
Payer: COMMERCIAL

## 2019-06-03 ENCOUNTER — HOSPITAL ENCOUNTER (OUTPATIENT)
Dept: RADIOLOGY | Facility: MEDICAL CENTER | Age: 49
End: 2019-06-03
Attending: INTERNAL MEDICINE
Payer: COMMERCIAL

## 2019-06-03 DIAGNOSIS — M87.9 NECROSIS, BONE, ACUTE (HCC): ICD-10-CM

## 2019-06-03 PROCEDURE — 77080 DXA BONE DENSITY AXIAL: CPT

## 2019-06-17 ENCOUNTER — TELEPHONE (OUTPATIENT)
Dept: MEDICAL GROUP | Facility: MEDICAL CENTER | Age: 49
End: 2019-06-17

## 2019-06-17 ENCOUNTER — OFFICE VISIT (OUTPATIENT)
Dept: MEDICAL GROUP | Facility: MEDICAL CENTER | Age: 49
End: 2019-06-17
Payer: COMMERCIAL

## 2019-06-17 VITALS
DIASTOLIC BLOOD PRESSURE: 72 MMHG | SYSTOLIC BLOOD PRESSURE: 128 MMHG | WEIGHT: 208.6 LBS | HEIGHT: 67 IN | HEART RATE: 83 BPM | TEMPERATURE: 98.2 F | BODY MASS INDEX: 32.74 KG/M2 | OXYGEN SATURATION: 97 %

## 2019-06-17 DIAGNOSIS — M87.00 AVASCULAR NECROSIS (HCC): Chronic | ICD-10-CM

## 2019-06-17 DIAGNOSIS — Z00.00 PREVENTATIVE HEALTH CARE: Chronic | ICD-10-CM

## 2019-06-17 DIAGNOSIS — M54.5 LOW BACK PAIN, UNSPECIFIED BACK PAIN LATERALITY, UNSPECIFIED CHRONICITY, WITH SCIATICA PRESENCE UNSPECIFIED: Chronic | ICD-10-CM

## 2019-06-17 DIAGNOSIS — R10.13 DYSPEPSIA: ICD-10-CM

## 2019-06-17 DIAGNOSIS — G89.29 OTHER CHRONIC PAIN: ICD-10-CM

## 2019-06-17 DIAGNOSIS — M25.552 PAIN OF BOTH HIP JOINTS: ICD-10-CM

## 2019-06-17 DIAGNOSIS — M25.551 PAIN OF BOTH HIP JOINTS: ICD-10-CM

## 2019-06-17 PROCEDURE — 99214 OFFICE O/P EST MOD 30 MIN: CPT | Performed by: INTERNAL MEDICINE

## 2019-06-17 RX ORDER — TRAMADOL HYDROCHLORIDE 50 MG/1
50 TABLET ORAL EVERY 8 HOURS PRN
Qty: 90 TAB | Refills: 0 | Status: SHIPPED | OUTPATIENT
Start: 2019-07-17 | End: 2019-06-17 | Stop reason: SDUPTHER

## 2019-06-17 RX ORDER — TRAMADOL HYDROCHLORIDE 50 MG/1
50 TABLET ORAL EVERY 8 HOURS PRN
Qty: 90 TAB | Refills: 0 | Status: ON HOLD | OUTPATIENT
Start: 2019-08-16 | End: 2019-08-06

## 2019-06-17 RX ORDER — TRAMADOL HYDROCHLORIDE 50 MG/1
50 TABLET ORAL EVERY 8 HOURS PRN
Qty: 90 TAB | Refills: 0 | Status: SHIPPED | OUTPATIENT
Start: 2019-06-17 | End: 2019-06-17 | Stop reason: SDUPTHER

## 2019-06-17 ASSESSMENT — PATIENT HEALTH QUESTIONNAIRE - PHQ9: CLINICAL INTERPRETATION OF PHQ2 SCORE: 0

## 2019-06-17 NOTE — PROGRESS NOTES
Subjective:      Janett Mcnulty is a 49 y.o. female who presents with Follow-Up            HPI     Patient here for medication refill.  History of left hip decompression and osteotomy, history of right hip arthroscopy, followed by orthopedics, pain management, [facility].  Avascular necrosis of her hips bilateral, knees, ankles as well as wrists.  History of minimal prednisone after rheumatic fever.  Currently on Fosamax per rheumatology.  Recently had MRI lumbar spine and left hip from orthopedics, done at [facility].  Records are brought by the patient for review.  Patient noticed earlier this year more left hip pain, denied trauma, no overuse, prolonged standing or sitting does exacerbate chronic hip pain bilateral.  Knees and ankles as well as wrists have not been as significantly impacted or active.  Baseline hip pain 5/10 sharp in nature, moderate to severe, increasing to 8/10 with sitting or going from sitting to standing position.  Tramadol does relieve the pain somewhat allowing her to function, taking at night on a regular basis, occasionally during the day.  No drowsiness, sedation, memory loss, falls, confusion, depression with chronic tramadol therapy.  No alcohol with medication.  Has been utilizing smart crutches since February because of the worsening hip pain.  Has been to physical therapy, tried aqua therapy, stretching.  Still goes to aqua therapy 3 times per week.  Has a raised desk at work, aero gravity chair at home.  She is on a work limitation not to exceed 40 hours/week because of avascular necrosis pain.  Her avascular necrosis pains of her knees, hips, ankles, will be worse with prolonged standing at work, even sitting can bother her.  No swelling of her knees or ankles.  No current low back pain, no sensory changes lower extremities.  Also remains on naproxen and gabapentin.  Does her own stretching as well as going to aqua therapy 3 times per week for exercise and  activity.  Medications, allergies, medical history, surgical history, social history reviewed and updated.  Good social support with .    Recent records reviewed  2/12/19  orthopedic note   2/28/19 MRI left hip at [facility] imaging stable moderate to severe avascular necrosis left femoral head appears to be quiescent, moderate to severe bone marrow edema (less conspicuous) again noted extending through the femoral neck into the intertrochanteric region, postoperative changes remote core compression of femoral osteotomy, mild degenerative changes  3/8/19  orthopedic note transient osteoporosis, stable lumbar spine MRI and MRA hip, discussed rest, crutch immobilization, aqua therapy, may take 4 to 5 months to recover, total hip arthroplasty is not a good option in the setting of transient osteoporosis  4/5/19   orthopedic note transient osteoporosis and avascular necrosis repeat if no improvement of symptoms, follow-up 6 weeks  2/28/19 MRI lumbar spine at [facility] imaging L4-L5 3 mm disc bulge, mild central stenosis, L5-S1 moderate facet hypertrophy, small right-sided sacral meningocele   6/3/19 dexa LS+0.7, forearm +0.8    Current Outpatient Prescriptions   Medication Sig Dispense Refill   • omeprazole (PRILOSEC) 40 MG delayed-release capsule TAKE ONE CAPSULE BY MOUTH ONE TIME DAILY 90 Cap 3   • gabapentin (NEURONTIN) 300 MG Cap Take 1 Cap by mouth 2 Times a Day. 180 Cap 3   • ondansetron (ZOFRAN ODT) 4 MG TABLET DISPERSIBLE Take 1 Tab by mouth every 6 hours as needed for Nausea. (Patient not taking: Reported on 1/3/2019) 30 Tab 0   • alendronate (FOSAMAX) 70 MG Tab Take 70 mg by mouth every 7 days.     • fluticasone (FLONASE) 50 MCG/ACT nasal spray Spray 2 Sprays in nose every day. 16 g 11   • Multiple Vitamins-Minerals (MULTIVITAMIN ADULT PO) Take  by mouth every day.     • Calcium Citrate-Vitamin D (CALCIUM + D PO) Take  by mouth every day.     •  Boswellia-Glucosamine-Vit D (GLUCOSAMINE COMPLEX PO) Take  by mouth every day.     • ascorbic acid (ASCORBIC ACID) 500 MG Tab Take 500 mg by mouth every day.     • naproxen (NAPROSYN) 500 MG Tab Take 500 mg by mouth every day.     • vitamin D (CHOLECALCIFEROL) 1000 UNIT Tab Take 1,000 Units by mouth every day.       No current facility-administered medications for this visit.      Varicose vein excision left lower extremity  9/11 varicose veins left excision   3/11/17 ultrasound venous bilateral lower extremities negative     Status post bunionectomy  8/9/13  podiatry; right foot martín bunionectomy, right foot first metatarsophalangeal joint bunion/degenerative arthritis      Status post appendectomy     s/p right hip arthroscopy  2007  ortho right hip arthroscopy  2/11/14  note bilateral greater trochanter injection under ultrasound guidance  1/20/17 MRI pelvis bilateral femoral head avascular necrosis involving approximately 40% of the articular surface, metallic artifact left femoral head consistent with presence of small metallic pain  9/29/17   orthopedic surgical note right hip fluoroscopically guided core decompression and right hand carpal tunnel release  12/10/18 MRI right hip without chronic avascular necrosis right femoral head 50% articular surface without evidence of subchondral collapse, early degenerative changes, surgical changes consistent with prior right proximal femoral osteoplasty     S/p left hip revision  History of left hip open decompression and osteotomy  12/03  left hip implant removal  11/10  ortho x-ray stable decompression left femoral head neck junction  11/12  pain management note bilateral trochanteric bursitis injection  5/2/13  pain note, bilateral trochanteric bursal injection  12/10/18 MRI left hip avascular necrosis left femoral head 50% articular surface, with some progression  from comparison, some early subchondral collapse anteriorly and superiorly with marrow edema extending into the left femoral head and neck, surgical change consistent with prior decompression procedure and femoral osteoplasty, metallic artifact consistent with surgical pain femoral head, early degenerative changes     S/p left ankle surgery 2006 ligament reconstruction     Preventative health  11/05 GIC colon negative  9/25/15 tdap  10/7/15 pap per gyn  negative  10/110/16 vit d 31  1/6/17 pneumovax  12/24/18 mammogram      Perimenopausal  9/25/15 on climara patch and progesterone tab per  gyn     Low back pain  10/10 MRI LS L5-S1 minimal disc bulge  1/11  at the pain management left L3-S1 facet joint injection, right L3-S1 facet joint injection  2/11  pain management right L3-S1 lateral, medial, dorsal ramus nerve block  4/11  pain management left L3-S1 medial, lateral, bursal ramus nerve block  5/11  pain management bilateral SI joint injection under fluoroscopy  7/11  pain note bilat trochanteric bursitis injection  3/12  pain note, right and left L3-L4 L5-S1 medial, dorsal, lateral branch ablation  12/12  pain note, left L3-S1 dorsal and medial branch radiofrequency ablation  1/4/13  pain note; right L3-S1 radiofrequency ablation  8/14/13  pain note  9/29/13  pain note, left L3-S1 nerve frequency ablation  10/4/13  pain note, status post right L3-S1 FJNA  12/19/13 pain note; status post right SIJI injection, continue voltaren gel, TENS, IT stretches  5/16/14  pain note; left L3-S1 medial, partial, lateral branch radiofrequency ablation under fluoroscopy  6/6/14  right L3-S1 FJNA  7/3/14  pain note; continue TENS,celebrex 200 mg qday, voltaren gel 1%  9/10/14  pain note; continue TENS, lidoderm  patch,voltaren gel, celebrex 200 mg qday  12/20/17  pain note left L3-S1 radiofrequency ablation under fluoroscopy  12/22/17  pain note right L3-S1 radiofrequency ablation under fluoroscopy      History of rheumatic fever  8/29/16 streptococcal pharyngitis positive strep test, treated with augmentin, developed erythema nodosum lower extremities and palms given NSAIDs and medrol dosepack  9/22/16 repeat medrol dose pack x 1 still with painful erythema nodosum nodules  9/24/16 erythema nodosum nodules and arthritic-type pains, we will retreat with penicillin V 500 mg 3 times a day ×10 days  10/5/16 high dose aspirin no benefit, changed to prednisone 20 mg daily, she has an appointment with me in 2 days  10/7/16 echo ordered, EKG done  10/10/16 cbc,cmp,tsh normal,ESR 40,CRP 0.3, repeat throat culture negative, blood cultures negative,  10/19/16 increase prednisone to 40 mg x 3 days then back to 20 mg  10/19/16 daughter diagnosis strep throat given antibiotics, we will treat prophylactically provided patient penicillin V 500 mg tid x 10 days  10/21/16 echo normal LV size and function, EF 65%, trace MR structurally normal mitral valve, mild TR and RVSP 30  10/24/16 on prednisone 40 mg qday unable to taper to 20 mg due to flare up of pain will try 20 mg bid then taper to 20 mg am and 10 mg qpm over the next week  10/26/16  infectious disease recommended secondary prophylaxis penicillin  mg bid x 5 years  11/7/16 still with polyarthritis, on prednisone 20 mg twice a day, repeat labs, still on penicillin, will speak with rheumatology about referral  11/8/16 ESR 15,CRP 0.3,wbc 10.9 (on prednisone),hgb 14,hct 43,cmp normal,RF,C3C4,TPO,lyme,MERA,HLA B27 negative, Factin IgG 22 (0-19),parietal cell Ab 25(0-25)  11/14/16  rheumatolog note, features consistent with rheumatic fever, also consider seronegative spondyloarthropathies, sarcoidosis, rheumatoid arthritis, will check  sacroiliac films, hand and feet x-rays, follow-up one week  11/17/16 refill prednisone  11/18/16 ACE serum level normal, G6PD ad vitamin 1,25 d level normal  11/21/16 cxr negative  11/21/16 xray SI joints negative for erosions  11/29/16 x-ray joint survey hands, feet, ankles no evidence of inflammatory arthropathy  11/29/16 CT soft tissue neck without; negative  12/7/16  cardiology repeat echo in 3-4 weeks  12/10/16 MRI right knee multiple areas right knee avascular necrosis medial and lateral femoral condyle, medial and lateral tibial plateau, bone marrow edema  12/23/16  rheumatology note, inflammatory markers did normalize with steroids, rheumatoid factor, CCP, MERA,ANKA negative continue to taper steroids given osteonecrosis alternating 17.5 mg and 15 mg, and 15 mg, and 15 mg alternating with 12.5 mg, and so forth  12/27/16 echo LV ejection fraction 60%, no valvular disease  1/4/17 ESR 8,CRP 0.3  1/12/17  rheumatology note, inflammatory markers did normalize with prednisone therapy, continue taper with osteonecrosis bilateral tenderness achilles insertion consider enthesitis, will get MRI left achilles region to evaluate for tendinitis or tenosynovitis, follow-up 6 weeks  1/20/17 MRI left foot without; no evidence of marrow edema, arthropathy, tendinopathy or ligament injury  3/6/17 anticardiolipin antibodies, lupus anticoagulant, protein CNS, factor V 5 Leyden, anti-thrombin panel, beta 2 glycoprotein negative  3/10/17 ESR 19,CRP 0.17, cortisol 3.3, ACTH 16, IgG 753 ([id number]), IgA 140, IgM 93  3/14/16  rheumatology note currently off prednisone, osteonecrosis involving multiple joints, knees, hips, right ankle, etiology unclear, antiphospholipid antibody, protein C and S normal, antithrombin III factor V leyden normal, refer to hematology for evaluation other etiologies, slightly decreased IgG refer to allergy immunology  3/14/17 remains on penicillin  3/30/17  allergy  immunology evaluation slight IgG reduction 753 with normal IgG, IgM, no history to suggest primary immunodeficiency, recent diagnosis of group A streptococcal pharyngitis complicated by erythema nodosum, 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 therapy, we will perform humerol workup to include repeat quantitative immunoglobulins with subclass, specific antibodies for haemophilus influenza, pneumococcal, tetanus/diphtheria, limited flow cytometry with repeat CBC, SPEP/UPEP, ESR, CRP, UA, follow-up ASO, DNase B titer  4/17/17  allergy note slight IgG reduction at 753 with normal IgA, normal IgM, no history to suggest primary immunodeficiency, suspect that 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 carryover effect from chronic prednisone therapy, cbc unremarkable, inflammatory markers ESR slightly elevated 28, CRP 2.4, urinalysis negative for protein or blood, no further humeral immunodeficiency or lab assessment at this point with normal immunologic titers, intact specific and subclass antibodies, the IgG decrease does not represent any underlying primary immunodeficiency or active antibody dysfunction at this time  4/25/17  rheumatology note await [place] [facility] evaluation  5/10/17 [facility] recommend fosamax weekly with anecdotal evidence that bisphosphonate therapy may provide symptomatic benefit and osteonecrosis, if develops side effects could consider intravenous reclast, referral [place] [facility], follow-up 4 months  5/28/17  rheumatology note avascular necrosis, arthralgias lower extremities, continue off prednisone, on alendronate per [place] [facility]  7/3/17  infectious disease consultation history of streptococcal pharyngitis with probable rheumatic fever, recommend discontinue penicillin prophylaxis follow-up as needed  6/27/17  [place] [facility] evaluation, recommended evaluation of hypercoagulable state, to  consider antiphospholipid antibodies (lupus anticoagulant, anticardiolipin antibodies, beta-2 glycoprotein, phosphatidyl serine antibodies, factor V Leiden, antithrombin III, prothrombin mutation, protein C&S quantitative and qualitative, homocysteine, recommend after review of records do not strongly feel that she has true rheumatic fever, clear to discontinue penicillin, recheck ASO and anti-DNase B titers after 4 weeks  8/16/17 homocystine, protein C and protein S, lupus anticoagulant, beta 2 glycoprotein, antithrombin III, anticardiolipin antibody, anti-DNAse antibody, antistreptolysin all negative  8/31/17  rheumatology note, continues on alendronate per bone endocrinology recommendation [place], did follow up with orthopedics, will proceed with core decompression  2/23/18  [place] [facility] note, multifocal osteonecrosis knees, hips, other joints, underlying etiology unclear, trial few months of alendronate without dramatic improvement, alendronate discontinued because of undergoing core procedures with her hips, recommend continuing off alendronate monitoring for improvement, reassess in 6 months  10/10/18  [place] consultation osteonecrosis knees, hips, other joints, unclear etiology trial of alendronate without improvement, discontinued prior to hip procedures, hip pain is improved, discussed another trial of alendronate for limited timeframe 70 mg weekly reassess 6 months     foot pain  8/5/15 MRI right foot severe artifact secondary to first MTP are clear, limiting analysis of soft tissue, highly likely complete FHL tear  8/28/15  orthopedic note right ankle FHL tendon rupture seen on MRI, do not recommend surgical repair at this time which would involve hemiarthroplasty, implant, first MTP fusion with bone graft, followup podiatry   8/18/17 MRI left foot normal, MRI right foot osteoathritis right first metatarsal head and sesamoids moderate in  degree, ulnar edema with an sesamoids may be due to susceptibility artifact related to first metatarsal phalangeal arthroplasty    dyspepsia  11/2/18 seen UC for abdominal discomfort right upper quadrant  11/3/18 ultrasound liver gallbladder region negative  11/10/18 hida negative  11/20/18 trial of prilosec 40 mg     Dyslipidemia  9/29/14 chol 186,trig 46,hdl 76,ldl 101  9/18/15 chol 225,trig 50,hdl 77,ldl 138  9/20/17 chol 189,trig 53,hdl 56,ldl 122  5/11/18 ultrasound carotid minimal left carotid bulb plaque, calcified, no evidence of occlusion less than 50% stenosis  9/28/18 chol 187,trig 59,hdl 76,ldl 99  5/8/18 EKG done in clinic, ultrasound carotid ordered follow-up calcifications   5/11/18 ultrasound carotid less than 50% internal stenosis bilateral     Chronic pain  5/8/18   5/8/18 pain contract  5/8/18 opiate risk score 1  5/8/18 on ultram 1-2 per day has tried physical therapy, TENS, acupuncture, chiropractor, massage therapy, heat, NSAIDs, gabapentin, Celebrex, Voltaren, Lidoderm patch  1/3/19      cervical pain  10/11 MRI cervical spine C4-C5 tiny disc bulge, C5-C6 bilateral and plate spurring with uncinate hypertrophy  10/7/16 MRI cervical spine C5-C6 small broad-based osteophyte complex, borderline foraminal stenosis, no significant progression of disease since 2010 6/4/18 MRI cervical spine C3-C4 mild diffuse bulge with mild bilateral foraminal stenosis, C5-C6 mild bilateral foraminal stenosis     avascular necrosis  8/29/16 streptococcal pharyngitis positive strep test, treated with augmentin, developed erythema nodosum lower extremities and palms given NSAIDs and medrol dosepack  9/22/16 repeat medrol dose pack x 1 still with painful erythema nodosum nodules  10/5/16 high dose aspirin no benefit, changed to prednisone 20 mg daily,  10/19/16 increase prednisone to 40 mg x 3 days then back to 20 mg  11/7/16 still with polyarthritis, on prednisone 20 mg twice a day, repeat labs, still on  penicillin, will speak with rheumatology about referral  11/14/16  rheumatolog note, features consistent with rheumatic fever, also consider seronegative spondyloarthropathies, sarcoidosis, rheumatoid arthritis, will check sacroiliac films, hand and feet x-rays, follow-up one week  12/23/16  rheumatology note, inflammatory markers did normalize with steroids, rheumatoid factor, CCP, MERA,ANKA negative continue to taper steroids given osteonecrosis alternating 17.5 mg and 15 mg, and 15 mg, and 15 mg alternating with 12.5 mg, and so forth  12/10/16 MRI right knee multiple areas right knee avascular necrosis medial and lateral femoral condyle, medial and lateral tibial plateau, bone marrow edema  12/23/16  rheumatology note, inflammatory markers did normalize with steroids, rheumatoid factor, CCP, MERA,ANKA negative continue to taper steroids given osteonecrosis alternating 17.5 mg and 15 mg, and 15 mg, and 15 mg alternating with 12.5 mg, and so forth  1/20/17 MRI left foot no evidence of arthropathy or tendinopathy  1/20/17 MRI left ankle metallic artifact lateral malleolus  1/20/17 MRI right ankle small oblong focus finger edema tibial plafond below subchondral plate, would be consistent with small area of avascular necrosis  1/20/17 MRI left knee multiple areas avascular necrosis surrounding the knee to include femur, tibia, fibula  1/20/17 MRI pelvis bilateral femoral head avascular necrosis involving approximately 40% of the articular surface, metallic artifact left femoral head consistent with presence of small metallic pin  2/9/17 tapering prednisone down to 2.5 mg alternating with 5 mg  2/23/17  rheumatology note complete steroid taper then repeat ESR, CRP and CPK, also check antiphospholipid antibody,antithrombin, factor v leiden, protein s  3/6/17 anticardiolipin antibodies, lupus anticoagulant, protein CNS, factor V 5 Leyden, anti-thrombin panel, beta 2 glycoprotein  negative  3/10/17 ESR 19,CRP 0.17, cortisol 3.3, ACTH 16, IgG 753 ([id number]), IgA 140, IgM 93  3/14/16  rheumatology note currently off prednisone, osteonecrosis involving multiple joints, knees, hips, right ankle, etiology unclear, antiphospholipid antibody, protein C and S normal, antithrombin III factor V leyden normal, refer to hematology for evaluation other etiologies, slightly decreased IgG refer to allergy immunology  3/30/17  allergy immunology evaluation slight IgG reduction 753 with normal IgG, IgM, no history to suggest primary immunodeficiency, recent diagnosis of group A streptococcal pharyngitis complicated by erythema nodosum, 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 therapy, we will perform humerol workup to include repeat quantitative immunoglobulins with subclass, specific antibodies for haemophilus influenza, pneumococcal, tetanus/diphtheria, limited flow cytometry with repeat CBC, SPEP/UPEP, ESR, CRP, UA, follow-up ASO, DNase B titer  5/10/17  [facility] endocrinology note recommend fosamax weekly with anecdotal evidence that bisphosphonate therapy may provide symptomatic benefit and osteonecrosis, if develops side effects could consider intravenous reclast, referral [place] [facility], follow-up 4 months  5/28/17  rheumatology note avascular necrosis, arthralgias lower extremities, continue off prednisone, on alendronate per [place] [facility]  7/7/17 MRI left knee without; again seen multiple bone infarcts femur, tibia, fibula, patella appeared less prominent on current examination  7/7/17 MRI right knee without; interval improvement in size of multiple bony infarcts involving femur, tibia, fibula, some infarcts have resolved  7/7/17 MRI pelvis without; stable bilateral femoral head avascular necrosis, these have not progressed and there is no evidence of subchondral collapse or arthropathy  6/27/17  [place] [facility] evaluation,  recommended evaluation of hypercoagulable state, to consider antiphospholipid antibodies (lupus anticoagulant, anticardiolipin antibodies, beta-2 glycoprotein, phosphatidyl serine antibodies, factor V Leiden, antithrombin III, prothrombin mutation, protein C&S quantitative and qualitative, homocysteine, recommend after review of records do not strongly feel that she has true rheumatic fever, clear to discontinue penicillin, recheck ASO and anti-DNase B titers after 4 weeks  8/16/17 homocystine, protein C and protein S, lupus anticoagulant, beta 2 glycoprotein, antithrombin III, anticardiolipin antibody, anti-DNAse antibody, antistreptolysin all negative  8/18/17 MRI left foot normal, MRI right foot osteoathritis right first metatarsal head and sesamoids moderate in degree, ulnar edema with an sesamoids may be due to susceptibility artifact related to first metatarsal phalangeal arthroplasty  8/31/17  rheumatology note, continues on alendronate per bone endocrinology recommendation [place], did follow up with orthopedics, will proceed with core decompression  9/27/17 dr.wu [facility] with focal osteonecrosis involving knees, hips, other joints, etiology unclear, dramatic improvement with a few months of alendronate, about to undergo surgical intervention hip, will discontinue alendronate pending surgery, reassess 4-5 months  9/29/17   orthopedic surgical note right hip fluoroscopically guided core decompression and right hand carpal tunnel release  12/29/17  orthopedic surgical note  left proximal femoral head core decompression  2/23/18  [place] [facility] note, multifocal osteonecrosis knees, hips, other joints, underlying etiology unclear, trial few months of alendronate without dramatic improvement, alendronate discontinued because of undergoing core procedures with her hips, recommend continuing off alendronate monitoring for improvement, reassess in 6  months  10/10/18  [place] [facility] consultation osteonecrosis knees, hips, other joints, unclear etiology trial of alendronate without improvement, discontinued prior to hip procedures, hip pain is improved, discussed another trial of alendronate for limited timeframe 70 mg weekly reassess 6 months  12/7/18 crp 0.46, ESR 23, hgb 13,hct 42, wbc 7.9  12/10/18 MRI left hip avascular necrosis left femoral head 50% articular surface, with some progression from comparison, some early subchondral collapse anteriorly and superiorly with marrow edema extending into the left femoral head and neck, surgical change consistent with prior decompression procedure and femoral osteoplasty, metallic artifact consistent with surgical pain femoral head, early degenerative changes  12/10/18 MRI right hip without chronic avascular necrosis right femoral head 50% articular surface without evidence of subchondral collapse, early degenerative changes, surgical changes consistent with prior right proximal femoral osteoplasty  4/24/19  [place] osteoporosis clinic order bone density if she does have osteoporosis consider continuation bisphosphonate versus anabolic therapy     allergic rhinitis  Tried otc claritin  9/12/13 flonase trial  9/25/15 flonase and zyrtec or claritin              Patient Active Problem List   Diagnosis   • S/P appendectomy   • S/p left hip revision 2002   • S/p left ankle surgery   • S/p right hip arthroscopy    • Low back pain   • Cervical pain (neck)   • IBS (irritable bowel syndrome)   • Preventative health care   • Varicose vein of leg   • S/P bunionectomy   • Allergic rhinitis due to animal hair and dander   • Chronic pain   • Dyslipidemia   • Perimenopausal   • Foot pain, right   • Obesity   • History of rheumatic fever   • Avascular necrosis (HCC)   • Dyspepsia         ROS       Objective:          Physical Exam   Constitutional: She appears well-developed and well-nourished. No distress.    HENT:   Head: Atraumatic.   Eyes: Conjunctivae are normal. Right eye exhibits no discharge. Left eye exhibits no discharge.   Neck: No JVD present.   Cardiovascular: Normal rate and regular rhythm.    Pulmonary/Chest: Effort normal and breath sounds normal.   Abdominal: She exhibits no distension.   Musculoskeletal: She exhibits no edema.   Neurological: She is alert.   Skin: Skin is warm. She is not diaphoretic.   Psychiatric: She has a normal mood and affect. Her behavior is normal.   Nursing note and vitals reviewed.    Tender to palpation left hip, left lumbar paraspinal region, normal knee flexion extension, no lower extremity edema bilateral          Assessment/Plan:     Assessment  #1 avascular necrosis 2/28/19 MRI left hip at [facility] imaging stable moderate to severe avascular necrosis left femoral head appears to be quiescent, moderate to severe bone marrow edema (less conspicuous) again noted extending through the femoral neck into the intertrochanteric region, postoperative changes remote core compression of femoral osteotomy, mild degenerative changes followed by specialist on Fosamax 4/24/19  [place] [facility] order bone density if she does have osteoporosis consider continuation bisphosphonate versus anabolic therapy    #2 BMI 32.67    #3 dyslipidemia 9/28/18 chol 187,trig 59,hdl 76,ldl 99     #4 chronic tramadol therapy allowing her to perform her activities of daily living and work, taking 1 at night, and occasionally during the day without side effects of drowsiness, sedation, memory loss, falls, depression.  Has already tried gabapentin, naproxen, physical therapy, seen pain management, orthopedics, endocrinology for avascular necrosis expertise           Plan  #! Old record recent MRI done at [facility]     #2 OT referral zero gravity chair worksite evaluation     #3 check with insurance for zero gravity chair and smart crutch     #4 stop Prilosec monitor for recurrence,  continue naproxen    #5 if able to taper off Prilosec, will next work on taper neurontin from two to one per day in the evening    #6     #7 refill tramadol 1 p.o. 3 times daily quantity 90 per 30 days, total 3 months    #8 follow-up 3 months    #9 long-term risk of tremor habituation, dependence, memory loss, depression, patient not experiencing side effects, medication allows her to perform ADLs and work with minimal limitations, recommend continue tramadol, I believe benefits outweigh risks

## 2019-07-19 DIAGNOSIS — Z01.812 PRE-OPERATIVE LABORATORY EXAMINATION: ICD-10-CM

## 2019-07-19 LAB
ANION GAP SERPL CALC-SCNC: 6 MMOL/L (ref 0–11.9)
APPEARANCE UR: ABNORMAL
BACTERIA #/AREA URNS HPF: NEGATIVE /HPF
BASOPHILS # BLD AUTO: 1 % (ref 0–1.8)
BASOPHILS # BLD: 0.06 K/UL (ref 0–0.12)
BILIRUB UR QL STRIP.AUTO: NEGATIVE
BUN SERPL-MCNC: 20 MG/DL (ref 8–22)
CALCIUM SERPL-MCNC: 9.8 MG/DL (ref 8.5–10.5)
CHLORIDE SERPL-SCNC: 104 MMOL/L (ref 96–112)
CO2 SERPL-SCNC: 26 MMOL/L (ref 20–33)
COLOR UR: ABNORMAL
CREAT SERPL-MCNC: 0.8 MG/DL (ref 0.5–1.4)
EOSINOPHIL # BLD AUTO: 0.28 K/UL (ref 0–0.51)
EOSINOPHIL NFR BLD: 4.5 % (ref 0–6.9)
EPI CELLS #/AREA URNS HPF: ABNORMAL /HPF
ERYTHROCYTE [DISTWIDTH] IN BLOOD BY AUTOMATED COUNT: 50.9 FL (ref 35.9–50)
GLUCOSE SERPL-MCNC: 89 MG/DL (ref 65–99)
GLUCOSE UR STRIP.AUTO-MCNC: NEGATIVE MG/DL
HCT VFR BLD AUTO: 41.7 % (ref 37–47)
HGB BLD-MCNC: 13.2 G/DL (ref 12–16)
HYALINE CASTS #/AREA URNS LPF: ABNORMAL /LPF
IMM GRANULOCYTES # BLD AUTO: 0.02 K/UL (ref 0–0.11)
IMM GRANULOCYTES NFR BLD AUTO: 0.3 % (ref 0–0.9)
KETONES UR STRIP.AUTO-MCNC: NEGATIVE MG/DL
LEUKOCYTE ESTERASE UR QL STRIP.AUTO: NEGATIVE
LYMPHOCYTES # BLD AUTO: 2.38 K/UL (ref 1–4.8)
LYMPHOCYTES NFR BLD: 37.8 % (ref 22–41)
MCH RBC QN AUTO: 30.4 PG (ref 27–33)
MCHC RBC AUTO-ENTMCNC: 31.7 G/DL (ref 33.6–35)
MCV RBC AUTO: 96.1 FL (ref 81.4–97.8)
MICRO URNS: ABNORMAL
MONOCYTES # BLD AUTO: 0.58 K/UL (ref 0–0.85)
MONOCYTES NFR BLD AUTO: 9.2 % (ref 0–13.4)
NEUTROPHILS # BLD AUTO: 2.97 K/UL (ref 2–7.15)
NEUTROPHILS NFR BLD: 47.2 % (ref 44–72)
NITRITE UR QL STRIP.AUTO: NEGATIVE
NRBC # BLD AUTO: 0 K/UL
NRBC BLD-RTO: 0 /100 WBC
PH UR STRIP.AUTO: 7.5 [PH]
PLATELET # BLD AUTO: 290 K/UL (ref 164–446)
PMV BLD AUTO: 10.1 FL (ref 9–12.9)
POTASSIUM SERPL-SCNC: 4.1 MMOL/L (ref 3.6–5.5)
PROT UR QL STRIP: NEGATIVE MG/DL
RBC # BLD AUTO: 4.34 M/UL (ref 4.2–5.4)
RBC # URNS HPF: ABNORMAL /HPF
RBC UR QL AUTO: NEGATIVE
SODIUM SERPL-SCNC: 136 MMOL/L (ref 135–145)
SP GR UR STRIP.AUTO: 1.02
UROBILINOGEN UR STRIP.AUTO-MCNC: 0.2 MG/DL
WBC # BLD AUTO: 6.3 K/UL (ref 4.8–10.8)
WBC #/AREA URNS HPF: ABNORMAL /HPF

## 2019-07-19 PROCEDURE — 81001 URINALYSIS AUTO W/SCOPE: CPT

## 2019-07-19 PROCEDURE — 87640 STAPH A DNA AMP PROBE: CPT

## 2019-07-19 PROCEDURE — 36415 COLL VENOUS BLD VENIPUNCTURE: CPT

## 2019-07-19 PROCEDURE — 87641 MR-STAPH DNA AMP PROBE: CPT

## 2019-07-19 PROCEDURE — 80048 BASIC METABOLIC PNL TOTAL CA: CPT

## 2019-07-19 PROCEDURE — 85025 COMPLETE CBC W/AUTO DIFF WBC: CPT

## 2019-07-19 RX ORDER — MAGNESIUM OXIDE/MAG AA CHELATE 300 MG
1 CAPSULE ORAL DAILY
COMMUNITY

## 2019-07-19 RX ORDER — CHLORAL HYDRATE 500 MG
1000 CAPSULE ORAL DAILY
Status: ON HOLD | COMMUNITY
End: 2019-08-06

## 2019-07-19 NOTE — DISCHARGE PLANNING
DISCHARGE PLANNING NOTE - TOTAL JOINT     Procedure: Procedure(s):  ARTHROPLASTY, HIP, TOTAL- PENALOZA  Procedure Date: 8/5/2019  Insurance:  Payor: [employer] / Plan: [facility] / Product Type: *No Product type* /   Equipment currently available at home? crutches  Steps into the home? 2  Steps within the home? 2 story  Toilet height? Standard  Type of shower? walk-in shower with hose  Who will be with you during your recovery? Spouse-Brandon  Is Outpatient Physical Therapy set up after surgery? Yes-8/9  Did you take the Total Joint Class and where? Yes, at Dr. Ferrer's with Malena     Plan: Met with patient during preadmission appointment.  Reviewed Equipment Resource list and provided a copy to the patient.  Provided and reviewed Home Safety Checklist.  Quiet Hours information given and reviewed.  There are no identified discharge needs at this time.  Anticipate discharge home with family.  No Dme ordered at this time.

## 2019-07-19 NOTE — OR NURSING
"Preparing for your Procedure information\" sheet given to patient with verbal and written instructions. Patient instructed to continue prescribed medications through the day before surgery, instructed to take the following medications the day of surgery per anesthesia protocol  flonase and tramadol if needed. MB  "

## 2019-07-20 LAB
SCCMEC + MECA PNL NOSE NAA+PROBE: NEGATIVE
SCCMEC + MECA PNL NOSE NAA+PROBE: POSITIVE

## 2019-08-05 ENCOUNTER — HOSPITAL ENCOUNTER (INPATIENT)
Facility: MEDICAL CENTER | Age: 49
LOS: 1 days | DRG: 470 | End: 2019-08-06
Attending: ORTHOPAEDIC SURGERY | Admitting: ORTHOPAEDIC SURGERY
Payer: COMMERCIAL

## 2019-08-05 ENCOUNTER — ANESTHESIA EVENT (OUTPATIENT)
Dept: SURGERY | Facility: MEDICAL CENTER | Age: 49
DRG: 470 | End: 2019-08-05
Payer: COMMERCIAL

## 2019-08-05 ENCOUNTER — APPOINTMENT (OUTPATIENT)
Dept: RADIOLOGY | Facility: MEDICAL CENTER | Age: 49
DRG: 470 | End: 2019-08-05
Attending: ORTHOPAEDIC SURGERY
Payer: COMMERCIAL

## 2019-08-05 ENCOUNTER — ANESTHESIA (OUTPATIENT)
Dept: SURGERY | Facility: MEDICAL CENTER | Age: 49
DRG: 470 | End: 2019-08-05
Payer: COMMERCIAL

## 2019-08-05 LAB — HCG UR QL: NEGATIVE

## 2019-08-05 PROCEDURE — 502000 HCHG MISC OR IMPLANTS RC 0278: Performed by: ORTHOPAEDIC SURGERY

## 2019-08-05 PROCEDURE — 700101 HCHG RX REV CODE 250: Performed by: ANESTHESIOLOGY

## 2019-08-05 PROCEDURE — 160035 HCHG PACU - 1ST 60 MINS PHASE I: Performed by: ORTHOPAEDIC SURGERY

## 2019-08-05 PROCEDURE — 500864 HCHG NEEDLE, SPINAL 18G: Performed by: ORTHOPAEDIC SURGERY

## 2019-08-05 PROCEDURE — 94760 N-INVAS EAR/PLS OXIMETRY 1: CPT

## 2019-08-05 PROCEDURE — A6222 GAUZE <=16 IN NO W/SAL W/O B: HCPCS | Performed by: ORTHOPAEDIC SURGERY

## 2019-08-05 PROCEDURE — 770001 HCHG ROOM/CARE - MED/SURG/GYN PRIV*

## 2019-08-05 PROCEDURE — A9270 NON-COVERED ITEM OR SERVICE: HCPCS | Performed by: ANESTHESIOLOGY

## 2019-08-05 PROCEDURE — 700111 HCHG RX REV CODE 636 W/ 250 OVERRIDE (IP): Performed by: ANESTHESIOLOGY

## 2019-08-05 PROCEDURE — 160009 HCHG ANES TIME/MIN: Performed by: ORTHOPAEDIC SURGERY

## 2019-08-05 PROCEDURE — 160048 HCHG OR STATISTICAL LEVEL 1-5: Performed by: ORTHOPAEDIC SURGERY

## 2019-08-05 PROCEDURE — 160036 HCHG PACU - EA ADDL 30 MINS PHASE I: Performed by: ORTHOPAEDIC SURGERY

## 2019-08-05 PROCEDURE — 501445 HCHG STAPLER, SKIN DISP: Performed by: ORTHOPAEDIC SURGERY

## 2019-08-05 PROCEDURE — A9270 NON-COVERED ITEM OR SERVICE: HCPCS | Performed by: ORTHOPAEDIC SURGERY

## 2019-08-05 PROCEDURE — 700101 HCHG RX REV CODE 250: Performed by: ORTHOPAEDIC SURGERY

## 2019-08-05 PROCEDURE — 160042 HCHG SURGERY MINUTES - EA ADDL 1 MIN LEVEL 5: Performed by: ORTHOPAEDIC SURGERY

## 2019-08-05 PROCEDURE — 501838 HCHG SUTURE GENERAL: Performed by: ORTHOPAEDIC SURGERY

## 2019-08-05 PROCEDURE — 700112 HCHG RX REV CODE 229: Performed by: ORTHOPAEDIC SURGERY

## 2019-08-05 PROCEDURE — 502578 HCHG PACK, TOTAL HIP: Performed by: ORTHOPAEDIC SURGERY

## 2019-08-05 PROCEDURE — 700105 HCHG RX REV CODE 258: Performed by: ORTHOPAEDIC SURGERY

## 2019-08-05 PROCEDURE — 500562 HCHG FIBERWIRE: Performed by: ORTHOPAEDIC SURGERY

## 2019-08-05 PROCEDURE — 97161 PT EVAL LOW COMPLEX 20 MIN: CPT

## 2019-08-05 PROCEDURE — 700102 HCHG RX REV CODE 250 W/ 637 OVERRIDE(OP): Performed by: ORTHOPAEDIC SURGERY

## 2019-08-05 PROCEDURE — 160031 HCHG SURGERY MINUTES - 1ST 30 MINS LEVEL 5: Performed by: ORTHOPAEDIC SURGERY

## 2019-08-05 PROCEDURE — 700102 HCHG RX REV CODE 250 W/ 637 OVERRIDE(OP): Performed by: ANESTHESIOLOGY

## 2019-08-05 PROCEDURE — 160002 HCHG RECOVERY MINUTES (STAT): Performed by: ORTHOPAEDIC SURGERY

## 2019-08-05 PROCEDURE — 73502 X-RAY EXAM HIP UNI 2-3 VIEWS: CPT | Mod: LT

## 2019-08-05 PROCEDURE — 0SRB04A REPLACEMENT OF LEFT HIP JOINT WITH CERAMIC ON POLYETHYLENE SYNTHETIC SUBSTITUTE, UNCEMENTED, OPEN APPROACH: ICD-10-PCS | Performed by: ORTHOPAEDIC SURGERY

## 2019-08-05 PROCEDURE — 700105 HCHG RX REV CODE 258: Performed by: ANESTHESIOLOGY

## 2019-08-05 PROCEDURE — 81025 URINE PREGNANCY TEST: CPT

## 2019-08-05 PROCEDURE — 700111 HCHG RX REV CODE 636 W/ 250 OVERRIDE (IP): Performed by: ORTHOPAEDIC SURGERY

## 2019-08-05 DEVICE — IMPLANTABLE DEVICE: Type: IMPLANTABLE DEVICE | Site: HIP | Status: FUNCTIONAL

## 2019-08-05 RX ORDER — DIPHENHYDRAMINE HYDROCHLORIDE 50 MG/ML
12.5 INJECTION INTRAMUSCULAR; INTRAVENOUS
Status: DISCONTINUED | OUTPATIENT
Start: 2019-08-05 | End: 2019-08-05 | Stop reason: HOSPADM

## 2019-08-05 RX ORDER — HYDROMORPHONE HYDROCHLORIDE 1 MG/ML
0.1 INJECTION, SOLUTION INTRAMUSCULAR; INTRAVENOUS; SUBCUTANEOUS
Status: DISCONTINUED | OUTPATIENT
Start: 2019-08-05 | End: 2019-08-05 | Stop reason: HOSPADM

## 2019-08-05 RX ORDER — MAGNESIUM OXIDE/MAG AA CHELATE 300 MG
1 CAPSULE ORAL DAILY
Status: DISCONTINUED | OUTPATIENT
Start: 2019-08-05 | End: 2019-08-05

## 2019-08-05 RX ORDER — HYDROMORPHONE HYDROCHLORIDE 1 MG/ML
0.5 INJECTION, SOLUTION INTRAMUSCULAR; INTRAVENOUS; SUBCUTANEOUS
Status: DISCONTINUED | OUTPATIENT
Start: 2019-08-05 | End: 2019-08-06 | Stop reason: HOSPADM

## 2019-08-05 RX ORDER — BISACODYL 10 MG
10 SUPPOSITORY, RECTAL RECTAL
Status: DISCONTINUED | OUTPATIENT
Start: 2019-08-05 | End: 2019-08-06 | Stop reason: HOSPADM

## 2019-08-05 RX ORDER — ROPIVACAINE HYDROCHLORIDE 5 MG/ML
INJECTION, SOLUTION EPIDURAL; INFILTRATION; PERINEURAL
Status: DISCONTINUED | OUTPATIENT
Start: 2019-08-05 | End: 2019-08-05 | Stop reason: HOSPADM

## 2019-08-05 RX ORDER — OXYCODONE HYDROCHLORIDE 5 MG/1
5 TABLET ORAL
Status: DISCONTINUED | OUTPATIENT
Start: 2019-08-05 | End: 2019-08-06 | Stop reason: HOSPADM

## 2019-08-05 RX ORDER — OXYCODONE HCL 10 MG/1
TABLET, FILM COATED, EXTENDED RELEASE ORAL
Status: COMPLETED
Start: 2019-08-05 | End: 2019-08-05

## 2019-08-05 RX ORDER — HYDROMORPHONE HYDROCHLORIDE 1 MG/ML
0.2 INJECTION, SOLUTION INTRAMUSCULAR; INTRAVENOUS; SUBCUTANEOUS
Status: DISCONTINUED | OUTPATIENT
Start: 2019-08-05 | End: 2019-08-05 | Stop reason: HOSPADM

## 2019-08-05 RX ORDER — ONDANSETRON 2 MG/ML
4 INJECTION INTRAMUSCULAR; INTRAVENOUS
Status: COMPLETED | OUTPATIENT
Start: 2019-08-05 | End: 2019-08-05

## 2019-08-05 RX ORDER — OMEPRAZOLE 20 MG/1
40 CAPSULE, DELAYED RELEASE ORAL 2 TIMES DAILY
Status: DISCONTINUED | OUTPATIENT
Start: 2019-08-05 | End: 2019-08-06 | Stop reason: HOSPADM

## 2019-08-05 RX ORDER — HALOPERIDOL 5 MG/ML
1 INJECTION INTRAMUSCULAR
Status: DISCONTINUED | OUTPATIENT
Start: 2019-08-05 | End: 2019-08-05 | Stop reason: HOSPADM

## 2019-08-05 RX ORDER — ACETAMINOPHEN 500 MG
1000 TABLET ORAL EVERY 6 HOURS
Status: DISCONTINUED | OUTPATIENT
Start: 2019-08-05 | End: 2019-08-06 | Stop reason: HOSPADM

## 2019-08-05 RX ORDER — BACITRACIN 50000 [IU]/1
INJECTION, POWDER, FOR SOLUTION INTRAMUSCULAR
Status: DISCONTINUED | OUTPATIENT
Start: 2019-08-05 | End: 2019-08-05 | Stop reason: HOSPADM

## 2019-08-05 RX ORDER — ENEMA 19; 7 G/133ML; G/133ML
1 ENEMA RECTAL
Status: DISCONTINUED | OUTPATIENT
Start: 2019-08-05 | End: 2019-08-06 | Stop reason: HOSPADM

## 2019-08-05 RX ORDER — SODIUM CHLORIDE, SODIUM LACTATE, POTASSIUM CHLORIDE, CALCIUM CHLORIDE 600; 310; 30; 20 MG/100ML; MG/100ML; MG/100ML; MG/100ML
INJECTION, SOLUTION INTRAVENOUS CONTINUOUS
Status: ACTIVE | OUTPATIENT
Start: 2019-08-05 | End: 2019-08-05

## 2019-08-05 RX ORDER — LIDOCAINE HYDROCHLORIDE 20 MG/ML
INJECTION, SOLUTION EPIDURAL; INFILTRATION; INTRACAUDAL; PERINEURAL PRN
Status: DISCONTINUED | OUTPATIENT
Start: 2019-08-05 | End: 2019-08-05 | Stop reason: SURG

## 2019-08-05 RX ORDER — OXYCODONE HCL 5 MG/5 ML
5 SOLUTION, ORAL ORAL
Status: COMPLETED | OUTPATIENT
Start: 2019-08-05 | End: 2019-08-05

## 2019-08-05 RX ORDER — ROCURONIUM BROMIDE 10 MG/ML
INJECTION, SOLUTION INTRAVENOUS PRN
Status: DISCONTINUED | OUTPATIENT
Start: 2019-08-05 | End: 2019-08-05 | Stop reason: SURG

## 2019-08-05 RX ORDER — DOCUSATE SODIUM 100 MG/1
100 CAPSULE, LIQUID FILLED ORAL 2 TIMES DAILY
Status: DISCONTINUED | OUTPATIENT
Start: 2019-08-05 | End: 2019-08-06 | Stop reason: HOSPADM

## 2019-08-05 RX ORDER — CELECOXIB 200 MG/1
200 CAPSULE ORAL 2 TIMES DAILY
Status: DISCONTINUED | OUTPATIENT
Start: 2019-08-06 | End: 2019-08-06 | Stop reason: HOSPADM

## 2019-08-05 RX ORDER — DIPHENHYDRAMINE HYDROCHLORIDE 50 MG/ML
25 INJECTION INTRAMUSCULAR; INTRAVENOUS EVERY 6 HOURS PRN
Status: DISCONTINUED | OUTPATIENT
Start: 2019-08-05 | End: 2019-08-06 | Stop reason: HOSPADM

## 2019-08-05 RX ORDER — HYDRALAZINE HYDROCHLORIDE 20 MG/ML
5 INJECTION INTRAMUSCULAR; INTRAVENOUS
Status: DISCONTINUED | OUTPATIENT
Start: 2019-08-05 | End: 2019-08-05 | Stop reason: HOSPADM

## 2019-08-05 RX ORDER — GABAPENTIN 300 MG/1
CAPSULE ORAL
Status: COMPLETED
Start: 2019-08-05 | End: 2019-08-05

## 2019-08-05 RX ORDER — FLUTICASONE PROPIONATE 50 MCG
2 SPRAY, SUSPENSION (ML) NASAL DAILY
Status: DISCONTINUED | OUTPATIENT
Start: 2019-08-05 | End: 2019-08-06 | Stop reason: HOSPADM

## 2019-08-05 RX ORDER — CEFAZOLIN SODIUM 1 G/3ML
INJECTION, POWDER, FOR SOLUTION INTRAMUSCULAR; INTRAVENOUS PRN
Status: DISCONTINUED | OUTPATIENT
Start: 2019-08-05 | End: 2019-08-05 | Stop reason: SURG

## 2019-08-05 RX ORDER — KETOROLAC TROMETHAMINE 30 MG/ML
30 INJECTION, SOLUTION INTRAMUSCULAR; INTRAVENOUS EVERY 6 HOURS
Status: DISCONTINUED | OUTPATIENT
Start: 2019-08-05 | End: 2019-08-06 | Stop reason: HOSPADM

## 2019-08-05 RX ORDER — DEXAMETHASONE SODIUM PHOSPHATE 4 MG/ML
4 INJECTION, SOLUTION INTRA-ARTICULAR; INTRALESIONAL; INTRAMUSCULAR; INTRAVENOUS; SOFT TISSUE
Status: DISCONTINUED | OUTPATIENT
Start: 2019-08-05 | End: 2019-08-06 | Stop reason: HOSPADM

## 2019-08-05 RX ORDER — METOCLOPRAMIDE HYDROCHLORIDE 5 MG/ML
INJECTION INTRAMUSCULAR; INTRAVENOUS PRN
Status: DISCONTINUED | OUTPATIENT
Start: 2019-08-05 | End: 2019-08-05 | Stop reason: SURG

## 2019-08-05 RX ORDER — AMOXICILLIN 250 MG
1 CAPSULE ORAL
Status: DISCONTINUED | OUTPATIENT
Start: 2019-08-05 | End: 2019-08-06 | Stop reason: HOSPADM

## 2019-08-05 RX ORDER — HYDROMORPHONE HYDROCHLORIDE 1 MG/ML
0.4 INJECTION, SOLUTION INTRAMUSCULAR; INTRAVENOUS; SUBCUTANEOUS
Status: DISCONTINUED | OUTPATIENT
Start: 2019-08-05 | End: 2019-08-05 | Stop reason: HOSPADM

## 2019-08-05 RX ORDER — ONDANSETRON 2 MG/ML
4 INJECTION INTRAMUSCULAR; INTRAVENOUS EVERY 4 HOURS PRN
Status: DISCONTINUED | OUTPATIENT
Start: 2019-08-05 | End: 2019-08-06 | Stop reason: HOSPADM

## 2019-08-05 RX ORDER — TRANEXAMIC ACID 100 MG/ML
INJECTION, SOLUTION INTRAVENOUS PRN
Status: DISCONTINUED | OUTPATIENT
Start: 2019-08-05 | End: 2019-08-05 | Stop reason: SURG

## 2019-08-05 RX ORDER — KETOROLAC TROMETHAMINE 30 MG/ML
INJECTION, SOLUTION INTRAMUSCULAR; INTRAVENOUS
Status: DISCONTINUED | OUTPATIENT
Start: 2019-08-05 | End: 2019-08-05 | Stop reason: HOSPADM

## 2019-08-05 RX ORDER — ACETAMINOPHEN 500 MG
TABLET ORAL
Status: COMPLETED
Start: 2019-08-05 | End: 2019-08-05

## 2019-08-05 RX ORDER — MEPERIDINE HYDROCHLORIDE 25 MG/ML
12.5 INJECTION INTRAMUSCULAR; INTRAVENOUS; SUBCUTANEOUS
Status: DISCONTINUED | OUTPATIENT
Start: 2019-08-05 | End: 2019-08-05 | Stop reason: HOSPADM

## 2019-08-05 RX ORDER — SCOLOPAMINE TRANSDERMAL SYSTEM 1 MG/1
1 PATCH, EXTENDED RELEASE TRANSDERMAL
Status: DISCONTINUED | OUTPATIENT
Start: 2019-08-05 | End: 2019-08-06 | Stop reason: HOSPADM

## 2019-08-05 RX ORDER — SODIUM CHLORIDE, SODIUM LACTATE, POTASSIUM CHLORIDE, CALCIUM CHLORIDE 600; 310; 30; 20 MG/100ML; MG/100ML; MG/100ML; MG/100ML
INJECTION, SOLUTION INTRAVENOUS CONTINUOUS
Status: DISCONTINUED | OUTPATIENT
Start: 2019-08-05 | End: 2019-08-05 | Stop reason: HOSPADM

## 2019-08-05 RX ORDER — OXYCODONE HYDROCHLORIDE 10 MG/1
10 TABLET ORAL
Status: DISCONTINUED | OUTPATIENT
Start: 2019-08-05 | End: 2019-08-06 | Stop reason: HOSPADM

## 2019-08-05 RX ORDER — POLYETHYLENE GLYCOL 3350 17 G/17G
1 POWDER, FOR SOLUTION ORAL 2 TIMES DAILY PRN
Status: DISCONTINUED | OUTPATIENT
Start: 2019-08-05 | End: 2019-08-06 | Stop reason: HOSPADM

## 2019-08-05 RX ORDER — AMOXICILLIN 250 MG
1 CAPSULE ORAL NIGHTLY
Status: DISCONTINUED | OUTPATIENT
Start: 2019-08-05 | End: 2019-08-06 | Stop reason: HOSPADM

## 2019-08-05 RX ORDER — ONDANSETRON 2 MG/ML
INJECTION INTRAMUSCULAR; INTRAVENOUS PRN
Status: DISCONTINUED | OUTPATIENT
Start: 2019-08-05 | End: 2019-08-05 | Stop reason: SURG

## 2019-08-05 RX ORDER — ACETAMINOPHEN 500 MG
1000 TABLET ORAL ONCE
Status: COMPLETED | OUTPATIENT
Start: 2019-08-05 | End: 2019-08-05

## 2019-08-05 RX ORDER — GABAPENTIN 300 MG/1
300 CAPSULE ORAL ONCE
Status: COMPLETED | OUTPATIENT
Start: 2019-08-05 | End: 2019-08-05

## 2019-08-05 RX ORDER — GABAPENTIN 300 MG/1
300 CAPSULE ORAL 2 TIMES DAILY
Status: DISCONTINUED | OUTPATIENT
Start: 2019-08-05 | End: 2019-08-06 | Stop reason: HOSPADM

## 2019-08-05 RX ORDER — MIDAZOLAM HYDROCHLORIDE 1 MG/ML
1 INJECTION INTRAMUSCULAR; INTRAVENOUS
Status: DISCONTINUED | OUTPATIENT
Start: 2019-08-05 | End: 2019-08-05 | Stop reason: HOSPADM

## 2019-08-05 RX ORDER — OXYCODONE HCL 5 MG/5 ML
10 SOLUTION, ORAL ORAL
Status: COMPLETED | OUTPATIENT
Start: 2019-08-05 | End: 2019-08-05

## 2019-08-05 RX ORDER — OXYCODONE HCL 10 MG/1
10 TABLET, FILM COATED, EXTENDED RELEASE ORAL ONCE
Status: COMPLETED | OUTPATIENT
Start: 2019-08-05 | End: 2019-08-05

## 2019-08-05 RX ADMIN — FENTANYL CITRATE 50 MCG: 50 INJECTION INTRAMUSCULAR; INTRAVENOUS at 11:07

## 2019-08-05 RX ADMIN — CEFAZOLIN 2.7 G: 1 INJECTION, POWDER, FOR SOLUTION INTRAVENOUS at 09:22

## 2019-08-05 RX ADMIN — TRANEXAMIC ACID 1000 MG: 100 INJECTION, SOLUTION INTRAVENOUS at 09:28

## 2019-08-05 RX ADMIN — LIDOCAINE HYDROCHLORIDE 0.5 ML: 10 INJECTION, SOLUTION INFILTRATION; PERINEURAL at 08:33

## 2019-08-05 RX ADMIN — OXYCODONE HYDROCHLORIDE 10 MG: 10 TABLET, FILM COATED, EXTENDED RELEASE ORAL at 08:43

## 2019-08-05 RX ADMIN — ACETAMINOPHEN 1000 MG: 500 TABLET, FILM COATED ORAL at 08:42

## 2019-08-05 RX ADMIN — OXYCODONE HYDROCHLORIDE 10 MG: 10 TABLET ORAL at 20:33

## 2019-08-05 RX ADMIN — FENTANYL CITRATE 50 MCG: 50 INJECTION INTRAMUSCULAR; INTRAVENOUS at 11:15

## 2019-08-05 RX ADMIN — VANCOMYCIN HYDROCHLORIDE 1500 MG: 1 INJECTION, POWDER, LYOPHILIZED, FOR SOLUTION INTRAVENOUS at 08:29

## 2019-08-05 RX ADMIN — KETOROLAC TROMETHAMINE 30 MG: 30 INJECTION, SOLUTION INTRAMUSCULAR at 16:53

## 2019-08-05 RX ADMIN — OXYCODONE HYDROCHLORIDE 10 MG: 10 TABLET ORAL at 14:23

## 2019-08-05 RX ADMIN — OXYCODONE HYDROCHLORIDE 10 MG: 5 SOLUTION ORAL at 11:05

## 2019-08-05 RX ADMIN — ONDANSETRON 4 MG: 2 INJECTION INTRAMUSCULAR; INTRAVENOUS at 10:24

## 2019-08-05 RX ADMIN — ACETAMINOPHEN 1000 MG: 500 TABLET, FILM COATED ORAL at 14:23

## 2019-08-05 RX ADMIN — HYDROMORPHONE HYDROCHLORIDE 0.5 MG: 1 INJECTION, SOLUTION INTRAMUSCULAR; INTRAVENOUS; SUBCUTANEOUS at 13:23

## 2019-08-05 RX ADMIN — FENTANYL CITRATE 100 MCG: 50 INJECTION, SOLUTION INTRAMUSCULAR; INTRAVENOUS at 09:02

## 2019-08-05 RX ADMIN — VANCOMYCIN 1.5 G: 1 INJECTION, SOLUTION INTRAVENOUS at 08:45

## 2019-08-05 RX ADMIN — SODIUM CHLORIDE 2 G: 9 INJECTION, SOLUTION INTRAVENOUS at 17:12

## 2019-08-05 RX ADMIN — SODIUM CHLORIDE, POTASSIUM CHLORIDE, SODIUM LACTATE AND CALCIUM CHLORIDE: 600; 310; 30; 20 INJECTION, SOLUTION INTRAVENOUS at 08:35

## 2019-08-05 RX ADMIN — FENTANYL CITRATE 25 MCG: 50 INJECTION, SOLUTION INTRAMUSCULAR; INTRAVENOUS at 11:49

## 2019-08-05 RX ADMIN — METOCLOPRAMIDE 10 MG: 5 INJECTION, SOLUTION INTRAMUSCULAR; INTRAVENOUS at 10:25

## 2019-08-05 RX ADMIN — HYDROMORPHONE HYDROCHLORIDE 0.5 MG: 1 INJECTION, SOLUTION INTRAMUSCULAR; INTRAVENOUS; SUBCUTANEOUS at 17:47

## 2019-08-05 RX ADMIN — LIDOCAINE HYDROCHLORIDE 60 ML: 20 INJECTION, SOLUTION EPIDURAL; INFILTRATION; INTRACAUDAL; PERINEURAL at 09:04

## 2019-08-05 RX ADMIN — ONDANSETRON 4 MG: 2 INJECTION INTRAMUSCULAR; INTRAVENOUS at 17:42

## 2019-08-05 RX ADMIN — DOCUSATE SODIUM 100 MG: 100 CAPSULE, LIQUID FILLED ORAL at 16:57

## 2019-08-05 RX ADMIN — ROCURONIUM BROMIDE 50 MG: 10 INJECTION INTRAVENOUS at 09:04

## 2019-08-05 RX ADMIN — SUGAMMADEX 200 MG: 100 INJECTION, SOLUTION INTRAVENOUS at 10:46

## 2019-08-05 RX ADMIN — GABAPENTIN 300 MG: 300 CAPSULE ORAL at 08:43

## 2019-08-05 RX ADMIN — SENNOSIDES,DOCUSATE SODIUM 1 TABLET: 8.6; 5 TABLET, FILM COATED ORAL at 20:33

## 2019-08-05 RX ADMIN — PROPOFOL 180 MG: 10 INJECTION, EMULSION INTRAVENOUS at 09:04

## 2019-08-05 ASSESSMENT — COGNITIVE AND FUNCTIONAL STATUS - GENERAL
SUGGESTED CMS G CODE MODIFIER MOBILITY: CH
MOBILITY SCORE: 24
SUGGESTED CMS G CODE MODIFIER DAILY ACTIVITY: CH
DAILY ACTIVITIY SCORE: 24
WALKING IN HOSPITAL ROOM: A LITTLE
CLIMB 3 TO 5 STEPS WITH RAILING: A LITTLE
SUGGESTED CMS G CODE MODIFIER MOBILITY: CJ
MOVING TO AND FROM BED TO CHAIR: A LITTLE
MOBILITY SCORE: 21

## 2019-08-05 ASSESSMENT — COPD QUESTIONNAIRES
DURING THE PAST 4 WEEKS HOW MUCH DID YOU FEEL SHORT OF BREATH: NONE/LITTLE OF THE TIME
COPD SCREENING SCORE: 1
IN THE PAST 12 MONTHS DO YOU DO LESS THAN YOU USED TO BECAUSE OF YOUR BREATHING PROBLEMS: DISAGREE/UNSURE
HAVE YOU SMOKED AT LEAST 100 CIGARETTES IN YOUR ENTIRE LIFE: NO/DON'T KNOW
DO YOU EVER COUGH UP ANY MUCUS OR PHLEGM?: NO/ONLY WITH OCCASIONAL COLDS OR INFECTIONS

## 2019-08-05 ASSESSMENT — LIFESTYLE VARIABLES
CONSUMPTION TOTAL: NEGATIVE
EVER FELT BAD OR GUILTY ABOUT YOUR DRINKING: NO
ON A TYPICAL DAY WHEN YOU DRINK ALCOHOL HOW MANY DRINKS DO YOU HAVE: 1
EVER HAD A DRINK FIRST THING IN THE MORNING TO STEADY YOUR NERVES TO GET RID OF A HANGOVER: NO
TOTAL SCORE: 0
AVERAGE NUMBER OF DAYS PER WEEK YOU HAVE A DRINK CONTAINING ALCOHOL: 2
HAVE PEOPLE ANNOYED YOU BY CRITICIZING YOUR DRINKING: NO
HOW MANY TIMES IN THE PAST YEAR HAVE YOU HAD 5 OR MORE DRINKS IN A DAY: 0
TOTAL SCORE: 0
ALCOHOL_USE: NO
HAVE YOU EVER FELT YOU SHOULD CUT DOWN ON YOUR DRINKING: NO
TOTAL SCORE: 0

## 2019-08-05 ASSESSMENT — PATIENT HEALTH QUESTIONNAIRE - PHQ9
SUM OF ALL RESPONSES TO PHQ9 QUESTIONS 1 AND 2: 0
2. FEELING DOWN, DEPRESSED, IRRITABLE, OR HOPELESS: NOT AT ALL
1. LITTLE INTEREST OR PLEASURE IN DOING THINGS: NOT AT ALL

## 2019-08-05 ASSESSMENT — GAIT ASSESSMENTS
DEVIATION: ANTALGIC;STEP TO;DECREASED BASE OF SUPPORT
GAIT LEVEL OF ASSIST: SUPERVISED
DISTANCE (FEET): 120
ASSISTIVE DEVICE: FRONT WHEEL WALKER

## 2019-08-05 ASSESSMENT — PAIN SCALES - GENERAL: PAIN_LEVEL: 4

## 2019-08-05 NOTE — ANESTHESIA PREPROCEDURE EVALUATION
Relevant Problems   NEURO   (+) History of rheumatic fever      CARDIAC   (+) Varicose vein of leg       Physical Exam    Airway   Mallampati: II  TM distance: >3 FB  Neck ROM: full       Cardiovascular - normal exam  Rhythm: regular  Rate: normal  (-) murmur     Dental - normal exam         Pulmonary - normal exam  Breath sounds clear to auscultation     Abdominal    Neurological - normal exam                 Anesthesia Plan    ASA 2       Plan - general       Airway plan will be ETT        Induction: intravenous    Postoperative Plan: Postoperative administration of opioids is intended.    Pertinent diagnostic labs and testing reviewed    Informed Consent:    Anesthetic plan and risks discussed with patient.    Use of blood products discussed with: patient whom consented to blood products.

## 2019-08-05 NOTE — OR NURSING
1053: To PACU post left total hip arthroplasty. Pt is extubated, breathing is spontaneous and unlabored. Palpable DP pulse observed.   1101: Pt states pain is at least 8/10, see MAR.  1138: Pain not quite tolerable, no nausea.  1158: Pain is tolerable, meets criteria for transfer to room.

## 2019-08-05 NOTE — OP REPORT
DATE OF OPERATION:8/5/2019      PREOPERATIVE DIAGNOSES:   1. Osteoarthritis, left hip.       POSTOPERATIVE DIAGNOSES:   1. Osteoarthritis, left hip.       PROCEDURES:   1. Left total hip arthroplasty.   2. Intraoperative block for relief of extensive postoperative pain    SURGEON: Luan Ferrer MD   ASSISTANT: Douglas Hicks    ANESTHESIA: General,Timothy Gibson M.D.    IMPLANTS: Jackson size 50 Trilogy cup with a Biomet xr 123 size 9 mL taper stem with   a 32 mm 0 ceramic head with and a 32 mm flat cross-linked polyethylene.     WEIGHTBEARING: As tolerated.     FINDINGS:   1. Advanced chondromalacic changes of the femoral head.      PROCEDURE IN DETAIL:   The patient was seen in the preop holding area and consent reviewed. The left lower extremity was marked with patient. Patient was taken to the operating room where general anesthesia was given. The body-exhaust uniforms were utilized. Perioperative antibiotics were given The patient then was positioned in a lateral decubitus position with axillary roll and Stulberg.  All bony prominences were padded. The left lower extremity was addressed with a Hibiclens, alcohol and ChloraPrep. Air isolation draping was utilized. Then sterile draping technique was performed.  An appropriate timeout was  undertaken.        The surgery was initiated with a minimally invasive posterior approach. Skin   and subcutaneous tissue were incised. Hemostasis was obtained. The fascia   jose enrique and gluteal fascia were split. Short external rotators were taken down   as a single layer. T-capsular incision was made.  The hip was dislocated.        Intraoperative periarticular and extensive austen-incisional block was undertaken. This was undertaken with a combination of Toradol ropivacaine and morphine to treat postoperative pain. This was done extensively throughout the pericapsular tissues, followed by an extensive block in the peritrochanteric tissues, followed by an extensive tissue infusion  into the subcutaneous tissues. This obtained a significant intraoperative improvement in anesthesia requirements as well as pain management and vitals stabilization.    Following this, the acetabulum was addressed with Labral excision and serial reaming.  Next, the acetabulum was reverse reamed with bone graft and the Trilogy cup inserted. The polyethylene was inserted after pulse lavage and the locking mechanism checked.     At this point, the femur was addressed.  Cookie cutter was used.  Then serial reaming   and broaching was undertaken.  Trial reductions were undertaken with reproduction and stabilization in the patient's length and offset. The final stem was then inserted.  A trial reductions repeated and neck length and head chosen.  After pulse lavage, cleaning and drying the Pickering taper the head was inserted and the hip was relocated, full extension, external rotation stability as well as sleeping stability to 80 degrees and flexed 90 stability to 60 degrees was noted. Pulse lavage was repeated. T-capsular closure was done with #2 FiberWire. Short external rotator repair over bone bridges with #2 FiberWire.    The fascia jose enrique repaired with #1 Quill barbed suture  The subcutaneous 0 and 2-0, And then closure of the skin. Patient was placed into a sterile bandage.  The patient was  awoken from anesthetic and taken to the recovery room, tolerated the procedure very   well with no signs of complications.

## 2019-08-05 NOTE — PROGRESS NOTES
Complaining of left eye scratching.  No noted redness or object in eye.  Cold compress applied with no result.  Consulted with Dr. Gibson from anesthesia.  Instructed to keep eye closed to minimize eye movement.

## 2019-08-05 NOTE — FLOWSHEET NOTE
08/05/19 1314   Education   Education Yes - Pt. / Family has been Instructed in use of Respiratory Equipment   Incentive Spirometry Group   Incentive Spirometry Instruction Yes   Breathing Exercises Yes   Incentive Spirometer Volume 3000 mL   Incentive Spirometer Date Last Changed 08/05/19   Oximetry   #Pulse Oximetry (Single Determination) Yes   Oxygen   Pulse Oximetry 97 %   O2 Daily Delivery Respiratory  Room Air with O2 Available

## 2019-08-05 NOTE — THERAPY
"Physical Therapy Evaluation completed.   Bed Mobility:  Supine to Sit: Minimal Assist  Transfers: Sit to Stand: Supervised  Gait: Level Of Assist: Supervised with Front-Wheel Walker       Plan of Care: Will benefit from Physical Therapy 5 times per week  Discharge Recommendations: Equipment: Front-Wheel Walker. Recommend outpatient physical therapy services to address higher level deficits.    See \"Rehab Therapy-Acute\" Patient Summary Report for complete documentation.     Pt was seen s/p L KATARINA with WBAT orders for LLE and posterior hip precautions. Pt presented with impaired ambulation, balance, and pain. Pt was able to demonstrate Min to SPV for all functional mobility at this time and was primairly limited in bed mobility due to pain. Pt provided with cues and demo for appropriate gait mechanics with using FWW and was able to improve in session. Pt would like to attempt lofstrand cruthces and stairs tomorrow. Pt educated on posterior hip precautions, LE elevation, and positioning. Pt educated on supine ther-ex and able to demonstrate appropriate mechaincs. Pt will be seen for stair training upon d/c home tomorrow. Anticipate pt to d/c home with spouse support and recs for OP therapy services for optimal progression.   "

## 2019-08-05 NOTE — ANESTHESIA PROCEDURE NOTES
Airway  Date/Time: 8/5/2019 9:05 AM  Performed by: Timothy Gibson M.D.  Authorized by: Timothy Gibson M.D.     Location:  OR  Urgency:  Elective  Difficult Airway: No    Indications for Airway Management:  Anesthesia  Spontaneous Ventilation: absent    Sedation Level:  Deep  Preoxygenated: Yes    Patient Position:  Sniffing  Final Airway Type:  Endotracheal airway  Final Endotracheal Airway:  ETT  Cuffed: Yes    Technique Used for Successful ETT Placement:  Direct laryngoscopy  Insertion Site:  Oral  Blade Type:  Jessika  Laryngoscope Blade/Videolaryngoscope Blade Size:  3  ETT Size (mm):  7.5  Measured from:  Lips  ETT to Lips (cm):  21  Placement Verified by: auscultation and capnometry    Cormack-Lehane Classification:  Grade IIb - view of arytenoids or posterior of glottis only  Number of Attempts at Approach:  1

## 2019-08-05 NOTE — ANESTHESIA POSTPROCEDURE EVALUATION
Patient: Janett Mcnulty    Procedure Summary     Date:  08/05/19 Room / Location:   OR 05 / SURGERY [facility]    Anesthesia Start:  0900 Anesthesia Stop:  1058    Procedure:  ARTHROPLASTY, HIP, TOTAL (Left Hip) Diagnosis:  (Left Hip Avascular Necrosis with Prior Surgery)    Surgeon:  Luan Ferrer M.D. Responsible Provider:  Timothy Gibson M.D.    Anesthesia Type:  general ASA Status:  2          Final Anesthesia Type: general  Last vitals  BP   Blood Pressure: 126/63    Temp   36.8 °C (98.2 °F)    Pulse   Pulse: 84, Heart Rate (Monitored): 87   Resp   14    SpO2   97 %      Anesthesia Post Evaluation    Patient location during evaluation: PACU  Patient participation: complete - patient participated  Level of consciousness: awake and alert  Pain score: 4    Airway patency: patent  Anesthetic complications: no  Cardiovascular status: hemodynamically stable  Respiratory status: acceptable  Hydration status: euvolemic    PONV: none           Nurse Pain Score: 7 (NPRS)

## 2019-08-06 VITALS
WEIGHT: 204.59 LBS | TEMPERATURE: 98.7 F | SYSTOLIC BLOOD PRESSURE: 100 MMHG | OXYGEN SATURATION: 99 % | RESPIRATION RATE: 18 BRPM | HEIGHT: 67 IN | DIASTOLIC BLOOD PRESSURE: 64 MMHG | HEART RATE: 88 BPM | BODY MASS INDEX: 32.11 KG/M2

## 2019-08-06 PROCEDURE — A9270 NON-COVERED ITEM OR SERVICE: HCPCS | Performed by: ORTHOPAEDIC SURGERY

## 2019-08-06 PROCEDURE — 97165 OT EVAL LOW COMPLEX 30 MIN: CPT

## 2019-08-06 PROCEDURE — 700112 HCHG RX REV CODE 229: Performed by: ORTHOPAEDIC SURGERY

## 2019-08-06 PROCEDURE — 700105 HCHG RX REV CODE 258: Performed by: ORTHOPAEDIC SURGERY

## 2019-08-06 PROCEDURE — 700111 HCHG RX REV CODE 636 W/ 250 OVERRIDE (IP): Performed by: ORTHOPAEDIC SURGERY

## 2019-08-06 PROCEDURE — 700102 HCHG RX REV CODE 250 W/ 637 OVERRIDE(OP): Performed by: ORTHOPAEDIC SURGERY

## 2019-08-06 PROCEDURE — 97110 THERAPEUTIC EXERCISES: CPT

## 2019-08-06 PROCEDURE — 97116 GAIT TRAINING THERAPY: CPT

## 2019-08-06 RX ADMIN — KETOROLAC TROMETHAMINE 30 MG: 30 INJECTION, SOLUTION INTRAMUSCULAR at 06:16

## 2019-08-06 RX ADMIN — OXYCODONE HYDROCHLORIDE 10 MG: 10 TABLET ORAL at 00:25

## 2019-08-06 RX ADMIN — GABAPENTIN 300 MG: 300 CAPSULE ORAL at 06:15

## 2019-08-06 RX ADMIN — SODIUM CHLORIDE 2 G: 9 INJECTION, SOLUTION INTRAVENOUS at 00:26

## 2019-08-06 RX ADMIN — ACETAMINOPHEN 1000 MG: 500 TABLET, FILM COATED ORAL at 00:24

## 2019-08-06 RX ADMIN — DOCUSATE SODIUM 100 MG: 100 CAPSULE, LIQUID FILLED ORAL at 06:15

## 2019-08-06 RX ADMIN — KETOROLAC TROMETHAMINE 30 MG: 30 INJECTION, SOLUTION INTRAMUSCULAR at 00:25

## 2019-08-06 RX ADMIN — OMEPRAZOLE 40 MG: 20 CAPSULE, DELAYED RELEASE ORAL at 06:14

## 2019-08-06 RX ADMIN — ACETAMINOPHEN 1000 MG: 500 TABLET, FILM COATED ORAL at 06:15

## 2019-08-06 RX ADMIN — OXYCODONE HYDROCHLORIDE 10 MG: 10 TABLET ORAL at 07:42

## 2019-08-06 RX ADMIN — ASPIRIN 81 MG: 81 TABLET, COATED ORAL at 00:25

## 2019-08-06 ASSESSMENT — COGNITIVE AND FUNCTIONAL STATUS - GENERAL
SUGGESTED CMS G CODE MODIFIER DAILY ACTIVITY: CJ
MOBILITY SCORE: 23
TOILETING: A LITTLE
CLIMB 3 TO 5 STEPS WITH RAILING: A LITTLE
DRESSING REGULAR LOWER BODY CLOTHING: A LITTLE
SUGGESTED CMS G CODE MODIFIER MOBILITY: CI
DAILY ACTIVITIY SCORE: 21
HELP NEEDED FOR BATHING: A LITTLE

## 2019-08-06 ASSESSMENT — GAIT ASSESSMENTS
ASSISTIVE DEVICE: CANADIAN (LOFSTRAND) CRUTCHES
GAIT LEVEL OF ASSIST: SUPERVISED
DISTANCE (FEET): 200
DEVIATION: DECREASED BASE OF SUPPORT

## 2019-08-06 ASSESSMENT — ACTIVITIES OF DAILY LIVING (ADL): TOILETING: INDEPENDENT

## 2019-08-06 NOTE — DISCHARGE INSTRUCTIONS
1. Discharge home on crutches/ walker   2. Discharge home on home meds per home doses   3. Discharge home on Percocet, ASA, and Outpt PT.   4. Follow up [facility] 10-14 days   5. Call for Fevers, drainage, redness, shortness of breath, or increasing lower extremity swelling particularly in the calf.   6. Start Aspirin 325mg twice a day. Use for thirty days.   7. Leave Bandage in place, call if drainage or wetness.    Discharge Instructions    Discharged to home by car with relative. Discharged via wheelchair, hospital escort: Yes.  Special equipment needed: Crutches    Be sure to schedule a follow-up appointment with your primary care doctor or any specialists as instructed.     Discharge Plan:   Influenza Vaccine Indication: Indicated: Not available from distributor/    I understand that a diet low in cholesterol, fat, and sodium is recommended for good health. Unless I have been given specific instructions below for another diet, I accept this instruction as my diet prescription.   Other diet: Regular    Special Instructions: Discharge instructions for the Orthopedic Patient    Follow up with Primary Care Physician within 2 weeks of discharge to home, regarding:  Review of medications and diagnostic testing.  Surveillance for medical complications.  Workup and treatment of osteoporosis, if appropriate.     -Is this a Joint Replacement patient? Yes   Total Joint Hip Replacement Discharge Instructions    Pain  - The goal is to slowly wean off the prescription pain medicine.  - Ice can be used for pain control.  20 minutes at a time is recommended, and never directly against your skin or incision.  - Most patients are off the pain pills by 3 weeks; others may require a low level of pain medications for many months. If your pain continues to be severe, follow up with your physician.  Infection  Deep hip joint infections that require removal of the prostheses occur in less than 0.1% of patients. Lesser  infections in the skin (cellulites) are more common and much more easily treated.  - Keep the incision as clean and dry as possible.  - Always wash your hands before touching your incision.  - Skin infections tend to develop around 7-10 days after surgery, most can be treated with oral antibiotics.  - Dental Care should be delayed for 3 months after surgery, your surgeon recommends taking a dose of antibiotics 1 hour prior to any dental procedure.  After 2 years, most surgeons recommend antibiotics only before an extensive procedure.  Ask your surgeon what he recommends.  - Signs and symptoms of infection can include:  low grade fever, redness, pain, swelling and drainage from your incision.  Notify your surgeon immediately if you develop any of these symptoms.  Post op Disturbances  - Bowel habits - constipation is extremely common and is caused by a combination of anesthesia, lack of mobility and pain medicine.  Use stool softeners or laxatives if necessary. It is important not to ignore this problem, as bowel obstructions can be a serious complication after joint replacement surgery.  - Mood/Energy Level - Many patients experience a lack of energy and endurance for up to 2-3 months after surgery.  Some may also feel down and can even become depressed.  This is likely due to the postoperative anemia, change in activity level, lack of sleep, pain medicine and just the emotional reaction to the surgery itself that is a big disruption in a person’s life.  This usually passes.  If symptoms persist, follow up with your primary physician.  - Returning to work - Your surgeon will give you more specific instructions.  Generally, if you work a sedentary job requiring little standing or walking, most patients may return within 2-6 weeks.  Manual labor jobs involving walking, lifting and standing may take 3-4 months.  Your surgeon’s office can provide a release to part-time or light duty work early on in your recovery and  progress you to full duty as able.  - Driving - You can begin driving an automatic shift car in 4 to 8 weeks, provided you are no longer taking narcotic pain medication. If you have a stick-shift car and your right hip was replaced, do not begin driving until your doctor says you can.   - Avoiding falls -  throw rugs and tack down loose carpeting.  Be aware of floor hazards such as pets, small objects or uneven surfaces.   -  Airport Metal Detectors - The sensitivity of metal detectors varies and it is likely that your prosthesis will cause an alarm. Inform the  that you have an artificial joint.  Diet  - Resume your normal diet as tolerated.  - It is important to achieve a healthy nutritional status by eating a well balanced diet on a regular basis.  - Your physician may recommend that you take iron and vitamin supplements.   - Continue to drink plenty of fluids.  Shower/Bathing  - You may shower as soon as you get home from the hospital unless otherwise instructed.  - Keep your incision out of water.  To keep the incision dry when showering, cover it with a plastic bag or plastic wrap.  - Pat incision dry if it gets wet.  Don’t rub.  - Do not submerge in a bath until staples are out and the incision is completely healed. (Approximately 6-8 weeks after surgery).  Dressing Change:  Procedure (if recommended by your physician)  - Wash hands.  - Open all dressing change materials.  - Remove old dressing and discard.  - Inspect incision for redness, increase in clear drainage, yellow/green drainage, odor and surrounding skin hot to touch.  -  ABD (large gauze) pad by one corner and lay over the incision.  Be careful not to touch the inside of the dressing that will lay over the incision.  - Secure in place as instructed (Ace wrap or tape).    Swelling/Bruising  - Swelling is normal after hip replacement and can involve the thigh, knee, calf and foot.  - Swelling can last from 3-6  months.  - Elevate your leg higher than your heart while reclining.  The first week you are home you should elevate your leg an equal amount of time, as you are active.    - Anti-inflammatory pills can be taken once you have stopped the blood thinners.  - The swelling is usually worse after you go home since you are upright for longer periods of time.  - Bruising is common and can involve the entire leg including the thigh, calf and even foot.  Bruising often does not appear until after you arrive home and it can be quite dramatic- purple, black, green.  The bruising you can see is not usually concerning and will subside without any treatment.      Blood Clot Prevention  Blood clots in the legs and the less common, but frightening, clots that travel to the lungs are a real focus of our preventative. Most patients are at standard risk for them, but those patients who are at higher risk include people who have had previous clots, a family history of clotting, smoking, diabetes, obesity, advanced age, use of estrogen and a sedentary lifestyle.    - Signs of blood clots in legs - Swelling in thigh, calf or ankle that does not go down with elevation.  Pain, heat and tenderness in calf, back of calf or groin area.  NOTE: blood clots can occur in either leg.  - You have been receiving anticoagulant therapy (blood thinners) in the hospital and you may be instructed to continue at home depending on your risk factors.  - Your risk for developing a clot continues for up to 2-3 months after surgery.  You should avoid prolonged sitting and dehydration during that time (long air trips and car trips).  If you do take a trip during this time, please get up and move around every 1- 1.5 hours.  - If you are prescribed blood thinning medication for home, follow instructions as directed. (Handouts provided if applicable).      Activity    Once you get home, you should stay active. The key is not to overdo it! While you can expect some  good days and some bad days, you should notice a gradual improvement over time you should notice a gradual improvement and a gradual increase in your endurance over the next 6 to 12 months.    - Weight Bearing - If you have undergone cemented or hybrid hip replacement, you can put some weight on the leg immediately using a cane or walker, and you should continue to use some support for 4 to 6 weeks to help the muscles recover.   - Sleeping Positions - Sleep on your back with your legs slightly apart or on your side with a regular pillow between your knees. Be sure to use the pillow for at least 6 weeks, or until your doctor says you can do without it. Sleeping on your stomach should be all right  - Sitting - For at least the first 3 months, sit only in chairs that have arms. Do not sit on low chairs, low stools, or reclining chairs. Do not cross your legs at the knees. The physical therapist will show you how to sit and stand from a chair, keeping your affected leg out in front of you. Get up and move around on a regular basis--at least once every hour.  - Walking - Walk as much as you like once your doctor gives you the go-ahead, but remember that walking is no substitute for your prescribed exercises. Walking with a pair of trekking poles is helpful and adds as much as 40% to the exercise you get when you walk  - Therapy may be needed in some cases, to strengthen your muscles and improve your gait (walking pattern).  This decision will be made at your post-operative appointment.  Follow your therapist recommended post-operative exercises (handout provided by Therapist).  - Swimming is also recommended; you can begin as soon as the sutures have been removed and the wound is healed, approximately 6 to 8 weeks after surgery. Using a pair of training fins may make swimming a more enjoyable and effective exercise.  - Other activities - Lower impact activities are preferred.  If you have specific questions, consult your  Surgeon.    - Sexual activity - Your surgeon can tell you when it should be safe to resume sexual activity.      When to Call the Doctor   Call the physician if:   - Fever over 100.5? F  - Increased pain, drainage, redness, odor or heat around the incision area  - Shaking chills  - Increased knee pain with activity and rest  - Increased pain in calf, tenderness or redness above or below the knee  - Increased swelling of calf, ankle, foot  - Sudden increased shortness of breath, sudden onset of chest pain, localized chest pain with coughing  - Incision opening  Or, if there are any questions or concerns about medications or care.       -Is this patient being discharged with medication to prevent blood clots?  Yes, Aspirin Aspirin, ASA oral tablets  What is this medicine?  ASPIRIN (AS pir in) is a pain reliever. It is used to treat mild pain and fever. This medicine is also used as directed by a doctor to prevent and to treat heart attacks, to prevent strokes, and to treat arthritis or inflammation.  This medicine may be used for other purposes; ask your health care provider or pharmacist if you have questions.  COMMON BRAND NAME(S): Aspir-Low, Aspir-Kiara, Aspirtab, Angel Advanced Aspirin, Angel Aspirin, Angel Aspirin Extra Strength, Angel Aspirin Plus, Angel Extra Strength, Angel Extra Strength Plus, Angel Genuine Aspirin, Angel Womens Aspirin, Bufferin, Bufferin Extra Strength, Bufferin Low Dose  What should I tell my health care provider before I take this medicine?  They need to know if you have any of these conditions:  -anemia  -asthma  -bleeding problems  -child with chickenpox, the flu, or other viral infection  -diabetes  -gout  -if you frequently drink alcohol containing drinks  -kidney disease  -liver disease  -low level of vitamin K  -lupus  -smoke tobacco  -stomach ulcers or other problems  -an unusual or allergic reaction to aspirin, tartrazine dye, other medicines, dyes, or preservatives  -pregnant or  trying to get pregnant  -breast-feeding  How should I use this medicine?  Take this medicine by mouth with a glass of water. Follow the directions on the package or prescription label. You can take this medicine with or without food. If it upsets your stomach, take it with food. Do not take your medicine more often than directed.  Talk to your pediatrician regarding the use of this medicine in children. While this drug may be prescribed for children as young as 12 years of age for selected conditions, precautions do apply. Children and teenagers should not use this medicine to treat chicken pox or flu symptoms unless directed by a doctor.  Patients over 65 years old may have a stronger reaction and need a smaller dose.  Overdosage: If you think you have taken too much of this medicine contact a poison control center or emergency room at once.  NOTE: This medicine is only for you. Do not share this medicine with others.  What if I miss a dose?  If you are taking this medicine on a regular schedule and miss a dose, take it as soon as you can. If it is almost time for your next dose, take only that dose. Do not take double or extra doses.  What may interact with this medicine?  Do not take this medicine with any of the following medications:  -cidofovir  -ketorolac  -probenecid  This medicine may also interact with the following medications:  -alcohol  -alendronate  -bismuth subsalicylate  -flavocoxid  -herbal supplements like feverfew, garlic, trina, ginkgo biloba, horse chestnut  -medicines for diabetes or glaucoma like acetazolamide, methazolamide  -medicines for gout  -medicines that treat or prevent blood clots like enoxaparin, heparin, ticlopidine, warfarin  -other aspirin and aspirin-like medicines  -NSAIDs, medicines for pain and inflammation, like ibuprofen or naproxen  -pemetrexed  -sulfinpyrazone  -varicella live vaccine  This list may not describe all possible interactions. Give your health care provider a  list of all the medicines, herbs, non-prescription drugs, or dietary supplements you use. Also tell them if you smoke, drink alcohol, or use illegal drugs. Some items may interact with your medicine.  What should I watch for while using this medicine?  If you are treating yourself for pain, tell your doctor or health care professional if the pain lasts more than 10 days, if it gets worse, or if there is a new or different kind of pain. Tell your doctor if you see redness or swelling. Also, check with your doctor if you have a fever that lasts for more than 3 days. Only take this medicine to prevent heart attacks or blood clotting if prescribed by your doctor or health care professional.  Do not take aspirin or aspirin-like medicines with this medicine. Too much aspirin can be dangerous. Always read the labels carefully.  This medicine can irritate your stomach or cause bleeding problems. Do not smoke cigarettes or drink alcohol while taking this medicine. Do not lie down for 30 minutes after taking this medicine to prevent irritation to your throat.  If you are scheduled for any medical or dental procedure, tell your healthcare provider that you are taking this medicine. You may need to stop taking this medicine before the procedure.  This medicine may be used to treat migraines. If you take migraine medicines for 10 or more days a month, your migraines may get worse. Keep a diary of headache days and medicine use. Contact your healthcare professional if your migraine attacks occur more frequently.  What side effects may I notice from receiving this medicine?  Side effects that you should report to your doctor or health care professional as soon as possible:  -allergic reactions like skin rash, itching or hives, swelling of the face, lips, or tongue  -breathing problems  -changes in hearing, ringing in the ears  -confusion  -general ill feeling or flu-like symptoms  -pain on swallowing  -redness, blistering, peeling  or loosening of the skin, including inside the mouth or nose  -signs and symptoms of bleeding such as bloody or black, tarry stools; red or dark-brown urine; spitting up blood or brown material that looks like coffee grounds; red spots on the skin; unusual bruising or bleeding from the eye, gums, or nose  -trouble passing urine or change in the amount of urine  -unusually weak or tired  -yellowing of the eyes or skin  Side effects that usually do not require medical attention (report to your doctor or health care professional if they continue or are bothersome):  -diarrhea or constipation  -headache  -nausea, vomiting  -stomach gas, heartburn  This list may not describe all possible side effects. Call your doctor for medical advice about side effects. You may report side effects to FDA at 4-145-FDA-8092.  Where should I keep my medicine?  Keep out of the reach of children.  Store at room temperature between 15 and 30 degrees C (59 and 86 degrees F). Protect from heat and moisture. Do not use this medicine if it has a strong vinegar smell. Throw away any unused medicine after the expiration date.  NOTE: This sheet is a summary. It may not cover all possible information. If you have questions about this medicine, talk to your doctor, pharmacist, or health care provider.  © 2018 [employer]/Gold Standard (2014-08-19 11:30:31)      · Is patient discharged on Warfarin / Coumadin?   No     Depression / Suicide Risk    As you are discharged from this [facility] facility, it is important to learn how to keep safe from harming yourself.    Recognize the warning signs:  · Abrupt changes in personality, positive or negative- including increase in energy   · Giving away possessions  · Change in eating patterns- significant weight changes-  positive or negative  · Change in sleeping patterns- unable to sleep or sleeping all the time   · Unwillingness or inability to communicate  · Depression  · Unusual sadness, discouragement  and loneliness  · Talk of wanting to die  · Neglect of personal appearance   · Rebelliousness- reckless behavior  · Withdrawal from people/activities they love  · Confusion- inability to concentrate     If you or a loved one observes any of these behaviors or has concerns about self-harm, here's what you can do:  · Talk about it- your feelings and reasons for harming yourself  · Remove any means that you might use to hurt yourself (examples: pills, rope, extension cords, firearm)  · Get professional help from the community (Mental Health, Substance Abuse, psychological counseling)  · Do not be alone:Call your Safe Contact- someone whom you trust who will be there for you.  · Call your local CRISIS HOTLINE 877-4296 or 983-505-8139  · Call your local Children's Mobile Crisis Response Team [place] (543) 712-8614 or www.[employer]  · Call the toll free National Suicide Prevention Hotlines   · National Suicide Prevention Lifeline 691-502-SKAM (2368)  · National Hope Line Network 800-SUICIDE (174-7356)

## 2019-08-06 NOTE — PROGRESS NOTES
Progress Note               Author: Luan Ferrer Date & Time created: 2019  8:22 AM     Interval History:  No complaints  Pain controlled  Tolerating PO    Review of Systems:  ROS    Physical Exam:  Physical Exam  LE with neg homans, no sign of DVT  Bandage stable with no signs of drainage  ABD soft  No complaints of orthostasis  Labs:          No results for input(s): SODIUM, POTASSIUM, CHLORIDE, CO2, BUN, CREATININE, MAGNESIUM, PHOSPHORUS, CALCIUM in the last 72 hours.  No results for input(s): ALTSGPT, ASTSGOT, ALKPHOSPHAT, TBILIRUBIN, DBILIRUBIN, GAMMAGT, AMYLASE, LIPASE, ALB, PREALBUMIN, GLUCOSE in the last 72 hours.  No results for input(s): RBC, HEMOGLOBIN, HEMATOCRIT, PLATELETCT, PROTHROMBTM, APTT, INR, IRON, FERRITIN, TOTIRONBC in the last 72 hours.      Hemodynamics:  Temp (24hrs), Av.7 °C (98.1 °F), Min:36.4 °C (97.6 °F), Max:36.9 °C (98.4 °F)  Temperature: 36.4 °C (97.6 °F)  Pulse  Av.3  Min: 73  Max: 88Heart Rate (Monitored): 87  Blood Pressure: 111/84     Respiratory:    Respiration: 16, Pulse Oximetry: 93 %, O2 Daily Delivery Respiratory : Room Air with O2 Available           Fluids:    Intake/Output Summary (Last 24 hours) at 2019 0822  Last data filed at 2019 0400  Gross per 24 hour   Intake 3868 ml   Output 2375 ml   Net 1493 ml        GI/Nutrition:  Orders Placed This Encounter   Procedures   • Diet Order Regular     Standing Status:   Standing     Number of Occurrences:   1     Order Specific Question:   Diet:     Answer:   Regular [1]     Medical Decision Making, by Problem:  There are no active hospital problems to display for this patient.      Plan:  1. DC home on crutches/ walker  2. DC home on home meds per home doses  3. DC home on Percocet, ASA, and Outpt PT.  4. Follow up [facility] 10-14 days  5. Call for Fevers, drainage, redness, shortness of breath, or increasing lower extremity          swelling particularly in the calf.  6. Start Aspirin 325mg BID. Use for  thirty days.  7. Leave Bandage in place, call if drainage or wetness.    Quality-Core Measures

## 2019-08-06 NOTE — PROGRESS NOTES
Assumed care of patient 0700. Patient up in chair A&Ox4. Pt c/o 5/10 hip pain, medicated per MAR with PRN meds. Dressing is clean, dry, intact. Polar ice and ACE wrap. SCD and DINORAH hose in place. Confirmed patient has personal crutches. Serge light within reach. Fall precautions in place.

## 2019-08-06 NOTE — CARE PLAN
Problem: Safety  Goal: Will remain free from falls  Outcome: PROGRESSING AS EXPECTED     Problem: Knowledge Deficit  Goal: Knowledge of disease process/condition, treatment plan, diagnostic tests, and medications will improve  Outcome: PROGRESSING AS EXPECTED     Problem: Pain Management  Goal: Pain level will decrease to patient's comfort goal  Outcome: PROGRESSING AS EXPECTED

## 2019-08-06 NOTE — CARE PLAN
While assuming care of pt, pt stated pain level 5/10. Admin of PRN pain meds and repositioned. Will reasses within two hours per policy

## 2019-08-06 NOTE — PROGRESS NOTES
Pt is A&Ox4. VSS. Pt denies any n/v at this time. Pt c/o 7//10 pain in hip. Medicated per MAR. Dressing to L hip is clean, dry, and intact. CMS is intact. Pt has ambulated and voided post op. Pt is requesting to rest. Fall precautions in place.

## 2019-08-06 NOTE — PROGRESS NOTES
Eating dinner, sudden emesis of entire dinner with no reported nausea. Patient requests Zofran despite emesis.  Zofran and Dilaudid given.

## 2019-08-06 NOTE — THERAPY
"Physical Therapy Treatment completed.   Bed Mobility:  Supine to Sit: Supervised  Transfers: Sit to Stand: Supervised  Gait: Level Of Assist: Supervised with Loftstrand Crutches    Plan of Care: Will benefit from Physical Therapy 5 times per week  Discharge Recommendations: Equipment: No Equipment Needed. Recommend outpatient physical therapy services to address higher level deficits.    See \"Rehab Therapy-Acute\" Patient Summary Report for complete documentation.     Pt is progressing well with therapy and was able to demonstrate good carry over and learning with supine ther-ex. Pt required occasional cues for appropriate mechanics. Pt was able to demonstrate recipricol gait pattern using loftstrand cruthces with appropriat mechanics and steady gait. Pt was cued and educated on going up/down steps and was able to complete with SPV. Pt educated on car transfer and sitting in low surfaces and how to avoid breaking posterior hip precautions. Anticipate pt to d/c home soon with family support and recs for OP therapy services.   "

## 2019-08-06 NOTE — THERAPY
"Occupational Therapy Evaluation completed.   Functional Status:  Pt is a 48 y/o female, admit for a L KATARINA. Pt lives with her , who is supportive and able to assist with care. Pt PLOF- I and active. Pt presents with minimal c/o pain with ADLS and functional mobility, is at the Supervised level with the use of AE. Pt was educated regarding THP, AE, DME, and techniques for ADLS and functional mobility, post surgery. Pt will be seen for initial evaluation and treatment only, as she is functional and safe in this setting.   Plan of Care: Patient with no further skilled OT needs in the acute care setting at this time  Discharge Recommendations:  Equipment: No Equipment Needed. Post-acute therapy Discharge to home with outpatient or home health for additional skilled therapy services.    See \"Rehab Therapy-Acute\" Patient Summary Report for complete documentation.    "

## 2019-09-06 ENCOUNTER — TELEPHONE (OUTPATIENT)
Dept: MEDICAL GROUP | Facility: MEDICAL CENTER | Age: 49
End: 2019-09-06

## 2019-09-06 DIAGNOSIS — G89.29 OTHER CHRONIC PAIN: ICD-10-CM

## 2019-09-09 ENCOUNTER — OFFICE VISIT (OUTPATIENT)
Dept: MEDICAL GROUP | Facility: MEDICAL CENTER | Age: 49
End: 2019-09-09
Payer: COMMERCIAL

## 2019-09-09 VITALS
TEMPERATURE: 98.4 F | HEART RATE: 99 BPM | HEIGHT: 67 IN | SYSTOLIC BLOOD PRESSURE: 130 MMHG | OXYGEN SATURATION: 98 % | WEIGHT: 204 LBS | DIASTOLIC BLOOD PRESSURE: 72 MMHG | BODY MASS INDEX: 32.02 KG/M2

## 2019-09-09 DIAGNOSIS — Z96.642 STATUS POST TOTAL REPLACEMENT OF LEFT HIP: ICD-10-CM

## 2019-09-09 DIAGNOSIS — M87.00 AVASCULAR NECROSIS (HCC): Chronic | ICD-10-CM

## 2019-09-09 DIAGNOSIS — Z00.00 PREVENTATIVE HEALTH CARE: ICD-10-CM

## 2019-09-09 DIAGNOSIS — G89.29 OTHER CHRONIC PAIN: ICD-10-CM

## 2019-09-09 PROCEDURE — 99214 OFFICE O/P EST MOD 30 MIN: CPT | Performed by: INTERNAL MEDICINE

## 2019-09-09 RX ORDER — TRAMADOL HYDROCHLORIDE 50 MG/1
50 TABLET ORAL EVERY 8 HOURS PRN
Qty: 90 TAB | Refills: 0 | Status: SHIPPED | OUTPATIENT
Start: 2019-09-09 | End: 2019-12-10 | Stop reason: SDUPTHER

## 2019-09-09 NOTE — PROGRESS NOTES
Subjective:      Camila Mcnulty is a 49 y.o. female who presents with follow up joint pain          HPI        Status post right hip arthroscopic, as well as left hip open decompression, osteotomy, history of rheumatic fever, polyarthralgias, avascular necrosis of left hip, right hip, followed by orthopedics status post left proximal femoral head core decompression December 2017, also followed by [facility] currently off bisphosphonate therapy, most recent MRI left hip in June showing moderate to advanced avascular necrosis bilateral femoral heads.  For chronic pain remains on tramadol 50 mg every 8 hours as needed for pain without side effects of drowsiness, sedation, memory loss, falls, has tried physical therapy, TENS, acupuncture, chiropractor, massage therapy, heat, NSAIDs, gabapentin, Celebrex, Voltaren gel, Lidoderm patch, last  in june 17 and controlled substance contract at that time.  Good social support at home with .  Able to ambulate without use of cane or walker, no falls.  Does all ADLs without assist.  Going to outpatient physical therapy at [facility]  8/5/19  orthopedic operative note left total hip arthroplasty  Pain level prior to the surgery was 7-8 out of 10 and now 2-3 pain level since the left hip arthroplasty. Right hip pain also feels better now 3-4 pain level. Currently no cane or walker also off work and planning on going back to work next week at limited schedule.   Did have percocet after the surgery, now on one tramadol at night, none during the day. Went off the percocet after a week. Still with some pain at night so taking ultram one per night. On neurontin 300 mg two per at night.   Has been off Fosamax due to recent left hip arthroplasty 6/3/19 dexa LS +0.7 and left forearm +0.8        Current Outpatient Medications   Medication Sig Dispense Refill   • Magnesium 300 MG Cap Take 1 Cap by mouth every day.     • Probiotic Product (PROBIOTIC ADVANCED PO)  Take 1 Cap by mouth every day.     • omeprazole (PRILOSEC) 40 MG delayed-release capsule TAKE ONE CAPSULE BY MOUTH ONE TIME DAILY (Patient not taking: Reported on 7/19/2019) 90 Cap 3   • gabapentin (NEURONTIN) 300 MG Cap Take 1 Cap by mouth 2 Times a Day. (Patient not taking: Reported on 7/19/2019) 180 Cap 3   • ondansetron (ZOFRAN ODT) 4 MG TABLET DISPERSIBLE Take 1 Tab by mouth every 6 hours as needed for Nausea. (Patient not taking: Reported on 1/3/2019) 30 Tab 0   • fluticasone (FLONASE) 50 MCG/ACT nasal spray Spray 2 Sprays in nose every day. 16 g 11   • Multiple Vitamins-Minerals (MULTIVITAMIN ADULT PO) Take  by mouth every day.     • Calcium Citrate-Vitamin D (CALCIUM + D PO) Take  by mouth every day.     • ascorbic acid (ASCORBIC ACID) 500 MG Tab Take 500 mg by mouth every day.     • vitamin D (CHOLECALCIFEROL) 1000 UNIT Tab Take 1,000 Units by mouth every day.       No current facility-administered medications for this visit.                           Varicose vein excision left lower extremity  9/11 varicose veins left excision   3/11/17 ultrasound venous bilateral lower extremities negative     Status post bunionectomy  8/9/13  podiatry; right foot martín bunionectomy, right foot first metatarsophalangeal joint bunion/degenerative arthritis      Status post appendectomy     s/p right hip arthroscopy  2007  ortho right hip arthroscopy  2/11/14  note bilateral greater trochanter injection under ultrasound guidance  1/20/17 MRI pelvis bilateral femoral head avascular necrosis involving approximately 40% of the articular surface, metallic artifact left femoral head consistent with presence of small metallic pain  9/29/17   orthopedic surgical note right hip fluoroscopically guided core decompression and right hand carpal tunnel release  12/10/18 MRI right hip without chronic avascular necrosis right femoral head 50% articular surface without evidence of  subchondral collapse, early degenerative changes, surgical changes consistent with prior right proximal femoral osteoplasty     S/p left hip revision  History of left hip open decompression and osteotomy  12/03  left hip implant removal  11/10  ortho x-ray stable decompression left femoral head neck junction  11/12  pain management note bilateral trochanteric bursitis injection  5/2/13  pain note, bilateral trochanteric bursal injection  12/10/18 MRI left hip avascular necrosis left femoral head 50% articular surface, with some progression from comparison, some early subchondral collapse anteriorly and superiorly with marrow edema extending into the left femoral head and neck, surgical change consistent with prior decompression procedure and femoral osteoplasty, metallic artifact consistent with surgical pain femoral head, early degenerative changes     S/p left ankle surgery 2006 ligament reconstruction     Preventative health  11/05 GIC colon negative  9/25/15 tdap  10/110/16 vit d 31  1/6/17 pneumovax  12/18/17 pap per  gyn   12/24/18 mammogram   6/3/19 dexa LS+0.7, forearm +0.8     Perimenopausal  9/25/15 on climara patch and progesterone tab per  gyn     Low back pain  10/10 MRI LS L5-S1 minimal disc bulge  1/11  at the pain management left L3-S1 facet joint injection, right L3-S1 facet joint injection  2/11  pain management right L3-S1 lateral, medial, dorsal ramus nerve block  4/11  pain management left L3-S1 medial, lateral, bursal ramus nerve block  5/11  pain management bilateral SI joint injection under fluoroscopy  7/11  pain note bilat trochanteric bursitis injection  3/12  pain note, right and left L3-L4 L5-S1 medial, dorsal, lateral branch ablation  12/12  pain note, left L3-S1 dorsal and medial branch radiofrequency ablation  1/4/13  pain  note; right L3-S1 radiofrequency ablation  8/14/13  pain note  9/29/13  pain note, left L3-S1 nerve frequency ablation  10/4/13  pain note, status post right L3-S1 FJNA  12/19/13  note; status post right SIJI injection, continue voltaren gel, TENS, IT stretches  5/16/14 dr.patterson ching note; left L3-S1 medial, partial, lateral branch radiofrequency ablation under fluoroscopy  6/6/14  right L3-S1 FJNA  7/3/14  pain note; continue TENS,celebrex 200 mg qday, voltaren gel 1%  9/10/14 dr.patterson cihng note; continue TENS, lidoderm patch,voltaren gel, celebrex 200 mg qday  12/20/17 dr.patterson ching note left L3-S1 radiofrequency ablation under fluoroscopy  12/22/17 dr.patterson ching note right L3-S1 radiofrequency ablation under fluoroscopy   2/28/19 MRI lumbar spine at [facility] imaging L4-L5 3 mm disc bulge, mild central stenosis, L5-S1 moderate facet hypertrophy, small right-sided sacral meningocele      History of rheumatic fever  8/29/16 streptococcal pharyngitis positive strep test, treated with augmentin, developed erythema nodosum lower extremities and palms given NSAIDs and medrol dosepack  9/22/16 repeat medrol dose pack x 1 still with painful erythema nodosum nodules  9/24/16 erythema nodosum nodules and arthritic-type pains, we will retreat with penicillin V 500 mg 3 times a day ×10 days  10/5/16 high dose aspirin no benefit, changed to prednisone 20 mg daily, she has an appointment with me in 2 days  10/7/16 echo ordered, EKG done  10/10/16 cbc,cmp,tsh normal,ESR 40,CRP 0.3, repeat throat culture negative, blood cultures negative,  10/19/16 increase prednisone to 40 mg x 3 days then back to 20 mg  10/19/16 daughter diagnosis strep throat given antibiotics, we will treat prophylactically provided patient penicillin V 500 mg tid x 10 days  10/21/16 echo normal LV size and function, EF 65%, trace MR structurally normal  mitral valve, mild TR and RVSP 30  10/24/16 on prednisone 40 mg qday unable to taper to 20 mg due to flare up of pain will try 20 mg bid then taper to 20 mg am and 10 mg qpm over the next week  10/26/16  infectious disease recommended secondary prophylaxis penicillin  mg bid x 5 years  11/7/16 still with polyarthritis, on prednisone 20 mg twice a day, repeat labs, still on penicillin, will speak with rheumatology about referral  11/8/16 ESR 15,CRP 0.3,wbc 10.9 (on prednisone),hgb 14,hct 43,cmp normal,RF,C3C4,TPO,lyme,MERA,HLA B27 negative, Factin IgG 22 (0-19),parietal cell Ab 25(0-25)  11/14/16  rheumatolog note, features consistent with rheumatic fever, also consider seronegative spondyloarthropathies, sarcoidosis, rheumatoid arthritis, will check sacroiliac films, hand and feet x-rays, follow-up one week  11/17/16 refill prednisone  11/18/16 ACE serum level normal, G6PD ad vitamin 1,25 d level normal  11/21/16 cxr negative  11/21/16 xray SI joints negative for erosions  11/29/16 x-ray joint survey hands, feet, ankles no evidence of inflammatory arthropathy  11/29/16 CT soft tissue neck without; negative  12/7/16  cardiology repeat echo in 3-4 weeks  12/10/16 MRI right knee multiple areas right knee avascular necrosis medial and lateral femoral condyle, medial and lateral tibial plateau, bone marrow edema  12/23/16  rheumatology note, inflammatory markers did normalize with steroids, rheumatoid factor, CCP, MERA,ANKA negative continue to taper steroids given osteonecrosis alternating 17.5 mg and 15 mg, and 15 mg, and 15 mg alternating with 12.5 mg, and so forth  12/27/16 echo LV ejection fraction 60%, no valvular disease  1/4/17 ESR 8,CRP 0.3  1/12/17  rheumatology note, inflammatory markers did normalize with prednisone therapy, continue taper with osteonecrosis bilateral tenderness achilles insertion consider enthesitis, will get MRI left achilles region to evaluate for  tendinitis or tenosynovitis, follow-up 6 weeks  1/20/17 MRI left foot without; no evidence of marrow edema, arthropathy, tendinopathy or ligament injury  3/6/17 anticardiolipin antibodies, lupus anticoagulant, protein CNS, factor V 5 Leyden, anti-thrombin panel, beta 2 glycoprotein negative  3/10/17 ESR 19,CRP 0.17, cortisol 3.3, ACTH 16, IgG 753 ([id number]), IgA 140, IgM 93  3/14/16  rheumatology note currently off prednisone, osteonecrosis involving multiple joints, knees, hips, right ankle, etiology unclear, antiphospholipid antibody, protein C and S normal, antithrombin III factor V leyden normal, refer to hematology for evaluation other etiologies, slightly decreased IgG refer to allergy immunology  3/14/17 remains on penicillin  3/30/17  allergy immunology evaluation slight IgG reduction 753 with normal IgG, IgM, no history to suggest primary immunodeficiency, recent diagnosis of group A streptococcal pharyngitis complicated by erythema nodosum, 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 therapy, we will perform humerol workup to include repeat quantitative immunoglobulins with subclass, specific antibodies for haemophilus influenza, pneumococcal, tetanus/diphtheria, limited flow cytometry with repeat CBC, SPEP/UPEP, ESR, CRP, UA, follow-up ASO, DNase B titer  4/17/17  allergy note slight IgG reduction at 753 with normal IgA, normal IgM, no history to suggest primary immunodeficiency, suspect that 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 carryover effect from chronic prednisone therapy, cbc unremarkable, inflammatory markers ESR slightly elevated 28, CRP 2.4, urinalysis negative for protein or blood, no further humeral immunodeficiency or lab assessment at this point with normal immunologic titers, intact specific and subclass antibodies, the IgG decrease does not represent any underlying primary immunodeficiency or active antibody dysfunction at this time  4/25/17  rheumatology note  await [facility] evaluation  5/10/17 dr.wu lacy endocrinology note recommend fosamax weekly with anecdotal evidence that bisphosphonate therapy may provide symptomatic benefit and osteonecrosis, if develops side effects could consider intravenous reclast, referral [facility], follow-up 4 months  5/28/17  rheumatology note avascular necrosis, arthralgias lower extremities, continue off prednisone, on alendronate per [facility]  7/3/17  infectious disease consultation history of streptococcal pharyngitis with probable rheumatic fever, recommend discontinue penicillin prophylaxis follow-up as needed  6/27/17  [facility] evaluation, recommended evaluation of hypercoagulable state, to consider antiphospholipid antibodies (lupus anticoagulant, anticardiolipin antibodies, beta-2 glycoprotein, phosphatidyl serine antibodies, factor V Leiden, antithrombin III, prothrombin mutation, protein C&S quantitative and qualitative, homocysteine, recommend after review of records do not strongly feel that she has true rheumatic fever, clear to discontinue penicillin, recheck ASO and anti-DNase B titers after 4 weeks  8/16/17 homocystine, protein C and protein S, lupus anticoagulant, beta 2 glycoprotein, antithrombin III, anticardiolipin antibody, anti-DNAse antibody, antistreptolysin all negative  8/31/17  rheumatology note, continues on alendronate per bone endocrinology recommendation [facility], did follow up with orthopedics, will proceed with core decompression  2/23/18  [facility] note, multifocal osteonecrosis knees, hips, other joints, underlying etiology unclear, trial few months of alendronate without dramatic improvement, alendronate discontinued because of undergoing core procedures with her hips, recommend continuing off alendronate monitoring for improvement, reassess in 6 months  10/10/18 dr.wu white  endocrinology consultation osteonecrosis knees, hips, other joints, unclear etiology trial of alendronate without improvement, discontinued prior to hip procedures, hip pain is improved, discussed another trial of alendronate for limited timeframe 70 mg weekly reassess 6 months     foot pain  8/5/15 MRI right foot severe artifact secondary to first MTP are clear, limiting analysis of soft tissue, highly likely complete FHL tear  8/28/15  orthopedic note right ankle FHL tendon rupture seen on MRI, do not recommend surgical repair at this time which would involve hemiarthroplasty, implant, first MTP fusion with bone graft, followup podiatry   8/18/17 MRI left foot normal, MRI right foot osteoathritis right first metatarsal head and sesamoids moderate in degree, ulnar edema with an sesamoids may be due to susceptibility artifact related to first metatarsal phalangeal arthroplasty     dyspepsia  11/2/18 seen UC for abdominal discomfort right upper quadrant  11/3/18 ultrasound liver gallbladder region negative  11/10/18 hida negative  11/20/18 trial of prilosec 40 mg  6/17/19 taper prilosec     Dyslipidemia  9/29/14 chol 186,trig 46,hdl 76,ldl 101  9/18/15 chol 225,trig 50,hdl 77,ldl 138  9/20/17 chol 189,trig 53,hdl 56,ldl 122  5/11/18 ultrasound carotid minimal left carotid bulb plaque, calcified, no evidence of occlusion less than 50% stenosis  9/28/18 chol 187,trig 59,hdl 76,ldl 99  5/8/18 EKG done in clinic, ultrasound carotid ordered follow-up calcifications   5/11/18 ultrasound carotid less than 50% internal stenosis bilateral     Chronic pain  5/8/18   5/8/18 pain contract  5/8/18 opiate risk score 1  5/8/18 on ultram 1-2 per day has tried physical therapy,TENS,acupuncture,chiropractor,massage therapy,heat,physical therapy, NSAIDs,gabapentin,celebrex,voltaren,lidoderm patch  1/3/19   6/17/19   6/17/19 controlled substance contract  tried physical  therapy,TENS,acupuncture,chiropractor,massage therapy,heat,physical therapy, NSAIDs,gabapentin,celebrex,voltaren,lidoderm patch     cervical pain  10/11 MRI cervical spine C4-C5 tiny disc bulge, C5-C6 bilateral and plate spurring with uncinate hypertrophy  10/7/16 MRI cervical spine C5-C6 small broad-based osteophyte complex, borderline foraminal stenosis, no significant progression of disease since 2010 6/4/18 MRI cervical spine C3-C4 mild diffuse bulge with mild bilateral foraminal stenosis, C5-C6 mild bilateral foraminal stenosis     avascular necrosis  8/29/16 streptococcal pharyngitis positive strep test, treated with augmentin, developed erythema nodosum lower extremities and palms given NSAIDs and medrol dosepack  9/22/16 repeat medrol dose pack x 1 still with painful erythema nodosum nodules  10/5/16 high dose aspirin no benefit, changed to prednisone 20 mg daily,  10/19/16 increase prednisone to 40 mg x 3 days then back to 20 mg  11/7/16 still with polyarthritis, on prednisone 20 mg twice a day, repeat labs, still on penicillin, will speak with rheumatology about referral  11/14/16  rheumatolog note, features consistent with rheumatic fever, also consider seronegative spondyloarthropathies, sarcoidosis, rheumatoid arthritis, will check sacroiliac films, hand and feet x-rays, follow-up one week  12/23/16  rheumatology note, inflammatory markers did normalize with steroids, rheumatoid factor, CCP, MERA,ANKA negative continue to taper steroids given osteonecrosis alternating 17.5 mg and 15 mg, and 15 mg, and 15 mg alternating with 12.5 mg, and so forth  12/10/16 MRI right knee multiple areas right knee avascular necrosis medial and lateral femoral condyle, medial and lateral tibial plateau, bone marrow edema  12/23/16  rheumatology note, inflammatory markers did normalize with steroids, rheumatoid factor, CCP, MERA,ANKA negative continue to taper steroids given osteonecrosis alternating  17.5 mg and 15 mg, and 15 mg, and 15 mg alternating with 12.5 mg, and so forth  1/20/17 MRI left foot no evidence of arthropathy or tendinopathy  1/20/17 MRI left ankle metallic artifact lateral malleolus  1/20/17 MRI right ankle small oblong focus finger edema tibial plafond below subchondral plate, would be consistent with small area of avascular necrosis  1/20/17 MRI left knee multiple areas avascular necrosis surrounding the knee to include femur, tibia, fibula  1/20/17 MRI pelvis bilateral femoral head avascular necrosis involving approximately 40% of the articular surface, metallic artifact left femoral head consistent with presence of small metallic pin  2/9/17 tapering prednisone down to 2.5 mg alternating with 5 mg  2/23/17  rheumatology note complete steroid taper then repeat ESR, CRP and CPK, also check antiphospholipid antibody,antithrombin, factor v leiden, protein s  3/6/17 anticardiolipin antibodies, lupus anticoagulant, protein CNS, factor V 5 Leyden, anti-thrombin panel, beta 2 glycoprotein negative  3/10/17 ESR 19,CRP 0.17, cortisol 3.3, ACTH 16, IgG 753 ([id number]), IgA 140, IgM 93  3/14/16  rheumatology note currently off prednisone, osteonecrosis involving multiple joints, knees, hips, right ankle, etiology unclear, antiphospholipid antibody, protein C and S normal, antithrombin III factor V leyden normal, refer to hematology for evaluation other etiologies, slightly decreased IgG refer to allergy immunology  3/30/17  allergy immunology evaluation slight IgG reduction 753 with normal IgG, IgM, no history to suggest primary immunodeficiency, recent diagnosis of group A streptococcal pharyngitis complicated by erythema nodosum, 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 therapy, we will perform humerol workup to include repeat quantitative immunoglobulins with subclass, specific antibodies for haemophilus influenza, pneumococcal, tetanus/diphtheria, limited flow  cytometry with repeat CBC, SPEP/UPEP, ESR, CRP, UA, follow-up ASO, DNase B titer  5/10/17 dr.wu lacy endocrinology note recommend fosamax weekly with anecdotal evidence that bisphosphonate therapy may provide symptomatic benefit and osteonecrosis, if develops side effects could consider intravenous reclast, referral [facility], follow-up 4 months  5/28/17  rheumatology note avascular necrosis, arthralgias lower extremities, continue off prednisone, on alendronate per [facility]  7/7/17 MRI left knee without; again seen multiple bone infarcts femur, tibia, fibula, patella appeared less prominent on current examination  7/7/17 MRI right knee without; interval improvement in size of multiple bony infarcts involving femur, tibia, fibula, some infarcts have resolved  7/7/17 MRI pelvis without; stable bilateral femoral head avascular necrosis, these have not progressed and there is no evidence of subchondral collapse or arthropathy  6/27/17  [facility] evaluation, recommended evaluation of hypercoagulable state, to consider antiphospholipid antibodies (lupus anticoagulant, anticardiolipin antibodies, beta-2 glycoprotein, phosphatidyl serine antibodies, factor V Leiden, antithrombin III, prothrombin mutation, protein C&S quantitative and qualitative, homocysteine, recommend after review of records do not strongly feel that she has true rheumatic fever, clear to discontinue penicillin, recheck ASO and anti-DNase B titers after 4 weeks  8/16/17 homocystine, protein C and protein S, lupus anticoagulant, beta 2 glycoprotein, antithrombin III, anticardiolipin antibody, anti-DNAse antibody, antistreptolysin all negative  8/18/17 MRI left foot normal, MRI right foot osteoathritis right first metatarsal head and sesamoids moderate in degree, ulnar edema with an sesamoids may be due to susceptibility artifact related to first metatarsal phalangeal arthroplasty  8/31/17   rheumatology note, continues on alendronate per bone endocrinology recommendation [facility], did follow up with orthopedics, will proceed with core decompression  9/27/17  [facility] with focal osteonecrosis involving knees, hips, other joints, etiology unclear, dramatic improvement with a few months of alendronate, about to undergo surgical intervention hip, will discontinue alendronate pending surgery, reassess 4-5 months  9/29/17   orthopedic surgical note right hip fluoroscopically guided core decompression and right hand carpal tunnel release  12/29/17  orthopedic surgical note  left proximal femoral head core decompression  2/23/18  [facility] note, multifocal osteonecrosis knees, hips, other joints, underlying etiology unclear, trial few months of alendronate without dramatic improvement, alendronate discontinued because of undergoing core procedures with her hips, recommend continuing off alendronate monitoring for improvement, reassess in 6 months  10/10/18  [facility] consultation osteonecrosis knees, hips, other joints, unclear etiology trial of alendronate without improvement, discontinued prior to hip procedures, hip pain is improved, discussed another trial of alendronate for limited timeframe 70 mg weekly reassess 6 months  12/7/18 crp 0.46, ESR 23, hgb 13,hct 42, wbc 7.9  12/10/18 MRI left hip avascular necrosis left femoral head 50% articular surface, with some progression from comparison, some early subchondral collapse anteriorly and superiorly with marrow edema extending into the left femoral head and neck, surgical change consistent with prior decompression procedure and femoral osteoplasty, metallic artifact consistent with surgical pain femoral head, early degenerative changes  12/10/18 MRI right hip without chronic avascular necrosis right femoral head 50% articular surface without evidence of  subchondral collapse, early degenerative changes, surgical changes consistent with prior right proximal femoral osteoplasty  4/24/19  [facility] order bone density if she does have osteoporosis consider continuation bisphosphonate versus anabolic therapy  5/2/19  orthopedic note undergo MRI evaluation of her left hip  6/10/19 MRI left hip at [facility], redemonstration moderate to advanced avascular necrosis bilateral femoral heads, degree of associated bone marrow edema appears significantly decreased from previous study particular on the left, small left hip effusion, postoperative changes remote core decompression and femoral osteotomy  6/27/19  orthopedic note follow-up evaluation advanced degenerative arthritis left hip, symptomatic failing conservative measures, considering total joint arthroplasty, recommend proceeding with left total hip arthroplasty due to progression of avascular necrosis     allergic rhinitis  Tried otc claritin  9/12/13 flonase trial  9/25/15 flonase and zyrtec or claritin       Patient Active Problem List   Diagnosis   • S/P appendectomy   • S/p left hip revision 2002   • S/p left ankle surgery   • S/p right hip arthroscopy    • Low back pain   • Cervical pain (neck)   • IBS (irritable bowel syndrome)   • Preventative health care   • Varicose vein of leg   • S/P bunionectomy   • Allergic rhinitis due to animal hair and dander   • Chronic pain   • Dyslipidemia   • Perimenopausal   • Foot pain, right   • Obesity   • History of rheumatic fever   • Avascular necrosis (HCC)   • Dyspepsia          Health Maintenance Summary                IMM INFLUENZA Postponed 10/18/2019 Originally 9/1/2019. System: vaccine not available, other system reasons     Done 11/20/2018 Imm Admin: Influenza Vaccine Quad Inj (Pf)     Patient has more history with this topic...    MAMMOGRAM Next Due 12/24/2019      Done 12/24/2018 MA-SCREENING MAMMO BILAT  W/TOMOSYNTHESIS W/CAD     Patient has more history with this topic...    PAP SMEAR Next Due 12/18/2020      Done 12/18/2017 Associated Gynecology     Patient has more history with this topic...    IMM DTaP/Tdap/Td Vaccine Next Due 9/23/2028      Done 9/23/2018 Imm Admin: Tdap Vaccine     Patient has more history with this topic...          Patient Care Team:  Franklin Gonzalez M.D. as PCP - General (Internal Medicine)  Starr Jackson M.D. (Inactive) as Consulting Physician (Rheumatology)  Franklin Gonzalez M.D.    ROS       Objective:      Physical Exam   Constitutional: She appears well-developed and well-nourished. No distress.   HENT:   Head: Atraumatic.   Eyes: Conjunctivae are normal. Right eye exhibits no discharge. Left eye exhibits no discharge.   Neck: Neck supple.   Cardiovascular: Normal rate and regular rhythm.   Pulmonary/Chest: Effort normal and breath sounds normal.   Abdominal: She exhibits no distension.   Musculoskeletal: She exhibits no edema.   Neurological: She is alert.   Skin: Skin is warm. She is not diaphoretic.   Psychiatric: She has a normal mood and affect. Her behavior is normal.   Nursing note and vitals reviewed.              Assessment/Plan:     Assessment  #!  Chronic bilateral hip pain related to avascular necrosis, s/p left hip arthroplasty 5 weeks ago by orthopedics Dr. Ferrer doing well, going to outpatient PT and other has had previous status post hip surgeries, followed by orthopedics, has seen pain management, [facility], remains on bisphosphonate therapy, on tramadol 50 mg 3 times daily has tried NSAIDs, Celebrex, topical Voltaren, Lidoderm, has tried physical therapy, TENS, acupuncture, chiropractor, massage therapy, heat    #2 chronic controlled substance therapy tramadol 50 mg nightly without drowsiness, sedation, memory loss, falls, depression, respiratory suppression, no alcohol with medication, pain is much improved after surgery    #3 avascular necrosis  bilateral hips followed by [facility], off Fosamax due to recent surgery    #4 chronic tramadol therapy 50 mg at night without side effects of drowsiness, sedation, confusion, depression      Plan  #!  Continue outpatient physical therapy, follow-up orthopedics    #2 old records from orthopedics    #3 continue physical therapy    #4 continue tramadol 50 mg at night, pain is much improved without side effects of tramadol, no further narcotics, no drowsiness, sedation, memory loss, falls    #5     #6 risks and benefits reviewed, patient understands long-term use on a regular basis can cause habituation, dependence, side effects as above, she is using this sparingly once at night, as her pain improves she will work on taper, I believe the benefits outweigh the risk of the medication, reviewed hospital records, orthopedic evaluation consult, physical therapy consult    #7 follow-up with [facility], continue off Fosamax for now    #8 follow-up 4 months

## 2019-09-27 ENCOUNTER — TELEPHONE (OUTPATIENT)
Dept: MEDICAL GROUP | Facility: MEDICAL CENTER | Age: 49
End: 2019-09-27

## 2019-09-27 ENCOUNTER — HOSPITAL ENCOUNTER (OUTPATIENT)
Dept: LAB | Facility: MEDICAL CENTER | Age: 49
End: 2019-09-27
Payer: COMMERCIAL

## 2019-09-27 ENCOUNTER — HOSPITAL ENCOUNTER (OUTPATIENT)
Dept: LAB | Facility: MEDICAL CENTER | Age: 49
End: 2019-09-27
Attending: INTERNAL MEDICINE
Payer: COMMERCIAL

## 2019-09-27 DIAGNOSIS — Z00.00 PREVENTATIVE HEALTH CARE: ICD-10-CM

## 2019-09-27 DIAGNOSIS — E03.8 SUBCLINICAL HYPOTHYROIDISM: ICD-10-CM

## 2019-09-27 LAB
25(OH)D3 SERPL-MCNC: 32 NG/ML (ref 30–100)
ALBUMIN SERPL BCP-MCNC: 4.1 G/DL (ref 3.2–4.9)
ALBUMIN/GLOB SERPL: 1.7 G/DL
ALP SERPL-CCNC: 55 U/L (ref 30–99)
ALT SERPL-CCNC: 24 U/L (ref 2–50)
ANION GAP SERPL CALC-SCNC: 7 MMOL/L (ref 0–11.9)
AST SERPL-CCNC: 24 U/L (ref 12–45)
BASOPHILS # BLD AUTO: 1.2 % (ref 0–1.8)
BASOPHILS # BLD: 0.06 K/UL (ref 0–0.12)
BDY FAT % MEASURED: 35.9 %
BILIRUB SERPL-MCNC: 0.4 MG/DL (ref 0.1–1.5)
BP DIAS: 79 MMHG
BP SYS: 128 MMHG
BUN SERPL-MCNC: 20 MG/DL (ref 8–22)
CALCIUM SERPL-MCNC: 8.8 MG/DL (ref 8.5–10.5)
CHLORIDE SERPL-SCNC: 104 MMOL/L (ref 96–112)
CHOLEST SERPL-MCNC: 196 MG/DL (ref 100–199)
CHOLEST SERPL-MCNC: 197 MG/DL (ref 100–199)
CO2 SERPL-SCNC: 25 MMOL/L (ref 20–33)
CREAT SERPL-MCNC: 0.85 MG/DL (ref 0.5–1.4)
DIABETES HTDIA: NO
EOSINOPHIL # BLD AUTO: 0.32 K/UL (ref 0–0.51)
EOSINOPHIL NFR BLD: 6.5 % (ref 0–6.9)
ERYTHROCYTE [DISTWIDTH] IN BLOOD BY AUTOMATED COUNT: 48.7 FL (ref 35.9–50)
EST. AVERAGE GLUCOSE BLD GHB EST-MCNC: 100 MG/DL
EVENT NAME HTEVT: NORMAL
FASTING HTFAS: YES
FASTING STATUS PATIENT QL REPORTED: NORMAL
GLOBULIN SER CALC-MCNC: 2.4 G/DL (ref 1.9–3.5)
GLUCOSE SERPL-MCNC: 80 MG/DL (ref 65–99)
GLUCOSE SERPL-MCNC: 82 MG/DL (ref 65–99)
HBA1C MFR BLD: 5.1 % (ref 0–5.6)
HCT VFR BLD AUTO: 37.7 % (ref 37–47)
HDLC SERPL-MCNC: 79 MG/DL
HDLC SERPL-MCNC: 80 MG/DL
HGB BLD-MCNC: 11.8 G/DL (ref 12–16)
HYPERTENSION HTHYP: NO
IMM GRANULOCYTES # BLD AUTO: 0.01 K/UL (ref 0–0.11)
IMM GRANULOCYTES NFR BLD AUTO: 0.2 % (ref 0–0.9)
LDLC SERPL CALC-MCNC: 105 MG/DL
LDLC SERPL CALC-MCNC: 105 MG/DL
LYMPHOCYTES # BLD AUTO: 1.98 K/UL (ref 1–4.8)
LYMPHOCYTES NFR BLD: 40.4 % (ref 22–41)
MCH RBC QN AUTO: 30.4 PG (ref 27–33)
MCHC RBC AUTO-ENTMCNC: 31.3 G/DL (ref 33.6–35)
MCV RBC AUTO: 97.2 FL (ref 81.4–97.8)
MONOCYTES # BLD AUTO: 0.51 K/UL (ref 0–0.85)
MONOCYTES NFR BLD AUTO: 10.4 % (ref 0–13.4)
NEUTROPHILS # BLD AUTO: 2.02 K/UL (ref 2–7.15)
NEUTROPHILS NFR BLD: 41.3 % (ref 44–72)
NRBC # BLD AUTO: 0 K/UL
NRBC BLD-RTO: 0 /100 WBC
PLATELET # BLD AUTO: 329 K/UL (ref 164–446)
PMV BLD AUTO: 9.8 FL (ref 9–12.9)
POTASSIUM SERPL-SCNC: 4.3 MMOL/L (ref 3.6–5.5)
PROT SERPL-MCNC: 6.5 G/DL (ref 6–8.2)
RBC # BLD AUTO: 3.88 M/UL (ref 4.2–5.4)
SCREENING LOC CITY HTCIT: NORMAL
SCREENING LOC STATE HTSTA: NORMAL
SCREENING LOCATION HTLOC: NORMAL
SMOKING HTSMO: NO
SODIUM SERPL-SCNC: 136 MMOL/L (ref 135–145)
SUBSCRIBER ID HTSID: NORMAL
TRIGL SERPL-MCNC: 59 MG/DL (ref 0–149)
TRIGL SERPL-MCNC: 60 MG/DL (ref 0–149)
TSH SERPL DL<=0.005 MIU/L-ACNC: 5.85 UIU/ML (ref 0.38–5.33)
WBC # BLD AUTO: 4.9 K/UL (ref 4.8–10.8)

## 2019-09-27 PROCEDURE — S5190 WELLNESS ASSESSMENT BY NONPH: HCPCS

## 2019-09-27 PROCEDURE — 82947 ASSAY GLUCOSE BLOOD QUANT: CPT

## 2019-09-27 PROCEDURE — 82306 VITAMIN D 25 HYDROXY: CPT

## 2019-09-27 PROCEDURE — 36415 COLL VENOUS BLD VENIPUNCTURE: CPT

## 2019-09-27 PROCEDURE — 80061 LIPID PANEL: CPT | Mod: 91

## 2019-09-27 PROCEDURE — 80061 LIPID PANEL: CPT

## 2019-09-27 PROCEDURE — 85025 COMPLETE CBC W/AUTO DIFF WBC: CPT

## 2019-09-27 PROCEDURE — 84443 ASSAY THYROID STIM HORMONE: CPT

## 2019-09-27 PROCEDURE — 83036 HEMOGLOBIN GLYCOSYLATED A1C: CPT

## 2019-09-27 PROCEDURE — 80053 COMPREHEN METABOLIC PANEL: CPT

## 2019-11-08 ENCOUNTER — NON-PROVIDER VISIT (OUTPATIENT)
Dept: URGENT CARE | Facility: CLINIC | Age: 49
End: 2019-11-08
Payer: COMMERCIAL

## 2019-11-08 DIAGNOSIS — Z23 NEED FOR VACCINATION: ICD-10-CM

## 2019-11-08 PROCEDURE — 90471 IMMUNIZATION ADMIN: CPT | Performed by: PHYSICIAN ASSISTANT

## 2019-11-08 PROCEDURE — 90686 IIV4 VACC NO PRSV 0.5 ML IM: CPT | Performed by: PHYSICIAN ASSISTANT

## 2019-11-08 NOTE — PROGRESS NOTES
"Camila Mcnulty is a 49 y.o. female here for a non-provider visit for:   FLU    Reason for immunization: Annual Flu Vaccine  Immunization records indicate need for vaccine: Yes, confirmed with Epic  Minimum interval has been met for this vaccine: No  ABN completed: Yes    Order and dose verified by: Irais Haley MA  VIS Dated  08/15/2019  was given to patient: Yes  All IAC Questionnaire questions were answered \"No.\"    Patient tolerated injection and no adverse effects were observed or reported: Yes    Pt scheduled for next dose in series: Not Indicated  "

## 2019-11-18 DIAGNOSIS — J30.9 ALLERGIC RHINITIS: ICD-10-CM

## 2019-11-18 RX ORDER — FLUTICASONE PROPIONATE 50 MCG
2 SPRAY, SUSPENSION (ML) NASAL DAILY
Qty: 16 G | Refills: 10 | Status: SHIPPED | OUTPATIENT
Start: 2019-11-18 | End: 2020-12-17 | Stop reason: SDUPTHER

## 2019-12-10 ENCOUNTER — OFFICE VISIT (OUTPATIENT)
Dept: MEDICAL GROUP | Facility: MEDICAL CENTER | Age: 49
End: 2019-12-10
Payer: COMMERCIAL

## 2019-12-10 VITALS
OXYGEN SATURATION: 98 % | WEIGHT: 205 LBS | SYSTOLIC BLOOD PRESSURE: 120 MMHG | TEMPERATURE: 97.5 F | HEIGHT: 64 IN | DIASTOLIC BLOOD PRESSURE: 74 MMHG | HEART RATE: 89 BPM | BODY MASS INDEX: 35 KG/M2

## 2019-12-10 DIAGNOSIS — Z12.31 ENCOUNTER FOR SCREENING MAMMOGRAM FOR BREAST CANCER: ICD-10-CM

## 2019-12-10 DIAGNOSIS — G89.29 OTHER CHRONIC PAIN: ICD-10-CM

## 2019-12-10 DIAGNOSIS — M87.00 AVASCULAR NECROSIS (HCC): Chronic | ICD-10-CM

## 2019-12-10 PROCEDURE — 99214 OFFICE O/P EST MOD 30 MIN: CPT | Performed by: INTERNAL MEDICINE

## 2019-12-10 RX ORDER — ALENDRONATE SODIUM 70 MG/1
70 TABLET ORAL
Qty: 12 TAB | Refills: 3 | Status: ON HOLD
Start: 2019-12-10 | End: 2020-08-24

## 2019-12-10 RX ORDER — TRAMADOL HYDROCHLORIDE 50 MG/1
50 TABLET ORAL EVERY 8 HOURS PRN
Qty: 90 TAB | Refills: 0 | Status: SHIPPED | OUTPATIENT
Start: 2020-01-10 | End: 2020-06-11 | Stop reason: SDUPTHER

## 2019-12-10 RX ORDER — TRAMADOL HYDROCHLORIDE 50 MG/1
50 TABLET ORAL EVERY 8 HOURS PRN
Qty: 90 TAB | Refills: 0 | Status: SHIPPED | OUTPATIENT
Start: 2019-12-10 | End: 2019-12-10 | Stop reason: SDUPTHER

## 2019-12-10 NOTE — PROGRESS NOTES
Subjective:      Camila Mcnulty is a 49 y.o. female who presents with follow up joint pain        HPI   Patient here for medication refill currently on tramadol 50 mg 3 times daily every 8 hours as needed for pain, quantity 90 last refill was September 9.  Patient with avascular necrosis, bilateral hips by MRI, seen orthopedics  status post left total hip arthroplasty August 2019, also followed by [place] [facility]. On ultram usually one per day. Has been on tramadol for over a year, taking as needed, has tried physical therapy, acupuncture, TENS, chiropractor, massage therapy, heat, NSAIDs, gabapentin, Celebrex, topical Voltaren and Lidoderm patch.  No side effects with tramadol, no drowsiness, sedation, memory loss, depression, constipation, no alcohol with medication. Pain level left hip before the surgery was 7/10 and now the pain level 3/10. Had been going to PT every other week, also has been going to pool three times per week. Left hip still is worse with overdoing things if on feet for too long and if does not stretch or give herself adequate rest. The other joints are better, right hip pain improved. Back on fosamax again per  per [place] [facility].           Current Outpatient Medications   Medication Sig Dispense Refill   • fluticasone (FLONASE) 50 MCG/ACT nasal spray Spray 2 Sprays in nose every day. 16 g 10   • Magnesium 300 MG Cap Take 1 Cap by mouth every day.     • Probiotic Product (PROBIOTIC ADVANCED PO) Take 1 Cap by mouth every day.     • gabapentin (NEURONTIN) 300 MG Cap Take 1 Cap by mouth 2 Times a Day. 180 Cap 3   • Multiple Vitamins-Minerals (MULTIVITAMIN ADULT PO) Take  by mouth every day.     • Calcium Citrate-Vitamin D (CALCIUM + D PO) Take  by mouth every day.     • ascorbic acid (ASCORBIC ACID) 500 MG Tab Take 500 mg by mouth every day.     • vitamin D (CHOLECALCIFEROL) 1000 UNIT Tab Take 1,000 Units by mouth every day.       No current facility-administered  medications for this visit.         Varicose vein excision left lower extremity  9/11 varicose veins left excision   3/11/17 ultrasound venous bilateral lower extremities negative    Status post total replacement left hip  2002 left hip open decompression and osteotomy  12/03  left hip implant removal  11/10  ortho x-ray stable decompression left femoral head neck junction  11/12  pain management note bilateral trochanteric bursitis injection  5/2/13  pain note, bilateral trochanteric bursal injection  12/10/18 MRI left hip avascular necrosis left femoral head 50% articular surface, with some progression from comparison, some early subchondral collapse anteriorly and superiorly with marrow edema extending into the left femoral head and neck, surgical change consistent with prior decompression procedure and femoral osteoplasty, metallic artifact consistent with surgical pain femoral head, early degenerative changes  8/5/19  orthopedic operative note left total hip arthroplasty     s/p right hip arthroscopy  2007  ortho right hip arthroscopy  2/11/14  note bilateral greater trochanter injection under ultrasound guidance  1/20/17 MRI pelvis bilateral femoral head avascular necrosis involving approximately 40% of the articular surface, metallic artifact left femoral head consistent with presence of small metallic pain  9/29/17   orthopedic surgical note right hip fluoroscopically guided core decompression and right hand carpal tunnel release  12/10/18 MRI right hip without chronic avascular necrosis right femoral head 50% articular surface without evidence of subchondral collapse, early degenerative changes, surgical changes consistent with prior right proximal femoral osteoplasty    Status post bunionectomy  8/9/13  podiatry; right foot martín bunionectomy, right foot first metatarsophalangeal joint  bunion/degenerative arthritis      Status post appendectomy     S/p left ankle surgery 2006 ligament reconstruction     Preventative health  11/05 GIC colon negative  9/25/15 tdap  1/6/17 pneumovax  12/18/17 pap per  gyn   12/24/18 mammogram   6/3/19 dexa LS+0.7, forearm +0.8  9/27/19 vit d 32 increase by 2000 units daily     Perimenopausal  9/25/15 on climara patch and progesterone tab per  gyn     Low back pain  10/10 MRI LS L5-S1 minimal disc bulge  1/11  at the pain management left L3-S1 facet joint injection, right L3-S1 facet joint injection  2/11  pain management right L3-S1 lateral, medial, dorsal ramus nerve block  4/11  pain management left L3-S1 medial, lateral, bursal ramus nerve block  5/11  pain management bilateral SI joint injection under fluoroscopy  7/11  pain note bilat trochanteric bursitis injection  3/12  pain note, right and left L3-L4 L5-S1 medial, dorsal, lateral branch ablation  12/12  pain note, left L3-S1 dorsal and medial branch radiofrequency ablation  1/4/13  pain note; right L3-S1 radiofrequency ablation  8/14/13  pain note  9/29/13  pain note, left L3-S1 nerve frequency ablation  10/4/13  pain note, status post right L3-S1 FJNA  12/19/13 pain note; status post right SIJI injection, continue voltaren gel, TENS, IT stretches  5/16/14  pain note; left L3-S1 medial, partial, lateral branch radiofrequency ablation under fluoroscopy  6/6/14  right L3-S1 FJNA  7/3/14  pain note; continue TENS,celebrex 200 mg qday, voltaren gel 1%  9/10/14  pain note; continue TENS, lidoderm patch,voltaren gel, celebrex 200 mg qday  12/20/17  pain note left L3-S1 radiofrequency ablation under fluoroscopy  12/22/17  pain note right L3-S1 radiofrequency ablation under  fluoroscopy   2/28/19 [facility] imaging L4-L5 3 mm disc bulge, mild central stenosis, L5-S1 moderate facet hypertrophy, small right-sided sacral meningocele      History of rheumatic fever  8/29/16 streptococcal pharyngitis positive strep test, treated with augmentin, developed erythema nodosum lower extremities and palms given NSAIDs and medrol dosepack  9/22/16 repeat medrol dose pack x 1 still with painful erythema nodosum nodules  9/24/16 erythema nodosum nodules and arthritic-type pains, we will retreat with penicillin V 500 mg 3 times a day ×10 days  10/5/16 high dose aspirin no benefit, changed to prednisone 20 mg daily, she has an appointment with me in 2 days  10/7/16 echo ordered, EKG done  10/10/16 cbc,cmp,tsh normal,ESR 40,CRP 0.3, repeat throat culture negative, blood cultures negative,  10/19/16 increase prednisone to 40 mg x 3 days then back to 20 mg  10/19/16 daughter diagnosis strep throat given antibiotics, we will treat prophylactically provided patient penicillin V 500 mg tid x 10 days  10/21/16 echo normal LV size and function, EF 65%, trace MR structurally normal mitral valve, mild TR and RVSP 30  10/24/16 on prednisone 40 mg qday unable to taper to 20 mg due to flare up of pain will try 20 mg bid then taper to 20 mg am and 10 mg qpm over the next week  10/26/16  infectious disease recommended secondary prophylaxis penicillin  mg bid x 5 years  11/7/16 still with polyarthritis, on prednisone 20 mg twice a day, repeat labs, still on penicillin, will speak with rheumatology about referral  11/8/16 ESR 15,CRP 0.3,wbc 10.9 (on prednisone),hgb 14,hct 43,cmp normal,RF,C3C4,TPO,lyme,MERA,HLA B27 negative, Factin IgG 22 (0-19),parietal cell Ab 25(0-25)  11/14/16  rheumatolog note, features consistent with rheumatic fever, also consider seronegative spondyloarthropathies, sarcoidosis, rheumatoid arthritis, will check sacroiliac films, hand and feet  x-rays, follow-up one week  11/17/16 refill prednisone  11/18/16 ACE serum level normal, G6PD ad vitamin 1,25 d level normal  11/21/16 cxr negative  11/21/16 xray SI joints negative for erosions  11/29/16 x-ray joint survey hands, feet, ankles no evidence of inflammatory arthropathy  11/29/16 CT soft tissue neck without; negative  12/7/16  cardiology repeat echo in 3-4 weeks  12/10/16 MRI right knee multiple areas right knee avascular necrosis medial and lateral femoral condyle, medial and lateral tibial plateau, bone marrow edema  12/23/16  rheumatology note, inflammatory markers did normalize with steroids, rheumatoid factor, CCP, MERA,ANKA negative continue to taper steroids given osteonecrosis alternating 17.5 mg and 15 mg, and 15 mg, and 15 mg alternating with 12.5 mg, and so forth  12/27/16 echo LV ejection fraction 60%, no valvular disease  1/4/17 ESR 8,CRP 0.3  1/12/17  rheumatology note, inflammatory markers did normalize with prednisone therapy, continue taper with osteonecrosis bilateral tenderness achilles insertion consider enthesitis, will get MRI left achilles region to evaluate for tendinitis or tenosynovitis, follow-up 6 weeks  1/20/17 MRI left foot without; no evidence of marrow edema, arthropathy, tendinopathy or ligament injury  3/6/17 anticardiolipin antibodies, lupus anticoagulant, protein CNS, factor V 5 Leyden, anti-thrombin panel, beta 2 glycoprotein negative  3/10/17 ESR 19,CRP 0.17, cortisol 3.3, ACTH 16, IgG 753 ([id number]), IgA 140, IgM 93  3/14/16  rheumatology note currently off prednisone, osteonecrosis involving multiple joints, knees, hips, right ankle, etiology unclear, antiphospholipid antibody, protein C and S normal, antithrombin III factor V leyden normal, refer to hematology for evaluation other etiologies, slightly decreased IgG refer to allergy immunology  3/14/17 remains on penicillin  3/30/17  allergy immunology evaluation slight IgG  reduction 753 with normal IgG, IgM, no history to suggest primary immunodeficiency, recent diagnosis of group A streptococcal pharyngitis complicated by erythema nodosum, 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 therapy, we will perform humerol workup to include repeat quantitative immunoglobulins with subclass, specific antibodies for haemophilus influenza, pneumococcal, tetanus/diphtheria, limited flow cytometry with repeat CBC, SPEP/UPEP, ESR, CRP, UA, follow-up ASO, DNase B titer  4/17/17  allergy note slight IgG reduction at 753 with normal IgA, normal IgM, no history to suggest primary immunodeficiency, suspect that 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 carryover effect from chronic prednisone therapy, cbc unremarkable, inflammatory markers ESR slightly elevated 28, CRP 2.4, urinalysis negative for protein or blood, no further humeral immunodeficiency or lab assessment at this point with normal immunologic titers, intact specific and subclass antibodies, the IgG decrease does not represent any underlying primary immunodeficiency or active antibody dysfunction at this time  4/25/17  rheumatology note await [facility] evaluation  5/10/17 [facility] [facility] recommend fosamax weekly with anecdotal evidence that bisphosphonate therapy may provide symptomatic benefit and osteonecrosis, if develops side effects could consider intravenous reclast, referral [facility], follow-up 4 months  5/28/17  rheumatology note avascular necrosis, arthralgias lower extremities, continue off prednisone, on alendronate per [facility]  7/3/17  infectious disease consultation history of streptococcal pharyngitis with probable rheumatic fever, recommend discontinue penicillin prophylaxis follow-up as needed  6/27/17  [facility] evaluation, recommended evaluation of hypercoagulable state, to consider antiphospholipid antibodies  (lupus anticoagulant, anticardiolipin antibodies, beta-2 glycoprotein, phosphatidyl serine antibodies, factor V Leiden, antithrombin III, prothrombin mutation, protein C&S quantitative and qualitative, homocysteine, recommend after review of records do not strongly feel that she has true rheumatic fever, clear to discontinue penicillin, recheck ASO and anti-DNase B titers after 4 weeks  8/16/17 homocystine, protein C and protein S, lupus anticoagulant, beta 2 glycoprotein, antithrombin III, anticardiolipin antibody, anti-DNAse antibody, antistreptolysin all negative  8/31/17  rheumatology note, continues on alendronate per bone endocrinology recommendation [place], did follow up with orthopedics, will proceed with core decompression  2/23/18  [place] [facility] note, multifocal osteonecrosis knees, hips, other joints, underlying etiology unclear, trial few months of alendronate without dramatic improvement, alendronate discontinued because of undergoing core procedures with her hips, recommend continuing off alendronate monitoring for improvement, reassess in 6 months  10/10/18  [place] [facility] consultation osteonecrosis knees, hips, other joints, unclear etiology trial of alendronate without improvement, discontinued prior to hip procedures, hip pain is improved, discussed another trial of alendronate for limited timeframe 70 mg weekly reassess 6 months     foot pain  8/5/15 MRI right foot severe artifact secondary to first MTP are clear, limiting analysis of soft tissue, highly likely complete FHL tear  8/28/15  orthopedic note right ankle FHL tendon rupture seen on MRI, do not recommend surgical repair at this time which would involve hemiarthroplasty, implant, first MTP fusion with bone graft, followup podiatry   8/18/17 MRI left foot normal, MRI right foot osteoathritis right first metatarsal head and sesamoids moderate in degree, ulnar edema with an sesamoids  may be due to susceptibility artifact related to first metatarsal phalangeal arthroplasty     dyspepsia  11/2/18 seen UC for abdominal discomfort right upper quadrant  11/3/18 ultrasound liver gallbladder region negative  11/10/18 hida negative  11/20/18 trial of prilosec 40 mg  6/17/19 taper prilosec     Dyslipidemia  9/29/14 chol 186,trig 46,hdl 76,ldl 101  9/18/15 chol 225,trig 50,hdl 77,ldl 138  9/20/17 chol 189,trig 53,hdl 56,ldl 122  5/11/18 ultrasound carotid minimal left carotid bulb plaque, calcified, no evidence of occlusion less than 50% stenosis  9/28/18 chol 187,trig 59,hdl 76,ldl 99  5/8/18 EKG done in clinic, ultrasound carotid ordered follow-up calcifications   5/11/18 ultrasound carotid less than 50% internal stenosis bilateral  9/27/19 chol 196,trig 60,hdl 79,ldl 105      Chronic pain  5/8/18   5/8/18 pain contract  5/8/18 opiate risk score 1  5/8/18 on ultram 1-2 per day has tried physical therapy,TENS,acupuncture,chiropractor,massage therapy,heat,physical therapy, NSAIDs,gabapentin,celebrex,voltaren,lidoderm patch  1/3/19   6/17/19   6/17/19 controlled substance contract  9/9/19  on ultram one per evening  tried physical therapy,TENS,acupuncture,chiropractor,massage therapy,heat,physical therapy, NSAIDs,gabapentin,celebrex,voltaren,lidoderm patch     cervical pain  10/11 MRI cervical spine C4-C5 tiny disc bulge, C5-C6 bilateral and plate spurring with uncinate hypertrophy  10/7/16 MRI cervical spine C5-C6 small broad-based osteophyte complex, borderline foraminal stenosis, no significant progression of disease since 2010 6/4/18 MRI cervical spine C3-C4 mild diffuse bulge with mild bilateral foraminal stenosis, C5-C6 mild bilateral foraminal stenosis     avascular necrosis  8/29/16 streptococcal pharyngitis positive strep test, treated with augmentin, developed erythema nodosum lower extremities and palms given NSAIDs and medrol dosepack  9/22/16 repeat medrol dose pack x 1 still with  painful erythema nodosum nodules  10/5/16 high dose aspirin no benefit, changed to prednisone 20 mg daily,  10/19/16 increase prednisone to 40 mg x 3 days then back to 20 mg  11/7/16 still with polyarthritis, on prednisone 20 mg twice a day, repeat labs, still on penicillin, will speak with rheumatology about referral  11/14/16  rheumatolog note, features consistent with rheumatic fever, also consider seronegative spondyloarthropathies, sarcoidosis, rheumatoid arthritis, will check sacroiliac films, hand and feet x-rays, follow-up one week  12/23/16  rheumatology note, inflammatory markers did normalize with steroids, rheumatoid factor, CCP, MERA,ANKA negative continue to taper steroids given osteonecrosis alternating 17.5 mg and 15 mg, and 15 mg, and 15 mg alternating with 12.5 mg, and so forth  12/10/16 MRI right knee multiple areas right knee avascular necrosis medial and lateral femoral condyle, medial and lateral tibial plateau, bone marrow edema  12/23/16  rheumatology note, inflammatory markers did normalize with steroids, rheumatoid factor, CCP, MERA,ANKA negative continue to taper steroids given osteonecrosis alternating 17.5 mg and 15 mg, and 15 mg, and 15 mg alternating with 12.5 mg, and so forth  1/20/17 MRI left foot no evidence of arthropathy or tendinopathy  1/20/17 MRI left ankle metallic artifact lateral malleolus  1/20/17 MRI right ankle small oblong focus finger edema tibial plafond below subchondral plate, would be consistent with small area of avascular necrosis  1/20/17 MRI left knee multiple areas avascular necrosis surrounding the knee to include femur, tibia, fibula  1/20/17 MRI pelvis bilateral femoral head avascular necrosis involving approximately 40% of the articular surface, metallic artifact left femoral head consistent with presence of small metallic pin  2/9/17 tapering prednisone down to 2.5 mg alternating with 5 mg  2/23/17  rheumatology note complete  steroid taper then repeat ESR, CRP and CPK, also check antiphospholipid antibody,antithrombin, factor v leiden, protein s  3/6/17 anticardiolipin antibodies, lupus anticoagulant, protein CNS, factor V 5 Leyden, anti-thrombin panel, beta 2 glycoprotein negative  3/10/17 ESR 19,CRP 0.17, cortisol 3.3, ACTH 16, IgG 753 ([id number]), IgA 140, IgM 93  3/14/16  rheumatology note currently off prednisone, osteonecrosis involving multiple joints, knees, hips, right ankle, etiology unclear, antiphospholipid antibody, protein C and S normal, antithrombin III factor V leyden normal, refer to hematology for evaluation other etiologies, slightly decreased IgG refer to allergy immunology  3/30/17  allergy immunology evaluation slight IgG reduction 753 with normal IgG, IgM, no history to suggest primary immunodeficiency, recent diagnosis of group A streptococcal pharyngitis complicated by erythema nodosum, 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 therapy, we will perform humerol workup to include repeat quantitative immunoglobulins with subclass, specific antibodies for haemophilus influenza, pneumococcal, tetanus/diphtheria, limited flow cytometry with repeat CBC, SPEP/UPEP, ESR, CRP, UA, follow-up ASO, DNase B titer  5/10/17 dr.wu lacy endocrinology note recommend fosamax weekly with anecdotal evidence that bisphosphonate therapy may provide symptomatic benefit and osteonecrosis, if develops side effects could consider intravenous reclast, referral [place] [facility], follow-up 4 months  5/28/17  rheumatology note avascular necrosis, arthralgias lower extremities, continue off prednisone, on alendronate per [place] [facility]  7/7/17 MRI left knee without; again seen multiple bone infarcts femur, tibia, fibula, patella appeared less prominent on current examination  7/7/17 MRI right knee without; interval improvement in size of multiple bony infarcts involving femur, tibia, fibula,  some infarcts have resolved  7/7/17 MRI pelvis without; stable bilateral femoral head avascular necrosis, these have not progressed and there is no evidence of subchondral collapse or arthropathy  6/27/17 dr.shah white rheumatology evaluation, recommended evaluation of hypercoagulable state, to consider antiphospholipid antibodies (lupus anticoagulant, anticardiolipin antibodies, beta-2 glycoprotein, phosphatidyl serine antibodies, factor V Leiden, antithrombin III, prothrombin mutation, protein C&S quantitative and qualitative, homocysteine, recommend after review of records do not strongly feel that she has true rheumatic fever, clear to discontinue penicillin, recheck ASO and anti-DNase B titers after 4 weeks  8/16/17 homocystine, protein C and protein S, lupus anticoagulant, beta 2 glycoprotein, antithrombin III, anticardiolipin antibody, anti-DNAse antibody, antistreptolysin all negative  8/18/17 MRI left foot normal, MRI right foot osteoathritis right first metatarsal head and sesamoids moderate in degree, ulnar edema with an sesamoids may be due to susceptibility artifact related to first metatarsal phalangeal arthroplasty  8/31/17  rheumatology note, continues on alendronate per bone endocrinology recommendation [place], did follow up with orthopedics, will proceed with core decompression  9/27/17  [place] [facility] with focal osteonecrosis involving knees, hips, other joints, etiology unclear, dramatic improvement with a few months of alendronate, about to undergo surgical intervention hip, will discontinue alendronate pending surgery, reassess 4-5 months  9/29/17   orthopedic surgical note right hip fluoroscopically guided core decompression and right hand carpal tunnel release  12/29/17  orthopedic surgical note  left proximal femoral head core decompression  2/23/18  [place] [facility] note, multifocal osteonecrosis knees, hips, other joints,  underlying etiology unclear, trial few months of alendronate without dramatic improvement, alendronate discontinued because of undergoing core procedures with her hips, recommend continuing off alendronate monitoring for improvement, reassess in 6 months  10/10/18  [place] [facility] consultation osteonecrosis knees, hips, other joints, unclear etiology trial of alendronate without improvement, discontinued prior to hip procedures, hip pain is improved, discussed another trial of alendronate for limited timeframe 70 mg weekly reassess 6 months  12/7/18 crp 0.46, ESR 23, hgb 13,hct 42, wbc 7.9  12/10/18 MRI left hip avascular necrosis left femoral head 50% articular surface, with some progression from comparison, some early subchondral collapse anteriorly and superiorly with marrow edema extending into the left femoral head and neck, surgical change consistent with prior decompression procedure and femoral osteoplasty, metallic artifact consistent with surgical pain femoral head, early degenerative changes  12/10/18 MRI right hip without chronic avascular necrosis right femoral head 50% articular surface without evidence of subchondral collapse, early degenerative changes, surgical changes consistent with prior right proximal femoral osteoplasty  4/24/19  [place] [facility] clinic order bone density if she does have osteoporosis consider continuation bisphosphonate versus anabolic therapy  5/2/19  orthopedic note undergo MRI evaluation of her left hip  6/10/19 MRI left hip at [facility], redemonstration moderate to advanced avascular necrosis bilateral femoral heads, degree of associated bone marrow edema appears significantly decreased from previous study particular on the left, small left hip effusion, postoperative changes remote core decompression and femoral osteotomy  6/27/19  orthopedic note follow-up evaluation advanced degenerative arthritis left hip, symptomatic  failing conservative measures, considering total joint arthroplasty, recommend proceeding with left total hip arthroplasty due to progression of avascular necrosis  8/5/19  orthopedic operative note left total hip arthroplasty  9/9/19 off fosamax for now due to recent hip arthroplasty  10/9/19  [place] endocr[facility]ology notes most recent bone density reviewed, remains off alendronate since left hip replacement, clear to resume alendronate 70 mg weekly follow-up 6 months     allergic rhinitis  Tried otc claritin  9/12/13 flonase trial  9/25/15 flonase and zyrtec or claritin    Patient Active Problem List   Diagnosis   • S/P appendectomy   • S/p left ankle surgery   • S/p right hip arthroscopy    • Low back pain   • Cervical pain (neck)   • IBS (irritable bowel syndrome)   • Preventative health care   • Varicose vein of leg   • S/P bunionectomy   • Allergic rhinitis due to animal hair and dander   • Chronic pain   • Dyslipidemia   • Perimenopausal   • Foot pain, right   • Obesity   • History of rheumatic fever   • Avascular necrosis (HCC)   • Dyspepsia   • Status post total replacement of left hip            Health Maintenance Summary                MAMMOGRAM Next Due 12/24/2019      Done 12/24/2018 MA-SCREENING MAMMO BILAT W/TOMOSYNTHESIS W/CAD     Patient has more history with this topic...    PAP SMEAR Next Due 12/18/2020      Done 12/18/2017 Associated Gynecology     Patient has more history with this topic...    IMM DTaP/Tdap/Td Vaccine Next Due 9/23/2028      Done 9/23/2018 Imm Admin: Tdap Vaccine     Patient has more history with this topic...          Patient Care Team:  Franklin Gonzalez M.D. as PCP - General (Internal Medicine)  Starr Jackson M.D. (Inactive) as Consulting Physician (Rheumatology)  Franklin Gonzalez M.D.    ROS       Objective:        Physical Exam  Vitals signs and nursing note reviewed.   Constitutional:       Appearance: Normal appearance.   HENT:      Head: Normocephalic and  atraumatic.      Right Ear: Tympanic membrane normal.      Left Ear: Tympanic membrane normal.      Mouth/Throat:      Pharynx: No oropharyngeal exudate.   Eyes:      General:         Right eye: No discharge.         Left eye: No discharge.   Neck:      Musculoskeletal: Neck supple.   Cardiovascular:      Rate and Rhythm: Normal rate and regular rhythm.      Heart sounds: Normal heart sounds.   Pulmonary:      Effort: Pulmonary effort is normal.      Breath sounds: Normal breath sounds.   Abdominal:      General: There is no distension.   Musculoskeletal:         General: No swelling.   Skin:     General: Skin is warm.   Neurological:      General: No focal deficit present.      Mental Status: She is alert.   Psychiatric:         Mood and Affect: Mood normal.         Behavior: Behavior normal.                 Assessment/Plan:     Assessment  #! 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 Rees pain management has periodic injections 2/28/19 MRI lumbar spine at [facility] imaging L4-L5 3 mm disc bulge, mild central stenosis, L5-S1 moderate facet hypertrophy, small right-sided sacral meningocele       #4 chronic pain on tramadol as needed, uses sparingly without side effects of drowsiness, sedation, memory loss, depression 6/17/19 controlled substance contract, patient has tried physical  therapy,TENS,acupuncture,chiropractor,massage therapy,heat,physical therapy, NSAIDs,gabapentin,celebrex,voltaren,lidoderm patch as far as different modalities.  Tramadol allows her to perform ADLs and keep active without side effects      #5 avascular necrosis more prominent at the hips, followed by [facility] 10/9/19  [facility] notes most recent bone density reviewed, remains off alendronate since left hip replacement, clear to resume alendronate 70 mg weekly follow-up 6 months, also followed by orthopedics    #6  Subclinical hypothyroid, TSH 5.8 in September    Plan  #! Mammogram    #2 refill ultram 1 p.o. every 8 hours as needed for pain quantity 90 per 30 days, 2 separate prescriptions provided to last 6 months, risks and benefits reviewed, I believe the benefits outweigh the risks.  Medication can cause habituation, dependence, memory loss, mood changes, no alcohol with medication she is taking this appropriately and she is receiving benefit from the medication.     #3 repeat tsh laboratory slip provided    #4 follow-up orthopedics question whether or not it would be beneficial to repeat MRI right hip evaluate progress with avascular necrosis on Fosamax    #5 continue alendronate, take 30 minutes before meals, medications, supplements, may cause reflux    #6     #7 follow-up 6 months

## 2019-12-22 ENCOUNTER — TELEPHONE (OUTPATIENT)
Dept: MEDICAL GROUP | Facility: MEDICAL CENTER | Age: 49
End: 2019-12-22

## 2020-01-23 ENCOUNTER — HOSPITAL ENCOUNTER (OUTPATIENT)
Dept: LAB | Facility: MEDICAL CENTER | Age: 50
End: 2020-01-23
Attending: INTERNAL MEDICINE
Payer: COMMERCIAL

## 2020-01-23 DIAGNOSIS — E03.8 SUBCLINICAL HYPOTHYROIDISM: ICD-10-CM

## 2020-01-23 LAB
THYROPEROXIDASE AB SERPL-ACNC: <0.2 IU/ML (ref 0–9)
TSH SERPL DL<=0.005 MIU/L-ACNC: 2.01 UIU/ML (ref 0.38–5.33)

## 2020-01-23 PROCEDURE — 36415 COLL VENOUS BLD VENIPUNCTURE: CPT

## 2020-01-23 PROCEDURE — 86376 MICROSOMAL ANTIBODY EACH: CPT

## 2020-01-23 PROCEDURE — 84443 ASSAY THYROID STIM HORMONE: CPT

## 2020-01-24 ENCOUNTER — TELEPHONE (OUTPATIENT)
Dept: MEDICAL GROUP | Facility: MEDICAL CENTER | Age: 50
End: 2020-01-24

## 2020-01-24 PROBLEM — E03.8 SUBCLINICAL HYPOTHYROIDISM: Status: ACTIVE | Noted: 2020-01-24

## 2020-02-27 DIAGNOSIS — M54.9 BACK PAIN, UNSPECIFIED BACK LOCATION, UNSPECIFIED BACK PAIN LATERALITY, UNSPECIFIED CHRONICITY: ICD-10-CM

## 2020-02-27 RX ORDER — GABAPENTIN 300 MG/1
300 CAPSULE ORAL 2 TIMES DAILY
Qty: 180 CAP | Refills: 3 | Status: SHIPPED | OUTPATIENT
Start: 2020-02-27 | End: 2021-02-26 | Stop reason: SDUPTHER

## 2020-02-27 NOTE — TELEPHONE ENCOUNTER
Gabapentin    PT HAS NEW PHARMACY      Received request via: Patient    Was the patient seen in the last year in this department? Yes    Does the patient have an active prescription (recently filled or refills available) for medication(s) requested? No

## 2020-05-18 ENCOUNTER — OFFICE VISIT (OUTPATIENT)
Dept: URGENT CARE | Facility: CLINIC | Age: 50
End: 2020-05-18
Payer: COMMERCIAL

## 2020-05-18 ENCOUNTER — APPOINTMENT (OUTPATIENT)
Dept: RADIOLOGY | Facility: IMAGING CENTER | Age: 50
End: 2020-05-18
Attending: NURSE PRACTITIONER
Payer: COMMERCIAL

## 2020-05-18 VITALS
OXYGEN SATURATION: 97 % | WEIGHT: 200 LBS | SYSTOLIC BLOOD PRESSURE: 136 MMHG | BODY MASS INDEX: 31.39 KG/M2 | HEIGHT: 67 IN | HEART RATE: 94 BPM | TEMPERATURE: 98.1 F | DIASTOLIC BLOOD PRESSURE: 82 MMHG | RESPIRATION RATE: 18 BRPM

## 2020-05-18 DIAGNOSIS — S61.259A DOG BITE OF FINGER, INITIAL ENCOUNTER: ICD-10-CM
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
## 2020-05-18 PROBLEM — M02.30 POST-STREPTOCOCCAL REACTIVE ARTHRITIS (HCC): Status: ACTIVE | Noted: 2017-07-31

## 2020-05-18 PROBLEM — B94.8 POST-STREPTOCOCCAL REACTIVE ARTHRITIS (HCC): Status: ACTIVE | Noted: 2017-07-31

## 2020-05-18 PROCEDURE — 73140 X-RAY EXAM OF FINGER(S): CPT | Mod: TC,RT | Performed by: NURSE PRACTITIONER

## 2020-05-18 PROCEDURE — 99214 OFFICE O/P EST MOD 30 MIN: CPT | Performed by: NURSE PRACTITIONER

## 2020-05-18 RX ORDER — AMOXICILLIN AND CLAVULANATE POTASSIUM 875; 125 MG/1; MG/1
1 TABLET, FILM COATED ORAL 2 TIMES DAILY
Qty: 10 TAB | Refills: 0 | Status: SHIPPED | OUTPATIENT
Start: 2020-05-18 | End: 2020-05-23

## 2020-05-18 RX ORDER — OMEPRAZOLE 40 MG/1
40 CAPSULE, DELAYED RELEASE ORAL DAILY
COMMUNITY
End: 2020-08-19

## 2020-05-18 RX ORDER — AMOXICILLIN AND CLAVULANATE POTASSIUM 875; 125 MG/1; MG/1
1 TABLET, FILM COATED ORAL 2 TIMES DAILY
Qty: 10 TAB | Refills: 0 | Status: SHIPPED | OUTPATIENT
Start: 2020-05-18 | End: 2020-05-18 | Stop reason: SDUPTHER

## 2020-05-18 ASSESSMENT — FIBROSIS 4 INDEX: FIB4 SCORE: 0.73

## 2020-05-18 NOTE — LETTER
May 18, 2020         Patient: Janett Mcnulty   YOB: 1970   Date of Visit: 5/18/2020           To Whom it May Concern:    Janett Mcnulty was seen in my clinic on 5/18/2020. She may be excused from work tomorrow and Wednesday.     If you have any questions or concerns, please don't hesitate to call.        Sincerely,           KATEY Pacheco.  Electronically Signed

## 2020-05-18 NOTE — PROGRESS NOTES
Chief Complaint   Patient presents with   • Dog Bite     (R) finger-dog fight-(UTD)-pt.       HISTORY OF PRESENT ILLNESS: Patient is a 50 y.o. female who presents to urgent care today with complaints of a dog bite. Notes her dog bit her right index finger approx 3-4 hours ago. She washed the wound out after incident. Pain is moderate. There is an additional area, on her right hand, which the dog bit, without laceration. She is right hand dominate. Dog's vaccinations UTD, her tetanus UTD. She has not tried any medication for symptom relief.     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   • Avascular necrosis (HCC) 02/12/2017   • Obesity 09/16/2016   • History of rheumatic fever 09/16/2016   • Foot pain, right 09/30/2015   • Perimenopausal 09/25/2015   • Dyslipidemia 09/18/2015   • Chronic pain 01/10/2014   • Allergic rhinitis due to animal hair and dander 09/12/2013   • S/P bunionectomy 06/07/2013   • Varicose vein of leg 10/07/2011   • S/P appendectomy 06/01/2011   • S/p left ankle surgery 06/01/2011   • S/p right hip arthroscopy  06/01/2011   • Low back pain 06/01/2011   • Cervical pain (neck) 06/01/2011   • IBS (irritable bowel syndrome) 06/01/2011   • Preventative health care 06/01/2011       Allergies:Other drug; Bactrim [sulfamethoxazole w-trimethoprim]; Morphine; and Other food    Current Outpatient Medications Ordered in Epic   Medication Sig Dispense Refill   • omeprazole (PRILOSEC) 40 MG delayed-release capsule Take 40 mg by mouth every day.     • Misc Natural Products (OSTEO BI-FLEX/5-LOXIN ADVANCED) Tab Take  by mouth.     • gabapentin (NEURONTIN) 300 MG Cap Take 1 Cap by mouth 2 Times a Day. 180 Cap 3   • alendronate (FOSAMAX) 70 MG Tab Take 1 Tab by mouth every 7 days. 12 Tab 3   • fluticasone (FLONASE) 50 MCG/ACT  nasal spray Spray 2 Sprays in nose every day. 16 g 10   • Magnesium 300 MG Cap Take 1 Cap by mouth every day.     • Probiotic Product (PROBIOTIC ADVANCED PO) Take 1 Cap by mouth every day.     • Multiple Vitamins-Minerals (MULTIVITAMIN ADULT PO) Take  by mouth every day.     • Calcium Citrate-Vitamin D (CALCIUM + D PO) Take  by mouth every day.     • ascorbic acid (ASCORBIC ACID) 500 MG Tab Take 500 mg by mouth every day.     • vitamin D (CHOLECALCIFEROL) 1000 UNIT Tab Take 1,000 Units by mouth every day.       No current [facility]-ordered facility-administered medications on file.        Past Medical History:   Diagnosis Date   • Arthritis     osteoarthritis   • Avascular necrosis (HCC) 11/2016    in many joints   • Pain 6/5/13    right foot   • Pain 3/21/17    to joints related to avascular necrosis.   • PONV (postoperative nausea and vomiting)        Social History     Tobacco Use   • Smoking status: Never Smoker   • Smokeless tobacco: Never Used   Substance Use Topics   • Alcohol use: Yes     Alcohol/week: 5.4 oz     Types: 2 Glasses of wine, 7 Standard drinks or equivalent per week     Comment: 1-2 drinks per week   • Drug use: No       Family Status   Relation Name Status   • Mo  Alive   • Fa  Alive        hx of carotid stenosis   • Sis  Alive   • Bro  Alive   • OTHER  (Not Specified)     Family History   Problem Relation Age of Onset   • Diabetes Other    • Hypertension Other    • Cancer Other        ROS:  Review of Systems   Constitutional: Negative for fever, chills, weight loss, malaise, and fatigue.   HENT: Negative for ear pain, nosebleeds, congestion, sore throat and neck pain.    Eyes: Negative for vision changes.   Neuro: Negative for headache, sensory changes, weakness, seizure, LOC.   Cardiovascular: Negative for chest pain, palpitations, orthopnea and leg swelling.   Respiratory: Negative for cough, sputum production, shortness of breath and wheezing.   Gastrointestinal: Negative for abdominal pain,  "nausea, vomiting or diarrhea.   Genitourinary: Negative for dysuria, urgency and frequency.  Musculoskeletal: Positive for dog bite right index finger. Negative for falls, neck pain, back pain, joint pain, myalgias.   Skin: Positive for dog bite. Negative for rash, diaphoresis.     Exam:  /82 (BP Location: Left arm)   Pulse 94   Temp 36.7 °C (98.1 °F) (Temporal)   Resp 18   Ht 1.702 m (5' 7\")   Wt 90.7 kg (200 lb)   SpO2 97%   General: well-nourished, well-developed female in NAD  Head: normocephalic, atraumatic  Eyes: PERRLA, no conjunctival injection, acuity grossly intact, lids normal.  Ears: normal shape and symmetry, no tenderness, no discharge. External canals are without any significant edema or erythema. Gross auditory acuity is intact.  Nose: symmetrical without tenderness, no discharge.  Mouth/Throat: reasonable hygiene, no erythema, exudates or tonsillar enlargement.  Neck: no masses, range of motion within normal limits, no tracheal deviation. No obvious thyroid enlargement.   Lymph: no cervical adenopathy. No supraclavicular adenopathy.   Neuro: alert and oriented. Cranial nerves 1-12 grossly intact. No sensory deficit.   Cardiovascular: regular rate and rhythm. No edema.  Pulmonary: no distress. Chest is symmetrical with respiration, no wheezes, crackles, or rhonchi.   Musculoskeletal: no clubbing, appropriate muscle tone, gait is stable. Right index finger: there is a small puncture wound proximal to nailbed, approx 3mm and superficial, dorsal aspect. There is a partial thickness laceration to distal and lateral aspect of finger, bleeding controlled, no foreign bodies, approx 0.5 cm. There is correlated tenderness to distal phalanx. Finger has FROM, cap refill brisk. Right hand: there is a small abrasion and ecchymosis (approx dime sized) over dorsal aspect of hand without bony tenderness. Hand has FROM.   Skin: warm, no clubbing, no cyanosis, no rashes. Puncture wounds present as noted " "above.   Psych: appropriate mood, affect, judgement.       DX right finger radiology reading \"No evidence of bony injury.\"            Assessment/Plan:  1. Dog bite of finger, initial encounter  DX-FINGER(S) 2+ RIGHT    amoxicillin-clavulanate (AUGMENTIN) 875-125 MG Tab         X-ray negative for bony process or foreign body. Wound irrigated with NS, steri strip applied over laceration site. Laceration small and feel risks of closure, due to chance of infection, outweigh benefits of closure. Loose dressing applied. Aluminum splint provided PRN with frequent ROM advised. Prophylactic Augmentin provided. Wound care addressed. STRICT ER precautions advised.   Supportive care, differential diagnoses, and indications for immediate follow-up discussed with patient.   Pathogenesis of diagnosis discussed including typical length and natural progression.   Instructed to return to clinic or nearest emergency department for any change in condition, further concerns, or worsening of symptoms.  Patient states understanding of the plan of care and discharge instructions.          Please note that this dictation was created using voice recognition software. I have made every reasonable attempt to correct obvious errors, but I expect that there are errors of grammar and possibly content that I did not discover before finalizing the note.      KATEY Pacheco.     "

## 2020-06-05 PROBLEM — M02.30 POST-STREPTOCOCCAL REACTIVE ARTHRITIS (HCC): Status: RESOLVED | Noted: 2017-07-31 | Resolved: 2020-06-05

## 2020-06-05 PROBLEM — B94.8 POST-STREPTOCOCCAL REACTIVE ARTHRITIS (HCC): Status: RESOLVED | Noted: 2017-07-31 | Resolved: 2020-06-05

## 2020-06-11 ENCOUNTER — TELEPHONE (OUTPATIENT)
Dept: MEDICAL GROUP | Facility: MEDICAL CENTER | Age: 50
End: 2020-06-11

## 2020-06-11 ENCOUNTER — OFFICE VISIT (OUTPATIENT)
Dept: MEDICAL GROUP | Facility: MEDICAL CENTER | Age: 50
End: 2020-06-11
Payer: COMMERCIAL

## 2020-06-11 VITALS
SYSTOLIC BLOOD PRESSURE: 122 MMHG | HEIGHT: 67 IN | WEIGHT: 200 LBS | DIASTOLIC BLOOD PRESSURE: 68 MMHG | TEMPERATURE: 98.1 F | OXYGEN SATURATION: 99 % | HEART RATE: 91 BPM | BODY MASS INDEX: 31.39 KG/M2

## 2020-06-11 DIAGNOSIS — M25.559 HIP PAIN: ICD-10-CM

## 2020-06-11 DIAGNOSIS — E66.9 CLASS 1 OBESITY WITHOUT SERIOUS COMORBIDITY WITH BODY MASS INDEX (BMI) OF 31.0 TO 31.9 IN ADULT, UNSPECIFIED OBESITY TYPE: ICD-10-CM

## 2020-06-11 DIAGNOSIS — M54.50 LOW BACK PAIN, UNSPECIFIED BACK PAIN LATERALITY, UNSPECIFIED CHRONICITY, UNSPECIFIED WHETHER SCIATICA PRESENT: ICD-10-CM

## 2020-06-11 DIAGNOSIS — M87.00 AVASCULAR NECROSIS (HCC): ICD-10-CM

## 2020-06-11 DIAGNOSIS — G89.29 OTHER CHRONIC PAIN: ICD-10-CM

## 2020-06-11 DIAGNOSIS — Z00.00 PREVENTATIVE HEALTH CARE: Chronic | ICD-10-CM

## 2020-06-11 DIAGNOSIS — M25.551 RIGHT HIP PAIN: ICD-10-CM

## 2020-06-11 DIAGNOSIS — Z96.642 STATUS POST TOTAL REPLACEMENT OF LEFT HIP: ICD-10-CM

## 2020-06-11 PROCEDURE — 99214 OFFICE O/P EST MOD 30 MIN: CPT | Performed by: INTERNAL MEDICINE

## 2020-06-11 RX ORDER — TRAMADOL HYDROCHLORIDE 50 MG/1
50 TABLET ORAL EVERY 8 HOURS PRN
Qty: 90 TAB | Refills: 0 | Status: ON HOLD | OUTPATIENT
Start: 2020-07-11 | End: 2020-08-24

## 2020-06-11 RX ORDER — TRAMADOL HYDROCHLORIDE 50 MG/1
50 TABLET ORAL EVERY 8 HOURS PRN
Qty: 90 TAB | Refills: 0 | Status: SHIPPED | OUTPATIENT
Start: 2020-06-11 | End: 2020-12-17 | Stop reason: SDUPTHER

## 2020-06-11 ASSESSMENT — PATIENT HEALTH QUESTIONNAIRE - PHQ9: CLINICAL INTERPRETATION OF PHQ2 SCORE: 0

## 2020-06-11 ASSESSMENT — FIBROSIS 4 INDEX: FIB4 SCORE: 0.73

## 2020-06-11 NOTE — LETTER
[facility]  Franklin Gonzalez M.D.  [address]  [place] [address]  Fax: [phone]   Authorization for Release/Disclosure of   Protected Health Information   Name: SUMAYA WATSON : 1970 SSN: [id number]   Address: [address] [place] [address]  [place] [address] Phone:    [phone] (home) [phone] (work)   I authorize the entity listed below to release/disclose the PHI below to:   [facility]/Franklin Gonzalez M.D. and Franklin Gonzalez M.D.   Provider or Entity Name:                                                                  Dr Rees (Pain Mgmt)   Address   City, State, [facility]   Phone:      Fax:     Reason for request: continuity of care   Information to be released: old records   [  ] LAST COLONOSCOPY,  including any PATH REPORT and follow-up  [  ] LAST FIT/COLOGUARD RESULT [  ] LAST DEXA  [  ] LAST MAMMOGRAM  [  ] LAST PAP  [  ] LAST LABS [  ] RETINA EXAM REPORT  [  ] IMMUNIZATION RECORDS  [xx  ] Release all info      [  ] Check here and initial the line next to each item to release ALL health information INCLUDING  _____ Care and treatment for drug and / or alcohol abuse  _____ HIV testing, infection status, or AIDS  _____ Genetic Testing    DATES OF SERVICE OR TIME PERIOD TO BE DISCLOSED: _____________  I understand and acknowledge that:  * This Authorization may be revoked at any time by you in writing, except if your health information has already been used or disclosed.  * Your health information that will be used or disclosed as a result of you signing this authorization could be re-disclosed by the recipient. If this occurs, your re-disclosed health information may no longer be protected by State or Federal laws.  * You may refuse to sign this Authorization. Your refusal will not affect your ability to obtain treatment.  * This Authorization becomes effective upon signing and will  on (date) __________.      If no date is indicated, this Authorization will  one (1)  year from the signature date.    Name: Janett Rajni Page    Signature:   Date:     6/11/2020       PLEASE FAX REQUESTED RECORDS BACK TO: [phone]

## 2020-06-11 NOTE — PROGRESS NOTES
Subjective:      Janett Mcnulty is a 50 y.o. female who presents med refill          HPI  Patient here for medication refill tramadol, chronic hip pain and avascular necrosis status post left total hip arthroplasty 2019, chronic low back pain followed by pain management Dr. Rees, has been stable on tramadol taken up to 1 tablet every 8 hours as needed for pain, has seen pain management, has tried physical therapy, TENS, acupuncture, chiropractor, massage, heat, NSAIDs, gabapentin, Celebrex, Voltaren topical, Lidoderm patch.still remains on neurontin 300 mg bid, has not gone back to the gym yet, saw  orthopedics last month and had been having more right hip pain as she had been overdoing it with activity, last right hip flare a year ago. Last MRI right hip was 12/18  No side effects with tramadol, no drowsiness, sedation, memory loss, depression, alcohol with medication  No side effects of GI upset with Fosamax, no side effects with gabapentin  Now working remotely and has stand up desk at home and uses a perch, and uses the stand up at least half the day. Has recumbent bike at home which she has not used much.  Patient needs [place] paperwork as well      Current Outpatient Medications   Medication Sig Dispense Refill   • omeprazole (PRILOSEC) 40 MG delayed-release capsule Take 40 mg by mouth every day.     • Misc Natural Products (OSTEO BI-FLEX/5-LOXIN ADVANCED) Tab Take  by mouth.     • gabapentin (NEURONTIN) 300 MG Cap Take 1 Cap by mouth 2 Times a Day. 180 Cap 3   • alendronate (FOSAMAX) 70 MG Tab Take 1 Tab by mouth every 7 days. 12 Tab 3   • fluticasone (FLONASE) 50 MCG/ACT nasal spray Spray 2 Sprays in nose every day. 16 g 10   • Magnesium 300 MG Cap Take 1 Cap by mouth every day.     • Probiotic Product (PROBIOTIC ADVANCED PO) Take 1 Cap by mouth every day.     • Multiple Vitamins-Minerals (MULTIVITAMIN ADULT PO) Take  by mouth every day.     • Calcium Citrate-Vitamin D (CALCIUM + D PO) Take   by mouth every day.     • ascorbic acid (ASCORBIC ACID) 500 MG Tab Take 500 mg by mouth every day.     • vitamin D (CHOLECALCIFEROL) 1000 UNIT Tab Take 1,000 Units by mouth every day.       No current facility-administered medications for this visit.      Varicose vein excision left lower extremity  9/11 varicose veins left excision   3/11/17 ultrasound venous bilateral lower extremities negative    Subclinical hypothyroid  9/26/19 tsh 5.8 repeat labs 3 months test ordered  1/23/20 tsh 2.0, TPO<0.2     Status post total replacement left hip  2002 left hip open decompression and osteotomy  12/03  left hip implant removal  11/10  ortho x-ray stable decompression left femoral head neck junction  11/12  pain management note bilateral trochanteric bursitis injection  5/2/13  pain note, bilateral trochanteric bursal injection  12/10/18 MRI left hip avascular necrosis left femoral head 50% articular surface, with some progression from comparison, some early subchondral collapse anteriorly and superiorly with marrow edema extending into the left femoral head and neck, surgical change consistent with prior decompression procedure and femoral osteoplasty, metallic artifact consistent with surgical pain femoral head, early degenerative changes  8/5/19  orthopedic operative note left total hip arthroplasty     s/p right hip arthroscopy  2007  ortho right hip arthroscopy  2/11/14  note bilateral greater trochanter injection under ultrasound guidance  1/20/17 MRI pelvis bilateral femoral head avascular necrosis involving approximately 40% of the articular surface, metallic artifact left femoral head consistent with presence of small metallic pain  9/29/17   orthopedic surgical note right hip fluoroscopically guided core decompression and right hand carpal tunnel release  12/10/18 MRI right hip without chronic avascular necrosis  right femoral head 50% articular surface without evidence of subchondral collapse, early degenerative changes, surgical changes consistent with prior right proximal femoral osteoplasty     Status post bunionectomy  8/9/13  podiatry; right foot martín bunionectomy, right foot first metatarsophalangeal joint bunion/degenerative arthritis      Status post appendectomy     S/p left ankle surgery 2006 ligament reconstruction     Preventative health  11/05 GIC colon negative  9/25/15 tdap  1/6/17 pneumovax  12/18/17 pap per  gyn   12/24/18 mammogram   6/3/19 dexa LS+0.7, forearm +0.8  9/27/19 vit d 32 increase by 2000 units daily     Perimenopausal  9/25/15 on climara patch and progesterone tab per  gyn     Low back pain  10/10 MRI LS L5-S1 minimal disc bulge  1/11  at the pain management left L3-S1 facet joint injection, right L3-S1 facet joint injection  2/11  pain management right L3-S1 lateral, medial, dorsal ramus nerve block  4/11  pain management left L3-S1 medial, lateral, bursal ramus nerve block  5/11  pain management bilateral SI joint injection under fluoroscopy  7/11  pain note bilat trochanteric bursitis injection  3/12  pain note, right and left L3-L4 L5-S1 medial, dorsal, lateral branch ablation  12/12  pain note, left L3-S1 dorsal and medial branch radiofrequency ablation  1/4/13  pain note; right L3-S1 radiofrequency ablation  8/14/13  pain note  9/29/13  pain note, left L3-S1 nerve frequency ablation  10/4/13  pain note, status post right L3-S1 FJNA  12/19/13 in note; status post right SIJI injection, continue voltaren gel, TENS, IT stretches  5/16/14  pain note; left L3-S1 medial, partial, lateral branch radiofrequency ablation under fluoroscopy  6/6/14  right L3-S1 FJNA  7/3/14  pain note;  continue TENS,celebrex 200 mg qday, voltaren gel 1%  9/10/14  pain note; continue TENS, lidoderm patch,voltaren gel, celebrex 200 mg qday  12/20/17  pain note left L3-S1 radiofrequency ablation under fluoroscopy  12/22/17 dr.patterson ching note right L3-S1 radiofrequency ablation under fluoroscopy   2/28/19 [facility] spine [facility] imaging L4-L5 3 mm disc bulge, mild central stenosis, L5-S1 moderate facet hypertrophy, small right-sided sacral meningocele      History of rheumatic fever  8/29/16 streptococcal pharyngitis positive strep test, treated with augmentin, developed erythema nodosum lower extremities and palms given NSAIDs and medrol dosepack  9/22/16 repeat medrol dose pack x 1 still with painful erythema nodosum nodules  9/24/16 erythema nodosum nodules and arthritic-type pains, we will retreat with penicillin V 500 mg 3 times a day ×10 days  10/5/16 high dose aspirin no benefit, changed to prednisone 20 mg daily, she has an appointment with me in 2 days  10/7/16 echo ordered, EKG done  10/10/16 cbc,cmp,tsh normal,ESR 40,CRP 0.3, repeat throat culture negative, blood cultures negative,  10/19/16 increase prednisone to 40 mg x 3 days then back to 20 mg  10/19/16 daughter diagnosis strep throat given antibiotics, we will treat prophylactically provided patient penicillin V 500 mg tid x 10 days  10/21/16 echo normal LV size and function, EF 65%, trace MR structurally normal mitral valve, mild TR and RVSP 30  10/24/16 on prednisone 40 mg qday unable to taper to 20 mg due to flare up of pain will try 20 mg bid then taper to 20 mg am and 10 mg qpm over the next week  10/26/16  infectious disease recommended secondary prophylaxis penicillin  mg bid x 5 years  11/7/16 still with polyarthritis, on prednisone 20 mg twice a day, repeat labs, still on penicillin, will speak with rheumatology about referral  11/8/16 ESR 15,CRP 0.3,wbc 10.9 (on prednisone),hgb  14,hct 43,cmp normal,RF,C3C4,TPO,lyme,MERA,HLA B27 negative, Factin IgG 22 (0-19),parietal cell Ab 25(0-25)  11/14/16  rheumatolog note, features consistent with rheumatic fever, also consider seronegative spondyloarthropathies, sarcoidosis, rheumatoid arthritis, will check sacroiliac films, hand and feet x-rays, follow-up one week  11/17/16 refill prednisone  11/18/16 ACE serum level normal, G6PD ad vitamin 1,25 d level normal  11/21/16 cxr negative  11/21/16 xray SI joints negative for erosions  11/29/16 x-ray joint survey hands, feet, ankles no evidence of inflammatory arthropathy  11/29/16 CT soft tissue neck without; negative  12/7/16  cardiology repeat echo in 3-4 weeks  12/10/16 MRI right knee multiple areas right knee avascular necrosis medial and lateral femoral condyle, medial and lateral tibial plateau, bone marrow edema  12/23/16  rheumatology note, inflammatory markers did normalize with steroids, rheumatoid factor, CCP, MERA,ANKA negative continue to taper steroids given osteonecrosis alternating 17.5 mg and 15 mg, and 15 mg, and 15 mg alternating with 12.5 mg, and so forth  12/27/16 echo LV ejection fraction 60%, no valvular disease  1/4/17 ESR 8,CRP 0.3  1/12/17  rheumatology note, inflammatory markers did normalize with prednisone therapy, continue taper with osteonecrosis bilateral tenderness achilles insertion consider enthesitis, will get MRI left achilles region to evaluate for tendinitis or tenosynovitis, follow-up 6 weeks  1/20/17 MRI left foot without; no evidence of marrow edema, arthropathy, tendinopathy or ligament injury  3/6/17 anticardiolipin antibodies, lupus anticoagulant, protein CNS, factor V 5 Leyden, anti-thrombin panel, beta 2 glycoprotein negative  3/10/17 ESR 19,CRP 0.17, cortisol 3.3, ACTH 16, IgG 753 ([id number]), IgA 140, IgM 93  3/14/16  rheumatology note currently off prednisone, osteonecrosis involving multiple joints, knees, hips, right  ankle, etiology unclear, antiphospholipid antibody, protein C and S normal, antithrombin III factor V leyden normal, refer to hematology for evaluation other etiologies, slightly decreased IgG refer to allergy immunology  3/14/17 remains on penicillin  3/30/17  allergy immunology evaluation slight IgG reduction 753 with normal IgG, IgM, no history to suggest primary immunodeficiency, recent diagnosis of group A streptococcal pharyngitis complicated by erythema nodosum, 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 therapy, we will perform humerol workup to include repeat quantitative immunoglobulins with subclass, specific antibodies for haemophilus influenza, pneumococcal, tetanus/diphtheria, limited flow cytometry with repeat CBC, SPEP/UPEP, ESR, CRP, UA, follow-up ASO, DNase B titer  4/17/17  allergy note slight IgG reduction at 753 with normal IgA, normal IgM, no history to suggest primary immunodeficiency, suspect that 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 carryover effect from chronic prednisone therapy, cbc unremarkable, inflammatory markers ESR slightly elevated 28, CRP 2.4, urinalysis negative for protein or blood, no further humeral immunodeficiency or lab assessment at this point with normal immunologic titers, intact specific and subclass antibodies, the IgG decrease does not represent any underlying primary immunodeficiency or active antibody dysfunction at this time  4/25/17  rheumatology note await [place] [facility] evaluation  5/10/17 dr.wu lacy endocrinology note recommend fosamax weekly with anecdotal evidence that bisphosphonate therapy may provide symptomatic benefit and osteonecrosis, if develops side effects could consider intravenous reclast, referral [place] [facility], follow-up 4 months  5/28/17  rheumatology note avascular necrosis, arthralgias lower extremities, continue off prednisone, on alendronate per [place] [facility]  7/3/17   infectious disease consultation history of streptococcal pharyngitis with probable rheumatic fever, recommend discontinue penicillin prophylaxis follow-up as needed  6/27/17  [place] [facility] evaluation, recommended evaluation of hypercoagulable state, to consider antiphospholipid antibodies (lupus anticoagulant, anticardiolipin antibodies, beta-2 glycoprotein, phosphatidyl serine antibodies, factor V Leiden, antithrombin III, prothrombin mutation, protein C&S quantitative and qualitative, homocysteine, recommend after review of records do not strongly feel that she has true rheumatic fever, clear to discontinue penicillin, recheck ASO and anti-DNase B titers after 4 weeks  8/16/17 homocystine, protein C and protein S, lupus anticoagulant, beta 2 glycoprotein, antithrombin III, anticardiolipin antibody, anti-DNAse antibody, antistreptolysin all negative  8/31/17  rheumatology note, continues on alendronate per bone endocrinology recommendation [place], did follow up with orthopedics, will proceed with core decompression  2/23/18  [place] [facility] note, multifocal osteonecrosis knees, hips, other joints, underlying etiology unclear, trial few months of alendronate without dramatic improvement, alendronate discontinued because of undergoing core procedures with her hips, recommend continuing off alendronate monitoring for improvement, reassess in 6 months  10/10/18  [place] [facility] consultation osteonecrosis knees, hips, other joints, unclear etiology trial of alendronate without improvement, discontinued prior to hip procedures, hip pain is improved, discussed another trial of alendronate for limited timeframe 70 mg weekly reassess 6 months     foot pain  8/5/15 MRI right foot severe artifact secondary to first MTP are clear, limiting analysis of soft tissue, highly likely complete FHL tear  8/28/15  orthopedic note right ankle FHL tendon rupture seen  on MRI, do not recommend surgical repair at this time which would involve hemiarthroplasty, implant, first MTP fusion with bone graft, followup podiatry   8/18/17 MRI left foot normal, MRI right foot osteoathritis right first metatarsal head and sesamoids moderate in degree, ulnar edema with an sesamoids may be due to susceptibility artifact related to first metatarsal phalangeal arthroplasty     dyspepsia  11/2/18 seen UC for abdominal discomfort right upper quadrant  11/3/18 ultrasound liver gallbladder region negative  11/10/18 hida negative  11/20/18 trial of prilosec 40 mg  6/17/19 taper prilosec     Dyslipidemia  9/29/14 chol 186,trig 46,hdl 76,ldl 101  9/18/15 chol 225,trig 50,hdl 77,ldl 138  9/20/17 chol 189,trig 53,hdl 56,ldl 122  5/11/18 ultrasound carotid minimal left carotid bulb plaque, calcified, no evidence of occlusion less than 50% stenosis  9/28/18 chol 187,trig 59,hdl 76,ldl 99  5/8/18 EKG done in clinic, ultrasound carotid ordered follow-up calcifications   5/11/18 ultrasound carotid less than 50% internal stenosis bilateral  9/27/19 chol 196,trig 60,hdl 79,ldl 105      Chronic pain  5/8/18   5/8/18 pain contract  5/8/18 opiate risk score 1  5/8/18 on ultram 1-2 per day has tried physical therapy,TENS,acupuncture,chiropractor,massage therapy,heat,physical therapy, NSAIDs,gabapentin,celebrex,voltaren,lidoderm patch  1/3/19   6/17/19   6/17/19 controlled substance contract  9/9/19  on ultram one per evening  12/10/19    tried physical therapy,TENS,acupuncture,chiropractor,massage therapy,heat, NSAIDs,gabapentin,celebrex,voltaren,lidoderm patch     cervical pain  10/11 MRI cervical spine C4-C5 tiny disc bulge, C5-C6 bilateral and plate spurring with uncinate hypertrophy  10/7/16 MRI cervical spine C5-C6 small broad-based osteophyte complex, borderline foraminal stenosis, no significant progression of disease since 2010 6/4/18 MRI cervical spine C3-C4 mild diffuse bulge with  mild bilateral foraminal stenosis, C5-C6 mild bilateral foraminal stenosis     avascular necrosis  8/29/16 streptococcal pharyngitis positive strep test, treated with augmentin, developed erythema nodosum lower extremities and palms given NSAIDs and medrol dosepack  9/22/16 repeat medrol dose pack x 1 still with painful erythema nodosum nodules  10/5/16 high dose aspirin no benefit, changed to prednisone 20 mg daily,  10/19/16 increase prednisone to 40 mg x 3 days then back to 20 mg  11/7/16 still with polyarthritis, on prednisone 20 mg twice a day, repeat labs, still on penicillin, will speak with rheumatology about referral  11/14/16  rheumatolog note, features consistent with rheumatic fever, also consider seronegative spondyloarthropathies, sarcoidosis, rheumatoid arthritis, will check sacroiliac films, hand and feet x-rays, follow-up one week  12/23/16  rheumatology note, inflammatory markers did normalize with steroids, rheumatoid factor, CCP, MERA,ANKA negative continue to taper steroids given osteonecrosis alternating 17.5 mg and 15 mg, and 15 mg, and 15 mg alternating with 12.5 mg, and so forth  12/10/16 MRI right knee multiple areas right knee avascular necrosis medial and lateral femoral condyle, medial and lateral tibial plateau, bone marrow edema  12/23/16  rheumatology note, inflammatory markers did normalize with steroids, rheumatoid factor, CCP, MERA,ANKA negative continue to taper steroids given osteonecrosis alternating 17.5 mg and 15 mg, and 15 mg, and 15 mg alternating with 12.5 mg, and so forth  1/20/17 MRI left foot no evidence of arthropathy or tendinopathy  1/20/17 MRI left ankle metallic artifact lateral malleolus  1/20/17 MRI right ankle small oblong focus finger edema tibial plafond below subchondral plate, would be consistent with small area of avascular necrosis  1/20/17 MRI left knee multiple areas avascular necrosis surrounding the knee to include femur, tibia,  fibula  1/20/17 MRI pelvis bilateral femoral head avascular necrosis involving approximately 40% of the articular surface, metallic artifact left femoral head consistent with presence of small metallic pin  2/9/17 tapering prednisone down to 2.5 mg alternating with 5 mg  2/23/17  rheumatology note complete steroid taper then repeat ESR, CRP and CPK, also check antiphospholipid antibody,antithrombin, factor v leiden, protein s  3/6/17 anticardiolipin antibodies, lupus anticoagulant, protein CNS, factor V 5 Leyden, anti-thrombin panel, beta 2 glycoprotein negative  3/10/17 ESR 19,CRP 0.17, cortisol 3.3, ACTH 16, IgG 753 ([id number]), IgA 140, IgM 93  3/14/16  rheumatology note currently off prednisone, osteonecrosis involving multiple joints, knees, hips, right ankle, etiology unclear, antiphospholipid antibody, protein C and S normal, antithrombin III factor V leyden normal, refer to hematology for evaluation other etiologies, slightly decreased IgG refer to allergy immunology  3/30/17  allergy immunology evaluation slight IgG reduction 753 with normal IgG, IgM, no history to suggest primary immunodeficiency, recent diagnosis of group A streptococcal pharyngitis complicated by erythema nodosum, 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 therapy, we will perform humerol workup to include repeat quantitative immunoglobulins with subclass, specific antibodies for haemophilus influenza, pneumococcal, tetanus/diphtheria, limited flow cytometry with repeat CBC, SPEP/UPEP, ESR, CRP, UA, follow-up ASO, DNase B titer  5/10/17 dr.wu lacy endocrinology note recommend fosamax weekly with anecdotal evidence that bisphosphonate therapy may provide symptomatic benefit and osteonecrosis, if develops side effects could consider intravenous reclast, referral [place] [facility], follow-up 4 months  5/28/17  rheumatology note avascular necrosis, arthralgias lower extremities, continue off  prednisone, on alendronate per [place] [facility]  7/7/17 MRI left knee without; again seen multiple bone infarcts femur, tibia, fibula, patella appeared less prominent on current examination  7/7/17 MRI right knee without; interval improvement in size of multiple bony infarcts involving femur, tibia, fibula, some infarcts have resolved  7/7/17 MRI pelvis without; stable bilateral femoral head avascular necrosis, these have not progressed and there is no evidence of subchondral collapse or arthropathy  6/27/17 dr.shah white rheumatology evaluation, recommended evaluation of hypercoagulable state, to consider antiphospholipid antibodies (lupus anticoagulant, anticardiolipin antibodies, beta-2 glycoprotein, phosphatidyl serine antibodies, factor V Leiden, antithrombin III, prothrombin mutation, protein C&S quantitative and qualitative, homocysteine, recommend after review of records do not strongly feel that she has true rheumatic fever, clear to discontinue penicillin, recheck ASO and anti-DNase B titers after 4 weeks  8/16/17 homocystine, protein C and protein S, lupus anticoagulant, beta 2 glycoprotein, antithrombin III, anticardiolipin antibody, anti-DNAse antibody, antistreptolysin all negative  8/18/17 MRI left foot normal, MRI right foot osteoathritis right first metatarsal head and sesamoids moderate in degree, ulnar edema with an sesamoids may be due to susceptibility artifact related to first metatarsal phalangeal arthroplasty  8/31/17  rheumatology note, continues on alendronate per bone endocrinology recommendation [place], did follow up with orthopedics, will proceed with core decompression  9/27/17  [place] [facility] with focal osteonecrosis involving knees, hips, other joints, etiology unclear, dramatic improvement with a few months of alendronate, about to undergo surgical intervention hip, will discontinue alendronate pending surgery, reassess 4-5 months  9/29/17    orthopedic surgical note right hip fluoroscopically guided core decompression and right hand carpal tunnel release  12/29/17  orthopedic surgical note  left proximal femoral head core decompression  2/23/18  [place] [facility] note, multifocal osteonecrosis knees, hips, other joints, underlying etiology unclear, trial few months of alendronate without dramatic improvement, alendronate discontinued because of undergoing core procedures with her hips, recommend continuing off alendronate monitoring for improvement, reassess in 6 months  10/10/18  [place] [facility] consultation osteonecrosis knees, hips, other joints, unclear etiology trial of alendronate without improvement, discontinued prior to hip procedures, hip pain is improved, discussed another trial of alendronate for limited timeframe 70 mg weekly reassess 6 months  12/7/18 crp 0.46, ESR 23, hgb 13,hct 42, wbc 7.9  12/10/18 MRI left hip avascular necrosis left femoral head 50% articular surface, with some progression from comparison, some early subchondral collapse anteriorly and superiorly with marrow edema extending into the left femoral head and neck, surgical change consistent with prior decompression procedure and femoral osteoplasty, metallic artifact consistent with surgical pain femoral head, early degenerative changes  12/10/18 MRI right hip without chronic avascular necrosis right femoral head 50% articular surface without evidence of subchondral collapse, early degenerative changes, surgical changes consistent with prior right proximal femoral osteoplasty  4/24/19  [place] [facility] order bone density if she does have osteoporosis consider continuation bisphosphonate versus anabolic therapy  5/2/19  orthopedic note undergo MRI evaluation of her left hip  6/10/19 MRI left hip at [facility], redemonstration moderate to advanced avascular necrosis bilateral femoral heads,  degree of associated bone marrow edema appears significantly decreased from previous study particular on the left, small left hip effusion, postoperative changes remote core decompression and femoral osteotomy  6/27/19  orthopedic note follow-up evaluation advanced degenerative arthritis left hip, symptomatic failing conservative measures, considering total joint arthroplasty, recommend proceeding with left total hip arthroplasty due to progression of avascular necrosis  8/5/19  orthopedic operative note left total hip arthroplasty  9/9/19 off fosamax for now due to recent hip arthroplasty  10/9/19 dr.wu white endocrinology notes most recent bone density reviewed, remains off alendronate since left hip replacement, clear to resume alendronate 70 mg weekly follow-up 6 months  12/10/19 on fosamax 70 mg      allergic rhinitis  Tried otc claritin  9/12/13 flonase trial  9/25/15 flonase and zyrtec or claritin  Mammogram                 Patient Active Problem List   Diagnosis   • S/P appendectomy   • S/p left ankle surgery   • S/p right hip arthroscopy    • Low back pain   • Cervical pain (neck)   • IBS (irritable bowel syndrome)   • Preventative health care   • Varicose vein of leg   • S/P bunionectomy   • Allergic rhinitis due to animal hair and dander   • Chronic pain   • Dyslipidemia   • Perimenopausal   • Foot pain, right   • Obesity   • History of rheumatic fever   • Avascular necrosis (HCC)   • Dyspepsia   • Status post total replacement of left hip   • Subclinical hypothyroidism     Depression Screening    Little interest or pleasure in doing things?  0 - not at all  Feeling down, depressed , or hopeless? 0 - not at all  Patient Health Questionnaire Score: 0        Health Maintenance Summary                MAMMOGRAM Overdue 12/24/2019      Done 12/24/2018 MA-SCREENING MAMMO BILAT W/TOMOSYNTHESIS W/CAD     Patient has more history with this topic...    PAP SMEAR Next Due 12/18/2020       Done 12/18/2017 Associated Gynecology     Patient has more history with this topic...    IMM DTaP/Tdap/Td Vaccine Next Due 9/23/2028      Done 9/23/2018 Imm Admin: Tdap Vaccine     Patient has more history with this topic...          Patient Care Team:  Franklin Gonzalez M.D. as PCP - General (Internal Medicine)  Starr Jackson M.D. (Inactive) as Consulting Physician (Rheumatology)  Franklin Gonzalez M.D.    ROS       Objective:          Physical Exam  Vitals signs and nursing note reviewed.   Constitutional:       Appearance: Normal appearance.   HENT:      Head: Atraumatic.      Nose: Nose normal.   Eyes:      Conjunctiva/sclera: Conjunctivae normal.   Neck:      Musculoskeletal: Neck supple.   Cardiovascular:      Rate and Rhythm: Regular rhythm.      Heart sounds: Normal heart sounds. No murmur.   Pulmonary:      Effort: Pulmonary effort is normal.      Breath sounds: Normal breath sounds.   Abdominal:      General: There is no distension.   Musculoskeletal:         General: No swelling.   Skin:     General: Skin is warm.   Neurological:      General: No focal deficit present.      Mental Status: She is alert.   Psychiatric:         Mood and Affect: Mood normal.         Behavior: Behavior normal.         Thought Content: Thought content normal.       No spinal tenderness, right hip tenderness to palpation, limited right hip range of motion internal and external rotation  Bilateral knees no edema     No lower extremity edema, left ankle normal range of motion, minimal edema     Assessment/Plan:       Assessment  #!  Chronic right hip pain related to avascular necrosis, last MRI 2018, followed by orthopedics, seen physical therapy at [facility] on Fosamax    #2 chronic knee pain related to avascular necrosis still with some exacerbations at time, pain worse with bending and walking    #3 BMI 31.8    #4 chronic tramadol therapy 1 tablet every 8 hours as needed for pain, is effective in helping her symptoms,  without side effects of drowsiness, sedation, memory loss, sees pain management    #5 chronic low back pain followed by pain management Dr. Rees  .  Plan  #!     #2 continue tramadol 1 tablet every 8 hours as needed for pain, medication is effective at controlling her symptoms, she has no side effects with the medication, old records reviewed, has had imaging previously, has seen pain management, has tried physical therapy, acupuncture, chiropractor, TENS, massage therapy, NSAIDs, Neurontin, Celebrex, topical Voltaren and Lidoderm.  I believe the benefits of tramadol outweigh the potential risks    #3 understands long-term use of tramadol may cause habituation, dependence, memory loss, depression, no alcohol with medication    #4 refill tramadol 3 separate months of prescriptions provided    #5 old records  orthopedics     #6 check with [facility] whether she needs MRI right hip and knees agai again before the August appointment, this would be indicated because of persistent right hip pain related to avascular necrosis as well as bilateral knee pain related to avascular necrosis, the patient is on Fosamax, in addition to monitoring her response to Fosamax by symptomology, MRI would be able to provide some imaging confirmation    #7 follow-up with myself 6 months    #8 FMLA form paperwork to be completed

## 2020-06-11 NOTE — LETTER
[facility]  Franklin Gonzalez M.D.  [address]  [place] [address]  Fax: [phone]   Authorization for Release/Disclosure of   Protected Health Information   Name: SUMAYA WATSON : 1970 SSN: [id number]   Address: [address] [place] [address]  [place] [address] Phone:    [phone] (home) [phone] (work)   I authorize the entity listed below to release/disclose the PHI below to:   [facility]/Franklin Gonzalez M.D. and Franklin Gonzalez M.D.   Provider or Entity Name:                                                              Dr Ferrer (ORTHO)   Address   City, State, [facility]   Phone:      Fax:     Reason for request: continuity of care   Information to be released: old records   [  ] LAST COLONOSCOPY,  including any PATH REPORT and follow-up  [  ] LAST FIT/COLOGUARD RESULT [  ] LAST DEXA  [  ] LAST MAMMOGRAM  [  ] LAST PAP  [  ] LAST LABS [  ] RETINA EXAM REPORT  [  ] IMMUNIZATION RECORDS  [ xx ] Release all info      [  ] Check here and initial the line next to each item to release ALL health information INCLUDING  _____ Care and treatment for drug and / or alcohol abuse  _____ HIV testing, infection status, or AIDS  _____ Genetic Testing    DATES OF SERVICE OR TIME PERIOD TO BE DISCLOSED: _____________  I understand and acknowledge that:  * This Authorization may be revoked at any time by you in writing, except if your health information has already been used or disclosed.  * Your health information that will be used or disclosed as a result of you signing this authorization could be re-disclosed by the recipient. If this occurs, your re-disclosed health information may no longer be protected by State or Federal laws.  * You may refuse to sign this Authorization. Your refusal will not affect your ability to obtain treatment.  * This Authorization becomes effective upon signing and will  on (date) __________.      If no date is indicated, this Authorization will  one (1) year from  the signature date.    Name: Janett Urban Page    Signature:   Date:     6/11/2020       PLEASE FAX REQUESTED RECORDS BACK TO: [phone]

## 2020-06-12 ENCOUNTER — TELEPHONE (OUTPATIENT)
Dept: MEDICAL GROUP | Facility: MEDICAL CENTER | Age: 50
End: 2020-06-12

## 2020-06-12 NOTE — TELEPHONE ENCOUNTER
Pt notified, forms faxed and scanned. Originals mailed to Pt per her request.     Thank you,  Fabiola

## 2020-08-14 ENCOUNTER — TELEPHONE (OUTPATIENT)
Dept: MEDICAL GROUP | Facility: MEDICAL CENTER | Age: 50
End: 2020-08-14

## 2020-08-14 ENCOUNTER — HOSPITAL ENCOUNTER (OUTPATIENT)
Dept: RADIOLOGY | Facility: MEDICAL CENTER | Age: 50
End: 2020-08-14
Attending: INTERNAL MEDICINE
Payer: COMMERCIAL

## 2020-08-14 DIAGNOSIS — Z12.31 ENCOUNTER FOR SCREENING MAMMOGRAM FOR BREAST CANCER: ICD-10-CM

## 2020-08-14 PROCEDURE — 77067 SCR MAMMO BI INCL CAD: CPT

## 2020-08-19 DIAGNOSIS — Z01.812 PRE-OPERATIVE LABORATORY EXAMINATION: ICD-10-CM

## 2020-08-19 LAB
ANION GAP SERPL CALC-SCNC: 13 MMOL/L (ref 7–16)
BUN SERPL-MCNC: 18 MG/DL (ref 8–22)
CALCIUM SERPL-MCNC: 9.3 MG/DL (ref 8.4–10.2)
CHLORIDE SERPL-SCNC: 97 MMOL/L (ref 96–112)
CO2 SERPL-SCNC: 23 MMOL/L (ref 20–33)
COVID ORDER STATUS COVID19: NORMAL
CREAT SERPL-MCNC: 0.71 MG/DL (ref 0.5–1.4)
ERYTHROCYTE [DISTWIDTH] IN BLOOD BY AUTOMATED COUNT: 47.1 FL (ref 35.9–50)
GLUCOSE SERPL-MCNC: 92 MG/DL (ref 65–99)
HCT VFR BLD AUTO: 37.9 % (ref 37–47)
HGB BLD-MCNC: 11.7 G/DL (ref 12–16)
MCH RBC QN AUTO: 27.3 PG (ref 27–33)
MCHC RBC AUTO-ENTMCNC: 30.9 G/DL (ref 33.6–35)
MCV RBC AUTO: 88.6 FL (ref 81.4–97.8)
PLATELET # BLD AUTO: 383 K/UL (ref 164–446)
PMV BLD AUTO: 9.2 FL (ref 9–12.9)
POTASSIUM SERPL-SCNC: 4.4 MMOL/L (ref 3.6–5.5)
RBC # BLD AUTO: 4.28 M/UL (ref 4.2–5.4)
SARS-COV-2 RNA RESP QL NAA+PROBE: NOTDETECTED
SODIUM SERPL-SCNC: 133 MMOL/L (ref 135–145)
SPECIMEN SOURCE: NORMAL
WBC # BLD AUTO: 6.3 K/UL (ref 4.8–10.8)

## 2020-08-19 PROCEDURE — 85027 COMPLETE CBC AUTOMATED: CPT

## 2020-08-19 PROCEDURE — 87640 STAPH A DNA AMP PROBE: CPT

## 2020-08-19 PROCEDURE — 87641 MR-STAPH DNA AMP PROBE: CPT

## 2020-08-19 PROCEDURE — C9803 HOPD COVID-19 SPEC COLLECT: HCPCS

## 2020-08-19 PROCEDURE — 36415 COLL VENOUS BLD VENIPUNCTURE: CPT

## 2020-08-19 PROCEDURE — U0003 INFECTIOUS AGENT DETECTION BY NUCLEIC ACID (DNA OR RNA); SEVERE ACUTE RESPIRATORY SYNDROME CORONAVIRUS 2 (SARS-COV-2) (CORONAVIRUS DISEASE [COVID-19]), AMPLIFIED PROBE TECHNIQUE, MAKING USE OF HIGH THROUGHPUT TECHNOLOGIES AS DESCRIBED BY CMS-2020-01-R: HCPCS

## 2020-08-19 PROCEDURE — 80048 BASIC METABOLIC PNL TOTAL CA: CPT

## 2020-08-19 RX ORDER — OXYCODONE HYDROCHLORIDE AND ACETAMINOPHEN 5; 325 MG/1; MG/1
TABLET ORAL
COMMUNITY
Start: 2020-08-18 | End: 2020-12-17

## 2020-08-19 RX ORDER — DIAZEPAM 5 MG/1
TABLET ORAL
COMMUNITY
Start: 2020-07-02 | End: 2020-12-17

## 2020-08-19 ASSESSMENT — FIBROSIS 4 INDEX: FIB4 SCORE: 0.73

## 2020-08-19 NOTE — OR NURSING
"Pre-admit appointment completed. \"Preparing for your Procedure\" sheet given to Pt with verbal and written instructions. Pt states all instructions given are understood and to call pre-admit or Dr's office for additional questions or any symptoms of illness/covid develop prior to DOS. Medications the patient will take the morning of surgery per anesthesia protocol: Flonase, Gabapentin, Tramadol    Surgery preparation checklist for Joint Replacement Program given to Pt with verbal instructions.     Denies burning with urination or urinary frequency  "

## 2020-08-19 NOTE — DISCHARGE PLANNING
DISCHARGE PLANNING NOTE - TOTAL JOINT     Procedure: Procedure(s):  ARTHROPLASTY, HIP, TOTAL - AND PENALOZA  Procedure Date: 8/24/2020  Insurance:  Payor: [employer] / Plan: [facility] / Product Type: *No Product type* /   Equipment currently available at home? crutches, raised toilet seat and shower chair  Steps into the home? 2  Steps within the home? 1 flight  Toilet height? Standard  Type of shower? walk-in shower  Who will be with you during your recovery? spouse  Is Outpatient Physical Therapy set up after surgery? Yes  Did you take the Total Joint Class and where? Yes, at Dr. Escalante office last year.  Planning on same day discharge? Yes.    This writer met with pt during her preadmission appointment. Pt states she has all needed equipment due to her left hip surgery last year. Home safety checklist reviewed and copy with pt. Pt has no questions and verbalizes understanding of all instructions. No dc needs identified, pt does not want a walker. Anticipate dc to home without barriers.

## 2020-08-20 LAB
SCCMEC + MECA PNL NOSE NAA+PROBE: NEGATIVE
SCCMEC + MECA PNL NOSE NAA+PROBE: POSITIVE

## 2020-08-24 ENCOUNTER — APPOINTMENT (OUTPATIENT)
Dept: RADIOLOGY | Facility: MEDICAL CENTER | Age: 50
End: 2020-08-24
Attending: ORTHOPAEDIC SURGERY
Payer: COMMERCIAL

## 2020-08-24 ENCOUNTER — HOSPITAL ENCOUNTER (OUTPATIENT)
Facility: MEDICAL CENTER | Age: 50
End: 2020-08-24
Attending: ORTHOPAEDIC SURGERY | Admitting: ORTHOPAEDIC SURGERY
Payer: COMMERCIAL

## 2020-08-24 ENCOUNTER — ANESTHESIA EVENT (OUTPATIENT)
Dept: SURGERY | Facility: MEDICAL CENTER | Age: 50
End: 2020-08-24
Payer: COMMERCIAL

## 2020-08-24 ENCOUNTER — ANESTHESIA (OUTPATIENT)
Dept: SURGERY | Facility: MEDICAL CENTER | Age: 50
End: 2020-08-24
Payer: COMMERCIAL

## 2020-08-24 VITALS
HEIGHT: 67 IN | BODY MASS INDEX: 32.21 KG/M2 | WEIGHT: 205.25 LBS | TEMPERATURE: 97.4 F | HEART RATE: 94 BPM | SYSTOLIC BLOOD PRESSURE: 126 MMHG | RESPIRATION RATE: 16 BRPM | DIASTOLIC BLOOD PRESSURE: 69 MMHG | OXYGEN SATURATION: 90 %

## 2020-08-24 PROCEDURE — 502578 HCHG PACK, TOTAL HIP: Performed by: ORTHOPAEDIC SURGERY

## 2020-08-24 PROCEDURE — 160009 HCHG ANES TIME/MIN: Performed by: ORTHOPAEDIC SURGERY

## 2020-08-24 PROCEDURE — 160042 HCHG SURGERY MINUTES - EA ADDL 1 MIN LEVEL 5: Performed by: ORTHOPAEDIC SURGERY

## 2020-08-24 PROCEDURE — 700105 HCHG RX REV CODE 258: Performed by: ORTHOPAEDIC SURGERY

## 2020-08-24 PROCEDURE — 700111 HCHG RX REV CODE 636 W/ 250 OVERRIDE (IP): Performed by: ANESTHESIOLOGY

## 2020-08-24 PROCEDURE — 160031 HCHG SURGERY MINUTES - 1ST 30 MINS LEVEL 5: Performed by: ORTHOPAEDIC SURGERY

## 2020-08-24 PROCEDURE — G0378 HOSPITAL OBSERVATION PER HR: HCPCS

## 2020-08-24 PROCEDURE — 96376 TX/PRO/DX INJ SAME DRUG ADON: CPT

## 2020-08-24 PROCEDURE — 73502 X-RAY EXAM HIP UNI 2-3 VIEWS: CPT | Mod: RT

## 2020-08-24 PROCEDURE — 501445 HCHG STAPLER, SKIN DISP: Performed by: ORTHOPAEDIC SURGERY

## 2020-08-24 PROCEDURE — 500864 HCHG NEEDLE, SPINAL 18G: Performed by: ORTHOPAEDIC SURGERY

## 2020-08-24 PROCEDURE — 700101 HCHG RX REV CODE 250

## 2020-08-24 PROCEDURE — 700111 HCHG RX REV CODE 636 W/ 250 OVERRIDE (IP): Performed by: ORTHOPAEDIC SURGERY

## 2020-08-24 PROCEDURE — 700101 HCHG RX REV CODE 250: Performed by: ANESTHESIOLOGY

## 2020-08-24 PROCEDURE — 700105 HCHG RX REV CODE 258: Performed by: ANESTHESIOLOGY

## 2020-08-24 PROCEDURE — 700111 HCHG RX REV CODE 636 W/ 250 OVERRIDE (IP)

## 2020-08-24 PROCEDURE — 160048 HCHG OR STATISTICAL LEVEL 1-5: Performed by: ORTHOPAEDIC SURGERY

## 2020-08-24 PROCEDURE — 97165 OT EVAL LOW COMPLEX 30 MIN: CPT

## 2020-08-24 PROCEDURE — 96374 THER/PROPH/DIAG INJ IV PUSH: CPT

## 2020-08-24 PROCEDURE — 500562 HCHG FIBERWIRE: Performed by: ORTHOPAEDIC SURGERY

## 2020-08-24 PROCEDURE — 160002 HCHG RECOVERY MINUTES (STAT): Performed by: ORTHOPAEDIC SURGERY

## 2020-08-24 PROCEDURE — A9270 NON-COVERED ITEM OR SERVICE: HCPCS | Performed by: ORTHOPAEDIC SURGERY

## 2020-08-24 PROCEDURE — A9270 NON-COVERED ITEM OR SERVICE: HCPCS | Performed by: ANESTHESIOLOGY

## 2020-08-24 PROCEDURE — 700102 HCHG RX REV CODE 250 W/ 637 OVERRIDE(OP): Performed by: ORTHOPAEDIC SURGERY

## 2020-08-24 PROCEDURE — 96375 TX/PRO/DX INJ NEW DRUG ADDON: CPT

## 2020-08-24 PROCEDURE — 160036 HCHG PACU - EA ADDL 30 MINS PHASE I: Performed by: ORTHOPAEDIC SURGERY

## 2020-08-24 PROCEDURE — 700102 HCHG RX REV CODE 250 W/ 637 OVERRIDE(OP): Performed by: ANESTHESIOLOGY

## 2020-08-24 PROCEDURE — 501838 HCHG SUTURE GENERAL: Performed by: ORTHOPAEDIC SURGERY

## 2020-08-24 PROCEDURE — 502000 HCHG MISC OR IMPLANTS RC 0278: Performed by: ORTHOPAEDIC SURGERY

## 2020-08-24 PROCEDURE — 97535 SELF CARE MNGMENT TRAINING: CPT

## 2020-08-24 PROCEDURE — 160035 HCHG PACU - 1ST 60 MINS PHASE I: Performed by: ORTHOPAEDIC SURGERY

## 2020-08-24 PROCEDURE — 97161 PT EVAL LOW COMPLEX 20 MIN: CPT

## 2020-08-24 PROCEDURE — 700101 HCHG RX REV CODE 250: Performed by: ORTHOPAEDIC SURGERY

## 2020-08-24 DEVICE — IMPLANTABLE DEVICE: Type: IMPLANTABLE DEVICE | Site: HIP | Status: FUNCTIONAL

## 2020-08-24 RX ORDER — OXYCODONE HYDROCHLORIDE 5 MG/1
5 TABLET ORAL
Status: DISCONTINUED | OUTPATIENT
Start: 2020-08-24 | End: 2020-08-24 | Stop reason: HOSPADM

## 2020-08-24 RX ORDER — SCOLOPAMINE TRANSDERMAL SYSTEM 1 MG/1
1 PATCH, EXTENDED RELEASE TRANSDERMAL
Status: DISCONTINUED | OUTPATIENT
Start: 2020-08-24 | End: 2020-08-24 | Stop reason: HOSPADM

## 2020-08-24 RX ORDER — DEXAMETHASONE SODIUM PHOSPHATE 4 MG/ML
4 INJECTION, SOLUTION INTRA-ARTICULAR; INTRALESIONAL; INTRAMUSCULAR; INTRAVENOUS; SOFT TISSUE
Status: DISCONTINUED | OUTPATIENT
Start: 2020-08-24 | End: 2020-08-24 | Stop reason: HOSPADM

## 2020-08-24 RX ORDER — ACETAMINOPHEN 500 MG
1000 TABLET ORAL EVERY 6 HOURS
Status: DISCONTINUED | OUTPATIENT
Start: 2020-08-24 | End: 2020-08-24 | Stop reason: HOSPADM

## 2020-08-24 RX ORDER — ROCURONIUM BROMIDE 10 MG/ML
INJECTION, SOLUTION INTRAVENOUS PRN
Status: DISCONTINUED | OUTPATIENT
Start: 2020-08-24 | End: 2020-08-24 | Stop reason: SURG

## 2020-08-24 RX ORDER — HYDROMORPHONE HYDROCHLORIDE 1 MG/ML
0.2 INJECTION, SOLUTION INTRAMUSCULAR; INTRAVENOUS; SUBCUTANEOUS
Status: DISCONTINUED | OUTPATIENT
Start: 2020-08-24 | End: 2020-08-24 | Stop reason: HOSPADM

## 2020-08-24 RX ORDER — OXYCODONE HYDROCHLORIDE 10 MG/1
10 TABLET ORAL
Status: DISCONTINUED | OUTPATIENT
Start: 2020-08-24 | End: 2020-08-24 | Stop reason: HOSPADM

## 2020-08-24 RX ORDER — GABAPENTIN 300 MG/1
300 CAPSULE ORAL 2 TIMES DAILY
Status: CANCELLED | OUTPATIENT
Start: 2020-08-24

## 2020-08-24 RX ORDER — HALOPERIDOL 5 MG/ML
1 INJECTION INTRAMUSCULAR
Status: DISCONTINUED | OUTPATIENT
Start: 2020-08-24 | End: 2020-08-24 | Stop reason: HOSPADM

## 2020-08-24 RX ORDER — TRANEXAMIC ACID 100 MG/ML
INJECTION, SOLUTION INTRAVENOUS PRN
Status: DISCONTINUED | OUTPATIENT
Start: 2020-08-24 | End: 2020-08-24 | Stop reason: SURG

## 2020-08-24 RX ORDER — HYDROMORPHONE HYDROCHLORIDE 1 MG/ML
0.5 INJECTION, SOLUTION INTRAMUSCULAR; INTRAVENOUS; SUBCUTANEOUS
Status: DISCONTINUED | OUTPATIENT
Start: 2020-08-24 | End: 2020-08-24 | Stop reason: HOSPADM

## 2020-08-24 RX ORDER — OXYCODONE HCL 5 MG/5 ML
5 SOLUTION, ORAL ORAL
Status: COMPLETED | OUTPATIENT
Start: 2020-08-24 | End: 2020-08-24

## 2020-08-24 RX ORDER — DOCUSATE SODIUM 100 MG/1
100 CAPSULE, LIQUID FILLED ORAL 2 TIMES DAILY
Status: DISCONTINUED | OUTPATIENT
Start: 2020-08-24 | End: 2020-08-24 | Stop reason: HOSPADM

## 2020-08-24 RX ORDER — KETOROLAC TROMETHAMINE 30 MG/ML
INJECTION, SOLUTION INTRAMUSCULAR; INTRAVENOUS
Status: DISCONTINUED | OUTPATIENT
Start: 2020-08-24 | End: 2020-08-24 | Stop reason: HOSPADM

## 2020-08-24 RX ORDER — ONDANSETRON 2 MG/ML
INJECTION INTRAMUSCULAR; INTRAVENOUS PRN
Status: DISCONTINUED | OUTPATIENT
Start: 2020-08-24 | End: 2020-08-24 | Stop reason: SURG

## 2020-08-24 RX ORDER — SODIUM CHLORIDE, SODIUM LACTATE, POTASSIUM CHLORIDE, CALCIUM CHLORIDE 600; 310; 30; 20 MG/100ML; MG/100ML; MG/100ML; MG/100ML
INJECTION, SOLUTION INTRAVENOUS CONTINUOUS
Status: DISCONTINUED | OUTPATIENT
Start: 2020-08-24 | End: 2020-08-24 | Stop reason: HOSPADM

## 2020-08-24 RX ORDER — HYDROMORPHONE HYDROCHLORIDE 1 MG/ML
0.4 INJECTION, SOLUTION INTRAMUSCULAR; INTRAVENOUS; SUBCUTANEOUS
Status: DISCONTINUED | OUTPATIENT
Start: 2020-08-24 | End: 2020-08-24 | Stop reason: HOSPADM

## 2020-08-24 RX ORDER — ROPIVACAINE HYDROCHLORIDE 5 MG/ML
INJECTION, SOLUTION EPIDURAL; INFILTRATION; PERINEURAL
Status: DISCONTINUED | OUTPATIENT
Start: 2020-08-24 | End: 2020-08-24 | Stop reason: HOSPADM

## 2020-08-24 RX ORDER — ONDANSETRON 2 MG/ML
4 INJECTION INTRAMUSCULAR; INTRAVENOUS EVERY 4 HOURS PRN
Status: DISCONTINUED | OUTPATIENT
Start: 2020-08-24 | End: 2020-08-24 | Stop reason: HOSPADM

## 2020-08-24 RX ORDER — CELECOXIB 200 MG/1
200 CAPSULE ORAL 2 TIMES DAILY
Status: DISCONTINUED | OUTPATIENT
Start: 2020-08-25 | End: 2020-08-24 | Stop reason: HOSPADM

## 2020-08-24 RX ORDER — BISACODYL 10 MG
10 SUPPOSITORY, RECTAL RECTAL
Status: DISCONTINUED | OUTPATIENT
Start: 2020-08-24 | End: 2020-08-24 | Stop reason: HOSPADM

## 2020-08-24 RX ORDER — DIPHENHYDRAMINE HYDROCHLORIDE 50 MG/ML
25 INJECTION INTRAMUSCULAR; INTRAVENOUS EVERY 6 HOURS PRN
Status: DISCONTINUED | OUTPATIENT
Start: 2020-08-24 | End: 2020-08-24 | Stop reason: HOSPADM

## 2020-08-24 RX ORDER — POLYETHYLENE GLYCOL 3350 17 G/17G
1 POWDER, FOR SOLUTION ORAL 2 TIMES DAILY PRN
Status: DISCONTINUED | OUTPATIENT
Start: 2020-08-24 | End: 2020-08-24 | Stop reason: HOSPADM

## 2020-08-24 RX ORDER — OXYCODONE HCL 5 MG/5 ML
10 SOLUTION, ORAL ORAL
Status: COMPLETED | OUTPATIENT
Start: 2020-08-24 | End: 2020-08-24

## 2020-08-24 RX ORDER — ONDANSETRON 2 MG/ML
4 INJECTION INTRAMUSCULAR; INTRAVENOUS
Status: COMPLETED | OUTPATIENT
Start: 2020-08-24 | End: 2020-08-24

## 2020-08-24 RX ORDER — SODIUM CHLORIDE 9 MG/ML
INJECTION, SOLUTION INTRAMUSCULAR; INTRAVENOUS; SUBCUTANEOUS
Status: DISCONTINUED | OUTPATIENT
Start: 2020-08-24 | End: 2020-08-24 | Stop reason: HOSPADM

## 2020-08-24 RX ORDER — MIDAZOLAM HYDROCHLORIDE 1 MG/ML
INJECTION INTRAMUSCULAR; INTRAVENOUS PRN
Status: DISCONTINUED | OUTPATIENT
Start: 2020-08-24 | End: 2020-08-24 | Stop reason: SURG

## 2020-08-24 RX ORDER — MEPERIDINE HYDROCHLORIDE 25 MG/ML
12.5 INJECTION INTRAMUSCULAR; INTRAVENOUS; SUBCUTANEOUS
Status: DISCONTINUED | OUTPATIENT
Start: 2020-08-24 | End: 2020-08-24 | Stop reason: HOSPADM

## 2020-08-24 RX ORDER — LIDOCAINE HYDROCHLORIDE 20 MG/ML
INJECTION, SOLUTION EPIDURAL; INFILTRATION; INTRACAUDAL; PERINEURAL PRN
Status: DISCONTINUED | OUTPATIENT
Start: 2020-08-24 | End: 2020-08-24 | Stop reason: SURG

## 2020-08-24 RX ORDER — LIDOCAINE HYDROCHLORIDE 10 MG/ML
INJECTION, SOLUTION INFILTRATION; PERINEURAL
Status: COMPLETED
Start: 2020-08-24 | End: 2020-08-24

## 2020-08-24 RX ORDER — AMOXICILLIN 250 MG
1 CAPSULE ORAL NIGHTLY
Status: DISCONTINUED | OUTPATIENT
Start: 2020-08-24 | End: 2020-08-24 | Stop reason: HOSPADM

## 2020-08-24 RX ORDER — HYDROMORPHONE HYDROCHLORIDE 1 MG/ML
0.1 INJECTION, SOLUTION INTRAMUSCULAR; INTRAVENOUS; SUBCUTANEOUS
Status: DISCONTINUED | OUTPATIENT
Start: 2020-08-24 | End: 2020-08-24 | Stop reason: HOSPADM

## 2020-08-24 RX ORDER — ENEMA 19; 7 G/133ML; G/133ML
1 ENEMA RECTAL
Status: DISCONTINUED | OUTPATIENT
Start: 2020-08-24 | End: 2020-08-24 | Stop reason: HOSPADM

## 2020-08-24 RX ORDER — AMOXICILLIN 250 MG
1 CAPSULE ORAL
Status: DISCONTINUED | OUTPATIENT
Start: 2020-08-24 | End: 2020-08-24 | Stop reason: HOSPADM

## 2020-08-24 RX ORDER — HALOPERIDOL 5 MG/ML
1 INJECTION INTRAMUSCULAR EVERY 6 HOURS PRN
Status: DISCONTINUED | OUTPATIENT
Start: 2020-08-24 | End: 2020-08-24 | Stop reason: HOSPADM

## 2020-08-24 RX ORDER — DIPHENHYDRAMINE HYDROCHLORIDE 50 MG/ML
12.5 INJECTION INTRAMUSCULAR; INTRAVENOUS
Status: DISCONTINUED | OUTPATIENT
Start: 2020-08-24 | End: 2020-08-24 | Stop reason: HOSPADM

## 2020-08-24 RX ORDER — KETOROLAC TROMETHAMINE 30 MG/ML
30 INJECTION, SOLUTION INTRAMUSCULAR; INTRAVENOUS EVERY 6 HOURS
Status: DISCONTINUED | OUTPATIENT
Start: 2020-08-24 | End: 2020-08-24 | Stop reason: HOSPADM

## 2020-08-24 RX ORDER — PHENYLEPHRINE HCL IN 0.9% NACL 0.5 MG/5ML
SYRINGE (ML) INTRAVENOUS PRN
Status: DISCONTINUED | OUTPATIENT
Start: 2020-08-24 | End: 2020-08-24 | Stop reason: SURG

## 2020-08-24 RX ADMIN — FENTANYL CITRATE 50 MCG: 50 INJECTION, SOLUTION INTRAMUSCULAR; INTRAVENOUS at 09:12

## 2020-08-24 RX ADMIN — FENTANYL CITRATE 25 MCG: 50 INJECTION, SOLUTION INTRAMUSCULAR; INTRAVENOUS at 09:45

## 2020-08-24 RX ADMIN — LIDOCAINE HYDROCHLORIDE 100 MG: 20 INJECTION, SOLUTION EPIDURAL; INFILTRATION; INTRACAUDAL; PERINEURAL at 07:04

## 2020-08-24 RX ADMIN — ONDANSETRON HYDROCHLORIDE 4 MG: 2 SOLUTION INTRAMUSCULAR; INTRAVENOUS at 09:44

## 2020-08-24 RX ADMIN — SUGAMMADEX 200 MG: 100 INJECTION, SOLUTION INTRAVENOUS at 08:26

## 2020-08-24 RX ADMIN — TRANEXAMIC ACID 1000 MG: 100 INJECTION, SOLUTION INTRAVENOUS at 07:15

## 2020-08-24 RX ADMIN — FENTANYL CITRATE 25 MCG: 50 INJECTION, SOLUTION INTRAMUSCULAR; INTRAVENOUS at 10:10

## 2020-08-24 RX ADMIN — PROPOFOL 200 MG: 10 INJECTION, EMULSION INTRAVENOUS at 07:04

## 2020-08-24 RX ADMIN — WATER 15 ML: 100 IRRIGANT IRRIGATION at 06:37

## 2020-08-24 RX ADMIN — FENTANYL CITRATE 25 MCG: 50 INJECTION, SOLUTION INTRAMUSCULAR; INTRAVENOUS at 10:18

## 2020-08-24 RX ADMIN — OXYCODONE HYDROCHLORIDE 10 MG: 10 TABLET ORAL at 11:47

## 2020-08-24 RX ADMIN — SODIUM CHLORIDE, POTASSIUM CHLORIDE, SODIUM LACTATE AND CALCIUM CHLORIDE: 600; 310; 30; 20 INJECTION, SOLUTION INTRAVENOUS at 06:38

## 2020-08-24 RX ADMIN — LIDOCAINE HYDROCHLORIDE 0.5 ML: 10 INJECTION, SOLUTION INFILTRATION; PERINEURAL at 06:41

## 2020-08-24 RX ADMIN — ONDANSETRON 4 MG: 2 INJECTION INTRAMUSCULAR; INTRAVENOUS at 08:26

## 2020-08-24 RX ADMIN — Medication 0.5 ML: at 06:41

## 2020-08-24 RX ADMIN — VANCOMYCIN HYDROCHLORIDE 1500 MG: 500 INJECTION, POWDER, LYOPHILIZED, FOR SOLUTION INTRAVENOUS at 06:38

## 2020-08-24 RX ADMIN — MIDAZOLAM HYDROCHLORIDE 2 MG: 1 INJECTION, SOLUTION INTRAMUSCULAR; INTRAVENOUS at 06:58

## 2020-08-24 RX ADMIN — OXYCODONE HYDROCHLORIDE 10 MG: 10 TABLET ORAL at 15:26

## 2020-08-24 RX ADMIN — Medication 100 MCG: at 07:13

## 2020-08-24 RX ADMIN — ROCURONIUM BROMIDE 50 MG: 10 INJECTION, SOLUTION INTRAVENOUS at 07:36

## 2020-08-24 RX ADMIN — FENTANYL CITRATE 50 MCG: 50 INJECTION, SOLUTION INTRAMUSCULAR; INTRAVENOUS at 09:24

## 2020-08-24 RX ADMIN — FENTANYL CITRATE 50 MCG: 50 INJECTION, SOLUTION INTRAMUSCULAR; INTRAVENOUS at 08:23

## 2020-08-24 RX ADMIN — KETOROLAC TROMETHAMINE 30 MG: 30 INJECTION, SOLUTION INTRAMUSCULAR at 11:47

## 2020-08-24 RX ADMIN — OXYCODONE HYDROCHLORIDE 5 MG: 5 SOLUTION ORAL at 09:44

## 2020-08-24 ASSESSMENT — COGNITIVE AND FUNCTIONAL STATUS - GENERAL
SUGGESTED CMS G CODE MODIFIER MOBILITY: CI
HELP NEEDED FOR BATHING: A LITTLE
EATING MEALS: A LITTLE
DRESSING REGULAR LOWER BODY CLOTHING: A LITTLE
DRESSING REGULAR UPPER BODY CLOTHING: A LITTLE
DAILY ACTIVITIY SCORE: 18
CLIMB 3 TO 5 STEPS WITH RAILING: A LITTLE
SUGGESTED CMS G CODE MODIFIER DAILY ACTIVITY: CK
PERSONAL GROOMING: A LITTLE
TOILETING: A LITTLE
MOBILITY SCORE: 23

## 2020-08-24 ASSESSMENT — PATIENT HEALTH QUESTIONNAIRE - PHQ9
1. LITTLE INTEREST OR PLEASURE IN DOING THINGS: NOT AT ALL
2. FEELING DOWN, DEPRESSED, IRRITABLE, OR HOPELESS: NOT AT ALL
SUM OF ALL RESPONSES TO PHQ9 QUESTIONS 1 AND 2: 0

## 2020-08-24 ASSESSMENT — ACTIVITIES OF DAILY LIVING (ADL): TOILETING: INDEPENDENT

## 2020-08-24 ASSESSMENT — GAIT ASSESSMENTS
GAIT LEVEL OF ASSIST: SUPERVISED
ASSISTIVE DEVICE: FRONT WHEEL WALKER
DISTANCE (FEET): 200
DEVIATION: STEP TO;BRADYKINETIC

## 2020-08-24 ASSESSMENT — FIBROSIS 4 INDEX: FIB4 SCORE: 0.63

## 2020-08-24 NOTE — ANESTHESIA PROCEDURE NOTES
Airway    Date/Time: 8/24/2020 7:04 AM  Performed by: Tobey Gansert, M.D.  Authorized by: Tobey Gansert, M.D.     Location:  OR  Urgency:  Elective  Indications for Airway Management:  Anesthesia      Spontaneous Ventilation: absent    Sedation Level:  Deep  Preoxygenated: Yes    Final Airway Type:  Supraglottic airway  Final Supraglottic Airway:  Standard LMA    SGA Size:  3  Number of Attempts at Approach:  1

## 2020-08-24 NOTE — THERAPY
Occupational Therapy   Initial Evaluation     Patient Name: Janett Mcnulty  Age:  50 y.o., Sex:  female  Medical Record #: [id number]  Today's Date: 8/24/2020     Precautions  Precautions: Posterior Hip Precautions, Weight Bearing As Tolerated Right Lower Extremity  Comments: h/o L KATARINA    Assessment    Patient is 50 y.o. female with a diagnosis of R hip OA, now s/p R KATARINA - posterior approach. Pt has had L KATARINA done last year. She lives with her , was working full time, drives, and I with all ADLs/IADLs. Extensive education provided, though a review for pt, re: post-op instructions, including icing and showers, as well as ADL techniques, txf techniques, and use of adaptive equipments to increase ease and safety during LB dressing tasks. Pt presents closely at baseline level, able to perform basic ADLs with spv level with use of adaptive equipments and assistive devices. Pt also ambulated within room with use of FWW, though she plans to use her bilateral crutches at home. No acute OT needs at this time.    Plan    Recommend Occupational Therapy for Evaluation only.     DC Equipment Recommendations: None  Discharge Recommendations: Anticipate that the patient will have no further occupational therapy needs after discharge from the hospital.      08/24/20 1359   Prior Living Situation   Prior Services Home-Independent   Housing / Facility 2 Story House   Steps Into Home 2   Steps In Home 2  (2 flights, 15 steps total)   Rail Right Rail  (Steps in Home)   Bathroom Set up Walk In Shower;Shower Chair  (does not use shower chair)   Equipment Owned Crutches;Tub / Shower Seat;Raised Toilet Seat With Arms;Bed Side Commode;Hand Held Shower;Sock Aid;Reacher;Other (Comments)  (leg )   Lives with - Patient's Self Care Capacity Spouse   Comments pt lives with supportive  who is able to provide assistance upon dc home   Prior Level of ADL Function   Self Feeding Independent   Grooming / Hygiene Independent    Bathing Independent   Dressing Independent   Toileting Independent   Prior Level of IADL Function   Medication Management Independent   Laundry Independent   Kitchen Mobility Independent   Finances Independent   Home Management Independent   Shopping Independent   Prior Level Of Mobility Independent Without Device in Home;Independent Without Device in Community   Driving / Transportation Driving Independent   Occupation (Pre-Hospital Vocational) Employed Full Time   Pain 0 - 10 Group   Location Hip   Location Orientation Right   Pain Rating Scale (NPRS) 6   Description Aching;Constant   Comfort Goal Comfort with Movement;Sleep Comfortably   Therapist Pain Assessment During Activity;Post Activity Pain Same as Prior to Activity   Bed Mobility    Supine to Sit Minimal Assist   Sit to Supine   (up with PT)   Scooting Supervised   Comments HOB elevated, rails up   ADL Assessment   Grooming Standing;Supervision   Upper Body Dressing Supervision   Lower Body Dressing Supervision   Toileting Supervision   Functional Mobility   Sit to Stand Supervised   Bed, Chair, Wheelchair Transfer Supervised   Toilet Transfers Supervised   Transfer Method Stand Step  (with FWW)   Mobility in room with FWW   Comments slow to move   Anticipated Discharge Equipment and Recommendations   DC Equipment Recommendations None   Discharge Recommendations Recommend post-acute placement for additional occupational therapy services prior to discharge home

## 2020-08-24 NOTE — THERAPY
Physical Therapy   Initial Evaluation     Patient Name: Janett Mcnulty  Age:  50 y.o., Sex:  female  Medical Record #: [id number]  Today's Date: 8/24/2020     Precautions: (P) Posterior Hip Precautions, Weight Bearing As Tolerated Right Lower Extremity    Assessment  Patient is 50 y.o. female who was seen s/p R KATARINA with posterior hip precautions. Pt has had prior hip surgery and is very familiar with current precautions and therapeutic exercises. Pt was primarily limited by occasional lethargy and nausea, otherwise pt was able to demo safe functional mobility during bed mobility, sit<>stands, transfers, ambulation, and stairs. Pt required education and demonstration on how to go up/down steps with correct mechanics. Pt required education and correction during supine therapeutic exercises, however, at end of session pt was able to demo good mechanics. Pt educated on car transfers, positioning, elevation, and icing. Pt is in no acute skilled PT needs at this time, anticipate pt to d/c home with recs or OP therapy services. Pt was able to demonstrate safe use of Lofstrand crutches with step to gait mechanics. Pt is in no need of AD, will recommend OP therapy services.     Plan    Recommend Physical Therapy for Evaluation only     DC Equipment Recommendations: (P) None  Discharge Recommendations: (P) Recommend outpatient physical therapy services to address higher level deficits     Objective       08/24/20 1440   Prior Living Situation   Prior Services Home-Independent   Housing / Facility 2 Story House   Steps Into Home 2   Steps In Home 15   Rail Right Rail  (Steps in Home)   Bathroom Set up Walk In Shower;Shower Chair   Equipment Owned Crutches;Tub / Shower Seat;Raised Toilet Seat With Arms;Bed Side Commode;Hand Held Shower;Sock Aid;Reacher   Lives with - Patient's Self Care Capacity Spouse   Comments pt reports spouse will be able to assist upon d/c to home   Prior Level of Functional Mobility   Bed Mobility  Independent   Transfer Status Independent   Ambulation Independent   Distance Ambulation (Feet)   (community )   Assistive Devices Used None   Stairs Independent   Comments pt reports of an IPLOF    Gait Analysis   Gait Level Of Assist Supervised   Assistive Device Front Wheel Walker   Distance (Feet) 200   # of Times Distance was Traveled 1   Deviation Step To;Bradykinetic   # of Stairs Climbed 15   Level of Assist with Stairs Supervised   Weight Bearing Status WBAT RLE   Comments able to attempt loftstrand crutches and demonstrated with steady gait mechanics with step to pattern   Bed Mobility    Supine to Sit Minimal Assist  (assist for RLE due to pain )   Sit to Supine Minimal Assist   Scooting Supervised   Functional Mobility   Sit to Stand Supervised   Bed, Chair, Wheelchair Transfer Supervised

## 2020-08-24 NOTE — PROGRESS NOTES
Pt arrived on unit. POC reviewed with pt. Pt verbalized understanding. Pt ambulated to bathroom to void. Pt wanting to discharge today. Requirements for discharge reviewed with pt. Pt verbalized understanding. Safety measures in place. Will continue to monitor.

## 2020-08-24 NOTE — ANESTHESIA TIME REPORT
Anesthesia Start and Stop Event Times     Date Time Event    8/24/2020 0657 Ready for Procedure     0700 Anesthesia Start     0857 Anesthesia Stop        Responsible Staff  08/24/20    Name Role Begin End    Tobey Gansert, M.D. Anesth 0700 0857        Preop Diagnosis (Free Text):  Pre-op Diagnosis     Right Hip Avascular Necrosis, Osteoarthritis        Preop Diagnosis (Codes):    Post op Diagnosis  Avascular necrosis of hip, right (HCC)      Premium Reason  Non-Premium    Comments:

## 2020-08-24 NOTE — OR NURSING
Pt allergies and NPO status verified, home meds reconcilled. Belongings secured. Pt verbalizes understanding of the pain scale, expected course of stay, and plan of care.  Surgical site verified with pt.  IV access established.  Sequential/ jessica hose placed on L leg.

## 2020-08-24 NOTE — ANESTHESIA POSTPROCEDURE EVALUATION
Patient: Janett Mcnulty    Procedure Summary     Date: 08/24/20 Room / Location:  OR  / SURGERY [facility]    Anesthesia Start: 0700 Anesthesia Stop: 0857    Procedure: ARTHROPLASTY, HIP, TOTAL (Right Hip) Diagnosis: (Right Hip Avascular Necrosis, Osteoarthritis)    Surgeon: Luan Ferrer M.D. Responsible Provider: Tobey Gansert, M.D.    Anesthesia Type: general ASA Status: 2          Final Anesthesia Type: general  Last vitals  BP   Blood Pressure: 150/93, NIBP: 147/72    Temp   36.2 °C (97.2 °F)    Pulse   Pulse: 66   Resp   16    SpO2   100 %      Anesthesia Post Evaluation    Patient location during evaluation: PACU  Patient participation: complete - patient participated  Level of consciousness: awake and alert    Airway patency: patent  Anesthetic complications: no  Cardiovascular status: hemodynamically stable  Respiratory status: acceptable  Hydration status: euvolemic    PONV: none           Nurse Pain Score: 4 (NPRS)

## 2020-08-24 NOTE — OP REPORT
DATE OF OPERATION: 8/24/2020      PREOPERATIVE DIAGNOSES:   1. Osteoarthritis, right hip.     POSTOPERATIVE DIAGNOSES:   1. Osteoarthritis, right hip.       PROCEDURES:   1. Right total hip arthroplasty.   2. Intraoperative block for rtelief of extensive postoperative pain.    SURGEON: Luan Ferrer MD   ASSISTANT: Ruiz Catherine CST FA  Obs: Douglas Rodriguez PAS2    ANESTHESIA: General, Tobey Gansert, M.D.      IMPLANTS: Jackson size 52 Trilogy cup with a size 11 Biomet xr123 taper stem with   a 32 mm -3 ceramic head with and a 32 mm flat cross-linked polyethylene.     WEIGHTBEARING: As tolerated.     FINDINGS:   1. Advanced chondromalacic changes of the femoral head.      PROCEDURE IN DETAIL:   The patient was seen in the preop holding area and consent reviewed. The right lower extremity was marked with patient. Patient was taken to the operating room where general anesthesia was given. The body-exhaust uniforms were utilized. Perioperative antibiotics were given The patient then was positioned in a lateral decubitus position with axillary roll and Stulberg.  All bony prominences were padded. The right lower extremity was addressed with a Hibiclens, alcohol and ChloraPrep. Then sterile draping technique was performed. Air isolation draping was undertaken. An appropriate timeout was  undertaken.        The surgery was initiated with a minimally invasive posterior approach. Skin   and subcutaneous tissue were incised. Hemostasis was obtained. The fascia   jose enrique and gluteal fascia were split. Short external rotators were taken down   as a single layer. T-capsular incision was made.  The hip was dislocated.     Intraoperative periarticular and extensive austen-incisional block was undertaken. This was undertaken with a combination of Toradol ropivacaine and morphine to treat postoperative pain. This was done extensively throughout the pericapsular tissues, followed by an extensive block in the peritrochanteric tissues, followed by  an extensive tissue infusion into the subcutaneous tissues. This obtained a significant intraoperative improvement in anesthesia requirements as well as pain management and vitals stabilization.    Following this, the acetabulum was addressed with Labral excision and serial reaming.  Next, the acetabulum was reverse reamed with bone graft  and the Trilogy cup inserted. The polyethylene was inserted after pulse lavage and the locking mechanism checked.     At this point, the femur was addressed.  Cookie cutter was used.  Then serial reaming   and broaching was undertaken.  Trial reductions were undertaken with reproduction and stabilization in the patient's length and offset. The final stem was then inserted.  A trial reductions repeated and  neck length and head chosen.  After pulse lavage,  cleaning and drying the Pickering taper the head was inserted and the hip was relocated, full extension, external rotation stability as well as sleeping stability to 80 degrees and flexed 90 stability to 50 degrees was noted. Pulse lavage was repeated. T-capsular closure was done with #2 FiberWire. Short external rotator repair over bone bridges with #2 FiberWire.    The fascia jose enrique repaired with #1 Quill barbed suture  The  subcutaneous 0 and 2-0 Monocryl and then closure of the skin. Patient was placed into a sterile bandage. The patient was  awoken from anesthetic and taken to the recovery room, tolerated the procedure very well with no signs of complications.

## 2020-08-24 NOTE — DISCHARGE INSTRUCTIONS
1. DC home on crutches/ walker   2. DC home on home meds per home doses   3. DC home on Percocet, ASA, and Outpt PT.   4. Follow up [facility] 10-14 days   5. Call for Fevers, drainage, redness, shortness of breath, or increasing lower extremity swelling particularly in the calf.   6. Start Aspirin 325mg DAILY FOR 35 DAYS.   7. CHANGE BANDAGE 5-7 DAYS, KEEP INCISION COVERED FOR SHOWERS AND DRY. Call if drainage or wetness.NOTICE: This order will  in 24 hours.    Discharge Instructions    Discharged to home by car with relative. Discharged via wheelchair, hospital escort: Yes.  Special equipment needed: Not Applicable    Be sure to schedule a follow-up appointment with your primary care doctor or any specialists as instructed.     Discharge Plan:        I understand that a diet low in cholesterol, fat, and sodium is recommended for good health. Unless I have been given specific instructions below for another diet, I accept this instruction as my diet prescription.   Other diet: regular    Special Instructions: Discharge instructions for the Orthopedic Patient    Follow up with Primary Care Physician within 2 weeks of discharge to home, regarding:  Review of medications and diagnostic testing.  Surveillance for medical complications.  Workup and treatment of osteoporosis, if appropriate.     -Is this a Hip/Knee/Shoulder Joint Replacement patient? Yes   TOTAL HIP REPLACEMENT, AFTER-CARE GUIDELINES     These instructions provide you with information on caring for yourself and your hip after surgery. Your health care provider may also give you instructions that are more specific. Your treatment was planned and performed according to current medical practices but problems sometimes occur. Call your health care provider if you have any problems or questions.     WHAT TO EXPECT AFTER THE PROCEDURE   After your procedure, your hip will typically be stiff, sore, and bruised. This will improve over time.     Pain    · Follow your home pain management plan as discussed with your nurse and as directed by your provider.   · It is important to follow any scheduled pain medications for maximal pain relief.   · If prescribed opioid medication, the goal is to use opioids only as needed and to wean off prescription pain medicine as soon as possible.   · Ice use for pain control.  · Put ice in a plastic bag.   · Place a towel between your skin and the bag.   · Leave the ice on for 20 minutes, 2-3 times a day at a minimum.   · Most patients are off the pain pills by 3 weeks. If your pain continues to be severe, follow up with your provider.     Infection   Deep hip joint infections that require removal of the prostheses occur in less than 1.0% of patients. Lesser infections in the skin (cellulites) are more common and much more easily treated.   · Keep the incision as clean and dry as possible.   · Always wash your hands before touching your incision.   · Avoid dental care for 3 months after surgery. Your provider may recommend taking a dose of antibiotics an hour prior to any dental procedure. After 2 years, most providers recommend antibiotics only before an extensive procedure. Ask your provider what they recommend.   · Signs and symptoms of infection include low-grade fever, redness, pain, swelling and drainage from your incision. Notify your provider IMMEDIATELY if you develop ANY of these symptoms.     Post op Disturbances   · Bowel Habits - constipation is extremely common and caused by a combination of anesthesia, lack of mobility, dehydration and pain medicine. Use stool softeners or laxatives if necessary. It is important not to ignore this problem as bowel obstructions can be a serious complication after joint replacement surgery.   · Mood/Energy Level - Many patients experience a lack of energy and endurance for up to 2-3 months after surgery. Some people feel down and can even become depressed. This is likely due to  postoperative anemia, change in activity level, lack of sleep, pain medicine and just the emotional reaction to the surgery itself that is a big disruption in a person’s life. This usually passes. If symptoms persist, follow up with your primary care provider.   · Returning to Work - Your provider will give you specific instructions based on your profession. Generally, if you work a sedentary job requiring little standing or walking, most patients may return within 2-6 weeks. Manual labor jobs involving walking, lifting and standing may take 3-4 months. Your provider’s office can provide a release to part-time or light duty work early on in your recovery and progress you to full duty as able.   · Driving - You can begin driving once cleared by your provider, provided you are no longer taking narcotic pain medication or any other medications that impair driving. Discuss the length of time with your doctor as returning to driving will depend on things such as your vehicle, which hip was replaced (right or left) and if you do or do not have post-operative movement precautions.   · Avoiding falls - A fall during the first few weeks after surgery can damage your new hip and may result in a need for further surgery.  throw rugs and tack down loose carpeting. Be aware of floor hazards such as pets, small objects or uneven surfaces. Notify your provider of any falls.   · [place] Metal Detectors - The sensitivity of metal detectors varies and it is likely that your prosthesis will cause an alarm. Inform the  of your artificial joint.     Diet   · Resume your normal diet as tolerated.   · It is important to achieve a healthy nutritional status by eating a well-balanced diet on a regular basis.   · Your provider may recommend that you take iron and vitamin supplements.   · Continue to drink plenty of fluids.     Shower/Bathing   · You may shower as soon as you get home from the hospital unless otherwise  instructed.   · Keep your incision out of water to prevent infection. To keep the incision dry when showering, cover it with a plastic bag or plastic wrap. If your bandage is waterproof, this may not be necessary.   · Pat incision dry if it gets wet. Do not rub. Notify your provider.   · Do not submerge in a bath until cleared by your provider. Your staples must be out and the incision completely healed.     Dressing Changes: Only change your dressing if directed by your provider.   · Wash hands.   · Open all dressing change materials.   · Remove old dressing and discard.   · Inspect incision for signs of irritation or infection including redness, increase in clear drainage, yellow/green drainage, odor and surrounding skin hot to touch. Notify your provider if present.   ·  the new dressing by one corner and lay over the incision. Be careful not to touch the inside of the dressing that will lay over the incision.   · Secure in place as instructed.     Swelling/Bruising   · Swelling is normal after hip replacement and can involve the thigh, knee, calf and foot.   · Swelling can last from 3-6 months.   · To reduce swelling, elevate your leg higher than your heart while reclining. The first week you are home you should elevate your leg an equal amount of time as you are active.   · The swelling is usually worse after you go home since you are upright for longer periods of time.   · Bruising often does not appear until after you arrive home and can be quite dramatic- appearing purple, black, or green. Bruising is typically not concerning and will subside without any treatment.     Blood Clot Prevention   Your treatment plan includes multiple preventative measures to decrease the risk of blood clots in the legs (DVTs) and the less common, but serious, clots that travel to the lungs (pulmonary emboli). Most patients are at standard risk for them, but people who are at higher risk include those who have had previous  clots, a family history of clotting, smoking, diabetes, obesity, advanced age, use estrogen and/or live a sedentary lifestyle.     · Signs of blood clots in legs include - Swelling in thigh, calf or ankle that does not go down with elevation. Pain, heat and tenderness in calf, back of calf or groin area. NOTE: blood clots can occur in either leg.   · Signs of blood clots in lungs include - Sudden increased shortness of breath, sudden onset of chest pain, and localized chest pain with coughing.   · If you experience any of the above symptoms, notify your provider and seek medical attention immediately.   · You received anticoagulant therapy (blood thinners) in the hospital. Continue the prescribed blood-thinning medication at home, as directed by your provider.   · Your risk for developing a clot continues for up to 2-3 months after surgery. Avoid prolonged sitting and dehydration (long air trips and car trips). If you do take a trip during this time, please get up, move around every 1-1.5 hours, and discuss all travel plans with your provider.     Activity   Once you get home, stay active. The key is not to overdo it. While you can expect some good days and some bad days, you should notice a gradual improvement over time and a gradual increase in endurance over the next 6 to 12 months.     · Weight Bearing - You can begin to bear weight as tolerated unless otherwise directed by your provider. Use a walker, crutches or cane to assist with walking until you can walk smoothly (minimal or no limp) without assistance.   · Physical Precautions - To assure proper recovery and tissue healing, you may be asked to take special precautions when moving (sitting, bending or sleeping) - usually for the first 6 weeks after surgery. Precautions vary from patient to patient depending on surgical approach used by your provider. Follow any specific precautions provided by your provider and physical therapist until cleared by your  provider.   · Sitting - Early on it is easier to get up from a tall chair or a chair with arms. The physical therapist will show you how to sit and stand from a chair keeping your affected leg out in front of you. Get up and move around on a regular basis--at least once every hour.   · Walking - Walk as much as you like once your doctor gives permission to proceed, but remember that walking is no substitute for your prescribed exercises.  · Follow the home exercise program prescribed during your hospital stay as directed by your physical therapist or provider.   · Continued physical therapy may be prescribed after hospital discharge to strengthen your muscles and improve your gait/walking pattern. The decision to have therapy or not after the hospital stay is made by you and your provider prior to surgery or at your follow up appointment.   · Swimming is an excellent low impact activity; you can begin as soon as the wound is healed and your provider clears you. Using a pair of training fins may make swimming a more enjoyable and effective exercise.   · Sexual activity - Your provider can tell you when it is safe to resume sexual activity.   · Other activities - Low impact activities are preferred. If you have specific questions, consult your provider.     When to Call the Doctor   Call the provider if you experience:   · Fever over 100.5° F   · Increased pain, drainage, redness, odor or heat around the incision area   · Shaking chills   · Increased knee pain with activity and rest   · Increased pain in calf, tenderness or redness above or below the knee   · Increased swelling of calf, ankle, foot   · Sudden increased shortness of breath, sudden onset of chest pain, localized chest pain with coughing   · Incision opening   Or, if there are any questions or concerns about medications or care.     Infection statistic resource:    [email or web address]/contents/prosthetic-joint-infection-epidemiology-microbiology-clinical-manifestations-and-diagnosis     -Is this patient being discharged with medication to prevent blood clots?  Yes, Aspirin Aspirin, ASA oral tablets  What is this medicine?  ASPIRIN (AS pir in) is a pain reliever. It is used to treat mild pain and fever. This medicine is also used as directed by a doctor to prevent and to treat heart attacks, to prevent strokes and blood clots, and to treat arthritis or inflammation.  This medicine may be used for other purposes; ask your health care provider or pharmacist if you have questions.  COMMON BRAND NAME(S): Aspir-Low, Aspir-Kiara, Aspirtab, Angel Advanced Aspirin, Angel Aspirin, Angel Aspirin Extra Strength, Angel Aspirin Plus, Angel Extra Strength, Nagel Extra Strength Plus, Angel Genuine Aspirin, Angel Womens Aspirin, Bufferin, Bufferin Extra Strength, Bufferin Low Dose  What should I tell my health care provider before I take this medicine?  They need to know if you have any of these conditions:  · anemia  · asthma  · bleeding problems  · child with chickenpox, the flu, or other viral infection  · diabetes  · gout  · if you frequently drink alcohol containing drinks  · kidney disease  · liver disease  · low level of vitamin K  · lupus  · smoke tobacco  · stomach ulcers or other problems  · an unusual or allergic reaction to aspirin, tartrazine dye, other medicines, dyes, or preservatives  · pregnant or trying to get pregnant  · breast-feeding  How should I use this medicine?  Take this medicine by mouth with a glass of water. Follow the directions on the package or prescription label. You can take this medicine with or without food. If it upsets your stomach, take it with food. Do not take your medicine more often than directed.  Talk to your pediatrician regarding the use of this medicine in children. While this drug may be prescribed for children as young as 12 years of age  for selected conditions, precautions do apply. Children and teenagers should not use this medicine to treat chicken pox or flu symptoms unless directed by a doctor.  Patients over 65 years old may have a stronger reaction and need a smaller dose.  Overdosage: If you think you have taken too much of this medicine contact a poison control center or emergency room at once.  NOTE: This medicine is only for you. Do not share this medicine with others.  What if I miss a dose?  If you are taking this medicine on a regular schedule and miss a dose, take it as soon as you can. If it is almost time for your next dose, take only that dose. Do not take double or extra doses.  What may interact with this medicine?  Do not take this medicine with any of the following medications:  · cidofovir  · ketorolac  · probenecid  This medicine may also interact with the following medications:  · alcohol  · alendronate  · bismuth subsalicylate  · flavocoxid  · herbal supplements like feverfew, garlic, trina, ginkgo biloba, horse chestnut  · medicines for diabetes or glaucoma like acetazolamide, methazolamide  · medicines for gout  · medicines that treat or prevent blood clots like enoxaparin, heparin, ticlopidine, warfarin  · other aspirin and aspirin-like medicines  · NSAIDs, medicines for pain and inflammation, like ibuprofen or naproxen  · pemetrexed  · sulfinpyrazone  · varicella live vaccine  This list may not describe all possible interactions. Give your health care provider a list of all the medicines, herbs, non-prescription drugs, or dietary supplements you use. Also tell them if you smoke, drink alcohol, or use illegal drugs. Some items may interact with your medicine.  What should I watch for while using this medicine?  If you are treating yourself for pain, tell your doctor or health care professional if the pain lasts more than 10 days, if it gets worse, or if there is a new or different kind of pain. Tell your doctor if you  see redness or swelling. Also, check with your doctor if you have a fever that lasts for more than 3 days. Only take this medicine to prevent heart attacks or blood clotting if prescribed by your doctor or health care professional.  Do not take aspirin or aspirin-like medicines with this medicine. Too much aspirin can be dangerous. Always read the labels carefully.  This medicine can irritate your stomach or cause bleeding problems. Do not smoke cigarettes or drink alcohol while taking this medicine. Do not lie down for 30 minutes after taking this medicine to prevent irritation to your throat.  If you are scheduled for any medical or dental procedure, tell your healthcare provider that you are taking this medicine. You may need to stop taking this medicine before the procedure.  This medicine may be used to treat migraines. If you take migraine medicines for 10 or more days a month, your migraines may get worse. Keep a diary of headache days and medicine use. Contact your healthcare professional if your migraine attacks occur more frequently.  What side effects may I notice from receiving this medicine?  Side effects that you should report to your doctor or health care professional as soon as possible:  · allergic reactions like skin rash, itching or hives, swelling of the face, lips, or tongue  · breathing problems  · changes in hearing, ringing in the ears  · confusion  · general ill feeling or flu-like symptoms  · pain on swallowing  · redness, blistering, peeling or loosening of the skin, including inside the mouth or nose  · signs and symptoms of bleeding such as bloody or black, tarry stools; red or dark-brown urine; spitting up blood or brown material that looks like coffee grounds; red spots on the skin; unusual bruising or bleeding from the eye, gums, or nose  · trouble passing urine or change in the amount of urine  · unusually weak or tired  · yellowing of the eyes or skin  Side effects that usually do  not require medical attention (report to your doctor or health care professional if they continue or are bothersome):  · diarrhea or constipation  · headache  · nausea, vomiting  · stomach gas, heartburn  This list may not describe all possible side effects. Call your doctor for medical advice about side effects. You may report side effects to FDA at 7-379-RNB-2331.  Where should I keep my medicine?  Keep out of the reach of children.  Store at room temperature between 15 and 30 degrees C (59 and 86 degrees F). Protect from heat and moisture. Do not use this medicine if it has a strong vinegar smell. Throw away any unused medicine after the expiration date.  NOTE: This sheet is a summary. It may not cover all possible information. If you have questions about this medicine, talk to your doctor, pharmacist, or health care provider.  © 2020 [employer]/Gold Standard (2018-01-30 10:42:13)      · Is patient discharged on Warfarin / Coumadin?   No     Depression / Suicide Risk    As you are discharged from this [facility] facility, it is important to learn how to keep safe from harming yourself.    Recognize the warning signs:  · Abrupt changes in personality, positive or negative- including increase in energy   · Giving away possessions  · Change in eating patterns- significant weight changes-  positive or negative  · Change in sleeping patterns- unable to sleep or sleeping all the time   · Unwillingness or inability to communicate  · Depression  · Unusual sadness, discouragement and loneliness  · Talk of wanting to die  · Neglect of personal appearance   · Rebelliousness- reckless behavior  · Withdrawal from people/activities they love  · Confusion- inability to concentrate     If you or a loved one observes any of these behaviors or has concerns about self-harm, here's what you can do:  · Talk about it- your feelings and reasons for harming yourself  · Remove any means that you might use to hurt yourself (examples:  pills, rope, extension cords, firearm)  · Get professional help from the community (Mental Health, Substance Abuse, psychological counseling)  · Do not be alone:Call your Safe Contact- someone whom you trust who will be there for you.  · Call your local CRISIS HOTLINE 942-3915 or 707-662-0257  · Call your local Children's Mobile Crisis Response Team [place] (344) 703-1943 or www.[place]  · Call the toll free National Suicide Prevention Hotlines   · National Suicide Prevention Lifeline 454-284-NYRJ (8573)  · National Hope Line Network 800-SUICIDE (322-0570)

## 2020-08-24 NOTE — OR NURSING
0855: To PACU from OR via gurney, grimacing w/pain but POSS = 3 so not medicated at this time,  respirations spontaneous and non-labored. Icepack applied over c/d/i R hip surgical dressings. Abduction pillow insitu between legs. O2 increased to 10 LPM per ERAS orders  0910: POSS=2 so IV analgesia commenced.  0925: Pain persists  0940: Pain decreasing, plan oral analgesia in preparation for transfer to floor and PT  0945: c/o slight nausea immediately post oral analgesia so medicated w/Zofran.  0955: Dozing intermittently, awaiting xrays.   1010: Taking sips po ginger ale per ERAS orders, medicated prior to movement for xrays  1018: Medicated for increased pain during xrays   1025: Pain level tolerable, No change in surgical site assessment.  1033: Meets criteria to transfer to [facility]

## 2020-08-24 NOTE — ANESTHESIA PREPROCEDURE EVALUATION
Relevant Problems   PULMONARY   (+) History of rheumatic fever      NEURO   (+) History of rheumatic fever      CARDIAC   (+) Varicose vein of leg      ENDO   (+) Subclinical hypothyroidism       Physical Exam    Airway   Mallampati: II  TM distance: >3 FB  Neck ROM: full       Cardiovascular - normal exam  Rhythm: regular  Rate: normal  (-) murmur     Dental - normal exam           Pulmonary - normal exam  Breath sounds clear to auscultation     Abdominal    Neurological - normal exam                 Anesthesia Plan    ASA 2       Plan - general       Airway plan will be LMA        Induction: intravenous    Postoperative Plan: Postoperative administration of opioids is intended.    Pertinent diagnostic labs and testing reviewed    Informed Consent:    Anesthetic plan and risks discussed with patient.    Use of blood products discussed with: patient whom consented to blood products.

## 2020-11-28 ENCOUNTER — HOSPITAL ENCOUNTER (OUTPATIENT)
Dept: LAB | Facility: MEDICAL CENTER | Age: 50
End: 2020-11-28
Attending: EMERGENCY MEDICINE
Payer: COMMERCIAL

## 2020-11-28 LAB
COVID ORDER STATUS COVID19: NORMAL
SARS-COV-2 RNA RESP QL NAA+PROBE: NOTDETECTED
SPECIMEN SOURCE: NORMAL

## 2020-11-30 ENCOUNTER — TELEPHONE (OUTPATIENT)
Dept: MEDICAL GROUP | Facility: MEDICAL CENTER | Age: 50
End: 2020-11-30

## 2020-11-30 DIAGNOSIS — Z20.822 EXPOSURE TO COVID-19 VIRUS: ICD-10-CM

## 2020-12-01 NOTE — TELEPHONE ENCOUNTER
----- Message from Rosmery Adames, Med Ass't sent at 11/30/2020  9:00 AM PST -----  Regarding: Non-Urgent Medical Question  Contact: [phone]      ----- Message -----  From: Janett Urban Page  Sent: 11/27/2020   8:47 AM PST  To: Elenita Arciniega [facility]  Subject: Non-Urgent Medical Question                      Manuel Gonzalez,         I was wondering if you could put in an order for me to have a Covid test. I was exposed to my son who tested positive. I have started feeling like I'm getting a head cold. Also my  is getting tested today so I thought I'd better get tested now too. Let me know if there is something else I need to do.     Thanks again,  Camila

## 2020-12-17 ENCOUNTER — TELEMEDICINE (OUTPATIENT)
Dept: MEDICAL GROUP | Facility: MEDICAL CENTER | Age: 50
End: 2020-12-17
Payer: COMMERCIAL

## 2020-12-17 ENCOUNTER — TELEPHONE (OUTPATIENT)
Dept: MEDICAL GROUP | Facility: MEDICAL CENTER | Age: 50
End: 2020-12-17

## 2020-12-17 VITALS — HEIGHT: 67 IN | HEART RATE: 88 BPM | WEIGHT: 200 LBS | BODY MASS INDEX: 31.39 KG/M2

## 2020-12-17 DIAGNOSIS — J30.9 ALLERGIC RHINITIS: ICD-10-CM

## 2020-12-17 DIAGNOSIS — E78.5 DYSLIPIDEMIA: ICD-10-CM

## 2020-12-17 DIAGNOSIS — M87.00 AVASCULAR NECROSIS (HCC): Chronic | ICD-10-CM

## 2020-12-17 DIAGNOSIS — R10.13 EPIGASTRIC PAIN: ICD-10-CM

## 2020-12-17 DIAGNOSIS — G89.29 OTHER CHRONIC PAIN: ICD-10-CM

## 2020-12-17 DIAGNOSIS — M25.551 RIGHT HIP PAIN: ICD-10-CM

## 2020-12-17 DIAGNOSIS — Z00.00 PREVENTATIVE HEALTH CARE: Chronic | ICD-10-CM

## 2020-12-17 PROCEDURE — 99214 OFFICE O/P EST MOD 30 MIN: CPT | Mod: 95,CR | Performed by: INTERNAL MEDICINE

## 2020-12-17 RX ORDER — OMEPRAZOLE 40 MG/1
40 CAPSULE, DELAYED RELEASE ORAL DAILY
Qty: 90 CAP | Refills: 3 | Status: SHIPPED | OUTPATIENT
Start: 2020-12-17 | End: 2022-02-05 | Stop reason: SDUPTHER

## 2020-12-17 RX ORDER — FLUTICASONE PROPIONATE 50 MCG
2 SPRAY, SUSPENSION (ML) NASAL DAILY
Qty: 48 G | Refills: 3 | Status: SHIPPED | OUTPATIENT
Start: 2020-12-17 | End: 2022-07-05 | Stop reason: SDUPTHER

## 2020-12-17 RX ORDER — ALENDRONATE SODIUM 70 MG/1
70 TABLET ORAL
Qty: 12 TAB | Refills: 3
Start: 2020-12-17 | End: 2022-08-26

## 2020-12-17 RX ORDER — TRAMADOL HYDROCHLORIDE 50 MG/1
50 TABLET ORAL EVERY 8 HOURS PRN
Qty: 90 TAB | Refills: 0 | Status: SHIPPED | OUTPATIENT
Start: 2020-12-17 | End: 2021-03-18 | Stop reason: SDUPTHER

## 2020-12-17 ASSESSMENT — FIBROSIS 4 INDEX: FIB4 SCORE: 0.63

## 2020-12-17 NOTE — LETTER
[facility]  Franklin Gonzalez M.D.  [address]  [place] [address]  Fax: [phone]   Authorization for Release/Disclosure of   Protected Health Information   Name: SUMAYA WATSON : 1970 SSN: [id number]   Address: [address] [place] [address]  [place] [address] Phone:    [phone] (home)    I authorize the entity listed below to release/disclose the PHI below to:   [facility]/Franklin Gonzalez M.D. and Franklin Gonzalez M.D.   Provider or Entity Name:   orthopedics   Address   [facility], [facility]  [address] [place] [address] Phone:  [phone]    Fax:  [phone]   Reason for request: continuity of care   Information to be released:    [  ] LAST COLONOSCOPY,  including any PATH REPORT and follow-up  [  ] LAST FIT/COLOGUARD RESULT [  ] LAST DEXA  [  ] LAST MAMMOGRAM  [  ] LAST PAP  [  ] LAST LABS [  ] RETINA EXAM REPORT  [  ] IMMUNIZATION RECORDS  [  ] Release all info since august      [  ] Check here and initial the line next to each item to release ALL health information INCLUDING  _____ Care and treatment for drug and / or alcohol abuse  _____ HIV testing, infection status, or AIDS  _____ Genetic Testing    DATES OF SERVICE OR TIME PERIOD TO BE DISCLOSED: _____________  I understand and acknowledge that:  * This Authorization may be revoked at any time by you in writing, except if your health information has already been used or disclosed.  * Your health information that will be used or disclosed as a result of you signing this authorization could be re-disclosed by the recipient. If this occurs, your re-disclosed health information may no longer be protected by State or Federal laws.  * You may refuse to sign this Authorization. Your refusal will not affect your ability to obtain treatment.  * This Authorization becomes effective upon signing and will  on (date) __________.      If no date is indicated, this Authorization will  one (1) year from the signature  date.    Name: Janett Rajni Page    Signature: continuity of care   Date:     12/18/2020       PLEASE FAX REQUESTED RECORDS BACK TO: [phone]

## 2020-12-17 NOTE — PROGRESS NOTES
"Subjective:             Virtual Visit: Established Patient   This visit was conducted via zoom using secure and encrypted videoconferencing technology. The patient was in a private location in the state of [place]  The patient's identity was confirmed and verbal consent was obtained for this virtual visit.    Subjective:   CC: hip pain     Janett Mcnulty is a 50 y.o. 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 Still using crutches for ambulation, no difficulties with transfers. No falls.  Able to perform ADLs.  Has not been driving.  Has not resumed physical therapy yet per orthopedics.  Did have her Pap smear earlier this year.  Still has not had a flu shot since she just had surgery.  On ultram one po q 8 hours and naproxyn otc 2 bid with no GI side effects, tramadol is taken mostly at night once a day, without drowsiness, sedation, memory loss, confusion in the past, no side effects with that medication  On neurontin two pills once a day without side effects as well  Has resumed alendronate no GI upset, also taking Prilosec      Allergies   Allergen Reactions   • Other Drug Unspecified      Steroids unable to take due to joint necrosis \"caused avascular necrosis\"   • Bactrim [Sulfamethoxazole W-Trimethoprim] Unspecified     Per pt kidney function decreased after use   • Morphine Rash     Rash all over   • Other Food Rash     Onions-cause headaches and facial flushing            Varicose vein excision left lower extremity  9/11 varicose veins left " farhad ocampo  3/11/17 ultrasound venous bilateral lower extremities negative     Subclinical hypothyroid  9/26/19 tsh 5.8 repeat labs 3 months test ordered  1/23/20 tsh 2.0, TPO<0.2     s/p total replacement left hip  2002 left hip open decompression and osteotomy  12/03  left hip implant removal  11/10  ortho x-ray stable decompression left femoral head neck junction  11/12  pain management note bilateral trochanteric bursitis injection  5/2/13  pain note, bilateral trochanteric bursal injection  12/10/18 MRI left hip avascular necrosis left femoral head 50% articular surface, with some progression from comparison, some early subchondral collapse anteriorly and superiorly with marrow edema extending into the left femoral head and neck, surgical change consistent with prior decompression procedure and femoral osteoplasty, metallic artifact consistent with surgical pain femoral head, early degenerative changes  8/5/19  orthopedic operative note left total hip arthroplasty  12/12/19  orthopedic note 3-month follow-up left total hip replacement, incision healed nicely, right hip continue to provide discomfort, continue physical therapy follow-up 1 year  5/7/20  orthopedic note x-ray pelvis stable left total hip arthroplasty, early osteoarthritic changes right hip, if worsens consider total joint replacement right side, follow-up 5 years     s/p right hip arthroscopy  2007  ortho right hip arthroscopy  2/11/14  note bilateral greater trochanter injection under ultrasound guidance  1/20/17 MRI pelvis bilateral femoral head avascular necrosis involving approximately 40% of the articular surface, metallic artifact left femoral head consistent with presence of small metallic pain  9/29/17   orthopedic surgical note right hip fluoroscopically guided core decompression and right hand carpal tunnel  release  12/10/18 MRI right hip without chronic avascular necrosis right femoral head 50% articular surface without evidence of subchondral collapse, early degenerative changes, surgical changes consistent with prior right proximal femoral osteoplasty  5/7/20  orthopedic note x-ray pelvis stable left total hip arthroplasty, early osteoarthritic changes right hip, if worsens consider total joint replacement right side, follow-up 5 years  8/13/20  orthopedic note, x-ray pelvis grade 3 arthrosis with osteophyte acetabular side and sclerosis femoral side, joint space narrowing,ficat 2 avascular necrosis right hip progression to osteoarthritis, stable left hip arthroplasty, discussed right hip total arthroplasty     s/p bunionectomy  8/9/13  podiatry; right foot martín bunionectomy, right foot first metatarsophalangeal joint bunion/degenerative arthritis      s/p  appendectomy     S/p left ankle surgery 2006 ligament reconstruction     Preventative health  11/05 [facility] colon negative  9/25/15 tdap  1/6/17 pneumovax  12/18/17 pap per  gyn   6/3/19 dexa LS+0.7, forearm +0.8  9/27/19 vit d 32 increase by 2000 units daily  8/14/20 mammogram      Perimenopausal  9/25/15 on climara patch and progesterone tab per  gyn     Low back pain  10/10 MRI LS L5-S1 minimal disc bulge  1/11  at the pain management left L3-S1 facet joint injection, right L3-S1 facet joint injection  2/11  pain management right L3-S1 lateral, medial, dorsal ramus nerve block  4/11  pain management left L3-S1 medial, lateral, bursal ramus nerve block  5/11  pain management bilateral SI joint injection under fluoroscopy  7/11  pain note bilat trochanteric bursitis injection  3/12  pain note, right and left L3-L4 L5-S1 medial, dorsal, lateral branch ablation  12/12  pain note, left L3-S1 dorsal and medial branch radiofrequency  ablation  1/4/13  pain note; right L3-S1 radiofrequency ablation  8/14/13  pain note  9/29/13  pain note, left L3-S1 nerve frequency ablation  10/4/13  pain note, status post right L3-S1 FJNA  12/19/13 in note; status post right SIJI injection, continue voltaren gel, TENS, IT stretches  5/16/14  pain note; left L3-S1 medial, partial, lateral branch radiofrequency ablation under fluoroscopy  6/6/14  right L3-S1 FJNA  7/3/14  pain note; continue TENS,celebrex 200 mg qday, voltaren gel 1%  9/10/14  pain note; continue TENS, lidoderm patch,voltaren gel, celebrex 200 mg qday  12/20/17  pain note left L3-S1 radiofrequency ablation under fluoroscopy  12/22/17 dr.patterson ching note right L3-S1 radiofrequency ablation under fluoroscopy   2/28/19 [facility] L4-L5 3 mm disc bulge, mild central stenosis, L5-S1 moderate facet hypertrophy, small right-sided sacral meningocele     ibs  8/05 CT abd/pelvis negative  11/05 [facility] colon negative     History of rheumatic fever  8/29/16 streptococcal pharyngitis positive strep test, treated with augmentin, developed erythema nodosum lower extremities and palms given NSAIDs and medrol dosepack  9/22/16 repeat medrol dose pack x 1 still with painful erythema nodosum nodules  9/24/16 erythema nodosum nodules and arthritic-type pains, we will retreat with penicillin V 500 mg 3 times a day ×10 days  10/5/16 high dose aspirin no benefit, changed to prednisone 20 mg daily, she has an appointment with me in 2 days  10/7/16 echo ordered, EKG done  10/10/16 cbc,cmp,tsh normal,ESR 40,CRP 0.3, repeat throat culture negative, blood cultures negative,  10/19/16 increase prednisone to 40 mg x 3 days then back to 20 mg  10/19/16 daughter diagnosis strep throat given antibiotics, we will treat prophylactically provided patient penicillin V 500 mg  tid x 10 days  10/21/16 echo normal LV size and function, EF 65%, trace MR structurally normal mitral valve, mild TR and RVSP 30  10/24/16 on prednisone 40 mg qday unable to taper to 20 mg due to flare up of pain will try 20 mg bid then taper to 20 mg am and 10 mg qpm over the next week  10/26/16  infectious disease recommended secondary prophylaxis penicillin  mg bid x 5 years  11/7/16 still with polyarthritis, on prednisone 20 mg twice a day, repeat labs, still on penicillin, will speak with rheumatology about referral  11/8/16 ESR 15,CRP 0.3,wbc 10.9 (on prednisone),hgb 14,hct 43,cmp normal,RF,C3C4,TPO,lyme,MERA,HLA B27 negative, Factin IgG 22 (0-19),parietal cell Ab 25(0-25)  11/14/16  rheumatolog note, features consistent with rheumatic fever, also consider seronegative spondyloarthropathies, sarcoidosis, rheumatoid arthritis, will check sacroiliac films, hand and feet x-rays, follow-up one week  11/17/16 refill prednisone  11/18/16 ACE serum level normal, G6PD ad vitamin 1,25 d level normal  11/21/16 cxr negative  11/21/16 xray SI joints negative for erosions  11/29/16 x-ray joint survey hands, feet, ankles no evidence of inflammatory arthropathy  11/29/16 CT soft tissue neck without; negative  12/7/16  cardiology repeat echo in 3-4 weeks  12/10/16 MRI right knee multiple areas right knee avascular necrosis medial and lateral femoral condyle, medial and lateral tibial plateau, bone marrow edema  12/23/16  rheumatology note, inflammatory markers did normalize with steroids, rheumatoid factor, CCP, MERA,ANKA negative continue to taper steroids given osteonecrosis alternating 17.5 mg and 15 mg, and 15 mg, and 15 mg alternating with 12.5 mg, and so forth  12/27/16 echo LV ejection fraction 60%, no valvular disease  1/4/17 ESR 8,CRP 0.3  1/12/17  rheumatology note, inflammatory markers did normalize with prednisone therapy, continue taper with osteonecrosis bilateral  tenderness achilles insertion consider enthesitis, will get MRI left achilles region to evaluate for tendinitis or tenosynovitis, follow-up 6 weeks  1/20/17 MRI left foot without; no evidence of marrow edema, arthropathy, tendinopathy or ligament injury  3/6/17 anticardiolipin antibodies, lupus anticoagulant, protein CNS, factor V 5 Leyden, anti-thrombin panel, beta 2 glycoprotein negative  3/10/17 ESR 19,CRP 0.17, cortisol 3.3, ACTH 16, IgG 753 ([id number]), IgA 140, IgM 93  3/14/16  rheumatology note currently off prednisone, osteonecrosis involving multiple joints, knees, hips, right ankle, etiology unclear, antiphospholipid antibody, protein C and S normal, antithrombin III factor V leyden normal, refer to hematology for evaluation other etiologies, slightly decreased IgG refer to allergy immunology  3/14/17 remains on penicillin  3/30/17  allergy immunology evaluation slight IgG reduction 753 with normal IgG, IgM, no history to suggest primary immunodeficiency, recent diagnosis of group A streptococcal pharyngitis complicated by erythema nodosum, 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 therapy, we will perform humerol workup to include repeat quantitative immunoglobulins with subclass, specific antibodies for haemophilus influenza, pneumococcal, tetanus/diphtheria, limited flow cytometry with repeat CBC, SPEP/UPEP, ESR, CRP, UA, follow-up ASO, DNase B titer  4/17/17  allergy note slight IgG reduction at 753 with normal IgA, normal IgM, no history to suggest primary immunodeficiency, suspect that 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 carryover effect from chronic prednisone therapy, cbc unremarkable, inflammatory markers ESR slightly elevated 28, CRP 2.4, urinalysis negative for protein or blood, no further humeral immunodeficiency or lab assessment at this point with normal immunologic titers, intact specific and subclass antibodies, the IgG decrease does not represent any underlying  primary immunodeficiency or active antibody dysfunction at this time  4/25/17  rheumatology note await [place] [facility] evaluation  5/10/17 dr.wu lacy endocrinology note recommend fosamax weekly with anecdotal evidence that bisphosphonate therapy may provide symptomatic benefit and osteonecrosis, if develops side effects could consider intravenous reclast, referral [place] [facility], follow-up 4 months  5/28/17  rheumatology note avascular necrosis, arthralgias lower extremities, continue off prednisone, on alendronate per [place] [facility]  7/3/17  infectious disease consultation history of streptococcal pharyngitis with probable rheumatic fever, recommend discontinue penicillin prophylaxis follow-up as needed  6/27/17  [place] rheumatology evaluation, recommended evaluation of hypercoagulable state, to consider antiphospholipid antibodies (lupus anticoagulant, anticardiolipin antibodies, beta-2 glycoprotein, phosphatidyl serine antibodies, factor V Leiden, antithrombin III, prothrombin mutation, protein C&S quantitative and qualitative, homocysteine, recommend after review of records do not strongly feel that she has true rheumatic fever, clear to discontinue penicillin, recheck ASO and anti-DNase B titers after 4 weeks  8/16/17 homocystine, protein C and protein S, lupus anticoagulant, beta 2 glycoprotein, antithrombin III, anticardiolipin antibody, anti-DNAse antibody, antistreptolysin all negative  8/31/17  rheumatology note, continues on alendronate per bone endocrinology recommendation [place], did follow up with orthopedics, will proceed with core decompression  2/23/18  [place] [facility] note, multifocal osteonecrosis knees, hips, other joints, underlying etiology unclear, trial few months of alendronate without dramatic improvement, alendronate discontinued because of undergoing core procedures with her hips, recommend  continuing off alendronate monitoring for improvement, reassess in 6 months  10/10/18  [place] [facility] consultation osteonecrosis knees, hips, other joints, unclear etiology trial of alendronate without improvement, discontinued prior to hip procedures, hip pain is improved, discussed another trial of alendronate for limited timeframe 70 mg weekly reassess 6 months     foot pain  8/5/15 MRI right foot severe artifact secondary to first MTP are clear, limiting analysis of soft tissue, highly likely complete FHL tear  8/28/15  orthopedic note right ankle FHL tendon rupture seen on MRI, do not recommend surgical repair at this time which would involve hemiarthroplasty, implant, first MTP fusion with bone graft, followup podiatry   8/18/17 MRI left foot normal, MRI right foot osteoathritis right first metatarsal head and sesamoids moderate in degree, ulnar edema with an sesamoids may be due to susceptibility artifact related to first metatarsal phalangeal arthroplasty     dyspepsia  11/2/18 seen UC for abdominal discomfort right upper quadrant  11/3/18 ultrasound liver gallbladder region negative  11/10/18 hida negative  11/20/18 trial of prilosec 40 mg  6/17/19 taper prilosec     Dyslipidemia  9/29/14 chol 186,trig 46,hdl 76,ldl 101  9/18/15 chol 225,trig 50,hdl 77,ldl 138  9/20/17 chol 189,trig 53,hdl 56,ldl 122  5/11/18 ultrasound carotid minimal left carotid bulb plaque, calcified, no evidence of occlusion less than 50% stenosis  9/28/18 chol 187,trig 59,hdl 76,ldl 99  5/8/18 EKG done in clinic, ultrasound carotid ordered follow-up calcifications   5/11/18 ultrasound carotid less than 50% internal stenosis bilateral  9/27/19 chol 196,trig 60,hdl 79,ldl 105      chronic pain  5/8/18   5/8/18 pain contract  5/8/18 opiate risk score 1  5/8/18 on ultram 1-2 per day has tried physical therapy,TENS,acupuncture,chiropractor,massage therapy,heat,physical therapy,  NSAIDs,gabapentin,celebrex,voltaren,lidoderm patch  1/3/19   6/17/19   6/17/19 controlled substance contract  9/9/19  on ultram one per evening  12/10/19     6/11/20     6/11/20 tramadol refill  tried physical therapy,TENS,acupuncture,chiropractor,massage therapy,heat, NSAIDs,gabapentin,celebrex,voltaren,lidoderm patch    cervical pain  10/11 MRI cervical spine C4-C5 tiny disc bulge, C5-C6 bilateral and plate spurring with uncinate hypertrophy  10/7/16 MRI cervical spine C5-C6 small broad-based osteophyte complex, borderline foraminal stenosis, no significant progression of disease since 2010 6/4/18 MRI cervical spine C3-C4 mild diffuse bulge with mild bilateral foraminal stenosis, C5-C6 mild bilateral foraminal stenosis  5/8/18 x-ray cervical spine minimal DJD C5-C6, calcification left neck may be related to atherosclerotic plaque     avascular necrosis  8/29/16 streptococcal pharyngitis positive strep test, treated with augmentin, developed erythema nodosum lower extremities and palms given NSAIDs and medrol dosepack  9/22/16 repeat medrol dose pack x 1 still with painful erythema nodosum nodules  10/5/16 high dose aspirin no benefit, changed to prednisone 20 mg daily,  10/19/16 increase prednisone to 40 mg x 3 days then back to 20 mg  11/7/16 still with polyarthritis, on prednisone 20 mg twice a day, repeat labs, still on penicillin, will speak with rheumatology about referral  11/14/16  rheumatolog note, features consistent with rheumatic fever, also consider seronegative spondyloarthropathies, sarcoidosis, rheumatoid arthritis, will check sacroiliac films, hand and feet x-rays, follow-up one week  12/23/16  rheumatology note, inflammatory markers did normalize with steroids, rheumatoid factor, CCP, MERA,ANKA negative continue to taper steroids given osteonecrosis alternating 17.5 mg and 15 mg, and 15 mg, and 15 mg alternating with 12.5 mg, and so forth  12/10/16 MRI right knee  multiple areas right knee avascular necrosis medial and lateral femoral condyle, medial and lateral tibial plateau, bone marrow edema  12/23/16  rheumatology note, inflammatory markers did normalize with steroids, rheumatoid factor, CCP, MERA,ANKA negative continue to taper steroids given osteonecrosis alternating 17.5 mg and 15 mg, and 15 mg, and 15 mg alternating with 12.5 mg, and so forth  1/20/17 MRI left foot no evidence of arthropathy or tendinopathy  1/20/17 MRI left ankle metallic artifact lateral malleolus  1/20/17 MRI right ankle small oblong focus finger edema tibial plafond below subchondral plate, would be consistent with small area of avascular necrosis  1/20/17 MRI left knee multiple areas avascular necrosis surrounding the knee to include femur, tibia, fibula  1/20/17 MRI pelvis bilateral femoral head avascular necrosis involving approximately 40% of the articular surface, metallic artifact left femoral head consistent with presence of small metallic pin  2/9/17 tapering prednisone down to 2.5 mg alternating with 5 mg  2/23/17  rheumatology note complete steroid taper then repeat ESR, CRP and CPK, also check antiphospholipid antibody,antithrombin, factor v leiden, protein s  3/6/17 anticardiolipin antibodies, lupus anticoagulant, protein CNS, factor V 5 Leyden, anti-thrombin panel, beta 2 glycoprotein negative  3/10/17 ESR 19,CRP 0.17, cortisol 3.3, ACTH 16, IgG 753 ([id number]), IgA 140, IgM 93  3/14/16  rheumatology note currently off prednisone, osteonecrosis involving multiple joints, knees, hips, right ankle, etiology unclear, antiphospholipid antibody, protein C and S normal, antithrombin III factor V leyden normal, refer to hematology for evaluation other etiologies, slightly decreased IgG refer to allergy immunology  3/30/17  allergy immunology evaluation slight IgG reduction 753 with normal IgG, IgM, no history to suggest primary immunodeficiency, recent diagnosis  of group A streptococcal pharyngitis complicated by erythema nodosum, 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 therapy, we will perform humerol workup to include repeat quantitative immunoglobulins with subclass, specific antibodies for haemophilus influenza, pneumococcal, tetanus/diphtheria, limited flow cytometry with repeat CBC, SPEP/UPEP, ESR, CRP, UA, follow-up ASO, DNase B titer  5/10/17 dr.wu lacy endocrinology note recommend fosamax weekly with anecdotal evidence that bisphosphonate therapy may provide symptomatic benefit and osteonecrosis, if develops side effects could consider intravenous reclast, referral [place] [facility], follow-up 4 months  5/28/17  rheumatology note avascular necrosis, arthralgias lower extremities, continue off prednisone, on alendronate per [place] [facility]  7/7/17 MRI left knee without; again seen multiple bone infarcts femur, tibia, fibula, patella appeared less prominent on current examination  7/7/17 MRI right knee without; interval improvement in size of multiple bony infarcts involving femur, tibia, fibula, some infarcts have resolved  7/7/17 MRI pelvis without; stable bilateral femoral head avascular necrosis, these have not progressed and there is no evidence of subchondral collapse or arthropathy  6/27/17  [place] rheumatology evaluation, recommended evaluation of hypercoagulable state, to consider antiphospholipid antibodies (lupus anticoagulant, anticardiolipin antibodies, beta-2 glycoprotein, phosphatidyl serine antibodies, factor V Leiden, antithrombin III, prothrombin mutation, protein C&S quantitative and qualitative, homocysteine, recommend after review of records do not strongly feel that she has true rheumatic fever, clear to discontinue penicillin, recheck ASO and anti-DNase B titers after 4 weeks  8/16/17 homocystine, protein C and protein S, lupus anticoagulant, beta 2 glycoprotein, antithrombin III, anticardiolipin  antibody, anti-DNAse antibody, antistreptolysin all negative  8/18/17 MRI left foot normal, MRI right foot osteoathritis right first metatarsal head and sesamoids moderate in degree, ulnar edema with an sesamoids may be due to susceptibility artifact related to first metatarsal phalangeal arthroplasty  8/31/17  rheumatology note, continues on alendronate per bone endocrinology recommendation [place], did follow up with orthopedics, will proceed with core decompression  9/27/17  [place] [facility] with focal osteonecrosis involving knees, hips, other joints, etiology unclear, dramatic improvement with a few months of alendronate, about to undergo surgical intervention hip, will discontinue alendronate pending surgery, reassess 4-5 months  9/29/17   orthopedic surgical note right hip fluoroscopically guided core decompression and right hand carpal tunnel release  12/29/17  orthopedic surgical note  left proximal femoral head core decompression  2/23/18  [place] [facility] note, multifocal osteonecrosis knees, hips, other joints, underlying etiology unclear, trial few months of alendronate without dramatic improvement, alendronate discontinued because of undergoing core procedures with her hips, recommend continuing off alendronate monitoring for improvement, reassess in 6 months  10/10/18  [place] endocrinology consultation osteonecrosis knees, hips, other joints, unclear etiology trial of alendronate without improvement, discontinued prior to hip procedures, hip pain is improved, discussed another trial of alendronate for limited timeframe 70 mg weekly reassess 6 months  12/7/18 crp 0.46, ESR 23, hgb 13,hct 42, wbc 7.9  12/10/18 MRI left hip avascular necrosis left femoral head 50% articular surface, with some progression from comparison, some early subchondral collapse anteriorly and superiorly with marrow edema extending into the left femoral head and  neck, surgical change consistent with prior decompression procedure and femoral osteoplasty, metallic artifact consistent with surgical pain femoral head, early degenerative changes  12/10/18 MRI right hip without chronic avascular necrosis right femoral head 50% articular surface without evidence of subchondral collapse, early degenerative changes, surgical changes consistent with prior right proximal femoral osteoplasty  4/24/19  [place] osteoporosis clinic order bone density if she does have osteoporosis consider continuation bisphosphonate versus anabolic therapy  5/2/19  orthopedic note undergo MRI evaluation of her left hip  6/10/19 MRI left hip at [facility], redemonstration moderate to advanced avascular necrosis bilateral femoral heads, degree of associated bone marrow edema appears significantly decreased from previous study particular on the left, small left hip effusion, postoperative changes remote core decompression and femoral osteotomy  6/27/19  orthopedic note follow-up evaluation advanced degenerative arthritis left hip, symptomatic failing conservative measures, considering total joint arthroplasty, recommend proceeding with left total hip arthroplasty due to progression of avascular necrosis  8/5/19  orthopedic operative note left total hip arthroplasty  9/9/19 off fosamax for now due to recent hip arthroplasty  10/9/19  [facility] notes most recent bone density reviewed, remains off alendronate since left hip replacement, clear to resume alendronate 70 mg weekly follow-up 6 months  12/10/19 on fosamax 70 mg 5/7/20  orthopedic note x-ray pelvis stable left total hip arthroplasty, early osteoarthritic changes right hip, if worsens consider total joint replacement right side, follow-up 5 years  8/19/20  [facility] note multifocal osteonecrosis, underlying etiology unclear, initially experienced dramatic  improvement with alendronate however recurrent symptoms despite alendronate therapy, planning to undergo right hip replacement and will resume alendronate after surgery     allergic rhinitis  Tried otc claritin  9/12/13 flonase trial  9/25/15 flonase and zyrtec or claritin            Current medicines (including changes today)  Current Outpatient Medications   Medication Sig Dispense Refill   • diazePAM (VALIUM) 5 MG Tab      • oxyCODONE-acetaminophen (PERCOCET) 5-325 MG Tab      • gabapentin (NEURONTIN) 300 MG Cap Take 1 Cap by mouth 2 Times a Day. 180 Cap 3   • fluticasone (FLONASE) 50 MCG/ACT nasal spray Spray 2 Sprays in nose every day. 16 g 10   • Magnesium 300 MG Cap Take 1 Cap by mouth every day.     • Probiotic Product (PROBIOTIC ADVANCED PO) Take 1 Cap by mouth every day.     • Multiple Vitamins-Minerals (MULTIVITAMIN ADULT PO) Take  by mouth every day.     • Calcium Citrate-Vitamin D (CALCIUM + D PO) Take  by mouth every day.     • ascorbic acid (ASCORBIC ACID) 500 MG Tab Take 500 mg by mouth every day.     • vitamin D (CHOLECALCIFEROL) 1000 UNIT Tab Take 1,000 Units by mouth every day.       No current facility-administered medications for this visit.        Patient Active Problem List    Diagnosis Date Noted   • Subclinical hypothyroidism 01/24/2020   • S/p total replacement of left hip 09/09/2019   • Dyspepsia 04/24/2019   • Avascular necrosis (HCC) 02/12/2017   • Obesity 09/16/2016   • History of rheumatic fever 09/16/2016   • Foot pain, right 09/30/2015   • Perimenopausal 09/25/2015   • Dyslipidemia 09/18/2015   • Chronic pain 01/10/2014   • Allergic rhinitis due to animal hair and dander 09/12/2013   • S/P bunionectomy 06/07/2013   • Varicose vein of leg 10/07/2011   • S/P appendectomy 06/01/2011   • S/p left ankle surgery 06/01/2011   • S/p right hip arthroscopy  06/01/2011   • Low back pain 06/01/2011   • Cervical pain (neck) 06/01/2011   • IBS (irritable bowel syndrome) 06/01/2011   • Preventative  "health care 06/01/2011       Family History   Problem Relation Age of Onset   • Diabetes Other    • Hypertension Other    • Cancer Other              Health Maintenance Summary                IMM INFLUENZA Overdue 9/1/2020      Done 11/8/2019 Imm Admin: Influenza Vaccine Quad Inj (Pf)     Patient has more history with this topic...    PAP SMEAR Next Due 12/18/2020      Done 12/18/2017 [facility]     Patient has more history with this topic...    MAMMOGRAM Next Due 8/14/2021      Done 8/14/2020 MA-SCREENING MAMMO BILAT W/TOMOSYNTHESIS W/CAD     Patient has more history with this topic...    IMM DTaP/Tdap/Td Vaccine Next Due 9/23/2028      Done 9/23/2018 Imm Admin: Tdap Vaccine     Patient has more history with this topic...          Patient Care Team:  Franklin Gonzalez M.D. as PCP - General (Internal Medicine)  Starr Jackson M.D. (Inactive) as Consulting Physician (Rheumatology)  Franklin Gonzalez M.D.       Objective:   Pulse 88   Ht 1.689 m (5' 6.5\")   Wt 90.7 kg (200 lb)   LMP 06/30/2012   BMI 31.80 kg/m²     Physical Exam:   Constitutional: Alert, no distress, well-groomed.  Skin: No rashes in visible areas.  Eye: Round. Conjunctiva clear, lids normal. No icterus.   ENMT: Lips pink without lesions, good dentition, moist mucous membranes. Phonation normal.  Neck: No masses, no thyromegaly. Moves freely without pain.  Respiratory: Unlabored respiratory effort, no cough or audible wheeze  Psych: Alert and oriented x3, normal affect and mood.       Assessment and Plan:   The following treatment plan was discussed:   #1 s/p right hip arthroplasty in august had a setback with stress fracture, on tramadol at night, naproxen, gabapentin    #2 avascular necrosis followed by christopher crooks and is back on alendronate    #3 chronic ultram 50 mg typically once a day at night, also on gabapentin without any side effects of drowsiness, sedation, memory loss, working from home    #4 reflux on Prilosec    #5 allergic " rhinitis on Flonase    #6 BMI 31.8      Plan   #1 old records  orthopedics, will obtain records from August going forward    #2 labs     #3     #4  Check on possible [facility] aqua therapy     #5 refill ultram 50 mg q 8 hours prn, typically taking 1/day, quantity 90, we can refill that again in 3 months, no side effects with the medication, everything benefits outweigh the risk of that medication, continue naproxen, gabapentin, alendronate    #6 follow up three months     #7 old records  gyn     #8 she can come in for flu shot anytime

## 2020-12-17 NOTE — TELEPHONE ENCOUNTER
Old records from   (1)  gynecology from this year  (2)  orthopedics records since august     Please call her to schedule a follow-up telemedicine appointment in 3 months

## 2020-12-17 NOTE — LETTER
[facility]  Franklin Gonzalez M.D.  [address]  [place] [address]  Fax: [phone]   Authorization for Release/Disclosure of   Protected Health Information   Name: SUMAYA WATSON : 1970 SSN: [id number]   Address: [address] [place] [address]  [place] [address] Phone:    [phone] (home)    I authorize the entity listed below to release/disclose the PHI below to:   [facility]/Franklin Gonzalez M.D. and Franklin Gonzalez M.D.   Provider or Entity Name:   gynecology    Address   [facility]  [address] [place] [address] Phone:  [phone]    Fax:  [phone]   Reason for request: continuity of care   Information to be released:    [  ] LAST COLONOSCOPY,  including any PATH REPORT and follow-up  [  ] LAST FIT/COLOGUARD RESULT [  ] LAST DEXA  [  ] LAST MAMMOGRAM  [  ] LAST PAP  [  ] LAST LABS [  ] RETINA EXAM REPORT  [  ] IMMUNIZATION RECORDS  [ XXXXX ] Release all info from this year      [  ] Check here and initial the line next to each item to release ALL health information INCLUDING  _____ Care and treatment for drug and / or alcohol abuse  _____ HIV testing, infection status, or AIDS  _____ Genetic Testing    DATES OF SERVICE OR TIME PERIOD TO BE DISCLOSED: _____________  I understand and acknowledge that:  * This Authorization may be revoked at any time by you in writing, except if your health information has already been used or disclosed.  * Your health information that will be used or disclosed as a result of you signing this authorization could be re-disclosed by the recipient. If this occurs, your re-disclosed health information may no longer be protected by State or Federal laws.  * You may refuse to sign this Authorization. Your refusal will not affect your ability to obtain treatment.  * This Authorization becomes effective upon signing and will  on (date) __________.      If no date is indicated, this Authorization will  one (1) year from the signature  date.    Name: Janett Rajni Page    Signature: continuity of care   Date:     12/18/2020       PLEASE FAX REQUESTED RECORDS BACK TO: [phone]

## 2020-12-20 DIAGNOSIS — Z23 NEED FOR VACCINATION: ICD-10-CM

## 2021-01-05 ENCOUNTER — NON-PROVIDER VISIT (OUTPATIENT)
Dept: MEDICAL GROUP | Facility: MEDICAL CENTER | Age: 51
End: 2021-01-05
Payer: COMMERCIAL

## 2021-01-05 DIAGNOSIS — Z23 NEED FOR VACCINATION: ICD-10-CM

## 2021-01-05 PROCEDURE — 90471 IMMUNIZATION ADMIN: CPT | Performed by: NURSE PRACTITIONER

## 2021-01-05 PROCEDURE — 90686 IIV4 VACC NO PRSV 0.5 ML IM: CPT | Performed by: NURSE PRACTITIONER

## 2021-01-05 NOTE — NON-PROVIDER
"Janett Mcnulty is a 50 y.o. female here for a non-provider visit for:   FLU    Reason for immunization: Annual Flu Vaccine  Immunization records indicate need for vaccine: Yes, confirmed with Epic  Minimum interval has been met for this vaccine: Yes  ABN completed: No    Order and dose verified by: DWAYNE  VIS Dated  8/15/19 was given to patient: Yes  All IAC Questionnaire questions were answered \"No.\"    Patient tolerated injection and no adverse effects were observed or reported: Yes    Pt scheduled for next dose in series: No  "

## 2021-01-21 ENCOUNTER — HOSPITAL ENCOUNTER (OUTPATIENT)
Dept: RADIOLOGY | Facility: MEDICAL CENTER | Age: 51
End: 2021-01-21
Attending: ORTHOPAEDIC SURGERY
Payer: COMMERCIAL

## 2021-01-21 DIAGNOSIS — M54.50 LOW BACK PAIN, UNSPECIFIED BACK PAIN LATERALITY, UNSPECIFIED CHRONICITY, UNSPECIFIED WHETHER SCIATICA PRESENT: ICD-10-CM

## 2021-01-21 PROCEDURE — 72148 MRI LUMBAR SPINE W/O DYE: CPT

## 2021-03-18 ENCOUNTER — TELEMEDICINE (OUTPATIENT)
Dept: MEDICAL GROUP | Facility: MEDICAL CENTER | Age: 51
End: 2021-03-18
Payer: COMMERCIAL

## 2021-03-18 ENCOUNTER — TELEPHONE (OUTPATIENT)
Dept: MEDICAL GROUP | Facility: MEDICAL CENTER | Age: 51
End: 2021-03-18

## 2021-03-18 VITALS — TEMPERATURE: 97.2 F | WEIGHT: 205 LBS | BODY MASS INDEX: 32.18 KG/M2 | HEIGHT: 67 IN

## 2021-03-18 DIAGNOSIS — G89.29 OTHER CHRONIC PAIN: ICD-10-CM

## 2021-03-18 DIAGNOSIS — E66.9 CLASS 1 OBESITY WITHOUT SERIOUS COMORBIDITY WITH BODY MASS INDEX (BMI) OF 31.0 TO 31.9 IN ADULT, UNSPECIFIED OBESITY TYPE: ICD-10-CM

## 2021-03-18 PROCEDURE — 99213 OFFICE O/P EST LOW 20 MIN: CPT | Mod: 95,CR | Performed by: INTERNAL MEDICINE

## 2021-03-18 RX ORDER — TRAMADOL HYDROCHLORIDE 50 MG/1
50 TABLET ORAL EVERY 8 HOURS PRN
Qty: 90 TABLET | Refills: 0 | Status: SHIPPED | OUTPATIENT
Start: 2021-03-18 | End: 2021-07-17 | Stop reason: SDUPTHER

## 2021-03-18 ASSESSMENT — PATIENT HEALTH QUESTIONNAIRE - PHQ9: CLINICAL INTERPRETATION OF PHQ2 SCORE: 0

## 2021-03-18 ASSESSMENT — FIBROSIS 4 INDEX: FIB4 SCORE: 0.63

## 2021-03-18 NOTE — PROGRESS NOTES
Virtual Visit: Established Patient   This visit was conducted via Zoom using secure and encrypted videoconferencing technology. The patient was in a private location in the state of [place].    The patient's identity was confirmed and verbal consent was obtained for this virtual visit.    Subjective:   CC: follow up med refill  Chief Complaint   Patient presents with   • Follow-Up       Janett Mcnulty is a 50 y.o. female presenting for evaluation and management of: refill ultram     Medication, allergies, medical history, surgical history, social history reviewed and updated  Doing PT at [facility] on [address] two days per week, followed by orthopedics Dr. Ferrer  Has had subsequent fracture near the stem of the implant and that has healed,  Had nerve ablation done by pain management by  pain management, back pain will radiate to the back of the right knee, no radiation to the foot, worse with prolonged standing. Will be worse with sitting or standing for prolonged periods of time. Still does exercises on her own at home.  1/21/21 MRI lumbar spine L4L5 mild stenosis slight progression since 2017  Possibly will get an epidural steroid injection by pain management if no improvement and that has been cleared by  from [facility]   Still on ultram 50 mg q 8 hours as needed and typically takes it at night, neurontin 300 mg bid, also on naproxen 500 mg bid with no GI upset, heat does help as well. The ultram does not cause any drowsiness, mood changes, depression, no etoh with the medication.  Unfortunately because of the chronic back and hip pain she has not been able to exercise consistently or as vigorously as she would like or had in the past and she has had some weight gain because of the relative inactivity.  She has been watching her diet, has cut back on carbohydrates, avoiding sweets, alcohol, and eating healthier but still has not been able to lose weight.  Her  has been  "helpful and that he is also adjusting his diet.        Allergies   Allergen Reactions   • Other Drug Unspecified      Steroids unable to take due to joint necrosis \"caused avascular necrosis\"   • Bactrim [Sulfamethoxazole W-Trimethoprim] Unspecified     Per pt kidney function decreased after use   • Morphine Rash     Rash all over   • Other Food Rash     Onions-cause headaches and facial flushing       Varicose vein excision left lower extremity  9/11 varicose veins left excision   3/11/17 ultrasound venous bilateral lower extremities negative     Subclinical hypothyroid  9/26/19 tsh 5.8 repeat labs 3 months test ordered  1/23/20 tsh 2.0, TPO<0.2     s/p total replacement left hip  2002 left hip open decompression and osteotomy  12/03  left hip implant removal  11/10  ortho x-ray stable decompression left femoral head neck junction  11/12  pain management note bilateral trochanteric bursitis injection  5/2/13  pain note, bilateral trochanteric bursal injection  12/10/18 MRI left hip avascular necrosis left femoral head 50% articular surface, with some progression from comparison, some early subchondral collapse anteriorly and superiorly with marrow edema extending into the left femoral head and neck, surgical change consistent with prior decompression procedure and femoral osteoplasty, metallic artifact consistent with surgical pain femoral head, early degenerative changes  8/5/19  orthopedic operative note left total hip arthroplasty  12/12/19  orthopedic note 3-month follow-up left total hip replacement, incision healed nicely, right hip continue to provide discomfort, continue physical therapy follow-up 1 year  5/7/20  orthopedic note x-ray pelvis stable left total hip arthroplasty, early osteoarthritic changes right hip, if worsens consider total joint replacement right side, follow-up 5 years     s/p right hip " arthroscopy  2007  ortho right hip arthroscopy  2/11/14  note bilateral greater trochanter injection under ultrasound guidance  1/20/17 MRI pelvis bilateral femoral head avascular necrosis involving approximately 40% of the articular surface, metallic artifact left femoral head consistent with presence of small metallic pain  9/29/17   orthopedic surgical note right hip fluoroscopically guided core decompression and right hand carpal tunnel release  12/10/18 MRI right hip without chronic avascular necrosis right femoral head 50% articular surface without evidence of subchondral collapse, early degenerative changes, surgical changes consistent with prior right proximal femoral osteoplasty  5/7/20  orthopedic note x-ray pelvis stable left total hip arthroplasty, early osteoarthritic changes right hip, if worsens consider total joint replacement right side, follow-up 5 years  8/13/20  orthopedic note, x-ray pelvis grade 3 arthrosis with osteophyte acetabular side and sclerosis femoral side, joint space narrowing,ficat 2 avascular necrosis right hip progression to osteoarthritis, stable left hip arthroplasty, discussed right hip total arthroplasty  8/24/20  orthopedic operative note right hip arthroplasty  9/3/20  orthopedic note, incision site clean, continue work with physical therapy, follow-up 4 weeks  10/29/20  orthopedic note, 9 weeks postoperative right total hip arthroplasty, x-ray right hip stable arthroplasty without loosening, significant lateral calcification and potential stress response lateral femoral cortex, backed off on standing and walking activities, convert to crutches, follow-up in 3 to 4 weeks  12/1/20  orthopedic note, x-ray LS and right hip, lateral femoral calcification and stress response that is maturing, continue 48-hour workday, follow-up 4 to 5 weeks     s/p bunionectomy  8/9/13   podiatry; right foot martín bunionectomy, right foot first metatarsophalangeal joint bunion/degenerative arthritis      s/p  appendectomy     S/p left ankle surgery 2006 ligament reconstruction     Preventative health  11/05 [facility] colon negative  9/25/15 tdap  1/6/17 pneumovax  6/3/19 dexa LS+0.7, forearm +0.8  9/27/19 vit d 32 increase by 2000 units daily  8/18/20 pap per  gyn  8/14/20 mammogram      Perimenopausal  9/25/15 on climara patch and progesterone tab per  gyn     Low back pain  10/10 MRI LS L5-S1 minimal disc bulge  1/11  at the pain management left L3-S1 facet joint injection, right L3-S1 facet joint injection  2/11  pain management right L3-S1 lateral, medial, dorsal ramus nerve block  4/11  pain management left L3-S1 medial, lateral, bursal ramus nerve block  5/11  pain management bilateral SI joint injection under fluoroscopy  7/11  pain note bilat trochanteric bursitis injection  3/12  pain note, right and left L3-L4 L5-S1 medial, dorsal, lateral branch ablation  12/12  pain note, left L3-S1 dorsal and medial branch radiofrequency ablation  1/4/13  pain note; right L3-S1 radiofrequency ablation  8/14/13  pain note  9/29/13  pain note, left L3-S1 nerve frequency ablation  10/4/13  pain note, status post right L3-S1 FJNA  12/19/13 pain note; status post right SIJI injection, continue voltaren gel, TENS, IT stretches  5/16/14  pain note; left L3-S1 medial, partial, lateral branch radiofrequency ablation under fluoroscopy  6/6/14  right L3-S1 FJNA  7/3/14  pain note; continue TENS,celebrex 200 mg qday, voltaren gel 1%  9/10/14  pain note; continue TENS, lidoderm patch,voltaren gel, celebrex 200 mg qday  12/20/17  pain note left L3-S1 radiofrequency ablation under fluoroscopy  12/22/17   pain note right L3-S1 radiofrequency ablation under fluoroscopy   2/28/19 [facility] L4-L5 3 mm disc bulge, mild central stenosis, L5-S1 moderate facet hypertrophy, small right-sided sacral meningocele      ibs  8/05 CT abd/pelvis negative  11/05 [facility] colon negative     History of rheumatic fever  8/29/16 streptococcal pharyngitis positive strep test, treated with augmentin, developed erythema nodosum lower extremities and palms given NSAIDs and medrol dosepack  9/22/16 repeat medrol dose pack x 1 still with painful erythema nodosum nodules  9/24/16 erythema nodosum nodules and arthritic-type pains, we will retreat with penicillin V 500 mg 3 times a day ×10 days  10/5/16 high dose aspirin no benefit, changed to prednisone 20 mg daily, she has an appointment with me in 2 days  10/7/16 echo ordered, EKG done  10/10/16 cbc,cmp,tsh normal,ESR 40,CRP 0.3, repeat throat culture negative, blood cultures negative,  10/19/16 increase prednisone to 40 mg x 3 days then back to 20 mg  10/19/16 daughter diagnosis strep throat given antibiotics, we will treat prophylactically provided patient penicillin V 500 mg tid x 10 days  10/21/16 echo normal LV size and function, EF 65%, trace MR structurally normal mitral valve, mild TR and RVSP 30  10/24/16 on prednisone 40 mg qday unable to taper to 20 mg due to flare up of pain will try 20 mg bid then taper to 20 mg am and 10 mg qpm over the next week  10/26/16  infectious disease recommended secondary prophylaxis penicillin  mg bid x 5 years  11/7/16 still with polyarthritis, on prednisone 20 mg twice a day, repeat labs, still on penicillin, will speak with rheumatology about referral  11/8/16 ESR 15,CRP 0.3,wbc 10.9 (on prednisone),hgb 14,hct 43,cmp normal,RF,C3C4,TPO,lyme,MERA,HLA B27 negative, Factin IgG 22 (0-19),parietal cell Ab 25(0-25)  11/14/16  rheumatolog note, features consistent with rheumatic fever,  also consider seronegative spondyloarthropathies, sarcoidosis, rheumatoid arthritis, will check sacroiliac films, hand and feet x-rays, follow-up one week  11/17/16 refill prednisone  11/18/16 ACE serum level normal, G6PD ad vitamin 1,25 d level normal  11/21/16 cxr negative  11/21/16 xray SI joints negative for erosions  11/29/16 x-ray joint survey hands, feet, ankles no evidence of inflammatory arthropathy  11/29/16 CT soft tissue neck without; negative  12/7/16  cardiology repeat echo in 3-4 weeks  12/10/16 MRI right knee multiple areas right knee avascular necrosis medial and lateral femoral condyle, medial and lateral tibial plateau, bone marrow edema  12/23/16  rheumatology note, inflammatory markers did normalize with steroids, rheumatoid factor, CCP, MERA,ANKA negative continue to taper steroids given osteonecrosis alternating 17.5 mg and 15 mg, and 15 mg, and 15 mg alternating with 12.5 mg, and so forth  12/27/16 echo LV ejection fraction 60%, no valvular disease  1/4/17 ESR 8,CRP 0.3  1/12/17  rheumatology note, inflammatory markers did normalize with prednisone therapy, continue taper with osteonecrosis bilateral tenderness achilles insertion consider enthesitis, will get MRI left achilles region to evaluate for tendinitis or tenosynovitis, follow-up 6 weeks  1/20/17 MRI left foot without; no evidence of marrow edema, arthropathy, tendinopathy or ligament injury  3/6/17 anticardiolipin antibodies, lupus anticoagulant, protein CNS, factor V 5 Leyden, anti-thrombin panel, beta 2 glycoprotein negative  3/10/17 ESR 19,CRP 0.17, cortisol 3.3, ACTH 16, IgG 753 ([id number]), IgA 140, IgM 93  3/14/16  rheumatology note currently off prednisone, osteonecrosis involving multiple joints, knees, hips, right ankle, etiology unclear, antiphospholipid antibody, protein C and S normal, antithrombin III factor V leyden normal, refer to hematology for evaluation other etiologies, slightly  decreased IgG refer to allergy immunology  3/14/17 remains on penicillin  3/30/17  allergy immunology evaluation slight IgG reduction 753 with normal IgG, IgM, no history to suggest primary immunodeficiency, recent diagnosis of group A streptococcal pharyngitis complicated by erythema nodosum, 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 therapy, we will perform humerol workup to include repeat quantitative immunoglobulins with subclass, specific antibodies for haemophilus influenza, pneumococcal, tetanus/diphtheria, limited flow cytometry with repeat CBC, SPEP/UPEP, ESR, CRP, UA, follow-up ASO, DNase B titer  4/17/17  allergy note slight IgG reduction at 753 with normal IgA, normal IgM, no history to suggest primary immunodeficiency, suspect that 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 carryover effect from chronic prednisone therapy, cbc unremarkable, inflammatory markers ESR slightly elevated 28, CRP 2.4, urinalysis negative for protein or blood, no further humeral immunodeficiency or lab assessment at this point with normal immunologic titers, intact specific and subclass antibodies, the IgG decrease does not represent any underlying primary immunodeficiency or active antibody dysfunction at this time  4/25/17  rheumatology note await [facility] evaluation  5/10/17 dr.wu lacy endocrinology note recommend fosamax weekly with anecdotal evidence that bisphosphonate therapy may provide symptomatic benefit and osteonecrosis, if develops side effects could consider intravenous reclast, referral [place] [facility], follow-up 4 months  5/28/17  rheumatology note avascular necrosis, arthralgias lower extremities, continue off prednisone, on alendronate per [facility]  7/3/17  infectious disease consultation history of streptococcal pharyngitis with probable rheumatic fever, recommend discontinue penicillin prophylaxis follow-up as  needed  6/27/17  [place] [facility] evaluation, recommended evaluation of hypercoagulable state, to consider antiphospholipid antibodies (lupus anticoagulant, anticardiolipin antibodies, beta-2 glycoprotein, phosphatidyl serine antibodies, factor V Leiden, antithrombin III, prothrombin mutation, protein C&S quantitative and qualitative, homocysteine, recommend after review of records do not strongly feel that she has true rheumatic fever, clear to discontinue penicillin, recheck ASO and anti-DNase B titers after 4 weeks  8/16/17 homocystine, protein C and protein S, lupus anticoagulant, beta 2 glycoprotein, antithrombin III, anticardiolipin antibody, anti-DNAse antibody, antistreptolysin all negative  8/31/17  rheumatology note, continues on alendronate per bone endocrinology recommendation [place], did follow up with orthopedics, will proceed with core decompression  2/23/18  [place] [facility] note, multifocal osteonecrosis knees, hips, other joints, underlying etiology unclear, trial few months of alendronate without dramatic improvement, alendronate discontinued because of undergoing core procedures with her hips, recommend continuing off alendronate monitoring for improvement, reassess in 6 months  10/10/18  [place] [facility] consultation osteonecrosis knees, hips, other joints, unclear etiology trial of alendronate without improvement, discontinued prior to hip procedures, hip pain is improved, discussed another trial of alendronate for limited timeframe 70 mg weekly reassess 6 months     foot pain  8/5/15 MRI right foot severe artifact secondary to first MTP are clear, limiting analysis of soft tissue, highly likely complete FHL tear  8/28/15  orthopedic note right ankle FHL tendon rupture seen on MRI, do not recommend surgical repair at this time which would involve hemiarthroplasty, implant, first MTP fusion with bone graft, followup podiatry    8/18/17 MRI left foot normal, MRI right foot osteoathritis right first metatarsal head and sesamoids moderate in degree, ulnar edema with an sesamoids may be due to susceptibility artifact related to first metatarsal phalangeal arthroplasty     dyspepsia  11/2/18 seen UC for abdominal discomfort right upper quadrant  11/3/18 ultrasound liver gallbladder region negative  11/10/18 hida negative  11/20/18 trial of prilosec 40 mg  6/17/19 taper prilosec     Dyslipidemia  9/29/14 chol 186,trig 46,hdl 76,ldl 101  9/18/15 chol 225,trig 50,hdl 77,ldl 138  9/20/17 chol 189,trig 53,hdl 56,ldl 122  5/11/18 ultrasound carotid minimal left carotid bulb plaque, calcified, no evidence of occlusion less than 50% stenosis  9/28/18 chol 187,trig 59,hdl 76,ldl 99  5/8/18 EKG done in clinic, ultrasound carotid ordered follow-up calcifications   5/11/18 ultrasound carotid less than 50% internal stenosis bilateral  9/27/19 chol 196,trig 60,hdl 79,ldl 105      chronic pain  5/8/18   5/8/18 pain contract  5/8/18 opiate risk score 1  5/8/18 on ultram 1-2 per day has tried physical therapy,TENS,acupuncture,chiropractor,massage therapy,heat,physical therapy, NSAIDs,gabapentin,celebrex,voltaren,lidoderm patch  1/3/19   6/17/19   6/17/19 controlled substance contract  9/9/19  on ultram one per evening  12/10/19     6/11/20     6/11/20 ultram refill  12/17/20    tried physical therapy,TENS,acupuncture,chiropractor,massage therapy,heat, NSAIDs,gabapentin,celebrex,voltaren,lidoderm patch     cervical pain  10/11 MRI cervical spine C4-C5 tiny disc bulge, C5-C6 bilateral and plate spurring with uncinate hypertrophy  10/7/16 MRI cervical spine C5-C6 small broad-based osteophyte complex, borderline foraminal stenosis, no significant progression of disease since 2010 6/4/18 MRI cervical spine C3-C4 mild diffuse bulge with mild bilateral foraminal stenosis, C5-C6 mild bilateral foraminal stenosis  5/8/18 x-ray cervical  spine minimal DJD C5-C6, calcification left neck may be related to atherosclerotic plaque     avascular necrosis  8/29/16 streptococcal pharyngitis positive strep test, treated with augmentin, developed erythema nodosum lower extremities and palms given NSAIDs and medrol dosepack  9/22/16 repeat medrol dose pack x 1 still with painful erythema nodosum nodules  10/5/16 high dose aspirin no benefit, changed to prednisone 20 mg daily,  10/19/16 increase prednisone to 40 mg x 3 days then back to 20 mg  11/7/16 still with polyarthritis, on prednisone 20 mg twice a day, repeat labs, still on penicillin, will speak with rheumatology about referral  11/14/16  rheumatolog note, features consistent with rheumatic fever, also consider seronegative spondyloarthropathies, sarcoidosis, rheumatoid arthritis, will check sacroiliac films, hand and feet x-rays, follow-up one week  12/23/16  rheumatology note, inflammatory markers did normalize with steroids, rheumatoid factor, CCP, MERA,ANKA negative continue to taper steroids given osteonecrosis alternating 17.5 mg and 15 mg, and 15 mg, and 15 mg alternating with 12.5 mg, and so forth  12/10/16 MRI right knee multiple areas right knee avascular necrosis medial and lateral femoral condyle, medial and lateral tibial plateau, bone marrow edema  12/23/16  rheumatology note, inflammatory markers did normalize with steroids, rheumatoid factor, CCP, MERA,ANKA negative continue to taper steroids given osteonecrosis alternating 17.5 mg and 15 mg, and 15 mg, and 15 mg alternating with 12.5 mg, and so forth  1/20/17 MRI left foot no evidence of arthropathy or tendinopathy  1/20/17 MRI left ankle metallic artifact lateral malleolus  1/20/17 MRI right ankle small oblong focus finger edema tibial plafond below subchondral plate, would be consistent with small area of avascular necrosis  1/20/17 MRI left knee multiple areas avascular necrosis surrounding the knee to include  femur, tibia, fibula  1/20/17 MRI pelvis bilateral femoral head avascular necrosis involving approximately 40% of the articular surface, metallic artifact left femoral head consistent with presence of small metallic pin  2/9/17 tapering prednisone down to 2.5 mg alternating with 5 mg  2/23/17  rheumatology note complete steroid taper then repeat ESR, CRP and CPK, also check antiphospholipid antibody,antithrombin, factor v leiden, protein s  3/6/17 anticardiolipin antibodies, lupus anticoagulant, protein CNS, factor V 5 Leyden, anti-thrombin panel, beta 2 glycoprotein negative  3/10/17 ESR 19,CRP 0.17, cortisol 3.3, ACTH 16, IgG 753 ([id number]), IgA 140, IgM 93  3/14/16  rheumatology note currently off prednisone, osteonecrosis involving multiple joints, knees, hips, right ankle, etiology unclear, antiphospholipid antibody, protein C and S normal, antithrombin III factor V leyden normal, refer to hematology for evaluation other etiologies, slightly decreased IgG refer to allergy immunology  3/30/17  allergy immunology evaluation slight IgG reduction 753 with normal IgG, IgM, no history to suggest primary immunodeficiency, recent diagnosis of group A streptococcal pharyngitis complicated by erythema nodosum, 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 therapy, we will perform humerol workup to include repeat quantitative immunoglobulins with subclass, specific antibodies for haemophilus influenza, pneumococcal, tetanus/diphtheria, limited flow cytometry with repeat CBC, SPEP/UPEP, ESR, CRP, UA, follow-up ASO, DNase B titer  5/10/17 dr.wu lacy endocrinology note recommend fosamax weekly with anecdotal evidence that bisphosphonate therapy may provide symptomatic benefit and osteonecrosis, if develops side effects could consider intravenous reclast, referral [place] [facility], follow-up 4 months  5/28/17  rheumatology note avascular necrosis, arthralgias lower extremities,  continue off prednisone, on alendronate per [place] [facility]  7/7/17 MRI left knee without; again seen multiple bone infarcts femur, tibia, fibula, patella appeared less prominent on current examination  7/7/17 MRI right knee without; interval improvement in size of multiple bony infarcts involving femur, tibia, fibula, some infarcts have resolved  7/7/17 MRI pelvis without; stable bilateral femoral head avascular necrosis, these have not progressed and there is no evidence of subchondral collapse or arthropathy  6/27/17  [place] [facility] evaluation, recommended evaluation of hypercoagulable state, to consider antiphospholipid antibodies (lupus anticoagulant, anticardiolipin antibodies, beta-2 glycoprotein, phosphatidyl serine antibodies, factor V Leiden, antithrombin III, prothrombin mutation, protein C&S quantitative and qualitative, homocysteine, recommend after review of records do not strongly feel that she has true rheumatic fever, clear to discontinue penicillin, recheck ASO and anti-DNase B titers after 4 weeks  8/16/17 homocystine, protein C and protein S, lupus anticoagulant, beta 2 glycoprotein, antithrombin III, anticardiolipin antibody, anti-DNAse antibody, antistreptolysin all negative  8/18/17 MRI left foot normal, MRI right foot osteoathritis right first metatarsal head and sesamoids moderate in degree, ulnar edema with an sesamoids may be due to susceptibility artifact related to first metatarsal phalangeal arthroplasty  8/31/17  rheumatology note, continues on alendronate per bone endocrinology recommendation [place], did follow up with orthopedics, will proceed with core decompression  9/27/17  [facility] [facility] with focal osteonecrosis involving knees, hips, other joints, etiology unclear, dramatic improvement with a few months of alendronate, about to undergo surgical intervention hip, will discontinue alendronate pending surgery, reassess 4-5  months  9/29/17   orthopedic surgical note right hip fluoroscopically guided core decompression and right hand carpal tunnel release  12/29/17  orthopedic surgical note  left proximal femoral head core decompression  2/23/18  [place] [facility] note, multifocal osteonecrosis knees, hips, other joints, underlying etiology unclear, trial few months of alendronate without dramatic improvement, alendronate discontinued because of undergoing core procedures with her hips, recommend continuing off alendronate monitoring for improvement, reassess in 6 months  10/10/18  [place] [facility] consultation osteonecrosis knees, hips, other joints, unclear etiology trial of alendronate without improvement, discontinued prior to hip procedures, hip pain is improved, discussed another trial of alendronate for limited timeframe 70 mg weekly reassess 6 months  12/7/18 crp 0.46, ESR 23, hgb 13,hct 42, wbc 7.9  12/10/18 MRI left hip avascular necrosis left femoral head 50% articular surface, with some progression from comparison, some early subchondral collapse anteriorly and superiorly with marrow edema extending into the left femoral head and neck, surgical change consistent with prior decompression procedure and femoral osteoplasty, metallic artifact consistent with surgical pain femoral head, early degenerative changes  12/10/18 MRI right hip without chronic avascular necrosis right femoral head 50% articular surface without evidence of subchondral collapse, early degenerative changes, surgical changes consistent with prior right proximal femoral osteoplasty  4/24/19  [place] [facility] order bone density if she does have osteoporosis consider continuation bisphosphonate versus anabolic therapy  5/2/19  orthopedic note undergo MRI evaluation of her left hip  6/10/19 MRI left hip at [facility], redemonstration moderate to advanced avascular necrosis bilateral  femoral heads, degree of associated bone marrow edema appears significantly decreased from previous study particular on the left, small left hip effusion, postoperative changes remote core decompression and femoral osteotomy  6/27/19  orthopedic note follow-up evaluation advanced degenerative arthritis left hip, symptomatic failing conservative measures, considering total joint arthroplasty, recommend proceeding with left total hip arthroplasty due to progression of avascular necrosis  8/5/19  orthopedic operative note left total hip arthroplasty  9/9/19 off fosamax for now due to recent hip arthroplasty  10/9/19  [facility] notes most recent bone density reviewed, remains off alendronate since left hip replacement, clear to resume alendronate 70 mg weekly follow-up 6 months  12/10/19 on fosamax 70 mg 5/7/20  orthopedic note x-ray pelvis stable left total hip arthroplasty, early osteoarthritic changes right hip, if worsens consider total joint replacement right side, follow-up 5 years  8/19/20 dr.wu [facility] endocrin[facility] note multifocal osteonecrosis, underlying etiology unclear, initially experienced dramatic improvement with alendronate however recurrent symptoms despite alendronate therapy, planning to undergo right hip replacement and will resume alendronate after surgery  12/17/20 back on alendronate 70 mg weekly  2/2/21  [facility] note, continue alendronate 70 mg weekly     allergic rhinitis  Tried otc claritin  9/12/13 flonase trial  9/25/15 flonase and zyrtec or claritin                  Current medicines (including changes today)  Current Outpatient Medications   Medication Sig Dispense Refill   • gabapentin (NEURONTIN) 300 MG Cap Take 1 capsule by mouth 2 Times a Day. 180 capsule 3   • alendronate (FOSAMAX) 70 MG Tab Take 1 Tab by mouth every 7 days. 12 Tab 3   • omeprazole (PRILOSEC) 40 MG delayed-release capsule Take 1 Cap by mouth  every day. 90 Cap 3   • fluticasone (FLONASE) 50 MCG/ACT nasal spray Administer 2 Sprays into affected nostril(S) every day. 48 g 3   • Magnesium 300 MG Cap Take 1 Cap by mouth every day.     • Probiotic Product (PROBIOTIC ADVANCED PO) Take 1 Cap by mouth every day.     • Multiple Vitamins-Minerals (MULTIVITAMIN ADULT PO) Take  by mouth every day.     • Calcium Citrate-Vitamin D (CALCIUM + D PO) Take  by mouth every day.     • ascorbic acid (ASCORBIC ACID) 500 MG Tab Take 500 mg by mouth every day.     • vitamin D (CHOLECALCIFEROL) 1000 UNIT Tab Take 1,000 Units by mouth every day.       No current facility-administered medications for this visit.       Patient Active Problem List    Diagnosis Date Noted   • Subclinical hypothyroidism 01/24/2020   • S/p total replacement of left hip 09/09/2019   • Dyspepsia 04/24/2019   • Avascular necrosis (HCC) 02/12/2017   • Obesity 09/16/2016   • History of rheumatic fever 09/16/2016   • Foot pain, right 09/30/2015   • Perimenopausal 09/25/2015   • Dyslipidemia 09/18/2015   • Chronic pain 01/10/2014   • Allergic rhinitis due to animal hair and dander 09/12/2013   • S/P bunionectomy 06/07/2013   • Varicose vein of leg 10/07/2011   • S/P appendectomy 06/01/2011   • S/p left ankle surgery 06/01/2011   • S/p hip right arthroplasty  06/01/2011   • Low back pain 06/01/2011   • Cervical pain (neck) 06/01/2011   • IBS (irritable bowel syndrome) 06/01/2011   • Preventative health care 06/01/2011       Family History   Problem Relation Age of Onset   • Diabetes Other    • Hypertension Other    • Cancer Other        Depression Screening    Little interest or pleasure in doing things?  0 - not at all  Feeling down, depressed , or hopeless? 0 - not at all  Patient Health Questionnaire Score: 0          Health Maintenance Summary                COVID-19 Vaccine Overdue 5/1/1987     MAMMOGRAM Next Due 8/14/2021      Done 8/14/2020 MA-SCREENING MAMMO BILAT W/TOMOSYNTHESIS W/CAD     Patient has  "more history with this topic...    PAP SMEAR Next Due 8/18/2023      Done 8/18/2020  gyn     Patient has more history with this topic...    IMM DTaP/Tdap/Td Vaccine Next Due 9/23/2028      Done 9/23/2018 Imm Admin: Tdap Vaccine     Patient has more history with this topic...          Patient Care Team:  Franklin Gonzalez M.D. as PCP - General (Internal Medicine)  Starr Jackson M.D. (Inactive) as Consulting Physician (Rheumatology)  Franklin Gonzalez M.D.         Objective:   Temp 36.2 °C (97.2 °F)   Ht 1.702 m (5' 7\")   Wt 93 kg (205 lb)   LMP 06/30/2012   BMI 32.11 kg/m²     Physical Exam:  Constitutional: Alert, no distress, well-groomed.  Skin: No rashes in visible areas.  Eye: Round. Conjunctiva clear, lids normal. No icterus.   ENMT: Lips pink without lesions, good dentition, moist mucous membranes. Phonation normal.  Neck: No masses, no thyromegaly. Moves freely without pain.  Respiratory: Unlabored respiratory effort, no cough or audible wheeze  Psych: Alert and oriented x3, normal affect and mood.       Assessment and Plan:   The following treatment plan was discussed:   Assessment  #1 low back pain and right hip pain status post right hip arthroplasty, apparently had a recurrent fracture stress response, followed by orthopedics and also has had avascular necrosis which contributes to chronic back and hip pain.  Taking tramadol 50 mg nightly up to 3 times a day as needed, gabapentin twice daily, naproxen over-the-counter    #2 s/p right hip arthroplasty followed by orthopedics, has had refracture of that area stress response that continues to improve, going to physical therapy    #3 chronic ultram 50 mg tid prn pain last refill 12/17/21 no drowsiness, sedation, memory loss or difficulty with focus or concentration, no depression on medication, does not take it with alcohol, good social support with her     #4 BMI 32.11 has not been able to lose weight in fact has gained some weight over the " past year because of the recurrent hip pain, surgery, and relative inactivity but she is still able to work          Plan  #1 old records from  pain management    #2     #3 PAR ultram 1 tablet 3 times a day as needed for pain quantity 90 per 30 days    #4 labs already ordered fasting she will schedule an appointment to complete labs    #5 continue tramadol understanding long-term may contribute to habituation, dependence, memory loss, depression, she is tolerating the medication and it is helpful for her pain, believe benefits outweigh the risk of chronic tramadol therapy    #6 Will sign up for the covid vaccine     #7 continue physical therapy, follow-up orthopedics    #8 follow up 6 months    #9 referral weight management    #10 she is having to pay out-of-pocket for the tramadol, I am not sure if her insurance is not covering this as we have received any notification from her pharmacy, thus we will apply for prior authorization for the tramadol.

## 2021-03-18 NOTE — TELEPHONE ENCOUNTER
Need PAR   (1) ultram 50 mg q 8 hours tid prn back pain  (2) #90 per 30 days  (3) diagnosis: low back pain   (4) ICD 10: M54.5   (5) comments: tried nsaids and physical therapy, sees pain management    Need old records from  pain management for the past year please

## 2021-04-28 ENCOUNTER — OFFICE VISIT (OUTPATIENT)
Dept: HEALTH INFORMATION MANAGEMENT | Facility: MEDICAL CENTER | Age: 51
End: 2021-04-28
Payer: COMMERCIAL

## 2021-04-28 VITALS
HEIGHT: 66 IN | WEIGHT: 212.4 LBS | BODY MASS INDEX: 34.13 KG/M2 | HEART RATE: 100 BPM | SYSTOLIC BLOOD PRESSURE: 128 MMHG | DIASTOLIC BLOOD PRESSURE: 82 MMHG | OXYGEN SATURATION: 92 %

## 2021-04-28 DIAGNOSIS — M54.50 CHRONIC LOW BACK PAIN, UNSPECIFIED BACK PAIN LATERALITY, UNSPECIFIED WHETHER SCIATICA PRESENT: Chronic | ICD-10-CM

## 2021-04-28 DIAGNOSIS — Z83.49 FAMILY HISTORY OF OBESITY: ICD-10-CM

## 2021-04-28 DIAGNOSIS — R10.13 DYSPEPSIA: ICD-10-CM

## 2021-04-28 DIAGNOSIS — G89.29 OTHER CHRONIC PAIN: ICD-10-CM

## 2021-04-28 DIAGNOSIS — G89.29 CHRONIC LOW BACK PAIN, UNSPECIFIED BACK PAIN LATERALITY, UNSPECIFIED WHETHER SCIATICA PRESENT: Chronic | ICD-10-CM

## 2021-04-28 DIAGNOSIS — E55.9 VITAMIN D DEFICIENCY: ICD-10-CM

## 2021-04-28 DIAGNOSIS — E78.5 DYSLIPIDEMIA: ICD-10-CM

## 2021-04-28 DIAGNOSIS — E66.9 OBESITY (BMI 30-39.9): ICD-10-CM

## 2021-04-28 PROCEDURE — 93000 ELECTROCARDIOGRAM COMPLETE: CPT | Performed by: INTERNAL MEDICINE

## 2021-04-28 PROCEDURE — 97802 MEDICAL NUTRITION INDIV IN: CPT | Performed by: DIETITIAN, REGISTERED

## 2021-04-28 PROCEDURE — 99205 OFFICE O/P NEW HI 60 MIN: CPT | Performed by: INTERNAL MEDICINE

## 2021-04-28 ASSESSMENT — PATIENT HEALTH QUESTIONNAIRE - PHQ9: CLINICAL INTERPRETATION OF PHQ2 SCORE: 0

## 2021-04-28 ASSESSMENT — FIBROSIS 4 INDEX: FIB4 SCORE: 0.63

## 2021-04-28 NOTE — PROGRESS NOTES
"4/28/2021     Janett Rajni Page 50 y.o.        Time in/out: 1:03-1:40 PM    Anthropometrics/Objective  Vitals:    04/28/21 1234   BP: 128/82   Weight: 96.3 kg (212 lb 6.4 oz)   Height: 1.676 m (5' 6\")     BMI: Body mass index is 34.28 kg/m².  Stated Goal Weight: see MD note  See comprehensive patient history form for further information     Subjective:  Pt is here today for the initial screening visit for the medical weight management program.   - pt has met with RD in the past and worked with personal trainers  - interested in using 2 MR shakes daily  - has used the [employer] jose in the past    See HIP Medical Questionnaire in media for more detailed diet history     Nutrition Diagnosis (PES Statement)  · Obesity related to excessive energy intake and inadequate energy expenditure as evidenced by BMI >30     Client history:  Condition(s) associated with a diagnosis or treatment / Pertinent Medical Hx: obesity, dyslipidemia, IBS, vitamin D deficiency    Biochemical data, medical test and procedures  Lab Results   Component Value Date/Time    HBA1C 5.1 09/27/2019 07:16 AM     No results found for: POCGLUCOSE  Lab Results   Component Value Date/Time    CHOLSTRLTOT 196 09/27/2019 07:27 AM     (H) 09/27/2019 07:27 AM    HDL 79 09/27/2019 07:27 AM    TRIGLYCERIDE 60 09/27/2019 07:27 AM         Nutrition Intervention  Nutrition Prescription  Recommended Daily Kcals: 0102-1396 Kcal based on REE of 1629 (*1200 kcal/d based on Dr. Lozada's recommendation)   Recommended Daily Protein: 100g or ~30% of kcals      Meal Plan Recommendation   Calorie controlled, high protein, low carb diet    with 2 meal replacements per day  1-2 snacks; 1 oz protein + non-starchy veggies or 1 fruit      Comprehensive Nutrition Education Instruction or training leading to in-depth nutrition related knowledge about:   Benefits to following meal plan, combine carb, protein and fat at each meal, meal timing and spacing, portion control, sweets " and alcohol in moderation.  Handouts provided regarding topics discussed:   - Meal Plan   - My Plate Planner    - Snack list with fruit   - Meal ideas   - MyFitnessPal How-To Guide    Monitoring & Evaluation Plan    Behavioral-Environmental:  Behavior: Keep a food journal and bring to next appointment   Physical activity: Did not discuss today. Pt instructed to avoid subtracting calories from physical activity on My Fitness Pal.     Food / Nutrient Intake:  Food intake: Follow meal plan as discussed. Avoid concentrated sweets and processed carbs. Use the plate method for portion control and macronutrient balance. Limit carbs/starch to up to 1/2 cup per meal. Snacks should be 1oz protein + ns veggies or fruit. Fruit as a snack once per day.     Fluid intake: Consume at least 64 oz water per day. Avoid all sweetened beverages. Limit coffee to 1 cup a day (ideally no cream or sugar). Limit or avoid alcohol as discussed with Dr. Lozada.     Physical Signs / Symptoms:  Weight loss towards BMI < 30   Weight change -1-2 lbs per week    Assessment Notes:  Today I oriented Camila to Dr. Lozada's Medical Weight Management program. Encouraged a higher protein, lower carbohydrate, and calorie controlled meal plan consisting of 3 meals and 1-2 snacks per day with optional use of meal replacements. Encouraged patient to track their food/beverage intake on My Fitness Pal or utilize a written food journal.  We discussed how to build protein into each meal and snack, incorporating 1/2 plate non-starchy vegetable twice daily, optional 1/2 cup of complex carbohydrate at meals if desired, and avoiding refined carbohydrates and simple sugars. Reviewed snack guidelines to include 1 oz protein and optional non-starchy vegetable or 1 serving of fruit.      Camila will be aiming for 1200 kcal/d.  The pt will also be looking at the macronutrient feature and aiming for 100g or less of carbohydrate and 100g or more of protein per day  per Dr. Lozada recommendations (or 30% or less of CHO and 30% or more of protein from calories).     Reviewed meal timing and pt plans to use 2 ND MR's per day (breakfast and lunch). Next visit suggest to review her food journal, set nutrition goals in MFP, and review the plate and snack list.     F/U: 2 weeks

## 2021-04-28 NOTE — PROGRESS NOTES
Bariatric Medicine H&P  Chief Complaint   Patient presents with   • Weight Gain       Referred by:  Franklin Gonzalez M.D.    History of Present Illness  Janett Mcnulty is a 50 y.o.  female who presents for weight management and to help address co-morbidities caused by overweight, as below.    The patient wants to be able to exercise, be more active by getting her weight under better control.  She has not been part of a formal weight loss program in the past.    She started gaining weight around 2017.  At that time she had significant inflammation, was on steroids, developed avascular necrosis in multiple joints.  Bilateral KATARINA, femur fracture and right hip implant.  Now with low back pain radiating to her right leg.  Receiving epidurals but no more steroids.  Also sees pain MD for ablations.  At her heaviest weight.  Very frustrated, due to lack of activity, makes it harder to lose weight.  She is hoping pain will improve with weight loss.    H/o Antiobesity medications: None  H/o Bariatric Surgery: None    Brief Diet History (see also RD notes):  AM: Eggs, ham or cottage cheese with fruit  Lunch/Dinner: [place] or lean cuisine/tacos or meat with vegetables  Snacks: Pretzels, cheese, peanut butter  Drinks: Energy drink, 0 sugar    Exercise:   Walks but intermittent    Behavior-Related History  Binge eating screen: Negative  H/o abuse: Negative     Medical Dx:  Sleep apnea screen: Negative  HLD: Controlled on 2019 labs, not on statin or fenofibrate  Chronic pain: Controlled with gabapentin  Dyspepsia: Controlled with Prilosec  VDD: Controlled with daily vitamin D supplement    Review of Systems   Positive for joint pain, arthritis and stiffness.  Denies excessive fatigue or lack of energy, palpitations, cough or shortness of breath, depression or anxiety.  All other ROS were reviewed with patient today and are negative.      PMH/PSH:  I have reviewed the patient's medical, social and family history,  "allergies, and medications today.  Prior records reviewed.   for [employer], works from home    Physical Exam:   /82 (BP Location: Left arm, Patient Position: Sitting, BP Cuff Size: Large adult)   Pulse 100   Ht 1.676 m (5' 6\")   Wt 96.3 kg (212 lb 6.4 oz)   LMP 06/30/2012   SpO2 92%   BMI 34.28 kg/m²   Waist Measurement   Waist: 40 inch/inches  Body fat % & REE:  see accompanying flow sheet    Constitutional: Oriented to person, place, and time and well-developed, well-nourished, and in no distress.    HENT: No facial plethora.  No Cushingoid features.  No scalloped tongue.  No dental erosions.  No swollen parotids.  Head: Normocephalic.   Eyes: EOM are normal. Pupils are equal, round, and reactive to light. No periorbital edema.  No lateral thinning of eyebrows.  No vertical nystagmus.  Neck: Normal range of motion. Neck supple. No thyromegaly present. No buffalo hump.  Cardiovascular: Normal rate and regular rhythm.  No murmur heard.  Pulmonary/Chest: Effort normal and breath sounds normal. No wheezes.   Abdominal: Soft. Bowel sounds are normal. No pannus.  No ascites.  No hepatosplenomegaly.   Musculoskeletal: Normal range of motion. No edema.   Neurological: Alert and oriented to person, place, and time. Normal reflexes. No cranial nerve deficit. No muscle weakness.  Gait normal.   Skin: Warm and dry. Not diaphoretic. No hirsuitism.  No acanthosis nigricans.  Not excessively dry, scaly.  No acne.  No bruising/ecchymosis.  No hyperpigmentation.  No xanthomas or acrochordon.    Psychiatric: Mood, memory, affect and judgment normal.     Laboratory:   Prior labs reviewed.  None recent.  EKG: Normal sinus rhythm, rate 86, LVH, no ST elevations or depressions.  Corrected QT 0.434  (Machine reading of atrial fibrillation is incorrect.  P waves are noted throughout and clear (  Ordered, performed in our office today, and reviewed by me today.    Dietitian Assessment: I have reviewed the Dietitian's " assessment related to this encounter.       ASSESSMENT  50 y.o. female presents for intensive lifestyle intervention for treatment of obesity and co-morbid conditions.  Obesity Stage ([place])/Class 1/1    1. Chronic low back pain, unspecified back pain laterality, unspecified whether sciatica present     2. Other chronic pain     3. Dyslipidemia     4. Dyspepsia     5. Obesity (BMI 30-39.9)     6. Family history of obesity     7. Vitamin D deficiency         The patient has really struggled with her weight, due to multiple orthopedic procedures and chronic pain, difficulty with higher activity levels.  Recommend she start tracking intake, use stimulus narrowing and consider antiobesity medication to reverse her weight gain which in turn will improve her pain levels.  Monitor lipids, GERD, vitamin D levels and pain as she progresses through the program.    PLAN  Weight Goal: 165 lb  Dietary intervention:     MR: 2 ND +1  High Protein/Low Carb whole food meal and 2 snacks  >100 g protein, <100 g total carbs daily, 1200 kcal/d initial goal  Track daily intake with My Fitness Pal, bring to next visit  64+ oz water per day  Physical Activity:  PT daily  Labs:  Await labs   Diagnostics:  EKG  Rx changes:   Consider AOM  Behavior change:   Mindful food choices, stimulus narrowing  Surgical considerations: N/A  Follow-up: one month with MD, weekly RD classes    Face to face time spent 60 minutes,  with >50% of time devoted to one on one counseling on weight management issues, as documented above.      Patient's body mass index is 34.28 kg/m². Exercise and nutrition counseling were performed at this visit.      >>>>>>>>>>>>>

## 2021-05-28 ENCOUNTER — HOSPITAL ENCOUNTER (OUTPATIENT)
Dept: LAB | Facility: MEDICAL CENTER | Age: 51
End: 2021-05-28
Attending: INTERNAL MEDICINE
Payer: COMMERCIAL

## 2021-05-28 DIAGNOSIS — Z00.00 PREVENTATIVE HEALTH CARE: Chronic | ICD-10-CM

## 2021-05-28 LAB
ALBUMIN SERPL BCP-MCNC: 4.4 G/DL (ref 3.2–4.9)
ALBUMIN/GLOB SERPL: 1.5 G/DL
ALP SERPL-CCNC: 72 U/L (ref 30–99)
ALT SERPL-CCNC: 38 U/L (ref 2–50)
ANION GAP SERPL CALC-SCNC: 12 MMOL/L (ref 7–16)
ANISOCYTOSIS BLD QL SMEAR: ABNORMAL
AST SERPL-CCNC: 30 U/L (ref 12–45)
BASOPHILS # BLD AUTO: 1 % (ref 0–1.8)
BASOPHILS # BLD: 0.05 K/UL (ref 0–0.12)
BILIRUB SERPL-MCNC: 0.3 MG/DL (ref 0.1–1.5)
BUN SERPL-MCNC: 18 MG/DL (ref 8–22)
CALCIUM SERPL-MCNC: 8.9 MG/DL (ref 8.5–10.5)
CHLORIDE SERPL-SCNC: 104 MMOL/L (ref 96–112)
CHOLEST SERPL-MCNC: 201 MG/DL (ref 100–199)
CO2 SERPL-SCNC: 21 MMOL/L (ref 20–33)
COMMENT 1642: NORMAL
CREAT SERPL-MCNC: 0.7 MG/DL (ref 0.5–1.4)
EOSINOPHIL # BLD AUTO: 0.38 K/UL (ref 0–0.51)
EOSINOPHIL NFR BLD: 7.9 % (ref 0–6.9)
ERYTHROCYTE [DISTWIDTH] IN BLOOD BY AUTOMATED COUNT: 55.6 FL (ref 35.9–50)
EST. AVERAGE GLUCOSE BLD GHB EST-MCNC: 108 MG/DL
FASTING STATUS PATIENT QL REPORTED: NORMAL
GIANT PLATELETS BLD QL SMEAR: NORMAL
GLOBULIN SER CALC-MCNC: 3 G/DL (ref 1.9–3.5)
GLUCOSE SERPL-MCNC: 90 MG/DL (ref 65–99)
HBA1C MFR BLD: 5.4 % (ref 4–5.6)
HCT VFR BLD AUTO: 33.5 % (ref 37–47)
HDLC SERPL-MCNC: 70 MG/DL
HGB BLD-MCNC: 10 G/DL (ref 12–16)
IMM GRANULOCYTES # BLD AUTO: 0.01 K/UL (ref 0–0.11)
IMM GRANULOCYTES NFR BLD AUTO: 0.2 % (ref 0–0.9)
LDLC SERPL CALC-MCNC: 119 MG/DL
LYMPHOCYTES # BLD AUTO: 2.06 K/UL (ref 1–4.8)
LYMPHOCYTES NFR BLD: 42.7 % (ref 22–41)
MACROCYTES BLD QL SMEAR: ABNORMAL
MCH RBC QN AUTO: 23.5 PG (ref 27–33)
MCHC RBC AUTO-ENTMCNC: 29.9 G/DL (ref 33.6–35)
MCV RBC AUTO: 78.6 FL (ref 81.4–97.8)
MICROCYTES BLD QL SMEAR: ABNORMAL
MONOCYTES # BLD AUTO: 0.54 K/UL (ref 0–0.85)
MONOCYTES NFR BLD AUTO: 11.2 % (ref 0–13.4)
MORPHOLOGY BLD-IMP: NORMAL
NEUTROPHILS # BLD AUTO: 1.79 K/UL (ref 2–7.15)
NEUTROPHILS NFR BLD: 37 % (ref 44–72)
NRBC # BLD AUTO: 0 K/UL
NRBC BLD-RTO: 0 /100 WBC
OVALOCYTES BLD QL SMEAR: NORMAL
PLATELET # BLD AUTO: 333 K/UL (ref 164–446)
PLATELET BLD QL SMEAR: NORMAL
PMV BLD AUTO: 10.1 FL (ref 9–12.9)
POLYCHROMASIA BLD QL SMEAR: NORMAL
POTASSIUM SERPL-SCNC: 4.7 MMOL/L (ref 3.6–5.5)
PROT SERPL-MCNC: 7.4 G/DL (ref 6–8.2)
RBC # BLD AUTO: 4.26 M/UL (ref 4.2–5.4)
RBC BLD AUTO: PRESENT
SODIUM SERPL-SCNC: 137 MMOL/L (ref 135–145)
TRIGL SERPL-MCNC: 58 MG/DL (ref 0–149)
TSH SERPL DL<=0.005 MIU/L-ACNC: 5.38 UIU/ML (ref 0.38–5.33)
WBC # BLD AUTO: 4.8 K/UL (ref 4.8–10.8)

## 2021-05-28 PROCEDURE — 85025 COMPLETE CBC W/AUTO DIFF WBC: CPT

## 2021-05-28 PROCEDURE — 83036 HEMOGLOBIN GLYCOSYLATED A1C: CPT

## 2021-05-28 PROCEDURE — 80061 LIPID PANEL: CPT

## 2021-05-28 PROCEDURE — 84443 ASSAY THYROID STIM HORMONE: CPT

## 2021-05-28 PROCEDURE — 36415 COLL VENOUS BLD VENIPUNCTURE: CPT

## 2021-05-28 PROCEDURE — 82306 VITAMIN D 25 HYDROXY: CPT

## 2021-05-28 PROCEDURE — 80053 COMPREHEN METABOLIC PANEL: CPT

## 2021-05-29 ENCOUNTER — TELEPHONE (OUTPATIENT)
Dept: MEDICAL GROUP | Facility: MEDICAL CENTER | Age: 51
End: 2021-05-29

## 2021-05-29 DIAGNOSIS — Z12.11 COLON CANCER SCREENING: ICD-10-CM

## 2021-05-29 DIAGNOSIS — D64.9 ANEMIA, UNSPECIFIED TYPE: ICD-10-CM

## 2021-05-29 PROBLEM — E55.9 VITAMIN D DEFICIENCY: Status: RESOLVED | Noted: 2021-04-28 | Resolved: 2021-05-29

## 2021-05-29 PROBLEM — Z83.49 FAMILY HISTORY OF OBESITY: Status: RESOLVED | Noted: 2021-04-28 | Resolved: 2021-05-29

## 2021-05-29 NOTE — TELEPHONE ENCOUNTER
Notified with lab anemia microcytic, no GI blood loss, no GI symptoms, due for colon cancer screening, no history of ulcers, postmenopausal.  Cholesterol stable, LDL slightly increased but is still elevated, CMP, A1c normal.  Vitamin D level pending.  TSH borderline, has been borderline before, will monitor.  Colon cancer screening referral placed  Anemia labs ordered nonfasting to be done in 2 to 4 weeks

## 2021-05-31 LAB — 25(OH)D3 SERPL-MCNC: 58 NG/ML (ref 30–80)

## 2021-06-04 ENCOUNTER — TELEMEDICINE (OUTPATIENT)
Dept: TELEHEALTH | Facility: TELEMEDICINE | Age: 51
End: 2021-06-04
Payer: COMMERCIAL

## 2021-06-04 DIAGNOSIS — J30.81 ALLERGIC RHINITIS DUE TO ANIMAL HAIR AND DANDER: Chronic | ICD-10-CM

## 2021-06-04 DIAGNOSIS — J30.2 SEASONAL ALLERGIC RHINITIS, UNSPECIFIED TRIGGER: ICD-10-CM

## 2021-06-04 DIAGNOSIS — K12.2 UVULITIS: ICD-10-CM

## 2021-06-04 PROCEDURE — 99213 OFFICE O/P EST LOW 20 MIN: CPT | Mod: 95,CR | Performed by: NURSE PRACTITIONER

## 2021-06-04 RX ORDER — DIAZEPAM 5 MG/1
TABLET ORAL
COMMUNITY
Start: 2021-06-04 | End: 2022-01-18

## 2021-06-04 NOTE — PATIENT INSTRUCTIONS
-Oral Hydration.  -Warm salt water gargles.  -OTC Throat lozenges or spray (Cepacol).  -Nasal spray  -You may try saline irrigation or neti pot.   -Ibuprofen or Tylenol as directed for pain.   -Warm compress to sinuses.   -Follow up with primary care provider    Follow up for persistent throat pain, increased swelling, persistent fevers, difficulty swallowing, shortness of breath, weakness, elevated heart rate, facial swelling, visual changes, stiff neck, sinus symptoms last longer than 10 days, or any other concerns.    Uvulitis    Uvulitis is inflammation of the uvula. The uvula is the small, finger-like piece of tissue that hangs down at the back of the throat. Uvulitis causes swelling and soreness of the uvula. It can also cause other symptoms, depending on the cause and severity of the condition.  What are the causes?  This condition may be caused by:  · An infection in the mouth or throat. This is the most common cause.  · Trauma or injury to the uvula. Causes of trauma include burning your mouth, damage from a medical procedure, and heavy snoring.  · Fluid buildup (edema). Edema can be triggered by an allergic reaction. Uvulitis that is caused by edema is called Quincke disease.  · Inhaling chemicals, smoke, steam, or other substances that irritate the body.  What are the signs or symptoms?  Symptoms of this condition depend on the cause.  Symptoms of uvulitis that is caused by infection include:  · Red, swollen uvula.  · Sore throat.  · Fever.  · Headache.  · Swollen neck glands.  Symptoms of uvulitis that is caused by trauma, edema, or irritation include:  · Red, swollen uvula.  · Sore throat.  · Trouble swallowing.  · Choking or gagging.  · Trouble breathing.  How is this diagnosed?  This condition is diagnosed with a physical exam. You may also have tests, such as a throat culture or blood tests, to help find the cause of the condition.  How is this treated?  Treatment for this condition depends on the  cause. Treatment may involve:  · Antibiotic medicine for a bacterial infection.  · Steroid medicine if the condition is caused by edema.  · Surgery to remove part of the uvula (partial uvulectomy). Surgery is only needed in rare cases where the swelling does not go away after trying other treatments.  Follow these instructions at home:    Medicines  · Take over-the-counter and prescription medicines only as told by your health care provider.  · If you were prescribed an antibiotic medicine, take it as told by your health care provider. Do not stop taking the antibiotic even if you start to feel better.  Managing pain and soreness    · Use a cool-mist humidifier to add moisture to the air. This can help ease irritation in your throat.  · While your throat is sore:  ? Eat a diet of soft foods or liquids.  ? Gargle with a salt-water mixture 3-4 times a day or as needed. To make a salt-water mixture, completely dissolve ½-1 tsp of salt in 1 cup of warm water.  General instructions  · Drink enough fluid to keep your urine pale yellow.  · Keep all follow-up visits as told by your health care provider. This is important.  Contact a health care provider if:  · You have a fever.  · You have trouble eating.  · Your symptoms do not get better.  · Your symptoms come back after treatment.  Get help right away if:  · You have trouble breathing.  · You have trouble swallowing.  Summary  · Uvulitis is inflammation of the uvula, which is the small, finger-like piece of tissue that hangs down at the back of the throat.  · Uvulitis causes swelling and soreness of the uvula.  · This condition may be treated with antibiotics or steroids. In rare cases, surgery may be needed.  This information is not intended to replace advice given to you by your health care provider. Make sure you discuss any questions you have with your health care provider.  Document Released: 07/28/2005 Document Revised: 04/04/2019 Document Reviewed:  04/04/2019  Elsevier Patient Education © 2020 [employer]        Sinusitis, Adult  Sinusitis is inflammation of your sinuses. Sinuses are hollow spaces in the bones around your face. Your sinuses are located:  · Around your eyes.  · In the middle of your forehead.  · Behind your nose.  · In your cheekbones.  Mucus normally drains out of your sinuses. When your nasal tissues become inflamed or swollen, mucus can become trapped or blocked. This allows bacteria, viruses, and fungi to grow, which leads to infection. Most infections of the sinuses are caused by a virus.  Sinusitis can develop quickly. It can last for up to 4 weeks (acute) or for more than 12 weeks (chronic). Sinusitis often develops after a cold.  What are the causes?  This condition is caused by anything that creates swelling in the sinuses or stops mucus from draining. This includes:  · Allergies.  · Asthma.  · Infection from bacteria or viruses.  · Deformities or blockages in your nose or sinuses.  · Abnormal growths in the nose (nasal polyps).  · Pollutants, such as chemicals or irritants in the air.  · Infection from fungi (rare).  What increases the risk?  You are more likely to develop this condition if you:  · Have a weak body defense system (immune system).  · Do a lot of swimming or diving.  · Overuse nasal sprays.  · Smoke.  What are the signs or symptoms?  The main symptoms of this condition are pain and a feeling of pressure around the affected sinuses. Other symptoms include:  · Stuffy nose or congestion.  · Thick drainage from your nose.  · Swelling and warmth over the affected sinuses.  · Headache.  · Upper toothache.  · A cough that may get worse at night.  · Extra mucus that collects in the throat or the back of the nose (postnasal drip).  · Decreased sense of smell and taste.  · Fatigue.  · A fever.  · Sore throat.  · Bad breath.  How is this diagnosed?  This condition is diagnosed based on:  · Your symptoms.  · Your medical  history.  · A physical exam.  · Tests to find out if your condition is acute or chronic. This may include:  ? Checking your nose for nasal polyps.  ? Viewing your sinuses using a device that has a light (endoscope).  ? Testing for allergies or bacteria.  ? Imaging tests, such as an MRI or CT scan.  In rare cases, a bone biopsy may be done to rule out more serious types of fungal sinus disease.  How is this treated?  Treatment for sinusitis depends on the cause and whether your condition is chronic or acute.  · If caused by a virus, your symptoms should go away on their own within 10 days. You may be given medicines to relieve symptoms. They include:  ? Medicines that shrink swollen nasal passages (topical intranasal decongestants).  ? Medicines that treat allergies (antihistamines).  ? A spray that eases inflammation of the nostrils (topical intranasal corticosteroids).  ? Rinses that help get rid of thick mucus in your nose (nasal saline washes).  · If caused by bacteria, your health care provider may recommend waiting to see if your symptoms improve. Most bacterial infections will get better without antibiotic medicine. You may be given antibiotics if you have:  ? A severe infection.  ? A weak immune system.  · If caused by narrow nasal passages or nasal polyps, you may need to have surgery.  Follow these instructions at home:  Medicines  · Take, use, or apply over-the-counter and prescription medicines only as told by your health care provider. These may include nasal sprays.  · If you were prescribed an antibiotic medicine, take it as told by your health care provider. Do not stop taking the antibiotic even if you start to feel better.  Hydrate and humidify    · Drink enough fluid to keep your urine pale yellow. Staying hydrated will help to thin your mucus.  · Use a cool mist humidifier to keep the humidity level in your home above 50%.  · Inhale steam for 10-15 minutes, 3-4 times a day, or as told by your  health care provider. You can do this in the bathroom while a hot shower is running.  · Limit your exposure to cool or dry air.  Rest  · Rest as much as possible.  · Sleep with your head raised (elevated).  · Make sure you get enough sleep each night.  General instructions    · Apply a warm, moist washcloth to your face 3-4 times a day or as told by your health care provider. This will help with discomfort.  · Wash your hands often with soap and water to reduce your exposure to germs. If soap and water are not available, use hand .  · Do not smoke. Avoid being around people who are smoking (secondhand smoke).  · Keep all follow-up visits as told by your health care provider. This is important.  Contact a health care provider if:  · You have a fever.  · Your symptoms get worse.  · Your symptoms do not improve within 10 days.  Get help right away if:  · You have a severe headache.  · You have persistent vomiting.  · You have severe pain or swelling around your face or eyes.  · You have vision problems.  · You develop confusion.  · Your neck is stiff.  · You have trouble breathing.  Summary  · Sinusitis is soreness and inflammation of your sinuses. Sinuses are hollow spaces in the bones around your face.  · This condition is caused by nasal tissues that become inflamed or swollen. The swelling traps or blocks the flow of mucus. This allows bacteria, viruses, and fungi to grow, which leads to infection.  · If you were prescribed an antibiotic medicine, take it as told by your health care provider. Do not stop taking the antibiotic even if you start to feel better.  · Keep all follow-up visits as told by your health care provider. This is important.  This information is not intended to replace advice given to you by your health care provider. Make sure you discuss any questions you have with your health care provider.  Document Released: 12/18/2006 Document Revised: 05/20/2019 Document Reviewed:  05/20/2019  [employer] Patient Education © 2020 [employer].    Allergic Rhinitis, Adult  Allergic rhinitis is an allergic reaction that affects the mucous membrane inside the nose. It causes sneezing, a runny or stuffy nose, and the feeling of mucus going down the back of the throat (postnasal drip). Allergic rhinitis can be mild to severe.  There are two types of allergic rhinitis:  · Seasonal. This type is also called hay fever. It happens only during certain seasons.  · Perennial. This type can happen at any time of the year.  What are the causes?  This condition happens when the body's defense system (immune system) responds to certain harmless substances called allergens as though they were germs.   Seasonal allergic rhinitis is triggered by pollen, which can come from grasses, trees, and weeds. Perennial allergic rhinitis may be caused by:  · House dust mites.  · Pet dander.  · Mold spores.  What are the signs or symptoms?  Symptoms of this condition include:  · Sneezing.  · Runny or stuffy nose (nasal congestion).  · Postnasal drip.  · Itchy nose.  · Tearing of the eyes.  · Trouble sleeping.  · Daytime sleepiness.  How is this diagnosed?  This condition may be diagnosed based on:  · Your medical history.  · A physical exam.  · Tests to check for related conditions, such as:  ? Asthma.  ? Pink eye.  ? Ear infection.  ? Upper respiratory infection.  · Tests to find out which allergens trigger your symptoms. These may include skin or blood tests.  How is this treated?  There is no cure for this condition, but treatment can help control symptoms. Treatment may include:  · Taking medicines that block allergy symptoms, such as antihistamines. Medicine may be given as a shot, nasal spray, or pill.  · Avoiding the allergen.  · Desensitization. This treatment involves getting ongoing shots until your body becomes less sensitive to the allergen. This treatment may be done if other treatments do not help.  · If taking  medicine and avoiding the allergen does not work, new, stronger medicines may be prescribed.  Follow these instructions at home:  · Find out what you are allergic to. Common allergens include smoke, dust, and pollen.  · Avoid the things you are allergic to. These are some things you can do to help avoid allergens:  ? Replace carpet with wood, tile, or vinyl tara. Carpet can trap dander and dust.  ? Do not smoke. Do not allow smoking in your home.  ? Change your heating and air conditioning filter at least once a month.  ? During allergy season:  § Keep windows closed as much as possible.  § Plan outdoor activities when pollen counts are lowest. This is usually during the evening hours.  § When coming indoors, change clothing and shower before sitting on furniture or bedding.  · Take over-the-counter and prescription medicines only as told by your health care provider.  · Keep all follow-up visits as told by your health care provider. This is important.  Contact a health care provider if:  · You have a fever.  · You develop a persistent cough.  · You make whistling sounds when you breathe (you wheeze).  · Your symptoms interfere with your normal daily activities.  Get help right away if:  · You have shortness of breath.  Summary  · This condition can be managed by taking medicines as directed and avoiding allergens.  · Contact your health care provider if you develop a persistent cough or fever.  · During allergy season, keep windows closed as much as possible.  This information is not intended to replace advice given to you by your health care provider. Make sure you discuss any questions you have with your health care provider.  Document Released: 09/12/2002 Document Revised: 11/30/2018 Document Reviewed: 01/25/2018  Elsevier Patient Education © 2020 [employer] Inc.

## 2021-06-04 NOTE — PROGRESS NOTES
"Virtual Visit: Established Patient   This visit was conducted via Zoom using secure and encrypted videoconferencing technology. The patient was in a private location in the state of [place].    The patient's identity was confirmed and verbal consent was obtained for this virtual visit.    Subjective:   CC:   Chief Complaint   Patient presents with   • Pharyngitis   Janett Mcnulty is a 51 y.o. female presenting for evaluation and management of:    Swollen uvula, woke up with it. [place] like there was mucus in back of her throat. Feels uncomfortable. Swallowing \"ok, can feel it\". Feels like a dry throat. No hx of similar symptoms. No trauma to area. No cough. Denies fever. No sores or film in mouth. No lymphnode swelling. Was having sinus pressure, now resolved. Clear nasal secretions. Has allergies. Takes flonase. Sudaphed . Had more GERD than normal yesterday, takes omeprazole. Had slept with fans blowing last night.    ROS Denies any recent fevers or chills. No nausea or vomiting. No chest pains or shortness of breath.     Allergies   Allergen Reactions   • Other Drug Unspecified      Steroids unable to take due to joint necrosis \"caused avascular necrosis\"   • Bactrim [Sulfamethoxazole W-Trimethoprim] Unspecified     Per pt kidney function decreased after use   • Morphine Rash     Rash all over   • Other Food Rash     Onions-cause headaches and facial flushing       Current medicines (including changes today)  Current Outpatient Medications   Medication Sig Dispense Refill   • gabapentin (NEURONTIN) 300 MG Cap Take 1 capsule by mouth 2 Times a Day. 180 capsule 3   • alendronate (FOSAMAX) 70 MG Tab Take 1 Tab by mouth every 7 days. 12 Tab 3   • omeprazole (PRILOSEC) 40 MG delayed-release capsule Take 1 Cap by mouth every day. 90 Cap 3   • fluticasone (FLONASE) 50 MCG/ACT nasal spray Administer 2 Sprays into affected nostril(S) every day. 48 g 3   • Magnesium 300 MG Cap Take 1 Cap by mouth every day.     • Probiotic " Product (PROBIOTIC ADVANCED PO) Take 1 Cap by mouth every day.     • Multiple Vitamins-Minerals (MULTIVITAMIN ADULT PO) Take  by mouth every day.     • Calcium Citrate-Vitamin D (CALCIUM + D PO) Take  by mouth every day.     • ascorbic acid (ASCORBIC ACID) 500 MG Tab Take 500 mg by mouth every day.     • vitamin D (CHOLECALCIFEROL) 1000 UNIT Tab Take 1,000 Units by mouth every day.       No current facility-administered medications for this visit.       Patient Active Problem List    Diagnosis Date Noted   • Subclinical hypothyroidism 01/24/2020   • S/p total replacement of left hip 09/09/2019   • Dyspepsia 04/24/2019   • Avascular necrosis (HCC) 02/12/2017   • Obesity (BMI 30-39.9) 09/16/2016   • History of rheumatic fever 09/16/2016   • Foot pain, right 09/30/2015   • Perimenopausal 09/25/2015   • Dyslipidemia 09/18/2015   • Chronic pain 01/10/2014   • Allergic rhinitis due to animal hair and dander 09/12/2013   • S/P bunionectomy 06/07/2013   • Varicose vein of leg 10/07/2011   • S/P appendectomy 06/01/2011   • S/p left ankle surgery 06/01/2011   • S/p hip right arthroplasty  06/01/2011   • Low back pain 06/01/2011   • Cervical pain (neck) 06/01/2011   • IBS (irritable bowel syndrome) 06/01/2011   • Preventative health care 06/01/2011       Family History   Problem Relation Age of Onset   • Diabetes Other    • Hypertension Other    • Cancer Other        She  has a past medical history of Arthritis, Avascular necrosis (HCC) (11/2016), Pain (6/5/13), Pain (3/21/17), and PONV (postoperative nausea and vomiting). She also has no past medical history of Breast cancer (HCC).  She  has a past surgical history that includes pr inj dx/ther agnt paravert facet joint, gómez* (1/21/2011); pr inj dx/ther agnt paravert facet joint, gómez* (2/25/2011); pr dest,paravertebral,l/s,single (3/4/2011); inj,sacroiliac,anes/steroid (5/13/2011); pr inject nerv blck,stellate ganglion (9/2/2011); neuro dest facet l/s w/ig sngl (3/2/2012); neuro  dest facet l/s w/ig addl (3/2/2012); neuro dest facet l/s w/ig addl (3/2/2012); neuro dest facet l/s w/ig sngl (3/9/2012); neuro dest facet l/s w/ig addl (3/9/2012); neuro dest facet l/s w/ig addl (3/9/2012); neuro dest facet l/s w/ig sngl (12/28/2012); neuro dest facet l/s w/ig addl (12/28/2012); neuro dest facet l/s w/ig addl (12/28/2012); neuro dest facet l/s w/ig sngl (1/4/2013); neuro dest facet l/s w/ig addl (1/4/2013); neuro dest facet l/s w/ig addl (1/4/2013); hip arthroscopy (Right, 2009); tonsillectomy (2001); other (Left, 2007); appendectomy (1991); other (2013); bunionectomy (6/7/2013); neuro dest facet l/s w/ig sngl (9/20/2013); neuro dest facet l/s w/ig addl (9/20/2013); neuro dest facet l/s w/ig addl (9/20/2013); neuro dest facet l/s w/ig addl (9/20/2013); neuro dest facet l/s w/ig sngl (10/4/2013); neuro dest facet l/s w/ig addl (10/4/2013); neuro dest facet l/s w/ig addl (10/4/2013); neuro dest facet l/s w/ig addl (10/4/2013); inj,sacroiliac,anes/steroid (11/8/2013); inj,sacroiliac,anes/steroid (12/6/2013); inj,sacroiliac,anes/steroid (1/10/2014); inj,sacroiliac,anes/steroid (1/10/2014); neuro dest facet l/s w/ig sngl (5/16/2014); neuro dest facet l/s w/ig addl (5/16/2014); neuro dest facet l/s w/ig addl (5/16/2014); neuro dest facet l/s w/ig addl (5/16/2014); neuro dest facet l/s w/ig sngl (6/6/2014); neuro dest facet l/s w/ig addl (6/6/2014); neuro dest facet l/s w/ig addl (6/6/2014); neuro dest facet l/s w/ig addl (6/6/2014); toe arthroplasty (7/7/2014); inj,sacroiliac,anes/steroid (7/11/2014); inj,sacroiliac,anes/steroid (7/11/2014); neuro dest facet l/s w/ig sngl (2/6/2015); neuro dest facet l/s w/ig addl (2/6/2015); neuro dest facet l/s w/ig addl (2/6/2015); neuro dest facet l/s w/ig sngl (2/13/2015); neuro dest facet l/s w/ig addl (2/13/2015); neuro dest facet l/s w/ig addl (2/13/2015); inj,sacroiliac,anes/steroid (4/3/2015); inj,sacroiliac,anes/steroid (4/3/2015); pr dstr nrolytc agnt  parverteb fct sngl lmbr/sacral (Left, 10/9/2015); pr dstr nrolytc agnt parverteb fct addl lmbr/sacral (10/9/2015); pr inject rx other periph nerve (10/9/2015); pr dstr nrolytc agnt parverteb fct addl lmbr/sacral (10/9/2015); pr dstr nrolytc agnt parverteb fct sngl lmbr/sacral (Right, 10/16/2015); pr dstr nrolytc agnt parverteb fct addl lmbr/sacral (10/16/2015); pr inject rx other periph nerve (10/16/2015); pr dstr nrolytc agnt parverteb fct addl lmbr/sacral (10/16/2015); pr dstr nrolytc agnt parverteb fct sngl lmbr/sacral (Left, 5/6/2016); pr dstr nrolytc agnt parverteb fct addl lmbr/sacral (Left, 5/6/2016); pr inject rx other periph nerve (Left, 5/6/2016); pr dstr nrolytc agnt parverteb fct addl lmbr/sacral (5/6/2016); pr dstr nrolytc agnt parverteb fct sngl lmbr/sacral (Right, 6/24/2016); pr dstr nrolytc agnt parverteb fct addl lmbr/sacral (Right, 6/24/2016); pr inject rx other periph nerve (Right, 6/24/2016); pr dstr nrolytc agnt parverteb fct addl lmbr/sacral (6/24/2016); other orthopedic surgery (Left, 2001); pr dstr nrolytc agnt parverteb fct sngl lmbr/sacral (Left, 3/24/2017); pr dstr nrolytc agnt parverteb fct addl lmbr/sacral (Left, 3/24/2017); pr inject rx other periph nerve (Left, 3/24/2017); pr dstr nrolytc agnt parverteb fct addl lmbr/sacral (3/24/2017); pr dstr nrolytc agnt parverteb fct sngl lmbr/sacral (Right, 4/7/2017); pr dstr nrolytc agnt parverteb fct addl lmbr/sacral (Right, 4/7/2017); pr inject rx other periph nerve (Right, 4/7/2017); pr dstr nrolytc agnt parverteb fct addl lmbr/sacral (4/7/2017); carpal tunnel release (Right, 9/29/2017); femoral neck osteoplasty (Right, 9/29/2017); femoral head core decompression (Left, 12/29/2017); pr total hip arthroplasty (Left, 8/5/2019); hip arthroplasty total (Left, 2019); bunionectomy; pelviscopy; and pr total hip arthroplasty (Right, 8/24/2020).       Objective:   LMP 06/30/2012     Physical Exam:  Constitutional: Alert, no distress,  well-groomed.  Skin: No rashes in visible areas.  Eye: Round. Conjunctiva clear, lids normal. No icterus.   ENMT: Lips pink without lesions, good dentition, moist mucous membranes. Clear post nasal drip with mild oropharyngeal erythema. Slight elongation of uvula, midline, no lesions. No indications of thrush. Phonation normal. Rhinitis, noted to be sniffing frequently. Report of tenderness to sinuses with self palpation. Primarily of left maxillary. Clear speech. Controlling secretions. No S&S of airway obstruction. No other indications of angioedema.  Neck: No edema.  Respiratory: Unlabored respiratory effort, no cough or audible wheeze  Psych: Alert and oriented x3, normal affect and mood.       Assessment and Plan:   The following treatment plan was discussed:     1. Uvulitis  -Oral Hydration.  -Warm salt water gargles.  -OTC Throat lozenges or spray (Cepacol).    2. Seasonal allergic rhinitis, unspecified trigger  -Nasal spray  -You may try saline irrigation or neti pot.   -Naproxen or Tylenol as directed for pain.   -Warm compress to sinuses.   -Follow up with primary care provider    Follow up for persistent throat pain, increased swelling, persistent fevers, difficulty swallowing, shortness of breath, weakness, elevated heart rate, facial swelling, visual changes, stiff neck, sinus symptoms last longer than 10 days, or any other concerns.    Discussed watchful wait with symptomatic care, acute symptoms x 1 day. Common causes of uvulitis: include infection in the mouth or throat, more commonly a viral illness, trauma, heavy snoring, inhaling chemicals or smoke.    Follow-up: Return if symptoms worsen or fail to improve.

## 2021-06-08 ENCOUNTER — OFFICE VISIT (OUTPATIENT)
Dept: HEALTH INFORMATION MANAGEMENT | Facility: MEDICAL CENTER | Age: 51
End: 2021-06-08
Payer: COMMERCIAL

## 2021-06-08 VITALS
WEIGHT: 211.8 LBS | BODY MASS INDEX: 34.04 KG/M2 | HEIGHT: 66 IN | SYSTOLIC BLOOD PRESSURE: 126 MMHG | OXYGEN SATURATION: 99 % | DIASTOLIC BLOOD PRESSURE: 72 MMHG | HEART RATE: 91 BPM

## 2021-06-08 DIAGNOSIS — E78.5 DYSLIPIDEMIA: ICD-10-CM

## 2021-06-08 DIAGNOSIS — E03.9 HYPOTHYROIDISM, UNSPECIFIED TYPE: ICD-10-CM

## 2021-06-08 DIAGNOSIS — D64.9 LOW HEMOGLOBIN: ICD-10-CM

## 2021-06-08 DIAGNOSIS — R10.13 DYSPEPSIA: ICD-10-CM

## 2021-06-08 DIAGNOSIS — E66.9 OBESITY (BMI 30-39.9): ICD-10-CM

## 2021-06-08 DIAGNOSIS — G89.29 CHRONIC LOW BACK PAIN, UNSPECIFIED BACK PAIN LATERALITY, UNSPECIFIED WHETHER SCIATICA PRESENT: Chronic | ICD-10-CM

## 2021-06-08 DIAGNOSIS — M54.50 CHRONIC LOW BACK PAIN, UNSPECIFIED BACK PAIN LATERALITY, UNSPECIFIED WHETHER SCIATICA PRESENT: Chronic | ICD-10-CM

## 2021-06-08 PROCEDURE — 97803 MED NUTRITION INDIV SUBSEQ: CPT | Performed by: DIETITIAN, REGISTERED

## 2021-06-08 PROCEDURE — 99214 OFFICE O/P EST MOD 30 MIN: CPT | Performed by: INTERNAL MEDICINE

## 2021-06-08 RX ORDER — LEVOTHYROXINE SODIUM 0.03 MG/1
25 TABLET ORAL
Qty: 30 TABLET | Refills: 1 | Status: SHIPPED | OUTPATIENT
Start: 2021-06-08 | End: 2021-08-05 | Stop reason: SDUPTHER

## 2021-06-08 ASSESSMENT — PATIENT HEALTH QUESTIONNAIRE - PHQ9: CLINICAL INTERPRETATION OF PHQ2 SCORE: 0

## 2021-06-08 ASSESSMENT — FIBROSIS 4 INDEX: FIB4 SCORE: 0.73

## 2021-06-08 NOTE — PROGRESS NOTES
"Nutrition Reassess: Medical Weight Management   Today's date: 6/8/2021  Referring Provider: No ref. provider found      Time: in/out 10:20-10:46  Visit#: 2    Subjective:  - recent sciatica episode  - 2 MR a day   - salads for dinner often stated  - tracking with [facility]   - likes to cook    Anthropometrics/Objective  Vitals 6/8/2021   WEIGHT 211.8   HEIGHT 5' 6\"   BMI 34.19 kg/m2     Starting weight/date 212.4 lbs (4/28/21)     Total weight change : -1 lbs             Meal Plan:   Calorie controlled, high protein, low carb diet    with 2 meal replacements per day    ReAssesment/Notes:    Camila has been working on her health goals inclusive of weight loss. Camila has been having a recent sciatica issue - taking medications that may be interfering with weight loss goals.  At this visit we reviewed the plate method, snack ideas, meal ideas and carbs to avoid list. At next visit suggest reviewing food journal and DMBTP for more meal ideas.     Follow-up: 4-6 weeks     "

## 2021-06-08 NOTE — PROGRESS NOTES
"Bariatric Medicine Follow Up  Chief Complaint   Patient presents with   • Weight Gain   • Obesity       History of Present Illness:   Janett Mcnulty is a 51 y.o. female who presents for follow-up to intensive behavioral modification of overweight and to help address below co-morbidities caused by overweight.      Weight Management History to Date:  4/28/2021 visit #1:  Started 2 ND MR daily +1 whole food meal  To track lower carb, 1200 kcals per day  PT daily  To update labs prior to next visit    ___    Challenges since last visit:  Worsening back pain increased gabapentin  Dietary adherence:  Fair  Difficult to focus on making changes in diet d/t pain  Having 2 ND MR daily  Trying to track lower carb intake  Feels bloated  Exercise:  Little d/t back pain    AntiObesity Medication use: none  Medication efficacy/tolerance/side effects: n/a  Medication compliance: n/a    Status of comorbid conditions/other medical diagnoses:  Chronic low back pain:  Uncontrolled, increased gabapentin  HLD:  Uncontrolled on recent labs, not on statin  GERD: Controlled with daily PPI  Hypothyroid: Recent labs show under supplemented as TSH is elevated, not on Synthroid or other       Physical Exam:    /72 (BP Location: Left arm, Patient Position: Sitting, BP Cuff Size: Large adult)   Pulse 91   Ht 1.676 m (5' 6\")   Wt 96.1 kg (211 lb 12.8 oz)   LMP 06/30/2012   SpO2 99%   BMI 34.19 kg/m²   Waist Measurement   Waist: 40 inch/inches  Weight change since last visit: - 0.6 lb   Waist Circum change since last visit:  0 in     Physical findings unchanged from prior exam.    Laboratory:   Recent labs reviewed.     ASSESSMENT  51 y.o.  female presents for intensive lifestyle intervention for treatment of obesity and co-morbid conditions.  Obesity Stage ([place])/Class: 1/1    1. Chronic low back pain, unspecified back pain laterality, unspecified whether sciatica present     2. Dyslipidemia     3. Dyspepsia     4. Obesity (BMI " 30-39.9)     5. Hypothyroidism, unspecified type  levothyroxine (SYNTHROID) 25 MCG Tab   6. Low hemoglobin  IRON/TOTAL IRON BIND     The patient is struggled with some back pain, limiting her activity level and requiring gabapentin, which can be weight inducing.  As a result, patient has not lost much weight since her initial visit.  However, she is beginning to make positive lifestyle change.  Continue to monitor back pain, lipids, GERD.  Recommend starting low-dose Synthroid given elevated TSH.  Check iron studies given fatigue and abnormal CBC.    PLAN  Weight Goal:  165 lb  Dietary intervention:    MR:  2 ND +  High Protein/Low Carb whole food meal  >100 g protein, <100 g total carbs daily, 1200 kcal/d, bring in tracking next visit   64+ oz water per day  Physical Activity:  PT for back pain  Labs:  iron studies  Rx changes:   Reduce gabapentin as tolerated  Start low dose Synthroid  Consider additional AOM   Side Effects: Risks, benefits, alternatives discussed, consent signed.  Behavior modification:   Continue stimulus narrowing  Surgical considerations:  n/a  Follow-up: one month with MD  See also recent RD notes and follow-up.      Patient's body mass index is 34.19 kg/m². Exercise and nutrition counseling were performed at this visit.

## 2021-06-12 ENCOUNTER — HOSPITAL ENCOUNTER (OUTPATIENT)
Dept: LAB | Facility: MEDICAL CENTER | Age: 51
End: 2021-06-12
Attending: INTERNAL MEDICINE
Payer: COMMERCIAL

## 2021-06-12 DIAGNOSIS — D64.9 ANEMIA, UNSPECIFIED TYPE: ICD-10-CM

## 2021-06-12 LAB
ERYTHROCYTE [DISTWIDTH] IN BLOOD BY AUTOMATED COUNT: 58 FL (ref 35.9–50)
FERRITIN SERPL-MCNC: 10.5 NG/ML (ref 10–291)
FOLATE SERPL-MCNC: 12.7 NG/ML
HCT VFR BLD AUTO: 35.5 % (ref 37–47)
HGB BLD-MCNC: 10.2 G/DL (ref 12–16)
HGB RETIC QN AUTO: 26.7 PG/CELL (ref 29–35)
IMM RETICS NFR: 29.4 % (ref 9.3–17.4)
IRON SATN MFR SERPL: 5 % (ref 15–55)
IRON SERPL-MCNC: 27 UG/DL (ref 40–170)
LDH SERPL L TO P-CCNC: 303 U/L (ref 107–266)
MCH RBC QN AUTO: 22.8 PG (ref 27–33)
MCHC RBC AUTO-ENTMCNC: 28.7 G/DL (ref 33.6–35)
MCV RBC AUTO: 79.4 FL (ref 81.4–97.8)
PLATELET # BLD AUTO: 416 K/UL (ref 164–446)
PMV BLD AUTO: 9.4 FL (ref 9–12.9)
RBC # BLD AUTO: 4.47 M/UL (ref 4.2–5.4)
RETICS # AUTO: 0.08 M/UL (ref 0.04–0.06)
RETICS/RBC NFR: 1.8 % (ref 0.8–2.1)
TIBC SERPL-MCNC: 553 UG/DL (ref 250–450)
UIBC SERPL-MCNC: 526 UG/DL (ref 110–370)
VIT B12 SERPL-MCNC: 1153 PG/ML (ref 211–911)
WBC # BLD AUTO: 5.1 K/UL (ref 4.8–10.8)

## 2021-06-12 PROCEDURE — 85046 RETICYTE/HGB CONCENTRATE: CPT

## 2021-06-12 PROCEDURE — 82746 ASSAY OF FOLIC ACID SERUM: CPT

## 2021-06-12 PROCEDURE — 82728 ASSAY OF FERRITIN: CPT

## 2021-06-12 PROCEDURE — 85027 COMPLETE CBC AUTOMATED: CPT

## 2021-06-12 PROCEDURE — 83550 IRON BINDING TEST: CPT

## 2021-06-12 PROCEDURE — 36415 COLL VENOUS BLD VENIPUNCTURE: CPT

## 2021-06-12 PROCEDURE — 82607 VITAMIN B-12: CPT

## 2021-06-12 PROCEDURE — 83615 LACTATE (LD) (LDH) ENZYME: CPT

## 2021-06-12 PROCEDURE — 84155 ASSAY OF PROTEIN SERUM: CPT

## 2021-06-12 PROCEDURE — 83540 ASSAY OF IRON: CPT

## 2021-06-12 PROCEDURE — 84165 PROTEIN E-PHORESIS SERUM: CPT

## 2021-06-14 ENCOUNTER — TELEPHONE (OUTPATIENT)
Dept: MEDICAL GROUP | Facility: MEDICAL CENTER | Age: 51
End: 2021-06-14

## 2021-06-14 DIAGNOSIS — D50.9 IRON DEFICIENCY ANEMIA, UNSPECIFIED IRON DEFICIENCY ANEMIA TYPE: ICD-10-CM

## 2021-06-14 PROBLEM — D64.9 ANEMIA: Status: ACTIVE | Noted: 2021-06-14

## 2021-06-15 ENCOUNTER — TELEPHONE (OUTPATIENT)
Dept: MEDICAL GROUP | Facility: MEDICAL CENTER | Age: 51
End: 2021-06-15

## 2021-06-15 NOTE — TELEPHONE ENCOUNTER
Notified results, iron deficient anemia, hemoglobin hematocrit stable but iron is low, she has not had menstrual cycles for the past year, no obvious GI blood loss or symptoms, start ferrous sulfate 325 mg every other day, continue over-the-counter vitamin C, referral to GI placed last month she will call GI consultants to schedule appointment not only for screening colonoscopy but evaluation of anemia iron deficiency.  Repeat CBC and iron studies placed in the computer system 1 month.

## 2021-06-15 NOTE — TELEPHONE ENCOUNTER
----- Message from Franklin Gonzalez M.D. sent at 6/14/2021  6:21 PM PDT -----  Notified results, iron deficient anemia, hemoglobin hematocrit stable but iron is low, she has not had menstrual cycles for the past year, no obvious GI blood loss or symptoms, start ferrous sulfate 325 mg every other day, continue over-the-counter vitamin C, referral to GI placed last month she will call GI consultants to schedule appointment not only for screening colonoscopy but evaluation of anemia iron deficiency.  Repeat CBC and iron studies placed in the computer system 1 month.

## 2021-06-17 LAB
ALBUMIN SERPL ELPH-MCNC: 4.32 G/DL (ref 3.75–5.01)
ALPHA1 GLOB SERPL ELPH-MCNC: 0.34 G/DL (ref 0.19–0.46)
ALPHA2 GLOB SERPL ELPH-MCNC: 0.95 G/DL (ref 0.48–1.05)
B-GLOBULIN SERPL ELPH-MCNC: 0.99 G/DL (ref 0.48–1.1)
GAMMA GLOB SERPL ELPH-MCNC: 1 G/DL (ref 0.62–1.51)
INTERPRETATION SERPL IFE-IMP: NORMAL
MONOCLON BAND OBS SERPL: NORMAL
PATHOLOGY STUDY: NORMAL
PROT SERPL-MCNC: 7.6 G/DL (ref 6.3–8.2)

## 2021-07-12 ENCOUNTER — TELEPHONE (OUTPATIENT)
Dept: ENDOCRINOLOGY | Facility: MEDICAL CENTER | Age: 51
End: 2021-07-12

## 2021-07-12 NOTE — TELEPHONE ENCOUNTER
1. Caller Name: Janett Urban Page                          Call Back Number: [phone]        How would the patient prefer to be contacted with a response: Phone call OK to leave a detailed message    Patient called looking for HIP program pt is aware dept temporarily closed. She was looking to get supplements and I let her know I would be reaching out to a leader to assist her.

## 2021-07-16 ENCOUNTER — HOSPITAL ENCOUNTER (OUTPATIENT)
Dept: LAB | Facility: MEDICAL CENTER | Age: 51
End: 2021-07-16
Attending: INTERNAL MEDICINE
Payer: COMMERCIAL

## 2021-07-16 DIAGNOSIS — D64.9 LOW HEMOGLOBIN: ICD-10-CM

## 2021-07-16 DIAGNOSIS — D50.9 IRON DEFICIENCY ANEMIA, UNSPECIFIED IRON DEFICIENCY ANEMIA TYPE: ICD-10-CM

## 2021-07-16 LAB
ANISOCYTOSIS BLD QL SMEAR: ABNORMAL
BASOPHILS # BLD AUTO: 1.3 % (ref 0–1.8)
BASOPHILS # BLD: 0.07 K/UL (ref 0–0.12)
COMMENT 1642: NORMAL
EOSINOPHIL # BLD AUTO: 0.19 K/UL (ref 0–0.51)
EOSINOPHIL NFR BLD: 3.7 % (ref 0–6.9)
ERYTHROCYTE [DISTWIDTH] IN BLOOD BY AUTOMATED COUNT: 75.3 FL (ref 35.9–50)
FERRITIN SERPL-MCNC: 22.6 NG/ML (ref 10–291)
HCT VFR BLD AUTO: 37.1 % (ref 37–47)
HGB BLD-MCNC: 11.3 G/DL (ref 12–16)
IMM GRANULOCYTES # BLD AUTO: 0.02 K/UL (ref 0–0.11)
IMM GRANULOCYTES NFR BLD AUTO: 0.4 % (ref 0–0.9)
IRON SATN MFR SERPL: 12 % (ref 15–55)
IRON SERPL-MCNC: 52 UG/DL (ref 40–170)
LYMPHOCYTES # BLD AUTO: 1.72 K/UL (ref 1–4.8)
LYMPHOCYTES NFR BLD: 33.1 % (ref 22–41)
MACROCYTES BLD QL SMEAR: ABNORMAL
MCH RBC QN AUTO: 26 PG (ref 27–33)
MCHC RBC AUTO-ENTMCNC: 30.5 G/DL (ref 33.6–35)
MCV RBC AUTO: 85.5 FL (ref 81.4–97.8)
MICROCYTES BLD QL SMEAR: ABNORMAL
MONOCYTES # BLD AUTO: 0.53 K/UL (ref 0–0.85)
MONOCYTES NFR BLD AUTO: 10.2 % (ref 0–13.4)
MORPHOLOGY BLD-IMP: NORMAL
NEUTROPHILS # BLD AUTO: 2.66 K/UL (ref 2–7.15)
NEUTROPHILS NFR BLD: 51.3 % (ref 44–72)
NRBC # BLD AUTO: 0 K/UL
NRBC BLD-RTO: 0 /100 WBC
OVALOCYTES BLD QL SMEAR: NORMAL
PLATELET # BLD AUTO: 342 K/UL (ref 164–446)
PLATELET BLD QL SMEAR: NORMAL
PMV BLD AUTO: 10.1 FL (ref 9–12.9)
POIKILOCYTOSIS BLD QL SMEAR: NORMAL
RBC # BLD AUTO: 4.34 M/UL (ref 4.2–5.4)
RBC BLD AUTO: PRESENT
TIBC SERPL-MCNC: 432 UG/DL (ref 250–450)
UIBC SERPL-MCNC: 380 UG/DL (ref 110–370)
WBC # BLD AUTO: 5.2 K/UL (ref 4.8–10.8)

## 2021-07-16 PROCEDURE — 82728 ASSAY OF FERRITIN: CPT

## 2021-07-16 PROCEDURE — 83540 ASSAY OF IRON: CPT

## 2021-07-16 PROCEDURE — 36415 COLL VENOUS BLD VENIPUNCTURE: CPT

## 2021-07-16 PROCEDURE — 85025 COMPLETE CBC W/AUTO DIFF WBC: CPT

## 2021-07-16 PROCEDURE — 83550 IRON BINDING TEST: CPT

## 2021-07-17 ENCOUNTER — TELEPHONE (OUTPATIENT)
Dept: MEDICAL GROUP | Facility: MEDICAL CENTER | Age: 51
End: 2021-07-17

## 2021-07-17 DIAGNOSIS — G89.29 OTHER CHRONIC PAIN: ICD-10-CM

## 2021-07-17 RX ORDER — TRAMADOL HYDROCHLORIDE 50 MG/1
50 TABLET ORAL EVERY 8 HOURS PRN
Qty: 90 TABLET | Refills: 0 | Status: SHIPPED | OUTPATIENT
Start: 2021-07-17 | End: 2021-10-18 | Stop reason: SDUPTHER

## 2021-07-17 NOTE — TELEPHONE ENCOUNTER
Notified with results, anemia improved, on iron qid and vitamin C.  Continue with regimen, has follow-up with GI next Tuesday, labs printed to fax to GI for evaluation.

## 2021-07-17 NOTE — TELEPHONE ENCOUNTER
Notified with results, anemia improved, on iron qid and vitamin C.  Continue with regimen, has follow-up with GI next Tuesday, labs printed to fax to GI for evaluation.    Also needs refill tramadol for chronic hip pain, last refill in March.  Will refill again, no side effects of drowsiness, constipation, sedation.  reviewed

## 2021-07-19 NOTE — TELEPHONE ENCOUNTER
Patient was contacted last week and notified to contact primary care physician to get a new referral for a new specialist.

## 2021-08-05 DIAGNOSIS — E03.9 HYPOTHYROIDISM, UNSPECIFIED TYPE: ICD-10-CM

## 2021-08-05 DIAGNOSIS — D64.9 ANEMIA, UNSPECIFIED TYPE: ICD-10-CM

## 2021-08-05 RX ORDER — LEVOTHYROXINE SODIUM 0.03 MG/1
25 TABLET ORAL
Qty: 30 TABLET | Refills: 1 | Status: SHIPPED | OUTPATIENT
Start: 2021-08-05 | End: 2021-08-14 | Stop reason: SDUPTHER

## 2021-08-13 ENCOUNTER — HOSPITAL ENCOUNTER (OUTPATIENT)
Dept: LAB | Facility: MEDICAL CENTER | Age: 51
End: 2021-08-13
Attending: INTERNAL MEDICINE
Payer: COMMERCIAL

## 2021-08-13 DIAGNOSIS — E03.9 HYPOTHYROIDISM, UNSPECIFIED TYPE: ICD-10-CM

## 2021-08-13 DIAGNOSIS — D64.9 ANEMIA, UNSPECIFIED TYPE: ICD-10-CM

## 2021-08-13 LAB
ANISOCYTOSIS BLD QL SMEAR: ABNORMAL
BASOPHILS # BLD AUTO: 1.3 % (ref 0–1.8)
BASOPHILS # BLD: 0.09 K/UL (ref 0–0.12)
COMMENT 1642: NORMAL
EOSINOPHIL # BLD AUTO: 0.28 K/UL (ref 0–0.51)
EOSINOPHIL NFR BLD: 4 % (ref 0–6.9)
ERYTHROCYTE [DISTWIDTH] IN BLOOD BY AUTOMATED COUNT: 75.7 FL (ref 35.9–50)
FERRITIN SERPL-MCNC: 20.3 NG/ML (ref 10–291)
HCT VFR BLD AUTO: 39.7 % (ref 37–47)
HGB BLD-MCNC: 12.2 G/DL (ref 12–16)
IMM GRANULOCYTES # BLD AUTO: 0.02 K/UL (ref 0–0.11)
IMM GRANULOCYTES NFR BLD AUTO: 0.3 % (ref 0–0.9)
IRON SATN MFR SERPL: 12 % (ref 15–55)
IRON SERPL-MCNC: 53 UG/DL (ref 40–170)
LYMPHOCYTES # BLD AUTO: 2.06 K/UL (ref 1–4.8)
LYMPHOCYTES NFR BLD: 29.5 % (ref 22–41)
MCH RBC QN AUTO: 27.5 PG (ref 27–33)
MCHC RBC AUTO-ENTMCNC: 30.7 G/DL (ref 33.6–35)
MCV RBC AUTO: 89.4 FL (ref 81.4–97.8)
MICROCYTES BLD QL SMEAR: ABNORMAL
MONOCYTES # BLD AUTO: 0.69 K/UL (ref 0–0.85)
MONOCYTES NFR BLD AUTO: 9.9 % (ref 0–13.4)
MORPHOLOGY BLD-IMP: NORMAL
NEUTROPHILS # BLD AUTO: 3.84 K/UL (ref 2–7.15)
NEUTROPHILS NFR BLD: 55 % (ref 44–72)
NRBC # BLD AUTO: 0 K/UL
NRBC BLD-RTO: 0 /100 WBC
OVALOCYTES BLD QL SMEAR: NORMAL
PLATELET # BLD AUTO: 354 K/UL (ref 164–446)
PLATELET BLD QL SMEAR: NORMAL
PMV BLD AUTO: 10.2 FL (ref 9–12.9)
RBC # BLD AUTO: 4.44 M/UL (ref 4.2–5.4)
RBC BLD AUTO: PRESENT
TIBC SERPL-MCNC: 449 UG/DL (ref 250–450)
TSH SERPL DL<=0.005 MIU/L-ACNC: 2.37 UIU/ML (ref 0.38–5.33)
UIBC SERPL-MCNC: 396 UG/DL (ref 110–370)
WBC # BLD AUTO: 7 K/UL (ref 4.8–10.8)

## 2021-08-13 PROCEDURE — 83540 ASSAY OF IRON: CPT

## 2021-08-13 PROCEDURE — 36415 COLL VENOUS BLD VENIPUNCTURE: CPT

## 2021-08-13 PROCEDURE — 83550 IRON BINDING TEST: CPT

## 2021-08-13 PROCEDURE — 85025 COMPLETE CBC W/AUTO DIFF WBC: CPT

## 2021-08-13 PROCEDURE — 82728 ASSAY OF FERRITIN: CPT

## 2021-08-13 PROCEDURE — 84443 ASSAY THYROID STIM HORMONE: CPT

## 2021-08-14 ENCOUNTER — TELEPHONE (OUTPATIENT)
Dept: MEDICAL GROUP | Facility: MEDICAL CENTER | Age: 51
End: 2021-08-14

## 2021-08-14 DIAGNOSIS — Z12.31 ENCOUNTER FOR SCREENING MAMMOGRAM FOR BREAST CANCER: ICD-10-CM

## 2021-08-14 DIAGNOSIS — M81.0 POSTMENOPAUSAL BONE LOSS: ICD-10-CM

## 2021-08-14 DIAGNOSIS — Z12.39 ENCOUNTER FOR SCREENING FOR MALIGNANT NEOPLASM OF BREAST, UNSPECIFIED SCREENING MODALITY: ICD-10-CM

## 2021-08-14 DIAGNOSIS — E03.9 HYPOTHYROIDISM, UNSPECIFIED TYPE: ICD-10-CM

## 2021-08-14 RX ORDER — LEVOTHYROXINE SODIUM 0.03 MG/1
25 TABLET ORAL
Qty: 90 TABLET | Refills: 3 | Status: SHIPPED | OUTPATIENT
Start: 2021-08-14 | End: 2021-09-13

## 2021-08-14 NOTE — TELEPHONE ENCOUNTER
Notified with labs, anemia improved continues on iron qod and vit c, has follow up with [facility] for EGD and colon, continue thyroid dose no change refill sent to her pharmacy.  Mammogram and bone density ordered.  Has follow-up with [facility] Dr. Rice maintains on alendronate for avascular necrosis, not for osteoporosis.  Symptoms are stabilized follow-up with Dr. Rees pain management, she will ask Dr. Rice if a follow-up MRI of her knee is necessary at some point to follow-up on avascular necrosis, unable to reimage her knee since she has had bilateral arthroplasty.

## 2021-10-15 ENCOUNTER — NON-PROVIDER VISIT (OUTPATIENT)
Dept: MEDICAL GROUP | Facility: MEDICAL CENTER | Age: 51
End: 2021-10-15
Payer: COMMERCIAL

## 2021-10-15 DIAGNOSIS — Z23 NEED FOR VACCINATION: ICD-10-CM

## 2021-10-15 PROCEDURE — 90686 IIV4 VACC NO PRSV 0.5 ML IM: CPT | Performed by: INTERNAL MEDICINE

## 2021-10-15 PROCEDURE — 90471 IMMUNIZATION ADMIN: CPT | Performed by: INTERNAL MEDICINE

## 2021-10-15 NOTE — PROGRESS NOTES
"Camila Mcnulty is a 51 y.o. female here for a non-provider visit for:   FLU    Reason for immunization: Annual Flu Vaccine  Immunization records indicate need for vaccine: Yes, confirmed with Epic  Minimum interval has been met for this vaccine: Yes  ABN completed: No    VIS Dated  8/6/21 was given to patient: Yes  All IAC Questionnaire questions were answered \"No.\"    Patient tolerated injection and no adverse effects were observed or reported: Yes    Pt scheduled for next dose in series: No    "

## 2021-10-18 DIAGNOSIS — G89.29 OTHER CHRONIC PAIN: ICD-10-CM

## 2021-10-18 PROBLEM — Z87.11 HISTORY OF PEPTIC ULCER: Status: ACTIVE | Noted: 2019-04-24

## 2021-10-18 RX ORDER — TRAMADOL HYDROCHLORIDE 50 MG/1
50 TABLET ORAL EVERY 8 HOURS PRN
Qty: 90 TABLET | Refills: 0 | Status: SHIPPED | OUTPATIENT
Start: 2021-10-18 | End: 2021-11-17

## 2021-10-19 ENCOUNTER — HOSPITAL ENCOUNTER (OUTPATIENT)
Dept: LAB | Facility: MEDICAL CENTER | Age: 51
End: 2021-10-19
Attending: INTERNAL MEDICINE
Payer: COMMERCIAL

## 2021-10-19 LAB
25(OH)D3 SERPL-MCNC: 64 NG/ML (ref 30–100)
ALBUMIN SERPL BCP-MCNC: 4.8 G/DL (ref 3.2–4.9)
ALBUMIN/GLOB SERPL: 1.6 G/DL
ALP SERPL-CCNC: 88 U/L (ref 30–99)
ALT SERPL-CCNC: 46 U/L (ref 2–50)
ANION GAP SERPL CALC-SCNC: 13 MMOL/L (ref 7–16)
AST SERPL-CCNC: 45 U/L (ref 12–45)
BILIRUB SERPL-MCNC: 0.4 MG/DL (ref 0.1–1.5)
BUN SERPL-MCNC: 20 MG/DL (ref 8–22)
CALCIUM SERPL-MCNC: 9.4 MG/DL (ref 8.5–10.5)
CHLORIDE SERPL-SCNC: 103 MMOL/L (ref 96–112)
CO2 SERPL-SCNC: 23 MMOL/L (ref 20–33)
CREAT SERPL-MCNC: 0.81 MG/DL (ref 0.5–1.4)
GLOBULIN SER CALC-MCNC: 3 G/DL (ref 1.9–3.5)
GLUCOSE SERPL-MCNC: 94 MG/DL (ref 65–99)
HBV CORE AB SERPL QL IA: NONREACTIVE
HBV SURFACE AB SERPL IA-ACNC: 10.93 MIU/ML (ref 0–10)
HBV SURFACE AG SER QL: NORMAL
HCV AB SER QL: NORMAL
INR PPP: 0.94 (ref 0.87–1.13)
POTASSIUM SERPL-SCNC: 4.5 MMOL/L (ref 3.6–5.5)
PROT SERPL-MCNC: 7.8 G/DL (ref 6–8.2)
PROTHROMBIN TIME: 12.3 SEC (ref 12–14.6)
SODIUM SERPL-SCNC: 139 MMOL/L (ref 135–145)
TSH SERPL DL<=0.005 MIU/L-ACNC: 1.93 UIU/ML (ref 0.38–5.33)

## 2021-10-19 PROCEDURE — 84630 ASSAY OF ZINC: CPT

## 2021-10-19 PROCEDURE — 86803 HEPATITIS C AB TEST: CPT

## 2021-10-19 PROCEDURE — 36415 COLL VENOUS BLD VENIPUNCTURE: CPT

## 2021-10-19 PROCEDURE — 80053 COMPREHEN METABOLIC PANEL: CPT

## 2021-10-19 PROCEDURE — 85610 PROTHROMBIN TIME: CPT

## 2021-10-19 PROCEDURE — 86706 HEP B SURFACE ANTIBODY: CPT

## 2021-10-19 PROCEDURE — 86038 ANTINUCLEAR ANTIBODIES: CPT

## 2021-10-19 PROCEDURE — 86704 HEP B CORE ANTIBODY TOTAL: CPT

## 2021-10-19 PROCEDURE — 84443 ASSAY THYROID STIM HORMONE: CPT

## 2021-10-19 PROCEDURE — 82525 ASSAY OF COPPER: CPT

## 2021-10-19 PROCEDURE — 82180 ASSAY OF ASCORBIC ACID: CPT

## 2021-10-19 PROCEDURE — 82785 ASSAY OF IGE: CPT

## 2021-10-19 PROCEDURE — 86708 HEPATITIS A ANTIBODY: CPT

## 2021-10-19 PROCEDURE — 86235 NUCLEAR ANTIGEN ANTIBODY: CPT | Mod: 91

## 2021-10-19 PROCEDURE — 86039 ANTINUCLEAR ANTIBODIES (ANA): CPT

## 2021-10-19 PROCEDURE — 82390 ASSAY OF CERULOPLASMIN: CPT

## 2021-10-19 PROCEDURE — 82784 ASSAY IGA/IGD/IGG/IGM EACH: CPT

## 2021-10-19 PROCEDURE — 84446 ASSAY OF VITAMIN E: CPT

## 2021-10-19 PROCEDURE — 87340 HEPATITIS B SURFACE AG IA: CPT

## 2021-10-19 PROCEDURE — 84590 ASSAY OF VITAMIN A: CPT

## 2021-10-19 PROCEDURE — 84597 ASSAY OF VITAMIN K: CPT

## 2021-10-19 PROCEDURE — 86225 DNA ANTIBODY NATIVE: CPT

## 2021-10-19 PROCEDURE — 86709 HEPATITIS A IGM ANTIBODY: CPT

## 2021-10-19 PROCEDURE — 83516 IMMUNOASSAY NONANTIBODY: CPT

## 2021-10-19 PROCEDURE — 82306 VITAMIN D 25 HYDROXY: CPT

## 2021-10-19 PROCEDURE — 86256 FLUORESCENT ANTIBODY TITER: CPT

## 2021-10-21 LAB
CERULOPLASMIN SERPL-MCNC: 29 MG/DL (ref 17–54)
HAV AB SER QL IA: NEGATIVE
HAV IGM SERPL QL IA: NEGATIVE
IGA SERPL-MCNC: 201 MG/DL (ref 68–408)
IGE SERPL-ACNC: 18 KU/L
IGG SERPL-MCNC: 873 MG/DL (ref 768–1632)
IGM SERPL-MCNC: 112 MG/DL (ref 35–263)
NUCLEAR IGG SER QL IA: DETECTED
TTG IGA SER IA-ACNC: 2 U/ML (ref 0–3)

## 2021-10-22 LAB
A-TOCOPHEROL VIT E SERPL-MCNC: 18.5 MG/L (ref 5.5–18)
ANNOTATION COMMENT IMP: ABNORMAL
BETA+GAMMA TOCOPHEROL SERPL-MCNC: 0.4 MG/L (ref 0–6)
PHYTONADIONE SERPL-MCNC: 0.84 NMOL/L (ref 0.22–4.88)
RETINYL PALMITATE SERPL-MCNC: 0.02 MG/L (ref 0–0.1)
SMA IGG SER-ACNC: 21 UNITS (ref 0–19)
SMOOTH MUSCLE IGG TITR SER: ABNORMAL {TITER}
VIT A SERPL-MCNC: 1.37 MG/L (ref 0.3–1.2)
VIT C SERPL-MCNC: 83 UMOL/L (ref 23–114)
ZINC BLD-MCNC: 461.7 UG/DL (ref 440–860)

## 2021-10-24 LAB
ANA INTERPRETIVE COMMENT Q5143: ABNORMAL
ANA PATTERN Q5144: ABNORMAL
ANA TITER Q5145: ABNORMAL
ANTINUCLEAR ANTIBODY (ANA), HEP-2, IGG Q5142: DETECTED

## 2021-10-25 ENCOUNTER — HOSPITAL ENCOUNTER (OUTPATIENT)
Dept: LAB | Facility: MEDICAL CENTER | Age: 51
End: 2021-10-25
Attending: INTERNAL MEDICINE
Payer: COMMERCIAL

## 2021-10-25 ENCOUNTER — HOSPITAL ENCOUNTER (OUTPATIENT)
Dept: RADIOLOGY | Facility: MEDICAL CENTER | Age: 51
End: 2021-10-25
Attending: INTERNAL MEDICINE
Payer: COMMERCIAL

## 2021-10-25 DIAGNOSIS — R74.8 ELEVATED LIVER ENZYMES: ICD-10-CM

## 2021-10-25 DIAGNOSIS — K90.0 CELIAC SPRUE: ICD-10-CM

## 2021-10-25 PROCEDURE — 82525 ASSAY OF COPPER: CPT

## 2021-10-25 PROCEDURE — 76700 US EXAM ABDOM COMPLETE: CPT

## 2021-10-25 PROCEDURE — 36415 COLL VENOUS BLD VENIPUNCTURE: CPT

## 2021-10-26 LAB
DSDNA AB TITR SER CLIF: 6 IU (ref 0–24)
ENA JO1 AB TITR SER: 1 AU/ML (ref 0–40)
ENA SCL70 IGG SER QL: 1 AU/ML (ref 0–40)
ENA SM IGG SER-ACNC: 7 AU/ML (ref 0–40)
ENA SS-B IGG SER IA-ACNC: 0 AU/ML (ref 0–40)
SSA52 R0ENA AB IGG Q0420: 2 AU/ML (ref 0–40)
SSA60 R0ENA AB IGG Q0419: 0 AU/ML (ref 0–40)

## 2021-10-27 LAB
COPPER SERPL-MCNC: 135.9 UG/DL (ref 80–155)
U1 SNRNP IGG SER QL: 2

## 2021-11-12 ENCOUNTER — HOSPITAL ENCOUNTER (OUTPATIENT)
Dept: RADIOLOGY | Facility: MEDICAL CENTER | Age: 51
End: 2021-11-12
Attending: INTERNAL MEDICINE
Payer: COMMERCIAL

## 2021-11-12 DIAGNOSIS — Z12.31 ENCOUNTER FOR SCREENING MAMMOGRAM FOR BREAST CANCER: ICD-10-CM

## 2021-11-12 DIAGNOSIS — M81.0 POSTMENOPAUSAL BONE LOSS: ICD-10-CM

## 2021-11-12 PROCEDURE — 77080 DXA BONE DENSITY AXIAL: CPT

## 2021-11-12 PROCEDURE — 77063 BREAST TOMOSYNTHESIS BI: CPT

## 2021-12-10 PROBLEM — R79.89 ABNORMAL LIVER FUNCTION TEST: Status: ACTIVE | Noted: 2021-12-10

## 2022-01-18 ENCOUNTER — TELEPHONE (OUTPATIENT)
Dept: MEDICAL GROUP | Facility: MEDICAL CENTER | Age: 52
End: 2022-01-18

## 2022-01-18 ENCOUNTER — OFFICE VISIT (OUTPATIENT)
Dept: MEDICAL GROUP | Facility: MEDICAL CENTER | Age: 52
End: 2022-01-18
Payer: COMMERCIAL

## 2022-01-18 VITALS
OXYGEN SATURATION: 97 % | DIASTOLIC BLOOD PRESSURE: 74 MMHG | WEIGHT: 214 LBS | SYSTOLIC BLOOD PRESSURE: 136 MMHG | TEMPERATURE: 97.2 F | HEIGHT: 67 IN | BODY MASS INDEX: 33.59 KG/M2 | HEART RATE: 100 BPM

## 2022-01-18 DIAGNOSIS — E78.5 DYSLIPIDEMIA: ICD-10-CM

## 2022-01-18 DIAGNOSIS — G89.29 OTHER CHRONIC PAIN: ICD-10-CM

## 2022-01-18 DIAGNOSIS — Z00.00 PREVENTATIVE HEALTH CARE: ICD-10-CM

## 2022-01-18 DIAGNOSIS — Z86.16 HISTORY OF COVID-19: ICD-10-CM

## 2022-01-18 DIAGNOSIS — G89.29 CHRONIC LOW BACK PAIN, UNSPECIFIED BACK PAIN LATERALITY, UNSPECIFIED WHETHER SCIATICA PRESENT: Chronic | ICD-10-CM

## 2022-01-18 DIAGNOSIS — Z12.4 CERVICAL CANCER SCREENING: ICD-10-CM

## 2022-01-18 DIAGNOSIS — E66.9 OBESITY (BMI 30-39.9): ICD-10-CM

## 2022-01-18 DIAGNOSIS — E03.9 HYPOTHYROIDISM, UNSPECIFIED TYPE: ICD-10-CM

## 2022-01-18 DIAGNOSIS — K90.0 CELIAC DISEASE: ICD-10-CM

## 2022-01-18 DIAGNOSIS — M54.50 CHRONIC LOW BACK PAIN, UNSPECIFIED BACK PAIN LATERALITY, UNSPECIFIED WHETHER SCIATICA PRESENT: Chronic | ICD-10-CM

## 2022-01-18 DIAGNOSIS — E55.9 VITAMIN D DEFICIENCY: ICD-10-CM

## 2022-01-18 PROCEDURE — 99396 PREV VISIT EST AGE 40-64: CPT | Performed by: INTERNAL MEDICINE

## 2022-01-18 RX ORDER — LEVOTHYROXINE SODIUM 0.03 MG/1
TABLET ORAL
COMMUNITY
Start: 2021-11-24 | End: 2022-08-26

## 2022-01-18 RX ORDER — TRAMADOL HYDROCHLORIDE 50 MG/1
50 TABLET ORAL EVERY 8 HOURS PRN
Qty: 90 TABLET | Refills: 0 | Status: SHIPPED | OUTPATIENT
Start: 2022-01-18 | End: 2022-02-17

## 2022-01-18 ASSESSMENT — PATIENT HEALTH QUESTIONNAIRE - PHQ9: CLINICAL INTERPRETATION OF PHQ2 SCORE: 0

## 2022-01-18 ASSESSMENT — FIBROSIS 4 INDEX: FIB4 SCORE: 0.94

## 2022-01-18 NOTE — LETTER
[facility]  Franklin Gonzalez M.D.  [address]  [place] [address]  Fax: [phone]   Authorization for Release/Disclosure of   Protected Health Information   Name: SUMAYA WATSON : 1970 SSN: [id number]   Address: [address] [place] [address]  [place] [address] Phone:    [phone] (home) [phone] (work)   I authorize the entity listed below to release/disclose the PHI below to:   [facility]/Franklin Gonzalez M.D. and Franklin Gonzalez M.D.   Provider or Entity Name:   pain management    Address   City, State, Zip   Phone:      Fax:  [phone]   Reason for request: continuity of care   Information to be released:    [  ] LAST COLONOSCOPY,  including any PATH REPORT and follow-up  [  ] LAST FIT/COLOGUARD RESULT [  ] LAST DEXA  [  ] LAST MAMMOGRAM  [  ] LAST PAP  [  ] LAST LABS [  ] RETINA EXAM REPORT  [  ] IMMUNIZATION RECORDS  [ xxxx ] Release all info for past year      [  ] Check here and initial the line next to each item to release ALL health information INCLUDING  _____ Care and treatment for drug and / or alcohol abuse  _____ HIV testing, infection status, or AIDS  _____ Genetic Testing    DATES OF SERVICE OR TIME PERIOD TO BE DISCLOSED: _____________  I understand and acknowledge that:  * This Authorization may be revoked at any time by you in writing, except if your health information has already been used or disclosed.  * Your health information that will be used or disclosed as a result of you signing this authorization could be re-disclosed by the recipient. If this occurs, your re-disclosed health information may no longer be protected by State or Federal laws.  * You may refuse to sign this Authorization. Your refusal will not affect your ability to obtain treatment.  * This Authorization becomes effective upon signing and will  on (date) __________.      If no date is indicated, this Authorization will  one (1) year from the signature date.    Name:  Janett Urban Page    Signature: continuity of care   Date:     1/18/2022       PLEASE FAX REQUESTED RECORDS BACK TO: [phone]

## 2022-01-18 NOTE — PROGRESS NOTES
Subjective     Camila Mcnulty is a 51 y.o. female who presents with annual        HPI     Here for annual exam   Medications, allergies, medical history, surgical history, social history, family history  reviewed and updated  Sees  from pain management and had a recent lumbar spine pain procedure sometime this fall, we do not have those records, after the procedure she still has been experiencing burning pain low back with some radiation to her right and left buttock region, this pain is more noticeable when going from sitting to standing positions, the chronic low back pain has definitely improved after the procedure.  1/21/21 MRI lumbar spine L4-5 annular disc bulge, borderline spinal stenosis  EMG nerve conduction study in June essentially normal.  She has been going to physical therapy at [facility] once per week which has been helpful.  She is still on tramadol for pain taking 1 at night, gabapentin 300 mg tablets 2 at night, the tramadol does not cause side effects of drowsiness, sedation, memory loss, depression.  She does not take NSAIDs since being diagnosed with an ulcer in October by EGD by GI consultants, single ulcer GE junction, biopsy suggestive of celiac disease, completed a course of Carafate, taking Prilosec daily.  She really did not have any symptoms with the ulcer at the time of diagnosis, and has been noted to have iron deficiency anemia and was referred to GI for evaluation.  No nausea, vomiting, abdominal pain, dysphagia, take aphasia.  Since the diagnosis and EGD, she has been off NSAIDs, she has been working on a gluten limited diet.  Diagnosed with covid by home test 12/29/21 has recovered no shortness of breath, no change in taste or smell, she did have 1 vaccine of the [employer] on 3/21/21, has not received the booster.  She is working at home full-time, has ability to change from a sitting to standing position with her set up.  She does notice that when she goes to the pain  management office her blood pressure tends to run higher with the automated blood cuff compared to a manual blood cuff in our offices.  No previous history of hypertension, she has not been exercising lately because of her back pain and joint pains but has been trying to limit sweets, candies, processed foods especially with her gluten diagnosis.      Current Outpatient Medications   Medication Sig Dispense Refill   • FERROUS SULFATE PO Take  by mouth.     • gabapentin (NEURONTIN) 300 MG Cap Take 1 capsule by mouth 2 Times a Day. 180 capsule 3   • alendronate (FOSAMAX) 70 MG Tab Take 1 Tab by mouth every 7 days. 12 Tab 3   • omeprazole (PRILOSEC) 40 MG delayed-release capsule Take 1 Cap by mouth every day. 90 Cap 3   • fluticasone (FLONASE) 50 MCG/ACT nasal spray Administer 2 Sprays into affected nostril(S) every day. 48 g 3   • Magnesium 300 MG Cap Take 1 Cap by mouth every day.     • Probiotic Product (PROBIOTIC ADVANCED PO) Take 1 Cap by mouth every day.     • Multiple Vitamins-Minerals (MULTIVITAMIN ADULT PO) Take  by mouth every day.     • Calcium Citrate-Vitamin D (CALCIUM + D PO) Take  by mouth every day.     • ascorbic acid (ASCORBIC ACID) 500 MG Tab Take 500 mg by mouth every day.     • vitamin D (CHOLECALCIFEROL) 1000 UNIT Tab Take 1,000 Units by mouth every day.       No current facility-administered medications for this visit.              Varicose vein excision left lower extremity  9/11 varicose veins left excision   3/11/17 ultrasound venous bilateral lower extremities negative     Subclinical hypothyroid  9/26/19 tsh 5.8 repeat labs 3 months test ordered  1/23/20 tsh 2.0, TPO<0.2     s/p total replacement left hip  2002 left hip open decompression and osteotomy  12/03  left hip implant removal  11/10  ortho x-ray stable decompression left femoral head neck junction  11/12  pain management note bilateral trochanteric bursitis injection  5/2/13   pain note, bilateral trochanteric bursal injection  12/10/18 MRI left hip avascular necrosis left femoral head 50% articular surface, with some progression from comparison, some early subchondral collapse anteriorly and superiorly with marrow edema extending into the left femoral head and neck, surgical change consistent with prior decompression procedure and femoral osteoplasty, metallic artifact consistent with surgical pain femoral head, early degenerative changes  8/5/19  orthopedic operative note left total hip arthroplasty  12/12/19  orthopedic note 3-month follow-up left total hip replacement, incision healed nicely, right hip continue to provide discomfort, continue physical therapy follow-up 1 year  5/7/20  orthopedic note x-ray pelvis stable left total hip arthroplasty, early osteoarthritic changes right hip, if worsens consider total joint replacement right side, follow-up 5 years     s/p right hip arthroscopy  2007  ortho right hip arthroscopy  2/11/14  note bilateral greater trochanter injection under ultrasound guidance  1/20/17 MRI pelvis bilateral femoral head avascular necrosis involving approximately 40% of the articular surface, metallic artifact left femoral head consistent with presence of small metallic pain  9/29/17   orthopedic surgical note right hip fluoroscopically guided core decompression and right hand carpal tunnel release  12/10/18 MRI right hip without chronic avascular necrosis right femoral head 50% articular surface without evidence of subchondral collapse, early degenerative changes, surgical changes consistent with prior right proximal femoral osteoplasty  5/7/20  orthopedic note x-ray pelvis stable left total hip arthroplasty, early osteoarthritic changes right hip, if worsens consider total joint replacement right side, follow-up 5 years  8/13/20  orthopedic note, x-ray pelvis grade 3  arthrosis with osteophyte acetabular side and sclerosis femoral side, joint space narrowing,ficat 2 avascular necrosis right hip progression to osteoarthritis, stable left hip arthroplasty, discussed right hip total arthroplasty  8/24/20  orthopedic operative note right hip arthroplasty  9/3/20  orthopedic note, incision site clean, continue work with physical therapy, follow-up 4 weeks  10/29/20  orthopedic note, 9 weeks postoperative right total hip arthroplasty, x-ray right hip stable arthroplasty without loosening, significant lateral calcification and potential stress response lateral femoral cortex, backed off on standing and walking activities, convert to crutches, follow-up in 3 to 4 weeks  12/1/20  orthopedic note, x-ray LS and right hip, lateral femoral calcification and stress response that is maturing, continue 48-hour workday, follow-up 4 to 5 weeks     s/p bunionectomy  8/9/13  podiatry; right foot martín bunionectomy, right foot first metatarsophalangeal joint bunion/degenerative arthritis      s/p  appendectomy     S/p left ankle surgery 2006 ligament reconstruction     Preventative health  9/25/15 tdap  1/6/17 pneumovax  8/18/20 pap per  gyn  3/21/21 covid rachel one shot  5/28/21 A1c 5.4%  10/15/21 flu  10/19/21 vit d 64  11/12/21 mammogram  11/12/21 dexa LS+1.2,forearm+1.4     Perimenopausal  9/25/15 on climara patch and progesterone tab per  gyn    neck pain  10/11 MRI cervical spine C4-C5 tiny disc bulge, C5-C6 bilateral and plate spurring with uncinate hypertrophy  10/7/16 MRI cervical spine C5-C6 small broad-based osteophyte complex, borderline foraminal stenosis, no significant progression of disease since 2010 6/4/18 MRI cervical spine C3-C4 mild diffuse bulge with mild bilateral foraminal stenosis, C5-C6 mild bilateral foraminal stenosis  5/8/18 x-ray cervical spine minimal DJD C5-C6, calcification left neck may be  related to atherosclerotic plaque     Low back pain  10/10 MRI LS L5-S1 minimal disc bulge  1/11  at the pain management left L3-S1 facet joint injection, right L3-S1 facet joint injection  2/11  pain management right L3-S1 lateral, medial, dorsal ramus nerve block  4/11  pain management left L3-S1 medial, lateral, bursal ramus nerve block  5/11  pain management bilateral SI joint injection under fluoroscopy  7/11  pain note bilat trochanteric bursitis injection  3/12  pain note, right and left L3-L4 L5-S1 medial, dorsal, lateral branch ablation  12/12  pain note, left L3-S1 dorsal and medial branch radiofrequency ablation  1/4/13  pain note; right L3-S1 radiofrequency ablation  8/14/13  pain note  9/29/13  pain note, left L3-S1 nerve frequency ablation  10/4/13  pain note, status post right L3-S1 FJNA  12/19/13 pain note; status post right SIJI injection, continue voltaren gel, TENS, IT stretches  5/16/14  pain note; left L3-S1 medial, partial, lateral branch radiofrequency ablation under fluoroscopy  6/6/14  right L3-S1 FJNA  7/3/14  pain note; continue TENS,celebrex 200 mg qday, voltaren gel 1%  9/10/14  pain note; continue TENS, lidoderm patch,voltaren gel, celebrex 200 mg qday  12/20/17  pain note left L3-S1 radiofrequency ablation under fluoroscopy  12/22/17  pain note right L3-S1 radiofrequency ablation under fluoroscopy   2/28/19 [facility] L4-L5 3 mm disc bulge, mild central stenosis, L5-S1 moderate facet hypertrophy, small right-sided sacral meningocele   6/3/21 EMG NCS lower extremities per  pain management essentially normal motor and sensory nerve conduction lower extremities bilateral, absent bilateral sural sensory nerve action potential  suspicious for mild length dependent peripheral neuropathy, but could be related to body habitus     ibs  8/05 CT abd/pelvis negative  11/05 [facility] colon negative  10/5/21 EGD per [facility] esophagus single ulcer GE junction, multiple patches ectopic gastric mucosa, hiatal hernia, stomach mucosal patchy erythema, biopsies performed pathology moderate inflammation, negative h.pylori, biopsies duodenum mild inflammation surface changes suggestive of celiac disease, take prilosec 40 mg daily, carafate 1 g 4 times daily x14 days  10/5/21 colon per [facility] patchy mucosal and abnormal vascularity, biopsies performed, pathology negative, single flat benign polyp  10/15/21 [facility] note iron deficiency anemia improving with iron supplementation, recommend gluten-free diet, will obtain baseline TTG, noted to have gastroesophageal ulcer which could be secondary to reflux esophagitis versus pill esophagitis, has been on carafate qid and omeprazole 40 mg daily, since the ulcer was small no surveillance EGD is indicated, continue omeprazole, discontinue carafate, avoid NSAIDs, chronic ALT elevation will obtain serology, right upper quadrant ultrasound, repeat colonoscopy 5 years     Hypothyroid  9/26/19 tsh 5.8 repeat labs 3 months test ordered  1/23/20 tsh 2.0, TPO<0.2  5/28/21 tsh 5.3 will monitor  6/8/21 started on synthroid 25 mcg by  bariatric medicine  8/13/21 tsh 2.3 on synthroid 25 mcg started by  bariatric medicine  10/19/21 tsh 1.9 on synthroid 25 mcg started by  bariatric medicine    History of rheumatic fever  8/29/16 streptococcal pharyngitis positive strep test, treated with augmentin, developed erythema nodosum lower extremities and palms given NSAIDs and medrol dosepack  9/22/16 repeat medrol dose pack x 1 still with painful erythema nodosum nodules  9/24/16 erythema nodosum nodules and arthritic-type pains, we will retreat with penicillin V 500 mg 3 times a day ×10 days  10/5/16 high dose  aspirin no benefit, changed to prednisone 20 mg daily, she has an appointment with me in 2 days  10/7/16 echo ordered, EKG done  10/10/16 cbc,cmp,tsh normal,ESR 40,CRP 0.3, repeat throat culture negative, blood cultures negative,  10/19/16 increase prednisone to 40 mg x 3 days then back to 20 mg  10/19/16 daughter diagnosis strep throat given antibiotics, we will treat prophylactically provided patient penicillin V 500 mg tid x 10 days  10/21/16 echo normal LV size and function, EF 65%, trace MR structurally normal mitral valve, mild TR and RVSP 30  10/24/16 on prednisone 40 mg qday unable to taper to 20 mg due to flare up of pain will try 20 mg bid then taper to 20 mg am and 10 mg qpm over the next week  10/26/16  infectious disease recommended secondary prophylaxis penicillin  mg bid x 5 years  11/7/16 still with polyarthritis, on prednisone 20 mg twice a day, repeat labs, still on penicillin, will speak with rheumatology about referral  11/8/16 ESR 15,CRP 0.3,wbc 10.9 (on prednisone),hgb 14,hct 43,cmp normal,RF,C3C4,TPO,lyme,MERA,HLA B27 negative, Factin IgG 22 (0-19),parietal cell Ab 25(0-25)  11/14/16  rheumatolog note, features consistent with rheumatic fever, also consider seronegative spondyloarthropathies, sarcoidosis, rheumatoid arthritis, will check sacroiliac films, hand and feet x-rays, follow-up one week  11/17/16 refill prednisone  11/18/16 ACE serum level normal, G6PD ad vitamin 1,25 d level normal  11/21/16 cxr negative  11/21/16 xray SI joints negative for erosions  11/29/16 x-ray joint survey hands, feet, ankles no evidence of inflammatory arthropathy  11/29/16 CT soft tissue neck without; negative  12/7/16  cardiology repeat echo in 3-4 weeks  12/10/16 MRI right knee multiple areas right knee avascular necrosis medial and lateral femoral condyle, medial and lateral tibial plateau, bone marrow edema  12/23/16  rheumatology note, inflammatory markers did  normalize with steroids, rheumatoid factor, CCP, MERA,ANKA negative continue to taper steroids given osteonecrosis alternating 17.5 mg and 15 mg, and 15 mg, and 15 mg alternating with 12.5 mg, and so forth  12/27/16 echo LV ejection fraction 60%, no valvular disease  1/4/17 ESR 8,CRP 0.3  1/12/17  rheumatology note, inflammatory markers did normalize with prednisone therapy, continue taper with osteonecrosis bilateral tenderness achilles insertion consider enthesitis, will get MRI left achilles region to evaluate for tendinitis or tenosynovitis, follow-up 6 weeks  1/20/17 MRI left foot without; no evidence of marrow edema, arthropathy, tendinopathy or ligament injury  3/6/17 anticardiolipin antibodies, lupus anticoagulant, protein CNS, factor V 5 Leyden, anti-thrombin panel, beta 2 glycoprotein negative  3/10/17 ESR 19,CRP 0.17, cortisol 3.3, ACTH 16, IgG 753 ([id number]), IgA 140, IgM 93  3/14/16  rheumatology note currently off prednisone, osteonecrosis involving multiple joints, knees, hips, right ankle, etiology unclear, antiphospholipid antibody, protein C and S normal, antithrombin III factor V leyden normal, refer to hematology for evaluation other etiologies, slightly decreased IgG refer to allergy immunology  3/14/17 remains on penicillin  3/30/17  allergy immunology evaluation slight IgG reduction 753 with normal IgG, IgM, no history to suggest primary immunodeficiency, recent diagnosis of group A streptococcal pharyngitis complicated by erythema nodosum, 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 therapy, we will perform humerol workup to include repeat quantitative immunoglobulins with subclass, specific antibodies for haemophilus influenza, pneumococcal, tetanus/diphtheria, limited flow cytometry with repeat CBC, SPEP/UPEP, ESR, CRP, UA, follow-up ASO, DNase B titer  4/17/17  allergy note slight IgG reduction at 753 with normal IgA, normal IgM, no history to suggest  primary immunodeficiency, suspect that 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 carryover effect from chronic prednisone therapy, cbc unremarkable, inflammatory markers ESR slightly elevated 28, CRP 2.4, urinalysis negative for protein or blood, no further humeral immunodeficiency or lab assessment at this point with normal immunologic titers, intact specific and subclass antibodies, the IgG decrease does not represent any underlying primary immunodeficiency or active antibody dysfunction at this time  4/25/17  rheumatology note await [facility] evaluation  5/10/17 dr.wu lacy endocrinology note recommend fosamax weekly with anecdotal evidence that bisphosphonate therapy may provide symptomatic benefit and osteonecrosis, if develops side effects could consider intravenous reclast, referral [facility], follow-up 4 months  5/28/17  rheumatology note avascular necrosis, arthralgias lower extremities, continue off prednisone, on alendronate per [facility]  7/3/17  infectious disease consultation history of streptococcal pharyngitis with probable rheumatic fever, recommend discontinue penicillin prophylaxis follow-up as needed  6/27/17  [facility] evaluation, recommended evaluation of hypercoagulable state, to consider antiphospholipid antibodies (lupus anticoagulant, anticardiolipin antibodies, beta-2 glycoprotein, phosphatidyl serine antibodies, factor V Leiden, antithrombin III, prothrombin mutation, protein C&S quantitative and qualitative, homocysteine, recommend after review of records do not strongly feel that she has true rheumatic fever, clear to discontinue penicillin, recheck ASO and anti-DNase B titers after 4 weeks  8/16/17 homocystine, protein C and protein S, lupus anticoagulant, beta 2 glycoprotein, antithrombin III, anticardiolipin antibody, anti-DNAse antibody, antistreptolysin all negative  8/31/17  rheumatology note,  continues on alendronate per bone endocrinology recommendation christopher, did follow up with orthopedics, will proceed with core decompression  2/23/18  [place] [facility] note, multifocal osteonecrosis knees, hips, other joints, underlying etiology unclear, trial few months of alendronate without dramatic improvement, alendronate discontinued because of undergoing core procedures with her hips, recommend continuing off alendronate monitoring for improvement, reassess in 6 months  10/10/18  [place] [facility] consultation osteonecrosis knees, hips, other joints, unclear etiology trial of alendronate without improvement, discontinued prior to hip procedures, hip pain is improved, discussed another trial of alendronate for limited timeframe 70 mg weekly reassess 6 months   8/25/21  [place] [facility] note continue alendronate for 6 months, follow-up 6 months    History of peptic ulcer  11/2/18 seen UC for abdominal discomfort right upper quadrant  11/3/18 ultrasound liver gallbladder region negative  11/10/18 hida negative  11/20/18 trial of prilosec 40 mg  6/17/19 taper prilosec  10/5/21 EGD per [facility] esophagus single ulcer GE junction, multiple patches ectopic gastric mucosa, hiatal hernia, stomach mucosal patchy erythema, biopsies performed pathology moderate inflammation, negative h.pylori, biopsies duodenum mild inflammation surface changes suggestive of celiac disease, take prilosec 40 mg daily, carafate 1 g 4 times daily x14 days  10/15/21 [facility] note iron deficiency anemia improving with iron supplementation, recommend gluten-free diet, will obtain baseline TTG, noted to have gastroesophageal ulcer which could be secondary to reflux esophagitis versus pill esophagitis, has been on carafate qid and omeprazole 40 mg daily, since the ulcer was small no surveillance EGD is indicated, continue omeprazole, discontinue carafate, avoid NSAIDs, chronic ALT elevation will obtain serology, right  upper quadrant ultrasound, repeat colonoscopy 5 years    History abnormal liver function test  10/15/21 [facility] note iron deficiency anemia improving with iron supplementation, recommend gluten-free diet, will obtain baseline TTG, noted to have gastroesophageal ulcer which could be secondary to reflux esophagitis versus pill esophagitis, has been on carafate qid and omeprazole 40 mg daily, since the ulcer was small no surveillance EGD is indicated, continue omeprazole, discontinue carafate, avoid NSAIDs, chronic ALT elevation will obtain serology, right upper quadrant ultrasound, repeat colonoscopy 5 years  10/19/21 zinc 461  10/19/21 IgG 873, IgM 112, IgA 201, IgE 18  10/19/21 celiac panel negative  10/19/21 AST 45,ALT 46, hep b ab positive, hep b core ab negative,hep b sAg negative, hep a ab negative, hep c ab negative,  F-actin IgG 21 (0-19), ceruloplasmin normal, copper normal, INR 0.9,vit k 0.84, vit c 83, vit a 1.3, vit e (alpha-tocopherol) 18.5, MERA 1:320 speckled, MERA HIp-2 IgG<1:80,antiDS DNA SSA, SSB, anti-SCL 70, Keli-1 antibody, smith antibody all negative  10/25/21 ultrasound abdomen mildly echogenic liver     foot pain  8/5/15 MRI right foot severe artifact secondary to first MTP are clear, limiting analysis of soft tissue, highly likely complete FHL tear  8/28/15  orthopedic note right ankle FHL tendon rupture seen on MRI, do not recommend surgical repair at this time which would involve hemiarthroplasty, implant, first MTP fusion with bone graft, followup podiatry   8/18/17 MRI left foot normal, MRI right foot osteoathritis right first metatarsal head and sesamoids moderate in degree, ulnar edema with an sesamoids may be due to susceptibility artifact related to first metatarsal phalangeal arthroplasty    Dyslipidemia  9/29/14 chol 186,trig 46,hdl 76,ldl 101  9/18/15 chol 225,trig 50,hdl 77,ldl 138  9/20/17 chol 189,trig 53,hdl 56,ldl 122  5/11/18 ultrasound carotid minimal left carotid  bulb plaque, calcified, no evidence of occlusion less than 50% stenosis  9/28/18 chol 187,trig 59,hdl 76,ldl 99  5/8/18 EKG done in clinic, ultrasound carotid ordered follow-up calcifications   5/11/18 ultrasound carotid less than 50% internal stenosis bilateral  9/27/19 chol 196,trig 60,hdl 79,ldl 105  5/28/21 chol 201,trig 58,hdl 70,ldl 119    Celiac   10/5/21 EGD per [facility] biopsies duodenum mild inflammation surface changes suggestive of celiac disease  10/19/21 t-TG IgA negative     avascular necrosis  8/29/16 streptococcal pharyngitis positive strep test, treated with augmentin, developed erythema nodosum lower extremities and palms given NSAIDs and medrol dosepack  9/22/16 repeat medrol dose pack x 1 still with painful erythema nodosum nodules  10/5/16 high dose aspirin no benefit, changed to prednisone 20 mg daily,  10/19/16 increase prednisone to 40 mg x 3 days then back to 20 mg  11/7/16 still with polyarthritis, on prednisone 20 mg twice a day, repeat labs, still on penicillin, will speak with rheumatology about referral  11/14/16  rheumatolog note, features consistent with rheumatic fever, also consider seronegative spondyloarthropathies, sarcoidosis, rheumatoid arthritis, will check sacroiliac films, hand and feet x-rays, follow-up one week  12/23/16  rheumatology note, inflammatory markers did normalize with steroids, rheumatoid factor, CCP, MERA,ANKA negative continue to taper steroids given osteonecrosis alternating 17.5 mg and 15 mg, and 15 mg, and 15 mg alternating with 12.5 mg, and so forth  12/10/16 MRI right knee multiple areas right knee avascular necrosis medial and lateral femoral condyle, medial and lateral tibial plateau, bone marrow edema  12/23/16  rheumatology note, inflammatory markers did normalize with steroids, rheumatoid factor, CCP, MERA,ANKA negative continue to taper steroids given osteonecrosis alternating 17.5 mg and 15 mg, and 15 mg, and 15 mg alternating with  12.5 mg, and so forth  1/20/17 MRI left foot no evidence of arthropathy or tendinopathy  1/20/17 MRI left ankle metallic artifact lateral malleolus  1/20/17 MRI right ankle small oblong focus finger edema tibial plafond below subchondral plate, would be consistent with small area of avascular necrosis  1/20/17 MRI left knee multiple areas avascular necrosis surrounding the knee to include femur, tibia, fibula  1/20/17 MRI pelvis bilateral femoral head avascular necrosis involving approximately 40% of the articular surface, metallic artifact left femoral head consistent with presence of small metallic pin  2/9/17 tapering prednisone down to 2.5 mg alternating with 5 mg  2/23/17  rheumatology note complete steroid taper then repeat ESR, CRP and CPK, also check antiphospholipid antibody,antithrombin, factor v leiden, protein s  3/6/17 anticardiolipin antibodies, lupus anticoagulant, protein CNS, factor V 5 Leyden, anti-thrombin panel, beta 2 glycoprotein negative  3/10/17 ESR 19,CRP 0.17, cortisol 3.3, ACTH 16, IgG 753 ([id number]), IgA 140, IgM 93  3/14/16  rheumatology note currently off prednisone, osteonecrosis involving multiple joints, knees, hips, right ankle, etiology unclear, antiphospholipid antibody, protein C and S normal, antithrombin III factor V leyden normal, refer to hematology for evaluation other etiologies, slightly decreased IgG refer to allergy immunology  3/30/17  allergy immunology evaluation slight IgG reduction 753 with normal IgG, IgM, no history to suggest primary immunodeficiency, recent diagnosis of group A streptococcal pharyngitis complicated by erythema nodosum, 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 therapy, we will perform humerol workup to include repeat quantitative immunoglobulins with subclass, specific antibodies for haemophilus influenza, pneumococcal, tetanus/diphtheria, limited flow cytometry with repeat CBC, SPEP/UPEP, ESR, CRP, UA, follow-up  ASO, DNase B titer  5/10/17 dr.wu lacy endocrinology note recommend fosamax weekly with anecdotal evidence that bisphosphonate therapy may provide symptomatic benefit and osteonecrosis, if develops side effects could consider intravenous reclast, referral [facility], follow-up 4 months  5/28/17  rheumatology note avascular necrosis, arthralgias lower extremities, continue off prednisone, on alendronate per [facility]  7/7/17 MRI left knee without; again seen multiple bone infarcts femur, tibia, fibula, patella appeared less prominent on current examination  7/7/17 MRI right knee without; interval improvement in size of multiple bony infarcts involving femur, tibia, fibula, some infarcts have resolved  7/7/17 MRI pelvis without; stable bilateral femoral head avascular necrosis, these have not progressed and there is no evidence of subchondral collapse or arthropathy  6/27/17  [facility] evaluation, recommended evaluation of hypercoagulable state, to consider antiphospholipid antibodies (lupus anticoagulant, anticardiolipin antibodies, beta-2 glycoprotein, phosphatidyl serine antibodies, factor V Leiden, antithrombin III, prothrombin mutation, protein C&S quantitative and qualitative, homocysteine, recommend after review of records do not strongly feel that she has true rheumatic fever, clear to discontinue penicillin, recheck ASO and anti-DNase B titers after 4 weeks  8/16/17 homocystine, protein C and protein S, lupus anticoagulant, beta 2 glycoprotein, antithrombin III, anticardiolipin antibody, anti-DNAse antibody, antistreptolysin all negative  8/18/17 MRI left foot normal, MRI right foot osteoathritis right first metatarsal head and sesamoids moderate in degree, ulnar edema with an sesamoids may be due to susceptibility artifact related to first metatarsal phalangeal arthroplasty  8/31/17  rheumatology note, continues on alendronate per bone  endocrinology recommendation [facility], did follow up with orthopedics, will proceed with core decompression  9/27/17  [facility] with focal osteonecrosis involving knees, hips, other joints, etiology unclear, dramatic improvement with a few months of alendronate, about to undergo surgical intervention hip, will discontinue alendronate pending surgery, reassess 4-5 months  9/29/17   orthopedic surgical note right hip fluoroscopically guided core decompression and right hand carpal tunnel release  12/29/17  orthopedic surgical note  left proximal femoral head core decompression  2/23/18  [facility] note, multifocal osteonecrosis knees, hips, other joints, underlying etiology unclear, trial few months of alendronate without dramatic improvement, alendronate discontinued because of undergoing core procedures with her hips, recommend continuing off alendronate monitoring for improvement, reassess in 6 months  10/10/18  [facility] consultation osteonecrosis knees, hips, other joints, unclear etiology trial of alendronate without improvement, discontinued prior to hip procedures, hip pain is improved, discussed another trial of alendronate for limited timeframe 70 mg weekly reassess 6 months  12/7/18 crp 0.46, ESR 23, hgb 13,hct 42, wbc 7.9  12/10/18 MRI left hip avascular necrosis left femoral head 50% articular surface, with some progression from comparison, some early subchondral collapse anteriorly and superiorly with marrow edema extending into the left femoral head and neck, surgical change consistent with prior decompression procedure and femoral osteoplasty, metallic artifact consistent with surgical pain femoral head, early degenerative changes  12/10/18 MRI right hip without chronic avascular necrosis right femoral head 50% articular surface without evidence of subchondral collapse, early degenerative changes, surgical changes  consistent with prior right proximal femoral osteoplasty  4/24/19  [facility] order bone density if she does have osteoporosis consider continuation bisphosphonate versus anabolic therapy  5/2/19  orthopedic note undergo MRI evaluation of her left hip  6/10/19 MRI left hip at [facility], redemonstration moderate to advanced avascular necrosis bilateral femoral heads, degree of associated bone marrow edema appears significantly decreased from previous study particular on the left, small left hip effusion, postoperative changes remote core decompression and femoral osteotomy  6/27/19  orthopedic note follow-up evaluation advanced degenerative arthritis left hip, symptomatic failing conservative measures, considering total joint arthroplasty, recommend proceeding with left total hip arthroplasty due to progression of avascular necrosis  8/5/19  orthopedic operative note left total hip arthroplasty  9/9/19 off fosamax for now due to recent hip arthroplasty  10/9/19  [facility] notes most recent bone density reviewed, remains off alendronate since left hip replacement, clear to resume alendronate 70 mg weekly follow-up 6 months  12/10/19 on fosamax 70 mg 5/7/20  orthopedic note x-ray pelvis stable left total hip arthroplasty, early osteoarthritic changes right hip, if worsens consider total joint replacement right side, follow-up 5 years  8/19/20  [facility] note multifocal osteonecrosis, underlying etiology unclear, initially experienced dramatic improvement with alendronate however recurrent symptoms despite alendronate therapy, planning to undergo right hip replacement and will resume alendronate after surgery  12/17/20 back on alendronate 70 mg weekly  2/2/21  [facility] note, continue alendronate 70 mg weekly  8/25/21  [facility] note she had a few months of alendronate without  significant improvement in symptoms, bone density test is normal, will continue alendronate until after spine surgery to see if knee pain resolves, if pain continues after surgery then it is likely intrinsic to the knee and electrolyte is not helping, follow-up 6 months    anemia  7/29/19 hgb 13.2,hct 42.2  9/27/19 hgb 11.8,hct 37.7  8/29/20 hgb 11.7,hct 37.9,mcv 88.6  5/28/21 hgb 10,hct 33.5,mcv 78.6  6/12/21 hgb 10.2,hct 35.5,mcv 79iron 27,%sat 5,TIBC 553,ferritin 10.5,,retic 1.8%, b12 1153, folate 12.7,SPEP negative  6/14/21 start ferrous sulfate every other day, continue vitamin C orally, call GI consultants to schedule appointment, repeat labs 4 weeks ordered  7/16/21 hgb 11.3,hct 37.1,mcv 85.5,iron 52,%sat 12,ferritin 22.6   7/20/21 [facility] note schedule EGD and colonoscopy for iron deficiency anemia  10/5/21 EGD per [facility] single ulcer gastroesophageal junction, multiple patches of gastric ectopic mucosa upper 3rd esophagus, hiatal hernia, patchy erythema stomach, erosions, ulceration, biopsies performed pathology moderate inflammation, negative h.pylori, biopsies duodenum mild inflammation surface changes suggestive of celiac disease  10/5/21 colon per [facility] patchy mucosal and abnormal vascularity, biopsies performed, pathology negative, single flat benign polyp  10/15/21 [facility] note iron deficiency anemia improving with iron supplementation, recommend gluten-free diet, will obtain baseline TTG, noted to have gastroesophageal ulcer which could be secondary to reflux esophagitis versus pill esophagitis, has been on carafate qid and omeprazole 40 mg daily, since the ulcer was small no surveillance EGD is indicated, continue omeprazole, discontinue Carafate, avoid NSAIDs, chronic ALT elevation will obtain serology, right upper quadrant ultrasound, repeat colonoscopy 5 years  10/19/21 AST 45,ALT 46, hep b ab positive, hep b core ab negative,hep b sAg negative, hep a ab negative, hep c ab negative,  F-actin IgG 21  (0-19), ceruloplasmin normal, copper normal, INR 0.9,vit k 0.84, vit c 83, vit a 1.3, vit e (alpha-tocopherol) 18.5, MERA 1:320 speckled, MERA HIp-2 IgG<1:80,antiDS DNA SSA, SSB, anti-SCL 70, Keli-1 antibody, smith antibody all negative, t-TG IgA negative     allergic rhinitis  Tried otc claritin  9/12/13 flonase trial  9/25/15 flonase and zyrtec or claritin       Patient Active Problem List   Diagnosis   • S/P appendectomy   • S/p left ankle surgery   • S/p hip right arthroplasty    • Low back pain   • Neck pain   • IBS (irritable bowel syndrome)   • Preventative health care   • Varicose vein of leg   • S/P bunionectomy   • Allergic rhinitis due to animal hair and dander   • Chronic pain   • Dyslipidemia   • Perimenopausal   • Foot pain, right   • Obesity (BMI 30-39.9)   • History of rheumatic fever   • Avascular necrosis (HCC)   • History of peptic ulcer   • S/p hip left arthroplasty   • Hypothyroid   • Anemia   • History of abnormal liver function test       Depression Screening    Little interest or pleasure in doing things?  0 - not at all  Feeling down, depressed , or hopeless? 0 - not at all  Patient Health Questionnaire Score: 0        Patient Care Team:  Franklin Gonzalez M.D. as PCP - General (Internal Medicine)  Starr Jackson M.D. (Inactive) as Consulting Physician (Rheumatology)  Franklin Gonzalez M.D.    ROS           Objective     LMP 06/30/2012      Physical Exam  Vitals and nursing note reviewed.   Constitutional:       Appearance: Normal appearance.   HENT:      Head: Normocephalic and atraumatic.      Right Ear: External ear normal.      Left Ear: External ear normal.   Eyes:      Conjunctiva/sclera: Conjunctivae normal.   Cardiovascular:      Rate and Rhythm: Normal rate and regular rhythm.      Heart sounds: Normal heart sounds.   Pulmonary:      Effort: Pulmonary effort is normal.      Breath sounds: Normal breath sounds.   Abdominal:      General: There is no distension.   Musculoskeletal:          General: No swelling.   Skin:     General: Skin is warm.   Neurological:      General: No focal deficit present.      Mental Status: She is alert.   Psychiatric:         Mood and Affect: Mood normal.         Behavior: Behavior normal.     Bilateral ears and TMs clear        Low back no spinal tenderness to palpation, no SI joint tenderness, lower extremity no edema, normal dorsiflexion, plantarflexion, knee flexion extension, hip flexion extension bilateral    Assessment & Plan        Assessment  #! Annual exam    #2 history of rheumatic fever 2016 with subsequent development of osteonecrosis of multiple joints including her hips, knees, unclear etiology as far as relationship to streptococcal pharyngitis, patient was started on alendronate by [facility], has been stable on the alendronate with no previous history of reflux or stomach irritation related to the medication    #3 status post right hip arthroplasty October 2020 and left hip arthroplasty October 2019, hip pain is currently stable and limited, improved since her surgeries    #4 low back pain followed by Dr. Rees pain management, has had multiple steroid injections, radiofrequency ablation, physical therapy, has tried NSAIDs, Celebrex, topical Voltaren, Lidoderm, currently sees physical therapy, most recent MRI in January of last year showed borderline spinal stenosis, degenerative changes, on tramadol for chronic pain 1 tablet a day, gabapentin 600 mg at night, Tylenol as needed, no current NSAIDs     #5 history of peptic ulcer disease by EGD October 5, single ulcer at GE junction by GI consultants, completed 2-week course of Carafate, remains on Prilosec, no current NSAIDs    #6  anemia related to peptic ulcer disease, initially hemoglobin 10, hematocrit 35 in June iron 27, percent saturation 5, ferritin 10 follow-up CBC and iron studies pending per GI, no current NSAIDs previously had been on naproxen 500 mg twice daily, neurologic,  vomiting, abdominal pain, dysphagia    #7 history COVID-19 by home testing December 29, patient had 1 shot of the Rachel COVID-vaccine March 21, she appears to have recovered well with no sequela    #8 preventative health  9/25/15 tdap  1/6/17 pneumovax  8/18/20 pap per  gyn  3/21/21 covid rachel one shot  5/28/21 A1c 5.4%  10/15/21 flu  10/19/21 vit d 64  11/12/21 mammogram  11/12/21 dexa LS+1.2,forearm+1.4      #9 BMI 33.5    #10 chronic tramadol therapy with no drowsiness, sedation, memory loss, depression, good social support with , no alcohol with medication    #11 elevated blood pressure at the pain management office     #12 hypothyroid on Synthroid 25 mcg    Plan  #! Need old records  pain management     #2  Patient has been referred to  neurosurgery regarding her back pain by pain management    #3 continue no NSAIDs, no Celebrex    #4  Continue gabapentin 600 mg at night, we could titrate that higher, continue Tylenol as needed up to 1000 mg 3 times daily, continue tramadol 50 mg at night, the medication has been effective without side effects of drowsiness, sedation, memory loss, depression    #5 , refill tramadol medication has been effective without side effects, she is only taking 1 at night.  Prescription for 90 tablets.  Monitor for drowsiness, sedation, memory loss.  I believe benefits outweigh the risks.  We can refill her tramadol in 3 months without an office visit, and she will follow-up with us in office in 6 months for tramadol refill    #6 controlled substance contract    #7 continue physical therapy, check with physical therapy about starting a swimming program    #8 blood pressure stable today, no need for medications, but I would like her to start checking her blood pressure at home varying the times a day send an update on her blood pressures in 1 to 2 weeks    #9 recommend getting a COVID mRNA booster in 3 months    #10 continue her celiac diet  per GI    #11 follow-up with gynecology  since  has retired    #12 check with dr.wu Kapoor endocrinology about changing from Fosamax to Reclast, less potential for GI irritation and this should make it more convenient for her since she already needs to take omeprazole and Synthroid each 30 minutes before anything else, she can try taking the Synthroid at night 2 hours after last meal and 30 minutes before bed    #13 follow-up 6 months    #14 continue Synthroid 25 mcg recheck labs

## 2022-01-21 ENCOUNTER — HOSPITAL ENCOUNTER (OUTPATIENT)
Dept: LAB | Facility: MEDICAL CENTER | Age: 52
End: 2022-01-21
Attending: INTERNAL MEDICINE
Payer: COMMERCIAL

## 2022-01-21 DIAGNOSIS — E55.9 VITAMIN D DEFICIENCY: ICD-10-CM

## 2022-01-21 DIAGNOSIS — Z00.00 PREVENTATIVE HEALTH CARE: ICD-10-CM

## 2022-01-21 LAB
25(OH)D3 SERPL-MCNC: 76 NG/ML (ref 30–100)
ALBUMIN SERPL BCP-MCNC: 4.7 G/DL (ref 3.2–4.9)
ALBUMIN SERPL BCP-MCNC: 4.7 G/DL (ref 3.2–4.9)
ALBUMIN/GLOB SERPL: 1.5 G/DL
ALBUMIN/GLOB SERPL: 1.5 G/DL
ALP SERPL-CCNC: 90 U/L (ref 30–99)
ALP SERPL-CCNC: 94 U/L (ref 30–99)
ALT SERPL-CCNC: 85 U/L (ref 2–50)
ALT SERPL-CCNC: 88 U/L (ref 2–50)
ANION GAP SERPL CALC-SCNC: 13 MMOL/L (ref 7–16)
ANION GAP SERPL CALC-SCNC: 14 MMOL/L (ref 7–16)
AST SERPL-CCNC: 60 U/L (ref 12–45)
AST SERPL-CCNC: 62 U/L (ref 12–45)
BASOPHILS # BLD AUTO: 0.7 % (ref 0–1.8)
BASOPHILS # BLD: 0.04 K/UL (ref 0–0.12)
BILIRUB SERPL-MCNC: 0.2 MG/DL (ref 0.1–1.5)
BILIRUB SERPL-MCNC: 0.3 MG/DL (ref 0.1–1.5)
BUN SERPL-MCNC: 15 MG/DL (ref 8–22)
BUN SERPL-MCNC: 16 MG/DL (ref 8–22)
CALCIUM SERPL-MCNC: 9.8 MG/DL (ref 8.5–10.5)
CALCIUM SERPL-MCNC: 9.9 MG/DL (ref 8.5–10.5)
CHLORIDE SERPL-SCNC: 100 MMOL/L (ref 96–112)
CHLORIDE SERPL-SCNC: 101 MMOL/L (ref 96–112)
CHOLEST SERPL-MCNC: 250 MG/DL (ref 100–199)
CO2 SERPL-SCNC: 21 MMOL/L (ref 20–33)
CO2 SERPL-SCNC: 22 MMOL/L (ref 20–33)
CREAT SERPL-MCNC: 0.79 MG/DL (ref 0.5–1.4)
CREAT SERPL-MCNC: 0.83 MG/DL (ref 0.5–1.4)
EOSINOPHIL # BLD AUTO: 0.11 K/UL (ref 0–0.51)
EOSINOPHIL NFR BLD: 2.1 % (ref 0–6.9)
ERYTHROCYTE [DISTWIDTH] IN BLOOD BY AUTOMATED COUNT: 52.5 FL (ref 35.9–50)
EST. AVERAGE GLUCOSE BLD GHB EST-MCNC: 103 MG/DL
FASTING STATUS PATIENT QL REPORTED: NORMAL
FERRITIN SERPL-MCNC: 69.9 NG/ML (ref 10–291)
GLOBULIN SER CALC-MCNC: 3.2 G/DL (ref 1.9–3.5)
GLOBULIN SER CALC-MCNC: 3.2 G/DL (ref 1.9–3.5)
GLUCOSE SERPL-MCNC: 78 MG/DL (ref 65–99)
GLUCOSE SERPL-MCNC: 92 MG/DL (ref 65–99)
HBA1C MFR BLD: 5.2 % (ref 4–5.6)
HCT VFR BLD AUTO: 41.9 % (ref 37–47)
HDLC SERPL-MCNC: 69 MG/DL
HGB BLD-MCNC: 13.8 G/DL (ref 12–16)
IMM GRANULOCYTES # BLD AUTO: 0.01 K/UL (ref 0–0.11)
IMM GRANULOCYTES NFR BLD AUTO: 0.2 % (ref 0–0.9)
IRON SATN MFR SERPL: 15 % (ref 15–55)
IRON SERPL-MCNC: 63 UG/DL (ref 40–170)
LDLC SERPL CALC-MCNC: 162 MG/DL
LYMPHOCYTES # BLD AUTO: 1.52 K/UL (ref 1–4.8)
LYMPHOCYTES NFR BLD: 28.5 % (ref 22–41)
MCH RBC QN AUTO: 32.5 PG (ref 27–33)
MCHC RBC AUTO-ENTMCNC: 32.9 G/DL (ref 33.6–35)
MCV RBC AUTO: 98.8 FL (ref 81.4–97.8)
MONOCYTES # BLD AUTO: 0.57 K/UL (ref 0–0.85)
MONOCYTES NFR BLD AUTO: 10.7 % (ref 0–13.4)
NEUTROPHILS # BLD AUTO: 3.09 K/UL (ref 2–7.15)
NEUTROPHILS NFR BLD: 57.8 % (ref 44–72)
NRBC # BLD AUTO: 0 K/UL
NRBC BLD-RTO: 0 /100 WBC
PLATELET # BLD AUTO: 283 K/UL (ref 164–446)
PMV BLD AUTO: 10.2 FL (ref 9–12.9)
POTASSIUM SERPL-SCNC: 4.2 MMOL/L (ref 3.6–5.5)
POTASSIUM SERPL-SCNC: 4.4 MMOL/L (ref 3.6–5.5)
PROT SERPL-MCNC: 7.9 G/DL (ref 6–8.2)
PROT SERPL-MCNC: 7.9 G/DL (ref 6–8.2)
RBC # BLD AUTO: 4.24 M/UL (ref 4.2–5.4)
SODIUM SERPL-SCNC: 135 MMOL/L (ref 135–145)
SODIUM SERPL-SCNC: 136 MMOL/L (ref 135–145)
TIBC SERPL-MCNC: 416 UG/DL (ref 250–450)
TRIGL SERPL-MCNC: 93 MG/DL (ref 0–149)
TSH SERPL DL<=0.005 MIU/L-ACNC: 2.6 UIU/ML (ref 0.38–5.33)
UIBC SERPL-MCNC: 353 UG/DL (ref 110–370)
WBC # BLD AUTO: 5.3 K/UL (ref 4.8–10.8)

## 2022-01-21 PROCEDURE — 82728 ASSAY OF FERRITIN: CPT

## 2022-01-21 PROCEDURE — 83036 HEMOGLOBIN GLYCOSYLATED A1C: CPT

## 2022-01-21 PROCEDURE — 80061 LIPID PANEL: CPT

## 2022-01-21 PROCEDURE — 82306 VITAMIN D 25 HYDROXY: CPT

## 2022-01-21 PROCEDURE — 86364 TISS TRNSGLTMNASE EA IG CLAS: CPT

## 2022-01-21 PROCEDURE — 36415 COLL VENOUS BLD VENIPUNCTURE: CPT

## 2022-01-21 PROCEDURE — 85025 COMPLETE CBC W/AUTO DIFF WBC: CPT

## 2022-01-21 PROCEDURE — 83540 ASSAY OF IRON: CPT

## 2022-01-21 PROCEDURE — 80053 COMPREHEN METABOLIC PANEL: CPT | Mod: 91

## 2022-01-21 PROCEDURE — 84443 ASSAY THYROID STIM HORMONE: CPT

## 2022-01-21 PROCEDURE — 80053 COMPREHEN METABOLIC PANEL: CPT

## 2022-01-21 PROCEDURE — 83550 IRON BINDING TEST: CPT

## 2022-01-22 ENCOUNTER — TELEPHONE (OUTPATIENT)
Dept: MEDICAL GROUP | Facility: MEDICAL CENTER | Age: 52
End: 2022-01-22

## 2022-01-22 DIAGNOSIS — K90.0 CELIAC DISEASE: ICD-10-CM

## 2022-01-22 DIAGNOSIS — E78.5 DYSLIPIDEMIA: ICD-10-CM

## 2022-01-22 NOTE — TELEPHONE ENCOUNTER
Notified with labs, TSH within range continue Synthroid, renal function, vitamin D level, normal.  AST ALT slightly elevated, has had full liver work-up from GI, no alcohol before the labs, also has elevated cholesterol the only changes is that she is on a celiac diet.  Advised her to check her dietary intake and look at the components, try to limit processed foods and higher fatty food contents, continue clean sources of carbohydrates and protein.  Will refer to dietitian, repeat labs lipid panel, CMP 3 months, orders placed.  She has follow-up with GI pending.

## 2022-01-24 LAB — TTG IGA SER IA-ACNC: <2 U/ML (ref 0–3)

## 2022-01-28 ENCOUNTER — HOSPITAL ENCOUNTER (OUTPATIENT)
Dept: RADIOLOGY | Facility: MEDICAL CENTER | Age: 52
End: 2022-01-28
Attending: PHYSICIAN ASSISTANT
Payer: COMMERCIAL

## 2022-01-28 DIAGNOSIS — M54.50 CHRONIC LOW BACK PAIN WITHOUT SCIATICA, UNSPECIFIED BACK PAIN LATERALITY: ICD-10-CM

## 2022-01-28 DIAGNOSIS — M79.18 MYOFASCIAL PAIN: ICD-10-CM

## 2022-01-28 DIAGNOSIS — G89.29 CHRONIC LOW BACK PAIN WITHOUT SCIATICA, UNSPECIFIED BACK PAIN LATERALITY: ICD-10-CM

## 2022-01-28 PROCEDURE — 72110 X-RAY EXAM L-2 SPINE 4/>VWS: CPT

## 2022-03-09 ENCOUNTER — TELEPHONE (OUTPATIENT)
Dept: MEDICAL GROUP | Facility: MEDICAL CENTER | Age: 52
End: 2022-03-09
Payer: COMMERCIAL

## 2022-03-09 DIAGNOSIS — E55.9 VITAMIN D DEFICIENCY: ICD-10-CM

## 2022-03-09 DIAGNOSIS — E61.1 IRON DEFICIENCY: ICD-10-CM

## 2022-03-09 DIAGNOSIS — R79.0 LOW MAGNESIUM LEVEL: ICD-10-CM

## 2022-03-09 DIAGNOSIS — E53.8 B12 DEFICIENCY: ICD-10-CM

## 2022-03-09 DIAGNOSIS — E78.5 DYSLIPIDEMIA: ICD-10-CM

## 2022-03-09 RX ORDER — CELECOXIB 200 MG/1
200 CAPSULE ORAL
Qty: 100 CAPSULE | Refills: 2 | Status: SHIPPED | OUTPATIENT
Start: 2022-03-09 | End: 2022-12-04

## 2022-03-09 NOTE — TELEPHONE ENCOUNTER
Please check to see if we have the hepatitis A and B vaccine in stock, if so please notify the patient she can walk-in for those vaccines.

## 2022-03-13 ENCOUNTER — TELEPHONE (OUTPATIENT)
Dept: MEDICAL GROUP | Facility: MEDICAL CENTER | Age: 52
End: 2022-03-13
Payer: COMMERCIAL

## 2022-03-14 NOTE — TELEPHONE ENCOUNTER
Please call the patient, she needs one hepatitis B vaccine and she needs the hepatitis A vaccine series of 2 shots.    Please notify her if we have the vaccines in stock, then she can come in for the hepatitis B one shot, and the first hepatitis A shot.

## 2022-03-18 ENCOUNTER — NON-PROVIDER VISIT (OUTPATIENT)
Dept: MEDICAL GROUP | Facility: MEDICAL CENTER | Age: 52
End: 2022-03-18
Payer: COMMERCIAL

## 2022-03-18 DIAGNOSIS — Z23 NEED FOR VACCINATION: ICD-10-CM

## 2022-03-18 PROCEDURE — 90746 HEPB VACCINE 3 DOSE ADULT IM: CPT | Performed by: STUDENT IN AN ORGANIZED HEALTH CARE EDUCATION/TRAINING PROGRAM

## 2022-03-18 PROCEDURE — 90471 IMMUNIZATION ADMIN: CPT | Performed by: STUDENT IN AN ORGANIZED HEALTH CARE EDUCATION/TRAINING PROGRAM

## 2022-03-18 PROCEDURE — 90632 HEPA VACCINE ADULT IM: CPT | Performed by: STUDENT IN AN ORGANIZED HEALTH CARE EDUCATION/TRAINING PROGRAM

## 2022-03-18 PROCEDURE — 90472 IMMUNIZATION ADMIN EACH ADD: CPT | Performed by: STUDENT IN AN ORGANIZED HEALTH CARE EDUCATION/TRAINING PROGRAM

## 2022-03-18 NOTE — NON-PROVIDER
"Camila Mcnulty is a 51 y.o. female here for a non-provider visit for:   HEPATITIS A 1 of 2    Reason for immunization: Overdue/Provider Recommended  Immunization records indicate need for vaccine: Yes, confirmed with Epic  Minimum interval has been met for this vaccine: Yes  ABN completed: No    VIS Dated  7/28/20 was given to patient: Yes  All IAC Questionnaire questions were answered \"No.\"    Patient tolerated injection and no adverse effects were observed or reported: Yes    Pt scheduled for next dose in series: No    Camila Mcnulty is a 51 y.o. female here for a non-provider visit for:   HEPATITIS B 1 of 1    Reason for immunization: Overdue/Provider Recommended  Immunization records indicate need for vaccine: Yes, confirmed with Epic  Minimum interval has been met for this vaccine: Yes  ABN completed: No    VIS Dated  8/15/19 was given to patient: Yes  All IAC Questionnaire questions were answered \"No.\"    Patient tolerated injection and no adverse effects were observed or reported: Yes    Pt scheduled for next dose in series: No      "

## 2022-06-03 ENCOUNTER — NON-PROVIDER VISIT (OUTPATIENT)
Dept: MEDICAL GROUP | Facility: MEDICAL CENTER | Age: 52
End: 2022-06-03
Payer: COMMERCIAL

## 2022-06-03 DIAGNOSIS — Z23 NEED FOR VACCINATION: ICD-10-CM

## 2022-06-03 DIAGNOSIS — Z00.00 PREVENTATIVE HEALTH CARE: Chronic | ICD-10-CM

## 2022-06-03 PROCEDURE — 90471 IMMUNIZATION ADMIN: CPT | Performed by: NURSE PRACTITIONER

## 2022-06-03 PROCEDURE — 90746 HEPB VACCINE 3 DOSE ADULT IM: CPT | Performed by: NURSE PRACTITIONER

## 2022-06-03 NOTE — PROGRESS NOTES
"Camila Mcnulty is a 52 y.o. female here for a non-provider visit for:   HEPATITIS B 2 of 2    Reason for immunization: continue or complete series started at the office  Immunization records indicate need for vaccine: Yes, confirmed with Epic  Minimum interval has been met for this vaccine: Yes  ABN completed: Not Indicated    VIS Dated  8/15/19 was given to patient: Yes  All IAC Questionnaire questions were answered \"No.\"    Patient tolerated injection and no adverse effects were observed or reported: Yes    Pt scheduled for next dose in series: No    "

## 2022-06-05 ENCOUNTER — TELEPHONE (OUTPATIENT)
Dept: MEDICAL GROUP | Facility: MEDICAL CENTER | Age: 52
End: 2022-06-05
Payer: COMMERCIAL

## 2022-07-08 ENCOUNTER — HOSPITAL ENCOUNTER (OUTPATIENT)
Dept: LAB | Facility: MEDICAL CENTER | Age: 52
End: 2022-07-08
Attending: INTERNAL MEDICINE
Payer: COMMERCIAL

## 2022-07-08 ENCOUNTER — HOSPITAL ENCOUNTER (OUTPATIENT)
Dept: LAB | Facility: MEDICAL CENTER | Age: 52
End: 2022-07-08
Attending: PHYSICIAN ASSISTANT
Payer: COMMERCIAL

## 2022-07-08 DIAGNOSIS — E55.9 VITAMIN D DEFICIENCY: ICD-10-CM

## 2022-07-08 DIAGNOSIS — E53.8 B12 DEFICIENCY: ICD-10-CM

## 2022-07-08 DIAGNOSIS — R79.0 LOW MAGNESIUM LEVEL: ICD-10-CM

## 2022-07-08 DIAGNOSIS — E78.5 DYSLIPIDEMIA: ICD-10-CM

## 2022-07-08 DIAGNOSIS — E61.1 IRON DEFICIENCY: ICD-10-CM

## 2022-07-08 LAB
25(OH)D3 SERPL-MCNC: 117 NG/ML (ref 30–100)
ALBUMIN SERPL BCP-MCNC: 4.5 G/DL (ref 3.2–4.9)
ALBUMIN SERPL BCP-MCNC: 4.5 G/DL (ref 3.2–4.9)
ALBUMIN/GLOB SERPL: 1.5 G/DL
ALBUMIN/GLOB SERPL: 1.6 G/DL
ALP SERPL-CCNC: 55 U/L (ref 30–99)
ALP SERPL-CCNC: 56 U/L (ref 30–99)
ALT SERPL-CCNC: 25 U/L (ref 2–50)
ALT SERPL-CCNC: 25 U/L (ref 2–50)
ANION GAP SERPL CALC-SCNC: 11 MMOL/L (ref 7–16)
ANION GAP SERPL CALC-SCNC: 12 MMOL/L (ref 7–16)
AST SERPL-CCNC: 23 U/L (ref 12–45)
AST SERPL-CCNC: 28 U/L (ref 12–45)
BASOPHILS # BLD AUTO: 1 % (ref 0–1.8)
BASOPHILS # BLD AUTO: 1.4 % (ref 0–1.8)
BASOPHILS # BLD: 0.05 K/UL (ref 0–0.12)
BASOPHILS # BLD: 0.07 K/UL (ref 0–0.12)
BILIRUB SERPL-MCNC: 0.4 MG/DL (ref 0.1–1.5)
BILIRUB SERPL-MCNC: 0.5 MG/DL (ref 0.1–1.5)
BUN SERPL-MCNC: 18 MG/DL (ref 8–22)
BUN SERPL-MCNC: 18 MG/DL (ref 8–22)
CALCIUM SERPL-MCNC: 9.4 MG/DL (ref 8.5–10.5)
CALCIUM SERPL-MCNC: 9.5 MG/DL (ref 8.5–10.5)
CHLORIDE SERPL-SCNC: 100 MMOL/L (ref 96–112)
CHLORIDE SERPL-SCNC: 101 MMOL/L (ref 96–112)
CHOLEST SERPL-MCNC: 199 MG/DL (ref 100–199)
CO2 SERPL-SCNC: 23 MMOL/L (ref 20–33)
CO2 SERPL-SCNC: 24 MMOL/L (ref 20–33)
CREAT SERPL-MCNC: 0.75 MG/DL (ref 0.5–1.4)
CREAT SERPL-MCNC: 0.8 MG/DL (ref 0.5–1.4)
EOSINOPHIL # BLD AUTO: 0.12 K/UL (ref 0–0.51)
EOSINOPHIL # BLD AUTO: 0.14 K/UL (ref 0–0.51)
EOSINOPHIL NFR BLD: 2.5 % (ref 0–6.9)
EOSINOPHIL NFR BLD: 2.9 % (ref 0–6.9)
ERYTHROCYTE [DISTWIDTH] IN BLOOD BY AUTOMATED COUNT: 49.1 FL (ref 35.9–50)
ERYTHROCYTE [DISTWIDTH] IN BLOOD BY AUTOMATED COUNT: 49.3 FL (ref 35.9–50)
FERRITIN SERPL-MCNC: 89.5 NG/ML (ref 10–291)
GFR SERPLBLD CREATININE-BSD FMLA CKD-EPI: 89 ML/MIN/1.73 M 2
GFR SERPLBLD CREATININE-BSD FMLA CKD-EPI: 96 ML/MIN/1.73 M 2
GLOBULIN SER CALC-MCNC: 2.8 G/DL (ref 1.9–3.5)
GLOBULIN SER CALC-MCNC: 3.1 G/DL (ref 1.9–3.5)
GLUCOSE SERPL-MCNC: 91 MG/DL (ref 65–99)
GLUCOSE SERPL-MCNC: 93 MG/DL (ref 65–99)
HCT VFR BLD AUTO: 40.1 % (ref 37–47)
HCT VFR BLD AUTO: 41.2 % (ref 37–47)
HDLC SERPL-MCNC: 84 MG/DL
HGB BLD-MCNC: 13.5 G/DL (ref 12–16)
HGB BLD-MCNC: 13.7 G/DL (ref 12–16)
IMM GRANULOCYTES # BLD AUTO: 0.01 K/UL (ref 0–0.11)
IMM GRANULOCYTES # BLD AUTO: 0.01 K/UL (ref 0–0.11)
IMM GRANULOCYTES NFR BLD AUTO: 0.2 % (ref 0–0.9)
IMM GRANULOCYTES NFR BLD AUTO: 0.2 % (ref 0–0.9)
IRON SATN MFR SERPL: 21 % (ref 15–55)
IRON SERPL-MCNC: 88 UG/DL (ref 40–170)
IRON SERPL-MCNC: 89 UG/DL (ref 40–170)
LDLC SERPL CALC-MCNC: 100 MG/DL
LYMPHOCYTES # BLD AUTO: 1.51 K/UL (ref 1–4.8)
LYMPHOCYTES # BLD AUTO: 1.52 K/UL (ref 1–4.8)
LYMPHOCYTES NFR BLD: 31.1 % (ref 22–41)
LYMPHOCYTES NFR BLD: 31.3 % (ref 22–41)
MAGNESIUM SERPL-MCNC: 2.1 MG/DL (ref 1.5–2.5)
MCH RBC QN AUTO: 32.5 PG (ref 27–33)
MCH RBC QN AUTO: 32.6 PG (ref 27–33)
MCHC RBC AUTO-ENTMCNC: 33.3 G/DL (ref 33.6–35)
MCHC RBC AUTO-ENTMCNC: 33.7 G/DL (ref 33.6–35)
MCV RBC AUTO: 96.6 FL (ref 81.4–97.8)
MCV RBC AUTO: 98.1 FL (ref 81.4–97.8)
MONOCYTES # BLD AUTO: 0.38 K/UL (ref 0–0.85)
MONOCYTES # BLD AUTO: 0.43 K/UL (ref 0–0.85)
MONOCYTES NFR BLD AUTO: 7.8 % (ref 0–13.4)
MONOCYTES NFR BLD AUTO: 8.9 % (ref 0–13.4)
NEUTROPHILS # BLD AUTO: 2.71 K/UL (ref 2–7.15)
NEUTROPHILS # BLD AUTO: 2.77 K/UL (ref 2–7.15)
NEUTROPHILS NFR BLD: 56.1 % (ref 44–72)
NEUTROPHILS NFR BLD: 56.6 % (ref 44–72)
NRBC # BLD AUTO: 0 K/UL
NRBC # BLD AUTO: 0 K/UL
NRBC BLD-RTO: 0 /100 WBC
NRBC BLD-RTO: 0 /100 WBC
PLATELET # BLD AUTO: 242 K/UL (ref 164–446)
PLATELET # BLD AUTO: 244 K/UL (ref 164–446)
PMV BLD AUTO: 10.4 FL (ref 9–12.9)
PMV BLD AUTO: 10.4 FL (ref 9–12.9)
POTASSIUM SERPL-SCNC: 4.6 MMOL/L (ref 3.6–5.5)
POTASSIUM SERPL-SCNC: 4.7 MMOL/L (ref 3.6–5.5)
PROT SERPL-MCNC: 7.3 G/DL (ref 6–8.2)
PROT SERPL-MCNC: 7.6 G/DL (ref 6–8.2)
RBC # BLD AUTO: 4.15 M/UL (ref 4.2–5.4)
RBC # BLD AUTO: 4.2 M/UL (ref 4.2–5.4)
SODIUM SERPL-SCNC: 135 MMOL/L (ref 135–145)
SODIUM SERPL-SCNC: 136 MMOL/L (ref 135–145)
TIBC SERPL-MCNC: 420 UG/DL (ref 250–450)
TRIGL SERPL-MCNC: 77 MG/DL (ref 0–149)
UIBC SERPL-MCNC: 331 UG/DL (ref 110–370)
VIT B12 SERPL-MCNC: 978 PG/ML (ref 211–911)
WBC # BLD AUTO: 4.8 K/UL (ref 4.8–10.8)
WBC # BLD AUTO: 4.9 K/UL (ref 4.8–10.8)

## 2022-07-08 PROCEDURE — 85025 COMPLETE CBC W/AUTO DIFF WBC: CPT

## 2022-07-08 PROCEDURE — 80053 COMPREHEN METABOLIC PANEL: CPT | Mod: 91

## 2022-07-08 PROCEDURE — 83550 IRON BINDING TEST: CPT

## 2022-07-08 PROCEDURE — 80053 COMPREHEN METABOLIC PANEL: CPT

## 2022-07-08 PROCEDURE — 83540 ASSAY OF IRON: CPT

## 2022-07-08 PROCEDURE — 82306 VITAMIN D 25 HYDROXY: CPT

## 2022-07-08 PROCEDURE — 85025 COMPLETE CBC W/AUTO DIFF WBC: CPT | Mod: 91

## 2022-07-08 PROCEDURE — 80061 LIPID PANEL: CPT

## 2022-07-08 PROCEDURE — 82728 ASSAY OF FERRITIN: CPT

## 2022-07-08 PROCEDURE — 83540 ASSAY OF IRON: CPT | Mod: 91

## 2022-07-08 PROCEDURE — 82607 VITAMIN B-12: CPT

## 2022-07-08 PROCEDURE — 36415 COLL VENOUS BLD VENIPUNCTURE: CPT

## 2022-07-08 PROCEDURE — 83735 ASSAY OF MAGNESIUM: CPT

## 2022-07-09 ENCOUNTER — TELEPHONE (OUTPATIENT)
Dept: MEDICAL GROUP | Facility: MEDICAL CENTER | Age: 52
End: 2022-07-09
Payer: COMMERCIAL

## 2022-07-09 PROBLEM — Z86.2 HISTORY OF ANEMIA: Status: ACTIVE | Noted: 2021-06-14

## 2022-07-09 NOTE — TELEPHONE ENCOUNTER
Called with labs, cholesterol is improved, liver enzymes normalized, she is taking vitamin D 4000 units, cut down to 1 pill a day which would be 2000 units daily.

## 2022-07-16 DIAGNOSIS — J30.9 ALLERGIC RHINITIS: ICD-10-CM

## 2022-07-16 DIAGNOSIS — G89.29 OTHER CHRONIC PAIN: ICD-10-CM

## 2022-08-25 SDOH — ECONOMIC STABILITY: TRANSPORTATION INSECURITY
IN THE PAST 12 MONTHS, HAS LACK OF TRANSPORTATION KEPT YOU FROM MEETINGS, WORK, OR FROM GETTING THINGS NEEDED FOR DAILY LIVING?: NO

## 2022-08-25 SDOH — HEALTH STABILITY: PHYSICAL HEALTH: ON AVERAGE, HOW MANY MINUTES DO YOU ENGAGE IN EXERCISE AT THIS LEVEL?: 30 MIN

## 2022-08-25 SDOH — ECONOMIC STABILITY: FOOD INSECURITY: WITHIN THE PAST 12 MONTHS, YOU WORRIED THAT YOUR FOOD WOULD RUN OUT BEFORE YOU GOT MONEY TO BUY MORE.: NEVER TRUE

## 2022-08-25 SDOH — ECONOMIC STABILITY: HOUSING INSECURITY
IN THE LAST 12 MONTHS, WAS THERE A TIME WHEN YOU DID NOT HAVE A STEADY PLACE TO SLEEP OR SLEPT IN A SHELTER (INCLUDING NOW)?: NO

## 2022-08-25 SDOH — ECONOMIC STABILITY: FOOD INSECURITY: WITHIN THE PAST 12 MONTHS, THE FOOD YOU BOUGHT JUST DIDN'T LAST AND YOU DIDN'T HAVE MONEY TO GET MORE.: NEVER TRUE

## 2022-08-25 SDOH — ECONOMIC STABILITY: INCOME INSECURITY: HOW HARD IS IT FOR YOU TO PAY FOR THE VERY BASICS LIKE FOOD, HOUSING, MEDICAL CARE, AND HEATING?: NOT VERY HARD

## 2022-08-25 SDOH — ECONOMIC STABILITY: TRANSPORTATION INSECURITY
IN THE PAST 12 MONTHS, HAS THE LACK OF TRANSPORTATION KEPT YOU FROM MEDICAL APPOINTMENTS OR FROM GETTING MEDICATIONS?: NO

## 2022-08-25 SDOH — HEALTH STABILITY: MENTAL HEALTH
STRESS IS WHEN SOMEONE FEELS TENSE, NERVOUS, ANXIOUS, OR CAN'T SLEEP AT NIGHT BECAUSE THEIR MIND IS TROUBLED. HOW STRESSED ARE YOU?: ONLY A LITTLE

## 2022-08-25 SDOH — HEALTH STABILITY: PHYSICAL HEALTH: ON AVERAGE, HOW MANY DAYS PER WEEK DO YOU ENGAGE IN MODERATE TO STRENUOUS EXERCISE (LIKE A BRISK WALK)?: 4 DAYS

## 2022-08-25 SDOH — ECONOMIC STABILITY: INCOME INSECURITY: IN THE LAST 12 MONTHS, WAS THERE A TIME WHEN YOU WERE NOT ABLE TO PAY THE MORTGAGE OR RENT ON TIME?: NO

## 2022-08-25 SDOH — ECONOMIC STABILITY: HOUSING INSECURITY

## 2022-08-25 SDOH — ECONOMIC STABILITY: TRANSPORTATION INSECURITY
IN THE PAST 12 MONTHS, HAS LACK OF RELIABLE TRANSPORTATION KEPT YOU FROM MEDICAL APPOINTMENTS, MEETINGS, WORK OR FROM GETTING THINGS NEEDED FOR DAILY LIVING?: NO

## 2022-08-25 ASSESSMENT — SOCIAL DETERMINANTS OF HEALTH (SDOH)
IN A TYPICAL WEEK, HOW MANY TIMES DO YOU TALK ON THE PHONE WITH FAMILY, FRIENDS, OR NEIGHBORS?: MORE THAN THREE TIMES A WEEK
HOW OFTEN DO YOU ATTENT MEETINGS OF THE CLUB OR ORGANIZATION YOU BELONG TO?: PATIENT DECLINED
WITHIN THE PAST 12 MONTHS, YOU WORRIED THAT YOUR FOOD WOULD RUN OUT BEFORE YOU GOT THE MONEY TO BUY MORE: NEVER TRUE
HOW OFTEN DO YOU GET TOGETHER WITH FRIENDS OR RELATIVES?: ONCE A WEEK
HOW OFTEN DO YOU HAVE A DRINK CONTAINING ALCOHOL: 2-3 TIMES A WEEK
HOW HARD IS IT FOR YOU TO PAY FOR THE VERY BASICS LIKE FOOD, HOUSING, MEDICAL CARE, AND HEATING?: NOT VERY HARD
DO YOU BELONG TO ANY CLUBS OR ORGANIZATIONS SUCH AS CHURCH GROUPS UNIONS, FRATERNAL OR ATHLETIC GROUPS, OR SCHOOL GROUPS?: NO
HOW OFTEN DO YOU ATTEND CHURCH OR RELIGIOUS SERVICES?: 1 TO 4 TIMES PER YEAR
HOW MANY DRINKS CONTAINING ALCOHOL DO YOU HAVE ON A TYPICAL DAY WHEN YOU ARE DRINKING: 1 OR 2
HOW OFTEN DO YOU ATTENT MEETINGS OF THE CLUB OR ORGANIZATION YOU BELONG TO?: PATIENT DECLINED
IN A TYPICAL WEEK, HOW MANY TIMES DO YOU TALK ON THE PHONE WITH FAMILY, FRIENDS, OR NEIGHBORS?: MORE THAN THREE TIMES A WEEK
HOW OFTEN DO YOU HAVE SIX OR MORE DRINKS ON ONE OCCASION: NEVER
DO YOU BELONG TO ANY CLUBS OR ORGANIZATIONS SUCH AS CHURCH GROUPS UNIONS, FRATERNAL OR ATHLETIC GROUPS, OR SCHOOL GROUPS?: NO
HOW OFTEN DO YOU ATTEND CHURCH OR RELIGIOUS SERVICES?: 1 TO 4 TIMES PER YEAR
HOW OFTEN DO YOU GET TOGETHER WITH FRIENDS OR RELATIVES?: ONCE A WEEK

## 2022-08-25 ASSESSMENT — LIFESTYLE VARIABLES
HOW MANY STANDARD DRINKS CONTAINING ALCOHOL DO YOU HAVE ON A TYPICAL DAY: 1 OR 2
HOW OFTEN DO YOU HAVE SIX OR MORE DRINKS ON ONE OCCASION: NEVER
AUDIT-C TOTAL SCORE: 3
SKIP TO QUESTIONS 9-10: 1
HOW OFTEN DO YOU HAVE A DRINK CONTAINING ALCOHOL: 2-3 TIMES A WEEK

## 2022-08-26 ENCOUNTER — OFFICE VISIT (OUTPATIENT)
Dept: MEDICAL GROUP | Facility: MEDICAL CENTER | Age: 52
End: 2022-08-26
Payer: COMMERCIAL

## 2022-08-26 ENCOUNTER — TELEPHONE (OUTPATIENT)
Dept: MEDICAL GROUP | Facility: MEDICAL CENTER | Age: 52
End: 2022-08-26

## 2022-08-26 VITALS
SYSTOLIC BLOOD PRESSURE: 126 MMHG | DIASTOLIC BLOOD PRESSURE: 76 MMHG | WEIGHT: 217 LBS | RESPIRATION RATE: 16 BRPM | BODY MASS INDEX: 34.06 KG/M2 | TEMPERATURE: 97.2 F | OXYGEN SATURATION: 96 % | HEIGHT: 67 IN | HEART RATE: 82 BPM

## 2022-08-26 DIAGNOSIS — R00.2 PALPITATIONS: ICD-10-CM

## 2022-08-26 DIAGNOSIS — E55.9 VITAMIN D DEFICIENCY: ICD-10-CM

## 2022-08-26 DIAGNOSIS — E66.9 OBESITY (BMI 30-39.9): ICD-10-CM

## 2022-08-26 DIAGNOSIS — Z87.11 HISTORY OF PEPTIC ULCER: ICD-10-CM

## 2022-08-26 DIAGNOSIS — Z00.00 PREVENTATIVE HEALTH CARE: Chronic | ICD-10-CM

## 2022-08-26 DIAGNOSIS — K90.0 CELIAC DISEASE: ICD-10-CM

## 2022-08-26 DIAGNOSIS — Z86.2 HISTORY OF ANEMIA: ICD-10-CM

## 2022-08-26 DIAGNOSIS — R73.09 IMPAIRED GLUCOSE METABOLISM: ICD-10-CM

## 2022-08-26 DIAGNOSIS — G89.29 OTHER CHRONIC PAIN: ICD-10-CM

## 2022-08-26 DIAGNOSIS — N95.1 PERIMENOPAUSAL: Chronic | ICD-10-CM

## 2022-08-26 PROCEDURE — 93000 ELECTROCARDIOGRAM COMPLETE: CPT | Performed by: INTERNAL MEDICINE

## 2022-08-26 PROCEDURE — 99214 OFFICE O/P EST MOD 30 MIN: CPT | Performed by: INTERNAL MEDICINE

## 2022-08-26 RX ORDER — OMEPRAZOLE 20 MG/1
20 CAPSULE, DELAYED RELEASE ORAL DAILY
COMMUNITY
Start: 2022-08-04 | End: 2023-04-27

## 2022-08-26 RX ORDER — PROGESTERONE 100 MG/1
100 CAPSULE ORAL DAILY
COMMUNITY
Start: 2022-08-04

## 2022-08-26 RX ORDER — ESTRADIOL 1 MG/1
1 TABLET ORAL DAILY
Status: ON HOLD | COMMUNITY
Start: 2022-08-04 | End: 2022-11-28

## 2022-08-26 ASSESSMENT — FIBROSIS 4 INDEX: FIB4 SCORE: 1.17

## 2022-08-26 NOTE — LETTER
[facility]  Franklin Gonzalez M.D.  [address]  [place] [address]  Fax: [phone]   Authorization for Release/Disclosure of   Protected Health Information   Name: SUMAYA WATSON : 1970 SSN: [id number]   Address: [address] [place] [address]  [place] [address] Phone:    [phone] (home) [phone] (work)   I authorize the entity listed below to release/disclose the PHI below to:   [facility]/Franklin Gonzalez M.D. and Franklin Gonzalez M.D.   Provider or Entity Name:                                                      Dr Herrera    Address   City, [facility]   Phone:      Fax:              [phone]   Reason for request: continuity of care   Information to be released: Old records   [  ] LAST COLONOSCOPY,  including any PATH REPORT and follow-up  [  ] LAST FIT/COLOGUARD RESULT [  ] LAST DEXA  [  ] LAST MAMMOGRAM  [  ] LAST PAP  [  ] LAST LABS [  ] RETINA EXAM REPORT  [  ] IMMUNIZATION RECORDS  [ x ] Release all info      [  ] Check here and initial the line next to each item to release ALL health information INCLUDING  _____ Care and treatment for drug and / or alcohol abuse  _____ HIV testing, infection status, or AIDS  _____ Genetic Testing    DATES OF SERVICE OR TIME PERIOD TO BE DISCLOSED: _____________  I understand and acknowledge that:  * This Authorization may be revoked at any time by you in writing, except if your health information has already been used or disclosed.  * Your health information that will be used or disclosed as a result of you signing this authorization could be re-disclosed by the recipient. If this occurs, your re-disclosed health information may no longer be protected by State or Federal laws.  * You may refuse to sign this Authorization. Your refusal will not affect your ability to obtain treatment.  * This Authorization becomes effective upon signing and will  on (date) __________.      If no date is indicated, this Authorization will  one (1) year from  the signature date.    Name: Janett Urban Pricila    Signature:                      Continuity of Care   Date:     8/30/2022       PLEASE FAX REQUESTED RECORDS BACK TO: [phone]

## 2022-08-26 NOTE — PROGRESS NOTES
Subjective     Camila Mcnulty is a 52 y.o. female who presents with annual           HPI    Here for annual exam  Medications, allergies, medical history, surgical history, social history, family history  reviewed and updated  Sees  pain and has had right neck and shoulder area, does have numbness bilateral hands to forearms, has had five PT treatments [facility] therapy on [address] and her neck pain and hand sensation symptoms have been improving.  Has been exercising pool aerobics 3-4 times per week, also stationary bike. Taking celebrex 200 mg daily with no GI upset and neurontin 600 mg at night.  Her hips and knees have really not been bothering her as much.  She has not used tramadol at all recently.  Changed from prilosec 40 mg to 20 mg with no reflux or GI symptoms   sees gyn  on estradiol and progesterone  Has had more anxiety at times, feels heart racing at times, starting last week since son moved home, caffeine cup of coffee per day, usually will occur in the evenings, will have wine in the evenings with no gerd.  No history of tach arrhythmia.  Does not seem to be related to activity.  Chest started to happen this week, heart rate will not be over 100 during these episodes with no lightheadedness or syncope.          Current Outpatient Medications   Medication Sig Dispense Refill    fluticasone (FLONASE) 50 MCG/ACT nasal spray Administer 2 Sprays into affected nostril(S) every day. 48 g 3    celecoxib (CELEBREX) 200 MG Cap Take 1 Capsule by mouth 1 time a day as needed for Moderate Pain. 100 Capsule 2    gabapentin (NEURONTIN) 300 MG Cap Take 1 Capsule by mouth 2 times a day. 180 Capsule 3    omeprazole (PRILOSEC) 40 MG delayed-release capsule Take 1 Capsule by mouth every day. 90 Capsule 3    methocarbamol (ROBAXIN) 750 MG Tab Take 1 Tablet by mouth 3 times a day as needed (muscle spasm). 90 Tablet 1    levothyroxine (SYNTHROID) 25 MCG Tab       alendronate (FOSAMAX) 70 MG Tab  Take 1 Tab by mouth every 7 days. 12 Tab 3    Magnesium 300 MG Cap Take 1 Cap by mouth every day.      Probiotic Product (PROBIOTIC ADVANCED PO) Take 1 Cap by mouth every day.      Multiple Vitamins-Minerals (MULTIVITAMIN ADULT PO) Take  by mouth every day.      Calcium Citrate-Vitamin D (CALCIUM + D PO) Take  by mouth every day.      ascorbic acid (ASCORBIC ACID) 500 MG Tab Take 500 mg by mouth every day.      vitamin D (CHOLECALCIFEROL) 1000 UNIT Tab Take 1,000 Units by mouth every day.       No current facility-administered medications for this visit.                       Varicose vein excision left lower extremity  9/11 varicose veins left excision   3/11/17 ultrasound venous bilateral lower extremities negative     Subclinical hypothyroid  9/26/19 tsh 5.8 repeat labs 3 months test ordered  1/23/20 tsh 2.0, TPO<0.2     s/p total replacement left hip  2002 left hip open decompression and osteotomy  12/03  left hip implant removal  11/10  ortho x-ray stable decompression left femoral head neck junction  11/12  pain management note bilateral trochanteric bursitis injection  5/2/13  pain note, bilateral trochanteric bursal injection  12/10/18 MRI left hip avascular necrosis left femoral head 50% articular surface, with some progression from comparison, some early subchondral collapse anteriorly and superiorly with marrow edema extending into the left femoral head and neck, surgical change consistent with prior decompression procedure and femoral osteoplasty, metallic artifact consistent with surgical pain femoral head, early degenerative changes  8/5/19  orthopedic operative note left total hip arthroplasty  12/12/19  orthopedic note 3-month follow-up left total hip replacement, incision healed nicely, right hip continue to provide discomfort, continue physical therapy follow-up 1 year  5/7/20  orthopedic note x-ray pelvis  stable left total hip arthroplasty, early osteoarthritic changes right hip, if worsens consider total joint replacement right side, follow-up 5 years     s/p right hip arthroscopy  2007  ortho right hip arthroscopy  2/11/14  note bilateral greater trochanter injection under ultrasound guidance  1/20/17 MRI pelvis bilateral femoral head avascular necrosis involving approximately 40% of the articular surface, metallic artifact left femoral head consistent with presence of small metallic pain  9/29/17   orthopedic surgical note right hip fluoroscopically guided core decompression and right hand carpal tunnel release  12/10/18 MRI right hip without chronic avascular necrosis right femoral head 50% articular surface without evidence of subchondral collapse, early degenerative changes, surgical changes consistent with prior right proximal femoral osteoplasty  5/7/20  orthopedic note x-ray pelvis stable left total hip arthroplasty, early osteoarthritic changes right hip, if worsens consider total joint replacement right side, follow-up 5 years  8/13/20  orthopedic note, x-ray pelvis grade 3 arthrosis with osteophyte acetabular side and sclerosis femoral side, joint space narrowing,ficat 2 avascular necrosis right hip progression to osteoarthritis, stable left hip arthroplasty, discussed right hip total arthroplasty  8/24/20  orthopedic operative note right hip arthroplasty  9/3/20  orthopedic note, incision site clean, continue work with physical therapy, follow-up 4 weeks  10/29/20  orthopedic note, 9 weeks postoperative right total hip arthroplasty, x-ray right hip stable arthroplasty without loosening, significant lateral calcification and potential stress response lateral femoral cortex, backed off on standing and walking activities, convert to crutches, follow-up in 3 to 4 weeks  12/1/20  orthopedic note, x-ray LS and  right hip, lateral femoral calcification and stress response that is maturing, continue 48-hour workday, follow-up 4 to 5 weeks     s/p bunionectomy  8/9/13  podiatry; right foot martín bunionectomy, right foot first metatarsophalangeal joint bunion/degenerative arthritis      s/p  appendectomy     S/p left ankle surgery 2006 ligament reconstruction     Preventative health  9/25/15 tdap  1/6/17 pneumovax  8/18/20 pap per  gyn  3/21/21 covid rachel one shot  10/5/21 colon per [facility] patchy erythema and congestion, abnormal vascularity ascending colon and rectum, biopsies negative  10/19/21 hep b ab 10.9 (8.4 to 11.5 indeterminate)  11/12/21 mammogram  11/12/21 dexa LS+1.2,forearm+1.4  1/21/22 A1c 5.2%  6/3/22 hep b second (no need for third since Ab on 10/19/21 was 10.9)  7/8/22 vit d 117 on 4000 units daily and will cut to 2000 units    Perimenopausal  9/25/15 on climara patch and progesterone tab per  gyn     neck pain  10/11 MRI cervical spine C4-C5 tiny disc bulge, C5-C6 bilateral and plate spurring with uncinate hypertrophy  10/7/16 MRI cervical spine C5-C6 small broad-based osteophyte complex, borderline foraminal stenosis, no significant progression of disease since 2010 6/4/18 MRI cervical spine C3-C4 mild diffuse bulge with mild bilateral foraminal stenosis, C5-C6 mild bilateral foraminal stenosis  5/8/18 x-ray cervical spine minimal DJD C5-C6, calcification left neck may be related to atherosclerotic plaque  5/10/21  pain note recommend repeat bilateral C3-C4 FJNA  6/9/21  pain procedure note right third occipital nerve frequency ablation, right C3-C4 medial branch nerve radiofrequency ablation  6/16/21  pain procedure note left third occipital nerve frequency ablation, right C3-C4 medial branch nerve radiofrequency ablation     Low back pain  10/10 MRI LS L5-S1 minimal disc bulge  1/11  at the pain management left L3-S1 facet  joint injection, right L3-S1 facet joint injection  2/11  pain management right L3-S1 lateral, medial, dorsal ramus nerve block  4/11  pain management left L3-S1 medial, lateral, bursal ramus nerve block  5/11  pain management bilateral SI joint injection under fluoroscopy  7/11  pain note bilat trochanteric bursitis injection  3/12  pain note, right and left L3-L4 L5-S1 medial, dorsal, lateral branch ablation  12/12  pain note, left L3-S1 dorsal and medial branch radiofrequency ablation  1/4/13  pain note; right L3-S1 radiofrequency ablation  8/14/13  pain note  9/29/13 dr.patterson ching note, left L3-S1 nerve frequency ablation  10/4/13  pain note, status post right L3-S1 FJNA  12/19/13 in note; status post right SIJI injection, continue voltaren gel, TENS, IT stretches  5/16/14  pain note; left L3-S1 medial, partial, lateral branch radiofrequency ablation under fluoroscopy  6/6/14  right L3-S1 FJNA  7/3/14 dr.patterson ching note; continue TENS,celebrex 200 mg qday, voltaren gel 1%  9/10/14  pain note; continue TENS, lidoderm patch,voltaren gel, celebrex 200 mg qday  12/20/17  pain note left L3-S1 radiofrequency ablation under fluoroscopy  12/22/17 dr.patterson ching note right L3-S1 radiofrequency ablation under fluoroscopy   2/28/19 MRI lumbar spine at [facility] imaging L4-L5 3 mm disc bulge, mild central stenosis, L5-S1 moderate facet hypertrophy, small right-sided sacral meningocele   1/25/21  pain note continue TENS,ztlido patches, lidoderm ointment 5%, pennsaid 2%  2/22/21  pain note continue TENS, ztlido patches, lidoderm ointment 5%, pennsaid 2%, recommend S3S1 FJNA  3/1/21  pain procedure note right L3-S1 medial, dorsal, lateral branch radiofrequency ablation under fluoroscopy  3/8/21   pain procedure note left L3-S1 medial, dorsal, lateral branch radiofrequency ablation under fluoroscopy  4/5/21  pain note recommend right L4-L5 epidural steroid injection  4/19/21  pain procedure note right L4-L5 epidural steroid injection under fluoroscopy  6/3/21 EMG NCS lower extremities per  pain management essentially normal motor and sensory nerve conduction lower extremities bilateral, absent bilateral sural sensory nerve action potential suspicious for mild length dependent peripheral neuropathy, but could be related to body habitus   6/22/21  pain note recommend consider spinal cord stimulator, will refer to pain psychologist  9/27/21  pain note patient has started formal physical therapy program  10/11/21  pain procedure note bilateral L5-S3 dorsal, lateral branch nerve radiofrequency ablation  10/21/21  pain procedure note bilateral L3-S1 medial, dorsal, lateral branch radiofrequency nerve ablation  11/22/21  pain note  12/13/21  pain note  1/26/22 GUTIERREZ note reviewed MRI from last year, will obtain updated lumbar spine x-ray, recommend trial of acupuncture, methocarbamol  1/28/22 x-ray lumbar spine, degenerative disc disease worse at L5-S1 and L1-L2     ibs  8/05 CT abd/pelvis negative  11/05 [facility] colon negative  10/5/21 EGD per [facility] esophagus single ulcer GE junction, multiple patches ectopic gastric mucosa, hiatal hernia, stomach mucosal patchy erythema, biopsies performed pathology moderate inflammation, negative h.pylori, biopsies duodenum mild inflammation surface changes suggestive of celiac disease, take prilosec 40 mg daily, carafate 1 g 4 times daily x14 days  10/5/21 colon per [facility] patchy mucosal and abnormal vascularity, biopsies performed, pathology negative, single flat benign polyp  10/15/21 [facility] note iron deficiency anemia improving with iron supplementation, recommend gluten-free diet,  will obtain baseline TTG, noted to have gastroesophageal ulcer which could be secondary to reflux esophagitis versus pill esophagitis, has been on carafate qid and omeprazole 40 mg daily, since the ulcer was small no surveillance EGD is indicated, continue omeprazole, discontinue carafate, avoid NSAIDs, chronic ALT elevation will obtain serology, right upper quadrant ultrasound, repeat colonoscopy 5 years  2/18/22 [facility] note celiac sprue presenting with iron deficiency, continue ferrous sulfate for another month, she notes improvement with gluten-free diet and her TTG antibodies normal, recommend continue gluten-free diet, repeat cbc,cmp and iron panel in 3 months; esophagitis with GE junction ulcer which could be secondary to reflux versus pill esophagitis, continues on prilosec 40 mg qday and surveillance EGD is not indicated, recommend minimize NSAID use (celebrex is fine), consider changing alendronate to a different formulation      Hypothyroid  9/26/19 tsh 5.8 repeat labs 3 months test ordered  1/23/20 tsh 2.0, TPO<0.2  5/28/21 tsh 5.3 will monitor  6/8/21 started on synthroid 25 mcg by  bariatric medicine  8/13/21 tsh 2.3 on synthroid 25 mcg started by  bariatric medicine  10/19/21 tsh 1.9 on synthroid 25 mcg started by  bariatric medicine  1/21/22 tsh 2.6 on synthroid 25 mcg     History of rheumatic fever  8/29/16 streptococcal pharyngitis positive strep test, treated with augmentin, developed erythema nodosum lower extremities and palms given NSAIDs and medrol dosepack  9/22/16 repeat medrol dose pack x 1 still with painful erythema nodosum nodules  9/24/16 erythema nodosum nodules and arthritic-type pains, we will retreat with penicillin V 500 mg 3 times a day ×10 days  10/5/16 high dose aspirin no benefit, changed to prednisone 20 mg daily, she has an appointment with me in 2 days  10/7/16 echo ordered, EKG done  10/10/16 cbc,cmp,tsh normal,ESR 40,CRP 0.3, repeat  throat culture negative, blood cultures negative,  10/19/16 increase prednisone to 40 mg x 3 days then back to 20 mg  10/19/16 daughter diagnosis strep throat given antibiotics, we will treat prophylactically provided patient penicillin V 500 mg tid x 10 days  10/21/16 echo normal LV size and function, EF 65%, trace MR structurally normal mitral valve, mild TR and RVSP 30  10/24/16 on prednisone 40 mg qday unable to taper to 20 mg due to flare up of pain will try 20 mg bid then taper to 20 mg am and 10 mg qpm over the next week  10/26/16  infectious disease recommended secondary prophylaxis penicillin  mg bid x 5 years  11/7/16 still with polyarthritis, on prednisone 20 mg twice a day, repeat labs, still on penicillin, will speak with rheumatology about referral  11/8/16 ESR 15,CRP 0.3,wbc 10.9 (on prednisone),hgb 14,hct 43,cmp normal,RF,C3C4,TPO,lyme,MERA,HLA B27 negative, Factin IgG 22 (0-19),parietal cell Ab 25(0-25)  11/14/16  rheumatolog note, features consistent with rheumatic fever, also consider seronegative spondyloarthropathies, sarcoidosis, rheumatoid arthritis, will check sacroiliac films, hand and feet x-rays, follow-up one week  11/17/16 refill prednisone  11/18/16 ACE serum level normal, G6PD ad vitamin 1,25 d level normal  11/21/16 cxr negative  11/21/16 xray SI joints negative for erosions  11/29/16 x-ray joint survey hands, feet, ankles no evidence of inflammatory arthropathy  11/29/16 CT soft tissue neck without; negative  12/7/16  cardiology repeat echo in 3-4 weeks  12/10/16 MRI right knee multiple areas right knee avascular necrosis medial and lateral femoral condyle, medial and lateral tibial plateau, bone marrow edema  12/23/16  rheumatology note, inflammatory markers did normalize with steroids, rheumatoid factor, CCP, MERA,ANKA negative continue to taper steroids given osteonecrosis alternating 17.5 mg and 15 mg, and 15 mg, and 15 mg alternating  with 12.5 mg, and so forth  12/27/16 echo LV ejection fraction 60%, no valvular disease  1/4/17 ESR 8,CRP 0.3  1/12/17  rheumatology note, inflammatory markers did normalize with prednisone therapy, continue taper with osteonecrosis bilateral tenderness achilles insertion consider enthesitis, will get MRI left achilles region to evaluate for tendinitis or tenosynovitis, follow-up 6 weeks  1/20/17 MRI left foot without; no evidence of marrow edema, arthropathy, tendinopathy or ligament injury  3/6/17 anticardiolipin antibodies, lupus anticoagulant, protein CNS, factor V 5 Leyden, anti-thrombin panel, beta 2 glycoprotein negative  3/10/17 ESR 19,CRP 0.17, cortisol 3.3, ACTH 16, IgG 753 ([id number]), IgA 140, IgM 93  3/14/16  rheumatology note currently off prednisone, osteonecrosis involving multiple joints, knees, hips, right ankle, etiology unclear, antiphospholipid antibody, protein C and S normal, antithrombin III factor V leyden normal, refer to hematology for evaluation other etiologies, slightly decreased IgG refer to allergy immunology  3/14/17 remains on penicillin  3/30/17  allergy immunology evaluation slight IgG reduction 753 with normal IgG, IgM, no history to suggest primary immunodeficiency, recent diagnosis of group A streptococcal pharyngitis complicated by erythema nodosum, 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 therapy, we will perform humerol workup to include repeat quantitative immunoglobulins with subclass, specific antibodies for haemophilus influenza, pneumococcal, tetanus/diphtheria, limited flow cytometry with repeat CBC, SPEP/UPEP, ESR, CRP, UA, follow-up ASO, DNase B titer  4/17/17  allergy note slight IgG reduction at 753 with normal IgA, normal IgM, no history to suggest primary immunodeficiency, suspect that 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 carryover effect from chronic prednisone therapy, cbc unremarkable, inflammatory markers ESR slightly elevated  28, CRP 2.4, urinalysis negative for protein or blood, no further humeral immunodeficiency or lab assessment at this point with normal immunologic titers, intact specific and subclass antibodies, the IgG decrease does not represent any underlying primary immunodeficiency or active antibody dysfunction at this time  4/25/17  rheumatology note await [place] [facility] evaluation  5/10/17 dr.wu lacy endocrinology note recommend fosamax weekly with anecdotal evidence that bisphosphonate therapy may provide symptomatic benefit and osteonecrosis, if develops side effects could consider intravenous reclast, referral [place] [facility], follow-up 4 months  5/28/17  rheumatology note avascular necrosis, arthralgias lower extremities, continue off prednisone, on alendronate per [place] [facility]  7/3/17  infectious disease consultation history of streptococcal pharyngitis with probable rheumatic fever, recommend discontinue penicillin prophylaxis follow-up as needed  6/27/17  [place] [facility] evaluation, recommended evaluation of hypercoagulable state, to consider antiphospholipid antibodies (lupus anticoagulant, anticardiolipin antibodies, beta-2 glycoprotein, phosphatidyl serine antibodies, factor V Leiden, antithrombin III, prothrombin mutation, protein C&S quantitative and qualitative, homocysteine, recommend after review of records do not strongly feel that she has true rheumatic fever, clear to discontinue penicillin, recheck ASO and anti-DNase B titers after 4 weeks  8/16/17 homocystine, protein C and protein S, lupus anticoagulant, beta 2 glycoprotein, antithrombin III, anticardiolipin antibody, anti-DNAse antibody, antistreptolysin all negative  8/31/17  rheumatology note, continues on alendronate per bone endocrinology recommendation [place], did follow up with orthopedics, will proceed with core decompression  2/23/18  [place] [facility]  [facility] note, multifocal osteonecrosis knees, hips, other joints, underlying etiology unclear, trial few months of alendronate without dramatic improvement, alendronate discontinued because of undergoing core procedures with her hips, recommend continuing off alendronate monitoring for improvement, reassess in 6 months  10/10/18  [place] [facility] consultation osteonecrosis knees, hips, other joints, unclear etiology trial of alendronate without improvement, discontinued prior to hip procedures, hip pain is improved, discussed another trial of alendronate for limited timeframe 70 mg weekly reassess 6 months   8/25/21  [place] end[facility] note continue alendronate for 6 months, follow-up 6 months     History of peptic ulcer  11/2/18 seen UC for abdominal discomfort right upper quadrant  11/3/18 ultrasound liver gallbladder region negative  11/10/18 hida negative  11/20/18 trial of prilosec 40 mg  6/17/19 taper prilosec  10/5/21 EGD per [facility] esophagus single ulcer GE junction, multiple patches ectopic gastric mucosa, hiatal hernia, stomach mucosal patchy erythema, biopsies performed pathology moderate inflammation, negative h.pylori, biopsies duodenum mild inflammation surface changes suggestive of celiac disease, take prilosec 40 mg daily, carafate 1 g 4 times daily x14 days  10/5/21 EGD per [facility] esophagus single ulcer GE junction, multiple patches ectopic gastric mucosa, hiatal hernia, stomach mucosal patchy erythema, biopsies performed pathology moderate inflammation, negative h.pylori, biopsies duodenum mild inflammation surface changes suggestive of celiac disease, take prilosec 40 mg daily, carafate 1 g 4 times daily x14 days  10/15/21 [facility] note iron deficiency anemia improving with iron supplementation, recommend gluten-free diet, will obtain baseline TTG, noted to have gastroesophageal ulcer which could be secondary to reflux esophagitis versus pill esophagitis, has been on carafate qid  and omeprazole 40 mg daily, since the ulcer was small no surveillance EGD is indicated, continue omeprazole, discontinue carafate, avoid NSAIDs, chronic ALT elevation will obtain serology, right upper quadrant ultrasound, repeat colonoscopy 5 years  2/18/22 [facility] note celiac sprue presenting with iron deficiency, continue ferrous sulfate for another month, she notes improvement with gluten-free diet and her TTG antibodies normal, recommend continue gluten-free diet, repeat cbc,cmp and iron panel in 3 months; esophagitis with GE junction ulcer which could be secondary to reflux versus pill esophagitis, continues on prilosec 40 mg qday and surveillance EGD is not indicated, recommend minimize NSAID use (celebrex is fine), consider changing alendronate to a different formulation   3/23/22 dr.wu lacy endocrinology note recently diagnosed with gastric ulcer and celiac disease, given GI side effects of alendronate, risks of continuing oral bisphosphonate therapy outweighs any potential benefit and will discontinue alendronate  7/8/22 b12 978,vit d 117,magnesium 2.1 on prilosec    history covid  3/21/21 covid rachel one shot  12/29/21 tested positive covid     history anemia  7/29/19 hgb 13.2,hct 42.2  9/27/19 hgb 11.8,hct 37.7  8/29/20 hgb 11.7,hct 37.9,mcv 88.6  5/28/21 hgb 10,hct 33.5,mcv 78.6  6/12/21 hgb 10.2,hct 35.5,mcv 79,iron 27,%sat 5,TIBC 553,ferritin 10.5,,retic 1.8%, b12 1153, folate 12.7,SPEP negative  6/14/21 start ferrous sulfate every other day, continue vitamin C orally, call GI consultants to schedule appointment, repeat labs 4 weeks ordered  7/16/21 hgb 11.3,hct 37.1,mcv 85.5,iron 52,%sat 12,ferritin 22.6   7/20/21 [facility] note schedule EGD and colonoscopy for iron deficiency anemia  10/5/21 EGD per [facility] single ulcer gastroesophageal junction, multiple patches of gastric ectopic mucosa upper 3rd esophagus, hiatal hernia, patchy erythema stomach, erosions, ulceration, biopsies performed pathology  moderate inflammation, negative h.pylori, biopsies duodenum mild inflammation surface changes suggestive of celiac disease  10/5/21 colon per [facility] patchy mucosal and abnormal vascularity, biopsies performed, pathology negative, single flat benign polyp  10/15/21 [facility] note iron deficiency anemia improving with iron supplementation, recommend gluten-free diet, will obtain baseline TTG, noted to have gastroesophageal ulcer which could be secondary to reflux esophagitis versus pill esophagitis, has been on carafate qid and omeprazole 40 mg daily, since the ulcer was small no surveillance EGD is indicated, continue omeprazole, discontinue Carafate, avoid NSAIDs, chronic ALT elevation will obtain serology, right upper quadrant ultrasound, repeat colonoscopy 5 years  10/19/21 AST 45,ALT 46, hep b ab positive, hep b core ab negative,hep b sAg negative, hep a ab negative, hep c ab negative,  F-actin IgG 21 (0-19), ceruloplasmin normal, copper normal, INR 0.9,vit k 0.84, vit c 83, vit a 1.3, vit e (alpha-tocopherol) 18.5, MERA 1:320 speckled, MERA HIp-2 IgG<1:80,antiDS DNA SSA, SSB, anti-SCL 70, Keli-1 antibody, smith antibody all negative  1/21/22 hgb 13.8,hct 42,iron 63,%sat 15,ferritin 70  2/18/22 [facility] note celiac sprue presenting with iron deficiency, continue ferrous sulfate for another month, she notes improvement with gluten-free diet and her TTG antibodies normal, recommend continue gluten-free diet, repeat cbc,cmp and iron panel in 3 months; esophagitis with GE junction ulcer which could be secondary to reflux versus pill esophagitis, continues on prilosec 40 mg qday and surveillance EGD is not indicated, recommend minimize NSAID use (celebrex is fine), consider changing alendronate to a different formulation   3/23/22 dr.wu lacy endocrinology note recently diagnosed with gastric ulcer and celiac disease, given GI side effects of alendronate, risks of continuing oral bisphosphonate therapy outweighs any potential  benefit and will discontinue alendronate  7/8/22 hgb 13.5,hct 40,iron 89,%sat 21,ferritin 89     History abnormal liver function test  10/15/21 [facility] note iron deficiency anemia improving with iron supplementation, recommend gluten-free diet, will obtain baseline TTG, noted to have gastroesophageal ulcer which could be secondary to reflux esophagitis versus pill esophagitis, has been on carafate qid and omeprazole 40 mg daily, since the ulcer was small no surveillance EGD is indicated, continue omeprazole, discontinue carafate, avoid NSAIDs, chronic ALT elevation will obtain serology, right upper quadrant ultrasound, repeat colonoscopy 5 years  10/19/21 zinc 461  10/19/21 IgG 873, IgM 112, IgA 201, IgE 18  10/19/21 celiac panel negative  10/19/21 AST 45,ALT 46, hep b ab positive, hep b core ab negative,hep b sAg negative, hep a ab negative, hep c ab negative,  F-actin IgG 21 (0-19), ceruloplasmin normal, copper normal, INR 0.9,vit k 0.84, vit c 83, vit a 1.3, vit e (alpha-tocopherol) 18.5, MERA 1:320 speckled, MERA HIp-2 IgG<1:80,antiDS DNA SSA, SSB, anti-SCL 70, Keli-1 antibody, smith antibody all negative  10/25/21 ultrasound abdomen mildly echogenic liver   1/21/22 AST 60,ALT 88,alk phos 94,bili 0.3  2/18/22 GI note NAFLD with chronic ALT elevation predominant hepatocellular pattern without evidence of cirrhosis, ultrasound demonstrated hepatic steatosis, celiac disease may be contributing, repeat labs 3 months, screening colonoscopy 5 years  7/8/22 AST 23,ALT 25     foot pain  8/5/15 MRI right foot severe artifact secondary to first MTP are clear, limiting analysis of soft tissue, highly likely complete FHL tear  8/28/15  orthopedic note right ankle FHL tendon rupture seen on MRI, do not recommend surgical repair at this time which would involve hemiarthroplasty, implant, first MTP fusion with bone graft, followup podiatry   8/18/17 MRI left foot normal, MRI right foot osteoathritis right first  metatarsal head and sesamoids moderate in degree, ulnar edema with an sesamoids may be due to susceptibility artifact related to first metatarsal phalangeal arthroplasty     Dyslipidemia  9/29/14 chol 186,trig 46,hdl 76,ldl 101  9/18/15 chol 225,trig 50,hdl 77,ldl 138  9/20/17 chol 189,trig 53,hdl 56,ldl 122  5/11/18 ultrasound carotid minimal left carotid bulb plaque, calcified, no evidence of occlusion less than 50% stenosis  9/28/18 chol 187,trig 59,hdl 76,ldl 99  5/8/18 EKG done in clinic, ultrasound carotid ordered follow-up calcifications   5/11/18 ultrasound carotid less than 50% internal stenosis bilateral  9/27/19 chol 196,trig 60,hdl 79,ldl 105  5/28/21 chol 201,trig 58,hdl 70,ldl 119  1/21/22 chol 250,trig 93,hdl 69,ldl 162;10 year risk 1.4%  7/8/22 chol 199,trig 77,hdl 84,ldl 100 on new diet    chronic pain  1/18/22   1/18/22 controlled substance contract  1/18/22 ultram refill #90  tried physical therapy,TENS,acupuncture,chiropractor,massage therapy,heat, NSAIDs,gabapentin,celebrex,voltaren,lidoderm patch     Celiac   10/5/21 EGD per [facility] biopsies duodenum mild inflammation surface changes suggestive of celiac disease  10/5/21 colon per [facility] patchy erythema and congestion, abnormal vascularity ascending colon and rectum, biopsies negative  10/19/21 t-TG IgA negative  1/21/22 t-TG IgA negative  2/18/22 [facility] note celiac sprue presenting with iron deficiency, continue ferrous sulfate for another month, she notes improvement with gluten-free diet and her TTG antibodies normal, recommend continue gluten-free diet, repeat cbc,cmp and iron panel in 3 months; esophagitis with GE junction ulcer which could be secondary to reflux versus pill esophagitis, continues on prilosec 40 mg qday and surveillance EGD is not indicated, recommend minimize NSAID use (celebrex is fine), consider changing alendronate to a different formulation, NAFLD with chronic ALT elevation predominant hepatocellular pattern without evidence  of cirrhosis, ultrasound demonstrated hepatic steatosis, celiac disease may be contributing, repeat labs 3 months, screening colonoscopy 5 years     avascular necrosis  8/29/16 streptococcal pharyngitis positive strep test, treated with augmentin, developed erythema nodosum lower extremities and palms given NSAIDs and medrol dosepack  9/22/16 repeat medrol dose pack x 1 still with painful erythema nodosum nodules  10/5/16 high dose aspirin no benefit, changed to prednisone 20 mg daily,  10/19/16 increase prednisone to 40 mg x 3 days then back to 20 mg  11/7/16 still with polyarthritis, on prednisone 20 mg twice a day, repeat labs, still on penicillin, will speak with rheumatology about referral  11/14/16  rheumatolog note, features consistent with rheumatic fever, also consider seronegative spondyloarthropathies, sarcoidosis, rheumatoid arthritis, will check sacroiliac films, hand and feet x-rays, follow-up one week  12/23/16  rheumatology note, inflammatory markers did normalize with steroids, rheumatoid factor, CCP, MERA,ANKA negative continue to taper steroids given osteonecrosis alternating 17.5 mg and 15 mg, and 15 mg, and 15 mg alternating with 12.5 mg, and so forth  12/10/16 MRI right knee multiple areas right knee avascular necrosis medial and lateral femoral condyle, medial and lateral tibial plateau, bone marrow edema  12/23/16  rheumatology note, inflammatory markers did normalize with steroids, rheumatoid factor, CCP, MERA,ANKA negative continue to taper steroids given osteonecrosis alternating 17.5 mg and 15 mg, and 15 mg, and 15 mg alternating with 12.5 mg, and so forth  1/20/17 MRI left foot no evidence of arthropathy or tendinopathy  1/20/17 MRI left ankle metallic artifact lateral malleolus  1/20/17 MRI right ankle small oblong focus finger edema tibial plafond below subchondral plate, would be consistent with small area of avascular necrosis  1/20/17 MRI left knee multiple areas  avascular necrosis surrounding the knee to include femur, tibia, fibula  1/20/17 MRI pelvis bilateral femoral head avascular necrosis involving approximately 40% of the articular surface, metallic artifact left femoral head consistent with presence of small metallic pin  2/9/17 tapering prednisone down to 2.5 mg alternating with 5 mg  2/23/17  rheumatology note complete steroid taper then repeat ESR, CRP and CPK, also check antiphospholipid antibody,antithrombin, factor v leiden, protein s  3/6/17 anticardiolipin antibodies, lupus anticoagulant, protein CNS, factor V 5 Leyden, anti-thrombin panel, beta 2 glycoprotein negative  3/10/17 ESR 19,CRP 0.17, cortisol 3.3, ACTH 16, IgG 753 ([id number]), IgA 140, IgM 93  3/14/16  rheumatology note currently off prednisone, osteonecrosis involving multiple joints, knees, hips, right ankle, etiology unclear, antiphospholipid antibody, protein C and S normal, antithrombin III factor V leyden normal, refer to hematology for evaluation other etiologies, slightly decreased IgG refer to allergy immunology  3/30/17  allergy immunology evaluation slight IgG reduction 753 with normal IgG, IgM, no history to suggest primary immunodeficiency, recent diagnosis of group A streptococcal pharyngitis complicated by erythema nodosum, 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 therapy, we will perform humerol workup to include repeat quantitative immunoglobulins with subclass, specific antibodies for haemophilus influenza, pneumococcal, tetanus/diphtheria, limited flow cytometry with repeat CBC, SPEP/UPEP, ESR, CRP, UA, follow-up ASO, DNase B titer  5/10/17 dr.wu lacy endocrinology note recommend fosamax weekly with anecdotal evidence that bisphosphonate therapy may provide symptomatic benefit and osteonecrosis, if develops side effects could consider intravenous reclast, referral [place] [facility], follow-up 4 months  5/28/17  rheumatology note  avascular necrosis, arthralgias lower extremities, continue off prednisone, on alendronate per [place] [facility]  7/7/17 MRI left knee without; again seen multiple bone infarcts femur, tibia, fibula, patella appeared less prominent on current examination  7/7/17 MRI right knee without; interval improvement in size of multiple bony infarcts involving femur, tibia, fibula, some infarcts have resolved  7/7/17 MRI pelvis without; stable bilateral femoral head avascular necrosis, these have not progressed and there is no evidence of subchondral collapse or arthropathy  6/27/17  [place] [facility] evaluation, recommended evaluation of hypercoagulable state, to consider antiphospholipid antibodies (lupus anticoagulant, anticardiolipin antibodies, beta-2 glycoprotein, phosphatidyl serine antibodies, factor V Leiden, antithrombin III, prothrombin mutation, protein C&S quantitative and qualitative, homocysteine, recommend after review of records do not strongly feel that she has true rheumatic fever, clear to discontinue penicillin, recheck ASO and anti-DNase B titers after 4 weeks  8/16/17 homocystine, protein C and protein S, lupus anticoagulant, beta 2 glycoprotein, antithrombin III, anticardiolipin antibody, anti-DNAse antibody, antistreptolysin all negative  8/18/17 MRI left foot normal, MRI right foot osteoathritis right first metatarsal head and sesamoids moderate in degree, ulnar edema with an sesamoids may be due to susceptibility artifact related to first metatarsal phalangeal arthroplasty  8/31/17  rheumatology note, continues on alendronate per bone endocrinology recommendation [place], did follow up with orthopedics, will proceed with core decompression  9/27/17  [place] [facility] with focal osteonecrosis involving knees, hips, other joints, etiology unclear, dramatic improvement with a few months of alendronate, about to undergo surgical intervention hip, will  discontinue alendronate pending surgery, reassess 4-5 months  9/29/17   orthopedic surgical note right hip fluoroscopically guided core decompression and right hand carpal tunnel release  12/29/17  orthopedic surgical note  left proximal femoral head core decompression  2/23/18  [place] [facility] note, multifocal osteonecrosis knees, hips, other joints, underlying etiology unclear, trial few months of alendronate without dramatic improvement, alendronate discontinued because of undergoing core procedures with her hips, recommend continuing off alendronate monitoring for improvement, reassess in 6 months  10/10/18  [place] [facility] consultation osteonecrosis knees, hips, other joints, unclear etiology trial of alendronate without improvement, discontinued prior to hip procedures, hip pain is improved, discussed another trial of alendronate for limited timeframe 70 mg weekly reassess 6 months  12/7/18 crp 0.46, ESR 23, hgb 13,hct 42, wbc 7.9  12/10/18 MRI left hip avascular necrosis left femoral head 50% articular surface, with some progression from comparison, some early subchondral collapse anteriorly and superiorly with marrow edema extending into the left femoral head and neck, surgical change consistent with prior decompression procedure and femoral osteoplasty, metallic artifact consistent with surgical pain femoral head, early degenerative changes  12/10/18 MRI right hip without chronic avascular necrosis right femoral head 50% articular surface without evidence of subchondral collapse, early degenerative changes, surgical changes consistent with prior right proximal femoral osteoplasty  4/24/19  [place] [facility] order bone density if she does have osteoporosis consider continuation bisphosphonate versus anabolic therapy  5/2/19  orthopedic note undergo MRI evaluation of her left hip  6/10/19 MRI left hip at [facility],  redemonstration moderate to advanced avascular necrosis bilateral femoral heads, degree of associated bone marrow edema appears significantly decreased from previous study particular on the left, small left hip effusion, postoperative changes remote core decompression and femoral osteotomy  6/27/19  orthopedic note follow-up evaluation advanced degenerative arthritis left hip, symptomatic failing conservative measures, considering total joint arthroplasty, recommend proceeding with left total hip arthroplasty due to progression of avascular necrosis  8/5/19  orthopedic operative note left total hip arthroplasty  9/9/19 off fosamax for now due to recent hip arthroplasty  10/9/19 dr.wu white endocrinology notes most recent bone density reviewed, remains off alendronate since left hip replacement, clear to resume alendronate 70 mg weekly follow-up 6 months  12/10/19 on fosamax 70 mg 5/7/20  orthopedic note x-ray pelvis stable left total hip arthroplasty, early osteoarthritic changes right hip, if worsens consider total joint replacement right side, follow-up 5 years  8/19/20 dr.wu white endocrinology note multifocal osteonecrosis, underlying etiology unclear, initially experienced dramatic improvement with alendronate however recurrent symptoms despite alendronate therapy, planning to undergo right hip replacement and will resume alendronate after surgery  12/17/20 back on alendronate 70 mg weekly  2/2/21 dr.wu white endocrinology note, continue alendronate 70 mg weekly  8/25/21 dr.wu white endocrinology note she had a few months of alendronate without significant improvement in symptoms, bone density test is normal, will continue alendronate until after spine surgery to see if knee pain resolves, if pain continues after surgery then it is likely intrinsic to the knee and electrolyte is not helping, follow-up 6 months  3/23/22 dr.wu lacy endocrinology note recently  "diagnosed with gastric ulcer and celiac disease, given GI side effects of alendronate, risks of continuing oral bisphosphonate therapy outweighs any potential benefit and will discontinue alendronate     allergic rhinitis  Tried otc claritin  9/12/13 flonase trial  9/25/15 flonase and zyrtec or claritin           Patient Active Problem List   Diagnosis    S/P appendectomy    S/p left ankle surgery    S/p hip right arthroplasty     Low back pain    Neck pain    IBS (irritable bowel syndrome)    Preventative health care    Varicose vein of leg    S/P bunionectomy    Allergic rhinitis due to animal hair and dander    Chronic pain    Dyslipidemia    Perimenopausal    Foot pain, right    Obesity (BMI 30-39.9)    History of rheumatic fever    Avascular necrosis (HCC)    History of peptic ulcer    S/p hip left arthroplasty    Hypothyroid    History of anemia    History of abnormal liver function test    History of COVID-19    Celiac disease              Patient Care Team:  Franklin Gonzalez M.D. as PCP - General (Internal Medicine)  Starr Jackson M.D. (Inactive) as Consulting Physician (Rheumatology)  Franklin Gonzalez M.D.      ROS           Objective     /76   Pulse 82   Temp 36.2 °C (97.2 °F)   Resp 16   Ht 1.702 m (5' 7\")   Wt 98.4 kg (217 lb)   LMP 06/30/2012   SpO2 96%   BMI 33.99 kg/m²      Physical Exam  Vitals and nursing note reviewed.   Constitutional:       Appearance: Normal appearance.   HENT:      Head: Normocephalic and atraumatic.      Right Ear: External ear normal.      Left Ear: External ear normal.   Eyes:      Conjunctiva/sclera: Conjunctivae normal.   Cardiovascular:      Rate and Rhythm: Normal rate and regular rhythm.   Pulmonary:      Effort: No respiratory distress.      Breath sounds: Normal breath sounds.   Abdominal:      General: There is no distension.   Neurological:      Mental Status: She is alert.   Psychiatric:         Mood and Affect: Mood normal.         Behavior: Behavior " normal.                  Assessment & Plan        Assessment  #1  Annual exam    #2 avascular necrosis status post left hip total arthroplasty currently off alendronate per  [place] [facility]    #3 celiac disease diagnosed by EGD last year followed by GI    #4 history of anemia and peptic ulcer disease, followed by GI, remains on Prilosec 20 mg decrease from 40 mg with no breakthrough symptoms, tolerating Celebrex with no GI upset    #5 BMI 33.99 started on low-dose Synthroid 25 mcg last summer by  weight management, patient would like to try going off the medication as her TSH was 5.3 at the time, TPO was negative    #6 chronic neck pain followed by Dr. Rees pain management, has been having more right shoulder pain currently seeing physical therapy, symptoms are had numbness have improved with physical therapy    #7 postmenopausal on estradiol and progesterone per gynecology     #8 chronic low back pain followed by pain management, back pain has been improved going to aqua therapy    #9 status post left and right hip arthroplasty stable no hip worsening, not taking tramadol, Celebrex has been beneficial without GI upset    #10 episode of palpitations this week typically occurring at night unrelated to activity, she has been under more stress since her son moved in with them this past week, limits caffeinated beverages and alcohol, no history of arrhythmia    #11 vitamin D deficiency    #12 dyslipidemia diet controlled    Plan  #! Old records  pain and custom PT on maranda once per week, continue physical therapy since symptoms improving    #2 stop thyroid and repeat tsh 6-8 weeks     #3 lab slip provided    #4 old records gynecology , postmenopausal hormones per gynecology    #5 continue with a good nutrition, exercise    #6 continue Celebrex, continue PPI Prilosec 20 mg, monitor for GI upset, blood in stools    #7 EKG today sinus rhythm    #8 echo and 2-week event  monitor for palpitations, continue minimize caffeine and alcohol     #9 hepatitis B third vaccination was due after November 3 she can walk in for that    #10 up-to-date on colonoscopy    #11 mammogram due end of the year, repeat bone density next year    #12 follow-up 1 year

## 2022-08-26 NOTE — LETTER
[facility]  Franklin Gonzalez M.D.  [address]  [place] [address]  Fax: [phone]   Authorization for Release/Disclosure of   Protected Health Information   Name: SUMAYA WATSON : 1970 SSN: [id number]   Address: [address] [place] [address]  [place] [address] Phone:    [phone] (home) [phone] (work)   I authorize the entity listed below to release/disclose the PHI below to:   [facility]/Franklin Gonzalez M.D. and Franklin Gonzalez M.D.   Provider or Entity Name:                                                  Dr Rees (Pain Mgmt)   Address   City, [facility], [facility]   Phone:      Fax:                 [phone]   Reason for request: continuity of care   Information to be released: Past 1 yr of records   [  ] LAST COLONOSCOPY,  including any PATH REPORT and follow-up  [  ] LAST FIT/COLOGUARD RESULT [  ] LAST DEXA  [  ] LAST MAMMOGRAM  [  ] LAST PAP  [  ] LAST LABS [  ] RETINA EXAM REPORT  [  ] IMMUNIZATION RECORDS  [ x ] Release all info      [  ] Check here and initial the line next to each item to release ALL health information INCLUDING  _____ Care and treatment for drug and / or alcohol abuse  _____ HIV testing, infection status, or AIDS  _____ Genetic Testing    DATES OF SERVICE OR TIME PERIOD TO BE DISCLOSED: _____________  I understand and acknowledge that:  * This Authorization may be revoked at any time by you in writing, except if your health information has already been used or disclosed.  * Your health information that will be used or disclosed as a result of you signing this authorization could be re-disclosed by the recipient. If this occurs, your re-disclosed health information may no longer be protected by State or Federal laws.  * You may refuse to sign this Authorization. Your refusal will not affect your ability to obtain treatment.  * This Authorization becomes effective upon signing and will  on (date) __________.      If no date is indicated, this Authorization will   one (1) year from the signature date.    Name: Janett Mcnulty    Signature:                    Continuity of Care   Date:     2022       PLEASE FAX REQUESTED RECORDS BACK TO: [phone]

## 2022-09-09 PROBLEM — G56.00 CARPAL TUNNEL SYNDROME: Status: ACTIVE | Noted: 2017-09-29

## 2022-09-27 ENCOUNTER — HOSPITAL ENCOUNTER (OUTPATIENT)
Dept: RADIOLOGY | Facility: MEDICAL CENTER | Age: 52
End: 2022-09-27
Attending: NURSE PRACTITIONER
Payer: COMMERCIAL

## 2022-09-27 DIAGNOSIS — M25.511 PAIN IN JOINT OF RIGHT SHOULDER: ICD-10-CM

## 2022-09-27 PROCEDURE — 73221 MRI JOINT UPR EXTREM W/O DYE: CPT | Mod: RT

## 2022-09-28 PROBLEM — M25.519 SHOULDER PAIN: Status: ACTIVE | Noted: 2022-09-28

## 2022-10-04 ENCOUNTER — TELEPHONE (OUTPATIENT)
Dept: MEDICAL GROUP | Facility: MEDICAL CENTER | Age: 52
End: 2022-10-04
Payer: COMMERCIAL

## 2022-10-04 DIAGNOSIS — M75.111 NONTRAUMATIC INCOMPLETE TEAR OF RIGHT ROTATOR CUFF: ICD-10-CM

## 2022-10-04 DIAGNOSIS — G89.29 CHRONIC RIGHT SHOULDER PAIN: ICD-10-CM

## 2022-10-04 DIAGNOSIS — M25.511 CHRONIC RIGHT SHOULDER PAIN: ICD-10-CM

## 2022-10-05 NOTE — TELEPHONE ENCOUNTER
Dr Barth,     Pt has appt 10/19/22 (Moved up appointment because we had an opening)      She will be discussing Cosentyx with you as this is the biologic that helped her the most. Would you like us to send in the Cosentyx now because PA will take some time? Pt states Psoriasis is worsening   Is there new paperwork that needs to be completed?

## 2022-10-07 ENCOUNTER — HOSPITAL ENCOUNTER (OUTPATIENT)
Dept: LAB | Facility: MEDICAL CENTER | Age: 52
End: 2022-10-07
Attending: INTERNAL MEDICINE
Payer: COMMERCIAL

## 2022-10-07 ENCOUNTER — TELEPHONE (OUTPATIENT)
Dept: MEDICAL GROUP | Facility: MEDICAL CENTER | Age: 52
End: 2022-10-07
Payer: COMMERCIAL

## 2022-10-07 DIAGNOSIS — R00.2 PALPITATIONS: ICD-10-CM

## 2022-10-07 DIAGNOSIS — E55.9 VITAMIN D DEFICIENCY: ICD-10-CM

## 2022-10-07 DIAGNOSIS — R73.09 IMPAIRED GLUCOSE METABOLISM: ICD-10-CM

## 2022-10-07 PROBLEM — Z86.39 HISTORY OF HYPOTHYROIDISM: Status: ACTIVE | Noted: 2020-01-24

## 2022-10-07 LAB
25(OH)D3 SERPL-MCNC: 99 NG/ML (ref 30–100)
ALBUMIN SERPL BCP-MCNC: 4.5 G/DL (ref 3.2–4.9)
ALBUMIN/GLOB SERPL: 1.7 G/DL
ALP SERPL-CCNC: 56 U/L (ref 30–99)
ALT SERPL-CCNC: 31 U/L (ref 2–50)
ANION GAP SERPL CALC-SCNC: 11 MMOL/L (ref 7–16)
AST SERPL-CCNC: 28 U/L (ref 12–45)
BILIRUB SERPL-MCNC: 0.3 MG/DL (ref 0.1–1.5)
BUN SERPL-MCNC: 18 MG/DL (ref 8–22)
CALCIUM SERPL-MCNC: 9.4 MG/DL (ref 8.5–10.5)
CHLORIDE SERPL-SCNC: 98 MMOL/L (ref 96–112)
CO2 SERPL-SCNC: 24 MMOL/L (ref 20–33)
CREAT SERPL-MCNC: 0.72 MG/DL (ref 0.5–1.4)
EST. AVERAGE GLUCOSE BLD GHB EST-MCNC: 100 MG/DL
GFR SERPLBLD CREATININE-BSD FMLA CKD-EPI: 101 ML/MIN/1.73 M 2
GLOBULIN SER CALC-MCNC: 2.7 G/DL (ref 1.9–3.5)
GLUCOSE SERPL-MCNC: 90 MG/DL (ref 65–99)
HBA1C MFR BLD: 5.1 % (ref 4–5.6)
POTASSIUM SERPL-SCNC: 4.7 MMOL/L (ref 3.6–5.5)
PROT SERPL-MCNC: 7.2 G/DL (ref 6–8.2)
SODIUM SERPL-SCNC: 133 MMOL/L (ref 135–145)
TSH SERPL DL<=0.005 MIU/L-ACNC: 2.04 UIU/ML (ref 0.38–5.33)

## 2022-10-07 PROCEDURE — 36415 COLL VENOUS BLD VENIPUNCTURE: CPT

## 2022-10-07 PROCEDURE — 83036 HEMOGLOBIN GLYCOSYLATED A1C: CPT

## 2022-10-07 PROCEDURE — 82306 VITAMIN D 25 HYDROXY: CPT

## 2022-10-07 PROCEDURE — 84443 ASSAY THYROID STIM HORMONE: CPT

## 2022-10-07 PROCEDURE — 80053 COMPREHEN METABOLIC PANEL: CPT

## 2022-10-08 NOTE — TELEPHONE ENCOUNTER
Notified with labs, TSH normal off medication, continue off Synthroid.  Vitamin D improved now down to only 2000 units/day, upper end of normal but she can continue 2000 units daily.  Liver enzymes remain normal.

## 2022-11-01 ENCOUNTER — APPOINTMENT (OUTPATIENT)
Dept: RADIOLOGY | Facility: MEDICAL CENTER | Age: 52
End: 2022-11-01
Attending: ORTHOPAEDIC SURGERY
Payer: COMMERCIAL

## 2022-11-11 ENCOUNTER — HOSPITAL ENCOUNTER (OUTPATIENT)
Dept: RADIOLOGY | Facility: MEDICAL CENTER | Age: 52
End: 2022-11-11
Attending: ORTHOPAEDIC SURGERY
Payer: COMMERCIAL

## 2022-11-11 DIAGNOSIS — M25.512 LEFT SHOULDER PAIN, UNSPECIFIED CHRONICITY: ICD-10-CM

## 2022-11-11 PROCEDURE — 73221 MRI JOINT UPR EXTREM W/O DYE: CPT | Mod: LT

## 2022-11-16 ENCOUNTER — PRE-ADMISSION TESTING (OUTPATIENT)
Dept: ADMISSIONS | Facility: MEDICAL CENTER | Age: 52
End: 2022-11-16
Attending: ORTHOPAEDIC SURGERY
Payer: COMMERCIAL

## 2022-11-16 DIAGNOSIS — Z01.812 PRE-OPERATIVE LABORATORY EXAMINATION: ICD-10-CM

## 2022-11-16 LAB
ERYTHROCYTE [DISTWIDTH] IN BLOOD BY AUTOMATED COUNT: 46.2 FL (ref 35.9–50)
HCT VFR BLD AUTO: 40.3 % (ref 37–47)
HGB BLD-MCNC: 13.6 G/DL (ref 12–16)
MCH RBC QN AUTO: 33.1 PG (ref 27–33)
MCHC RBC AUTO-ENTMCNC: 33.7 G/DL (ref 33.6–35)
MCV RBC AUTO: 98.1 FL (ref 81.4–97.8)
PLATELET # BLD AUTO: 261 K/UL (ref 164–446)
PMV BLD AUTO: 10.3 FL (ref 9–12.9)
RBC # BLD AUTO: 4.11 M/UL (ref 4.2–5.4)
WBC # BLD AUTO: 7.2 K/UL (ref 4.8–10.8)

## 2022-11-16 PROCEDURE — 85027 COMPLETE CBC AUTOMATED: CPT

## 2022-11-16 PROCEDURE — 36415 COLL VENOUS BLD VENIPUNCTURE: CPT

## 2022-11-16 RX ORDER — TRAMADOL HYDROCHLORIDE 50 MG/1
50 TABLET ORAL EVERY 4 HOURS PRN
Status: ON HOLD | COMMUNITY
End: 2022-11-28

## 2022-11-16 ASSESSMENT — FIBROSIS 4 INDEX: FIB4 SCORE: 1.05

## 2022-11-16 NOTE — PREPROCEDURE INSTRUCTIONS
"Pre-admit appointment completed. \"Preparing for your procedure\" sheet given to pt along with verbal and written instructions. Pt instructed to continue regularly prescribed medications through the day before surgery. Pt instructed to take the following medications the day of surgery with a sip of water, per anesthesia protocol;  Omeprazole, and if needed-robaxin, ultram.     MET's= >4.  "

## 2022-11-17 ENCOUNTER — HOSPITAL ENCOUNTER (OUTPATIENT)
Dept: RADIOLOGY | Facility: MEDICAL CENTER | Age: 52
End: 2022-11-17
Attending: INTERNAL MEDICINE
Payer: COMMERCIAL

## 2022-11-17 DIAGNOSIS — Z12.31 VISIT FOR SCREENING MAMMOGRAM: ICD-10-CM

## 2022-11-17 PROCEDURE — 77063 BREAST TOMOSYNTHESIS BI: CPT

## 2022-11-27 ENCOUNTER — ANESTHESIA EVENT (OUTPATIENT)
Dept: SURGERY | Facility: MEDICAL CENTER | Age: 52
End: 2022-11-27
Payer: COMMERCIAL

## 2022-11-28 ENCOUNTER — HOSPITAL ENCOUNTER (OUTPATIENT)
Facility: MEDICAL CENTER | Age: 52
End: 2022-11-28
Attending: ORTHOPAEDIC SURGERY | Admitting: ORTHOPAEDIC SURGERY
Payer: COMMERCIAL

## 2022-11-28 ENCOUNTER — ANESTHESIA (OUTPATIENT)
Dept: SURGERY | Facility: MEDICAL CENTER | Age: 52
End: 2022-11-28
Payer: COMMERCIAL

## 2022-11-28 VITALS
HEART RATE: 77 BPM | DIASTOLIC BLOOD PRESSURE: 94 MMHG | BODY MASS INDEX: 28.36 KG/M2 | TEMPERATURE: 97.5 F | RESPIRATION RATE: 16 BRPM | OXYGEN SATURATION: 93 % | WEIGHT: 180.67 LBS | SYSTOLIC BLOOD PRESSURE: 157 MMHG | HEIGHT: 67 IN

## 2022-11-28 PROCEDURE — 700101 HCHG RX REV CODE 250: Performed by: ORTHOPAEDIC SURGERY

## 2022-11-28 PROCEDURE — 160041 HCHG SURGERY MINUTES - EA ADDL 1 MIN LEVEL 4: Performed by: ORTHOPAEDIC SURGERY

## 2022-11-28 PROCEDURE — 700105 HCHG RX REV CODE 258: Performed by: ORTHOPAEDIC SURGERY

## 2022-11-28 PROCEDURE — 160009 HCHG ANES TIME/MIN: Performed by: ORTHOPAEDIC SURGERY

## 2022-11-28 PROCEDURE — 160046 HCHG PACU - 1ST 60 MINS PHASE II: Performed by: ORTHOPAEDIC SURGERY

## 2022-11-28 PROCEDURE — 160029 HCHG SURGERY MINUTES - 1ST 30 MINS LEVEL 4: Performed by: ORTHOPAEDIC SURGERY

## 2022-11-28 PROCEDURE — 700101 HCHG RX REV CODE 250: Performed by: ANESTHESIOLOGY

## 2022-11-28 PROCEDURE — 700111 HCHG RX REV CODE 636 W/ 250 OVERRIDE (IP): Performed by: ORTHOPAEDIC SURGERY

## 2022-11-28 PROCEDURE — 700111 HCHG RX REV CODE 636 W/ 250 OVERRIDE (IP): Performed by: ANESTHESIOLOGY

## 2022-11-28 PROCEDURE — 160035 HCHG PACU - 1ST 60 MINS PHASE I: Performed by: ORTHOPAEDIC SURGERY

## 2022-11-28 PROCEDURE — 160025 RECOVERY II MINUTES (STATS): Performed by: ORTHOPAEDIC SURGERY

## 2022-11-28 PROCEDURE — 01630 ANES OPN/ARTHR PX SHO JT NOS: CPT | Performed by: ANESTHESIOLOGY

## 2022-11-28 PROCEDURE — 160002 HCHG RECOVERY MINUTES (STAT): Performed by: ORTHOPAEDIC SURGERY

## 2022-11-28 PROCEDURE — 160036 HCHG PACU - EA ADDL 30 MINS PHASE I: Performed by: ORTHOPAEDIC SURGERY

## 2022-11-28 PROCEDURE — 160048 HCHG OR STATISTICAL LEVEL 1-5: Performed by: ORTHOPAEDIC SURGERY

## 2022-11-28 PROCEDURE — A9270 NON-COVERED ITEM OR SERVICE: HCPCS | Performed by: ANESTHESIOLOGY

## 2022-11-28 PROCEDURE — 700102 HCHG RX REV CODE 250 W/ 637 OVERRIDE(OP): Performed by: ANESTHESIOLOGY

## 2022-11-28 RX ORDER — LIDOCAINE HYDROCHLORIDE 40 MG/ML
SOLUTION TOPICAL PRN
Status: DISCONTINUED | OUTPATIENT
Start: 2022-11-28 | End: 2022-11-28 | Stop reason: SURG

## 2022-11-28 RX ORDER — LABETALOL HYDROCHLORIDE 5 MG/ML
5 INJECTION, SOLUTION INTRAVENOUS
Status: DISCONTINUED | OUTPATIENT
Start: 2022-11-28 | End: 2022-11-28 | Stop reason: HOSPADM

## 2022-11-28 RX ORDER — SODIUM CHLORIDE 9 MG/ML
INJECTION, SOLUTION INTRAMUSCULAR; INTRAVENOUS; SUBCUTANEOUS
Status: DISCONTINUED | OUTPATIENT
Start: 2022-11-28 | End: 2022-11-28 | Stop reason: HOSPADM

## 2022-11-28 RX ORDER — HALOPERIDOL 5 MG/ML
1 INJECTION INTRAMUSCULAR
Status: DISCONTINUED | OUTPATIENT
Start: 2022-11-28 | End: 2022-11-28 | Stop reason: HOSPADM

## 2022-11-28 RX ORDER — METOCLOPRAMIDE HYDROCHLORIDE 5 MG/ML
INJECTION INTRAMUSCULAR; INTRAVENOUS PRN
Status: DISCONTINUED | OUTPATIENT
Start: 2022-11-28 | End: 2022-11-28 | Stop reason: SURG

## 2022-11-28 RX ORDER — HYDRALAZINE HYDROCHLORIDE 20 MG/ML
5 INJECTION INTRAMUSCULAR; INTRAVENOUS
Status: DISCONTINUED | OUTPATIENT
Start: 2022-11-28 | End: 2022-11-28 | Stop reason: HOSPADM

## 2022-11-28 RX ORDER — ROPIVACAINE HYDROCHLORIDE 5 MG/ML
INJECTION, SOLUTION EPIDURAL; INFILTRATION; PERINEURAL
Status: DISCONTINUED | OUTPATIENT
Start: 2022-11-28 | End: 2022-11-28 | Stop reason: HOSPADM

## 2022-11-28 RX ORDER — HYDROMORPHONE HYDROCHLORIDE 1 MG/ML
0.1 INJECTION, SOLUTION INTRAMUSCULAR; INTRAVENOUS; SUBCUTANEOUS
Status: DISCONTINUED | OUTPATIENT
Start: 2022-11-28 | End: 2022-11-28 | Stop reason: HOSPADM

## 2022-11-28 RX ORDER — SODIUM CHLORIDE, SODIUM LACTATE, POTASSIUM CHLORIDE, CALCIUM CHLORIDE 600; 310; 30; 20 MG/100ML; MG/100ML; MG/100ML; MG/100ML
INJECTION, SOLUTION INTRAVENOUS CONTINUOUS
Status: ACTIVE | OUTPATIENT
Start: 2022-11-28 | End: 2022-11-28

## 2022-11-28 RX ORDER — ONDANSETRON 2 MG/ML
4 INJECTION INTRAMUSCULAR; INTRAVENOUS
Status: DISCONTINUED | OUTPATIENT
Start: 2022-11-28 | End: 2022-11-28 | Stop reason: HOSPADM

## 2022-11-28 RX ORDER — OXYCODONE HCL 5 MG/5 ML
10 SOLUTION, ORAL ORAL
Status: COMPLETED | OUTPATIENT
Start: 2022-11-28 | End: 2022-11-28

## 2022-11-28 RX ORDER — OXYCODONE HCL 5 MG/5 ML
5 SOLUTION, ORAL ORAL
Status: COMPLETED | OUTPATIENT
Start: 2022-11-28 | End: 2022-11-28

## 2022-11-28 RX ORDER — ONDANSETRON 2 MG/ML
INJECTION INTRAMUSCULAR; INTRAVENOUS PRN
Status: DISCONTINUED | OUTPATIENT
Start: 2022-11-28 | End: 2022-11-28 | Stop reason: SURG

## 2022-11-28 RX ORDER — CEFAZOLIN SODIUM 1 G/3ML
INJECTION, POWDER, FOR SOLUTION INTRAMUSCULAR; INTRAVENOUS PRN
Status: DISCONTINUED | OUTPATIENT
Start: 2022-11-28 | End: 2022-11-28 | Stop reason: SURG

## 2022-11-28 RX ORDER — DIPHENHYDRAMINE HYDROCHLORIDE 50 MG/ML
12.5 INJECTION INTRAMUSCULAR; INTRAVENOUS
Status: DISCONTINUED | OUTPATIENT
Start: 2022-11-28 | End: 2022-11-28 | Stop reason: HOSPADM

## 2022-11-28 RX ORDER — SODIUM CHLORIDE, SODIUM LACTATE, POTASSIUM CHLORIDE, CALCIUM CHLORIDE 600; 310; 30; 20 MG/100ML; MG/100ML; MG/100ML; MG/100ML
INJECTION, SOLUTION INTRAVENOUS CONTINUOUS
Status: DISCONTINUED | OUTPATIENT
Start: 2022-11-28 | End: 2022-11-28 | Stop reason: HOSPADM

## 2022-11-28 RX ORDER — MIDAZOLAM HYDROCHLORIDE 1 MG/ML
INJECTION INTRAMUSCULAR; INTRAVENOUS PRN
Status: DISCONTINUED | OUTPATIENT
Start: 2022-11-28 | End: 2022-11-28 | Stop reason: SURG

## 2022-11-28 RX ORDER — HYDROMORPHONE HYDROCHLORIDE 1 MG/ML
0.4 INJECTION, SOLUTION INTRAMUSCULAR; INTRAVENOUS; SUBCUTANEOUS
Status: DISCONTINUED | OUTPATIENT
Start: 2022-11-28 | End: 2022-11-28 | Stop reason: HOSPADM

## 2022-11-28 RX ORDER — ACETAMINOPHEN 500 MG
1000 TABLET ORAL ONCE
Status: COMPLETED | OUTPATIENT
Start: 2022-11-28 | End: 2022-11-28

## 2022-11-28 RX ORDER — ROCURONIUM BROMIDE 10 MG/ML
INJECTION, SOLUTION INTRAVENOUS PRN
Status: DISCONTINUED | OUTPATIENT
Start: 2022-11-28 | End: 2022-11-28 | Stop reason: SURG

## 2022-11-28 RX ORDER — KETOROLAC TROMETHAMINE 30 MG/ML
INJECTION, SOLUTION INTRAMUSCULAR; INTRAVENOUS PRN
Status: DISCONTINUED | OUTPATIENT
Start: 2022-11-28 | End: 2022-11-28 | Stop reason: SURG

## 2022-11-28 RX ORDER — LIDOCAINE HYDROCHLORIDE 20 MG/ML
INJECTION, SOLUTION EPIDURAL; INFILTRATION; INTRACAUDAL; PERINEURAL PRN
Status: DISCONTINUED | OUTPATIENT
Start: 2022-11-28 | End: 2022-11-28 | Stop reason: SURG

## 2022-11-28 RX ORDER — HYDROMORPHONE HYDROCHLORIDE 1 MG/ML
0.2 INJECTION, SOLUTION INTRAMUSCULAR; INTRAVENOUS; SUBCUTANEOUS
Status: DISCONTINUED | OUTPATIENT
Start: 2022-11-28 | End: 2022-11-28 | Stop reason: HOSPADM

## 2022-11-28 RX ADMIN — KETOROLAC TROMETHAMINE 30 MG: 30 INJECTION, SOLUTION INTRAMUSCULAR at 15:25

## 2022-11-28 RX ADMIN — SODIUM CHLORIDE, POTASSIUM CHLORIDE, SODIUM LACTATE AND CALCIUM CHLORIDE: 600; 310; 30; 20 INJECTION, SOLUTION INTRAVENOUS at 13:55

## 2022-11-28 RX ADMIN — HYDROMORPHONE HYDROCHLORIDE 0.2 MG: 1 INJECTION, SOLUTION INTRAMUSCULAR; INTRAVENOUS; SUBCUTANEOUS at 16:30

## 2022-11-28 RX ADMIN — FENTANYL CITRATE 25 MCG: 50 INJECTION, SOLUTION INTRAMUSCULAR; INTRAVENOUS at 16:15

## 2022-11-28 RX ADMIN — CEFAZOLIN 2 G: 330 INJECTION, POWDER, FOR SOLUTION INTRAMUSCULAR; INTRAVENOUS at 14:23

## 2022-11-28 RX ADMIN — FENTANYL CITRATE 50 MCG: 50 INJECTION, SOLUTION INTRAMUSCULAR; INTRAVENOUS at 15:49

## 2022-11-28 RX ADMIN — FENTANYL CITRATE 50 MCG: 50 INJECTION, SOLUTION INTRAMUSCULAR; INTRAVENOUS at 14:22

## 2022-11-28 RX ADMIN — LIDOCAINE HYDROCHLORIDE 0.5 ML: 10 INJECTION, SOLUTION EPIDURAL; INFILTRATION; INTRACAUDAL; PERINEURAL at 13:54

## 2022-11-28 RX ADMIN — ONDANSETRON 4 MG: 2 INJECTION INTRAMUSCULAR; INTRAVENOUS at 15:25

## 2022-11-28 RX ADMIN — SUGAMMADEX 200 MG: 100 INJECTION, SOLUTION INTRAVENOUS at 15:25

## 2022-11-28 RX ADMIN — METOCLOPRAMIDE 10 MG: 5 INJECTION, SOLUTION INTRAMUSCULAR; INTRAVENOUS at 15:25

## 2022-11-28 RX ADMIN — FENTANYL CITRATE 25 MCG: 50 INJECTION, SOLUTION INTRAMUSCULAR; INTRAVENOUS at 16:22

## 2022-11-28 RX ADMIN — FENTANYL CITRATE 50 MCG: 50 INJECTION, SOLUTION INTRAMUSCULAR; INTRAVENOUS at 14:42

## 2022-11-28 RX ADMIN — FENTANYL CITRATE 100 MCG: 50 INJECTION, SOLUTION INTRAMUSCULAR; INTRAVENOUS at 14:16

## 2022-11-28 RX ADMIN — SODIUM CHLORIDE, POTASSIUM CHLORIDE, SODIUM LACTATE AND CALCIUM CHLORIDE: 600; 310; 30; 20 INJECTION, SOLUTION INTRAVENOUS at 15:30

## 2022-11-28 RX ADMIN — MIDAZOLAM HYDROCHLORIDE 2 MG: 1 INJECTION, SOLUTION INTRAMUSCULAR; INTRAVENOUS at 14:16

## 2022-11-28 RX ADMIN — LIDOCAINE HYDROCHLORIDE 4 ML: 40 SOLUTION TOPICAL at 14:24

## 2022-11-28 RX ADMIN — LIDOCAINE HYDROCHLORIDE 2 ML: 20 INJECTION, SOLUTION EPIDURAL; INFILTRATION; INTRACAUDAL at 14:22

## 2022-11-28 RX ADMIN — ACETAMINOPHEN 1000 MG: 500 TABLET ORAL at 13:43

## 2022-11-28 RX ADMIN — EPHEDRINE SULFATE 10 MG: 50 INJECTION, SOLUTION INTRAVENOUS at 15:01

## 2022-11-28 RX ADMIN — ROCURONIUM BROMIDE 50 MG: 10 INJECTION, SOLUTION INTRAVENOUS at 14:22

## 2022-11-28 RX ADMIN — FENTANYL CITRATE 100 MCG: 50 INJECTION, SOLUTION INTRAMUSCULAR; INTRAVENOUS at 15:31

## 2022-11-28 RX ADMIN — CLINDAMYCIN PHOSPHATE 900 MG: 150 INJECTION, SOLUTION INTRAMUSCULAR; INTRAVENOUS at 14:28

## 2022-11-28 RX ADMIN — OXYCODONE HYDROCHLORIDE 10 MG: 5 SOLUTION ORAL at 15:49

## 2022-11-28 RX ADMIN — HYDROMORPHONE HYDROCHLORIDE 0.2 MG: 1 INJECTION, SOLUTION INTRAMUSCULAR; INTRAVENOUS; SUBCUTANEOUS at 16:50

## 2022-11-28 RX ADMIN — PROPOFOL 200 MG: 10 INJECTION, EMULSION INTRAVENOUS at 14:22

## 2022-11-28 RX ADMIN — HYDROMORPHONE HYDROCHLORIDE 0.2 MG: 1 INJECTION, SOLUTION INTRAMUSCULAR; INTRAVENOUS; SUBCUTANEOUS at 17:05

## 2022-11-28 ASSESSMENT — FIBROSIS 4 INDEX: FIB4 SCORE: 0.98

## 2022-11-28 ASSESSMENT — PAIN DESCRIPTION - PAIN TYPE
TYPE: CHRONIC PAIN
TYPE: SURGICAL PAIN

## 2022-11-28 NOTE — DISCHARGE INSTRUCTIONS
What to Expect Post Anesthesia    Rest and take it easy for the first 24 hours.  A responsible adult is recommended to remain with you during that time.  It is normal to feel sleepy.  We encourage you to not do anything that requires balance, judgment or coordination.    FOR 24 HOURS DO NOT:  Drive, operate machinery or run household appliances.  Drink beer or alcoholic beverages.  Make important decisions or sign legal documents.    To avoid nausea, slowly advance diet as tolerated, avoiding spicy or greasy foods for the first day.  Add more substantial food to your diet according to your provider's instructions.  Babies can be fed formula or breast milk as soon as they are hungry.  INCREASE FLUIDS AND FIBER TO AVOID CONSTIPATION.    MILD FLU-LIKE SYMPTOMS ARE NORMAL.  YOU MAY EXPERIENCE GENERALIZED MUSCLE ACHES, THROAT IRRITATION, HEADACHE AND/OR SOME NAUSEA.    If any questions arise, call your provider, Dr. Ferrer [phone].  If your provider is not available, please feel free to call the Surgical Center at [phone].    MEDICATIONS: Resume taking daily medication.  Take prescribed pain medication with food.  If no medication is prescribed, you may take non-aspirin pain medication if needed.  PAIN MEDICATION CAN BE VERY CONSTIPATING.  Take a stool softener or laxative such as senokot, pericolace, or milk of magnesia if needed.    Last pain medication given at 03:49 PM, 10 mg Oxycodone    Diet  Resume pre-operative diet upon discharge from the hospital. Depending on how you are feeling and whether you have nausea or not, you may wish to stay with a bland diet for the first few days. However, you can advance your diet as quickly as you feel ready.    Activity  1. DC home   2. DC home on home meds   3. DC home on Percocet, aspirin   4. Outpatient physical therapy to begin in 2-4 days.   5.  Follow up [facility] 10-14 days   6. Call for Fevers, drainage, redness, shortness of breath, or increasing  extremity swelling particularly in the calf.   7. Start Aspirin 325mg per day. Use for thirty days.   8. Remove bandage in 5 days.  Replace sooner for drainage and notify office.   9.  Wear DINORAH (compressive stocking) for 2 weeks on both lower extremities.   10. Sling to right upper extremity at all times, including during sleep.

## 2022-11-28 NOTE — ANESTHESIA PREPROCEDURE EVALUATION
Case: [id number] Date/Time: 11/28/22 1315    Procedures:       RIGHT SHOULDER ARTHROSCOPY, SUBACROMIAL DECOMPRESSION, POSSIBLE BICEPS TENODESIS, DISTAL CLAVICLE EXCISION, ROTATOR CUFF REPAIR AND REPAIRS AS INDICATED      ARTHROSCOPY, SHOULDER, WITH BICEPS TENODESIS      EXCISION, CLAVICLE, DISTAL      ARTHROSCOPY, SHOULDER, WITH ROTATOR CUFF REPAIR    Pre-op diagnosis: COMPLETE ROTATOR CUFF TEAR OR RUPTURE OF RIGHT SHOULDER    Location:  OR  / SURGERY [facility]    Surgeons: Luan Ferrer M.D.          Relevant Problems   NEURO   (positive) History of hypothyroidism      Other   (positive) Dyslipidemia   (positive) Obesity (BMI 30-39.9)   GERD      Physical Exam    Airway   Mallampati: II  TM distance: >3 FB  Neck ROM: full       Cardiovascular - normal exam  Rhythm: regular  Rate: normal  (-) murmur     Dental - normal exam           Pulmonary - normal exam  Breath sounds clear to auscultation     Abdominal    Neurological - normal exam                 Anesthesia Plan    ASA 2       Plan - general       Airway plan will be ETT          Induction: intravenous    Postoperative Plan: Postoperative administration of opioids is intended.    Pertinent diagnostic labs and testing reviewed    Informed Consent:    Anesthetic plan and risks discussed with patient.    Use of blood products discussed with: patient whom consented to blood products.

## 2022-11-28 NOTE — OP REPORT
Date of Surgery:  11/28/2022    PREOPERATIVE DIAGNOSES   Right shoulder pain.  Right shoulder impingement.  Right shoulder acromioclavicular arthritis.  Right shoulder biceps tendinopathy  Right shoulder rotator cuff tear     POSTOPERATIVE DIAGNOSES:  Right shoulder pain.  Right shoulder impingement.  Right shoulder acromioclavicular arthritis.  Right shoulder biceps morphologic/anatomic change with dystrophic nature extensive  Right shoulder rotator cuff tear    PROCEDURES:  Right shoulder arthroscopic debridement of the labrum, rotator cuff and humeral head.  Right shoulder arthroscopic partial synovectomy.  Right shoulder open distal clavicle excision.  Right shoulder open subacromial decompression.  Right shoulder open biceps tenodesis with concomitant atretic changes   Right shoulder rotator cuff repair      SURGEON:  Luan Ferrer MD.   ASSISTANT:, PARAMJIT Carrizales 1     ANESTHESIA:  General.     ANESTHESIOLOGIST:  Janes Turk M.D.     FINDINGS:  Stable subacromial decompression.  Stable distal clavicle excision.  Stable arthroscopic labral debridement.  Anatomic variant of the superior aspect of the humeral head and absence from the intra-articular biceps on surface  Stable biceps tenodesis in the bicipital groove  Stable rotator cuff repair     COMPLICATIONS:  None.       PROCEDURE IN DETAIL:  The patient was taken to the operating room where he    underwent general anesthetic and then was positioned on the beach-chair    apparatus.  All bony prominences padded.  Right upper extremity was isolated,    prepped using Hibiclens, alcohol, ChloraPrep, draped using sterile technique.    Perioperative antibiotics were given.  An appropriate time-out was    undertaken.  The spider was utilized for stabilization.  Surgery initiated    with instilling 30 mL of saline at the joint and then placement of a posterior   and an anterior portal under direct localization.     Diagnostic arthroscopy showed  intact articular surfaces.     The labrum had significant degenerative changes and therefore was treated with   a shaver chondroplasty and Oratec/Arthrocare stabilization.The superior labrum had a deficiency in it .  The biceps was not noted to be intra-articular.  Attempts at shaver debridement were undertaken noting an extra-articular biceps and the superior aspect of the humeral head but it was quite small and atretic.  The patient did not have any prior surgical exposure of her shoulder.  At this point the biceps was felt to be nonanatomic and nonarticular and therefore biceps tenotomy was avoided.  Extensive debridement of the superior aspect of the humeral head as well as the rotator cuff tear and of the labrum was undertaken.    At this point, the cannulas were removed.  The portals were closed using interrupted nylon and the shoulder addressed with a mini open approach.    Skin and subcutaneous tissue were incised.      Subperiosteal dissection of the distal clavicle was followed by resection of the distal 1 cm after elevation of the periosteum.  This was treated with hemostasis, Bovie, periarticular anesthetization and then the fascia over this repaired after distal clavicle    excision using 0 Monocryl.     The deltoid was split in line with the anterolateral corner and subacromial    bursa was identified and excised.  The subacromial decompression had been accomplished with the undersurface decompression of the CA ligament insertion and then an osteotomy of the acromion using an oscillating saw and hand rasp.    The rotator cuff was assessed and noted to have a  tear .  This was treated with debridement, and darwin decompression of the greater tuberosity.  Followed by suture of the cuff with modified renny ana lilia stitch with #2 fiberwire over bone bridges.     The biceps tendon was addressed through the bicipital groove with incision and evaluation of the biceps.  A small and atretic biceps could be  identified in the bicipital groove.  For this reason it was dissected toward the shoulder and tenotomized and then a external #2 4 sprayed was utilized to suture the biceps and perform a tenodesis.  It was then brought back over itself and resutured again utilizing a stable biceps tenodesis.    Thorough irrigation was undertaken and anesthetization was undertaken using    ropivacaine, .The patient was then placed into arm at    the side position and closure undertaken after hemostasis obtained.     The deltoid was repaired using 0 Monocryl.  The subcutaneous 0 and 3-0    Monocryl were utilized.  Steri-Strips were used on the skin.  The patient    placed into a sterile bandage, awoken from his anesthetic and taken to the    recovery room, tolerated the procedure very well with no signs of    complications.        ____________________________________     TERRENCE RAY MD

## 2022-11-28 NOTE — OR NURSING
1320 PT TO PRE OP TO ASSUME CARE .    1403 Patient allergies and NPO status verified, home medication reconciliation completed and belongings secured. Patient verbalizes understanding of pain scale, expected course of stay and plan of care. Surgical site verified with patient. IV access established. Sequentials placed on B legs.

## 2022-11-28 NOTE — ANESTHESIA PROCEDURE NOTES
Airway    Date/Time: 11/28/2022 2:24 PM  Performed by: Janes Turk M.D.  Authorized by: Janes Turk M.D.     Location:  OR  Urgency:  Elective  Difficult Airway: No    Indications for Airway Management:  Anesthesia      Spontaneous Ventilation: absent    Sedation Level:  Deep  Preoxygenated: Yes    Patient Position:  Sniffing  Mask Difficulty Assessment:  2 - vent by mask + OA or adjuvant +/- NMBA  Final Airway Type:  Endotracheal airway  Final Endotracheal Airway:  ETT  Cuffed: Yes    Technique Used for Successful ETT Placement:  Direct laryngoscopy    Insertion Site:  Oral  Blade Type:  Jessika  Laryngoscope Blade/Videolaryngoscope Blade Size:  3  ETT Size (mm):  7.0  Measured from:  Teeth  ETT to Teeth (cm):  21  Placement Verified by: auscultation and capnometry    Cormack-Lehane Classification:  Grade I - full view of glottis  Number of Attempts at Approach:  1   Atraumatic DLx1

## 2022-11-28 NOTE — OR NURSING
1541: Patient arrived to PACU from OR via gurney. Report received from anesthesia and RN. Respirations are spontaneous and unlabored. VSS on 6L. Dressing is CDI. Cold pack applied. RUE: radial pulse 2+, cap refill less than 3 seconds, warm, pink.    1549: c/o 8/10 right shoulder pain. See MAR, PO analgesia given    1615: 6/10 right shoulder pain, see MAR    1620: family updated    1622: 6/10 right shoulder pain, see MAR    1630: 6/10 right shoulder pain, see MAR    1650: 6/10 right shoulder pain, see MAR    1705: 6/10 right shoulder pain, see MAR    1720: criteria met for phase II. Report given to receiving RN

## 2022-11-29 NOTE — ANESTHESIA POSTPROCEDURE EVALUATION
Patient: Janett Mcnulty    Procedure Summary     Date: 11/28/22 Room / Location: [place] [address] / SURGERY [facility]    Anesthesia Start: 1414 Anesthesia Stop: 1543    Procedures:       RIGHT SHOULDER ARTHROSCOPY, SUBACROMIAL DECOMPRESSION, BICEPS TENODESIS, DISTAL CLAVICLE EXCISION, ROTATOR CUFF REPAIR, EXTENSIVE DEBRIDEMENT LABRUM, HUMERAL HEAD, & ROTATOR CUFF (Right: Shoulder)      ARTHROSCOPY, SHOULDER, WITH BICEPS TENODESIS (Right: Shoulder)      EXCISION, CLAVICLE, DISTAL (Right: Shoulder)      ARTHROSCOPY, SHOULDER, WITH ROTATOR CUFF REPAIR (Right: Shoulder) Diagnosis: (Right Shoulder Pain, Rotator Cuff Tear)    Surgeons: Luan Ferrer M.D. Responsible Provider: Janes Turk M.D.    Anesthesia Type: general ASA Status: 2          Final Anesthesia Type: general  Last vitals  BP   Blood Pressure: (!) 157/94    Temp   36.4 °C (97.5 °F)    Pulse   77   Resp   16    SpO2   93 %      Anesthesia Post Evaluation    Patient location during evaluation: PACU  Patient participation: complete - patient participated  Level of consciousness: awake and alert    Airway patency: patent  Anesthetic complications: no  Cardiovascular status: hemodynamically stable  Respiratory status: acceptable  Hydration status: euvolemic    PONV: none          No notable events documented.     Nurse Pain Score: 4 (NPRS)

## 2022-12-29 ENCOUNTER — TELEPHONE (OUTPATIENT)
Dept: MEDICAL GROUP | Facility: MEDICAL CENTER | Age: 52
End: 2022-12-29
Payer: COMMERCIAL

## 2022-12-29 NOTE — TELEPHONE ENCOUNTER
Ria @ [facility] called to advise that the ECHO that you ordered was denied and you need to do a PEER to PEER with [facility] today before 1 pm. Case # [id number] PH: [phone] option 1 option 3

## 2022-12-30 ENCOUNTER — APPOINTMENT (OUTPATIENT)
Dept: CARDIOLOGY | Facility: MEDICAL CENTER | Age: 52
End: 2022-12-30
Attending: INTERNAL MEDICINE
Payer: COMMERCIAL

## 2023-01-03 ENCOUNTER — TELEPHONE (OUTPATIENT)
Dept: MEDICAL GROUP | Facility: MEDICAL CENTER | Age: 53
End: 2023-01-03
Payer: COMMERCIAL

## 2023-01-04 NOTE — TELEPHONE ENCOUNTER
I called the peer to peer number and was placed on hold for 15 minutes and then I was hung up on without any notification.

## 2023-01-04 NOTE — TELEPHONE ENCOUNTER
----- Message from Ria Feldman sent at 12/27/2022 10:17 AM PST -----  Regarding: Peer to peer request needed  Good morning Dr. Gonzalez,     The auth request for the echocardiogram has been denied and I am reaching out to see if you would be willing to do peer to peer. If so you will need to call [facility] at [phone] option #1 and then option #3, using case number [id number]. Patient scheduled for this Friday, Dec. 30th, Peer to peer would need to be done by Thursday this week.    If you need to speak with my ext is: [phone].    Thank you,  [facility] Authorization

## 2023-01-04 NOTE — TELEPHONE ENCOUNTER
I called that number attempting to get the peer to peer, they put me on hold for over 20 minutes and and then I was disconnected.

## 2023-03-07 ENCOUNTER — OFFICE VISIT (OUTPATIENT)
Dept: URGENT CARE | Facility: CLINIC | Age: 53
End: 2023-03-07
Payer: COMMERCIAL

## 2023-03-07 VITALS
RESPIRATION RATE: 16 BRPM | BODY MASS INDEX: 28.56 KG/M2 | TEMPERATURE: 97.6 F | WEIGHT: 182 LBS | HEIGHT: 67 IN | HEART RATE: 86 BPM | OXYGEN SATURATION: 99 % | SYSTOLIC BLOOD PRESSURE: 142 MMHG | DIASTOLIC BLOOD PRESSURE: 88 MMHG

## 2023-03-07 DIAGNOSIS — J32.9 BACTERIAL SINUSITIS: ICD-10-CM

## 2023-03-07 DIAGNOSIS — B96.89 BACTERIAL SINUSITIS: ICD-10-CM

## 2023-03-07 PROCEDURE — 99213 OFFICE O/P EST LOW 20 MIN: CPT

## 2023-03-07 RX ORDER — AMOXICILLIN AND CLAVULANATE POTASSIUM 875; 125 MG/1; MG/1
1 TABLET, FILM COATED ORAL 2 TIMES DAILY
Qty: 14 TABLET | Refills: 0 | Status: SHIPPED | OUTPATIENT
Start: 2023-03-07 | End: 2023-03-14

## 2023-03-07 ASSESSMENT — FIBROSIS 4 INDEX: FIB4 SCORE: 0.98

## 2023-03-08 NOTE — PROGRESS NOTES
"Subjective:   Janett Mcnulty is a 52 y.o. female who presents for Otalgia (Left ear, \" My ears have been clogged for a week, today the pain is worse, and my whole head hurts.\" )      HPI:    Patient presents to urgent care with concerns of rhinorrhea, nasal congestion, headache, sore throat. Reports bilateral ear pain, facial pain, dizziness.   Onset was 8 days ago  Denies cough, wheezing, chest tightness, SOB  No fever.   Endorses chills.  Mild improvement with Flonase, Benadryl, Sudafed, warm showers, Dayquil      ROS As above in HPI    Medications:    Current Outpatient Medications on File Prior to Visit   Medication Sig Dispense Refill    gabapentin (NEURONTIN) 300 MG Cap Take 1 Capsule by mouth 2 times a day. 180 Capsule 3    celecoxib (CELEBREX) 200 MG Cap TAKE ONE CAPSULE BY MOUTH EVERY DAY AS NEEDED FOR MODERATE PAIN 100 Capsule 2    omeprazole (PRILOSEC) 20 MG delayed-release capsule Take 1 Capsule by mouth every day.      progesterone (PROMETRIUM) 100 MG Cap Take 1 Capsule by mouth every day.      methocarbamol (ROBAXIN) 750 MG Tab Take 1 Tablet by mouth 3 times a day as needed (muscle spasm). 90 Tablet 1    Magnesium 300 MG Cap Take 1 Cap by mouth every day.      Probiotic Product (PROBIOTIC ADVANCED PO) Take 1 Cap by mouth every day.      Multiple Vitamins-Minerals (MULTIVITAMIN ADULT PO) Take 1 Tablet by mouth every day.      Calcium Citrate-Vitamin D (CALCIUM + D PO) Take  by mouth every day.      ascorbic acid (ASCORBIC ACID) 500 MG Tab Take 500 mg by mouth every day.      vitamin D (CHOLECALCIFEROL) 1000 UNIT Tab Take 1,000 Units by mouth every day.      fluticasone (FLONASE) 50 MCG/ACT nasal spray Administer 2 Sprays into affected nostril(S) every day. (Patient taking differently: Administer 2 Sprays into affected nostril(S) 1 time a day as needed.) 48 g 3     No current facility-administered medications on file prior to visit.        Allergies:   Gluten meal, Other drug, Bactrim [sulfamethoxazole " w-trimethoprim], Morphine, and Other food    Problem List:   Patient Active Problem List   Diagnosis    S/P appendectomy    S/p ankle left surgery    S/p hip right arthroplasty     Low back pain    Neck pain    IBS (irritable bowel syndrome)    Preventative health care    Varicose vein of leg    S/P bunionectomy    Allergic rhinitis due to animal hair and dander    Chronic pain    Dyslipidemia    Perimenopausal    Foot pain, right    Obesity (BMI 30-39.9)    History of rheumatic fever    Avascular necrosis (HCC)    Carpal tunnel syndrome    History of peptic ulcer    S/p hip left arthroplasty    History of hypothyroidism    History of anemia    History of abnormal liver function test    History of COVID-19    Celiac disease    Shoulder pain        Surgical History:  Past Surgical History:   Procedure Laterality Date    CO SHLDR ARTHROSCOP,PART ACROMIOPLAS Right 11/28/2022    Procedure: RIGHT SHOULDER ARTHROSCOPY, SUBACROMIAL DECOMPRESSION, BICEPS TENODESIS, DISTAL CLAVICLE EXCISION, ROTATOR CUFF REPAIR, EXTENSIVE DEBRIDEMENT LABRUM, HUMERAL HEAD, & ROTATOR CUFF;  Surgeon: Luan Ferrer M.D.;  Location: SURGERY [facility];  Service: Orthopedics    PB ARTHROSCOPY SHOULDER SURGICAL BICEPS TENODES* Right 11/28/2022    Procedure: ARTHROSCOPY, SHOULDER, WITH BICEPS TENODESIS;  Surgeon: Luan Ferrer M.D.;  Location: SURGERY [facility];  Service: Orthopedics    PB SHLDR ARTHROSCOP,SURG,W/ROTAT CUFF REPB Right 11/28/2022    Procedure: ARTHROSCOPY, SHOULDER, WITH ROTATOR CUFF REPAIR;  Surgeon: Luan Ferrer M.D.;  Location: SURGERY [facility];  Service: Orthopedics    CLAVICLE DISTAL EXCISION Right 11/28/2022    Procedure: EXCISION, CLAVICLE, DISTAL;  Surgeon: Luan Ferrer M.D.;  Location: SURGERY [facility];  Service: Orthopedics    COLONOSCOPY  10/2021    WITH UPPER ENDOSCOPY    CO TOTAL HIP ARTHROPLASTY Right 08/24/2020    Procedure: ARTHROPLASTY, HIP, TOTAL;  Surgeon: Luan Ferrer M.D.;   Location: [facility];  Service: Orthopedics    LUMBAR DECOMPRESSION  2020    MICRO DECOMPRESSION    IN TOTAL HIP ARTHROPLASTY Left 08/05/2019    Procedure: ARTHROPLASTY, HIP, TOTAL;  Surgeon: Luan Ferrer M.D.;  Location: [facility];  Service: Orthopedics    HIP ARTHROPLASTY TOTAL Left 2019    FEMORAL HEAD CORE DECOMPRESSION Left 12/29/2017    Procedure: FEMORAL HEAD CORE DECOMPRESSION/HIP;  Surgeon: Luan Ferrer M.D.;  Location: [facility];  Service: Orthopedics    CARPAL TUNNEL RELEASE Right 09/29/2017    Procedure: CARPAL TUNNEL RELEASE;  Surgeon: Luan Ferrer M.D.;  Location: [facility];  Service: Orthopedics    FEMORAL NECK OSTEOPLASTY Right 09/29/2017    Procedure: FEMORAL NECK OSTEOPLASTY FOR HIP CORE DECOMPRESSION ;  Surgeon: Luan Ferrer M.D.;  Location: [facility];  Service: Orthopedics    IN DSTR NROLYTC AGNT PARVERTEB FCT SNGL LMBR/SACRAL Right 04/07/2017    Procedure: NEURO DEST FACET L/S W/IG SNGL - L3-S1;  Surgeon: Farhan Rees D.O.;  Location: [facility];  Service: Pain Management    IN DSTR NROLYTC AGNT PARVERTEB FCT ADDL LMBR/SACRAL Right 04/07/2017    Procedure: NEURO DEST FACET L/S W/IG ADDL;  Surgeon: Farhan Rees D.O.;  Location: [facility];  Service: Pain Management    PB INJECT RX OTHER PERIPH NERVE Right 04/07/2017    Procedure: NEUROLYTIC DEST-OTHER NERVE;  Surgeon: Farhan Rees D.O.;  Location: [facility];  Service: Pain Management    IN DSTR NROLYTC AGNT PARVERTEB FCT ADDL LMBR/SACRAL  04/07/2017    Procedure: NEURO DEST FACET L/S W/IG ADDL;  Surgeon: Farhan Rees D.O.;  Location: [facility];  Service: Pain Management    IN DSTR NROLYTC AGNT PARVERTEB FCT SNGL LMBR/SACRAL Left 03/24/2017    Procedure: NEURO DEST FACET L/S W/IG SNGL - L3-S1;  Surgeon: Farhan Rees D.O.;  Location: [facility];   Service: Pain Management    AR DSTR NROLYTC AGNT PARVERTEB FCT ADDL LMBR/SACRAL Left 03/24/2017    Procedure: NEURO DEST FACET L/S W/IG ADDL;  Surgeon: Farhan Rees D.O.;  Location: SURGERY [facility];  Service: Pain Management    PB INJECT RX OTHER PERIPH NERVE Left 03/24/2017    Procedure: NEUROLYTIC DEST-OTHER NERVE;  Surgeon: Farhan Rees D.O.;  Location: SURGERY [facility];  Service: Pain Management    AR DSTR NROLYTC AGNT PARVERTEB FCT ADDL LMBR/SACRAL  03/24/2017    Procedure: NEURO DEST FACET L/S W/IG ADDL;  Surgeon: Farhan Rees D.O.;  Location: SURGERY [facility];  Service: Pain Management    AR DSTR NROLYTC AGNT PARVERTEB FCT SNGL LMBR/SACRAL Right 06/24/2016    Procedure: NEURO DEST FACET L/S W/IG SNGL - L3-S1;  Surgeon: Farhan Rees D.O.;  Location: SURGERY [facility];  Service: Pain Management    AR DSTR NROLYTC AGNT PARVERTEB FCT ADDL LMBR/SACRAL Right 06/24/2016    Procedure: NEURO DEST FACET L/S W/IG ADDL;  Surgeon: Farhan Rees D.O.;  Location: SURGERY [facility];  Service: Pain Management    PB INJECT RX OTHER PERIPH NERVE Right 06/24/2016    Procedure: NEUROLYTIC DEST-OTHER NERVE;  Surgeon: Farhan Rees D.O.;  Location: SURGERY [facility];  Service: Pain Management    AR DSTR NROLYTC AGNT PARVERTEB FCT ADDL LMBR/SACRAL  06/24/2016    Procedure: NEURO DEST FACET L/S W/IG ADDL;  Surgeon: Farhan Rees D.O.;  Location: SURGERY [facility];  Service: Pain Management    AR DSTR NROLYTC AGNT PARVERTEB FCT SNGL LMBR/SACRAL Left 05/06/2016    Procedure: NEURO DEST FACET L/S W/IG SNGL - L3-S1;  Surgeon: Farhan Rees D.O.;  Location: SURGERY [facility];  Service: Pain Management    AR DSTR NROLYTC AGNT PARVERTEB FCT ADDL LMBR/SACRAL Left 05/06/2016    Procedure: NEURO DEST FACET L/S W/IG ADDL;  Surgeon: Farhan Rees D.O.;  Location: [facility];  Service: Pain Management    PB INJECT RX  OTHER PERIPH NERVE Left 05/06/2016    Procedure: NEUROLYTIC DEST-OTHER NERVE;  Surgeon: Farhan Rees D.O.;  Location: SUR[facility]ERY [facility];  Service: Pain Management    OH DSTR NROLYTC AGNT PARVERTEB FCT ADDL LMBR/SACRAL  05/06/2016    Procedure: NEURO DEST FACET L/S W/IG ADDL;  Surgeon: Farhan Rees D.O.;  Location: [facility]ERY [facility];  Service: Pain Management    OH DSTR NROLYTC AGNT PARVERTEB FCT SNGL LMBR/SACRAL Right 10/16/2015    Procedure: NEURO DEST FACET L/S W/IG SNGL - L3-S1;  Surgeon: Farhan Rees D.O.;  Location: [facility]ERY [facility];  Service: Pain Management    OH DSTR NROLYTC AGNT PARVERTEB FCT ADDL LMBR/SACRAL  10/16/2015    Procedure: NEURO DEST FACET L/S W/IG ADDL;  Surgeon: Farhan Rees D.O.;  Location: SUR[facility]ERY [facility];  Service: Pain Management    PB INJECT RX OTHER PERIPH NERVE  10/16/2015    Procedure: NEUROLYTIC DEST-OTHER NERVE;  Surgeon: Farhan Rees D.O.;  Location: SUR[facility]ERY [facility];  Service: Pain Management    OH DSTR NROLYTC AGNT PARVERTEB FCT ADDL LMBR/SACRAL  10/16/2015    Procedure: NEURO DEST FACET L/S W/IG ADDL;  Surgeon: Farhan Rees D.O.;  Location: SUR[facility]ERY [facility];  Service: Pain Management    OH DSTR NROLYTC AGNT PARVERTEB FCT SNGL LMBR/SACRAL Left 10/09/2015    Procedure: NEURO DEST FACET L/S W/IG SNGL - L3-S1;  Surgeon: Farhan Rees D.O.;  Location: SUR[facility]ERY [facility];  Service: Pain Management    OH DSTR NROLYTC AGNT PARVERTEB FCT ADDL LMBR/SACRAL  10/09/2015    Procedure: NEURO DEST FACET L/S W/IG ADDL;  Surgeon: Farhan Rees D.O.;  Location: SUR[facility]ERY [facility];  Service: Pain Management    PB INJECT RX OTHER PERIPH NERVE  10/09/2015    Procedure: NEUROLYTIC DEST-OTHER NERVE;  Surgeon: Farhan Rees D.O.;  [facility];  Service: Pain Management    OH DSTR NROLYTC AGNT PARVERTEB FCT ADDL LMBR/SACRAL  10/09/2015    Procedure: NEURO DEST FACET  L/S W/IG ADDL;  Surgeon: Farhan Rees D.O.;  Location: [facility];  Service: Pain Management    INJ,SACROILIAC,ANES/STEROID  04/03/2015    Performed by Farhan Rees D.O. at [facility]    INJ,SACROILIAC,ANES/STEROID  04/03/2015    Performed by Farhan Rees D.O. at [facility]    NEURO DEST FACET L/S W/IG SNGL  02/13/2015    Performed by Farhan Rees D.O. at [facility]    NEURO DEST FACET L/S W/IG ADDL  02/13/2015    Performed by Farhan Rees D.O. at [facility]    NEURO DEST FACET L/S W/IG ADDL  02/13/2015    Performed by Farhan Rees D.O. at [facility]    NEURO DEST FACET L/S W/IG SNGL  02/06/2015    Performed by Farhan Rees D.O. at [facility]    NEURO DEST FACET L/S W/IG ADDL  02/06/2015    Performed by Farhan Rees D.O. at [facility]    NEURO DEST FACET L/S W/IG ADDL  02/06/2015    Performed by Farhan Rees D.O. at [facility]    INJ,SACROILIAC,ANES/STEROID  07/11/2014    Performed by Farhan Rees D.O. at [facility]    INJ,SACROILIAC,ANES/STEROID  07/11/2014    Performed by Farhan Rees D.O. at [facility]    TOE ARTHROPLASTY  07/07/2014    Performed by Oliver Pratt D.P.M. at [facility]    NEURO DEST FACET L/S W/IG SNGL  06/06/2014    Performed by Farhan Rees D.O. at [facility]    NEURO DEST FACET L/S W/IG ADDL  06/06/2014    Performed by Farhan Rees D.O. at [facility]    NEURO DEST FACET L/S W/IG ADDL  06/06/2014    Performed by Farhan Rees D.O. [facility]    NEURO DEST FACET L/S W/IG ADDL  06/06/2014    Performed by Farhan Rees D.O. at [facility]    NEURO DEST FACET L/S W/IG SNGL  05/16/2014    Performed by Farhan Rees D.O. at [facility]    NEURO DEST FACET  L/S W/IG ADDL  05/16/2014    Performed by Farhan Rees D.O. at [facility]    NEURO DEST FACET L/S W/IG ADDL  05/16/2014    Performed by Farhan Rees D.O. at [facility]    NEURO DEST FACET L/S W/IG ADDL  05/16/2014    Performed by Farhan Rees D.O. at [facility]    INJ,SACROILIAC,ANES/STEROID  01/10/2014    Performed by Farhan Rees D.O. at [facility]    INJ,SACROILIAC,ANES/STEROID  01/10/2014    Performed by Farhan Rees D.O. at [facility]    INJ,SACROILIAC,ANES/STEROID  12/06/2013    Performed by Farhan Rees D.O. at [facility]    INJ,SACROILIAC,ANES/STEROID  11/08/2013    Performed by Farhan Rees D.O. at [facility]    NEURO DEST FACET L/S W/IG SNGL  10/04/2013    Performed by Farhan Rees D.O. at [facility]    NEURO DEST FACET L/S W/IG ADDL  10/04/2013    Performed by Farhan Rees D.O. at [facility]    NEURO DEST FACET L/S W/IG ADDL  10/04/2013    Performed by Farhan Rees D.O. at [facility]    NEURO DEST FACET L/S W/IG ADDL  10/04/2013    Performed by Farhan Rees D.O. at [facility]    NEURO DEST FACET L/S W/IG SNGL  09/20/2013    Performed by Farhan Rees D.O. at [facility]    NEURO DEST FACET L/S W/IG ADDL  09/20/2013    Performed by Farhan Rees D.O. at [facility]    NEURO DEST FACET L/S W/IG ADDL  09/20/2013    Performed by Farhan Rees D.O. at [facility]    NEURO DEST FACET L/S W/IG ADDL  09/20/2013    Performed by Farhan Rees D.O. at [facility]    BUNIONECTOMY  06/07/2013    Performed by Oliver Pratt D.P.M. at [facility]    NEURO DEST FACET L/S W/IG SNGL  01/04/2013    Performed by Farhan Rees D.O. at [facility]    NEURO DEST FACET L/S W/IG ADDL   01/04/2013    Performed by Edmar Elias D.O. at [facility]    NEURO DEST FACET L/S W/IG ADDL  01/04/2013    Performed by Edmar Elias D.O. at [facility]    OTHER  2013    uterine ablation    NEURO DEST FACET L/S W/IG SNGL  12/28/2012    Performed by Edmar Elias D.O. at [facility]    NEURO DEST FACET L/S W/IG ADDL  12/28/2012    Performed by Edmar Elias D.O. at [facility]    NEURO DEST FACET L/S W/IG ADDL  12/28/2012    Performed by Edmar Elias D.O. at [facility]    NEURO DEST FACET L/S W/IG SNGL  03/09/2012    Performed by EDMAR ELIAS at [facility]    NEURO DEST FACET L/S W/IG ADDL  03/09/2012    Performed by EDMAR ELIAS at [facility]    NEURO DEST FACET L/S W/IG ADDL  03/09/2012    Performed by EDMAR ELIAS at [facility]    NEURO DEST FACET L/S W/IG SNGL  03/02/2012    Performed by EDMAR ELIAS at [facility]    NEURO DEST FACET L/S W/IG ADDL  03/02/2012    Performed by EDMAR ELIAS at [facility]    NEURO DEST FACET L/S W/IG ADDL  03/02/2012    Performed by EDMAR ELIAS at [facility]    PB INJECT NERV BLCK,STELLATE GANGLION  09/02/2011    Performed by EDMAR ELIAS at [facility]    INJ,SACROILIAC,ANES/STEROID  05/13/2011    Performed by EDMAR ELIAS at [facility]    PB DEST,PARAVERTEBRAL,L/S,SINGLE  03/04/2011    Performed by EDMAR ELIAS at [facility]    ND INJ DX/THER AGNT PARAVERT FACET JOINT, ROSALVA*  02/25/2011    Performed by EDMAR ELIAS at [facility]    ND INJ DX/THER AGNT PARAVERT FACET JOINT, ROSALVA*  01/21/2011    Performed by EDMAR ELIAS at [facility]    HIP ARTHROSCOPY Right 2009    TENDON REPAIR Left 2007    left ankle tendon repair    TONSILLECTOMY  2001    OTHER ORTHOPEDIC  "SURGERY Left 2001    HIP surgery with bone grafting and screws    APPENDECTOMY  1991    PELVISCOPY      1990'S       Past Social Hx:   Social History     Tobacco Use    Smoking status: Never    Smokeless tobacco: Never   Vaping Use    Vaping Use: Never used   Substance Use Topics    Alcohol use: Yes     Alcohol/week: 2.4 oz     Types: 4 Standard drinks or equivalent per week     Comment: Socially    Drug use: Never          Problem list, medications, and allergies reviewed by myself today in Epic.     Objective:     BP (!) 142/88 (BP Location: Left arm, Patient Position: Sitting, BP Cuff Size: Adult)   Pulse 86   Temp 36.4 °C (97.6 °F) (Temporal)   Resp 16   Ht 1.702 m (5' 7\")   Wt 82.6 kg (182 lb)   LMP 06/30/2012   SpO2 99%   BMI 28.51 kg/m²     Physical Exam  Vitals reviewed.   Constitutional:       Appearance: Normal appearance.   HENT:      Head: Normocephalic and atraumatic.      Right Ear: Ear canal normal. A middle ear effusion is present.      Left Ear: Ear canal normal. A middle ear effusion is present.      Nose: Congestion and rhinorrhea present. Rhinorrhea is purulent.      Right Sinus: Maxillary sinus tenderness and frontal sinus tenderness present.      Left Sinus: Maxillary sinus tenderness and frontal sinus tenderness present.      Mouth/Throat:      Mouth: Mucous membranes are moist.      Pharynx: Oropharynx is clear. Uvula midline. Posterior oropharyngeal erythema present. No pharyngeal swelling or oropharyngeal exudate.      Tonsils: 0 on the right. 0 on the left.   Eyes:      Conjunctiva/sclera: Conjunctivae normal.      Pupils: Pupils are equal, round, and reactive to light.   Cardiovascular:      Rate and Rhythm: Normal rate and regular rhythm.      Heart sounds: Normal heart sounds. No murmur heard.    No friction rub. No gallop.   Pulmonary:      Effort: Pulmonary effort is normal. No respiratory distress.      Breath sounds: Normal breath sounds. No stridor. No wheezing, rhonchi or " rales.   Chest:      Chest wall: No tenderness.   Abdominal:      General: Bowel sounds are normal.      Palpations: Abdomen is soft.   Skin:     General: Skin is warm and dry.      Capillary Refill: Capillary refill takes less than 2 seconds.      Findings: No rash.   Neurological:      Mental Status: She is alert and oriented to person, place, and time.       Assessment/Plan:     Diagnosis and associated orders:   1. Bacterial sinusitis  - amoxicillin-clavulanate (AUGMENTIN) 875-125 MG Tab; Take 1 Tablet by mouth 2 times a day for 7 days.  Dispense: 14 Tablet; Refill: 0        Comments/MDM:     Increase clear fluid intake, rest.  Tylenol/ibuprofen as needed per package instruction  OTC antihistamines, Mucinex, Sudafed  Salt water gargles, ice chips to soothe throat, lozenges  Increase emitted use humidifier by bedside.  Exposure to steam for expectoration.  Nasal saline as needed.  Return to UC if not improved over the next several days   Return to ER precautions reviewed with patient.  Follow-up with primary care advised.          Please note that this dictation was created using voice recognition software. I have made a reasonable attempt to correct obvious errors, but I expect that there are errors of grammar and possibly content that I did not discover before finalizing the note.    This note was electronically signed by Leann Baldwin DNP

## 2023-03-30 ENCOUNTER — TELEPHONE (OUTPATIENT)
Dept: MEDICAL GROUP | Facility: MEDICAL CENTER | Age: 53
End: 2023-03-30

## 2023-03-30 ENCOUNTER — HOSPITAL ENCOUNTER (OUTPATIENT)
Dept: CARDIOLOGY | Facility: MEDICAL CENTER | Age: 53
End: 2023-03-30
Attending: INTERNAL MEDICINE
Payer: COMMERCIAL

## 2023-03-30 DIAGNOSIS — R00.2 PALPITATIONS: ICD-10-CM

## 2023-03-30 LAB
LV EJECT FRACT  99904: 65
LV EJECT FRACT MOD 2C 99903: 66.43
LV EJECT FRACT MOD 4C 99902: 50.34
LV EJECT FRACT MOD BP 99901: 57.95

## 2023-03-30 PROCEDURE — 93306 TTE W/DOPPLER COMPLETE: CPT

## 2023-03-30 PROCEDURE — 93306 TTE W/DOPPLER COMPLETE: CPT | Mod: 26 | Performed by: INTERNAL MEDICINE

## 2023-03-31 ENCOUNTER — TELEPHONE (OUTPATIENT)
Dept: MEDICAL GROUP | Facility: MEDICAL CENTER | Age: 53
End: 2023-03-31
Payer: COMMERCIAL

## 2023-03-31 NOTE — TELEPHONE ENCOUNTER
----- Message from Franklin Gonzalez M.D. sent at 3/30/2023  6:44 PM PDT -----  Called the patient and left a message, please notify her that the heart ultrasound shows normal heart muscle function, normal heart valves, no significant abnormalities.

## 2023-03-31 NOTE — TELEPHONE ENCOUNTER
Health Maintenance Due   Topic Date Due   • Hepatitis B Vaccine (For Physician/APC Discussion) (1 of 3 - 3-dose series) Never done   • COVID-19 Vaccine (1) Never done   • Pneumococcal Vaccine 65+ (1 - PCV) Never done   • Shingles Vaccine (1 of 2) Never done   • Colorectal Cancer Screen-  Never done   • Hepatitis C Screening  Never done   • Influenza Vaccine (1) Never done   • Medicare Advantage- Medicare Wellness Visit  01/01/2023   • Depression Screening  01/19/2023       Patient is due for topics as listed above but is not proceeding with Immunization(s) COVID-19, Hep B and Shingles and Depression Screening  at this time.    Pt notified.

## 2023-03-31 NOTE — TELEPHONE ENCOUNTER
Called the patient and left a message, please notify her that the heart ultrasound shows normal heart muscle function, normal heart valves, no significant abnormalities.

## 2023-04-23 PROBLEM — Z98.890 S/P ARTHROSCOPY OF RIGHT SHOULDER: Status: ACTIVE | Noted: 2022-09-28

## 2023-04-27 ENCOUNTER — OFFICE VISIT (OUTPATIENT)
Dept: MEDICAL GROUP | Facility: MEDICAL CENTER | Age: 53
End: 2023-04-27
Payer: COMMERCIAL

## 2023-04-27 ENCOUNTER — TELEPHONE (OUTPATIENT)
Dept: MEDICAL GROUP | Facility: MEDICAL CENTER | Age: 53
End: 2023-04-27

## 2023-04-27 VITALS
OXYGEN SATURATION: 94 % | RESPIRATION RATE: 12 BRPM | DIASTOLIC BLOOD PRESSURE: 92 MMHG | WEIGHT: 181 LBS | SYSTOLIC BLOOD PRESSURE: 136 MMHG | BODY MASS INDEX: 28.41 KG/M2 | TEMPERATURE: 98.2 F | HEART RATE: 98 BPM | HEIGHT: 67 IN

## 2023-04-27 DIAGNOSIS — R10.13 DYSPEPSIA: ICD-10-CM

## 2023-04-27 DIAGNOSIS — M25.519 SHOULDER PAIN, UNSPECIFIED CHRONICITY, UNSPECIFIED LATERALITY: ICD-10-CM

## 2023-04-27 DIAGNOSIS — Z23 NEED FOR HEPATITIS B VACCINATION: ICD-10-CM

## 2023-04-27 DIAGNOSIS — E53.8 B12 DEFICIENCY: ICD-10-CM

## 2023-04-27 DIAGNOSIS — R79.0 LOW BLOOD MAGNESIUM: ICD-10-CM

## 2023-04-27 DIAGNOSIS — R11.0 NAUSEA: ICD-10-CM

## 2023-04-27 DIAGNOSIS — G89.29 OTHER CHRONIC PAIN: ICD-10-CM

## 2023-04-27 DIAGNOSIS — E66.9 OBESITY (BMI 30-39.9): ICD-10-CM

## 2023-04-27 DIAGNOSIS — Z86.2 HISTORY OF IRON DEFICIENCY ANEMIA: ICD-10-CM

## 2023-04-27 DIAGNOSIS — Z98.890 S/P ARTHROSCOPY OF RIGHT SHOULDER: ICD-10-CM

## 2023-04-27 DIAGNOSIS — E55.9 VITAMIN D DEFICIENCY: ICD-10-CM

## 2023-04-27 DIAGNOSIS — E78.5 DYSLIPIDEMIA: ICD-10-CM

## 2023-04-27 DIAGNOSIS — Z00.00 PREVENTATIVE HEALTH CARE: ICD-10-CM

## 2023-04-27 PROCEDURE — 99396 PREV VISIT EST AGE 40-64: CPT | Mod: 25 | Performed by: INTERNAL MEDICINE

## 2023-04-27 PROCEDURE — 90471 IMMUNIZATION ADMIN: CPT | Performed by: INTERNAL MEDICINE

## 2023-04-27 PROCEDURE — 90746 HEPB VACCINE 3 DOSE ADULT IM: CPT | Performed by: INTERNAL MEDICINE

## 2023-04-27 RX ORDER — ESTRADIOL 1 MG/1
TABLET ORAL
COMMUNITY
Start: 2023-03-04

## 2023-04-27 RX ORDER — OMEPRAZOLE 40 MG/1
40 CAPSULE, DELAYED RELEASE ORAL DAILY
Qty: 90 CAPSULE | Refills: 3 | Status: SHIPPED | OUTPATIENT
Start: 2023-04-27

## 2023-04-27 RX ORDER — ONDANSETRON 4 MG/1
4 TABLET, FILM COATED ORAL EVERY 8 HOURS PRN
Qty: 90 EACH | Refills: 5 | Status: SHIPPED | OUTPATIENT
Start: 2023-04-27 | End: 2023-05-27

## 2023-04-27 RX ORDER — TRAMADOL HYDROCHLORIDE 50 MG/1
50 TABLET ORAL EVERY 8 HOURS PRN
Qty: 90 TABLET | Refills: 0 | Status: SHIPPED | OUTPATIENT
Start: 2023-04-27 | End: 2023-08-03 | Stop reason: SDUPTHER

## 2023-04-27 ASSESSMENT — FIBROSIS 4 INDEX: FIB4 SCORE: 0.98

## 2023-04-27 NOTE — LETTER
[facility]  Franklin Gonzalez M.D.  [id number]  [place] [address]  Fax: [phone]   Authorization for Release/Disclosure of   Protected Health Information   Name: SUMAYA WATSON : 1970 SSN: [id number]   Address: [address] [place] [address]  [place] [address] Phone:    [phone] (home)    I authorize the entity listed below to release/disclose the PHI below to:   [facility]"[employer] [facility]/Franklin Gonzalez M.D. and Franklin Gonzalez M.D.   Provider or Entity Name:                                                     Dr Rees (Physiatry)   Address   City, State, [facility] [place]   Phone:      Fax:     Reason for request: continuity of care   Information to be released: Notes from  to present   [  ] LAST COLONOSCOPY,  including any PATH REPORT and follow-up  [  ] LAST FIT/COLOGUARD RESULT [  ] LAST DEXA  [  ] LAST MAMMOGRAM  [  ] LAST PAP  [  ] LAST LABS [  ] RETINA EXAM REPORT  [  ] IMMUNIZATION RECORDS  [ x ] Release all info      [  ] Check here and initial the line next to each item to release ALL health information INCLUDING  _____ Care and treatment for drug and / or alcohol abuse  _____ HIV testing, infection status, or AIDS  _____ Genetic Testing    DATES OF SERVICE OR TIME PERIOD TO BE DISCLOSED: _____________  I understand and acknowledge that:  * This Authorization may be revoked at any time by you in writing, except if your health information has already been used or disclosed.  * Your health information that will be used or disclosed as a result of you signing this authorization could be re-disclosed by the recipient. If this occurs, your re-disclosed health information may no longer be protected by State or Federal laws.  * You may refuse to sign this Authorization. Your refusal will not affect your ability to obtain treatment.  * This Authorization becomes effective upon signing and will  on (date) __________.      If no date is indicated, this Authorization will  one (1) year from the  signature date.    Name: Janett Urban Pricila  Signature:                     Continuity of Care   Date:   5/4/2023     PLEASE FAX REQUESTED RECORDS BACK TO: [phone]

## 2023-04-27 NOTE — PROGRESS NOTES
Subjective     Janett Mcnulty is a 52 y.o. female who presents with Annual Exam            HPI      Here for annual exam  Status post right shoulder arthroscopy November 28 by  from orthopedics, debridement, rotator cuff, humeral head, distal clavicle excision and subacromial decompression.  She is recovering well.  [facility]es [facility] twice per week s/p shoulder right arthroscopy and pain has improved of the right shoulder.  No current narcotics.  She has been taking Celebrex once a day, has had some stomach discomfort recently, has seen GI in the past with history of peptic ulcer disease.  Has had some epigastric discomfort, nausea at times, episodes of emesis, no blood in her stools, no change in bowel habits.  No constipation.  She remains on Prilosec 20 mg, taking Prilosec 30 minutes before medications, meals, supplements with water only decrease from 40 mg last year by GI.  Still following a healthy diet on Optavia, gluten-free diet with history of celiac.  She has not deviated blood from the gluten-free diet, although there is always the concern about cross-contamination.  She has been on tramadol in the past but has not had any recent refills.  Occasional caffeine and alcohol.  She is off Fosamax.  Has had tramadol for pain previously without drowsiness, sedation, memory loss, depression last refill over a year ago  Sees  orthopedics  Sees  pain     Medications, allergies, medical history, surgical history, social history, family history  reviewed and updated    Current Outpatient Medications   Medication Sig Dispense Refill    estradiol (ESTRACE) 1 MG Tab       gabapentin (NEURONTIN) 300 MG Cap Take 1 Capsule by mouth 2 times a day. 180 Capsule 3    celecoxib (CELEBREX) 200 MG Cap TAKE ONE CAPSULE BY MOUTH EVERY DAY AS NEEDED FOR MODERATE PAIN 100 Capsule 2    omeprazole (PRILOSEC) 20 MG delayed-release capsule Take 1 Capsule by mouth every day.      progesterone  (PROMETRIUM) 100 MG Cap Take 1 Capsule by mouth every day.      fluticasone (FLONASE) 50 MCG/ACT nasal spray Administer 2 Sprays into affected nostril(S) every day. (Patient taking differently: Administer 2 Sprays into affected nostril(S) 1 time a day as needed.) 48 g 3    methocarbamol (ROBAXIN) 750 MG Tab Take 1 Tablet by mouth 3 times a day as needed (muscle spasm). 90 Tablet 1    Magnesium 300 MG Cap Take 1 Cap by mouth every day.      Probiotic Product (PROBIOTIC ADVANCED PO) Take 1 Cap by mouth every day.      Multiple Vitamins-Minerals (MULTIVITAMIN ADULT PO) Take 1 Tablet by mouth every day.      Calcium Citrate-Vitamin D (CALCIUM + D PO) Take  by mouth every day.      ascorbic acid (ASCORBIC ACID) 500 MG Tab Take 500 mg by mouth every day.      vitamin D (CHOLECALCIFEROL) 1000 UNIT Tab Take 1,000 Units by mouth every day.       No current facility-administered medications for this visit.                         Varicose vein excision left lower extremity  9/11 varicose veins left excision   3/11/17 ultrasound venous bilateral lower extremities negative     Subclinical hypothyroid  9/26/19 tsh 5.8 repeat labs 3 months test ordered  1/23/20 tsh 2.0, TPO<0.2    s/p shoulder right arthroscopy  9/27/22 MRI right shoulder, new leading edge partial supraspinatus tear with majority of tendon intact, new marginal cystic change humeral head may be internal impingement, and likely reactive marrow edema deep to the partial supraspinatus tear, subdeltoid bursitis, subacromial spurring as seen previously  11/12/22 MRI left shoulder per  orthopedics mild tendinosis anterior supraspinatus, no full-thickness rotator cuff tear, subacromial subdeltoid bursitis secondary to subacromial enthesophyte, mild to moderate osteoarthritis AC joint     s/p hip left arthoplasty  2002 left hip open decompression and osteotomy  12/03  left hip implant removal  11/10  ortho x-ray stable  decompression left femoral head neck junction  11/12  pain management note bilateral trochanteric bursitis injection  5/2/13  pain note, bilateral trochanteric bursal injection  12/10/18 MRI left hip avascular necrosis left femoral head 50% articular surface, with some progression from comparison, some early subchondral collapse anteriorly and superiorly with marrow edema extending into the left femoral head and neck, surgical change consistent with prior decompression procedure and femoral osteoplasty, metallic artifact consistent with surgical pain femoral head, early degenerative changes  8/5/19  orthopedic operative note left total hip arthroplasty  12/12/19  orthopedic note 3-month follow-up left total hip replacement, incision healed nicely, right hip continue to provide discomfort, continue physical therapy follow-up 1 year  5/7/20  orthopedic note x-ray pelvis stable left total hip arthroplasty, early osteoarthritic changes right hip, if worsens consider total joint replacement right side, follow-up 5 years     s/p right hip arthroscopy  2007  ortho right hip arthroscopy  2/11/14  note bilateral greater trochanter injection under ultrasound guidance  1/20/17 MRI pelvis bilateral femoral head avascular necrosis involving approximately 40% of the articular surface, metallic artifact left femoral head consistent with presence of small metallic pain  9/29/17   orthopedic surgical note right hip fluoroscopically guided core decompression and right hand carpal tunnel release  12/10/18 MRI right hip without chronic avascular necrosis right femoral head 50% articular surface without evidence of subchondral collapse, early degenerative changes, surgical changes consistent with prior right proximal femoral osteoplasty  5/7/20  orthopedic note x-ray pelvis stable left total hip arthroplasty, early osteoarthritic  changes right hip, if worsens consider total joint replacement right side, follow-up 5 years  8/13/20  orthopedic note, x-ray pelvis grade 3 arthrosis with osteophyte acetabular side and sclerosis femoral side, joint space narrowing,ficat 2 avascular necrosis right hip progression to osteoarthritis, stable left hip arthroplasty, discussed right hip total arthroplasty  8/24/20  orthopedic operative note right hip arthroplasty  9/3/20  orthopedic note, incision site clean, continue work with physical therapy, follow-up 4 weeks  10/29/20  orthopedic note, 9 weeks postoperative right total hip arthroplasty, x-ray right hip stable arthroplasty without loosening, significant lateral calcification and potential stress response lateral femoral cortex, backed off on standing and walking activities, convert to crutches, follow-up in 3 to 4 weeks  12/1/20  orthopedic note, x-ray LS and right hip, lateral femoral calcification and stress response that is maturing, continue 48-hour workday, follow-up 4 to 5 weeks     s/p bunionectomy  8/9/13  podiatry; right foot martín bunionectomy, right foot first metatarsophalangeal joint bunion/degenerative arthritis      s/p  appendectomy     S/p left ankle surgery 2006 ligament reconstruction     Preventative health  9/25/15 tdap  1/6/17 pneumovax  8/18/20 pap per  gyn  3/21/21 covid rachel one shot  10/5/21 colon per [facility] patchy erythema and congestion, abnormal vascularity ascending colon and rectum, biopsies negative  10/19/21 hep b ab 10.9 (8.4 to 11.5 indeterminate)  11/12/21 dexa LS+1.2,forearm+1.4  6/3/22 hep b second (no need for third since Ab on 10/19/21 was 10.9)  7/8/22 vit d 117 on 4000 units daily and will cut to 2000 units  10/7/22 vit d 99 on 2000 units  10/7/22 A1c 5.1%  11/18/22 mammogram      Perimenopausal  9/25/15 on climara patch and progesterone tab per  gyn     neck pain  10/11 MRI  cervical spine C4-C5 tiny disc bulge, C5-C6 bilateral and plate spurring with uncinate hypertrophy  10/7/16 MRI cervical spine C5-C6 small broad-based osteophyte complex, borderline foraminal stenosis, no significant progression of disease since 2010 6/4/18 MRI cervical spine C3-C4 mild diffuse bulge with mild bilateral foraminal stenosis, C5-C6 mild bilateral foraminal stenosis  5/8/18 x-ray cervical spine minimal DJD C5-C6, calcification left neck may be related to atherosclerotic plaque  5/10/21  pain note recommend repeat bilateral C3-C4 FJNA  6/9/21  pain procedure note right third occipital nerve frequency ablation, right C3-C4 medial branch nerve radiofrequency ablation  6/16/21 dr.patterson ching procedure note left third occipital nerve frequency ablation, right C3-C4 medial branch nerve radiofrequency ablation  2/21/22  pain procedure note right third occipital nerve, right C3 and C4 medial branch radiofrequency ablation under fluoroscopy  2/28/22 dr.patterson ching procedure note left third occipital nerve, left C3-C4 medial branch nerve radiofrequency ablation under fluoroscopy  4/19/22 dr.patterson ching procedure note bilateral C5-C7 medial branch nerve block under fluoroscopy  6/9/22  pain procedure note bilateral C5-C7 medial branch nerve radiofrequency ablation under fluoroscopy     Low back pain  10/10 MRI LS L5-S1 minimal disc bulge  1/11  at the pain management left L3-S1 facet joint injection, right L3-S1 facet joint injection  2/11  pain management right L3-S1 lateral, medial, dorsal ramus nerve block  4/11  pain management left L3-S1 medial, lateral, bursal ramus nerve block  5/11  pain management bilateral SI joint injection under fluoroscopy  7/11  pain note bilat trochanteric bursitis injection  3/12  pain note, right and left L3-L4 L5-S1 medial, dorsal, lateral branch  ablation  12/12  pain note, left L3-S1 dorsal and medial branch radiofrequency ablation  1/4/13  pain note; right L3-S1 radiofrequency ablation  8/14/13  pain note  9/29/13  pain note, left L3-S1 nerve frequency ablation  10/4/13  pain note, status post right L3-S1 FJNA  12/19/13 pain note; status post right SIJI injection, continue voltaren gel, TENS, IT stretches  5/16/14  pain note; left L3-S1 medial, partial, lateral branch radiofrequency ablation under fluoroscopy  6/6/14  right L3-S1 FJNA  7/3/14  pain note; continue TENS,celebrex 200 mg qday, voltaren gel 1%  9/10/14  pain note; continue TENS, lidoderm patch,voltaren gel, celebrex 200 mg qday  12/20/17  pain note left L3-S1 radiofrequency ablation under fluoroscopy  12/22/17  pain note right L3-S1 radiofrequency ablation under fluoroscopy   2/28/19 [facility] L4-L5 3 mm disc bulge, mild central stenosis, L5-S1 moderate facet hypertrophy, small right-sided sacral meningocele   1/25/21  pain note continue TENS,ztlido patches, lidoderm ointment 5%, pennsaid 2%  2/22/21  pain note continue TENS, ztlido patches, lidoderm ointment 5%, pennsaid 2%, recommend S3S1 FJNA  3/1/21  pain procedure note right L3-S1 medial, dorsal, lateral branch radiofrequency ablation under fluoroscopy  3/8/21  pain procedure note left L3-S1 medial, dorsal, lateral branch radiofrequency ablation under fluoroscopy  4/5/21  pain note recommend right L4-L5 epidural steroid injection  4/19/21  pain procedure note right L4-L5 epidural steroid injection under fluoroscopy  6/3/21 EMG NCS lower extremities per  pain management essentially normal motor and sensory nerve conduction lower extremities bilateral, absent bilateral sural sensory  nerve action potential suspicious for mild length dependent peripheral neuropathy, but could be related to body habitus   6/22/21  pain note recommend consider spinal cord stimulator, will refer to pain psychologist  9/27/21  pain note patient has started formal physical therapy program  10/11/21  pain procedure note bilateral L5-S3 dorsal, lateral branch nerve radiofrequency ablation  10/21/21  pain procedure note bilateral L3-S1 medial, dorsal, lateral branch radiofrequency nerve ablation  11/22/21  pain note  12/13/21  pain note  1/26/22 GUTIERREZ note reviewed MRI from last year, will obtain updated lumbar spine x-ray, recommend trial of acupuncture, methocarbamol  1/28/22 x-ray lumbar spine, degenerative disc disease worse at L5-S1 and L1-L2  1/31/22  pain note     ibs  8/05 CT abd/pelvis negative  11/05 [facility] colon negative  10/5/21 EGD per [facility] esophagus single ulcer GE junction, multiple patches ectopic gastric mucosa, hiatal hernia, stomach mucosal patchy erythema, biopsies performed pathology moderate inflammation, negative h.pylori, biopsies duodenum mild inflammation surface changes suggestive of celiac disease, take prilosec 40 mg daily, carafate 1 g 4 times daily x14 days  10/5/21 colon per [facility] patchy mucosal and abnormal vascularity, biopsies performed, pathology negative, single flat benign polyp  10/15/21 [facility] note iron deficiency anemia improving with iron supplementation, recommend gluten-free diet, will obtain baseline TTG, noted to have gastroesophageal ulcer which could be secondary to reflux esophagitis versus pill esophagitis, has been on carafate qid and omeprazole 40 mg daily, since the ulcer was small no surveillance EGD is indicated, continue omeprazole, discontinue carafate, avoid NSAIDs, chronic ALT elevation will obtain serology, right upper quadrant ultrasound, repeat colonoscopy 5 years  2/18/22 [facility] note celiac  carson presenting with iron deficiency, continue ferrous sulfate for another month, she notes improvement with gluten-free diet and her TTG antibodies normal, recommend continue gluten-free diet, repeat cbc,cmp and iron panel in 3 months; esophagitis with GE junction ulcer which could be secondary to reflux versus pill esophagitis, continues on prilosec 40 mg qday and surveillance EGD is not indicated, recommend minimize NSAID use (celebrex is fine), consider changing alendronate to a different formulation      Hypothyroid  9/26/19 tsh 5.8 repeat labs 3 months test ordered  1/23/20 tsh 2.0, TPO<0.2  5/28/21 tsh 5.3 will monitor  6/8/21 started on synthroid 25 mcg by  bariatric medicine  8/13/21 tsh 2.3 on synthroid 25 mcg started by  bariatric medicine  10/19/21 tsh 1.9 on synthroid 25 mcg started by  bariatric medicine  1/21/22 tsh 2.6 on synthroid 25 mcg     History of rheumatic fever  8/29/16 streptococcal pharyngitis positive strep test, treated with augmentin, developed erythema nodosum lower extremities and palms given NSAIDs and medrol dosepack  9/22/16 repeat medrol dose pack x 1 still with painful erythema nodosum nodules  9/24/16 erythema nodosum nodules and arthritic-type pains, we will retreat with penicillin V 500 mg 3 times a day ×10 days  10/5/16 high dose aspirin no benefit, changed to prednisone 20 mg daily, she has an appointment with me in 2 days  10/7/16 echo ordered, EKG done  10/10/16 cbc,cmp,tsh normal,ESR 40,CRP 0.3, repeat throat culture negative, blood cultures negative,  10/19/16 increase prednisone to 40 mg x 3 days then back to 20 mg  10/19/16 daughter diagnosis strep throat given antibiotics, we will treat prophylactically provided patient penicillin V 500 mg tid x 10 days  10/21/16 echo normal LV size and function, EF 65%, trace MR structurally normal mitral valve, mild TR and RVSP 30  10/24/16 on prednisone 40 mg qday unable to taper to  20 mg due to flare up of pain will try 20 mg bid then taper to 20 mg am and 10 mg qpm over the next week  10/26/16  infectious disease recommended secondary prophylaxis penicillin  mg bid x 5 years  11/7/16 still with polyarthritis, on prednisone 20 mg twice a day, repeat labs, still on penicillin, will speak with rheumatology about referral  11/8/16 ESR 15,CRP 0.3,wbc 10.9 (on prednisone),hgb 14,hct 43,cmp normal,RF,C3C4,TPO,lyme,MERA,HLA B27 negative, Factin IgG 22 (0-19),parietal cell Ab 25(0-25)  11/14/16  rheumatolog note, features consistent with rheumatic fever, also consider seronegative spondyloarthropathies, sarcoidosis, rheumatoid arthritis, will check sacroiliac films, hand and feet x-rays, follow-up one week  11/17/16 refill prednisone  11/18/16 ACE serum level normal, G6PD ad vitamin 1,25 d level normal  11/21/16 cxr negative  11/21/16 xray SI joints negative for erosions  11/29/16 x-ray joint survey hands, feet, ankles no evidence of inflammatory arthropathy  11/29/16 CT soft tissue neck without; negative  12/7/16  cardiology repeat echo in 3-4 weeks  12/10/16 MRI right knee multiple areas right knee avascular necrosis medial and lateral femoral condyle, medial and lateral tibial plateau, bone marrow edema  12/23/16  rheumatology note, inflammatory markers did normalize with steroids, rheumatoid factor, CCP, MERA,ANKA negative continue to taper steroids given osteonecrosis alternating 17.5 mg and 15 mg, and 15 mg, and 15 mg alternating with 12.5 mg, and so forth  12/27/16 echo LV ejection fraction 60%, no valvular disease  1/4/17 ESR 8,CRP 0.3  1/12/17  rheumatology note, inflammatory markers did normalize with prednisone therapy, continue taper with osteonecrosis bilateral tenderness achilles insertion consider enthesitis, will get MRI left achilles region to evaluate for tendinitis or tenosynovitis, follow-up 6 weeks  1/20/17 MRI left foot without; no  evidence of marrow edema, arthropathy, tendinopathy or ligament injury  3/6/17 anticardiolipin antibodies, lupus anticoagulant, protein CNS, factor V 5 Leyden, anti-thrombin panel, beta 2 glycoprotein negative  3/10/17 ESR 19,CRP 0.17, cortisol 3.3, ACTH 16, IgG 753 ([id number]), IgA 140, IgM 93  3/14/16  rheumatology note currently off prednisone, osteonecrosis involving multiple joints, knees, hips, right ankle, etiology unclear, antiphospholipid antibody, protein C and S normal, antithrombin III factor V leyden normal, refer to hematology for evaluation other etiologies, slightly decreased IgG refer to allergy immunology  3/14/17 remains on penicillin  3/30/17  allergy immunology evaluation slight IgG reduction 753 with normal IgG, IgM, no history to suggest primary immunodeficiency, recent diagnosis of group A streptococcal pharyngitis complicated by erythema nodosum, 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 therapy, we will perform humerol workup to include repeat quantitative immunoglobulins with subclass, specific antibodies for haemophilus influenza, pneumococcal, tetanus/diphtheria, limited flow cytometry with repeat CBC, SPEP/UPEP, ESR, CRP, UA, follow-up ASO, DNase B titer  4/17/17  allergy note slight IgG reduction at 753 with normal IgA, normal IgM, no history to suggest primary immunodeficiency, suspect that 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 carryover effect from chronic prednisone therapy, cbc unremarkable, inflammatory markers ESR slightly elevated 28, CRP 2.4, urinalysis negative for protein or blood, no further humeral immunodeficiency or lab assessment at this point with normal immunologic titers, intact specific and subclass antibodies, the IgG decrease does not represent any underlying primary immunodeficiency or active antibody dysfunction at this time  4/25/17  rheumatology note await [facility] evaluation  5/10/17 dr.wu lacy  endocrinology note recommend fosamax weekly with anecdotal evidence that bisphosphonate therapy may provide symptomatic benefit and osteonecrosis, if develops side effects could consider intravenous reclast, referral [place] [facility], follow-up 4 months  5/28/17  rheumatology note avascular necrosis, arthralgias lower extremities, continue off prednisone, on alendronate per [facility]  7/3/17  infectious disease consultation history of streptococcal pharyngitis with probable rheumatic fever, recommend discontinue penicillin prophylaxis follow-up as needed  6/27/17  [place] [facility] evaluation, recommended evaluation of hypercoagulable state, to consider antiphospholipid antibodies (lupus anticoagulant, anticardiolipin antibodies, beta-2 glycoprotein, phosphatidyl serine antibodies, factor V Leiden, antithrombin III, prothrombin mutation, protein C&S quantitative and qualitative, homocysteine, recommend after review of records do not strongly feel that she has true rheumatic fever, clear to discontinue penicillin, recheck ASO and anti-DNase B titers after 4 weeks  8/16/17 homocystine, protein C and protein S, lupus anticoagulant, beta 2 glycoprotein, antithrombin III, anticardiolipin antibody, anti-DNAse antibody, antistreptolysin all negative  8/31/17  rheumatology note, continues on alendronate per [facility] recommendation [place], did follow up with orthopedics, will proceed with core decompression  2/23/18  [place] [facility] note, multifocal osteonecrosis knees, hips, other joints, underlying etiology unclear, trial few months of alendronate without dramatic improvement, alendronate discontinued because of undergoing core procedures with her hips, recommend continuing off alendronate monitoring for improvement, reassess in 6 months  10/10/18  [place] [facility] consultation osteonecrosis knees, hips, other joints, unclear  etiology trial of alendronate without improvement, discontinued prior to hip procedures, hip pain is improved, discussed another trial of alendronate for limited timeframe 70 mg weekly reassess 6 months   8/25/21 dr.wu white endocrinology note continue alendronate for 6 months, follow-up 6 months     History of peptic ulcer  11/2/18 seen UC for abdominal discomfort right upper quadrant  11/3/18 ultrasound liver gallbladder region negative  11/10/18 hida negative  11/20/18 trial of prilosec 40 mg  6/17/19 taper prilosec  10/5/21 EGD per [facility] esophagus single ulcer GE junction, multiple patches ectopic gastric mucosa, hiatal hernia, stomach mucosal patchy erythema, biopsies performed pathology moderate inflammation, negative h.pylori, biopsies duodenum mild inflammation surface changes suggestive of celiac disease, take prilosec 40 mg daily, carafate 1 g 4 times daily x14 days  10/5/21 EGD per [facility] esophagus single ulcer GE junction, multiple patches ectopic gastric mucosa, hiatal hernia, stomach mucosal patchy erythema, biopsies performed pathology moderate inflammation, negative h.pylori, biopsies duodenum mild inflammation surface changes suggestive of celiac disease, take prilosec 40 mg daily, carafate 1 g 4 times daily x14 days  10/15/21 [facility] note iron deficiency anemia improving with iron supplementation, recommend gluten-free diet, will obtain baseline TTG, noted to have gastroesophageal ulcer which could be secondary to reflux esophagitis versus pill esophagitis, has been on carafate qid and omeprazole 40 mg daily, since the ulcer was small no surveillance EGD is indicated, continue omeprazole, discontinue carafate, avoid NSAIDs, chronic ALT elevation will obtain serology, right upper quadrant ultrasound, repeat colonoscopy 5 years  2/18/22 [facility] note celiac sprue presenting with iron deficiency, continue ferrous sulfate for another month, she notes improvement with gluten-free diet and her TTG antibodies  normal, recommend continue gluten-free diet, repeat cbc,cmp and iron panel in 3 months; esophagitis with GE junction ulcer which could be secondary to reflux versus pill esophagitis, continues on prilosec 40 mg qday and surveillance EGD is not indicated, recommend minimize NSAID use (celebrex is fine), consider changing alendronate to a different formulation   3/23/22 dr.wu lacy endocrinology note recently diagnosed with gastric ulcer and celiac disease, given GI side effects of alendronate, risks of continuing oral bisphosphonate therapy outweighs any potential benefit and will discontinue alendronate  7/8/22 b12 978,vit d 117,magnesium 2.1 on prilosec     history covid  3/21/21 covid rachel one shot  12/29/21 tested positive covid      history anemia  7/29/19 hgb 13.2,hct 42.2  9/27/19 hgb 11.8,hct 37.7  8/29/20 hgb 11.7,hct 37.9,mcv 88.6  5/28/21 hgb 10,hct 33.5,mcv 78.6  6/12/21 hgb 10.2,hct 35.5,mcv 79,iron 27,%sat 5,TIBC 553,ferritin 10.5,,retic 1.8%, b12 1153, folate 12.7,SPEP negative  6/14/21 start ferrous sulfate every other day, continue vitamin C orally, call GI consultants to schedule appointment, repeat labs 4 weeks ordered  7/16/21 hgb 11.3,hct 37.1,mcv 85.5,iron 52,%sat 12,ferritin 22.6   7/20/21 [facility] note schedule EGD and colonoscopy for iron deficiency anemia  10/5/21 EGD per [facility] single ulcer gastroesophageal junction, multiple patches of gastric ectopic mucosa upper 3rd esophagus, hiatal hernia, patchy erythema stomach, erosions, ulceration, biopsies performed pathology moderate inflammation, negative h.pylori, biopsies duodenum mild inflammation surface changes suggestive of celiac disease  10/5/21 colon per [facility] patchy mucosal and abnormal vascularity, biopsies performed, pathology negative, single flat benign polyp  10/15/21 [facility] note iron deficiency anemia improving with iron supplementation, recommend gluten-free diet, will obtain baseline TTG, noted to have gastroesophageal ulcer  which could be secondary to reflux esophagitis versus pill esophagitis, has been on carafate qid and omeprazole 40 mg daily, since the ulcer was small no surveillance EGD is indicated, continue omeprazole, discontinue Carafate, avoid NSAIDs, chronic ALT elevation will obtain serology, right upper quadrant ultrasound, repeat colonoscopy 5 years  10/19/21 AST 45,ALT 46, hep b ab positive, hep b core ab negative,hep b sAg negative, hep a ab negative, hep c ab negative,  F-actin IgG 21 (0-19), ceruloplasmin normal, copper normal, INR 0.9,vit k 0.84, vit c 83, vit a 1.3, vit e (alpha-tocopherol) 18.5, MERA 1:320 speckled, MERA HIp-2 IgG<1:80,antiDS DNA SSA, SSB, anti-SCL 70, Keli-1 antibody, smith antibody all negative  1/21/22 hgb 13.8,hct 42,iron 63,%sat 15,ferritin 70  2/18/22 [facility] note celiac sprue presenting with iron deficiency, continue ferrous sulfate for another month, she notes improvement with gluten-free diet and her TTG antibodies normal, recommend continue gluten-free diet, repeat cbc,cmp and iron panel in 3 months; esophagitis with GE junction ulcer which could be secondary to reflux versus pill esophagitis, continues on prilosec 40 mg qday and surveillance EGD is not indicated, recommend minimize NSAID use (celebrex is fine), consider changing alendronate to a different formulation   3/23/22 dr.wu lacy endocrinology note recently diagnosed with gastric ulcer and celiac disease, given GI side effects of alendronate, risks of continuing oral bisphosphonate therapy outweighs any potential benefit and will discontinue alendronate  7/8/22 hgb 13.5,hct 40,iron 89,%sat 21,ferritin 89     History abnormal liver function test  10/15/21 [facility] note iron deficiency anemia improving with iron supplementation, recommend gluten-free diet, will obtain baseline TTG, noted to have gastroesophageal ulcer which could be secondary to reflux esophagitis versus pill esophagitis, has been on carafate qid and omeprazole 40 mg  daily, since the ulcer was small no surveillance EGD is indicated, continue omeprazole, discontinue carafate, avoid NSAIDs, chronic ALT elevation will obtain serology, right upper quadrant ultrasound, repeat colonoscopy 5 years  10/19/21 zinc 461  10/19/21 IgG 873, IgM 112, IgA 201, IgE 18  10/19/21 celiac panel negative  10/19/21 AST 45,ALT 46, hep b ab positive, hep b core ab negative,hep b sAg negative, hep a ab negative, hep c ab negative,  F-actin IgG 21 (0-19), ceruloplasmin normal, copper normal, INR 0.9,vit k 0.84, vit c 83, vit a 1.3, vit e (alpha-tocopherol) 18.5, MERA 1:320 speckled, MERA HIp-2 IgG<1:80,antiDS DNA SSA, SSB, anti-SCL 70, Keli-1 antibody, smith antibody all negative  10/25/21 ultrasound abdomen mildly echogenic liver   1/21/22 AST 60,ALT 88,alk phos 94,bili 0.3  2/18/22 [facility] note NAFLD with chronic ALT elevation predominant hepatocellular pattern without evidence of cirrhosis, ultrasound demonstrated hepatic steatosis, celiac disease may be contributing, repeat labs 3 months, screening colonoscopy 5 years  7/8/22 AST 23,ALT 25   10/7/22 AST 28,ALT 31    foot pain  8/5/15 MRI right foot severe artifact secondary to first MTP are clear, limiting analysis of soft tissue, highly likely complete FHL tear  8/28/15  orthopedic note right ankle FHL tendon rupture seen on MRI, do not recommend surgical repair at this time which would involve hemiarthroplasty, implant, first MTP fusion with bone graft, followup podiatry   8/18/17 MRI left foot normal, MRI right foot osteoathritis right first metatarsal head and sesamoids moderate in degree, ulnar edema with an sesamoids may be due to susceptibility artifact related to first metatarsal phalangeal arthroplasty     Dyslipidemia  9/29/14 chol 186,trig 46,hdl 76,ldl 101  9/18/15 chol 225,trig 50,hdl 77,ldl 138  9/20/17 chol 189,trig 53,hdl 56,ldl 122  5/11/18 ultrasound carotid minimal left carotid bulb plaque, calcified, no evidence of  occlusion less than 50% stenosis  9/28/18 chol 187,trig 59,hdl 76,ldl 99  5/8/18 EKG done in clinic, ultrasound carotid ordered follow-up calcifications   5/11/18 ultrasound carotid less than 50% internal stenosis bilateral  9/27/19 chol 196,trig 60,hdl 79,ldl 105  5/28/21 chol 201,trig 58,hdl 70,ldl 119  1/21/22 chol 250,trig 93,hdl 69,ldl 162;10 year risk 1.4%  7/8/22 chol 199,trig 77,hdl 84,ldl 100 on new diet     chronic pain  5/8/18 pain contract  5/8/18 opiate risk score 1  5/8/18 on ultram 1-2 per day has tried physical therapy,TENS,acupuncture,chiropractor,massage therapy,heat,physical therapy, NSAIDs,gabapentin,celebrex,voltaren,lidoderm patch  6/17/19 controlled substance contract  9/9/19  on ultram one per evening  6/11/20 ultram refill  7/17/21 ultram refill  1/18/22 ultram refill #90  1/18/22 controlled substance contract  8/25/22 off ultram just on celebrex prn   4/27/23 ultram refill  4/27/23 contract  tried physical therapy,TENS,acupuncture,chiropractor,massage therapy,heat, NSAIDs,gabapentin,celebrex,voltaren,lidoderm patch     Celiac   10/5/21 EGD per [facility] biopsies duodenum mild inflammation surface changes suggestive of celiac disease  10/5/21 colon per [facility] patchy erythema and congestion, abnormal vascularity ascending colon and rectum, biopsies negative  10/19/21 t-TG IgA negative  1/21/22 t-TG IgA negative  2/18/22 [facility] note celiac sprue presenting with iron deficiency, continue ferrous sulfate for another month, she notes improvement with gluten-free diet and her TTG antibodies normal, recommend continue gluten-free diet, repeat cbc,cmp and iron panel in 3 months; esophagitis with GE junction ulcer which could be secondary to reflux versus pill esophagitis, continues on prilosec 40 mg qday and surveillance EGD is not indicated, recommend minimize NSAID use (celebrex is fine), consider changing alendronate to a different formulation, NAFLD with chronic ALT elevation predominant hepatocellular  pattern without evidence of cirrhosis, ultrasound demonstrated hepatic steatosis, celiac disease may be contributing, repeat labs 3 months, screening colonoscopy 5 years    carpal tunnel  2/15/22  pain procedure note left median nerve ultrasound-guided steroid injection     avascular necrosis  8/29/16 streptococcal pharyngitis positive strep test, treated with augmentin, developed erythema nodosum lower extremities and palms given NSAIDs and medrol dosepack  9/22/16 repeat medrol dose pack x 1 still with painful erythema nodosum nodules  10/5/16 high dose aspirin no benefit, changed to prednisone 20 mg daily,  10/19/16 increase prednisone to 40 mg x 3 days then back to 20 mg  11/7/16 still with polyarthritis, on prednisone 20 mg twice a day, repeat labs, still on penicillin, will speak with rheumatology about referral  11/14/16  rheumatolog note, features consistent with rheumatic fever, also consider seronegative spondyloarthropathies, sarcoidosis, rheumatoid arthritis, will check sacroiliac films, hand and feet x-rays, follow-up one week  12/23/16  rheumatology note, inflammatory markers did normalize with steroids, rheumatoid factor, CCP, MERA,ANKA negative continue to taper steroids given osteonecrosis alternating 17.5 mg and 15 mg, and 15 mg, and 15 mg alternating with 12.5 mg, and so forth  12/10/16 MRI right knee multiple areas right knee avascular necrosis medial and lateral femoral condyle, medial and lateral tibial plateau, bone marrow edema  12/23/16  rheumatology note, inflammatory markers did normalize with steroids, rheumatoid factor, CCP, MERA,ANKA negative continue to taper steroids given osteonecrosis alternating 17.5 mg and 15 mg, and 15 mg, and 15 mg alternating with 12.5 mg, and so forth  1/20/17 MRI left foot no evidence of arthropathy or tendinopathy  1/20/17 MRI left ankle metallic artifact lateral malleolus  1/20/17 MRI right ankle small oblong focus finger  edema tibial plafond below subchondral plate, would be consistent with small area of avascular necrosis  1/20/17 MRI left knee multiple areas avascular necrosis surrounding the knee to include femur, tibia, fibula  1/20/17 MRI pelvis bilateral femoral head avascular necrosis involving approximately 40% of the articular surface, metallic artifact left femoral head consistent with presence of small metallic pin  2/9/17 tapering prednisone down to 2.5 mg alternating with 5 mg  2/23/17  rheumatology note complete steroid taper then repeat ESR, CRP and CPK, also check antiphospholipid antibody,antithrombin, factor v leiden, protein s  3/6/17 anticardiolipin antibodies, lupus anticoagulant, protein CNS, factor V 5 Leyden, anti-thrombin panel, beta 2 glycoprotein negative  3/10/17 ESR 19,CRP 0.17, cortisol 3.3, ACTH 16, IgG 753 ([id number]), IgA 140, IgM 93  3/14/16  rheumatology note currently off prednisone, osteonecrosis involving multiple joints, knees, hips, right ankle, etiology unclear, antiphospholipid antibody, protein C and S normal, antithrombin III factor V leyden normal, refer to hematology for evaluation other etiologies, slightly decreased IgG refer to allergy immunology  3/30/17  allergy immunology evaluation slight IgG reduction 753 with normal IgG, IgM, no history to suggest primary immunodeficiency, recent diagnosis of group A streptococcal pharyngitis complicated by erythema nodosum, 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 therapy, we will perform humerol workup to include repeat quantitative immunoglobulins with subclass, specific antibodies for haemophilus influenza, pneumococcal, tetanus/diphtheria, limited flow cytometry with repeat CBC, SPEP/UPEP, ESR, CRP, UA, follow-up ASO, DNase B titer  5/10/17 dr.wu lacy endocrinology note recommend fosamax weekly with anecdotal evidence that bisphosphonate therapy may provide symptomatic benefit and osteonecrosis, if  develops side effects could consider intravenous reclast, referral [place] [facility], follow-up 4 months  5/28/17  rheumatology note avascular necrosis, arthralgias lower extremities, continue off prednisone, on alendronate per [facility]  7/7/17 MRI left knee without; again seen multiple bone infarcts femur, tibia, fibula, patella appeared less prominent on current examination  7/7/17 MRI right knee without; interval improvement in size of multiple bony infarcts involving femur, tibia, fibula, some infarcts have resolved  7/7/17 MRI pelvis without; stable bilateral femoral head avascular necrosis, these have not progressed and there is no evidence of subchondral collapse or arthropathy  6/27/17  [facility] evaluation, recommended evaluation of hypercoagulable state, to consider antiphospholipid antibodies (lupus anticoagulant, anticardiolipin antibodies, beta-2 glycoprotein, phosphatidyl serine antibodies, factor V Leiden, antithrombin III, prothrombin mutation, protein C&S quantitative and qualitative, homocysteine, recommend after review of records do not strongly feel that she has true rheumatic fever, clear to discontinue penicillin, recheck ASO and anti-DNase B titers after 4 weeks  8/16/17 homocystine, protein C and protein S, lupus anticoagulant, beta 2 glycoprotein, antithrombin III, anticardiolipin antibody, anti-DNAse antibody, antistreptolysin all negative  8/18/17 MRI left foot normal, MRI right foot osteoathritis right first metatarsal head and sesamoids moderate in degree, ulnar edema with an sesamoids may be due to susceptibility artifact related to first metatarsal phalangeal arthroplasty  8/31/17  rheumatology note, continues on alendronate per bone endocrinology recommendation [facility], did follow up with orthopedics, will proceed with core decompression  9/27/17  [facility] with focal osteonecrosis involving knees, hips,  other joints, etiology unclear, dramatic improvement with a few months of alendronate, about to undergo surgical intervention hip, will discontinue alendronate pending surgery, reassess 4-5 months  9/29/17   orthopedic surgical note right hip fluoroscopically guided core decompression and right hand carpal tunnel release  12/29/17  orthopedic surgical note  left proximal femoral head core decompression  2/23/18  [place] [facility] [facility] note, multifocal osteonecrosis knees, hips, other joints, underlying etiology unclear, trial few months of alendronate without dramatic improvement, alendronate discontinued because of undergoing core procedures with her hips, recommend continuing off alendronate monitoring for improvement, reassess in 6 months  10/10/18  [place] [facility] consultation osteonecrosis knees, hips, other joints, unclear etiology trial of alendronate without improvement, discontinued prior to hip procedures, hip pain is improved, discussed another trial of alendronate for limited timeframe 70 mg weekly reassess 6 months  12/7/18 crp 0.46, ESR 23, hgb 13,hct 42, wbc 7.9  12/10/18 MRI left hip avascular necrosis left femoral head 50% articular surface, with some progression from comparison, some early subchondral collapse anteriorly and superiorly with marrow edema extending into the left femoral head and neck, surgical change consistent with prior decompression procedure and femoral osteoplasty, metallic artifact consistent with surgical pain femoral head, early degenerative changes  12/10/18 MRI right hip without chronic avascular necrosis right femoral head 50% articular surface without evidence of subchondral collapse, early degenerative changes, surgical changes consistent with prior right proximal femoral osteoplasty  4/24/19  [place] [facility] order bone density if she does have osteoporosis consider continuation bisphosphonate versus anabolic  therapy  5/2/19  orthopedic note undergo MRI evaluation of her left hip  6/10/19 MRI left hip at [facility], redemonstration moderate to advanced avascular necrosis bilateral femoral heads, degree of associated bone marrow edema appears significantly decreased from previous study particular on the left, small left hip effusion, postoperative changes remote core decompression and femoral osteotomy  6/27/19  orthopedic note follow-up evaluation advanced degenerative arthritis left hip, symptomatic failing conservative measures, considering total joint arthroplasty, recommend proceeding with left total hip arthroplasty due to progression of avascular necrosis  8/5/19  orthopedic operative note left total hip arthroplasty  9/9/19 off fosamax for now due to recent hip arthroplasty  10/9/19 dr.wu white endocrinology notes most recent bone density reviewed, remains off alendronate since left hip replacement, clear to resume alendronate 70 mg weekly follow-up 6 months  12/10/19 on fosamax 70 mg 5/7/20  orthopedic note x-ray pelvis stable left total hip arthroplasty, early osteoarthritic changes right hip, if worsens consider total joint replacement right side, follow-up 5 years  8/19/20 dr.wu white endocrinology note multifocal osteonecrosis, underlying etiology unclear, initially experienced dramatic improvement with alendronate however recurrent symptoms despite alendronate therapy, planning to undergo right hip replacement and will resume alendronate after surgery  12/17/20 back on alendronate 70 mg weekly  2/2/21  [place] [facility] note, continue alendronate 70 mg weekly  8/25/21  [facility] note she had a few months of alendronate without significant improvement in symptoms, bone density test is normal, will continue alendronate until after spine surgery to see if knee pain resolves, if pain continues after surgery then it is likely  "intrinsic to the knee and electrolyte is not helping, follow-up 6 months  3/23/22 dr.wu lacy endocrinology note recently diagnosed with gastric ulcer and celiac disease, given GI side effects of alendronate, risks of continuing oral bisphosphonate therapy outweighs any potential benefit and will discontinue alendronate     allergic rhinitis  Tried otc claritin  9/12/13 flonase trial  9/25/15 flonase and zyrtec or claritin    Patient Active Problem List   Diagnosis    S/P appendectomy    S/p ankle left surgery    S/p hip right arthroplasty     Low back pain    Neck pain    IBS (irritable bowel syndrome)    Preventative health care    Varicose vein of leg    S/P bunionectomy    Allergic rhinitis due to animal hair and dander    Chronic pain    Dyslipidemia    Perimenopausal    Foot pain, right    Obesity (BMI 30-39.9)    History of rheumatic fever    Avascular necrosis (HCC)    Carpal tunnel syndrome    History of peptic ulcer    S/p hip left arthroplasty    History of hypothyroidism    History of anemia    History of abnormal liver function test    History of COVID-19    Celiac disease    S/p shoulder right arthroscopy                 Patient Care Team:  Franklin Gonzalez M.D. as PCP - General (Internal Medicine)  Starr Jackson M.D. (Inactive) as Consulting Physician (Rheumatology)  Franklin Gonzalez M.D.      ROS           Objective     BP (!) 136/92 (BP Location: Right arm, Patient Position: Sitting, BP Cuff Size: Adult)   Pulse 98   Temp 36.8 °C (98.2 °F)   Resp 12   Ht 1.702 m (5' 7\")   Wt 82.1 kg (181 lb)   LMP 06/30/2012   SpO2 (!) 82%   BMI 28.35 kg/m²      Physical Exam  Vitals and nursing note reviewed.   Constitutional:       Appearance: Normal appearance.   HENT:      Head: Normocephalic and atraumatic.      Right Ear: External ear normal.      Left Ear: External ear normal.   Eyes:      Conjunctiva/sclera: Conjunctivae normal.   Cardiovascular:      Rate and Rhythm: Normal rate and regular rhythm. "      Heart sounds: Normal heart sounds.   Pulmonary:      Effort: Pulmonary effort is normal.      Breath sounds: Normal breath sounds.   Abdominal:      General: Abdomen is flat. There is no distension.      Palpations: Abdomen is soft. There is no mass.      Tenderness: There is abdominal tenderness. There is no guarding.   Musculoskeletal:         General: No swelling.   Skin:     Findings: No bruising.   Neurological:      General: No focal deficit present.      Mental Status: She is alert.   Psychiatric:         Mood and Affect: Mood normal.         Behavior: Behavior normal.                           Assessment & Plan        Assessment  #1 annual exam     #2 s/p shoulder right arthroscopy going to PT and still with some right shoulder pain taking celebrex daily    #3 dyspepsia with history of peptic ulcer disease, EGD October 2021 showed esophagus ulcer, stomach erythema, has been on Prilosec 20 mg, chronic PPI use can contribute to B12, vitamin D, magnesium deficiency    #4 low back pain followed by  pain management     #5 celiac disease on gluten-free diet    #6 history of anemia    #7 preventive health Pap smear per gynecology  earlier this year we do not have those records    #8 history of obesity, improved on Optavia and gluten-free or low gluten diet BMI 28.3      Plan  #! Health maintenance reviewed and updated    #2  Increase Prilosec to 40 mg    #3 refill Zofran    #4 continue gluten-free diet    #5 continue Celebrex for now, see if she responds to the higher dose of Prilosec, also refill tramadol 50 mg every 8 hours as needed for pain she has had this before without side effects, she can use that in place of Celebrex    #6     #7 controlled substance contract    #8 tramadol 50 mg every 8 hours as needed can cause nausea, lightheadedness, dizziness, previously no mood changes or depression related to that    #9 no caffeine, no alcohol    #10 hepatitis B third in series    #11  right upper quadrant ultrasound  Increase prilosec to 40 mg    #12 Labs    #13 continue shoulder exercises   General

## 2023-04-27 NOTE — LETTER
[facility]  Franklin Gonzalez M.D.  [address]  [place] [address]  Fax: [phone]   Authorization for Release/Disclosure of   Protected Health Information   Name: SUMAYA WATSON : 1970 SSN: [id number]   Address: [address] [place] [address]  [place] [address] Phone:    [phone] (home)    I authorize the entity listed below to release/disclose the PHI below to:   [facility]/Franklin Gonzalez M.D. and Franklin Gonzalez M.D.   Provider or Entity Name:                                                         Dr Lula Herrera (GYN)   Address   City, State, [facility]   Phone:      Fax:     Reason for request: continuity of care   Information to be released: OLD RECORDS   [  ] LAST COLONOSCOPY,  including any PATH REPORT and follow-up  [  ] LAST FIT/COLOGUARD RESULT [  ] LAST DEXA  [  ] LAST MAMMOGRAM  [  ] LAST PAP  [  ] LAST LABS [  ] RETINA EXAM REPORT  [  ] IMMUNIZATION RECORDS  [ x ] Release all info      [  ] Check here and initial the line next to each item to release ALL health information INCLUDING  _____ Care and treatment for drug and / or alcohol abuse  _____ HIV testing, infection status, or AIDS  _____ Genetic Testing    DATES OF SERVICE OR TIME PERIOD TO BE DISCLOSED: _____________  I understand and acknowledge that:  * This Authorization may be revoked at any time by you in writing, except if your health information has already been used or disclosed.  * Your health information that will be used or disclosed as a result of you signing this authorization could be re-disclosed by the recipient. If this occurs, your re-disclosed health information may no longer be protected by State or Federal laws.  * You may refuse to sign this Authorization. Your refusal will not affect your ability to obtain treatment.  * This Authorization becomes effective upon signing and will  on (date) __________.      If no date is indicated, this Authorization will  one (1) year from the signature date.     Name: Janett Urban Page  Signature:                  Continuity of Care   Date:   5/4/2023     PLEASE FAX REQUESTED RECORDS BACK TO: [phone]

## 2023-04-28 NOTE — TELEPHONE ENCOUNTER
Need old records:  (1) dr.susan padilla gynecology  (2)  pain management (only the notes from Jan 2022 through today)

## 2023-05-01 ENCOUNTER — HOSPITAL ENCOUNTER (OUTPATIENT)
Dept: RADIOLOGY | Facility: MEDICAL CENTER | Age: 53
End: 2023-05-01
Attending: INTERNAL MEDICINE
Payer: COMMERCIAL

## 2023-05-01 DIAGNOSIS — R10.13 DYSPEPSIA: ICD-10-CM

## 2023-05-01 PROCEDURE — 76705 ECHO EXAM OF ABDOMEN: CPT

## 2023-07-07 ENCOUNTER — HOSPITAL ENCOUNTER (OUTPATIENT)
Dept: LAB | Facility: MEDICAL CENTER | Age: 53
End: 2023-07-07
Payer: COMMERCIAL

## 2023-07-07 ENCOUNTER — HOSPITAL ENCOUNTER (OUTPATIENT)
Dept: LAB | Facility: MEDICAL CENTER | Age: 53
End: 2023-07-07
Attending: INTERNAL MEDICINE
Payer: COMMERCIAL

## 2023-07-07 DIAGNOSIS — Z86.2 HISTORY OF IRON DEFICIENCY ANEMIA: ICD-10-CM

## 2023-07-07 DIAGNOSIS — E53.8 B12 DEFICIENCY: ICD-10-CM

## 2023-07-07 DIAGNOSIS — R79.0 LOW BLOOD MAGNESIUM: ICD-10-CM

## 2023-07-07 DIAGNOSIS — E55.9 VITAMIN D DEFICIENCY: ICD-10-CM

## 2023-07-07 DIAGNOSIS — Z00.00 PREVENTATIVE HEALTH CARE: ICD-10-CM

## 2023-07-07 DIAGNOSIS — R11.0 NAUSEA: ICD-10-CM

## 2023-07-07 LAB
25(OH)D3 SERPL-MCNC: 70 NG/ML (ref 30–100)
ALBUMIN SERPL BCP-MCNC: 4.4 G/DL (ref 3.2–4.9)
ALBUMIN/GLOB SERPL: 1.6 G/DL
ALP SERPL-CCNC: 58 U/L (ref 30–99)
ALT SERPL-CCNC: 23 U/L (ref 2–50)
ANION GAP SERPL CALC-SCNC: 12 MMOL/L (ref 7–16)
AST SERPL-CCNC: 29 U/L (ref 12–45)
BASOPHILS # BLD AUTO: 0.9 % (ref 0–1.8)
BASOPHILS # BLD: 0.05 K/UL (ref 0–0.12)
BILIRUB SERPL-MCNC: 0.5 MG/DL (ref 0.1–1.5)
BUN SERPL-MCNC: 21 MG/DL (ref 8–22)
CALCIUM ALBUM COR SERPL-MCNC: 8.8 MG/DL (ref 8.5–10.5)
CALCIUM SERPL-MCNC: 9.1 MG/DL (ref 8.5–10.5)
CHLORIDE SERPL-SCNC: 97 MMOL/L (ref 96–112)
CHOLEST SERPL-MCNC: 166 MG/DL (ref 100–199)
CO2 SERPL-SCNC: 22 MMOL/L (ref 20–33)
CREAT SERPL-MCNC: 0.81 MG/DL (ref 0.5–1.4)
CRP SERPL HS-MCNC: 0.5 MG/DL (ref 0–0.75)
EOSINOPHIL # BLD AUTO: 0.09 K/UL (ref 0–0.51)
EOSINOPHIL NFR BLD: 1.7 % (ref 0–6.9)
ERYTHROCYTE [DISTWIDTH] IN BLOOD BY AUTOMATED COUNT: 47.9 FL (ref 35.9–50)
EST. AVERAGE GLUCOSE BLD GHB EST-MCNC: 100 MG/DL
FERRITIN SERPL-MCNC: 113 NG/ML (ref 10–291)
GFR SERPLBLD CREATININE-BSD FMLA CKD-EPI: 87 ML/MIN/1.73 M 2
GLOBULIN SER CALC-MCNC: 2.8 G/DL (ref 1.9–3.5)
GLUCOSE SERPL-MCNC: 89 MG/DL (ref 65–99)
HBA1C MFR BLD: 5.1 % (ref 4–5.6)
HCT VFR BLD AUTO: 38.8 % (ref 37–47)
HDLC SERPL-MCNC: 65 MG/DL
HGB BLD-MCNC: 12.9 G/DL (ref 12–16)
IMM GRANULOCYTES # BLD AUTO: 0.01 K/UL (ref 0–0.11)
IMM GRANULOCYTES NFR BLD AUTO: 0.2 % (ref 0–0.9)
IRON SERPL-MCNC: 146 UG/DL (ref 40–170)
LDLC SERPL CALC-MCNC: 77 MG/DL
LIPASE SERPL-CCNC: 47 U/L (ref 11–82)
LYMPHOCYTES # BLD AUTO: 1.44 K/UL (ref 1–4.8)
LYMPHOCYTES NFR BLD: 26.8 % (ref 22–41)
MAGNESIUM SERPL-MCNC: 2 MG/DL (ref 1.5–2.5)
MCH RBC QN AUTO: 32.8 PG (ref 27–33)
MCHC RBC AUTO-ENTMCNC: 33.2 G/DL (ref 32.2–35.5)
MCV RBC AUTO: 98.7 FL (ref 81.4–97.8)
MONOCYTES # BLD AUTO: 0.54 K/UL (ref 0–0.85)
MONOCYTES NFR BLD AUTO: 10.1 % (ref 0–13.4)
NEUTROPHILS # BLD AUTO: 3.24 K/UL (ref 1.82–7.42)
NEUTROPHILS NFR BLD: 60.3 % (ref 44–72)
NRBC # BLD AUTO: 0 K/UL
NRBC BLD-RTO: 0 /100 WBC (ref 0–0.2)
PLATELET # BLD AUTO: 237 K/UL (ref 164–446)
PMV BLD AUTO: 10.6 FL (ref 9–12.9)
POTASSIUM SERPL-SCNC: 4.8 MMOL/L (ref 3.6–5.5)
PROT SERPL-MCNC: 7.2 G/DL (ref 6–8.2)
RBC # BLD AUTO: 3.93 M/UL (ref 4.2–5.4)
SODIUM SERPL-SCNC: 131 MMOL/L (ref 135–145)
TRIGL SERPL-MCNC: 121 MG/DL (ref 0–149)
TSH SERPL DL<=0.005 MIU/L-ACNC: 2.19 UIU/ML (ref 0.38–5.33)
VIT B12 SERPL-MCNC: 662 PG/ML (ref 211–911)
WBC # BLD AUTO: 5.4 K/UL (ref 4.8–10.8)

## 2023-07-07 PROCEDURE — 83690 ASSAY OF LIPASE: CPT

## 2023-07-07 PROCEDURE — 85025 COMPLETE CBC W/AUTO DIFF WBC: CPT

## 2023-07-07 PROCEDURE — 82306 VITAMIN D 25 HYDROXY: CPT

## 2023-07-07 PROCEDURE — 80053 COMPREHEN METABOLIC PANEL: CPT

## 2023-07-07 PROCEDURE — 82784 ASSAY IGA/IGD/IGG/IGM EACH: CPT

## 2023-07-07 PROCEDURE — 83540 ASSAY OF IRON: CPT

## 2023-07-07 PROCEDURE — 83735 ASSAY OF MAGNESIUM: CPT

## 2023-07-07 PROCEDURE — 82607 VITAMIN B-12: CPT

## 2023-07-07 PROCEDURE — 80061 LIPID PANEL: CPT

## 2023-07-07 PROCEDURE — 36415 COLL VENOUS BLD VENIPUNCTURE: CPT

## 2023-07-07 PROCEDURE — 84443 ASSAY THYROID STIM HORMONE: CPT

## 2023-07-07 PROCEDURE — 83036 HEMOGLOBIN GLYCOSYLATED A1C: CPT

## 2023-07-07 PROCEDURE — 86364 TISS TRNSGLTMNASE EA IG CLAS: CPT

## 2023-07-07 PROCEDURE — 86140 C-REACTIVE PROTEIN: CPT

## 2023-07-07 PROCEDURE — 82728 ASSAY OF FERRITIN: CPT

## 2023-07-09 LAB — TTG IGA SER IA-ACNC: <2 U/ML (ref 0–3)

## 2023-07-10 ENCOUNTER — TELEPHONE (OUTPATIENT)
Dept: MEDICAL GROUP | Facility: MEDICAL CENTER | Age: 53
End: 2023-07-10
Payer: COMMERCIAL

## 2023-07-10 LAB — IGA SERPL-MCNC: 205 MG/DL (ref 68–408)

## 2023-07-11 NOTE — TELEPHONE ENCOUNTER
Notified with results, slightly low sodium, currently not on diuretics, no new medications that would affect her sodium, perhaps with the heat and weather, her water intake has been above normal..  Iron studies normal, lipase negative, she has upper endoscopy pending from GI.  She is holding off the Celebrex until she has the endoscopy.  Cholesterol is much better from her previous, continue her good nutrition and exercise program.

## 2023-08-03 ENCOUNTER — OFFICE VISIT (OUTPATIENT)
Dept: MEDICAL GROUP | Facility: MEDICAL CENTER | Age: 53
End: 2023-08-03
Payer: COMMERCIAL

## 2023-08-03 VITALS
DIASTOLIC BLOOD PRESSURE: 90 MMHG | HEART RATE: 74 BPM | WEIGHT: 184 LBS | BODY MASS INDEX: 28.88 KG/M2 | SYSTOLIC BLOOD PRESSURE: 140 MMHG | TEMPERATURE: 97.9 F | HEIGHT: 67 IN

## 2023-08-03 DIAGNOSIS — M25.519 SHOULDER PAIN, UNSPECIFIED CHRONICITY, UNSPECIFIED LATERALITY: ICD-10-CM

## 2023-08-03 DIAGNOSIS — M54.9 BACK PAIN, UNSPECIFIED BACK LOCATION, UNSPECIFIED BACK PAIN LATERALITY, UNSPECIFIED CHRONICITY: ICD-10-CM
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
## 2023-08-03 DIAGNOSIS — G89.29 OTHER CHRONIC PAIN: ICD-10-CM

## 2023-08-03 PROCEDURE — 99214 OFFICE O/P EST MOD 30 MIN: CPT | Performed by: INTERNAL MEDICINE

## 2023-08-03 PROCEDURE — 3080F DIAST BP >= 90 MM HG: CPT | Performed by: INTERNAL MEDICINE

## 2023-08-03 PROCEDURE — 3077F SYST BP >= 140 MM HG: CPT | Performed by: INTERNAL MEDICINE

## 2023-08-03 RX ORDER — ONDANSETRON 4 MG/1
TABLET, FILM COATED ORAL
COMMUNITY
Start: 2023-07-15

## 2023-08-03 RX ORDER — TRAMADOL HYDROCHLORIDE 50 MG/1
50 TABLET ORAL EVERY 8 HOURS PRN
Qty: 90 TABLET | Refills: 0 | Status: SHIPPED | OUTPATIENT
Start: 2023-08-03 | End: 2023-09-02

## 2023-08-03 ASSESSMENT — PATIENT HEALTH QUESTIONNAIRE - PHQ9: CLINICAL INTERPRETATION OF PHQ2 SCORE: 0

## 2023-08-03 ASSESSMENT — FIBROSIS 4 INDEX: FIB4 SCORE: 1.33

## 2023-08-03 NOTE — PROGRESS NOTES
Subjective     Janett Mcnulty is a 53 y.o. female who presents with Follow-Up (From fall)            HPI    Patient is here with her  new complaint, about a week and a half ago while camping, she was in their indoor camper and she was trying to go to the shower however she had to walk over a pullout bed and while trying to avoid people on the bed, she lost her balance and fell forward, hitting her chin on a door, and hyperextended her back such that her feet were on a sofa bed and her chest was on the floor.  No loss of consciousness.  She did have a headache, dizziness the first few days after the fall, also some bruising of her legs, knees, he did have some swelling of her ankles initially.  She does have chronic neck and low back pain, follows pain management Dr. Rees's office and underwent a L3-L4 radiofrequency ablation and L5 ablation in April.  She also has had chronic neck pain but has not had a cervical medial branch or occipital nerve block in a year.  The next day after her fall she felt pain in her neck, low back, shoulders and legs as well.  This weekend she still had the headache and dizziness, does notice she is more fatigued than normal and the headache and neck pain can be worse at work sitting and doing computer work.  She has been taking Celebrex every few days since the fall, recent EGD July 13 was negative, although biopsies are still pending, she was told that she could take Celebrex on as-needed basis.  She still takes omeprazole 40 mg daily.  With the Celebrex since the fall she has not had any nausea, vomiting, or breakthrough reflux.  She also takes gabapentin 2 tablets at night and has Robaxin as needed.  Also since the fall she has been taking tramadol up to twice a day as needed, before the fall she had been using tramadol rarely last refill was May 13.  Tramadol does not normally cause any drowsiness or memory loss.  Before the fall she had been exercising regularly going  walking 1 block a day at noon, she has not been swimming as much since the [place] [facility] closed.   Medications, allergies, medical history, surgical history, social history, family history  reviewed and updated           Current Outpatient Medications   Medication Sig Dispense Refill    ondansetron (ZOFRAN) 4 MG Tab tablet       estradiol (ESTRACE) 1 MG Tab       omeprazole (PRILOSEC) 40 MG delayed-release capsule Take 1 Capsule by mouth every day. 90 Capsule 3    gabapentin (NEURONTIN) 300 MG Cap Take 1 Capsule by mouth 2 times a day. 180 Capsule 3    celecoxib (CELEBREX) 200 MG Cap TAKE ONE CAPSULE BY MOUTH EVERY DAY AS NEEDED FOR MODERATE PAIN 100 Capsule 2    progesterone (PROMETRIUM) 100 MG Cap Take 1 Capsule by mouth every day.      fluticasone (FLONASE) 50 MCG/ACT nasal spray Administer 2 Sprays into affected nostril(S) every day. (Patient taking differently: Administer 2 Sprays into affected nostril(S) 1 time a day as needed.) 48 g 3    methocarbamol (ROBAXIN) 750 MG Tab Take 1 Tablet by mouth 3 times a day as needed (muscle spasm). 90 Tablet 1    Magnesium 300 MG Cap Take 1 Cap by mouth every day.      Probiotic Product (PROBIOTIC ADVANCED PO) Take 1 Cap by mouth every day.      Multiple Vitamins-Minerals (MULTIVITAMIN ADULT PO) Take 1 Tablet by mouth every day.      Calcium Citrate-Vitamin D (CALCIUM + D PO) Take  by mouth every day.      ascorbic acid (ASCORBIC ACID) 500 MG Tab Take 500 mg by mouth every day.      vitamin D (CHOLECALCIFEROL) 1000 UNIT Tab Take 1,000 Units by mouth every day.       No current facility-administered medications for this visit.                       Varicose vein excision left lower extremity  9/11 varicose veins left excision   3/11/17 ultrasound venous bilateral lower extremities negative    s/p shoulder right arthroscopy  9/27/22 MRI right shoulder, new leading edge partial supraspinatus tear with majority of tendon intact, new marginal cystic change humeral  head may be internal impingement, and likely reactive marrow edema deep to the partial supraspinatus tear, subdeltoid bursitis, subacromial spurring as seen previously  11/12/22 MRI left shoulder per  orthopedics mild tendinosis anterior supraspinatus, no full-thickness rotator cuff tear, subacromial subdeltoid bursitis secondary to subacromial enthesophyte, mild to moderate osteoarthritis AC joint     s/p hip left arthoplasty  2002 left hip open decompression and osteotomy  12/03  left hip implant removal  11/10  ortho x-ray stable decompression left femoral head neck junction  11/12  pain management note bilateral trochanteric bursitis injection  5/2/13  pain note, bilateral trochanteric bursal injection  12/10/18 MRI left hip avascular necrosis left femoral head 50% articular surface, with some progression from comparison, some early subchondral collapse anteriorly and superiorly with marrow edema extending into the left femoral head and neck, surgical change consistent with prior decompression procedure and femoral osteoplasty, metallic artifact consistent with surgical pain femoral head, early degenerative changes  8/5/19  orthopedic operative note left total hip arthroplasty  12/12/19  orthopedic note 3-month follow-up left total hip replacement, incision healed nicely, right hip continue to provide discomfort, continue physical therapy follow-up 1 year  5/7/20  orthopedic note x-ray pelvis stable left total hip arthroplasty, early osteoarthritic changes right hip, if worsens consider total joint replacement right side, follow-up 5 years     s/p right hip arthroscopy  2007  ortho right hip arthroscopy  2/11/14  note bilateral greater trochanter injection under ultrasound guidance  1/20/17 MRI pelvis bilateral femoral head avascular necrosis involving approximately 40% of the articular surface,  metallic artifact left femoral head consistent with presence of small metallic pain  9/29/17   orthopedic surgical note right hip fluoroscopically guided core decompression and right hand carpal tunnel release  12/10/18 MRI right hip without chronic avascular necrosis right femoral head 50% articular surface without evidence of subchondral collapse, early degenerative changes, surgical changes consistent with prior right proximal femoral osteoplasty  5/7/20  orthopedic note x-ray pelvis stable left total hip arthroplasty, early osteoarthritic changes right hip, if worsens consider total joint replacement right side, follow-up 5 years  8/13/20  orthopedic note, x-ray pelvis grade 3 arthrosis with osteophyte acetabular side and sclerosis femoral side, joint space narrowing,ficat 2 avascular necrosis right hip progression to osteoarthritis, stable left hip arthroplasty, discussed right hip total arthroplasty  8/24/20  orthopedic operative note right hip arthroplasty  9/3/20  orthopedic note, incision site clean, continue work with physical therapy, follow-up 4 weeks  10/29/20  orthopedic note, 9 weeks postoperative right total hip arthroplasty, x-ray right hip stable arthroplasty without loosening, significant lateral calcification and potential stress response lateral femoral cortex, backed off on standing and walking activities, convert to crutches, follow-up in 3 to 4 weeks  12/1/20  orthopedic note, x-ray LS and right hip, lateral femoral calcification and stress response that is maturing, continue 48-hour workday, follow-up 4 to 5 weeks     s/p bunionectomy  8/9/13  podiatry; right foot martín bunionectomy, right foot first metatarsophalangeal joint bunion/degenerative arthritis      s/p  appendectomy     S/p left ankle surgery 2006 ligament reconstruction     Preventative health  9/25/15 tdap  1/6/17 pneumovax  8/18/20 pap per   gyn  10/5/21 colon per [facility] patchy erythema and congestion, abnormal vascularity ascending colon and rectum, biopsies negative  10/19/21 hep b ab 10.9 (8.4 to 11.5 indeterminate)  11/12/21 dexa LS+1.2,forearm+1.4  11/18/22 mammogram    4/27/23 hep b third   7/10/23 vit d 70  7/10/23 A1c 5.1%     Perimenopausal  9/25/15 on climara patch and progesterone tab per  gyn  8/26/22 sees  gyn estradiol 1 mg and progesterone 100 mg     neck pain  10/11 MRI cervical spine C4-C5 tiny disc bulge, C5-C6 bilateral and plate spurring with uncinate hypertrophy  10/7/16 MRI cervical spine C5-C6 small broad-based osteophyte complex, borderline foraminal stenosis, no significant progression of disease since 2010 6/4/18 MRI cervical spine C3-C4 mild diffuse bulge with mild bilateral foraminal stenosis, C5-C6 mild bilateral foraminal stenosis  5/8/18 x-ray cervical spine minimal DJD C5-C6, calcification left neck may be related to atherosclerotic plaque  5/10/21  pain note recommend repeat bilateral C3-C4 FJNA  6/9/21  pain procedure note right third occipital nerve frequency ablation, right C3-C4 medial branch nerve radiofrequency ablation  6/16/21  pain procedure note left third occipital nerve frequency ablation, right C3-C4 medial branch nerve radiofrequency ablation  2/21/22  pain procedure note right third occipital nerve, right C3 and C4 medial branch radiofrequency ablation under fluoroscopy  2/28/22  pain procedure note left third occipital nerve, left C3-C4 medial branch nerve radiofrequency ablation under fluoroscopy  4/19/22  pain procedure note bilateral C5-C7 medial branch nerve block under fluoroscopy  6/9/22  pain procedure note bilateral C5-C7 medial branch nerve radiofrequency ablation under fluoroscopy     Low back pain  10/10 MRI LS L5-S1 minimal disc bulge  1/11  at the pain management left L3-S1  facet joint injection, right L3-S1 facet joint injection  2/11  pain management right L3-S1 lateral, medial, dorsal ramus nerve block  4/11  pain management left L3-S1 medial, lateral, bursal ramus nerve block  5/11  pain management bilateral SI joint injection under fluoroscopy  7/11  pain note bilat trochanteric bursitis injection  3/12  pain note, right and left L3-L4 L5-S1 medial, dorsal, lateral branch ablation  12/12  pain note, left L3-S1 dorsal and medial branch radiofrequency ablation  1/4/13  pain note; right L3-S1 radiofrequency ablation  8/14/13  pain note  9/29/13 dr.patterson ching note, left L3-S1 nerve frequency ablation  10/4/13  pain note, status post right L3-S1 FJNA  12/19/13  note; status post right SIJI injection, continue voltaren gel, TENS, IT stretches  5/16/14  pain note; left L3-S1 medial, partial, lateral branch radiofrequency ablation under fluoroscopy  6/6/14  right L3-S1 FJNA  7/3/14 dr.patterson ching note; continue TENS,celebrex 200 mg qday, voltaren gel 1%  9/10/14  pain note; continue TENS, lidoderm patch,voltaren gel, celebrex 200 mg qday  12/20/17  pain note left L3-S1 radiofrequency ablation under fluoroscopy  12/22/17 dr.patterson ching note right L3-S1 radiofrequency ablation under fluoroscopy   2/28/19 [facility] L4-L5 3 mm disc bulge, mild central stenosis, L5-S1 moderate facet hypertrophy, small right-sided sacral meningocele   1/25/21  pain note continue TENS,ztlido patches, lidoderm ointment 5%, pennsaid 2%  2/22/21  pain note continue TENS, ztlido patches, lidoderm ointment 5%, pennsaid 2%, recommend S3S1 FJNA  3/1/21 dr.hewitt pain procedure note right L3-S1 medial, dorsal, lateral branch radiofrequency ablation under fluoroscopy  3/8/21   pain procedure note left L3-S1 medial, dorsal, lateral branch radiofrequency ablation under fluoroscopy  4/5/21  pain note recommend right L4-L5 epidural steroid injection  4/19/21  pain procedure note right L4-L5 epidural steroid injection under fluoroscopy  6/3/21 EMG NCS lower extremities per  pain management essentially normal motor and sensory nerve conduction lower extremities bilateral, absent bilateral sural sensory nerve action potential suspicious for mild length dependent peripheral neuropathy, but could be related to body habitus   6/22/21  pain note recommend consider spinal cord stimulator, will refer to pain psychologist  9/27/21  pain note patient has started formal physical therapy program  10/11/21  pain procedure note bilateral L5-S3 dorsal, lateral branch nerve radiofrequency ablation  10/21/21  pain procedure note bilateral L3-S1 medial, dorsal, lateral branch radiofrequency nerve ablation  11/22/21  pain note  12/13/21  pain note  1/26/22 GUTIERREZ note reviewed MRI from last year, will obtain updated lumbar spine x-ray, recommend trial of acupuncture, methocarbamol  1/28/22 x-ray lumbar spine, degenerative disc disease worse at L5-S1 and L1-L2  1/31/22  pain note   10/17/22  pain note  4/3/23  pain note  4/12/23  pain procedure note bilateral L3-L4 medial branch radiofrequency ablation, L5 dorsal ramus radiofrequency nerve ablation under fluoroscopy    ibs  8/05 CT abd/pelvis negative  11/05 [facility] colon negative  10/5/21 EGD per [facility] esophagus single ulcer GE junction, multiple patches ectopic gastric mucosa, hiatal hernia, stomach mucosal patchy erythema, biopsies performed pathology moderate inflammation, negative h.pylori, biopsies duodenum mild inflammation surface changes suggestive of celiac disease, take prilosec 40 mg daily,  carafate 1 g 4 times daily x14 days  10/5/21 colon per [facility] patchy mucosal and abnormal vascularity, biopsies performed, pathology negative, single flat benign polyp  10/15/21 [facility] note iron deficiency anemia improving with iron supplementation, recommend gluten-free diet, will obtain baseline TTG, noted to have gastroesophageal ulcer which could be secondary to reflux esophagitis versus pill esophagitis, has been on carafate qid and omeprazole 40 mg daily, since the ulcer was small no surveillance EGD is indicated, continue omeprazole, discontinue carafate, avoid NSAIDs, chronic ALT elevation will obtain serology, right upper quadrant ultrasound, repeat colonoscopy 5 years  2/18/22 [facility] note celiac sprue presenting with iron deficiency, continue ferrous sulfate for another month, she notes improvement with gluten-free diet and her TTG antibodies normal, recommend continue gluten-free diet, repeat cbc,cmp and iron panel in 3 months; esophagitis with GE junction ulcer which could be secondary to reflux versus pill esophagitis, continues on prilosec 40 mg qday and surveillance EGD is not indicated, recommend minimize NSAID use (celebrex is fine), consider changing alendronate to a different formulation   4/27/23 dyspepsia, increase prilosec to 40 mg, labs ordered, ultrasound right upper quadrant, continue celebrex for now  5/1/23 ultrasound right upper quadrant mild hepatomegaly otherwise negative, no evidence of cholelithiasis, visualized pancreas unremarkable  6/6/23 [facility] note increased symptoms nausea, vomiting, epigastric pain, proceed with EGD, continue omeprazole 40 mg and trial of weaning off celebrexm also labs celiac panel,crp  7/13/23 EGD per [facility] esophagus localized irregularity Z-line and GE junction, biopsies performed, normal mucosa esophagus, normal mucosa stomach, normal mucosa duodenum, biopsies performed     History of hypothyroid  9/26/19 tsh 5.8 repeat labs 3 months test ordered  1/23/20 tsh 2.0,  TPO<0.2  5/28/21 tsh 5.3 will monitor  6/8/21 started on synthroid 25 mcg by  bariatric medicine  8/13/21 tsh 2.3 on synthroid 25 mcg started by  bariatric medicine  10/19/21 tsh 1.9 on synthroid 25 mcg started by  bariatric medicine  1/21/22 tsh 2.6 on synthroid 25 mcg     History of rheumatic fever  8/29/16 streptococcal pharyngitis positive strep test, treated with augmentin, developed erythema nodosum lower extremities and palms given NSAIDs and medrol dosepack  9/22/16 repeat medrol dose pack x 1 still with painful erythema nodosum nodules  9/24/16 erythema nodosum nodules and arthritic-type pains, we will retreat with penicillin V 500 mg 3 times a day ×10 days  10/5/16 high dose aspirin no benefit, changed to prednisone 20 mg daily, she has an appointment with me in 2 days  10/7/16 echo ordered, EKG done  10/10/16 cbc,cmp,tsh normal,ESR 40,CRP 0.3, repeat throat culture negative, blood cultures negative,  10/19/16 increase prednisone to 40 mg x 3 days then back to 20 mg  10/19/16 daughter diagnosis strep throat given antibiotics, we will treat prophylactically provided patient penicillin V 500 mg tid x 10 days  10/21/16 echo normal LV size and function, EF 65%, trace MR structurally normal mitral valve, mild TR and RVSP 30  10/24/16 on prednisone 40 mg qday unable to taper to 20 mg due to flare up of pain will try 20 mg bid then taper to 20 mg am and 10 mg qpm over the next week  10/26/16  infectious disease recommended secondary prophylaxis penicillin  mg bid x 5 years  11/7/16 still with polyarthritis, on prednisone 20 mg twice a day, repeat labs, still on penicillin, will speak with rheumatology about referral  11/8/16 ESR 15,CRP 0.3,wbc 10.9 (on prednisone),hgb 14,hct 43,cmp normal,RF,C3C4,TPO,lyme,MERA,HLA B27 negative, Factin IgG 22 (0-19),parietal cell Ab 25(0-25)  11/14/16  rheumatolog note, features consistent with rheumatic fever,  also consider seronegative spondyloarthropathies, sarcoidosis, rheumatoid arthritis, will check sacroiliac films, hand and feet x-rays, follow-up one week  11/17/16 refill prednisone  11/18/16 ACE serum level normal, G6PD ad vitamin 1,25 d level normal  11/21/16 cxr negative  11/21/16 xray SI joints negative for erosions  11/29/16 x-ray joint survey hands, feet, ankles no evidence of inflammatory arthropathy  11/29/16 CT soft tissue neck without; negative  12/7/16  cardiology repeat echo in 3-4 weeks  12/10/16 MRI right knee multiple areas right knee avascular necrosis medial and lateral femoral condyle, medial and lateral tibial plateau, bone marrow edema  12/23/16  rheumatology note, inflammatory markers did normalize with steroids, rheumatoid factor, CCP, MERA,ANKA negative continue to taper steroids given osteonecrosis alternating 17.5 mg and 15 mg, and 15 mg, and 15 mg alternating with 12.5 mg, and so forth  12/27/16 echo LV ejection fraction 60%, no valvular disease  1/4/17 ESR 8,CRP 0.3  1/12/17  rheumatology note, inflammatory markers did normalize with prednisone therapy, continue taper with osteonecrosis bilateral tenderness achilles insertion consider enthesitis, will get MRI left achilles region to evaluate for tendinitis or tenosynovitis, follow-up 6 weeks  1/20/17 MRI left foot without; no evidence of marrow edema, arthropathy, tendinopathy or ligament injury  3/6/17 anticardiolipin antibodies, lupus anticoagulant, protein CNS, factor V 5 Leyden, anti-thrombin panel, beta 2 glycoprotein negative  3/10/17 ESR 19,CRP 0.17, cortisol 3.3, ACTH 16, IgG 753 ([id number]), IgA 140, IgM 93  3/14/16  rheumatology note currently off prednisone, osteonecrosis involving multiple joints, knees, hips, right ankle, etiology unclear, antiphospholipid antibody, protein C and S normal, antithrombin III factor V leyden normal, refer to hematology for evaluation other etiologies, slightly  decreased IgG refer to allergy immunology  3/14/17 remains on penicillin  3/30/17  allergy immunology evaluation slight IgG reduction 753 with normal IgG, IgM, no history to suggest primary immunodeficiency, recent diagnosis of group A streptococcal pharyngitis complicated by erythema nodosum, 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 therapy, we will perform humerol workup to include repeat quantitative immunoglobulins with subclass, specific antibodies for haemophilus influenza, pneumococcal, tetanus/diphtheria, limited flow cytometry with repeat CBC, SPEP/UPEP, ESR, CRP, UA, follow-up ASO, DNase B titer  4/17/17  allergy note slight IgG reduction at 753 with normal IgA, normal IgM, no history to suggest primary immunodeficiency, suspect that 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 carryover effect from chronic prednisone therapy, cbc unremarkable, inflammatory markers ESR slightly elevated 28, CRP 2.4, urinalysis negative for protein or blood, no further humeral immunodeficiency or lab assessment at this point with normal immunologic titers, intact specific and subclass antibodies, the IgG decrease does not represent any underlying primary immunodeficiency or active antibody dysfunction at this time  4/25/17  rheumatology note await [facility] evaluation  5/10/17 dr.wu lacy endocrinology note recommend fosamax weekly with anecdotal evidence that bisphosphonate therapy may provide symptomatic benefit and osteonecrosis, if develops side effects could consider intravenous reclast, referral [facility], follow-up 4 months  5/28/17  rheumatology note avascular necrosis, arthralgias lower extremities, continue off prednisone, on alendronate per [facility]  7/3/17  infectious disease consultation history of streptococcal pharyngitis with probable rheumatic fever, recommend discontinue penicillin prophylaxis follow-up as  needed  6/27/17  [facility] evaluation, recommended evaluation of hypercoagulable state, to consider antiphospholipid antibodies (lupus anticoagulant, anticardiolipin antibodies, beta-2 glycoprotein, phosphatidyl serine antibodies, factor V Leiden, antithrombin III, prothrombin mutation, protein C&S quantitative and qualitative, homocysteine, recommend after review of records do not strongly feel that she has true rheumatic fever, clear to discontinue penicillin, recheck ASO and anti-DNase B titers after 4 weeks  8/16/17 homocystine, protein C and protein S, lupus anticoagulant, beta 2 glycoprotein, antithrombin III, anticardiolipin antibody, anti-DNAse antibody, antistreptolysin all negative  8/31/17  rheumatology note, continues on alendronate per bone endocrinology recommendation [facility], did follow up with orthopedics, will proceed with core decompression  2/23/18  [facility] note, multifocal osteonecrosis knees, hips, other joints, underlying etiology unclear, trial few months of alendronate without dramatic improvement, alendronate discontinued because of undergoing core procedures with her hips, recommend continuing off alendronate monitoring for improvement, reassess in 6 months  10/10/18  [facility] consultation osteonecrosis knees, hips, other joints, unclear etiology trial of alendronate without improvement, discontinued prior to hip procedures, hip pain is improved, discussed another trial of alendronate for limited timeframe 70 mg weekly reassess 6 months   8/25/21  [facility] note continue alendronate for 6 months, follow-up 6 months     History of peptic ulcer  11/2/18 seen UC for abdominal discomfort right upper quadrant  11/3/18 ultrasound liver gallbladder region negative  11/10/18 hida negative  11/20/18 trial of prilosec 40 mg  6/17/19 taper prilosec  10/5/21 EGD per [facility] esophagus single ulcer GE junction, multiple  patches ectopic gastric mucosa, hiatal hernia, stomach mucosal patchy erythema, biopsies performed pathology moderate inflammation, negative h.pylori, biopsies duodenum mild inflammation surface changes suggestive of celiac disease, take prilosec 40 mg daily, carafate 1 g 4 times daily x14 days  10/5/21 EGD per [facility] esophagus single ulcer GE junction, multiple patches ectopic gastric mucosa, hiatal hernia, stomach mucosal patchy erythema, biopsies performed pathology moderate inflammation, negative h.pylori, biopsies duodenum mild inflammation surface changes suggestive of celiac disease, take prilosec 40 mg daily, carafate 1 g 4 times daily x14 days  10/15/21 [facility] note iron deficiency anemia improving with iron supplementation, recommend gluten-free diet, will obtain baseline TTG, noted to have gastroesophageal ulcer which could be secondary to reflux esophagitis versus pill esophagitis, has been on carafate qid and omeprazole 40 mg daily, since the ulcer was small no surveillance EGD is indicated, continue omeprazole, discontinue carafate, avoid NSAIDs, chronic ALT elevation will obtain serology, right upper quadrant ultrasound, repeat colonoscopy 5 years  2/18/22 [facility] note celiac sprue presenting with iron deficiency, continue ferrous sulfate for another month, she notes improvement with gluten-free diet and her TTG antibodies normal, recommend continue gluten-free diet, repeat cbc,cmp and iron panel in 3 months; esophagitis with GE junction ulcer which could be secondary to reflux versus pill esophagitis, continues on prilosec 40 mg qday and surveillance EGD is not indicated, recommend minimize NSAID use (celebrex is fine), consider changing alendronate to a different formulation   3/23/22 dr.wu lacy endocrinology note recently diagnosed with gastric ulcer and celiac disease, given GI side effects of alendronate, risks of continuing oral bisphosphonate therapy outweighs any potential benefit and will  discontinue alendronate  7/8/22 b12 978,vit d 117,magnesium 2.1 on prilosec  4/27/23 dyspepsia, increase prilosec to 40 mg, labs ordered, ultrasound right upper quadrant, continue celebrex for now  5/1/23 ultrasound right upper quadrant mild hepatomegaly otherwise negative, no evidence of cholelithiasis, visualized pancreas unremarkable  6/6/23 [facility] note increased symptoms nausea, vomiting, epigastric pain, proceed with EGD, continue omeprazole 40 mg and trial of weaning off celebrexm also labs celiac panel,crp  7/10/23 b12 662,vit d 70,magnesium 2.0 on prilosec  7/13/23 EGD per [facility] esophagus localized irregularity Z-line and GE junction, biopsies performed, normal mucosa esophagus, normal mucosa stomach, normal mucosa duodenum, biopsies performed  7/20/23 [facility] letter to patient await EGD biopsies, continue prilosec 40 mg    history hypothyroid  9/26/19 tsh 5.8 repeat labs 3 months test ordered  1/23/20 tsh 2.0, TPO<0.2  5/28/21 tsh 5.3 will monitor  6/8/21 started on synthroid 25 mcg by  bariatric medicine  8/13/21 tsh 2.3 on synthroid 25 mcg started by  bariatric medicine  10/19/21 tsh 1.9 on synthroid 25 mcg started by  bariatric medicine  1/21/22 tsh 2.6 on synthroid 25 mcg  8/26/22 stop synthroid repeat labs 6 weeks  10/7/22 tsh 2.0 off synthroid  7/10/23 tsh 2.1 off meds     history covid  3/21/21 covid rachel one shot  12/29/21 tested positive covid      history anemia  7/29/19 hgb 13.2,hct 42.2  9/27/19 hgb 11.8,hct 37.7  8/29/20 hgb 11.7,hct 37.9,mcv 88.6  5/28/21 hgb 10,hct 33.5,mcv 78.6  6/12/21 hgb 10.2,hct 35.5,mcv 79,iron 27,%sat 5,TIBC 553,ferritin 10.5,,retic 1.8%, b12 1153, folate 12.7,SPEP negative  6/14/21 start ferrous sulfate every other day, continue vitamin C orally, call GI consultants to schedule appointment, repeat labs 4 weeks ordered  7/16/21 hgb 11.3,hct 37.1,mcv 85.5,iron 52,%sat 12,ferritin 22.6   7/20/21 [facility] note schedule EGD and  colonoscopy for iron deficiency anemia  10/5/21 EGD per [facility] single ulcer gastroesophageal junction, multiple patches of gastric ectopic mucosa upper 3rd esophagus, hiatal hernia, patchy erythema stomach, erosions, ulceration, biopsies performed pathology moderate inflammation, negative h.pylori, biopsies duodenum mild inflammation surface changes suggestive of celiac disease  10/5/21 colon per [facility] patchy mucosal and abnormal vascularity, biopsies performed, pathology negative, single flat benign polyp  10/15/21 [facility] note iron deficiency anemia improving with iron supplementation, recommend gluten-free diet, will obtain baseline TTG, noted to have gastroesophageal ulcer which could be secondary to reflux esophagitis versus pill esophagitis, has been on carafate qid and omeprazole 40 mg daily, since the ulcer was small no surveillance EGD is indicated, continue omeprazole, discontinue Carafate, avoid NSAIDs, chronic ALT elevation will obtain serology, right upper quadrant ultrasound, repeat colonoscopy 5 years  10/19/21 AST 45,ALT 46, hep b ab positive, hep b core ab negative,hep b sAg negative, hep a ab negative, hep c ab negative,  F-actin IgG 21 (0-19), ceruloplasmin normal, copper normal, INR 0.9,vit k 0.84, vit c 83, vit a 1.3, vit e (alpha-tocopherol) 18.5, MERA 1:320 speckled, MERA HIp-2 IgG<1:80,antiDS DNA SSA, SSB, anti-SCL 70, Keli-1 antibody, smith antibody all negative  1/21/22 hgb 13.8,hct 42,iron 63,%sat 15,ferritin 70  2/18/22 [facility] note celiac sprue presenting with iron deficiency, continue ferrous sulfate for another month, she notes improvement with gluten-free diet and her TTG antibodies normal, recommend continue gluten-free diet, repeat cbc,cmp and iron panel in 3 months; esophagitis with GE junction ulcer which could be secondary to reflux versus pill esophagitis, continues on prilosec 40 mg qday and surveillance EGD is not indicated, recommend minimize NSAID use (celebrex is fine), consider changing  alendronate to a different formulation   3/23/22 dr.wu lacy endocrinology note recently diagnosed with gastric ulcer and celiac disease, given GI side effects of alendronate, risks of continuing oral bisphosphonate therapy outweighs any potential benefit and will discontinue alendronate  7/8/22 hgb 13.5,hct 40,iron 89,%sat 21,ferritin 89  4/27/23 dyspepsia, increase prilosec to 40 mg, labs ordered, ultrasound right upper quadrant, continue celebrex for now  6/6/23 [facility] note increased symptoms nausea, vomiting, epigastric pain, proceed with EGD, continue omeprazole 40 mg and trial of weaning off celebrexm also labs celiac panel,crp  7/10/23 hgb 12.9,hct 39,mcv 98,iron 146,ferritin 113,b12 662  7/13/23 EGD per [facility] esophagus localized irregularity Z-line and GE junction, biopsies performed, normal mucosa esophagus, normal mucosa stomach, normal mucosa duodenum, biopsies performed     History abnormal liver function test  10/15/21 [facility] note iron deficiency anemia improving with iron supplementation, recommend gluten-free diet, will obtain baseline TTG, noted to have gastroesophageal ulcer which could be secondary to reflux esophagitis versus pill esophagitis, has been on carafate qid and omeprazole 40 mg daily, since the ulcer was small no surveillance EGD is indicated, continue omeprazole, discontinue carafate, avoid NSAIDs, chronic ALT elevation will obtain serology, right upper quadrant ultrasound, repeat colonoscopy 5 years  10/19/21 zinc 461  10/19/21 IgG 873, IgM 112, IgA 201, IgE 18  10/19/21 celiac panel negative  10/19/21 AST 45,ALT 46, hep b ab positive, hep b core ab negative,hep b sAg negative, hep a ab negative, hep c ab negative,  F-actin IgG 21 (0-19), ceruloplasmin normal, copper normal, INR 0.9,vit k 0.84, vit c 83, vit a 1.3, vit e (alpha-tocopherol) 18.5, MERA 1:320 speckled, MERA HIp-2 IgG<1:80,antiDS DNA SSA, SSB, anti-SCL 70, Keli-1 antibody, smith antibody all negative  10/25/21 ultrasound abdomen  mildly echogenic liver   1/21/22 AST 60,ALT 88,alk phos 94,bili 0.3  2/18/22 [facility] note NAFLD with chronic ALT elevation predominant hepatocellular pattern without evidence of cirrhosis, ultrasound demonstrated hepatic steatosis, celiac disease may be contributing, repeat labs 3 months, screening colonoscopy 5 years  7/8/22 AST 23,ALT 25   10/7/22 AST 28,ALT 31  5/1/23 ultrasound right upper quadrant mild hepatomegaly otherwise negative, no evidence of cholelithiasis, visualized pancreas unremarkable  7/10/23 AST 29,ALT 23     foot pain  8/5/15 MRI right foot severe artifact secondary to first MTP are clear, limiting analysis of soft tissue, highly likely complete FHL tear  8/28/15  orthopedic note right ankle FHL tendon rupture seen on MRI, do not recommend surgical repair at this time which would involve hemiarthroplasty, implant, first MTP fusion with bone graft, followup podiatry   8/18/17 MRI left foot normal, MRI right foot osteoathritis right first metatarsal head and sesamoids moderate in degree, ulnar edema with an sesamoids may be due to susceptibility artifact related to first metatarsal phalangeal arthroplasty     Dyslipidemia  9/29/14 chol 186,trig 46,hdl 76,ldl 101  9/18/15 chol 225,trig 50,hdl 77,ldl 138  9/20/17 chol 189,trig 53,hdl 56,ldl 122  5/11/18 ultrasound carotid minimal left carotid bulb plaque, calcified, no evidence of occlusion less than 50% stenosis  9/28/18 chol 187,trig 59,hdl 76,ldl 99  5/8/18 EKG done in clinic, ultrasound carotid ordered follow-up calcifications   5/11/18 ultrasound carotid less than 50% internal stenosis bilateral  9/27/19 chol 196,trig 60,hdl 79,ldl 105  5/28/21 chol 201,trig 58,hdl 70,ldl 119  1/21/22 chol 250,trig 93,hdl 69,ldl 162;10 year risk 1.4%  7/8/22 chol 199,trig 77,hdl 84,ldl 100 on new diet  7/10/23 chol 166,trig 121,hdl 65,ldl 77     chronic pain  5/8/18 pain contract  5/8/18 opiate risk score 1  5/8/18 on ultram 1-2 per day has tried  physical therapy,TENS,acupuncture,chiropractor,massage therapy,heat,physical therapy, NSAIDs,gabapentin,celebrex,voltaren,lidoderm patch  6/17/19 controlled substance contract  9/9/19  on ultram one per evening  6/11/20 ultram refill  7/17/21 ultram refill  1/18/22 ultram refill #90  1/18/22 controlled substance contract  8/25/22 off ultram just on celebrex prn   4/27/23 ultram refill  4/27/23 contract  tried physical therapy,TENS,acupuncture,chiropractor,massage therapy,heat, NSAIDs,gabapentin,celebrex,voltaren,lidoderm patch     Celiac   10/5/21 EGD per [facility] biopsies duodenum mild inflammation surface changes suggestive of celiac disease  10/5/21 colon per [facility] patchy erythema and congestion, abnormal vascularity ascending colon and rectum, biopsies negative  10/19/21 t-TG IgA negative  1/21/22 t-TG IgA negative  2/18/22 [facility] note celiac sprue presenting with iron deficiency, continue ferrous sulfate for another month, she notes improvement with gluten-free diet and her TTG antibodies normal, recommend continue gluten-free diet, repeat cbc,cmp and iron panel in 3 months; esophagitis with GE junction ulcer which could be secondary to reflux versus pill esophagitis, continues on prilosec 40 mg qday and surveillance EGD is not indicated, recommend minimize NSAID use (celebrex is fine), consider changing alendronate to a different formulation, NAFLD with chronic ALT elevation predominant hepatocellular pattern without evidence of cirrhosis, ultrasound demonstrated hepatic steatosis, celiac disease may be contributing, repeat labs 3 months, screening colonoscopy 5 years  6/6/23 [facility] note increased symptoms nausea, vomiting, epigastric pain, proceed with EGD, continue omeprazole 40 mg and trial of weaning off celebrexm also labs celiac panel,crp  7/10/23 celiac panel negative,CRP 0.5    carpal tunnel  2/15/22  pain procedure note left median nerve ultrasound-guided steroid injection  10/13/22  pain  procedure note EMG nerve conduction study mild bilateral sensory median neuropathy     avascular necrosis  8/29/16 streptococcal pharyngitis positive strep test, treated with augmentin, developed erythema nodosum lower extremities and palms given NSAIDs and medrol dosepack  9/22/16 repeat medrol dose pack x 1 still with painful erythema nodosum nodules  10/5/16 high dose aspirin no benefit, changed to prednisone 20 mg daily,  10/19/16 increase prednisone to 40 mg x 3 days then back to 20 mg  11/7/16 still with polyarthritis, on prednisone 20 mg twice a day, repeat labs, still on penicillin, will speak with rheumatology about referral  11/14/16  rheumatolog note, features consistent with rheumatic fever, also consider seronegative spondyloarthropathies, sarcoidosis, rheumatoid arthritis, will check sacroiliac films, hand and feet x-rays, follow-up one week  12/23/16  rheumatology note, inflammatory markers did normalize with steroids, rheumatoid factor, CCP, MERA,ANKA negative continue to taper steroids given osteonecrosis alternating 17.5 mg and 15 mg, and 15 mg, and 15 mg alternating with 12.5 mg, and so forth  12/10/16 MRI right knee multiple areas right knee avascular necrosis medial and lateral femoral condyle, medial and lateral tibial plateau, bone marrow edema  12/23/16  rheumatology note, inflammatory markers did normalize with steroids, rheumatoid factor, CCP, MERA,ANKA negative continue to taper steroids given osteonecrosis alternating 17.5 mg and 15 mg, and 15 mg, and 15 mg alternating with 12.5 mg, and so forth  1/20/17 MRI left foot no evidence of arthropathy or tendinopathy  1/20/17 MRI left ankle metallic artifact lateral malleolus  1/20/17 MRI right ankle small oblong focus finger edema tibial plafond below subchondral plate, would be consistent with small area of avascular necrosis  1/20/17 MRI left knee multiple areas avascular necrosis surrounding the knee to include femur,  tibia, fibula  1/20/17 MRI pelvis bilateral femoral head avascular necrosis involving approximately 40% of the articular surface, metallic artifact left femoral head consistent with presence of small metallic pin  2/9/17 tapering prednisone down to 2.5 mg alternating with 5 mg  2/23/17  rheumatology note complete steroid taper then repeat ESR, CRP and CPK, also check antiphospholipid antibody,antithrombin, factor v leiden, protein s  3/6/17 anticardiolipin antibodies, lupus anticoagulant, protein CNS, factor V 5 Leyden, anti-thrombin panel, beta 2 glycoprotein negative  3/10/17 ESR 19,CRP 0.17, cortisol 3.3, ACTH 16, IgG 753 ([id number]), IgA 140, IgM 93  3/14/16  rheumatology note currently off prednisone, osteonecrosis involving multiple joints, knees, hips, right ankle, etiology unclear, antiphospholipid antibody, protein C and S normal, antithrombin III factor V leyden normal, refer to hematology for evaluation other etiologies, slightly decreased IgG refer to allergy immunology  3/30/17  allergy immunology evaluation slight IgG reduction 753 with normal IgG, IgM, no history to suggest primary immunodeficiency, recent diagnosis of group A streptococcal pharyngitis complicated by erythema nodosum, 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 therapy, we will perform humerol workup to include repeat quantitative immunoglobulins with subclass, specific antibodies for haemophilus influenza, pneumococcal, tetanus/diphtheria, limited flow cytometry with repeat CBC, SPEP/UPEP, ESR, CRP, UA, follow-up ASO, DNase B titer  5/10/17 dr.wu lacy endocrinology note recommend fosamax weekly with anecdotal evidence that bisphosphonate therapy may provide symptomatic benefit and osteonecrosis, if develops side effects could consider intravenous reclast, referral [facility], follow-up 4 months  5/28/17  rheumatology note avascular necrosis, arthralgias lower extremities, continue  off prednisone, on alendronate per [facility]  7/7/17 MRI left knee without; again seen multiple bone infarcts femur, tibia, fibula, patella appeared less prominent on current examination  7/7/17 MRI right knee without; interval improvement in size of multiple bony infarcts involving femur, tibia, fibula, some infarcts have resolved  7/7/17 MRI pelvis without; stable bilateral femoral head avascular necrosis, these have not progressed and there is no evidence of subchondral collapse or arthropathy  6/27/17  [facility] evaluation, recommended evaluation of hypercoagulable state, to consider antiphospholipid antibodies (lupus anticoagulant, anticardiolipin antibodies, beta-2 glycoprotein, phosphatidyl serine antibodies, factor V Leiden, antithrombin III, prothrombin mutation, protein C&S quantitative and qualitative, homocysteine, recommend after review of records do not strongly feel that she has true rheumatic fever, clear to discontinue penicillin, recheck ASO and anti-DNase B titers after 4 weeks  8/16/17 homocystine, protein C and protein S, lupus anticoagulant, beta 2 glycoprotein, antithrombin III, anticardiolipin antibody, anti-DNAse antibody, antistreptolysin all negative  8/18/17 MRI left foot normal, MRI right foot osteoathritis right first metatarsal head and sesamoids moderate in degree, ulnar edema with an sesamoids may be due to susceptibility artifact related to first metatarsal phalangeal arthroplasty  8/31/17  rheumatology note, continues on alendronate per [facility] endocr[facility] recommendation [place], did follow up with orthopedics, will proceed with core decompression  9/27/17 dr.wu [facility] with focal osteonecrosis involving knees, hips, other joints, etiology unclear, dramatic improvement with a few months of alendronate, about to undergo surgical intervention hip, will discontinue alendronate pending surgery, reassess 4-5 months  9/29/17    orthopedic surgical note right hip fluoroscopically guided core decompression and right hand carpal tunnel release  12/29/17  orthopedic surgical note  left proximal femoral head core decompression  2/23/18  [facility] [facility] note, multifocal osteonecrosis knees, hips, other joints, underlying etiology unclear, trial few months of alendronate without dramatic improvement, alendronate discontinued because of undergoing core procedures with her hips, recommend continuing off alendronate monitoring for improvement, reassess in 6 months  10/10/18  [facility] [facility] consultation osteonecrosis knees, hips, other joints, unclear etiology trial of alendronate without improvement, discontinued prior to hip procedures, hip pain is improved, discussed another trial of alendronate for limited timeframe 70 mg weekly reassess 6 months  12/7/18 crp 0.46, ESR 23, hgb 13,hct 42, wbc 7.9  12/10/18 MRI left hip avascular necrosis left femoral head 50% articular surface, with some progression from comparison, some early subchondral collapse anteriorly and superiorly with marrow edema extending into the left femoral head and neck, surgical change consistent with prior decompression procedure and femoral osteoplasty, metallic artifact consistent with surgical pain femoral head, early degenerative changes  12/10/18 MRI right hip without chronic avascular necrosis right femoral head 50% articular surface without evidence of subchondral collapse, early degenerative changes, surgical changes consistent with prior right proximal femoral osteoplasty  4/24/19  [facility] [facility] order bone density if she does have osteoporosis consider continuation bisphosphonate versus anabolic therapy  5/2/19  orthopedic note undergo MRI evaluation of her left hip  6/10/19 MRI left hip at [facility], redemonstration moderate to advanced avascular necrosis bilateral femoral heads,  degree of associated bone marrow edema appears significantly decreased from previous study particular on the left, small left hip effusion, postoperative changes remote core decompression and femoral osteotomy  6/27/19  orthopedic note follow-up evaluation advanced degenerative arthritis left hip, symptomatic failing conservative measures, considering total joint arthroplasty, recommend proceeding with left total hip arthroplasty due to progression of avascular necrosis  8/5/19  orthopedic operative note left total hip arthroplasty  9/9/19 off fosamax for now due to recent hip arthroplasty  10/9/19 dr.wu white endocrinology notes most recent bone density reviewed, remains off alendronate since left hip replacement, clear to resume alendronate 70 mg weekly follow-up 6 months  12/10/19 on fosamax 70 mg 5/7/20  orthopedic note x-ray pelvis stable left total hip arthroplasty, early osteoarthritic changes right hip, if worsens consider total joint replacement right side, follow-up 5 years  8/19/20 dr.wu white endocrinology note multifocal osteonecrosis, underlying etiology unclear, initially experienced dramatic improvement with alendronate however recurrent symptoms despite alendronate therapy, planning to undergo right hip replacement and will resume alendronate after surgery  12/17/20 back on alendronate 70 mg weekly  2/2/21  [place] [facility] note, continue alendronate 70 mg weekly  8/25/21  [place] [facility] note she had a few months of alendronate without significant improvement in symptoms, bone density test is normal, will continue alendronate until after spine surgery to see if knee pain resolves, if pain continues after surgery then it is likely intrinsic to the knee and electrolyte is not helping, follow-up 6 months  3/23/22 dr.wu lacy endocrinology note recently diagnosed with gastric ulcer and celiac disease, given GI side effects of  "alendronate, risks of continuing oral bisphosphonate therapy outweighs any potential benefit and will discontinue alendronate     allergic rhinitis  Tried otc claritin  9/12/13 flonase trial  9/25/15 flonase and zyrtec or claritin    Patient Active Problem List   Diagnosis    S/P appendectomy    S/p ankle left surgery    S/p hip right arthroplasty     Low back pain    Neck pain    IBS (irritable bowel syndrome)    Preventative health care    Varicose vein of leg    S/P bunionectomy    Allergic rhinitis due to animal hair and dander    Chronic pain    Dyslipidemia    Perimenopausal    Foot pain, right    History of rheumatic fever    Avascular necrosis (HCC)    Carpal tunnel syndrome    History of peptic ulcer    S/p hip left arthroplasty    History of hypothyroidism    History of anemia    History of abnormal liver function test    History of COVID-19    Celiac disease    S/p shoulder right arthroscopy        Depression Screening  Little interest or pleasure in doing things?  0 - not at all  Feeling down, depressed , or hopeless? 0 - not at all  Patient Health Questionnaire Score: 0          Patient Care Team:  Franklin Gonzalez M.D. as PCP - General (Internal Medicine)  Starr Jackson M.D. (Inactive) as Consulting Physician (Rheumatology)  Franklin Gonzalez M.D.      ROS           Objective     BP (!) 140/90   Pulse 74   Temp 36.6 °C (97.9 °F) (Temporal)   Ht 1.702 m (5' 7\")   Wt 83.5 kg (184 lb)   LMP 06/30/2012   BMI 28.82 kg/m²      Physical Exam  Vitals and nursing note reviewed.   Constitutional:       General: She is not in acute distress.     Appearance: Normal appearance. She is not ill-appearing.   HENT:      Head: Normocephalic and atraumatic.      Right Ear: Tympanic membrane and external ear normal.      Left Ear: Tympanic membrane and external ear normal.   Eyes:      Extraocular Movements: Extraocular movements intact.      Conjunctiva/sclera: Conjunctivae normal.      Pupils: Pupils are equal, round, " and reactive to light.   Cardiovascular:      Rate and Rhythm: Normal rate and regular rhythm.      Heart sounds: Normal heart sounds. No murmur heard.  Pulmonary:      Effort: No respiratory distress.      Breath sounds: Normal breath sounds.   Abdominal:      General: There is no distension.   Musculoskeletal:         General: No swelling.      Cervical back: Neck supple. Tenderness present. No rigidity.   Skin:     Findings: No bruising.   Neurological:      General: No focal deficit present.      Mental Status: She is alert. Mental status is at baseline.      Cranial Nerves: No cranial nerve deficit.      Motor: No weakness.   Psychiatric:         Mood and Affect: Mood normal.         Behavior: Behavior normal.     Cranial nerves intact, pupils equal and reactive to light, extraocular movements intact, fundi without exudate or hemorrhage, neck mild trapezius and paraspinal cervical, thoracic, lumbar muscle tenderness, some tenderness maxillary area and TMJ area bilateral, normal  strength hands, normal sensation, lower extremities normal strength, dorsiflexion, plantarflexion, knee flexion extension, no edema         Assessment & Plan        Assessment   #1 status post fall about 10 days ago, no loss of consciousness, fell forward with hyperextension of her mid and low back but no direct trauma to the back, she does have a history of neck pain and low back pain followed by pain management, the neck and low back pain have been worse since the fall, she did have pain and swelling of her chin where she struck a door, also pain in her legs and some bruising as well which has resolved    #2 chronic neck and low back pain, uses Celebrex as needed, gabapentin 300 mg 2 nightly, Robaxin as needed    #3 dyspepsia, status post recent EGD negative biopsies pending, per GI she could use Celebrex as needed, she also remains on omeprazole daily    #4 slightly elevated blood pressure elevation no previous history of  hypertension, perhaps a contributing factor of recent fall and chronic pain, she has been trying to walk 1 block a day      Plan  #1 based on my evaluation today, no need for further imaging at this time, no need for cervical or lumbar x-rays, no need for CT head imaging at this time, monitor for persistent headaches, nausea, vision changes, weakness arms or legs, should that occur she will notify me, consider taking more frequent 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, standing up to stretch and walk around    #2 continue Celebrex as needed monitor for GI upset, continue Prilosec     #3 she can use Robaxin as needed    #4 refill tramadol 1 tablet every 8 hours as needed for pain she does not use this regularly especially since she can take Celebrex again but will provide 1 refill since she had a recent fall, be careful if she does take that with Robaxin as that combination can cause drowsiness,  reviewed    #5 follow-up pain management    #6 check blood pressure regularly and record    #7 once her symptoms improve she can start walking perhaps longer each day and add swimming back into her regimen

## 2023-11-18 RX ORDER — CELECOXIB 200 MG/1
CAPSULE ORAL
Qty: 100 CAPSULE | Refills: 2 | Status: SHIPPED | OUTPATIENT
Start: 2023-11-18

## 2024-01-15 DIAGNOSIS — M54.9 BACK PAIN, UNSPECIFIED BACK LOCATION, UNSPECIFIED BACK PAIN LATERALITY, UNSPECIFIED CHRONICITY: ICD-10-CM

## 2024-01-16 RX ORDER — GABAPENTIN 300 MG/1
300 CAPSULE ORAL 2 TIMES DAILY
Qty: 180 CAPSULE | Refills: 1 | Status: SHIPPED | OUTPATIENT
Start: 2024-01-16

## 2024-02-12 ENCOUNTER — APPOINTMENT (OUTPATIENT)
Dept: RADIOLOGY | Facility: MEDICAL CENTER | Age: 54
End: 2024-02-12
Attending: NURSE PRACTITIONER
Payer: COMMERCIAL

## 2024-02-14 ENCOUNTER — HOSPITAL ENCOUNTER (OUTPATIENT)
Dept: RADIOLOGY | Facility: MEDICAL CENTER | Age: 54
End: 2024-02-14
Attending: NURSE PRACTITIONER
Payer: COMMERCIAL

## 2024-02-14 DIAGNOSIS — M47.817 LUMBOSACRAL SPONDYLOSIS WITHOUT MYELOPATHY: ICD-10-CM

## 2024-02-14 PROCEDURE — 72110 X-RAY EXAM L-2 SPINE 4/>VWS: CPT

## 2024-02-14 PROCEDURE — 72148 MRI LUMBAR SPINE W/O DYE: CPT

## 2024-02-20 NOTE — IP AVS SNAPSHOT
4/7/2017          Janett Urban Page  [address]  [place] [address]    Dear Janett:    [facility] wants to ensure your discharge home is safe and you or your loved ones have had all your questions answered regarding your care after you leave the hospital.    You may receive a telephone call within two days of your discharge.  This call is to make certain you understand your discharge instructions as well as ensure we provided you with the best care possible during your stay with us.     The call will only last approximately 3-5 minutes and will be done by a nurse.    Once again, we want to ensure your discharge home is safe and that you have a clear understanding of any next steps in your care.  If you have any questions or concerns, please do not hesitate to contact us, we are here for you.  Thank you for choosing [facility] for your healthcare needs.    Sincerely,    Erlin Soriano    [facility]         as per Dr. Jean

## 2024-04-24 RX ORDER — OMEPRAZOLE 40 MG/1
40 CAPSULE, DELAYED RELEASE ORAL DAILY
Qty: 90 CAPSULE | Refills: 1 | Status: SHIPPED | OUTPATIENT
Start: 2024-04-24

## 2024-04-24 NOTE — TELEPHONE ENCOUNTER
Received request via: Patient    Was the patient seen in the last year in this department? Yes    Does the patient have an active prescription (recently filled or refills available) for medication(s) requested? No    [facility]    Does the patient have longterm Plus and need 100 day supply (blood pressure, diabetes and cholesterol meds only)? Patient does not have SCP

## 2024-05-02 PROBLEM — M48.062 NEUROGENIC CLAUDICATION DUE TO LUMBAR SPINAL STENOSIS: Status: ACTIVE | Noted: 2024-05-02

## 2024-05-08 ENCOUNTER — APPOINTMENT (OUTPATIENT)
Dept: ADMISSIONS | Facility: MEDICAL CENTER | Age: 54
End: 2024-05-08
Attending: NEUROLOGICAL SURGERY
Payer: COMMERCIAL

## 2024-05-12 ENCOUNTER — TELEPHONE (OUTPATIENT)
Dept: MEDICAL GROUP | Facility: MEDICAL CENTER | Age: 54
End: 2024-05-12
Payer: COMMERCIAL

## 2024-05-13 NOTE — TELEPHONE ENCOUNTER
Please notify the patient of completing her FMLA form, we will fax that to HR and mail a copy to her.  The forms are on the counter.

## 2024-05-14 ENCOUNTER — PRE-ADMISSION TESTING (OUTPATIENT)
Dept: ADMISSIONS | Facility: MEDICAL CENTER | Age: 54
End: 2024-05-14
Attending: NEUROLOGICAL SURGERY
Payer: COMMERCIAL

## 2024-05-14 RX ORDER — UBIDECARENONE 75 MG
100 CAPSULE ORAL DAILY
COMMUNITY

## 2024-05-14 RX ORDER — TRAMADOL HYDROCHLORIDE 50 MG/1
50 TABLET ORAL EVERY 4 HOURS PRN
COMMUNITY

## 2024-05-14 RX ORDER — CHLORAL HYDRATE 500 MG
1000 CAPSULE ORAL DAILY
COMMUNITY

## 2024-05-24 ENCOUNTER — PRE-ADMISSION TESTING (OUTPATIENT)
Dept: ADMISSIONS | Facility: MEDICAL CENTER | Age: 54
End: 2024-05-24
Attending: NEUROLOGICAL SURGERY
Payer: COMMERCIAL

## 2024-05-24 ENCOUNTER — HOSPITAL ENCOUNTER (OUTPATIENT)
Dept: RADIOLOGY | Facility: MEDICAL CENTER | Age: 54
End: 2024-05-24
Attending: NEUROLOGICAL SURGERY | Admitting: NEUROLOGICAL SURGERY
Payer: COMMERCIAL

## 2024-05-24 DIAGNOSIS — M48.062 SPINAL STENOSIS OF LUMBAR REGION WITH NEUROGENIC CLAUDICATION: ICD-10-CM

## 2024-05-24 LAB
ANION GAP SERPL CALC-SCNC: 11 MMOL/L (ref 7–16)
APPEARANCE UR: CLEAR
APTT PPP: 29.7 SEC (ref 24.7–36)
BASOPHILS # BLD AUTO: 1 % (ref 0–1.8)
BASOPHILS # BLD: 0.04 K/UL (ref 0–0.12)
BILIRUB UR QL STRIP.AUTO: NEGATIVE
BUN SERPL-MCNC: 20 MG/DL (ref 8–22)
CALCIUM SERPL-MCNC: 9 MG/DL (ref 8.5–10.5)
CHLORIDE SERPL-SCNC: 100 MMOL/L (ref 96–112)
CO2 SERPL-SCNC: 23 MMOL/L (ref 20–33)
COLOR UR: YELLOW
CREAT SERPL-MCNC: 0.57 MG/DL (ref 0.5–1.4)
EKG IMPRESSION: NORMAL
EOSINOPHIL # BLD AUTO: 0.12 K/UL (ref 0–0.51)
EOSINOPHIL NFR BLD: 3 % (ref 0–6.9)
ERYTHROCYTE [DISTWIDTH] IN BLOOD BY AUTOMATED COUNT: 46.5 FL (ref 35.9–50)
EST. AVERAGE GLUCOSE BLD GHB EST-MCNC: 91 MG/DL
GFR SERPLBLD CREATININE-BSD FMLA CKD-EPI: 109 ML/MIN/1.73 M 2
GLUCOSE SERPL-MCNC: 98 MG/DL (ref 65–99)
GLUCOSE UR STRIP.AUTO-MCNC: NEGATIVE MG/DL
HBA1C MFR BLD: 4.8 % (ref 4–5.6)
HCT VFR BLD AUTO: 40.3 % (ref 37–47)
HGB BLD-MCNC: 13.2 G/DL (ref 12–16)
IMM GRANULOCYTES # BLD AUTO: 0 K/UL (ref 0–0.11)
IMM GRANULOCYTES NFR BLD AUTO: 0 % (ref 0–0.9)
INR PPP: 0.93 (ref 0.87–1.13)
KETONES UR STRIP.AUTO-MCNC: NEGATIVE MG/DL
LEUKOCYTE ESTERASE UR QL STRIP.AUTO: NEGATIVE
LYMPHOCYTES # BLD AUTO: 1.69 K/UL (ref 1–4.8)
LYMPHOCYTES NFR BLD: 42.5 % (ref 22–41)
MCH RBC QN AUTO: 32.8 PG (ref 27–33)
MCHC RBC AUTO-ENTMCNC: 32.8 G/DL (ref 32.2–35.5)
MCV RBC AUTO: 100 FL (ref 81.4–97.8)
MICRO URNS: NORMAL
MONOCYTES # BLD AUTO: 0.41 K/UL (ref 0–0.85)
MONOCYTES NFR BLD AUTO: 10.3 % (ref 0–13.4)
NEUTROPHILS # BLD AUTO: 1.72 K/UL (ref 1.82–7.42)
NEUTROPHILS NFR BLD: 43.2 % (ref 44–72)
NITRITE UR QL STRIP.AUTO: NEGATIVE
NRBC # BLD AUTO: 0 K/UL
NRBC BLD-RTO: 0 /100 WBC (ref 0–0.2)
PH UR STRIP.AUTO: 7 [PH] (ref 5–8)
PLATELET # BLD AUTO: 234 K/UL (ref 164–446)
PMV BLD AUTO: 9.7 FL (ref 9–12.9)
POTASSIUM SERPL-SCNC: 4.7 MMOL/L (ref 3.6–5.5)
PROT UR QL STRIP: NEGATIVE MG/DL
PROTHROMBIN TIME: 12.5 SEC (ref 12–14.6)
RBC # BLD AUTO: 4.03 M/UL (ref 4.2–5.4)
RBC UR QL AUTO: NEGATIVE
SODIUM SERPL-SCNC: 134 MMOL/L (ref 135–145)
SP GR UR STRIP.AUTO: 1.01
UROBILINOGEN UR STRIP.AUTO-MCNC: 0.2 MG/DL
WBC # BLD AUTO: 4 K/UL (ref 4.8–10.8)

## 2024-05-24 PROCEDURE — 83036 HEMOGLOBIN GLYCOSYLATED A1C: CPT

## 2024-05-24 PROCEDURE — 80048 BASIC METABOLIC PNL TOTAL CA: CPT

## 2024-05-24 PROCEDURE — 85730 THROMBOPLASTIN TIME PARTIAL: CPT

## 2024-05-24 PROCEDURE — 93005 ELECTROCARDIOGRAM TRACING: CPT

## 2024-05-24 PROCEDURE — 85025 COMPLETE CBC W/AUTO DIFF WBC: CPT

## 2024-05-24 PROCEDURE — 85610 PROTHROMBIN TIME: CPT

## 2024-05-24 PROCEDURE — 81003 URINALYSIS AUTO W/O SCOPE: CPT

## 2024-05-24 PROCEDURE — 36415 COLL VENOUS BLD VENIPUNCTURE: CPT

## 2024-05-24 PROCEDURE — 93010 ELECTROCARDIOGRAM REPORT: CPT | Performed by: INTERNAL MEDICINE

## 2024-06-03 ENCOUNTER — HOSPITAL ENCOUNTER (OUTPATIENT)
Facility: MEDICAL CENTER | Age: 54
End: 2024-06-03
Attending: NEUROLOGICAL SURGERY | Admitting: NEUROLOGICAL SURGERY
Payer: COMMERCIAL

## 2024-06-03 ENCOUNTER — PHARMACY VISIT (OUTPATIENT)
Dept: PHARMACY | Facility: MEDICAL CENTER | Age: 54
End: 2024-06-03
Payer: COMMERCIAL

## 2024-06-03 ENCOUNTER — ANESTHESIA EVENT (OUTPATIENT)
Dept: SURGERY | Facility: MEDICAL CENTER | Age: 54
End: 2024-06-03
Payer: COMMERCIAL

## 2024-06-03 ENCOUNTER — ANESTHESIA (OUTPATIENT)
Dept: SURGERY | Facility: MEDICAL CENTER | Age: 54
End: 2024-06-03
Payer: COMMERCIAL

## 2024-06-03 ENCOUNTER — APPOINTMENT (OUTPATIENT)
Dept: RADIOLOGY | Facility: MEDICAL CENTER | Age: 54
End: 2024-06-03
Attending: NEUROLOGICAL SURGERY
Payer: COMMERCIAL

## 2024-06-03 VITALS
BODY MASS INDEX: 29.65 KG/M2 | HEART RATE: 67 BPM | DIASTOLIC BLOOD PRESSURE: 60 MMHG | TEMPERATURE: 96.6 F | OXYGEN SATURATION: 96 % | RESPIRATION RATE: 18 BRPM | WEIGHT: 188.93 LBS | HEIGHT: 67 IN | SYSTOLIC BLOOD PRESSURE: 124 MMHG

## 2024-06-03 DIAGNOSIS — G89.18 POSTOPERATIVE PAIN AFTER SPINAL SURGERY: ICD-10-CM

## 2024-06-03 PROCEDURE — 700101 HCHG RX REV CODE 250: Performed by: NEUROLOGICAL SURGERY

## 2024-06-03 PROCEDURE — 700111 HCHG RX REV CODE 636 W/ 250 OVERRIDE (IP): Mod: JZ | Performed by: NEUROLOGICAL SURGERY

## 2024-06-03 PROCEDURE — 700102 HCHG RX REV CODE 250 W/ 637 OVERRIDE(OP): Performed by: ANESTHESIOLOGY

## 2024-06-03 PROCEDURE — 110371 HCHG SHELL REV 272: Performed by: NEUROLOGICAL SURGERY

## 2024-06-03 PROCEDURE — 160048 HCHG OR STATISTICAL LEVEL 1-5: Performed by: NEUROLOGICAL SURGERY

## 2024-06-03 PROCEDURE — 160035 HCHG PACU - 1ST 60 MINS PHASE I: Performed by: NEUROLOGICAL SURGERY

## 2024-06-03 PROCEDURE — 160041 HCHG SURGERY MINUTES - EA ADDL 1 MIN LEVEL 4: Performed by: NEUROLOGICAL SURGERY

## 2024-06-03 PROCEDURE — 700105 HCHG RX REV CODE 258: Performed by: ANESTHESIOLOGY

## 2024-06-03 PROCEDURE — 700111 HCHG RX REV CODE 636 W/ 250 OVERRIDE (IP): Performed by: PHYSICIAN ASSISTANT

## 2024-06-03 PROCEDURE — 63047 LAM FACETEC & FORAMOT LUMBAR: CPT | Mod: 22ROC | Performed by: NEUROLOGICAL SURGERY

## 2024-06-03 PROCEDURE — 63048 LAM FACETEC &FORAMOT EA ADDL: CPT | Mod: ASROC | Performed by: PHYSICIAN ASSISTANT

## 2024-06-03 PROCEDURE — 160036 HCHG PACU - EA ADDL 30 MINS PHASE I: Performed by: NEUROLOGICAL SURGERY

## 2024-06-03 PROCEDURE — 63047 LAM FACETEC & FORAMOT LUMBAR: CPT | Mod: ASROC,22ROC | Performed by: PHYSICIAN ASSISTANT

## 2024-06-03 PROCEDURE — 160029 HCHG SURGERY MINUTES - 1ST 30 MINS LEVEL 4: Performed by: NEUROLOGICAL SURGERY

## 2024-06-03 PROCEDURE — 160025 RECOVERY II MINUTES (STATS): Performed by: NEUROLOGICAL SURGERY

## 2024-06-03 PROCEDURE — 700111 HCHG RX REV CODE 636 W/ 250 OVERRIDE (IP): Performed by: ANESTHESIOLOGY

## 2024-06-03 PROCEDURE — 700106 HCHG RX REV CODE 271: Performed by: NEUROLOGICAL SURGERY

## 2024-06-03 PROCEDURE — 63048 LAM FACETEC &FORAMOT EA ADDL: CPT | Performed by: NEUROLOGICAL SURGERY

## 2024-06-03 PROCEDURE — A9270 NON-COVERED ITEM OR SERVICE: HCPCS | Performed by: ANESTHESIOLOGY

## 2024-06-03 PROCEDURE — 160002 HCHG RECOVERY MINUTES (STAT): Performed by: NEUROLOGICAL SURGERY

## 2024-06-03 PROCEDURE — 160046 HCHG PACU - 1ST 60 MINS PHASE II: Performed by: NEUROLOGICAL SURGERY

## 2024-06-03 PROCEDURE — 160047 HCHG PACU  - EA ADDL 30 MINS PHASE II: Performed by: NEUROLOGICAL SURGERY

## 2024-06-03 PROCEDURE — 160009 HCHG ANES TIME/MIN: Performed by: NEUROLOGICAL SURGERY

## 2024-06-03 PROCEDURE — 700105 HCHG RX REV CODE 258: Performed by: NEUROLOGICAL SURGERY

## 2024-06-03 PROCEDURE — 700101 HCHG RX REV CODE 250: Performed by: ANESTHESIOLOGY

## 2024-06-03 PROCEDURE — 700111 HCHG RX REV CODE 636 W/ 250 OVERRIDE (IP): Performed by: NEUROLOGICAL SURGERY

## 2024-06-03 PROCEDURE — RXMED WILLOW AMBULATORY MEDICATION CHARGE: Performed by: PHYSICIAN ASSISTANT

## 2024-06-03 PROCEDURE — 700111 HCHG RX REV CODE 636 W/ 250 OVERRIDE (IP): Mod: JZ | Performed by: ANESTHESIOLOGY

## 2024-06-03 PROCEDURE — 700105 HCHG RX REV CODE 258: Performed by: PHYSICIAN ASSISTANT

## 2024-06-03 PROCEDURE — 110454 HCHG SHELL REV 250: Performed by: NEUROLOGICAL SURGERY

## 2024-06-03 PROCEDURE — 72020 X-RAY EXAM OF SPINE 1 VIEW: CPT

## 2024-06-03 RX ORDER — LABETALOL HYDROCHLORIDE 5 MG/ML
5 INJECTION, SOLUTION INTRAVENOUS
Status: DISCONTINUED | OUTPATIENT
Start: 2024-06-03 | End: 2024-06-03 | Stop reason: HOSPADM

## 2024-06-03 RX ORDER — SODIUM CHLORIDE, SODIUM LACTATE, POTASSIUM CHLORIDE, AND CALCIUM CHLORIDE .6; .31; .03; .02 G/100ML; G/100ML; G/100ML; G/100ML
IRRIGANT IRRIGATION
Status: DISCONTINUED | OUTPATIENT
Start: 2024-06-03 | End: 2024-06-03 | Stop reason: HOSPADM

## 2024-06-03 RX ORDER — CEFAZOLIN SODIUM 1 G/3ML
2 INJECTION, POWDER, FOR SOLUTION INTRAMUSCULAR; INTRAVENOUS ONCE
Status: COMPLETED | OUTPATIENT
Start: 2024-06-03 | End: 2024-06-03

## 2024-06-03 RX ORDER — EPHEDRINE SULFATE 50 MG/ML
INJECTION, SOLUTION INTRAVENOUS PRN
Status: DISCONTINUED | OUTPATIENT
Start: 2024-06-03 | End: 2024-06-03 | Stop reason: SURG

## 2024-06-03 RX ORDER — TRANEXAMIC ACID 100 MG/ML
INJECTION, SOLUTION INTRAVENOUS PRN
Status: DISCONTINUED | OUTPATIENT
Start: 2024-06-03 | End: 2024-06-03 | Stop reason: SURG

## 2024-06-03 RX ORDER — BUPIVACAINE HYDROCHLORIDE AND EPINEPHRINE 5; 5 MG/ML; UG/ML
INJECTION, SOLUTION EPIDURAL; INTRACAUDAL; PERINEURAL
Status: DISCONTINUED | OUTPATIENT
Start: 2024-06-03 | End: 2024-06-03 | Stop reason: HOSPADM

## 2024-06-03 RX ORDER — HYDROMORPHONE HYDROCHLORIDE 1 MG/ML
0.1 INJECTION, SOLUTION INTRAMUSCULAR; INTRAVENOUS; SUBCUTANEOUS
Status: DISCONTINUED | OUTPATIENT
Start: 2024-06-03 | End: 2024-06-03 | Stop reason: HOSPADM

## 2024-06-03 RX ORDER — OXYCODONE HYDROCHLORIDE 5 MG/1
5 TABLET ORAL EVERY 6 HOURS PRN
Qty: 28 TABLET | Refills: 0 | Status: SHIPPED | OUTPATIENT
Start: 2024-06-03 | End: 2024-06-10

## 2024-06-03 RX ORDER — OXYCODONE HCL 5 MG/5 ML
5 SOLUTION, ORAL ORAL
Status: COMPLETED | OUTPATIENT
Start: 2024-06-03 | End: 2024-06-03

## 2024-06-03 RX ORDER — ONDANSETRON 2 MG/ML
INJECTION INTRAMUSCULAR; INTRAVENOUS PRN
Status: DISCONTINUED | OUTPATIENT
Start: 2024-06-03 | End: 2024-06-03 | Stop reason: SURG

## 2024-06-03 RX ORDER — METOCLOPRAMIDE HYDROCHLORIDE 5 MG/ML
INJECTION INTRAMUSCULAR; INTRAVENOUS PRN
Status: DISCONTINUED | OUTPATIENT
Start: 2024-06-03 | End: 2024-06-03 | Stop reason: SURG

## 2024-06-03 RX ORDER — HYDROMORPHONE HYDROCHLORIDE 1 MG/ML
0.2 INJECTION, SOLUTION INTRAMUSCULAR; INTRAVENOUS; SUBCUTANEOUS
Status: DISCONTINUED | OUTPATIENT
Start: 2024-06-03 | End: 2024-06-03 | Stop reason: HOSPADM

## 2024-06-03 RX ORDER — GABAPENTIN 300 MG/1
300 CAPSULE ORAL 2 TIMES DAILY
Status: CANCELLED | OUTPATIENT
Start: 2024-06-03

## 2024-06-03 RX ORDER — ONDANSETRON 2 MG/ML
4 INJECTION INTRAMUSCULAR; INTRAVENOUS
Status: DISCONTINUED | OUTPATIENT
Start: 2024-06-03 | End: 2024-06-03 | Stop reason: HOSPADM

## 2024-06-03 RX ORDER — METHOCARBAMOL 750 MG/1
750 TABLET, FILM COATED ORAL 3 TIMES DAILY PRN
Qty: 90 TABLET | Refills: 2 | Status: SHIPPED | OUTPATIENT
Start: 2024-06-03

## 2024-06-03 RX ORDER — HYDRALAZINE HYDROCHLORIDE 20 MG/ML
5 INJECTION INTRAMUSCULAR; INTRAVENOUS
Status: DISCONTINUED | OUTPATIENT
Start: 2024-06-03 | End: 2024-06-03 | Stop reason: HOSPADM

## 2024-06-03 RX ORDER — SODIUM CHLORIDE, SODIUM LACTATE, POTASSIUM CHLORIDE, CALCIUM CHLORIDE 600; 310; 30; 20 MG/100ML; MG/100ML; MG/100ML; MG/100ML
INJECTION, SOLUTION INTRAVENOUS CONTINUOUS
Status: ACTIVE | OUTPATIENT
Start: 2024-06-03 | End: 2024-06-03

## 2024-06-03 RX ORDER — MIDAZOLAM HYDROCHLORIDE 1 MG/ML
1 INJECTION INTRAMUSCULAR; INTRAVENOUS
Status: DISCONTINUED | OUTPATIENT
Start: 2024-06-03 | End: 2024-06-03 | Stop reason: HOSPADM

## 2024-06-03 RX ORDER — KETOROLAC TROMETHAMINE 15 MG/ML
INJECTION, SOLUTION INTRAMUSCULAR; INTRAVENOUS PRN
Status: DISCONTINUED | OUTPATIENT
Start: 2024-06-03 | End: 2024-06-03 | Stop reason: SURG

## 2024-06-03 RX ORDER — MIDAZOLAM HYDROCHLORIDE 1 MG/ML
INJECTION INTRAMUSCULAR; INTRAVENOUS PRN
Status: DISCONTINUED | OUTPATIENT
Start: 2024-06-03 | End: 2024-06-03 | Stop reason: SURG

## 2024-06-03 RX ORDER — MAGNESIUM HYDROXIDE 1200 MG/15ML
LIQUID ORAL
Status: COMPLETED | OUTPATIENT
Start: 2024-06-03 | End: 2024-06-03

## 2024-06-03 RX ORDER — OMEPRAZOLE 20 MG/1
40 CAPSULE, DELAYED RELEASE ORAL DAILY
Status: CANCELLED | OUTPATIENT
Start: 2024-06-03

## 2024-06-03 RX ORDER — HYDROMORPHONE HYDROCHLORIDE 2 MG/ML
INJECTION, SOLUTION INTRAMUSCULAR; INTRAVENOUS; SUBCUTANEOUS PRN
Status: DISCONTINUED | OUTPATIENT
Start: 2024-06-03 | End: 2024-06-03 | Stop reason: SURG

## 2024-06-03 RX ORDER — LIDOCAINE HYDROCHLORIDE 20 MG/ML
INJECTION, SOLUTION EPIDURAL; INFILTRATION; INTRACAUDAL; PERINEURAL PRN
Status: DISCONTINUED | OUTPATIENT
Start: 2024-06-03 | End: 2024-06-03 | Stop reason: SURG

## 2024-06-03 RX ORDER — CEFAZOLIN SODIUM 1 G/3ML
INJECTION, POWDER, FOR SOLUTION INTRAMUSCULAR; INTRAVENOUS
Status: DISCONTINUED | OUTPATIENT
Start: 2024-06-03 | End: 2024-06-03 | Stop reason: HOSPADM

## 2024-06-03 RX ORDER — ROCURONIUM BROMIDE 10 MG/ML
INJECTION, SOLUTION INTRAVENOUS PRN
Status: DISCONTINUED | OUTPATIENT
Start: 2024-06-03 | End: 2024-06-03 | Stop reason: SURG

## 2024-06-03 RX ORDER — EPHEDRINE SULFATE 50 MG/ML
5 INJECTION, SOLUTION INTRAVENOUS
Status: DISCONTINUED | OUTPATIENT
Start: 2024-06-03 | End: 2024-06-03 | Stop reason: HOSPADM

## 2024-06-03 RX ORDER — METOPROLOL TARTRATE 1 MG/ML
1 INJECTION, SOLUTION INTRAVENOUS
Status: DISCONTINUED | OUTPATIENT
Start: 2024-06-03 | End: 2024-06-03 | Stop reason: HOSPADM

## 2024-06-03 RX ORDER — OXYCODONE HCL 5 MG/5 ML
10 SOLUTION, ORAL ORAL
Status: COMPLETED | OUTPATIENT
Start: 2024-06-03 | End: 2024-06-03

## 2024-06-03 RX ORDER — HYDROMORPHONE HYDROCHLORIDE 1 MG/ML
0.4 INJECTION, SOLUTION INTRAMUSCULAR; INTRAVENOUS; SUBCUTANEOUS
Status: DISCONTINUED | OUTPATIENT
Start: 2024-06-03 | End: 2024-06-03 | Stop reason: HOSPADM

## 2024-06-03 RX ORDER — DIPHENHYDRAMINE HYDROCHLORIDE 50 MG/ML
12.5 INJECTION INTRAMUSCULAR; INTRAVENOUS
Status: DISCONTINUED | OUTPATIENT
Start: 2024-06-03 | End: 2024-06-03 | Stop reason: HOSPADM

## 2024-06-03 RX ORDER — PHENYLEPHRINE HYDROCHLORIDE 10 MG/ML
INJECTION, SOLUTION INTRAMUSCULAR; INTRAVENOUS; SUBCUTANEOUS PRN
Status: DISCONTINUED | OUTPATIENT
Start: 2024-06-03 | End: 2024-06-03 | Stop reason: SURG

## 2024-06-03 RX ORDER — SODIUM CHLORIDE, SODIUM LACTATE, POTASSIUM CHLORIDE, CALCIUM CHLORIDE 600; 310; 30; 20 MG/100ML; MG/100ML; MG/100ML; MG/100ML
INJECTION, SOLUTION INTRAVENOUS
Status: DISCONTINUED | OUTPATIENT
Start: 2024-06-03 | End: 2024-06-03 | Stop reason: SURG

## 2024-06-03 RX ORDER — CEPHALEXIN 500 MG/1
500 CAPSULE ORAL 4 TIMES DAILY
Qty: 20 CAPSULE | Refills: 0 | Status: SHIPPED | OUTPATIENT
Start: 2024-06-03 | End: 2024-06-08

## 2024-06-03 RX ORDER — MEPERIDINE HYDROCHLORIDE 25 MG/ML
12.5 INJECTION INTRAMUSCULAR; INTRAVENOUS; SUBCUTANEOUS
Status: DISCONTINUED | OUTPATIENT
Start: 2024-06-03 | End: 2024-06-03 | Stop reason: HOSPADM

## 2024-06-03 RX ORDER — VANCOMYCIN HYDROCHLORIDE 1 G/20ML
INJECTION, POWDER, LYOPHILIZED, FOR SOLUTION INTRAVENOUS
Status: COMPLETED | OUTPATIENT
Start: 2024-06-03 | End: 2024-06-03

## 2024-06-03 RX ORDER — HALOPERIDOL 5 MG/ML
1 INJECTION INTRAMUSCULAR
Status: DISCONTINUED | OUTPATIENT
Start: 2024-06-03 | End: 2024-06-03 | Stop reason: HOSPADM

## 2024-06-03 RX ORDER — ACETAMINOPHEN 500 MG
1000 TABLET ORAL EVERY 8 HOURS
Qty: 120 TABLET | Refills: 1 | Status: SHIPPED | OUTPATIENT
Start: 2024-06-03

## 2024-06-03 RX ORDER — PROGESTERONE 100 MG/1
100 CAPSULE ORAL DAILY
Status: CANCELLED | OUTPATIENT
Start: 2024-06-03

## 2024-06-03 RX ORDER — SODIUM CHLORIDE, SODIUM LACTATE, POTASSIUM CHLORIDE, CALCIUM CHLORIDE 600; 310; 30; 20 MG/100ML; MG/100ML; MG/100ML; MG/100ML
INJECTION, SOLUTION INTRAVENOUS CONTINUOUS
Status: DISCONTINUED | OUTPATIENT
Start: 2024-06-03 | End: 2024-06-03 | Stop reason: HOSPADM

## 2024-06-03 RX ADMIN — METHOCARBAMOL 1000 MG: 100 INJECTION, SOLUTION INTRAMUSCULAR; INTRAVENOUS at 11:26

## 2024-06-03 RX ADMIN — LIDOCAINE HYDROCHLORIDE 0.5 ML: 10 SOLUTION INTRAVENOUS at 07:28

## 2024-06-03 RX ADMIN — EPHEDRINE SULFATE 10 MG: 50 INJECTION, SOLUTION INTRAVENOUS at 09:47

## 2024-06-03 RX ADMIN — ONDANSETRON 8 MG: 2 INJECTION INTRAMUSCULAR; INTRAVENOUS at 10:21

## 2024-06-03 RX ADMIN — ROCURONIUM BROMIDE 50 MG: 50 INJECTION, SOLUTION INTRAVENOUS at 09:25

## 2024-06-03 RX ADMIN — MIDAZOLAM HYDROCHLORIDE 2 MG: 1 INJECTION, SOLUTION INTRAMUSCULAR; INTRAVENOUS at 09:22

## 2024-06-03 RX ADMIN — PHENYLEPHRINE HYDROCHLORIDE 100 MCG: 10 INJECTION INTRAVENOUS at 09:28

## 2024-06-03 RX ADMIN — HYDROMORPHONE HYDROCHLORIDE 1 MG: 2 INJECTION INTRAMUSCULAR; INTRAVENOUS; SUBCUTANEOUS at 09:38

## 2024-06-03 RX ADMIN — SODIUM CHLORIDE, POTASSIUM CHLORIDE, SODIUM LACTATE AND CALCIUM CHLORIDE: 600; 310; 30; 20 INJECTION, SOLUTION INTRAVENOUS at 07:28

## 2024-06-03 RX ADMIN — SUGAMMADEX 200 MG: 100 INJECTION, SOLUTION INTRAVENOUS at 10:32

## 2024-06-03 RX ADMIN — OXYCODONE HYDROCHLORIDE 10 MG: 5 SOLUTION ORAL at 11:00

## 2024-06-03 RX ADMIN — HYDROMORPHONE HYDROCHLORIDE 1 MG: 2 INJECTION INTRAMUSCULAR; INTRAVENOUS; SUBCUTANEOUS at 09:40

## 2024-06-03 RX ADMIN — FENTANYL CITRATE 25 MCG: 50 INJECTION, SOLUTION INTRAMUSCULAR; INTRAVENOUS at 11:30

## 2024-06-03 RX ADMIN — FENTANYL CITRATE 50 MCG: 50 INJECTION, SOLUTION INTRAMUSCULAR; INTRAVENOUS at 09:40

## 2024-06-03 RX ADMIN — METOCLOPRAMIDE 10 MG: 5 INJECTION, SOLUTION INTRAMUSCULAR; INTRAVENOUS at 10:21

## 2024-06-03 RX ADMIN — PROPOFOL 200 MG: 10 INJECTION, EMULSION INTRAVENOUS at 09:25

## 2024-06-03 RX ADMIN — TRANEXAMIC ACID 1000 MG: 100 INJECTION, SOLUTION INTRAVENOUS at 09:25

## 2024-06-03 RX ADMIN — SODIUM CHLORIDE, POTASSIUM CHLORIDE, SODIUM LACTATE AND CALCIUM CHLORIDE: 600; 310; 30; 20 INJECTION, SOLUTION INTRAVENOUS at 09:21

## 2024-06-03 RX ADMIN — FENTANYL CITRATE 50 MCG: 50 INJECTION, SOLUTION INTRAMUSCULAR; INTRAVENOUS at 10:48

## 2024-06-03 RX ADMIN — FENTANYL CITRATE 150 MCG: 50 INJECTION, SOLUTION INTRAMUSCULAR; INTRAVENOUS at 09:25

## 2024-06-03 RX ADMIN — LIDOCAINE HYDROCHLORIDE 80 MG: 20 INJECTION, SOLUTION EPIDURAL; INFILTRATION; INTRACAUDAL at 09:25

## 2024-06-03 RX ADMIN — HALOPERIDOL LACTATE 1 MG: 5 INJECTION, SOLUTION INTRAMUSCULAR at 10:48

## 2024-06-03 RX ADMIN — FENTANYL CITRATE 25 MCG: 50 INJECTION, SOLUTION INTRAMUSCULAR; INTRAVENOUS at 11:16

## 2024-06-03 RX ADMIN — KETOROLAC TROMETHAMINE 15 MG: 15 INJECTION, SOLUTION INTRAMUSCULAR; INTRAVENOUS at 10:21

## 2024-06-03 RX ADMIN — CEFAZOLIN 2 G: 1 INJECTION, POWDER, FOR SOLUTION INTRAMUSCULAR; INTRAVENOUS at 09:25

## 2024-06-03 ASSESSMENT — PAIN DESCRIPTION - PAIN TYPE
TYPE: SURGICAL PAIN

## 2024-06-03 ASSESSMENT — PAIN SCALES - GENERAL: PAIN_LEVEL: 4

## 2024-06-03 ASSESSMENT — FIBROSIS 4 INDEX: FIB4 SCORE: 1.37

## 2024-06-03 NOTE — PROGRESS NOTES
UPDATE TO HISTORY AND PHYSICAL    I met with patient and any family members in preop. Again discussed planned surgery. I went over the risks, benefits and alternatives of the surgery in the office visit. I had discussed any off label use of products. The patient had been given literature to read about the procedure in person and via email if able and had access to the office note via the patient portal. I Answered any questions remaining from prior visits.     There was no change to my last H and P (it may be labelled a progress note or a H and P in EPIC).   The note was made by either myself, or my PAs Toni Vázquez or Sumanth Hanson but is signed by me, if it was done by my physician assistant.   I verify it is correct and has my co-signature.    Alia Kolb MD PhD JULIANNA

## 2024-06-03 NOTE — OR NURSING
1041-Pt arrived from OR, resting calmly with even, unlabored breathing, vss, no pain, no nausea, site CDI and soft to palpation with hemovac in place and draining.    1111-RN contacted pt's  and provided updates, all questions answered.    1143-RN followed up with  once more, with ETA and request to  meds.    1356-RN called report to LESLY Horta    1310-Pt transferred in [facility] with RN to Phase II.  Vss, no pain, no nausea, site CDI and soft to palpation, hemovac in place and draining.

## 2024-06-03 NOTE — ANESTHESIA PREPROCEDURE EVALUATION
Case: [id number] Date/Time: 06/03/24 0915    Procedure: L4/5 decompressive laminectomy and bilateral foraminotomies and excision of left L4/5 synovial cyst    Diagnosis: Neurogenic claudication due to lumbar spinal stenosis [M48.062]    Pre-op diagnosis: Neurogenic claudication due to lumbar spinal stenosis [M48.062]    Location: [facility] 07 / SURGERY [facility]    Surgeons: Alia Kolb M.D.            Relevant Problems   CARDIAC   (positive) Varicose vein of leg      Other   (positive) Avascular necrosis (HCC)   (positive) Neurogenic claudication due to lumbar spinal stenosis       Physical Exam    Airway   Mallampati: II  TM distance: >3 FB  Neck ROM: full       Cardiovascular - normal exam  Rhythm: regular  Rate: normal  (-) murmur     Dental - normal exam           Pulmonary - normal exam  Breath sounds clear to auscultation     Abdominal    Neurological - normal exam                   Anesthesia Plan    ASA 2       Plan - general       Airway plan will be ETT          Induction: intravenous    Postoperative Plan: Postoperative administration of opioids is intended.    Pertinent diagnostic labs and testing reviewed    Informed Consent:    Anesthetic plan and risks discussed with patient.    Use of blood products discussed with: patient whom consented to blood products.

## 2024-06-03 NOTE — DISCHARGE INSTRUCTIONS
HOME CARE INSTRUCTIONS    ACTIVITY: Rest and take it easy for the first 24 hours.  A responsible adult is recommended to remain with you during that time.  It is normal to feel sleepy.  We encourage you to not do anything that requires balance, judgment or coordination.    FOR 24 HOURS DO NOT:  Drive, operate machinery or run household appliances.  Drink beer or alcoholic beverages.  Make important decisions or sign legal documents.    SPECIAL INSTRUCTIONS:     Surgical Spinal Decompression, Care After  This sheet gives you information about how to care for yourself after your procedure. Your health care provider may also give you more specific instructions. If you have problems or questions, contact your health care provider.  What can I expect after the procedure?  After the procedure, it is common to have:  Pain in the surgical area for the first few days.  Muscle tightening (spasms) across the back.  Numbness in the legs or the back.  Weakness in the legs.  Follow these instructions at home:  Medicines  Take over-the-counter and prescription medicines as told by your health care provider. Finish all antibiotic medicine even when you start to feel better.  If you were prescribed an antibiotic medicine, take or use it as told by your health care provider. Do not stop using the antibiotic even if you start to feel better.  Your medicines may cause constipation. To prevent or treat constipation, you may need to:  Drink enough fluid to keep your urine pale yellow.  Take over-the-counter or prescription medicines.  Eat foods that are high in fiber, such as beans, whole grains, and fresh fruits and vegetables.  Limit foods that are high in fat and processed sugars, such as fried or sweet foods.  Incision care    Follow instructions from your health care provider about how to take care of your incision. Make sure you:  Wash your hands with soap and water for at least 20 seconds before you change your bandage (dressing).  If soap and water are not available, use hand .  Change your dressing as told by your health care provider. You may need to have someone change your dressing for you.  Leave stitches (sutures), skin glue, or adhesive strips in place. These skin closures may need to stay in place for 2 weeks or longer. If adhesive strip edges start to loosen and curl up, you may trim the loose edges. Do not remove adhesive strips completely unless your health care provider tells you to do that.  Check your incision area every day for signs of infection. If you cannot see your incision, have someone check it for you. Check for:  More redness, swelling, or pain.  Fluid or blood.  Warmth.  Pus or a bad smell.  Bathing  Do not take baths, swim, or use a hot tub until your health care provider approves. Ask your health care provider if you may take showers. You may only be allowed to take sponge baths.  Keep the dressing dry until your health care provider says it can be removed.  Activity  Rest as told by your health care provider.  Avoid sitting for a long time without moving. Get up to take short walks every 1-2 hours. This is important to improve blood flow and breathing. Ask for help if you feel weak or unsteady.  Do not bend or twist at the waist until your health care provider approves. To lower yourself to pick things up, bend your knees instead of tipping your upper body forward.  Do not lift anything that is heavier than 10 lb (4.5 kg), or the limit that you are told, until your health care provider says that it is safe. Avoid lifting anything above the level of your head.  Sit, stand, walk, turn in bed, and reposition yourself as told by your health care provider. This will help to keep your spine in proper alignment.  Avoid pushing and pulling motions, or other motions that require a lot of effort, such as vacuuming.  Do exercises as told by your health care provider.  Return to your normal activities as told by your  health care provider. Ask your health care provider what activities are safe for you.  Managing pain, stiffness, and swelling    If directed, put ice on the injured area:  Put ice in a plastic bag.  Place a towel between your skin and the bag.  Leave the ice on for 20 minutes, 2-3 times a day.  Remove the ice if your skin turns bright red. This is very important. If you cannot feel pain, heat, or cold, you have a greater risk of damage to the area.  Driving  Do not drive or use heavy machinery while taking prescription pain medicine.  Ask your health care provider when it is safe to drive.  General instructions  Do not use any products that contain nicotine or tobacco, such as cigarettes and e-cigarettes. These can delay incision and bone healing. If you need help quitting, ask your health care provider.  If you have a back brace, wear it as told by your health care provider. Remove it only as told.  Keep all follow-up visits. This is important.  Contact a health care provider if you:  Have a fever.  Notice that your incision feels warm to the touch or is opening up.  Have more redness, swelling, or pain at the site of your incision.  Have pus or a bad smell coming from your incision.  Have fluid or blood coming from your incision.  Have pain that is not controlled by medicine.  Have new numbness, tingling, or weakness in any part of your body.  Get help right away if you:  Have increasing pain, numbness, or weakness.  Have incontinence or retention. This means that you cannot control your urination or bowel movements.  Cannot move a part of your body (paralysis).  Notice that your incision feels swollen and tender, and the surrounding area looks like a lump. The lump may be red or bluish in color.  Develop pain or swelling in your lower leg or at the back of your knee.  Have difficulty breathing.  Have chest pain.  These symptoms may represent a serious problem that is an emergency. Do not wait to see if the  symptoms will go away. Get medical help right away. Call your local emergency services (911 in the [place] Do not drive yourself to the hospital.  Summary  After the procedure, it is common to have some pain, weakness, and fatigue.  Follow instructions from your health care provider about how to take care of your incision. Do not let it get wet until your health care provider approves.  Rest as told by your health care provider. Follow instructions about activity and movements.  Take over-the-counter and prescription medicines only as told by your health care provider.  Make sure you know what symptoms should cause you to get help right away.  This information is not intended to replace advice given to you by your health care provider. Make sure you discuss any questions you have with your health care provider.  Document Revised: 05/02/2022 Document Reviewed: 04/07/2021  [employer] Patient Education © 2023 [employer]    Appointment with our office for hemovac removal tomorrow.   Empty and measure hemovac every 12 hours     Surgical Drain Record  Empty your surgical drain as told by your health care provider. Use this form to write down the amount of fluid that has collected in the drainage container. Bring this form with you to your follow-up visits.  Surgical drain #1 location: ___________________  Date __________ Time __________ Amount __________  Date __________ Time __________ Amount __________  Date __________ Time __________ Amount __________  Date __________ Time __________ Amount __________  Date __________ Time __________ Amount __________  Date __________ Time __________ Amount __________  Date __________ Time __________ Amount __________  Date __________ Time __________ Amount __________  Date __________ Time __________ Amount __________  Date __________ Time __________ Amount __________  Date __________ Time __________ Amount __________  Date __________ Time __________ Amount __________  Date __________  Time __________ Amount __________  Date __________ Time __________ Amount __________      DIET: To avoid nausea, slowly advance diet as tolerated, avoiding spicy or greasy foods for the first day.  Add more substantial food to your diet according to your physician's instructions. INCREASE FLUIDS AND FIBER TO AVOID CONSTIPATION.    SURGICAL DRESSING/BATHING:   Keep dressing clean/dry/intact.   No baths/soaking in water until cleared by Dr.    MEDICATIONS: Resume taking daily medication.  Take prescribed pain medication with food.  If no medication is prescribed, you may take non-aspirin pain medication if needed.  PAIN MEDICATION CAN BE VERY CONSTIPATING.  Take a stool softener or laxative such as senokot, pericolace, or milk of magnesia if needed.    Prescription given for Keflex, Robaxin, Roxicodone, and Tylenol.  Last pain medication given at 11am (10mg oxycodone).    A follow-up appointment should be arranged with your doctor 6/4; call to schedule.    You should CALL YOUR PHYSICIAN if you develop:  Fever greater than 101 degrees F.  Pain not relieved by medication, or persistent nausea or vomiting.  Excessive bleeding (blood soaking through dressing) or unexpected drainage from the wound.  Extreme redness or swelling around the incision site, drainage of pus or foul smelling drainage.  Inability to urinate or empty your bladder within 8 hours.  Problems with breathing or chest pain.    You should call 911 if you develop problems with breathing or chest pain.  If you are unable to contact your doctor or surgical center, you should go to the nearest emergency room or urgent care center.  Physician's telephone #: Dr. Kolb [phone]    MILD FLU-LIKE SYMPTOMS ARE NORMAL.  YOU MAY EXPERIENCE GENERALIZED MUSCLE ACHES, THROAT IRRITATION, HEADACHE AND/OR SOME NAUSEA.    If any questions arise, call your doctor.  If your doctor is not available, please feel free to call the Surgical Center at [phone].  The  Center is open Monday through Friday from 7AM to 7PM.      A registered nurse may call you a few days after your surgery to see how you are doing after your procedure.    You may also receive a survey in the mail within the next two weeks and we ask that you take a few moments to complete the survey and return it to us.  Our goal is to provide you with very good care and we value your comments.     Depression / Suicide Risk    As you are discharged from this [facility] facility, it is important to learn how to keep safe from harming yourself.    Recognize the warning signs:  Abrupt changes in personality, positive or negative- including increase in energy   Giving away possessions  Change in eating patterns- significant weight changes-  positive or negative  Change in sleeping patterns- unable to sleep or sleeping all the time   Unwillingness or inability to communicate  Depression  Unusual sadness, discouragement and loneliness  Talk of wanting to die  Neglect of personal appearance   Rebelliousness- reckless behavior  Withdrawal from people/activities they love  Confusion- inability to concentrate     If you or a loved one observes any of these behaviors or has concerns about self-harm, here's what you can do:  Talk about it- your feelings and reasons for harming yourself  Remove any means that you might use to hurt yourself (examples: pills, rope, extension cords, firearm)  Get professional help from the community (Mental Health, Substance Abuse, psychological counseling)  Do not be alone:Call your Safe Contact- someone whom you trust who will be there for you.  Call your local CRISIS HOTLINE 521-8085 or 860-165-9492  Call your local Children's Mobile Crisis Response Team [place] (590) 622-0538 or www.[employer]  Call the toll free National Suicide Prevention Hotlines   National Suicide Prevention Lifeline 079-446-TSUN (8208)  National Hope Line Network 800-SUICIDE (555-0412)    I acknowledge  receipt and understanding of these Home Care instructions.

## 2024-06-03 NOTE — OR NURSING
1314 - Pt's vital signs are stable, pt is alert and pain is at a tolerable level at this time. Dressing is clean, dry and intact - medipore tape . Pt has no complaints of nausea or vomiting and is able to void adequately.     1450 - Discharge instructions including prescriptions, follow up appointments, and drainage emptying instructions were discussed with pt and . Drain hoses reinforced by Sumanth VENTURA. Pt's IV was discontinued and pt was given all belongings. Pt had no other questions. Pt pushed out via wheelchair.

## 2024-06-03 NOTE — ANESTHESIA PROCEDURE NOTES
Airway    Date/Time: 6/3/2024 9:26 AM    Performed by: Jesse Marc D.O.  Authorized by: Jesse Marc D.O.    Location:  OR  Urgency:  Elective  Indications for Airway Management:  Anesthesia      Spontaneous Ventilation: absent    Sedation Level:  Deep  Preoxygenated: Yes    Patient Position:  Sniffing  Mask Difficulty Assessment:  1 - vent by mask  Final Airway Type:  Endotracheal airway  Final Endotracheal Airway:  ETT  Cuffed: Yes    Technique Used for Successful ETT Placement:  Direct laryngoscopy    Insertion Site:  Oral  Blade Type:  Jessika  Laryngoscope Blade/Videolaryngoscope Blade Size:  3  ETT Size (mm):  7.0  Measured from:  Teeth  ETT to Teeth (cm):  23  Placement Verified by: auscultation and capnometry    Cormack-Lehane Classification:  Grade I - full view of glottis  Number of Attempts at Approach:  1

## 2024-06-03 NOTE — LETTER
May 7, 2024    Patient Name: Janett Mcnulty  Surgeon Name: Alia Kolb M.D.  Surgery Facility: [facility] ([address] [facility])  Surgery Date: 6/3/2024    The time of your surgery is not final and may change up to and until the day of your surgery. You will be contacted 1-2 business days prior to your surgery date with your check-in and surgery time.    BEFORE YOUR SURGERY - WITH THE FACILITY  Pre Registration and/or Lab Work/Tests must be done within 60 days of your surgery date. These will be done at [facility], with an appointment. This appointment should be completed before your pre op appointment in our office.    On 5/28 - if you have not already heard from [facility] Pre Admission/Registration Department, please call them at [phone] option 2, then option 1, for an appointment.      BEFORE YOUR SURGERY - WITH YOUR SURGEONS OFFICE  Pre op Appointment:   Date: 05/30/24   Time: 2:30PM   Provider: Sumanth Hanson PA-C    Location: [address] [place] [address]    Bring a list of all medications you are currently taking including the dosing and frequency.    Please read the MEDICATION INSTRUCTIONS below completely.    DAY OF YOUR SURGERY  Nothing to eat or drink and refrain from smoking any substance after midnight the day of your surgery. Smoking may interfere with the anesthetic and frequently produces nausea during the recovery period.    Continue taking all lifesaving medications, including the morning of your surgery with small sip of water.    You will need a responsible adult to drive you to and from your surgery.    AFTER YOUR SURGERY  2 Week Post op Appointment:   Date: 06/20/24   Time: 2:30PM  With: Sumanth Hanson PA-C   Location: [facility]    6 Week Post op Appointment:   Date: 07/16/24   Time: 2:30PM   With: Sumanth Hanson PA-C  Location: [facility]    MEDICATION INSTRUCTIONS  Do not take these medications prior to your procedure:            Anti-inflammatories:  stop 7 days prior, restart when advised. For fusions avoid for 12 weeks after surgery            Naproxen (Naprosyn or Alleve)            Motrin            Ibuprofen            Nabumetone (Relafen)            Meloxicam (Mobic)            Celebrex            Salsalate            Diclofenac (Arthrotec, Voltaren, Flector)            Sulindac (Clinoril            Etodolac (Lodine)            Indomethacin (Indocin)            Ketoprofen            Ketorolac            Oxaprozin (Daypro)            Piroxicam (Feldene)            Blood thinners (stop after approval from the prescribing physician)            Aspirin (any dosage): Stop 14 days prior, restart 7 days after procedure            Any medications that contain aspirin in combination (ie: Excedrin migraine, Fiorinal, and Norgesic)            Warfarin (Coumadin): Stop 14 days prior, INR day of procedure, restart 2-3 weeks after procedure            Antiplatelet: Stop 14 days prior, restart 7 days after procedure            Ticlid (ticlopidine): Stop 14 days prior            Plavix (clopidogrel): Stop 7 days prior            Aggrenox or Dipyridamole: Stop 14 days prior            ReoPro (abciximab): Stop 14 days prior            Integrilin (eptifibatide): Stop 14 days prior            Aggrastat (tirofiban): Stop 14 days prior            Lovenox (Enoxaparin): Stop 24hrs before and restart 24hrs after procedure            Heparin: Stop 24hrs before             Dalteparin (Fragmin): Stop 24hrs before            Fondaparinux (Arixtra): Stop 24hrs before            Xeralto, Dabigatran (Pradaxa) Stop 5 days prior            Eliquis (apibaxan) Stop 7 days prior    Okay to take these medications as prescribed:            Muscle relaxers            Acetaminophen and pain medications that have it in addition to oxycodone and hydrocodone            Blood pressure, cholesterol and diabetes medications are ok      TIME OFF WORK  FMLA or Disability forms can be faxed directly to  [phone] or you may drop them off at any [facility]. Our office charges a $35.00 fee. Forms will be completed within 5-10 business days after payment is received. For the status of your forms you may contact our disability office directly at [phone].    DENTAL PROCDURES/CLEANINGS Avoid 3 weeks before surgery and for 3 months after surgery.    QUESTIONS ABOUT COSTS  Contact our Patient Financial Services department at [phone] for more information.    If you have any questions, please contact our office.    Amaya   Surgery Scheduler  [email or web address]  ? [phone]   Fax: [phone]  EXT [phone] [place] [address]  [phone]

## 2024-06-03 NOTE — ANESTHESIA POSTPROCEDURE EVALUATION
Patient: SUMAYA WATSON    Procedure Summary       Date: 06/03/24 Room / Location: [facility] 07 / SURGERY [facility]    Anesthesia Start: 0921 Anesthesia Stop: 1043    Procedure: L4/5 decompressive laminectomy and bilateral foraminotomies and excision of left L4/5 synovial cyst (Spine Lumbar) Diagnosis:       Neurogenic claudication due to lumbar spinal stenosis      (Neurogenic claudication due to lumbar spinal stenosis [M48.062])    Surgeons: Alia Kolb M.D. Responsible Provider: Jesse Marc D.O.    Anesthesia Type: general ASA Status: 2            Final Anesthesia Type: general  Last vitals  BP   Blood Pressure: 115/58    Temp   36.9 °C (98.5 °F)    Pulse   92   Resp   (!) 5    SpO2   95 %      Anesthesia Post Evaluation    Patient location during evaluation: PACU  Patient participation: complete - patient participated  Level of consciousness: awake and alert  Pain score: 4    Airway patency: patent  Anesthetic complications: no  Cardiovascular status: hemodynamically stable  Respiratory status: acceptable  Hydration status: euvolemic    PONV: none          No notable events documented.     Nurse Pain Score: 5 (NPRS)

## 2024-06-03 NOTE — OP REPORT
1.  DATE OF SURGERY: 6/3/2024    2. SURGEON:  Alia Kolb MD, PhD, JULIANNA, Neurosurgeon    3. ASSISTANT:  Sumanth Hanson PA-C    An assistant was crucial to have during the case as the assistant helped with positioning, keeping the field clear of blood and retraction. This case could not be done easily without a qualified assistant. It was medically necessary    4. TYPE OF ANESTHESIA:  General anesthesia with endotracheal intubation    5. PREOPERATIVE DIAGNOSIS:      1.  L4-5 spinal stenosis with a left-sided synovial cyst     2.  Tiny spondylolisthesis with no gross instability     3.  Intolerant of steroids-history of avascular necrosis.     4.  Status post MILD procedure at L4-5        6. POSTOPERATIVE DIAGNOSIS:      1.  L4-5 spinal stenosis with a left-sided synovial cyst     2.  Tiny spondylolisthesis with no gross instability       4.  Intolerant of steroids-history of avascular necrosis.     5.  Status post MILD procedure at L4-5        7. HISTORY: See formal admission H and P      5/2/2024  This a very nice 54-year-old woman.  She comes in with a 10-year history when she is sitting.,  Her pain is a 4 out of 10.  She has bilateral buttock and posterior thigh pain when she stands and walks.  He can go to 6-7 out of 10.  She has had extensive pain management with epidurals, facet ablations.  She done physical therapy in the past.  Her symptoms get worse when she walks.  She has history of peptic ulcer disease but tolerated Celebrex okay.  She has a history of avascular necrosis and avoid steroids.  In essence she has back buttock and posterior thigh pain gets worse when she stands and walks.     She underwent tan epidural with no relief.  She done massage exercises and TENS.  She has had both hips replaced.  There is history of anemia.  She is .  She is a close to clinical systems analysis.      8. PREOPERATIVE PHYSICAL EXAMINATION: See formal admission H and P    5/2/2024  Tender facet joints but  neurologically intact.     Last Imaging Result(s):      No results found.     She had plain x-rays and MRI scan at [facility].  This was on 2/14/2024.  She has very minimal L4-5 spondylolisthesis with marked      9.  TITLE OF THE OPERATION:  L4 decompressive laminectomy bilateral foraminotomies  L5 decompressive laminectomy bilateral foraminotomies  Microscopic microdissection.  Modifier 22.  This procedure took another 30 minutes.  There was extensive scar tissue from the previous MI of the procedure.  I did take this down with a sharp curette.  Micro dissection was meticulous in order to make sure there was no CSF leakage  Excision of left L4-5 synovial cyst    10. OPERATIVE FINDINGS: Previous surgery.  Extensive tissue.  Extreme stenosis.  Mild ligamentous arthropathy and facet hypertrophy.  Very adherent to the dura.  Left L4-5 synovial cyst.  Excised in a piecemeal fashion.  This had another c 30 minutes of the procedure.    11. OPERATED SIDE/LEVEL: L4-5    12. IMPLANTS USED: Nil    13. COMPLICATIONS:  Nil.    14. ESTIMATED BLOOD LOSS:       25  mL        17. OPERATIVE DETAILS:  After fully informed consent, the patient was brought to the operating room.  General anesthesia was administered.  The patient was given intravenous antibiotic.  The patient was carefully turned prone on the OSI operating table on the Jassi frame.  All pressure points were padded.      The posterior lumbar region was prepped and draped in a standard fashion.  After fluoroscopic localization, a 3 cm skin incision was marked over the appropriate disk space level.  The wound was prepped and draped in a standard fashion.  A midline incision was affected.  A bilateral subperiosteal exposure was affected at the L4-5 level..  The self retaining  retractors were placed.  The microscope was then brought in the field of view.      Under the microscope using an AM-8 drill bit, a laminectomy was affected involving the inferior two thirds of L4 and  the superior half of L5.  I used a Midas Hima AM 8 drill bit after using Leksell rongeurs.  I used the drill to thin down the lamina and medial facets.  I used 4, 3 and 2 mm medial Kerrison punches to remove the remaining bone, disc, osteophyte and ligament.  This exposed the common thecal sac as well as the traversing nerve root on either side.  I was also able to palpate the foramen on either side. The nerve root was completely identified.      Significantly the degree of stenosis is extreme.  There was a large amount of scar tissue as well.  I used a 3 angled curette to release all of this.  This added another 30 minutes to the procedure.  There was evidence of previous surgery.  The synovial cyst on the left at L4-5 was removed in a piecemeal fashion.      Hemostasis was obtained.  Using 2 mm Kerrison punches, a foraminotomy was affected and completed on either side..  Using a blunt hook, the L4 and L5 nerve roots bilaterally were decompressed.     Once I was happy with the decompression, the wound was thoroughly irrigated with saline solution.  Vancomycin powder was placed into the wound. Local anesthesia was placed throughout the paraspinal musculature prior to closing. Closure was then affected over a suction subfascial Hemovac.  The wound was closed with interrupted 0 Vicryl sutures for the fascia, 2-0 Vicryl for the subdermal tissues and staples for the skin. All counts were correct and all instruments accounted for.      15. PROGNOSIS:  The surgery went well.  The patient has been decompressed.  At the end of the case, the patient awoke moving his/her upper and lower extremities well. I would anticipate the patient will be discharged later today. When the patient goes home, he/she will be discharged home on narcotic analgesia,  a muscle relaxant and oral antibiotic, usually Keflex.  The plan will be to follow up in 2 weeks in the office.  We will call the patient next week to ensure there are not any  problems.  He/she can shower in 72 hours but is instructed to keep the wound dry.  The patient has also been instructed to abstain from smoking or any anti-inflammatories.  We will send home with a drain and we will remove this in the office tomorrow.    This was very scarred down.  She is well decompressed.    Alia Kolb MD, PhD, JULIANNA      Cc: Franklin Gonzalez M.D.  Dr hewitt

## 2024-06-03 NOTE — ANESTHESIA TIME REPORT
Anesthesia Start and Stop Event Times       Date Time Event    6/3/2024 0912 Ready for Procedure     0921 Anesthesia Start     1043 Anesthesia Stop          Responsible Staff  06/03/24      Name Role Begin End    Jesse Marc D.O. Anesth 0921 1043          Overtime Reason:  no overtime (within assigned shift)    Comments:

## 2024-06-03 NOTE — OR SURGEON
Immediate Post OP Note    5. PREOPERATIVE DIAGNOSIS:       1.  L4-5 spinal stenosis with a left-sided synovial cyst     2.  Tiny spondylolisthesis with no gross instability     3.  Intolerant of steroids-history of avascular necrosis.     4.  Status post MILD procedure at L4-5           6. POSTOPERATIVE DIAGNOSIS:       1.  L4-5 spinal stenosis with a left-sided synovial cyst     2.  Tiny spondylolisthesis with no gross instability        4.  Intolerant of steroids-history of avascular necrosis.     5.  Status post MILD procedure at L4-5      Procedure(s):  L4/5 decompressive laminectomy and bilateral foraminotomies and excision of left L4/5 synovial cyst - Wound Class: Clean with Drain    Surgeon(s):  Alia Kolb M.D.    Anesthesiologist/Type of Anesthesia:  Anesthesiologist: Jesse Marc D.O./General    Surgical Staff:  Assistant: Sumanth Hanson P.A.-C.  Circulator: Ronit Santiago R.N.  Scrub Person: Amaya Levine  Radiology Technologist: Scar Harrington    Specimens removed if any:  * No specimens in log *    Assistants: Sumanth Hanson PA-C  ,  Specimen: nil    Estimated Blood Loss: 25 cc    Findings: n/a    Complications: nil        6/3/2024 10:46 AM Alia Kolb M.D.

## 2024-07-06 PROCEDURE — RXMED WILLOW AMBULATORY MEDICATION CHARGE: Performed by: PHYSICIAN ASSISTANT

## 2024-07-08 DIAGNOSIS — M54.9 BACK PAIN, UNSPECIFIED BACK LOCATION, UNSPECIFIED BACK PAIN LATERALITY, UNSPECIFIED CHRONICITY: ICD-10-CM

## 2024-07-08 RX ORDER — GABAPENTIN 300 MG/1
300 CAPSULE ORAL 2 TIMES DAILY
Qty: 180 CAPSULE | Refills: 0 | Status: SHIPPED | OUTPATIENT
Start: 2024-07-08

## 2024-07-11 ENCOUNTER — PHARMACY VISIT (OUTPATIENT)
Dept: PHARMACY | Facility: MEDICAL CENTER | Age: 54
End: 2024-07-11
Payer: COMMERCIAL

## 2024-09-11 PROCEDURE — RXMED WILLOW AMBULATORY MEDICATION CHARGE: Performed by: PHYSICIAN ASSISTANT

## 2024-09-12 ENCOUNTER — PHARMACY VISIT (OUTPATIENT)
Dept: PHARMACY | Facility: MEDICAL CENTER | Age: 54
End: 2024-09-12
Payer: COMMERCIAL

## 2024-09-23 ENCOUNTER — TELEPHONE (OUTPATIENT)
Dept: MEDICAL GROUP | Facility: MEDICAL CENTER | Age: 54
End: 2024-09-23
Payer: COMMERCIAL

## 2024-09-23 RX ORDER — CELECOXIB 200 MG/1
200 CAPSULE ORAL
Qty: 120 CAPSULE | Refills: 0 | Status: SHIPPED | OUTPATIENT
Start: 2024-09-23

## 2024-10-02 DIAGNOSIS — M54.9 BACK PAIN, UNSPECIFIED BACK LOCATION, UNSPECIFIED BACK PAIN LATERALITY, UNSPECIFIED CHRONICITY: ICD-10-CM

## 2024-10-02 RX ORDER — ONDANSETRON 4 MG/1
4 TABLET, FILM COATED ORAL EVERY 8 HOURS PRN
Qty: 90 TABLET | Refills: 0 | Status: SHIPPED | OUTPATIENT
Start: 2024-10-02

## 2024-10-02 RX ORDER — GABAPENTIN 300 MG/1
300 CAPSULE ORAL 2 TIMES DAILY
Qty: 180 CAPSULE | Refills: 0 | Status: SHIPPED | OUTPATIENT
Start: 2024-10-02

## 2024-10-04 ENCOUNTER — TELEPHONE (OUTPATIENT)
Dept: MEDICAL GROUP | Facility: MEDICAL CENTER | Age: 54
End: 2024-10-04
Payer: COMMERCIAL

## 2024-10-15 RX ORDER — OMEPRAZOLE 40 MG/1
40 CAPSULE, DELAYED RELEASE ORAL DAILY
Qty: 90 CAPSULE | Refills: 0 | Status: SHIPPED | OUTPATIENT
Start: 2024-10-15 | End: 2024-10-22

## 2024-10-22 RX ORDER — OMEPRAZOLE 40 MG/1
40 CAPSULE, DELAYED RELEASE ORAL DAILY
Qty: 90 CAPSULE | Refills: 0 | Status: SHIPPED | OUTPATIENT
Start: 2024-10-22

## 2024-11-22 PROCEDURE — RXMED WILLOW AMBULATORY MEDICATION CHARGE: Performed by: PHYSICIAN ASSISTANT

## 2024-11-24 PROBLEM — M48.062 NEUROGENIC CLAUDICATION DUE TO LUMBAR SPINAL STENOSIS: Status: RESOLVED | Noted: 2024-05-02 | Resolved: 2024-11-24

## 2024-11-25 ENCOUNTER — OFFICE VISIT (OUTPATIENT)
Dept: MEDICAL GROUP | Facility: MEDICAL CENTER | Age: 54
End: 2024-11-25
Payer: COMMERCIAL

## 2024-11-25 ENCOUNTER — TELEPHONE (OUTPATIENT)
Dept: MEDICAL GROUP | Facility: MEDICAL CENTER | Age: 54
End: 2024-11-25

## 2024-11-25 VITALS
DIASTOLIC BLOOD PRESSURE: 84 MMHG | WEIGHT: 203 LBS | RESPIRATION RATE: 14 BRPM | OXYGEN SATURATION: 96 % | BODY MASS INDEX: 31.86 KG/M2 | TEMPERATURE: 97.4 F | HEIGHT: 67 IN | SYSTOLIC BLOOD PRESSURE: 142 MMHG | HEART RATE: 94 BPM

## 2024-11-25 DIAGNOSIS — E55.9 VITAMIN D DEFICIENCY: ICD-10-CM

## 2024-11-25 DIAGNOSIS — K21.9 GASTROESOPHAGEAL REFLUX DISEASE, UNSPECIFIED WHETHER ESOPHAGITIS PRESENT: ICD-10-CM

## 2024-11-25 DIAGNOSIS — M81.0 POSTMENOPAUSAL BONE LOSS: ICD-10-CM

## 2024-11-25 DIAGNOSIS — Z98.890 STATUS POST LUMBAR SPINE SURGERY FOR DECOMPRESSION OF SPINAL CORD: Chronic | ICD-10-CM

## 2024-11-25 DIAGNOSIS — Z00.00 PREVENTATIVE HEALTH CARE: ICD-10-CM

## 2024-11-25 DIAGNOSIS — R79.0 LOW BLOOD MAGNESIUM: ICD-10-CM

## 2024-11-25 DIAGNOSIS — E61.1 IRON DEFICIENCY: ICD-10-CM

## 2024-11-25 DIAGNOSIS — Z12.31 ENCOUNTER FOR SCREENING MAMMOGRAM FOR BREAST CANCER: ICD-10-CM

## 2024-11-25 DIAGNOSIS — G89.29 OTHER CHRONIC PAIN: ICD-10-CM

## 2024-11-25 DIAGNOSIS — Z87.11 HISTORY OF PEPTIC ULCER: ICD-10-CM

## 2024-11-25 DIAGNOSIS — R05.9 COUGH, UNSPECIFIED TYPE: ICD-10-CM

## 2024-11-25 DIAGNOSIS — E53.8 B12 DEFICIENCY: ICD-10-CM

## 2024-11-25 DIAGNOSIS — J30.81 ALLERGIC RHINITIS DUE TO ANIMAL HAIR AND DANDER: Chronic | ICD-10-CM

## 2024-11-25 DIAGNOSIS — R03.0 ELEVATED BLOOD PRESSURE READING: ICD-10-CM

## 2024-11-25 LAB
FLUAV RNA SPEC QL NAA+PROBE: NEGATIVE
FLUBV RNA SPEC QL NAA+PROBE: NEGATIVE
RSV RNA SPEC QL NAA+PROBE: NEGATIVE
SARS-COV-2 RNA RESP QL NAA+PROBE: NEGATIVE

## 2024-11-25 RX ORDER — IPRATROPIUM BROMIDE 21 UG/1
2 SPRAY, METERED NASAL 2 TIMES DAILY
Qty: 30 ML | Refills: 5 | Status: SHIPPED | OUTPATIENT
Start: 2024-11-25

## 2024-11-25 SDOH — ECONOMIC STABILITY: INCOME INSECURITY: HOW HARD IS IT FOR YOU TO PAY FOR THE VERY BASICS LIKE FOOD, HOUSING, MEDICAL CARE, AND HEATING?: NOT VERY HARD

## 2024-11-25 SDOH — ECONOMIC STABILITY: INCOME INSECURITY: IN THE LAST 12 MONTHS, WAS THERE A TIME WHEN YOU WERE NOT ABLE TO PAY THE MORTGAGE OR RENT ON TIME?: NO

## 2024-11-25 SDOH — HEALTH STABILITY: PHYSICAL HEALTH: ON AVERAGE, HOW MANY DAYS PER WEEK DO YOU ENGAGE IN MODERATE TO STRENUOUS EXERCISE (LIKE A BRISK WALK)?: 4 DAYS

## 2024-11-25 SDOH — ECONOMIC STABILITY: FOOD INSECURITY: WITHIN THE PAST 12 MONTHS, YOU WORRIED THAT YOUR FOOD WOULD RUN OUT BEFORE YOU GOT MONEY TO BUY MORE.: NEVER TRUE

## 2024-11-25 SDOH — ECONOMIC STABILITY: FOOD INSECURITY: WITHIN THE PAST 12 MONTHS, THE FOOD YOU BOUGHT JUST DIDN'T LAST AND YOU DIDN'T HAVE MONEY TO GET MORE.: NEVER TRUE

## 2024-11-25 SDOH — HEALTH STABILITY: MENTAL HEALTH
STRESS IS WHEN SOMEONE FEELS TENSE, NERVOUS, ANXIOUS, OR CAN'T SLEEP AT NIGHT BECAUSE THEIR MIND IS TROUBLED. HOW STRESSED ARE YOU?: NOT AT ALL

## 2024-11-25 SDOH — HEALTH STABILITY: PHYSICAL HEALTH: ON AVERAGE, HOW MANY MINUTES DO YOU ENGAGE IN EXERCISE AT THIS LEVEL?: 20 MIN

## 2024-11-25 ASSESSMENT — LIFESTYLE VARIABLES
HOW OFTEN DO YOU HAVE A DRINK CONTAINING ALCOHOL: 2-3 TIMES A WEEK
HOW OFTEN DO YOU HAVE SIX OR MORE DRINKS ON ONE OCCASION: LESS THAN MONTHLY
HOW MANY STANDARD DRINKS CONTAINING ALCOHOL DO YOU HAVE ON A TYPICAL DAY: 1 OR 2
AUDIT-C TOTAL SCORE: 4
SKIP TO QUESTIONS 9-10: 0

## 2024-11-25 ASSESSMENT — SOCIAL DETERMINANTS OF HEALTH (SDOH)
HOW OFTEN DO YOU ATTEND CHURCH OR RELIGIOUS SERVICES?: 1 TO 4 TIMES PER YEAR
HOW OFTEN DO YOU GET TOGETHER WITH FRIENDS OR RELATIVES?: ONCE A WEEK
IN THE PAST 12 MONTHS, HAS THE ELECTRIC, GAS, OIL, OR WATER COMPANY THREATENED TO SHUT OFF SERVICE IN YOUR HOME?: NO
HOW MANY DRINKS CONTAINING ALCOHOL DO YOU HAVE ON A TYPICAL DAY WHEN YOU ARE DRINKING: 1 OR 2
HOW HARD IS IT FOR YOU TO PAY FOR THE VERY BASICS LIKE FOOD, HOUSING, MEDICAL CARE, AND HEATING?: NOT VERY HARD
HOW OFTEN DO YOU ATTEND CHURCH OR RELIGIOUS SERVICES?: 1 TO 4 TIMES PER YEAR
DO YOU BELONG TO ANY CLUBS OR ORGANIZATIONS SUCH AS CHURCH GROUPS UNIONS, FRATERNAL OR ATHLETIC GROUPS, OR SCHOOL GROUPS?: YES
HOW OFTEN DO YOU HAVE SIX OR MORE DRINKS ON ONE OCCASION: LESS THAN MONTHLY
HOW OFTEN DO YOU ATTENT MEETINGS OF THE CLUB OR ORGANIZATION YOU BELONG TO?: 1 TO 4 TIMES PER YEAR
HOW OFTEN DO YOU GET TOGETHER WITH FRIENDS OR RELATIVES?: ONCE A WEEK
HOW OFTEN DO YOU ATTENT MEETINGS OF THE CLUB OR ORGANIZATION YOU BELONG TO?: 1 TO 4 TIMES PER YEAR
WITHIN THE PAST 12 MONTHS, YOU WORRIED THAT YOUR FOOD WOULD RUN OUT BEFORE YOU GOT THE MONEY TO BUY MORE: NEVER TRUE
IN A TYPICAL WEEK, HOW MANY TIMES DO YOU TALK ON THE PHONE WITH FAMILY, FRIENDS, OR NEIGHBORS?: MORE THAN THREE TIMES A WEEK
IN A TYPICAL WEEK, HOW MANY TIMES DO YOU TALK ON THE PHONE WITH FAMILY, FRIENDS, OR NEIGHBORS?: MORE THAN THREE TIMES A WEEK
HOW OFTEN DO YOU HAVE A DRINK CONTAINING ALCOHOL: 2-3 TIMES A WEEK
DO YOU BELONG TO ANY CLUBS OR ORGANIZATIONS SUCH AS CHURCH GROUPS UNIONS, FRATERNAL OR ATHLETIC GROUPS, OR SCHOOL GROUPS?: YES

## 2024-11-25 ASSESSMENT — FIBROSIS 4 INDEX: FIB4 SCORE: 1.37

## 2024-11-25 NOTE — LETTER
[facility]  Franklin Gonzalez M.D.  [address]  [place] [address]  Fax: [phone]   Authorization for Release/Disclosure of   Protected Health Information   Name: JANETT TAM SHIRLEY : 1970 SSN: [id number]   Address: [address] [place] [address]  [place] [address] Phone:    [phone] (home)    I authorize the entity listed below to release/disclose the PHI below to:   [facility]/Franklin Gonzalez M.D. and Franklin Gonzalez M.D.   Provider or Entity Name:  Dr. hewitt   Address   City, State, Zip   Phone:      Fax:  [phone]   Reason for request: continuity of care   Information to be released:    [  ] LAST COLONOSCOPY,  including any PATH REPORT and follow-up  [  ] LAST FIT/COLOGUARD RESULT [  ] LAST DEXA  [  ] LAST MAMMOGRAM  [  ] LAST PAP  [  ] LAST LABS [  ] RETINA EXAM REPORT  [  ] IMMUNIZATION RECORDS  [ x ] Release all info      [  ] Check here and initial the line next to each item to release ALL health information INCLUDING  _____ Care and treatment for drug and / or alcohol abuse  _____ HIV testing, infection status, or AIDS  _____ Genetic Testing    DATES OF SERVICE OR TIME PERIOD TO BE DISCLOSED: _____________  I understand and acknowledge that:  * This Authorization may be revoked at any time by you in writing, except if your health information has already been used or disclosed.  * Your health information that will be used or disclosed as a result of you signing this authorization could be re-disclosed by the recipient. If this occurs, your re-disclosed health information may no longer be protected by State or Federal laws.  * You may refuse to sign this Authorization. Your refusal will not affect your ability to obtain treatment.  * This Authorization becomes effective upon signing and will  on (date) __________.      If no date is indicated, this Authorization will  one (1) year from the signature date.    Name: Janett Mcnulty  Signature:continuity of care Date:    11/25/2024     PLEASE FAX REQUESTED RECORDS BACK TO: [phone]

## 2024-11-25 NOTE — LETTER
[facility]  Franklin Gonzalez M.D.  [address]  [place] [address]  Fax: [phone]   Authorization for Release/Disclosure of   Protected Health Information   Name: JANETT TAM SHIRLEY : 1970 SSN: [id number]   Address: [address] [place] [address]  [place] [address] Phone:    [phone] (home)    I authorize the entity listed below to release/disclose the PHI below to:   [facility]/Franklin Gonzalez M.D. and Franklin Gonzalez M.D.   Provider or Entity Name:  Dr. padilla   [facility]   Phone:      Fax:  [phone]   Reason for request: continuity of care   Information to be released:    [  ] LAST COLONOSCOPY,  including any PATH REPORT and follow-up  [  ] LAST FIT/COLOGUARD RESULT [  ] LAST DEXA  [  ] LAST MAMMOGRAM  [  ] LAST PAP  [  ] LAST LABS [  ] RETINA EXAM REPORT  [  ] IMMUNIZATION RECORDS  [x ] Release all info      [  ] Check here and initial the line next to each item to release ALL health information INCLUDING  _____ Care and treatment for drug and / or alcohol abuse  _____ HIV testing, infection status, or AIDS  _____ Genetic Testing    DATES OF SERVICE OR TIME PERIOD TO BE DISCLOSED: _____________  I understand and acknowledge that:  * This Authorization may be revoked at any time by you in writing, except if your health information has already been used or disclosed.  * Your health information that will be used or disclosed as a result of you signing this authorization could be re-disclosed by the recipient. If this occurs, your re-disclosed health information may no longer be protected by State or Federal laws.  * You may refuse to sign this Authorization. Your refusal will not affect your ability to obtain treatment.  * This Authorization becomes effective upon signing and will  on (date) __________.      If no date is indicated, this Authorization will  one (1) year from the signature date.    Name: Janett Mcnulty  Signature: continuity of care Date:    11/25/2024     PLEASE FAX REQUESTED RECORDS BACK TO: [phone]

## 2024-11-25 NOTE — PROGRESS NOTES
Subjective     Camila Rajni Mcnulty is a 54 y.o. female who presents with annual and Cough (Started thursday) and Sinus Problem            HPI    Here for annual exam   New complaint last Thursday patient has noticed some sinus pressure left side, runny nose and post nasal drip, feeling more run down and tired starting on Thursday as well, felt like she had a fever that night, slight cough started as dry and has become more productive, a bit more joint pain both hips and low back, some nausea and no emesis, no diarrhea, tried taking dayquil and nyquil and mucinex and sudafed and using flonase nasal spray and she has been using Flonase and over-the-counter Zyrtec on a regular basis for her allergies.  S/p lumbar surgery dr.sekhon WHITLEY and [facility] and scheduled to go today but she is planning on not going because she has been feeling ill  [facility]  Sees  pain management, last visit earlier this year  Dental check twice per year   Gyn  on estradiol and progesterone   Previously when not sick, she has been walking since her surgery for 20-30 minutes 4 days per week and no hip or back pain with walking, will still have flare ups of back and hip pain with prolonged sitting or 1 per certain exercises at PT. Sometimes will notice the pain with sleep. Still on Celebrex 200 mg qday or up to bid on occasion short term prior to 6 and on neurontin 300 mg 2 per evening with no hangover effect, on robaxin per orthopedics and takes that at night.  The Celebrex does help without any stomach upset or irritation, no reflux or abdominal pain, no other NSAIDs  Etoh 4 drinks per week   Continues on Prilosec 40 mg daily by GI, with Celebrex no stomach pain, no reflux  Medications, allergies, medical history, surgical history, social history, family history  reviewed and updated    Current Outpatient Medications   Medication Sig Dispense Refill    methocarbamol (ROBAXIN) 750 MG Tab Take 1 Tablet  by mouth 3 times a day as needed (muscle spasm). 90 Tablet 2    omeprazole (PRILOSEC) 40 MG delayed-release capsule Take 1 capsule by mouth once daily 90 Capsule 0    ondansetron (ZOFRAN) 4 MG Tab tablet TAKE 1 TABLET BY MOUTH EVERY 8 HOURS AS NEEDED FOR  NAUSEA/VOMITING 90 Tablet 0    gabapentin (NEURONTIN) 300 MG Cap Take 1 capsule by mouth twice daily 180 Capsule 0    celecoxib (CELEBREX) 200 MG Cap Take 1 Capsule by mouth 1 time a day as needed for Moderate Pain. 120 Capsule 0    acetaminophen (TYLENOL) 500 MG Tab Take 2 Tablets by mouth every 8 hours. 120 Tablet 1    cyanocobalamin (VITAMIN B-12) 100 MCG Tab Take 100 mcg by mouth every day.      Omega-3 Fatty Acids (FISH OIL) 1000 MG Cap capsule Take 1,000 mg by mouth every day.      estradiol (ESTRACE) 1 MG Tab Take 1 mg by mouth every day.      progesterone (PROMETRIUM) 100 MG Cap Take 1 Capsule by mouth every day.      fluticasone (FLONASE) 50 MCG/ACT nasal spray Administer 2 Sprays into affected nostril(S) every day. (Patient taking differently: Administer 2 Sprays into affected nostril(S) 1 time a day as needed.) 48 g 3    Magnesium 300 MG Cap Take 1 Cap by mouth every day.      Probiotic Product (PROBIOTIC ADVANCED PO) Take 1 Cap by mouth every day.      Multiple Vitamins-Minerals (MULTIVITAMIN ADULT PO) Take 1 Tablet by mouth every day.      Calcium Citrate-Vitamin D (CALCIUM + D PO) Take 1 Tablet by mouth every day.      ascorbic acid (ASCORBIC ACID) 500 MG Tab Take 500 mg by mouth every day.      Cholecalciferol (VITAMIN D) 2000 UNIT Tab Take 1 Tablet by mouth every day.       No current facility-administered medications for this visit.                                       Varicose vein excision left lower extremity  9/11 varicose veins left excision   3/11/17 ultrasound venous bilateral lower extremities negative     s/p shoulder right arthroscopy  9/27/22 MRI right shoulder, new leading edge partial supraspinatus tear with majority of  tendon intact, new marginal cystic change humeral head may be internal impingement, and likely reactive marrow edema deep to the partial supraspinatus tear, subdeltoid bursitis, subacromial spurring as seen previously  11/12/22 MRI left shoulder per  orthopedics mild tendinosis anterior supraspinatus, no full-thickness rotator cuff tear, subacromial subdeltoid bursitis secondary to subacromial enthesophyte, mild to moderate osteoarthritis AC joint    s/p lumbar surgery  10/10 MRI LS L5-S1 minimal disc bulge  1/11  at the pain management left L3-S1 facet joint injection, right L3-S1 facet joint injection  2/11  pain management right L3-S1 lateral, medial, dorsal ramus nerve block  4/11  pain management left L3-S1 medial, lateral, bursal ramus nerve block  5/11  pain management bilateral SI joint injection under fluoroscopy  7/11  pain note bilat trochanteric bursitis injection  3/12  pain note, right and left L3-L4 L5-S1 medial, dorsal, lateral branch ablation  12/12  pain note, left L3-S1 dorsal and medial branch radiofrequency ablation  1/4/13  pain note; right L3-S1 radiofrequency ablation  8/14/13  pain note  9/29/13  pain note, left L3-S1 nerve frequency ablation  10/4/13  pain note, status post right L3-S1 FJNA  12/19/13 pain note; status post right SIJI injection, continue voltaren gel, TENS, IT stretches  5/16/14  pain note; left L3-S1 medial, partial, lateral branch radiofrequency ablation under fluoroscopy  6/6/14  right L3-S1 FJNA  7/3/14  pain note; continue TENS,celebrex 200 mg qday, voltaren gel 1%  9/10/14  pain note; continue TENS, lidoderm patch,voltaren gel, celebrex 200 mg qday  12/20/17  pain note left L3-S1 radiofrequency ablation under fluoroscopy  12/22/17  pain note  right L3-S1 radiofrequency ablation under fluoroscopy  2/28/19 MRI lumbar spine at [facility] imaging L4-L5 3 mm disc bulge, mild central stenosis, L5-S1 moderate facet hypertrophy, small right-sided sacral meningocele   1/21/21 MRI lumbar spine L4-5 annular disc bulge, borderline spinal stenosis  1/25/21  pain note continue TENS,ztlido patches, lidoderm ointment 5%, pennsaid 2%  2/22/21  pain note continue TENS, ztlido patches, lidoderm ointment 5%, pennsaid 2%, recommend S3S1 FJNA  3/1/21  pain procedure note right L3-S1 medial, dorsal, lateral branch radiofrequency ablation under fluoroscopy  3/8/21 dr.patterson ching procedure note left L3-S1 medial, dorsal, lateral branch radiofrequency ablation under fluoroscopy  4/5/21  pain note recommend right L4-L5 epidural steroid injection  4/19/21  pain procedure note right L4-L5 epidural steroid injection under fluoroscopy  6/3/21 EMG NCS lower extremities per  pain management essentially normal motor and sensory nerve conduction lower extremities bilateral, absent bilateral sural sensory nerve action potential suspicious for mild length dependent peripheral neuropathy, but could be related to body habitus   6/22/21  pain note recommend consider spinal cord stimulator, will refer to pain psychologist  9/27/21  pain note patient has started formal physical therapy program  10/11/21  pain procedure note bilateral L5-S3 dorsal, lateral branch nerve radiofrequency ablation  10/21/21  pain procedure note bilateral L3-S1 medial, dorsal, lateral branch radiofrequency nerve ablation  11/22/21  pain note  12/13/21  pain note  1/26/22 GUTIERREZ note reviewed MRI from last year, will obtain updated lumbar spine x-ray, recommend trial of acupuncture, methocarbamol  1/28/22 x-ray lumbar spine, degenerative disc disease worse at L5-S1 and  L1-L2  1/31/22  pain note   10/17/22  pain note  4/3/23  pain note  4/12/23  pain procedure note bilateral L3-L4 medial branch radiofrequency ablation, L5 dorsal ramus radiofrequency nerve ablation under fluoroscopy  8/3/23 on celebrex as needed, neurontin 300 mg 2 qpm, robaxin as needed  2/14/24 x-ray lumbar spine L4-L5 slight grade 1 anterolisthesis, degenerative facet arthropathy lower lumbar spine  2/14/24 MRI lumbar spine without, L3-L4 mild central canal stenosis, L4-L5 diffuse disc bulge, moderate to severe central canal stenosis with significant progression compared to previous MRI, small synovial cyst, multifocal degenerative disease  5/2/24  GUTIERREZ note reviewed x-ray and MRI lumbar spine done recently, offered redo L4-5 decompressive laminectomy, bilateral foraminotomies, excision left L4-5 synovial cyst, she can consider, otherwise recommend physical therapy  6/3/24  orthopedic operative note L4-L5 laminectomy, bilateral foraminotomy, excision L4-5 synovial cyst  6/4/24 GUTIERREZ note postoperative follow-up, remove dressing this thursday, clear to shower after that, follow-up 2 weeks  7/1/24 GUTIERREZ note status post L4-5 laminectomy with excision of left L4-5 synovial cyst, improvement in pain and leg symptoms, follow-up 2 weeks  7/16/24 GUTIERREZ note status post L4-5 laminectomy, and excision left L4-5 synovial cyst, 6 weeks out from surgery, start physical therapy follow-up 6 weeks  8/27/24 GUTIERREZ note continue physical therapy, increase celebrex to bid follow-up 3 months       s/p hip left arthoplasty  2002 left hip open decompression and osteotomy  12/03  left hip implant removal  11/10  ortho x-ray stable decompression left femoral head neck junction  11/12  pain management note bilateral trochanteric bursitis injection  5/2/13  pain note, bilateral trochanteric bursal injection  12/10/18 MRI left hip  avascular necrosis left femoral head 50% articular surface, with some progression from comparison, some early subchondral collapse anteriorly and superiorly with marrow edema extending into the left femoral head and neck, surgical change consistent with prior decompression procedure and femoral osteoplasty, metallic artifact consistent with surgical pain femoral head, early degenerative changes  8/5/19  orthopedic operative note left total hip arthroplasty  12/12/19  orthopedic note 3-month follow-up left total hip replacement, incision healed nicely, right hip continue to provide discomfort, continue physical therapy follow-up 1 year  5/7/20  orthopedic note x-ray pelvis stable left total hip arthroplasty, early osteoarthritic changes right hip, if worsens consider total joint replacement right side, follow-up 5 years     s/p right hip arthroscopy  2007  ortho right hip arthroscopy  2/11/14  note bilateral greater trochanter injection under ultrasound guidance  1/20/17 MRI pelvis bilateral femoral head avascular necrosis involving approximately 40% of the articular surface, metallic artifact left femoral head consistent with presence of small metallic pain  9/29/17   orthopedic surgical note right hip fluoroscopically guided core decompression and right hand carpal tunnel release  12/10/18 MRI right hip without chronic avascular necrosis right femoral head 50% articular surface without evidence of subchondral collapse, early degenerative changes, surgical changes consistent with prior right proximal femoral osteoplasty  5/7/20  orthopedic note x-ray pelvis stable left total hip arthroplasty, early osteoarthritic changes right hip, if worsens consider total joint replacement right side, follow-up 5 years  8/13/20  orthopedic note, x-ray pelvis grade 3 arthrosis with osteophyte acetabular side and sclerosis femoral side, joint  space narrowing,ficat 2 avascular necrosis right hip progression to osteoarthritis, stable left hip arthroplasty, discussed right hip total arthroplasty  8/24/20  orthopedic operative note right hip arthroplasty  9/3/20  orthopedic note, incision site clean, continue work with physical therapy, follow-up 4 weeks  10/29/20  orthopedic note, 9 weeks postoperative right total hip arthroplasty, x-ray right hip stable arthroplasty without loosening, significant lateral calcification and potential stress response lateral femoral cortex, backed off on standing and walking activities, convert to crutches, follow-up in 3 to 4 weeks  12/1/20  orthopedic note, x-ray LS and right hip, lateral femoral calcification and stress response that is maturing, continue 48-hour workday, follow-up 4 to 5 weeks     s/p bunionectomy  8/9/13  podiatry; right foot martín bunionectomy, right foot first metatarsophalangeal joint bunion/degenerative arthritis      s/p  appendectomy     S/p left ankle surgery 2006 ligament reconstruction     Preventative health  9/25/15 tdap  1/6/17 pneumovax  8/18/20 pap per  gyn  10/5/21 colon per [facility] patchy erythema and congestion, abnormal vascularity ascending colon and rectum, biopsies negative  10/19/21 hep b ab 10.9 (8.4 to 11.5 indeterminate)  11/12/21 dexa LS+1.2,forearm+1.4  11/18/22 mammogram    4/27/23 hep b third   7/10/23 vit d 70  7/10/23 A1c 5.1%     Perimenopausal  9/25/15 on climara patch and progesterone tab per  gyn  8/26/22 sees  gyn estradiol 1 mg and progesterone 100 mg     neck pain  10/11 MRI cervical spine C4-C5 tiny disc bulge, C5-C6 bilateral and plate spurring with uncinate hypertrophy  10/7/16 MRI cervical spine C5-C6 small broad-based osteophyte complex, borderline foraminal stenosis, no significant progression of disease since 2010 6/4/18 MRI cervical spine C3-C4 mild diffuse bulge with mild bilateral  foraminal stenosis, C5-C6 mild bilateral foraminal stenosis  5/8/18 x-ray cervical spine minimal DJD C5-C6, calcification left neck may be related to atherosclerotic plaque  5/10/21  pain note recommend repeat bilateral C3-C4 FJNA  6/9/21 dr.patterson ching procedure note right third occipital nerve frequency ablation, right C3-C4 medial branch nerve radiofrequency ablation  6/16/21 dr.patterson ching procedure note left third occipital nerve frequency ablation, right C3-C4 medial branch nerve radiofrequency ablation  2/21/22 dr.patterson ching procedure note right third occipital nerve, right C3 and C4 medial branch radiofrequency ablation under fluoroscopy  2/28/22 dr.patterson ching procedure note left third occipital nerve, left C3-C4 medial branch nerve radiofrequency ablation under fluoroscopy  4/19/22 dr.patterson ching procedure note bilateral C5-C7 medial branch nerve block under fluoroscopy  6/9/22 dr.patterson ching procedure note bilateral C5-C7 medial branch nerve radiofrequency ablation under fluoroscopy     ibs  8/05 CT abd/pelvis negative  11/05 [facility] colon negative  10/5/21 EGD per [facility] esophagus single ulcer GE junction, multiple patches ectopic gastric mucosa, hiatal hernia, stomach mucosal patchy erythema, biopsies performed pathology moderate inflammation, negative h.pylori, biopsies duodenum mild inflammation surface changes suggestive of celiac disease, take prilosec 40 mg daily, carafate 1 g 4 times daily x14 days  10/5/21 colon per [facility] patchy mucosal and abnormal vascularity, biopsies performed, pathology negative, single flat benign polyp  10/15/21 [facility] note iron deficiency anemia improving with iron supplementation, recommend gluten-free diet, will obtain baseline TTG, noted to have gastroesophageal ulcer which could be secondary to reflux esophagitis versus pill esophagitis, has been on carafate qid and omeprazole 40 mg daily, since the ulcer was small no surveillance EGD is indicated,  continue omeprazole, discontinue carafate, avoid NSAIDs, chronic ALT elevation will obtain serology, right upper quadrant ultrasound, repeat colonoscopy 5 years  2/18/22 [facility] note celiac sprue presenting with iron deficiency, continue ferrous sulfate for another month, she notes improvement with gluten-free diet and her TTG antibodies normal, recommend continue gluten-free diet, repeat cbc,cmp and iron panel in 3 months; esophagitis with GE junction ulcer which could be secondary to reflux versus pill esophagitis, continues on prilosec 40 mg qday and surveillance EGD is not indicated, recommend minimize NSAID use (celebrex is fine), consider changing alendronate to a different formulation   4/27/23 dyspepsia, increase prilosec to 40 mg, labs ordered, ultrasound right upper quadrant, continue celebrex for now  5/1/23 ultrasound right upper quadrant mild hepatomegaly otherwise negative, no evidence of cholelithiasis, visualized pancreas unremarkable  6/6/23 [facility] note increased symptoms nausea, vomiting, epigastric pain, proceed with EGD, continue omeprazole 40 mg and trial of weaning off celebrexm also labs celiac panel,crp  7/13/23 EGD per [facility] esophagus localized irregularity Z-line and GE junction, biopsies performed, normal mucosa esophagus, normal mucosa stomach, normal mucosa duodenum, biopsies performed  9/27/23 [facility] note continue omeprazole 40 mg daily, trial of famotidine 20 mg bid before meals, if has increased nausea and fullness then consider upper GI series, follow-up 1 year     History of hypothyroid  9/26/19 tsh 5.8 repeat labs 3 months test ordered  1/23/20 tsh 2.0, TPO<0.2  5/28/21 tsh 5.3 will monitor  6/8/21 started on synthroid 25 mcg by  bariatric medicine  8/13/21 tsh 2.3 on synthroid 25 mcg started by  bariatric medicine  10/19/21 tsh 1.9 on synthroid 25 mcg started by  bariatric medicine  1/21/22 tsh 2.6 on synthroid 25 mcg     History of rheumatic  fever  8/29/16 streptococcal pharyngitis positive strep test, treated with augmentin, developed erythema nodosum lower extremities and palms given NSAIDs and medrol dosepack  9/22/16 repeat medrol dose pack x 1 still with painful erythema nodosum nodules  9/24/16 erythema nodosum nodules and arthritic-type pains, we will retreat with penicillin V 500 mg 3 times a day ×10 days  10/5/16 high dose aspirin no benefit, changed to prednisone 20 mg daily, she has an appointment with me in 2 days  10/7/16 echo ordered, EKG done  10/10/16 cbc,cmp,tsh normal,ESR 40,CRP 0.3, repeat throat culture negative, blood cultures negative,  10/19/16 increase prednisone to 40 mg x 3 days then back to 20 mg  10/19/16 daughter diagnosis strep throat given antibiotics, we will treat prophylactically provided patient penicillin V 500 mg tid x 10 days  10/21/16 echo normal LV size and function, EF 65%, trace MR structurally normal mitral valve, mild TR and RVSP 30  10/24/16 on prednisone 40 mg qday unable to taper to 20 mg due to flare up of pain will try 20 mg bid then taper to 20 mg am and 10 mg qpm over the next week  10/26/16  infectious disease recommended secondary prophylaxis penicillin  mg bid x 5 years  11/7/16 still with polyarthritis, on prednisone 20 mg twice a day, repeat labs, still on penicillin, will speak with rheumatology about referral  11/8/16 ESR 15,CRP 0.3,wbc 10.9 (on prednisone),hgb 14,hct 43,cmp normal,RF,C3C4,TPO,lyme,MERA,HLA B27 negative, Factin IgG 22 (0-19),parietal cell Ab 25(0-25)  11/14/16  rheumatolog note, features consistent with rheumatic fever, also consider seronegative spondyloarthropathies, sarcoidosis, rheumatoid arthritis, will check sacroiliac films, hand and feet x-rays, follow-up one week  11/17/16 refill prednisone  11/18/16 ACE serum level normal, G6PD ad vitamin 1,25 d level normal  11/21/16 cxr negative  11/21/16 xray SI joints negative for erosions  11/29/16  x-ray joint survey hands, feet, ankles no evidence of inflammatory arthropathy  11/29/16 CT soft tissue neck without; negative  12/7/16  cardiology repeat echo in 3-4 weeks  12/10/16 MRI right knee multiple areas right knee avascular necrosis medial and lateral femoral condyle, medial and lateral tibial plateau, bone marrow edema  12/23/16  rheumatology note, inflammatory markers did normalize with steroids, rheumatoid factor, CCP, MERA,ANKA negative continue to taper steroids given osteonecrosis alternating 17.5 mg and 15 mg, and 15 mg, and 15 mg alternating with 12.5 mg, and so forth  12/27/16 echo LV ejection fraction 60%, no valvular disease  1/4/17 ESR 8,CRP 0.3  1/12/17  rheumatology note, inflammatory markers did normalize with prednisone therapy, continue taper with osteonecrosis bilateral tenderness achilles insertion consider enthesitis, will get MRI left achilles region to evaluate for tendinitis or tenosynovitis, follow-up 6 weeks  1/20/17 MRI left foot without; no evidence of marrow edema, arthropathy, tendinopathy or ligament injury  3/6/17 anticardiolipin antibodies, lupus anticoagulant, protein CNS, factor V 5 Leyden, anti-thrombin panel, beta 2 glycoprotein negative  3/10/17 ESR 19,CRP 0.17, cortisol 3.3, ACTH 16, IgG 753 ([id number]), IgA 140, IgM 93  3/14/16  rheumatology note currently off prednisone, osteonecrosis involving multiple joints, knees, hips, right ankle, etiology unclear, antiphospholipid antibody, protein C and S normal, antithrombin III factor V leyden normal, refer to hematology for evaluation other etiologies, slightly decreased IgG refer to allergy immunology  3/14/17 remains on penicillin  3/30/17  allergy immunology evaluation slight IgG reduction 753 with normal IgG, IgM, no history to suggest primary immunodeficiency, recent diagnosis of group A streptococcal pharyngitis complicated by erythema nodosum, 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 related  to prednisone therapy, we will perform humerol workup to include repeat quantitative immunoglobulins with subclass, specific antibodies for haemophilus influenza, pneumococcal, tetanus/diphtheria, limited flow cytometry with repeat CBC, SPEP/UPEP, ESR, CRP, UA, follow-up ASO, DNase B titer  4/17/17  allergy note slight IgG reduction at 753 with normal IgA, normal IgM, no history to suggest primary immunodeficiency, suspect that 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 carryover effect from chronic prednisone therapy, cbc unremarkable, inflammatory markers ESR slightly elevated 28, CRP 2.4, urinalysis negative for protein or blood, no further humeral immunodeficiency or lab assessment at this point with normal immunologic titers, intact specific and subclass antibodies, the IgG decrease does not represent any underlying primary immunodeficiency or active antibody dysfunction at this time  4/25/17  rheumatology note await [facility] evaluation  5/10/17 dr.wu lacy endocrinology note recommend fosamax weekly with anecdotal evidence that bisphosphonate therapy may provide symptomatic benefit and osteonecrosis, if develops side effects could consider intravenous reclast, referral [facility], follow-up 4 months  5/28/17  rheumatology note avascular necrosis, arthralgias lower extremities, continue off prednisone, on alendronate per [facility]  7/3/17  infectious disease consultation history of streptococcal pharyngitis with probable rheumatic fever, recommend discontinue penicillin prophylaxis follow-up as needed  6/27/17  [facility] evaluation, recommended evaluation of hypercoagulable state, to consider antiphospholipid antibodies (lupus anticoagulant, anticardiolipin antibodies, beta-2 glycoprotein, phosphatidyl serine antibodies, factor V Leiden, antithrombin III, prothrombin mutation, protein C&S quantitative and qualitative,  homocysteine, recommend after review of records do not strongly feel that she has true rheumatic fever, clear to discontinue penicillin, recheck ASO and anti-DNase B titers after 4 weeks  8/16/17 homocystine, protein C and protein S, lupus anticoagulant, beta 2 glycoprotein, antithrombin III, anticardiolipin antibody, anti-DNAse antibody, antistreptolysin all negative  8/31/17  rheumatology note, continues on alendronate per [facility] recommendation [facility], did follow up with orthopedics, will proceed with core decompression  2/23/18  [facility] note, multifocal osteonecrosis knees, hips, other joints, underlying etiology unclear, trial few months of alendronate without dramatic improvement, alendronate discontinued because of undergoing core procedures with her hips, recommend continuing off alendronate monitoring for improvement, reassess in 6 months  10/10/18  [facility] consultation osteonecrosis knees, hips, other joints, unclear etiology trial of alendronate without improvement, discontinued prior to hip procedures, hip pain is improved, discussed another trial of alendronate for limited timeframe 70 mg weekly reassess 6 months   8/25/21  [facility] note continue alendronate for 6 months, follow-up 6 months     History of peptic ulcer  11/2/18 seen UC for abdominal discomfort right upper quadrant  11/3/18 ultrasound liver gallbladder region negative  11/10/18 hida negative  11/20/18 trial of prilosec 40 mg  6/17/19 taper prilosec  10/5/21 EGD per [facility] esophagus single ulcer GE junction, multiple patches ectopic gastric mucosa, hiatal hernia, stomach mucosal patchy erythema, biopsies performed pathology moderate inflammation, negative h.pylori, biopsies duodenum mild inflammation surface changes suggestive of celiac disease, take prilosec 40 mg daily, carafate 1 g 4 times daily x14 days  10/5/21 EGD per [facility] esophagus single ulcer GE  junction, multiple patches ectopic gastric mucosa, hiatal hernia, stomach mucosal patchy erythema, biopsies performed pathology moderate inflammation, negative h.pylori, biopsies duodenum mild inflammation surface changes suggestive of celiac disease, take prilosec 40 mg daily, carafate 1 g 4 times daily x14 days  10/15/21 [facility] note iron deficiency anemia improving with iron supplementation, recommend gluten-free diet, will obtain baseline TTG, noted to have gastroesophageal ulcer which could be secondary to reflux esophagitis versus pill esophagitis, has been on carafate qid and omeprazole 40 mg daily, since the ulcer was small no surveillance EGD is indicated, continue omeprazole, discontinue carafate, avoid NSAIDs, chronic ALT elevation will obtain serology, right upper quadrant ultrasound, repeat colonoscopy 5 years  2/18/22 [facility] note celiac sprue presenting with iron deficiency, continue ferrous sulfate for another month, she notes improvement with gluten-free diet and her TTG antibodies normal, recommend continue gluten-free diet, repeat cbc,cmp and iron panel in 3 months; esophagitis with GE junction ulcer which could be secondary to reflux versus pill esophagitis, continues on prilosec 40 mg qday and surveillance EGD is not indicated, recommend minimize NSAID use (celebrex is fine), consider changing alendronate to a different formulation   3/23/22 dr.wu lacy endocrinology note recently diagnosed with gastric ulcer and celiac disease, given GI side effects of alendronate, risks of continuing oral bisphosphonate therapy outweighs any potential benefit and will discontinue alendronate  7/8/22 b12 978,vit d 117,magnesium 2.1 on prilosec  4/27/23 dyspepsia, increase prilosec to 40 mg, labs ordered, ultrasound right upper quadrant, continue celebrex for now  5/1/23 ultrasound right upper quadrant mild hepatomegaly otherwise negative, no evidence of cholelithiasis, visualized pancreas unremarkable  6/6/23 [facility]  note increased symptoms nausea, vomiting, epigastric pain, proceed with EGD, continue omeprazole 40 mg and trial of weaning off celebrexm also labs celiac panel,crp  7/10/23 b12 662,vit d 70,magnesium 2.0 on prilosec  7/13/23 EGD per [facility] esophagus localized irregularity Z-line and GE junction, biopsies performed, normal mucosa esophagus, normal mucosa stomach, normal mucosa duodenum, biopsies performed  7/20/23 GI letter to patient await EGD biopsies, continue prilosec 40 mg  9/27/23 [facility] note continue omeprazole 40 mg daily, trial of famotidine 20 mg bid before meals, if has increased nausea and fullness then consider upper GI series, follow-up 1 year     history hypothyroid  9/26/19 tsh 5.8 repeat labs 3 months test ordered  1/23/20 tsh 2.0, TPO<0.2  5/28/21 tsh 5.3 will monitor  6/8/21 started on synthroid 25 mcg by  bariatric medicine  8/13/21 tsh 2.3 on synthroid 25 mcg started by  bariatric medicine  10/19/21 tsh 1.9 on synthroid 25 mcg started by  bariatric medicine  1/21/22 tsh 2.6 on synthroid 25 mcg  8/26/22 stop synthroid repeat labs 6 weeks  10/7/22 tsh 2.0 off synthroid  7/10/23 tsh 2.1 off meds     history covid  3/21/21 covid rachel one shot  12/29/21 tested positive covid      history anemia  7/29/19 hgb 13.2,hct 42.2  9/27/19 hgb 11.8,hct 37.7  8/29/20 hgb 11.7,hct 37.9,mcv 88.6  5/28/21 hgb 10,hct 33.5,mcv 78.6  6/12/21 hgb 10.2,hct 35.5,mcv 79,iron 27,%sat 5,TIBC 553,ferritin 10.5,,retic 1.8%, b12 1153, folate 12.7,SPEP negative  6/14/21 start ferrous sulfate every other day, continue vitamin C orally, call GI consultants to schedule appointment, repeat labs 4 weeks ordered  7/16/21 hgb 11.3,hct 37.1,mcv 85.5,iron 52,%sat 12,ferritin 22.6   7/20/21 [facility] note schedule EGD and colonoscopy for iron deficiency anemia  10/5/21 EGD per [facility] single ulcer gastroesophageal junction, multiple patches of gastric ectopic mucosa upper 3rd esophagus, hiatal  hernia, patchy erythema stomach, erosions, ulceration, biopsies performed pathology moderate inflammation, negative h.pylori, biopsies duodenum mild inflammation surface changes suggestive of celiac disease  10/5/21 colon per [facility] patchy mucosal and abnormal vascularity, biopsies performed, pathology negative, single flat benign polyp  10/15/21 [facility] note iron deficiency anemia improving with iron supplementation, recommend gluten-free diet, will obtain baseline TTG, noted to have gastroesophageal ulcer which could be secondary to reflux esophagitis versus pill esophagitis, has been on carafate qid and omeprazole 40 mg daily, since the ulcer was small no surveillance EGD is indicated, continue omeprazole, discontinue Carafate, avoid NSAIDs, chronic ALT elevation will obtain serology, right upper quadrant ultrasound, repeat colonoscopy 5 years  10/19/21 AST 45,ALT 46, hep b ab positive, hep b core ab negative,hep b sAg negative, hep a ab negative, hep c ab negative,  F-actin IgG 21 (0-19), ceruloplasmin normal, copper normal, INR 0.9,vit k 0.84, vit c 83, vit a 1.3, vit e (alpha-tocopherol) 18.5, MERA 1:320 speckled, MERA HIp-2 IgG<1:80,antiDS DNA SSA, SSB, anti-SCL 70, Keli-1 antibody, smith antibody all negative  1/21/22 hgb 13.8,hct 42,iron 63,%sat 15,ferritin 70  2/18/22 [facility] note celiac sprue presenting with iron deficiency, continue ferrous sulfate for another month, she notes improvement with gluten-free diet and her TTG antibodies normal, recommend continue gluten-free diet, repeat cbc,cmp and iron panel in 3 months; esophagitis with GE junction ulcer which could be secondary to reflux versus pill esophagitis, continues on prilosec 40 mg qday and surveillance EGD is not indicated, recommend minimize NSAID use (celebrex is fine), consider changing alendronate to a different formulation   3/23/22 dr.wu lacy endocrinology note recently diagnosed with gastric ulcer and celiac disease, given GI side effects of  alendronate, risks of continuing oral bisphosphonate therapy outweighs any potential benefit and will discontinue alendronate  7/8/22 hgb 13.5,hct 40,iron 89,%sat 21,ferritin 89  4/27/23 dyspepsia, increase prilosec to 40 mg, labs ordered, ultrasound right upper quadrant, continue celebrex for now  6/6/23 [facility] note increased symptoms nausea, vomiting, epigastric pain, proceed with EGD, continue omeprazole 40 mg and trial of weaning off celebrexm also labs celiac panel,crp  7/10/23 hgb 12.9,hct 39,mcv 98,iron 146,ferritin 113,b12 662  7/13/23 EGD per [facility] esophagus localized irregularity Z-line and GE junction, biopsies performed, normal mucosa esophagus, normal mucosa stomach, normal mucosa duodenum, biopsies performed     History abnormal liver function test  10/15/21 [facility] note iron deficiency anemia improving with iron supplementation, recommend gluten-free diet, will obtain baseline TTG, noted to have gastroesophageal ulcer which could be secondary to reflux esophagitis versus pill esophagitis, has been on carafate qid and omeprazole 40 mg daily, since the ulcer was small no surveillance EGD is indicated, continue omeprazole, discontinue carafate, avoid NSAIDs, chronic ALT elevation will obtain serology, right upper quadrant ultrasound, repeat colonoscopy 5 years  10/19/21 zinc 461  10/19/21 IgG 873, IgM 112, IgA 201, IgE 18  10/19/21 celiac panel negative  10/19/21 AST 45,ALT 46, hep b ab positive, hep b core ab negative,hep b sAg negative, hep a ab negative, hep c ab negative,  F-actin IgG 21 (0-19), ceruloplasmin normal, copper normal, INR 0.9,vit k 0.84, vit c 83, vit a 1.3, vit e (alpha-tocopherol) 18.5, MERA 1:320 speckled, MERA HIp-2 IgG<1:80,antiDS DNA SSA, SSB, anti-SCL 70, Keli-1 antibody, smith antibody all negative  10/25/21 ultrasound abdomen mildly echogenic liver   1/21/22 AST 60,ALT 88,alk phos 94,bili 0.3  2/18/22 [facility] note NAFLD with chronic ALT elevation predominant hepatocellular pattern without  evidence of cirrhosis, ultrasound demonstrated hepatic steatosis, celiac disease may be contributing, repeat labs 3 months, screening colonoscopy 5 years  7/8/22 AST 23,ALT 25   10/7/22 AST 28,ALT 31  5/1/23 ultrasound right upper quadrant mild hepatomegaly otherwise negative, no evidence of cholelithiasis, visualized pancreas unremarkable  7/10/23 AST 29,ALT 23     foot pain  8/5/15 MRI right foot severe artifact secondary to first MTP are clear, limiting analysis of soft tissue, highly likely complete FHL tear  8/28/15  orthopedic note right ankle FHL tendon rupture seen on MRI, do not recommend surgical repair at this time which would involve hemiarthroplasty, implant, first MTP fusion with bone graft, followup podiatry   8/18/17 MRI left foot normal, MRI right foot osteoathritis right first metatarsal head and sesamoids moderate in degree, ulnar edema with an sesamoids may be due to susceptibility artifact related to first metatarsal phalangeal arthroplasty    Elevated blood pressure  3/30/23 echo normal LV function, normal left ventricular size and thickness, EF 65%, normal wall motion, normal diastolic function, normal aortic root  5/24/24 EKG sinus     Dyslipidemia  9/29/14 chol 186,trig 46,hdl 76,ldl 101  9/18/15 chol 225,trig 50,hdl 77,ldl 138  9/20/17 chol 189,trig 53,hdl 56,ldl 122  5/11/18 ultrasound carotid minimal left carotid bulb plaque, calcified, no evidence of occlusion less than 50% stenosis  9/28/18 chol 187,trig 59,hdl 76,ldl 99  5/8/18 EKG done in clinic, ultrasound carotid ordered follow-up calcifications   5/11/18 ultrasound carotid less than 50% internal stenosis bilateral  9/27/19 chol 196,trig 60,hdl 79,ldl 105  5/28/21 chol 201,trig 58,hdl 70,ldl 119  1/21/22 chol 250,trig 93,hdl 69,ldl 162;10 year risk 1.4%  7/8/22 chol 199,trig 77,hdl 84,ldl 100 on new diet  7/10/23 chol 166,trig 121,hdl 65,ldl 77     chronic pain  5/8/18 pain contract  5/8/18 opiate risk score  1  5/8/18 on ultram 1-2 per day has tried physical therapy,TENS,acupuncture,chiropractor,massage therapy,heat,physical therapy, NSAIDs,gabapentin,celebrex,voltaren,lidoderm patch  6/17/19 controlled substance contract  9/9/19  on ultram one per evening  6/11/20 ultram refill  7/17/21 ultram refill  1/18/22 ultram refill #90  1/18/22 controlled substance contract  8/25/22 off ultram just on celebrex prn   4/27/23 ultram refill  4/27/23 contract  tried physical therapy,TENS,acupuncture,chiropractor,massage therapy,heat, NSAIDs,gabapentin,celebrex,voltaren,lidoderm patch     Celiac   10/5/21 EGD per [facility] biopsies duodenum mild inflammation surface changes suggestive of celiac disease  10/5/21 colon per [facility] patchy erythema and congestion, abnormal vascularity ascending colon and rectum, biopsies negative  10/19/21 t-TG IgA negative  1/21/22 t-TG IgA negative  2/18/22 [facility] note celiac sprue presenting with iron deficiency, continue ferrous sulfate for another month, she notes improvement with gluten-free diet and her TTG antibodies normal, recommend continue gluten-free diet, repeat cbc,cmp and iron panel in 3 months; esophagitis with GE junction ulcer which could be secondary to reflux versus pill esophagitis, continues on prilosec 40 mg qday and surveillance EGD is not indicated, recommend minimize NSAID use (celebrex is fine), consider changing alendronate to a different formulation, NAFLD with chronic ALT elevation predominant hepatocellular pattern without evidence of cirrhosis, ultrasound demonstrated hepatic steatosis, celiac disease may be contributing, repeat labs 3 months, screening colonoscopy 5 years  6/6/23 [facility] note increased symptoms nausea, vomiting, epigastric pain, proceed with EGD, continue omeprazole 40 mg and trial of weaning off celebrexm also labs celiac panel,crp  7/10/23 celiac panel negative,CRP 0.5  9/27/23 [facility] note continue omeprazole 40 mg daily, trial of famotidine 20 mg bid before meals, if  has increased nausea and fullness then consider upper GI series, follow-up 1 year     carpal tunnel  2/15/22  pain procedure note left median nerve ultrasound-guided steroid injection  10/13/22  pain procedure note EMG nerve conduction study mild bilateral sensory median neuropathy     avascular necrosis  8/29/16 streptococcal pharyngitis positive strep test, treated with augmentin, developed erythema nodosum lower extremities and palms given NSAIDs and medrol dosepack  9/22/16 repeat medrol dose pack x 1 still with painful erythema nodosum nodules  10/5/16 high dose aspirin no benefit, changed to prednisone 20 mg daily,  10/19/16 increase prednisone to 40 mg x 3 days then back to 20 mg  11/7/16 still with polyarthritis, on prednisone 20 mg twice a day, repeat labs, still on penicillin, will speak with rheumatology about referral  11/14/16  rheumatolog note, features consistent with rheumatic fever, also consider seronegative spondyloarthropathies, sarcoidosis, rheumatoid arthritis, will check sacroiliac films, hand and feet x-rays, follow-up one week  12/23/16  rheumatology note, inflammatory markers did normalize with steroids, rheumatoid factor, CCP, MERA,ANKA negative continue to taper steroids given osteonecrosis alternating 17.5 mg and 15 mg, and 15 mg, and 15 mg alternating with 12.5 mg, and so forth  12/10/16 MRI right knee multiple areas right knee avascular necrosis medial and lateral femoral condyle, medial and lateral tibial plateau, bone marrow edema  12/23/16  rheumatology note, inflammatory markers did normalize with steroids, rheumatoid factor, CCP, MERA,ANKA negative continue to taper steroids given osteonecrosis alternating 17.5 mg and 15 mg, and 15 mg, and 15 mg alternating with 12.5 mg, and so forth  1/20/17 MRI left foot no evidence of arthropathy or tendinopathy  1/20/17 MRI left ankle metallic artifact lateral malleolus  1/20/17 MRI right ankle small  oblong focus finger edema tibial plafond below subchondral plate, would be consistent with small area of avascular necrosis  1/20/17 MRI left knee multiple areas avascular necrosis surrounding the knee to include femur, tibia, fibula  1/20/17 MRI pelvis bilateral femoral head avascular necrosis involving approximately 40% of the articular surface, metallic artifact left femoral head consistent with presence of small metallic pin  2/9/17 tapering prednisone down to 2.5 mg alternating with 5 mg  2/23/17  rheumatology note complete steroid taper then repeat ESR, CRP and CPK, also check antiphospholipid antibody,antithrombin, factor v leiden, protein s  3/6/17 anticardiolipin antibodies, lupus anticoagulant, protein CNS, factor V 5 Leyden, anti-thrombin panel, beta 2 glycoprotein negative  3/10/17 ESR 19,CRP 0.17, cortisol 3.3, ACTH 16, IgG 753 ([id number]), IgA 140, IgM 93  3/14/16  rheumatology note currently off prednisone, osteonecrosis involving multiple joints, knees, hips, right ankle, etiology unclear, antiphospholipid antibody, protein C and S normal, antithrombin III factor V leyden normal, refer to hematology for evaluation other etiologies, slightly decreased IgG refer to allergy immunology  3/30/17  allergy immunology evaluation slight IgG reduction 753 with normal IgG, IgM, no history to suggest primary immunodeficiency, recent diagnosis of group A streptococcal pharyngitis complicated by erythema nodosum, 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 therapy, we will perform humerol workup to include repeat quantitative immunoglobulins with subclass, specific antibodies for haemophilus influenza, pneumococcal, tetanus/diphtheria, limited flow cytometry with repeat CBC, SPEP/UPEP, ESR, CRP, UA, follow-up ASO, DNase B titer  5/10/17 dr.wu lacy endocrinology note recommend fosamax weekly with anecdotal evidence that bisphosphonate therapy may provide symptomatic benefit and  osteonecrosis, if develops side effects could consider intravenous reclast, referral [place] [facility], follow-up 4 months  5/28/17  rheumatology note avascular necrosis, arthralgias lower extremities, continue off prednisone, on alendronate per [place] [facility]  7/7/17 MRI left knee without; again seen multiple bone infarcts femur, tibia, fibula, patella appeared less prominent on current examination  7/7/17 MRI right knee without; interval improvement in size of multiple bony infarcts involving femur, tibia, fibula, some infarcts have resolved  7/7/17 MRI pelvis without; stable bilateral femoral head avascular necrosis, these have not progressed and there is no evidence of subchondral collapse or arthropathy  6/27/17  [place] [facility] evaluation, recommended evaluation of hypercoagulable state, to consider antiphospholipid antibodies (lupus anticoagulant, anticardiolipin antibodies, beta-2 glycoprotein, phosphatidyl serine antibodies, factor V Leiden, antithrombin III, prothrombin mutation, protein C&S quantitative and qualitative, homocysteine, recommend after review of records do not strongly feel that she has true rheumatic fever, clear to discontinue penicillin, recheck ASO and anti-DNase B titers after 4 weeks  8/16/17 homocystine, protein C and protein S, lupus anticoagulant, beta 2 glycoprotein, antithrombin III, anticardiolipin antibody, anti-DNAse antibody, antistreptolysin all negative  8/18/17 MRI left foot normal, MRI right foot osteoathritis right first metatarsal head and sesamoids moderate in degree, ulnar edema with an sesamoids may be due to susceptibility artifact related to first metatarsal phalangeal arthroplasty  8/31/17  rheumatology note, continues on alendronate per bone endocrinology recommendation [place], did follow up with orthopedics, will proceed with core decompression  9/27/17  [place] [facility] with focal osteonecrosis  involving knees, hips, other joints, etiology unclear, dramatic improvement with a few months of alendronate, about to undergo surgical intervention hip, will discontinue alendronate pending surgery, reassess 4-5 months  9/29/17   orthopedic surgical note right hip fluoroscopically guided core decompression and right hand carpal tunnel release  12/29/17  orthopedic surgical note  left proximal femoral head core decompression  2/23/18  [facility] note, multifocal osteonecrosis knees, hips, other joints, underlying etiology unclear, trial few months of alendronate without dramatic improvement, alendronate discontinued because of undergoing core procedures with her hips, recommend continuing off alendronate monitoring for improvement, reassess in 6 months  10/10/18  [facility] consultation osteonecrosis knees, hips, other joints, unclear etiology trial of alendronate without improvement, discontinued prior to hip procedures, hip pain is improved, discussed another trial of alendronate for limited timeframe 70 mg weekly reassess 6 months  12/7/18 crp 0.46, ESR 23, hgb 13,hct 42, wbc 7.9  12/10/18 MRI left hip avascular necrosis left femoral head 50% articular surface, with some progression from comparison, some early subchondral collapse anteriorly and superiorly with marrow edema extending into the left femoral head and neck, surgical change consistent with prior decompression procedure and femoral osteoplasty, metallic artifact consistent with surgical pain femoral head, early degenerative changes  12/10/18 MRI right hip without chronic avascular necrosis right femoral head 50% articular surface without evidence of subchondral collapse, early degenerative changes, surgical changes consistent with prior right proximal femoral osteoplasty  4/24/19  [facility] order bone density if she does have osteoporosis consider continuation  bisphosphonate versus anabolic therapy  5/2/19  orthopedic note undergo MRI evaluation of her left hip  6/10/19 MRI left hip at [facility], redemonstration moderate to advanced avascular necrosis bilateral femoral heads, degree of associated bone marrow edema appears significantly decreased from previous study particular on the left, small left hip effusion, postoperative changes remote core decompression and femoral osteotomy  6/27/19  orthopedic note follow-up evaluation advanced degenerative arthritis left hip, symptomatic failing conservative measures, considering total joint arthroplasty, recommend proceeding with left total hip arthroplasty due to progression of avascular necrosis  8/5/19  orthopedic operative note left total hip arthroplasty  9/9/19 off fosamax for now due to recent hip arthroplasty  10/9/19 dr.wu white endocrinology notes most recent bone density reviewed, remains off alendronate since left hip replacement, clear to resume alendronate 70 mg weekly follow-up 6 months  12/10/19 on fosamax 70 mg 5/7/20  orthopedic note x-ray pelvis stable left total hip arthroplasty, early osteoarthritic changes right hip, if worsens consider total joint replacement right side, follow-up 5 years  8/19/20 dr.wu white endocrinology note multifocal osteonecrosis, underlying etiology unclear, initially experienced dramatic improvement with alendronate however recurrent symptoms despite alendronate therapy, planning to undergo right hip replacement and will resume alendronate after surgery  12/17/20 back on alendronate 70 mg weekly  2/2/21 dr.wu white endocrinology note, continue alendronate 70 mg weekly  8/25/21 dr.wu white endocrinology note she had a few months of alendronate without significant improvement in symptoms, bone density test is normal, will continue alendronate until after spine surgery to see if knee pain resolves, if pain continues after  surgery then it is likely intrinsic to the knee and electrolyte is not helping, follow-up 6 months  3/23/22 dr.wu lacy endocrinology note recently diagnosed with gastric ulcer and celiac disease, given GI side effects of alendronate, risks of continuing oral bisphosphonate therapy outweighs any potential benefit and will discontinue alendronate     allergic rhinitis  Tried otc claritin  9/12/13 flonase trial  9/25/15 flonase and zyrtec or claritin    Patient Active Problem List   Diagnosis    S/P appendectomy    S/p ankle left surgery    S/p hip right arthroplasty     S/p lumbar surgery    Neck pain    IBS (irritable bowel syndrome)    Preventative health care    Varicose vein of leg    S/P bunionectomy    Allergic rhinitis due to animal hair and dander    Chronic pain    Dyslipidemia    Perimenopausal    Foot pain, right    History of rheumatic fever    Avascular necrosis (HCC)    Carpal tunnel syndrome    History of peptic ulcer    S/p hip left arthroplasty    History of hypothyroidism    History of anemia    History of abnormal liver function test    History of COVID-19    Celiac disease    S/p shoulder right arthroscopy             Health Maintenance Summary            Overdue - Zoster (Shingles) Vaccines (1 of 2) Never done      No completion history exists for this topic.              Overdue - Cervical Cancer Screening (Every 3 Years) Overdue since 8/18/2023 08/18/2020  Done -  gyn    06/18/2020  PAP IG (IMAGE GUIDED)    12/18/2017  Done - [facility]    10/07/2015  PAP IG (IMAGE GUIDED)              Overdue - COVID-19 Vaccine (2 - 2024-25 season) Overdue since 9/1/2024 03/21/2021  Imm Admin: [employer] SARS-CoV-2 Vaccine              Overdue - Mammogram (Every 2 Years) Overdue since 11/17/2024 11/17/2022  MA-SCREENING MAMMO BILAT W/TOMOSYNTHESIS W/CAD    11/12/2021  MA-SCREENING MAMMO BILAT W/TOMOSYNTHESIS W/CAD    08/14/2020  MA-SCREENING MAMMO BILAT W/TOMOSYNTHESIS  W/CAD    12/24/2018  MA-SCREENING MAMMO BILAT W/TOMOSYNTHESIS W/CAD    12/06/2017  MA-MAMMO SCREENING BILAT W/JAZZ W/CAD    Only the first 5 history entries have been loaded, but more history exists.              Colorectal Cancer Screening (Colonoscopy - Every 5 Years) Next due on 10/7/2026      10/07/2021  REFERRAL TO GI FOR COLONOSCOPY    10/07/2021  REFERRAL TO GI FOR COLONOSCOPY    10/05/2021  REFERRAL TO GI FOR COLONOSCOPY              IMM DTaP/Tdap/Td Vaccine (3 - Td or Tdap) Next due on 9/23/2028 09/23/2018  Imm Admin: Tdap Vaccine    09/25/2015  Imm Admin: Tdap Vaccine              Pneumococcal Vaccine: 0-64 Years (Series Information) Aged Out      01/06/2017  Imm Admin: Pneumococcal polysaccharide vaccine (PPSV-23)              Hepatitis A Vaccine (Hep A) (Series Information) Aged Out      03/18/2022  Imm Admin: Hepatitis A Vaccine, Adult              Hepatitis B Vaccine (Hep B) (Series Information) Completed      04/27/2023  Imm Admin: Hepatitis B Vaccine (Adol/Adult)    06/03/2022  Imm Admin: Hepatitis B Vaccine (Adol/Adult)    03/18/2022  Imm Admin: Hepatitis B Vaccine (Adol/Adult)              Hepatitis C Screening  Completed      02/14/2024  Done    10/19/2021  Hepatitis C Antibody component of HEP C VIRUS ANTIBODY    12/01/2016  Hepatitis C Antibody component of HEP C VIRUS ANTIBODY              HPV Vaccines (Series Information) Aged Out      No completion history exists for this topic.              Polio Vaccine (Inactivated Polio) (Series Information) Aged Out      No completion history exists for this topic.              Meningococcal Immunization (Series Information) Aged Out      No completion history exists for this topic.              Discontinued - Influenza Vaccine  Discontinued      10/15/2021  Imm Admin: Influenza Vaccine Quad Inj (Pf)    01/05/2021  Imm Admin: Influenza Vaccine Quad Inj (Pf)    01/05/2021  Imm Admin: Influenza Vac Subunit Quad Inj (Pf)    11/08/2019  Imm Admin:  "Influenza Vaccine Quad Inj (Pf)    11/20/2018  Imm Admin: Influenza Vaccine Quad Inj (Pf)    Only the first 5 history entries have been loaded, but more history exists.                    Patient Care Team:  Franklin Gonzalez M.D. as PCP - General (Internal Medicine)  Starr Jackson M.D. (Inactive) as Consulting Physician (Rheumatology)  Franklin Gonzalez M.D.      ROS           Objective     BP (!) 152/88 (BP Location: Left arm, Patient Position: Sitting, BP Cuff Size: Adult)   Pulse 94   Temp 36.3 °C (97.4 °F)   Resp 14   Ht 1.702 m (5' 7\")   Wt 92.1 kg (203 lb)   LMP 06/30/2012   SpO2 96%   BMI 31.79 kg/m²    Repeat blood pressure by myself left arm 142/84  Physical Exam  Vitals and nursing note reviewed.   Constitutional:       General: She is in acute distress.      Appearance: Normal appearance.   HENT:      Head: Normocephalic and atraumatic.      Right Ear: Tympanic membrane and external ear normal.      Left Ear: Tympanic membrane and external ear normal.      Nose: Congestion present.      Mouth/Throat:      Mouth: Mucous membranes are moist.      Pharynx: Oropharynx is clear. No oropharyngeal exudate.   Eyes:      General:         Right eye: No discharge.         Left eye: No discharge.      Conjunctiva/sclera: Conjunctivae normal.      Pupils: Pupils are equal, round, and reactive to light.   Cardiovascular:      Rate and Rhythm: Normal rate and regular rhythm.      Heart sounds: Normal heart sounds. No murmur heard.  Pulmonary:      Effort: No respiratory distress.      Breath sounds: Normal breath sounds.   Abdominal:      General: There is no distension.   Musculoskeletal:         General: No swelling.      Cervical back: Neck supple. No rigidity.   Lymphadenopathy:      Cervical: No cervical adenopathy.   Skin:     Coloration: Skin is not jaundiced.      Findings: No bruising.   Neurological:      General: No focal deficit present.      Mental Status: She is alert.   Psychiatric:         Mood and " Affect: Mood normal.         Behavior: Behavior normal.                             Assessment & Plan     Assessment  #! Annual exam     #2 status post lumbar surgery in June, L4-L5 laminectomy, foraminotomy, followed by dr.sekhon WHITLEY, continues to go to [facility] physical therapy twice weekly, also followed by  pain management    #3 sinus congestion, sinus discomfort question sinusitis, symptoms since last week Thursday, no sick exposures, lungs are clear, normal saturation, afebrile question sinusitis also consider viral etiology    #4 history of left and right hip arthroplasty with occasional hip pains especially if sitting for too long    #5 postmenopausal HRT per gynecology  we do not have any recent records    #6 history of peptic ulcer disease, last EGD by GI consultants a year ago, continues on omeprazole 40 mg daily, she is on Celebrex 200 mg once or twice a day for chronic back and hip pain with no GI upset    #7 dyslipidemia diet controlled    #8 postmenopausal bone loss    #9 celiac disease, trying to follow a gluten limited diet    #10 allergic rhinitis on Flonase and Zyrtec    #11 elevated blood pressure today, possibly related to her sinus infection, also she took Sudafed this morning preop EKG earlier this year no acute changes, echo last year no LVH  3/30/23 echo normal LV function, normal left ventricular size and thickness, EF 65%, normal wall motion, normal diastolic function, normal aortic root  5/24/24 EKG sinus    Plan  #1 health maintenance reviewed and updated    #2 COVID/RSV/flu swab today negative, notified patient with results, given her symptoms and a negative swab, will start Augmentin 875 mg p.o. twice daily can cause nausea, loose stools, rash, prescription sent to pharmacy x 7 days    #3 Continue Flonase, Zyrtec, add Atrovent nasal, recommend also try nasal lavage    #4 continue Celebrex 200 mg once to twice a day monitor for GI upset especially on twice daily dosing  which she tries to limit, continue Prilosec    #5 referral back to GI    #6 asked patient to start checking blood pressure at home 3-4 times a week, writing down the numbers, goal systolic blood pressure less than 130, goal diastolic less than 80, if blood pressures are consistent above those numbers to let me know, work on a good nutrition program limiting processed foods, sweets, salt in her diet, try to work on regular exercise given her back and hip limitations    #7 mammogram and bone density    #8 she will get the Shingrix vaccine at the pharmacy    #9 labs preventative labs ordered, weight at least 2 weeks after her symptoms have improved before obtain the fasting blood and urine test    #10 follow-up orthopedics and physical therapy    #11 will notify her with test results and she can send me an update on her blood pressures in [facility]

## 2024-11-26 ENCOUNTER — PHARMACY VISIT (OUTPATIENT)
Dept: PHARMACY | Facility: MEDICAL CENTER | Age: 54
End: 2024-11-26
Payer: COMMERCIAL

## 2024-12-11 RX ORDER — CELECOXIB 200 MG/1
200 CAPSULE ORAL
Qty: 120 CAPSULE | Refills: 2 | Status: SHIPPED | OUTPATIENT
Start: 2024-12-11

## 2024-12-12 NOTE — TELEPHONE ENCOUNTER
Received request via: Patient    Was the patient seen in the last year in this department? Yes    Does the patient have an active prescription (recently filled or refills available) for medication(s) requested? No    Pharmacy Name: [facility] [id number] [place], [place] - [address]     Does the patient have care home Plus and need 100-day supply? (This applies to ALL medications) Patient does not have SCP

## 2024-12-22 DIAGNOSIS — M54.9 BACK PAIN, UNSPECIFIED BACK LOCATION, UNSPECIFIED BACK PAIN LATERALITY, UNSPECIFIED CHRONICITY: ICD-10-CM

## 2024-12-23 RX ORDER — GABAPENTIN 300 MG/1
300 CAPSULE ORAL 2 TIMES DAILY
Qty: 180 CAPSULE | Refills: 0 | Status: SHIPPED | OUTPATIENT
Start: 2024-12-23

## 2024-12-23 NOTE — TELEPHONE ENCOUNTER
Received request via: Pharmacy    Was the patient seen in the last year in this department? Yes    Does the patient have an active prescription (recently filled or refills available) for medication(s) requested? No    Pharmacy Name: [facility] [id number] [place], [place] - [address]     Does the patient have [facility] Plus and need 100-day supply? (This applies to ALL medications) Patient does not have SCP

## 2024-12-27 ENCOUNTER — APPOINTMENT (OUTPATIENT)
Dept: RADIOLOGY | Facility: MEDICAL CENTER | Age: 54
End: 2024-12-27
Attending: INTERNAL MEDICINE
Payer: COMMERCIAL

## 2025-01-17 ENCOUNTER — APPOINTMENT (OUTPATIENT)
Dept: RADIOLOGY | Facility: MEDICAL CENTER | Age: 55
End: 2025-01-17
Attending: INTERNAL MEDICINE
Payer: COMMERCIAL

## 2025-02-04 ENCOUNTER — APPOINTMENT (OUTPATIENT)
Dept: RADIOLOGY | Facility: MEDICAL CENTER | Age: 55
End: 2025-02-04
Attending: INTERNAL MEDICINE
Payer: COMMERCIAL

## 2025-02-11 ENCOUNTER — HOSPITAL ENCOUNTER (OUTPATIENT)
Dept: RADIOLOGY | Facility: MEDICAL CENTER | Age: 55
End: 2025-02-11
Attending: INTERNAL MEDICINE
Payer: COMMERCIAL

## 2025-02-11 DIAGNOSIS — M81.0 POSTMENOPAUSAL BONE LOSS: ICD-10-CM

## 2025-02-11 DIAGNOSIS — Z12.31 ENCOUNTER FOR SCREENING MAMMOGRAM FOR BREAST CANCER: ICD-10-CM

## 2025-02-11 PROCEDURE — 77080 DXA BONE DENSITY AXIAL: CPT

## 2025-02-11 PROCEDURE — 77067 SCR MAMMO BI INCL CAD: CPT

## 2025-02-13 ENCOUNTER — RESULTS FOLLOW-UP (OUTPATIENT)
Dept: MEDICAL GROUP | Facility: MEDICAL CENTER | Age: 55
End: 2025-02-13
Payer: COMMERCIAL

## 2025-02-13 NOTE — TELEPHONE ENCOUNTER
Please notify the patient that her bone density test shows normal bone strength of her back and forearm.  Have her continue regular weightbearing exercises, taking vitamin D daily and calcium 1000 mg daily.  We can repeat her bone density test in 2 years.

## 2025-02-17 PROCEDURE — RXMED WILLOW AMBULATORY MEDICATION CHARGE: Performed by: PHYSICIAN ASSISTANT

## 2025-02-18 ENCOUNTER — PHARMACY VISIT (OUTPATIENT)
Dept: PHARMACY | Facility: MEDICAL CENTER | Age: 55
End: 2025-02-18
Payer: COMMERCIAL

## 2025-03-22 RX ORDER — OMEPRAZOLE 40 MG/1
40 CAPSULE, DELAYED RELEASE ORAL DAILY
Qty: 100 CAPSULE | Refills: 2 | Status: SHIPPED | OUTPATIENT
Start: 2025-03-22

## 2025-04-11 DIAGNOSIS — M54.9 BACK PAIN, UNSPECIFIED BACK LOCATION, UNSPECIFIED BACK PAIN LATERALITY, UNSPECIFIED CHRONICITY: ICD-10-CM

## 2025-04-12 RX ORDER — GABAPENTIN 300 MG/1
300 CAPSULE ORAL 2 TIMES DAILY
Qty: 200 CAPSULE | Refills: 1 | Status: SHIPPED | OUTPATIENT
Start: 2025-04-12

## 2025-05-16 ENCOUNTER — PHARMACY VISIT (OUTPATIENT)
Dept: PHARMACY | Facility: MEDICAL CENTER | Age: 55
End: 2025-05-16
Payer: COMMERCIAL

## 2025-05-16 PROCEDURE — RXMED WILLOW AMBULATORY MEDICATION CHARGE: Performed by: PHYSICIAN ASSISTANT

## 2025-06-03 ENCOUNTER — APPOINTMENT (OUTPATIENT)
Dept: LAB | Facility: MEDICAL CENTER | Age: 55
End: 2025-06-03
Payer: COMMERCIAL

## 2025-06-18 ENCOUNTER — APPOINTMENT (OUTPATIENT)
Dept: LAB | Facility: MEDICAL CENTER | Age: 55
End: 2025-06-18
Payer: COMMERCIAL

## 2025-06-25 ENCOUNTER — APPOINTMENT (OUTPATIENT)
Dept: LAB | Facility: MEDICAL CENTER | Age: 55
End: 2025-06-25
Payer: COMMERCIAL

## 2025-07-11 ENCOUNTER — APPOINTMENT (OUTPATIENT)
Dept: LAB | Facility: MEDICAL CENTER | Age: 55
End: 2025-07-11
Payer: COMMERCIAL

## 2025-07-11 ENCOUNTER — HOSPITAL ENCOUNTER (OUTPATIENT)
Dept: LAB | Facility: MEDICAL CENTER | Age: 55
End: 2025-07-11
Payer: COMMERCIAL

## 2025-07-11 ENCOUNTER — HOSPITAL ENCOUNTER (OUTPATIENT)
Dept: LAB | Facility: MEDICAL CENTER | Age: 55
End: 2025-07-11
Attending: INTERNAL MEDICINE
Payer: COMMERCIAL

## 2025-07-11 DIAGNOSIS — Z00.00 PREVENTATIVE HEALTH CARE: ICD-10-CM

## 2025-07-11 DIAGNOSIS — E55.9 VITAMIN D DEFICIENCY: ICD-10-CM

## 2025-07-11 DIAGNOSIS — E53.8 B12 DEFICIENCY: ICD-10-CM

## 2025-07-11 DIAGNOSIS — R79.0 LOW BLOOD MAGNESIUM: ICD-10-CM

## 2025-07-11 DIAGNOSIS — E61.1 IRON DEFICIENCY: ICD-10-CM

## 2025-07-11 LAB
25(OH)D3 SERPL-MCNC: 58 NG/ML (ref 30–100)
ALBUMIN SERPL BCP-MCNC: 4.5 G/DL (ref 3.2–4.9)
ALBUMIN SERPL BCP-MCNC: 4.5 G/DL (ref 3.2–4.9)
ALBUMIN/GLOB SERPL: 1.7 G/DL
ALP SERPL-CCNC: 54 U/L (ref 30–99)
ALP SERPL-CCNC: 54 U/L (ref 30–99)
ALT SERPL-CCNC: 55 U/L (ref 2–50)
ALT SERPL-CCNC: 56 U/L (ref 2–50)
ANION GAP SERPL CALC-SCNC: 13 MMOL/L (ref 7–16)
APPEARANCE UR: ABNORMAL
AST SERPL-CCNC: 43 U/L (ref 12–45)
AST SERPL-CCNC: 44 U/L (ref 12–45)
BACTERIA #/AREA URNS HPF: ABNORMAL /HPF
BASOPHILS # BLD AUTO: 0.9 % (ref 0–1.8)
BASOPHILS # BLD: 0.04 K/UL (ref 0–0.12)
BILIRUB CONJ SERPL-MCNC: <0.2 MG/DL (ref 0.1–0.5)
BILIRUB INDIRECT SERPL-MCNC: ABNORMAL MG/DL (ref 0–1)
BILIRUB SERPL-MCNC: 0.4 MG/DL (ref 0.1–1.5)
BILIRUB SERPL-MCNC: 0.4 MG/DL (ref 0.1–1.5)
BILIRUB UR QL STRIP.AUTO: NEGATIVE
BUN SERPL-MCNC: 14 MG/DL (ref 8–22)
CALCIUM ALBUM COR SERPL-MCNC: 9.1 MG/DL (ref 8.5–10.5)
CALCIUM SERPL-MCNC: 9.5 MG/DL (ref 8.5–10.5)
CASTS URNS QL MICRO: ABNORMAL /LPF (ref 0–2)
CHLORIDE SERPL-SCNC: 102 MMOL/L (ref 96–112)
CHOLEST SERPL-MCNC: 219 MG/DL (ref 100–199)
CO2 SERPL-SCNC: 21 MMOL/L (ref 20–33)
COLOR UR: YELLOW
CREAT SERPL-MCNC: 0.79 MG/DL (ref 0.5–1.4)
EOSINOPHIL # BLD AUTO: 0.09 K/UL (ref 0–0.51)
EOSINOPHIL NFR BLD: 1.9 % (ref 0–6.9)
EPITHELIAL CELLS 1715: ABNORMAL /HPF (ref 0–5)
ERYTHROCYTE [DISTWIDTH] IN BLOOD BY AUTOMATED COUNT: 46.8 FL (ref 35.9–50)
EST. AVERAGE GLUCOSE BLD GHB EST-MCNC: 100 MG/DL
FERRITIN SERPL-MCNC: 132 NG/ML (ref 10–291)
GFR SERPLBLD CREATININE-BSD FMLA CKD-EPI: 89 ML/MIN/1.73 M 2
GGT SERPL-CCNC: 43 U/L (ref 7–34)
GLOBULIN SER CALC-MCNC: 2.7 G/DL (ref 1.9–3.5)
GLUCOSE SERPL-MCNC: 80 MG/DL (ref 65–99)
GLUCOSE UR STRIP.AUTO-MCNC: NEGATIVE MG/DL
HBA1C MFR BLD: 5.1 % (ref 4–5.6)
HCT VFR BLD AUTO: 41.6 % (ref 37–47)
HDLC SERPL-MCNC: 82 MG/DL
HGB BLD-MCNC: 13.7 G/DL (ref 12–16)
IMM GRANULOCYTES # BLD AUTO: 0.01 K/UL (ref 0–0.11)
IMM GRANULOCYTES NFR BLD AUTO: 0.2 % (ref 0–0.9)
IRON SATN MFR SERPL: 25 % (ref 15–55)
IRON SERPL-MCNC: 90 UG/DL (ref 40–170)
IRON SERPL-MCNC: 90 UG/DL (ref 40–170)
KETONES UR STRIP.AUTO-MCNC: ABNORMAL MG/DL
LDLC SERPL CALC-MCNC: 122 MG/DL
LEUKOCYTE ESTERASE UR QL STRIP.AUTO: ABNORMAL
LYMPHOCYTES # BLD AUTO: 1.57 K/UL (ref 1–4.8)
LYMPHOCYTES NFR BLD: 33.9 % (ref 22–41)
MAGNESIUM SERPL-MCNC: 2 MG/DL (ref 1.5–2.5)
MCH RBC QN AUTO: 32.3 PG (ref 27–33)
MCHC RBC AUTO-ENTMCNC: 32.9 G/DL (ref 32.2–35.5)
MCV RBC AUTO: 98.1 FL (ref 81.4–97.8)
MICRO URNS: ABNORMAL
MONOCYTES # BLD AUTO: 0.49 K/UL (ref 0–0.85)
MONOCYTES NFR BLD AUTO: 10.6 % (ref 0–13.4)
NEUTROPHILS # BLD AUTO: 2.43 K/UL (ref 1.82–7.42)
NEUTROPHILS NFR BLD: 52.5 % (ref 44–72)
NITRITE UR QL STRIP.AUTO: NEGATIVE
NRBC # BLD AUTO: 0 K/UL
NRBC BLD-RTO: 0 /100 WBC (ref 0–0.2)
PH UR STRIP.AUTO: 6 [PH] (ref 5–8)
PLATELET # BLD AUTO: 232 K/UL (ref 164–446)
PMV BLD AUTO: 10.3 FL (ref 9–12.9)
POTASSIUM SERPL-SCNC: 4.7 MMOL/L (ref 3.6–5.5)
PROT SERPL-MCNC: 7.2 G/DL (ref 6–8.2)
PROT SERPL-MCNC: 7.2 G/DL (ref 6–8.2)
PROT UR QL STRIP: NEGATIVE MG/DL
RBC # BLD AUTO: 4.24 M/UL (ref 4.2–5.4)
RBC # URNS HPF: ABNORMAL /HPF (ref 0–2)
RBC UR QL AUTO: NEGATIVE
SODIUM SERPL-SCNC: 136 MMOL/L (ref 135–145)
SP GR UR STRIP.AUTO: 1.02
TIBC SERPL-MCNC: 353 UG/DL (ref 250–450)
TRIGL SERPL-MCNC: 73 MG/DL (ref 0–149)
TSH SERPL-ACNC: 2.72 UIU/ML (ref 0.38–5.33)
UIBC SERPL-MCNC: 263 UG/DL (ref 110–370)
UROBILINOGEN UR STRIP.AUTO-MCNC: 0.2 EU/DL
VIT B12 SERPL-MCNC: 769 PG/ML (ref 211–911)
WBC # BLD AUTO: 4.6 K/UL (ref 4.8–10.8)
WBC #/AREA URNS HPF: ABNORMAL /HPF

## 2025-07-11 PROCEDURE — 83036 HEMOGLOBIN GLYCOSYLATED A1C: CPT

## 2025-07-11 PROCEDURE — 81001 URINALYSIS AUTO W/SCOPE: CPT

## 2025-07-11 PROCEDURE — 80061 LIPID PANEL: CPT

## 2025-07-11 PROCEDURE — 83540 ASSAY OF IRON: CPT

## 2025-07-11 PROCEDURE — 85025 COMPLETE CBC W/AUTO DIFF WBC: CPT

## 2025-07-11 PROCEDURE — 83550 IRON BINDING TEST: CPT

## 2025-07-11 PROCEDURE — 82977 ASSAY OF GGT: CPT

## 2025-07-11 PROCEDURE — 82784 ASSAY IGA/IGD/IGG/IGM EACH: CPT

## 2025-07-11 PROCEDURE — 82607 VITAMIN B-12: CPT

## 2025-07-11 PROCEDURE — 80053 COMPREHEN METABOLIC PANEL: CPT

## 2025-07-11 PROCEDURE — 80076 HEPATIC FUNCTION PANEL: CPT

## 2025-07-11 PROCEDURE — 82306 VITAMIN D 25 HYDROXY: CPT

## 2025-07-11 PROCEDURE — 83540 ASSAY OF IRON: CPT | Mod: 91

## 2025-07-11 PROCEDURE — 86364 TISS TRNSGLTMNASE EA IG CLAS: CPT

## 2025-07-11 PROCEDURE — 36415 COLL VENOUS BLD VENIPUNCTURE: CPT

## 2025-07-11 PROCEDURE — 83735 ASSAY OF MAGNESIUM: CPT

## 2025-07-11 PROCEDURE — 84443 ASSAY THYROID STIM HORMONE: CPT

## 2025-07-11 PROCEDURE — 82728 ASSAY OF FERRITIN: CPT

## 2025-07-13 LAB
IGA SERPL-MCNC: 187 MG/DL (ref 68–408)
TTG IGA SER IA-ACNC: <1.02 FLU (ref 0–4.99)

## 2025-07-16 PROBLEM — R31.29 MICROSCOPIC HEMATURIA: Status: ACTIVE | Noted: 2025-07-16

## 2025-07-20 ENCOUNTER — TELEPHONE (OUTPATIENT)
Dept: MEDICAL GROUP | Facility: MEDICAL CENTER | Age: 55
End: 2025-07-20
Payer: COMMERCIAL

## 2025-07-20 DIAGNOSIS — R94.5 ABNORMAL LIVER FUNCTION: Primary | ICD-10-CM

## 2025-08-21 PROCEDURE — RXMED WILLOW AMBULATORY MEDICATION CHARGE: Performed by: PHYSICIAN ASSISTANT

## 2025-08-22 ENCOUNTER — PHARMACY VISIT (OUTPATIENT)
Dept: PHARMACY | Facility: MEDICAL CENTER | Age: 55
End: 2025-08-22
Payer: COMMERCIAL

## (undated) DEVICE — DRESSING INTEGUSEAL MICROBIAL SEALANT IS100 (20EA/CA)

## (undated) DEVICE — DEVICE MONOPOLAR RF PEAK PLASMABLADE 3.0S

## (undated) DEVICE — TRAY EPIDURAL REHAB ONLY!!!! - (10EA/CA)

## (undated) DEVICE — PROTECTOR ULNA NERVE - (36PR/CA)

## (undated) DEVICE — LACTATED RINGERS INJ 1000 ML - (14EA/CA 60CA/PF)

## (undated) DEVICE — GLOVE SURGICAL PROTEXIS PI 8.0 LF - (50PR/BX)

## (undated) DEVICE — CLOSURE SKIN STRIP 1/2 X 4 IN - (STERI STRIP) (50/BX 4BX/CA)

## (undated) DEVICE — CON SEDATION/>5 YR 1ST 15 MIN

## (undated) DEVICE — PILLOW ABDUCTION HIP SMALL - (6EA/CA)

## (undated) DEVICE — WATER IRRIGATION STERILE 1000ML (12EA/CA)

## (undated) DEVICE — SUCTION INSTRUMENT YANKAUER BULBOUS TIP W/O VENT (50EA/CA)

## (undated) DEVICE — SODIUM CHL. IRRIGATION 0.9% 3000ML (4EA/CA 65CA/PF)

## (undated) DEVICE — DRAPETIBURON SHOULDER W/POUCH - (5EA/CA)

## (undated) DEVICE — SODIUM CHL IRRIGATION 0.9% 1000ML (12EA/CA)

## (undated) DEVICE — CHLORAPREP 26 ML APPLICATOR - ORANGE TINT(25/CA)

## (undated) DEVICE — GOWN SURGICAL X-LARGE ULTRA - FILM-REINFORCED (20/CA)

## (undated) DEVICE — SYRINGE DISP. 60 CC LL - (30/BX, 12BX/CA)**WHEN THESE ARE GONE ORDER #500206**

## (undated) DEVICE — HANDPIECE 10FT INTPLS SCT PLS IRRIGATION HAND CONTROL SET (6/PK)

## (undated) DEVICE — STOCKINET BIAS 4 IN STERILE - (20/CA)

## (undated) DEVICE — DRESSING TRANSPARENT FILM TEGADERM 4 X 4.75" (50EA/BX)"

## (undated) DEVICE — DRAPE LARGE 3 QUARTER - (20/CA)

## (undated) DEVICE — SUTURE 4-0 30CM STRATAFIX SPIRAL PS-2 (12EA/BX)

## (undated) DEVICE — KIT ANESTHESIA W/CIRCUIT & 3/LT BAG W/FILTER (20EA/CA)

## (undated) DEVICE — DRILL BIT

## (undated) DEVICE — TUBING IV NEEDLESS PRE-OP - (50/CA)

## (undated) DEVICE — HEAD HOLDER JUNIOR/ADULT

## (undated) DEVICE — TIP INTPLS HFLO ML ORFC BTRY - (12/CS)  FOR SURGILAV

## (undated) DEVICE — CANNULA KIT UNIV GREY - (10/BX)

## (undated) DEVICE — BOVIE NEEDLE TIP INSULATD NON-SAFETY 2CM (50/PK)

## (undated) DEVICE — SUTURE 2 TICRON BLUE GS-21 (36EA/CA)

## (undated) DEVICE — DRAPE VERTICAL ISOLATION - (10EA/CA)

## (undated) DEVICE — GLOVE BIOGEL PI INDICATOR SZ 7.5 SURGICAL PF LF -(50/BX 4BX/CA)

## (undated) DEVICE — ELECTRODE 850 FOAM ADHESIVE - HYDROGEL RADIOTRNSPRNT (50/PK)

## (undated) DEVICE — NEPTUNE 4 PORT MANIFOLD - (20/PK)

## (undated) DEVICE — GLOVE PROTEXIS PI MICRO SZ 8.5 (200PR/CA)

## (undated) DEVICE — KIT SURGIFLO W/OUT THROMBIN - (6EA/CA)

## (undated) DEVICE — GLOVE SZ 7 BIOGEL PI MICRO - PF LF (50PR/BX 4BX/CA)

## (undated) DEVICE — GLOVE BIOGEL PI INDICATOR SZ 8.0 SURGICAL PF LF -(50/BX 4BX/CA)

## (undated) DEVICE — PADDING CAST 4 IN STERILE - 4 X 4 YDS (24/CA)

## (undated) DEVICE — LENS/HOOD FOR SPACESUIT - (32/PK) PEEL AWAY FACE

## (undated) DEVICE — TUBE CONNECTING SUCTION - CLEAR PLASTIC STERILE 72 IN (50EA/CA)

## (undated) DEVICE — BANDAID X-LARGE 2 X 4 IN LF (50EA/BX)

## (undated) DEVICE — SENSOR SPO2 NEO LNCS ADHESIVE (20/BX) SEE USER NOTES

## (undated) DEVICE — GOWN WARMING STANDARD FLEX - (30/CA)

## (undated) DEVICE — PAD UNIVERSAL MULTI USE (1/EA)

## (undated) DEVICE — BANDAGE ELASTIC STERILE VELCRO 6 X 5 YDS (25EA/CA)

## (undated) DEVICE — ELECTRODE DUAL RETURN W/ CORD - (50/PK)

## (undated) DEVICE — FIBERWIRE 2.0 (12EA/BX)

## (undated) DEVICE — SET LEADWIRE 5 LEAD BEDSIDE DISPOSABLE ECG (1SET OF 5/EA)

## (undated) DEVICE — GLOVE PROTEXIS LATEX MICRO SIZE 9 (50PR/BX)

## (undated) DEVICE — CANISTER SUCTION RIGID RED 1500CC (40EA/CA)

## (undated) DEVICE — CON SEDATION EA ADDL 15 MIN

## (undated) DEVICE — LACTATED RINGERS INJ. 500 ML - (24EA/CA)

## (undated) DEVICE — SUTURE 0 PDS-2 CTX 36 INCH - (24/BX)

## (undated) DEVICE — PEN SKIN MARKER W/RULER - (50EA/BX)

## (undated) DEVICE — PACK TOTAL HIP - (1/CA)

## (undated) DEVICE — GLOVE BIOGEL SZ 8 SURGICAL PF LTX - (50PR/BX 4BX/CA)

## (undated) DEVICE — TOWELS CLOTH SURGICAL - (4/PK 20PK/CA)

## (undated) DEVICE — SUTURE 4-0 ETHILON FS-2 18 (36PK/BX)"

## (undated) DEVICE — TUBE E-T HI-LO CUFF 7.0MM (10EA/PK)

## (undated) DEVICE — SPONGE GAUZESTER 4 X 4 4PLY - (128PK/CA)

## (undated) DEVICE — MASK ANESTHESIA ADULT  - (100/CA)

## (undated) DEVICE — GLOVE SURGICAL PROTEXIS 8 1/2 - (50PR/BX)

## (undated) DEVICE — SUTURE 2-0 MONOCRYL PLUS UNDYED CT-1 1 X 36 (36EA/BX)"

## (undated) DEVICE — TUBING C&T SET FLYING LEADS DRAIN TUBING (10EA/BX)

## (undated) DEVICE — DRAPE C-ARM LARGE 41IN X 74 IN - (10/BX 2BX/CA)

## (undated) DEVICE — TOURNIQUET, STERILE 18 (RED)

## (undated) DEVICE — PACK MINOR BASIN - (2EA/CA)

## (undated) DEVICE — ARMREST CRADLE FOAM - (2PR/PK 12PR/CA)

## (undated) DEVICE — GLOVE BIOGEL SZ 8.5 SURGICAL PF LTX - (50PR/BX 4BX/CA)

## (undated) DEVICE — GLOVE SZ 7.5 LF PROTEXIS (50PR/BX)

## (undated) DEVICE — SEALER BIPOLAR 2.3 AQUAMANTYS

## (undated) DEVICE — CANISTER SUCTION 3000ML MECHANICAL FILTER AUTO SHUTOFF MEDI-VAC NONSTERILE LF DISP  (40EA/CA)

## (undated) DEVICE — STAPLER SKIN DISP - (6/BX 10BX/CA) VISISTAT

## (undated) DEVICE — NEEDLE DAVIS TONSIL 1/2 CIR - (2EA/PK20PK/BX)

## (undated) DEVICE — CONTAINER SPECIMEN BAG OR - STERILE 4 OZ W/LID (100EA/CA)

## (undated) DEVICE — SUTURE 1 VICRYL PLUS CTX - 8 X 18 INCH (12/BX)

## (undated) DEVICE — NEEDLE SPINAL NON-SAFETY 18 GA X 3 IN (25EA/BX)

## (undated) DEVICE — GLOVE, LITE (PAIR)

## (undated) DEVICE — BAG, SPONGE COUNT 50600

## (undated) DEVICE — DRAPE U SPLIT IMP 54 X 76 - (24/CA)

## (undated) DEVICE — GOWN SURGEONS X-LARGE - DISP. (30/CA)

## (undated) DEVICE — BAG SPONGE COUNT 10.25 X 32 - BLUE (250/CA)

## (undated) DEVICE — CANNULA RF RADIOPAQUE 100MM 16GA 10MM ACTIVE TIP (10/CA)

## (undated) DEVICE — SYRINGE SAFETY 3 ML 18 GA X 1 1/2 BLUNT LL (100/BX 8BX/CA)

## (undated) DEVICE — BLADE SAGITTAL SAW DUAL CUT 75.0 X 25.0MM (1/EA)

## (undated) DEVICE — GLOVE BIOGEL PI INDICATOR SZ 7.0 SURGICAL PF LF - (50/BX 4BX/CA)

## (undated) DEVICE — GLOVE BIOGEL INDICATOR SZ 8 SURGICAL PF LTX - (50/BX 4BX/CA)

## (undated) DEVICE — GLOVE BIOGEL PI ULTRATOUCH SZ 7.5 SURGICAL PF LF -(50/BX 4BX/CA)

## (undated) DEVICE — SENSOR OXIMETER ADULT SPO2 RD SET (20EA/BX)

## (undated) DEVICE — TUBING CASSETTE CROSSFLOW INTEGRATED (10EA/CA)

## (undated) DEVICE — COVER MAYO STAND X-LG - (22EA/CA)

## (undated) DEVICE — GLOVE BIOGEL PI ULTRATOUCH SZ 7.0 SURGICAL PF LF- POWDER FREE (50/BX 4BX/CA)

## (undated) DEVICE — HUMID-VENT HEAT AND MOISTURE EXCHANGE- (50/BX)

## (undated) DEVICE — GLOVE PROTEXIS LATEX SIZE 9 (40PR/BX)

## (undated) DEVICE — KIT ROOM DECONTAMINATION

## (undated) DEVICE — SUTURE GENERAL

## (undated) DEVICE — PACK UPPER EXTREMITY SM OR - (3/CA)

## (undated) DEVICE — DRESSING TRANSPARENT FILM TEGADERM 2.375 X 2.75"  (100EA/BX)"

## (undated) DEVICE — TUBE E-T HI-LO CUFF 7.5MM (10EA/PK)

## (undated) DEVICE — DRESSING ABDOMINAL PAD STERILE 8 X 10" (360EA/CA)"

## (undated) DEVICE — DRESSING XEROFORM 1X8 - (50/BX 4BX/CA)

## (undated) DEVICE — MASK AIRWAY SIZE 3 UNIQUE SILICON (10/BX)

## (undated) DEVICE — PACK NEURO - (2EA/CA)

## (undated) DEVICE — DRAPE SURGICAL U 77X120 - (10/CA)

## (undated) DEVICE — KIT EVACUATER 3 SPRING PVC LF 1/8 DRAIN SIZE (10EA/CA)"

## (undated) DEVICE — TRAY SRGPRP PVP IOD WT PRP - (20/CA)

## (undated) DEVICE — BLADE SURGICAL #15 - (50/BX 3BX/CA)

## (undated) DEVICE — SYRINGE ASEPTO - (50EA/CA

## (undated) DEVICE — BURR ROUND 66MM

## (undated) DEVICE — STYLETTE 6FR INFANT STERILE SINGEL ALUMINUM SUNSLIP SEALED IN PVC SHEATH (20EA/BX)

## (undated) DEVICE — SPIDER SHOULDER HOLDER (12EA/BX)

## (undated) DEVICE — GLOVE BIOGEL PI ORTHO SZ 8.5 PF LF (40/BX)

## (undated) DEVICE — PACK SHOULDER ARTHROSCOPY SM - (2EA/CA)

## (undated) DEVICE — HEADREST PRONEVIEW LARGE - (10/CA)

## (undated) DEVICE — SET EXTENSION WITH 2 PORTS (48EA/CA) ***PART #2C8610 IS A SUBSTITUTE*****

## (undated) DEVICE — SYRINGE SAFETY 5 ML 18 GA X 1-1/2 BLUNT LL (100/BX 4BX/CA)

## (undated) DEVICE — TOOL MR8 14CM MATCH HD SYM-TRI 3MM DIAMETER (1/EA)

## (undated) DEVICE — MASK AIRWAY SZ 2.5 UNIQUE SILICON (10EA/BX)

## (undated) DEVICE — STERI STRIP COMPOUND BENZOIN - TINCTURE 0.6ML WITH APPLICATOR (40EA/BX)

## (undated) DEVICE — KIT  I.V. START (100EA/CA)

## (undated) DEVICE — PENCIL ELECTSURG 10FT BTN SWH - (50/CA)

## (undated) DEVICE — SUCTION TIP STRAIGHT ARGYLE - 50EA/CA

## (undated) DEVICE — GLOVE BIOGEL PI INDICATOR SZ 9.0 SURGICAL PF LF -(50PR/BX 4BX/CA)

## (undated) DEVICE — SHAVER4.0 AGGRESSIVE + FORMLA (5EA/BX)

## (undated) DEVICE — GROUNDING PAD

## (undated) DEVICE — COVER LIGHT HANDLE ALC PLUS DISP (18EA/BX)

## (undated) DEVICE — DRAPE U ORTHOPEDIC - (10/BX)

## (undated) DEVICE — SUTURE 2-0 VICRYL PLUS CT-1 - 8 X 18 INCH(12/BX)

## (undated) DEVICE — SUCTION TUBE FRAZIER 12FR STERILE (40EA/CA)

## (undated) DEVICE — CUFF SOFT ADLT 12W/RECTUS - CONNECTER (20/CA)

## (undated) DEVICE — NEEDLE FILTER ASPIRATION 18 GA X 1 1/2 IN (100EA/BX)

## (undated) DEVICE — DRESSING AQUACEL AG ADVANTAGE 3.5 X 10" (10EA/BX)"

## (undated) DEVICE — FOAM FACEHOLDER SPIDER (8EA/BX)

## (undated) DEVICE — DRAPE 36X28IN RAD CARM BND BG - (25/CA) O

## (undated) DEVICE — TOWEL STOP TIMEOUT SAFETY FLAG (40EA/CA)

## (undated) DEVICE — BLADE SAGITTAL SAW 9.4MM X 25.5MM X .4MM FINE TOOTH (1/EA)

## (undated) DEVICE — TUBING DAY USE W/CARTRIDGE (10EA/BX)

## (undated) DEVICE — SPONGE GAUZE STER 4X4 8-PL - (2/PK 50PK/BX 12BX/CS)

## (undated) DEVICE — BLADE SURGICAL CLIPPER - (50EA/CA)

## (undated) DEVICE — SUTURE 1 PDS-2 PLUS CTX - (24/BX)

## (undated) DEVICE — GLOVE BIOGEL INDICATOR SZ 8.5 SURGICAL PF LTX - (50/BX 4BX/CA)

## (undated) DEVICE — SUTURE 3-0 ETHILON FS-1 - (36/BX) 30 INCH

## (undated) DEVICE — GLOVE SIZE 7.0 SURGEON ACCELERATOR FREE GREEN (50PR/BX 4BX/CA)

## (undated) DEVICE — GUIDE TRACHE TUBE INTUBATING STYLET 5.0-10.0MM 14FR (20EA/PK)

## (undated) DEVICE — DRAPE LAPAROTOMY T SHEET - (12EA/CA)

## (undated) DEVICE — GLOVE BIOGEL SZ 7.5 SURGICAL PF LTX - (50PR/BX 4BX/CA)

## (undated) DEVICE — MIDAS LUBRICATOR DIFFUSER PACK (4EA/CA)

## (undated) DEVICE — SUTURE 3-0 MONOCRYL PLUS PS-1 - 27 INCH (36/BX)

## (undated) DEVICE — TUBING CLEARLINK DUO-VENT - C-FLO (48EA/CA)

## (undated) DEVICE — DRAPE SURG STERI-DRAPE 7X11OD - (40EA/CA)

## (undated) DEVICE — SUTURE 0 VICRYL PLUS CT-1 - 36 INCH (36/BX)

## (undated) DEVICE — CANNULA W/ SUPPLY TUBING O2 - (50/CA)

## (undated) DEVICE — GLOVE BIOGEL PI ORTHO SZ 9 PF LF

## (undated) DEVICE — SLEEVE, VASO, THIGH, MED

## (undated) DEVICE — SUTURE PASSER SM CONCEPT - 6/BX